# Patient Record
Sex: FEMALE | Race: BLACK OR AFRICAN AMERICAN | NOT HISPANIC OR LATINO | Employment: OTHER | URBAN - METROPOLITAN AREA
[De-identification: names, ages, dates, MRNs, and addresses within clinical notes are randomized per-mention and may not be internally consistent; named-entity substitution may affect disease eponyms.]

---

## 2017-01-03 ENCOUNTER — PATIENT OUTREACH (OUTPATIENT)
Dept: OTHER | Facility: OTHER | Age: 72
End: 2017-01-03
Payer: COMMERCIAL

## 2017-01-03 NOTE — PROGRESS NOTES
Last 5 Patient Entered Readings                                                               Current 30 Day Average: 126/72     Recent Readings 1/2/2017 1/2/2017 12/31/2016 12/30/2016 12/26/2016    Systolic BP (mmHg) 129 138 118 124 119    Diastolic BP (mmHg) 72 77 72 70 69      Ms. Hooper is doing well, continuing her low sodium diet & exercise routine. Patient explained that she will no longer be able to participate in Digital Medicine program because after switching insurance providers she can no longer see Dr. Andrade or other [facility] doctors and that she must now see a provider at [facility]. She states she will still continue to take her BP readings to monitor at home.  will remove her from our active patient list. No further questions or concerns.

## 2017-05-04 ENCOUNTER — PATIENT MESSAGE (OUTPATIENT)
Dept: INTERNAL MEDICINE | Facility: CLINIC | Age: 72
End: 2017-05-04

## 2018-07-03 ENCOUNTER — TELEPHONE (OUTPATIENT)
Dept: INTERNAL MEDICINE | Facility: CLINIC | Age: 73
End: 2018-07-03

## 2018-07-03 ENCOUNTER — OFFICE VISIT (OUTPATIENT)
Dept: INTERNAL MEDICINE | Facility: CLINIC | Age: 73
End: 2018-07-03

## 2018-07-03 VITALS
HEART RATE: 80 BPM | HEIGHT: 61 IN | DIASTOLIC BLOOD PRESSURE: 76 MMHG | SYSTOLIC BLOOD PRESSURE: 142 MMHG | BODY MASS INDEX: 26.51 KG/M2 | WEIGHT: 140.44 LBS | TEMPERATURE: 98 F

## 2018-07-03 DIAGNOSIS — R05.9 COUGH: Primary | ICD-10-CM

## 2018-07-03 PROCEDURE — 99214 OFFICE O/P EST MOD 30 MIN: CPT | Mod: PBBFAC | Performed by: PHYSICIAN ASSISTANT

## 2018-07-03 PROCEDURE — 99212 OFFICE O/P EST SF 10 MIN: CPT | Mod: S$PBB,,, | Performed by: PHYSICIAN ASSISTANT

## 2018-07-03 PROCEDURE — 99999 PR PBB SHADOW E&M-EST. PATIENT-LVL IV: CPT | Mod: PBBFAC,,, | Performed by: PHYSICIAN ASSISTANT

## 2018-07-03 RX ORDER — AMOXICILLIN 875 MG/1
875 TABLET, FILM COATED ORAL 2 TIMES DAILY
Qty: 20 TABLET | Refills: 0 | Status: ON HOLD | OUTPATIENT
Start: 2018-07-03 | End: 2018-07-12 | Stop reason: ALTCHOICE

## 2018-07-03 RX ORDER — CODEINE PHOSPHATE AND GUAIFENESIN 10; 100 MG/5ML; MG/5ML
5-10 SOLUTION ORAL NIGHTLY
Qty: 120 ML | Refills: 0 | Status: SHIPPED | OUTPATIENT
Start: 2018-07-03 | End: 2018-07-23

## 2018-07-03 RX ORDER — DOXYCYCLINE 100 MG/1
100 CAPSULE ORAL EVERY 12 HOURS
Qty: 20 CAPSULE | Refills: 0 | Status: SHIPPED | OUTPATIENT
Start: 2018-07-03 | End: 2018-07-03

## 2018-07-03 RX ORDER — ALBUTEROL SULFATE 90 UG/1
2 AEROSOL, METERED RESPIRATORY (INHALATION) EVERY 4 HOURS PRN
Qty: 1 INHALER | Refills: 0 | Status: ON HOLD | OUTPATIENT
Start: 2018-07-03 | End: 2018-07-12 | Stop reason: ALTCHOICE

## 2018-07-03 NOTE — PATIENT INSTRUCTIONS
What Is Acute Bronchitis?  Acute bronchitis is when the airways in your lungs (bronchial tubes) become red and swollen (inflamed). It is usually caused by a viral infection. But it can also occur because of a bacteria or allergen. Symptoms include a cough that produces yellow or greenish mucus and can last for days or sometimes weeks.  Inside healthy lungs    Air travels in and out of the lungs through the airways. The linings of these airways produce sticky mucus. This mucus traps particles that enter the lungs. Tiny structures called cilia then sweep the particles out of the airways.     Healthy airway: Airways are normally open. Air moves in and out easily.      Healthy cilia: Tiny, hairlike cilia sweep mucus and particles up and out of the airways.   Lungs with bronchitis  Bronchitis often occurs with a cold or the flu virus. The airways become inflamed (red and swollen). There is a deep hacking cough from the extra mucus. Other symptoms may include:  · Wheezing  · Chest discomfort  · Shortness of breath  · Mild fever  A second infection, this time due to bacteria, may then occur. And airways irritated by allergens or smoke are more likely to get infected.        Inflamed airway: Inflammation and extra mucus narrow the airway, causing shortness of breath.      Impaired cilia: Extra mucus impairs cilia, causing congestion and wheezing. Smoking makes the problem worse.   Making a diagnosis  A physical exam, health history, and certain tests help your healthcare provider make the diagnosis.  Health history  Your healthcare provider will ask you about your symptoms.  The exam  Your provider listens to your chest for signs of congestion. He or she may also check your ears, nose, and throat.  Possible tests  · A sputum test for bacteria. This requires a sample of mucus from your lungs.  · A nasal or throat swab. This tests to see if you have a bacterial infection.  · A chest X-ray. This is done if your healthcare  provider thinks you have pneumonia.  · Tests to check for an underlying condition. Other tests may be done to check for things such as allergies, asthma, or COPD (chronic obstructive pulmonary disease). You may need to see a specialist for more lung function testing.  Treating a cough  The main treatment for bronchitis is easing symptoms. Avoiding smoke, allergens, and other things that trigger coughing can often help. If the infection is bacterial, you may be given antibiotics. During the illness, it's important to get plenty of sleep. To ease symptoms:  · Dont smoke. Also avoid secondhand smoke.  · Use a humidifier. Or try breathing in steam from a hot shower. This may help loosen mucus.  · Drink a lot of water and juice. They can soothe the throat and may help thin mucus.  · Sit up or use extra pillows when in bed. This helps to lessen coughing and congestion.  · Ask your provider about using medicine. Ask about using cough medicine, pain and fever medicine, or a decongestant.  Antibiotics  Most cases of bronchitis are caused by cold or flu viruses. They dont need antibiotics to treat them, even if your mucus is thick and green or yellow. Antibiotics dont treat viral illness and antibiotics have not been shown to have any benefit in cases of acute bronchitis. Taking antibiotics when they are not needed increases your risk of getting an infection later that is antibiotic-resistant. Antibiotics can also cause severe cases of diarrhea that require other antibiotics to treat.  It is important that you accept your healthcare provider's opinion to not use antibiotics. Your provider will prescribe antibiotics if the infection is caused by bacteria. If they are prescribed:  · Take all of the medicine. Take the medicine until it is used up, even if symptoms have improved. If you dont, the bronchitis may come back.  · Take the medicines as directed. For instance, some medicines should be taken with food.  · Ask about  side effects. Ask your provider or pharmacist what side effects are common, and what to do about them.  Follow-up care  You should see your provider again in 2 to 3 weeks. By this time, symptoms should have improved. An infection that lasts longer may mean you have a more serious problem.  Prevention  · Avoid tobacco smoke. If you smoke, quit. Stay away from smoky places. Ask friends and family not to smoke around you, or in your home or car.  · Get checked for allergies.  · Ask your provider about getting a yearly flu shot. Also ask about pneumococcal or pneumonia shots.  · Wash your hands often. This helps reduce the chance of picking up viruses that cause colds and flu.  Call your healthcare provider if:  · Symptoms worsen, or you have new symptoms  · Breathing problems worsen or  become severe  · Symptoms dont get better within a week, or within 3 days of taking antibiotics   Date Last Reviewed: 2/1/2017  © 4053-2118 [employer]. [address] [place] [address], [place] [address]. All rights reserved. This information is not intended as a substitute for professional medical care. Always follow your healthcare professional's instructions.

## 2018-07-03 NOTE — TELEPHONE ENCOUNTER
----- Message from Liset Parmar sent at 7/3/2018  1:10 PM CDT -----  Contact: self/ [phone]  Patient was seen today and would like something else called in for doxycycline (VIBRAMYCIN) 100 MG Cap, that is lest expensive.     [facility] [address] [place] [address] [address] [phone] (Phone)  [phone] (Fax)

## 2018-07-03 NOTE — PROGRESS NOTES
Subjective:       Patient ID: Selma Hooper is a 72 y.o. female.        Chief Complaint: Cough    Selma Hooper is an established patient of Phil Quintana MD here today for urgent care visit.    Cough started 2 weeks ago, productive of clear mucus.  Also with post nasal drip and nasal congestion.  No sinus pressure.  No ear pain or sore throat.  No chest pain or shortness of breath.  She does feel like she is wheezing.  No N/V/D/C.  No headache.  No abdominal pain.      She is still smoking.    She no longer has insurance and is going to [facility], seeing Dr. Jameson.    She was on HCTZ, then Hyzaar, then Losartan HCT, off all meds x 6 weeks, has f/u scheduled with Dr. Jameson to discuss.             Review of Systems   Constitutional: Negative for chills, diaphoresis, fatigue and fever.   HENT: Positive for congestion and postnasal drip. Negative for sore throat.    Eyes: Negative for visual disturbance.   Respiratory: Positive for cough. Negative for chest tightness and shortness of breath.    Cardiovascular: Negative for chest pain, palpitations and leg swelling.   Gastrointestinal: Negative for abdominal pain, blood in stool, constipation, diarrhea, nausea and vomiting.   Genitourinary: Negative for dysuria, frequency, hematuria and urgency.   Musculoskeletal: Negative for arthralgias and back pain.   Skin: Negative for rash.   Neurological: Negative for dizziness, syncope, weakness and headaches.   Psychiatric/Behavioral: Negative for dysphoric mood and sleep disturbance. The patient is not nervous/anxious.        Objective:      Physical Exam   Constitutional: She appears well-developed and well-nourished. No distress.   HENT:   Head: Normocephalic and atraumatic.   Right Ear: Tympanic membrane and external ear normal.   Left Ear: Tympanic membrane and external ear normal.   Nose: Mucosal edema and rhinorrhea present.   Mouth/Throat: Oropharynx is clear and moist.   Thick white post nasal drainage in  "oropharynx   Eyes: Conjunctivae are normal. Pupils are equal, round, and reactive to light.   Cardiovascular: Normal rate, regular rhythm and normal heart sounds.  Exam reveals no gallop.    No murmur heard.  Pulmonary/Chest: Effort normal and breath sounds normal. No respiratory distress.   Abdominal: Soft. Normal appearance. There is no tenderness. There is no rebound, no guarding and no CVA tenderness.   Musculoskeletal: She exhibits no edema.   Neurological: She is alert.   Skin: Skin is warm and dry. She is not diaphoretic.   Psychiatric: She has a normal mood and affect.   Nursing note and vitals reviewed.      Assessment:       1. Cough/Acute bronchitis        Plan:       Selma was seen today for cough.    Diagnoses and all orders for this visit:    Cough/acute bronchitis  -     doxycycline (VIBRAMYCIN) 100 MG Cap; Take 1 capsule (100 mg total) by mouth every 12 (twelve) hours.  -     albuterol 90 mcg/actuation inhaler; Inhale 2 puffs into the lungs every 4 (four) hours as needed for Wheezing.  -     guaifenesin-codeine 100-10 mg/5 ml (TUSSI-ORGANIDIN NR)  mg/5 mL syrup; Take 5-10 mLs by mouth every evening.    Drink plenty of fluids, get lots of rest, and follow-up poor results.      Pt has been given instructions populated from [employer] database and has verbalized understanding of the after visit summary and information contained wherein.    Follow up with a primary care provider. May go to ER for acute shortness of breath, lightheadedness, fever, or any other emergent complaints or changes in condition.    "This note will be shared with the patient"    No future appointments.            "

## 2018-07-05 ENCOUNTER — TELEPHONE (OUTPATIENT)
Dept: INTERNAL MEDICINE | Facility: CLINIC | Age: 73
End: 2018-07-05

## 2018-07-05 NOTE — TELEPHONE ENCOUNTER
----- Message from Yash Alvarado sent at 7/5/2018 10:55 AM CDT -----  Contact: Patient  Patient called while was on hold hung up call trying to retrieve call, called patient back no response left vm informing Rx for amoxil sent.      Thank you

## 2018-07-06 ENCOUNTER — PATIENT MESSAGE (OUTPATIENT)
Dept: INTERNAL MEDICINE | Facility: CLINIC | Age: 73
End: 2018-07-06

## 2018-07-11 ENCOUNTER — HOSPITAL ENCOUNTER (INPATIENT)
Facility: HOSPITAL | Age: 73
LOS: 3 days | Discharge: HOME | DRG: 291 | End: 2018-07-14
Attending: EMERGENCY MEDICINE | Admitting: HOSPITALIST
Payer: MEDICAID

## 2018-07-11 DIAGNOSIS — I50.9 NEW ONSET OF CONGESTIVE HEART FAILURE: Primary | ICD-10-CM

## 2018-07-11 DIAGNOSIS — R07.9 CHEST PAIN: ICD-10-CM

## 2018-07-11 DIAGNOSIS — R06.02 SOB (SHORTNESS OF BREATH): ICD-10-CM

## 2018-07-11 PROBLEM — R79.89 ELEVATED LFTS: Status: ACTIVE | Noted: 2018-07-11

## 2018-07-11 PROBLEM — D64.9 ANEMIA: Status: ACTIVE | Noted: 2018-07-11

## 2018-07-11 LAB
ALBUMIN SERPL BCP-MCNC: 3.2 G/DL
ALLENS TEST: ABNORMAL
ALP SERPL-CCNC: 145 U/L
ALT SERPL W/O P-5'-P-CCNC: 118 U/L
ANION GAP SERPL CALC-SCNC: 10 MMOL/L
ANION GAP SERPL CALC-SCNC: 17 MMOL/L
AST SERPL-CCNC: 165 U/L
BACTERIA #/AREA URNS AUTO: ABNORMAL /HPF
BASOPHILS # BLD AUTO: 0.15 K/UL
BASOPHILS NFR BLD: 1.2 %
BILIRUB SERPL-MCNC: 0.3 MG/DL
BILIRUB UR QL STRIP: NEGATIVE
BNP SERPL-MCNC: 1151 PG/ML
BUN SERPL-MCNC: 17 MG/DL
BUN SERPL-MCNC: 20 MG/DL
CALCIUM SERPL-MCNC: 7.7 MG/DL
CALCIUM SERPL-MCNC: 8.5 MG/DL
CHLORIDE SERPL-SCNC: 106 MMOL/L
CHLORIDE SERPL-SCNC: 107 MMOL/L
CLARITY UR REFRACT.AUTO: ABNORMAL
CO2 SERPL-SCNC: 17 MMOL/L
CO2 SERPL-SCNC: 26 MMOL/L
COLOR UR AUTO: YELLOW
CREAT SERPL-MCNC: 0.8 MG/DL
CREAT SERPL-MCNC: 1.3 MG/DL
DELSYS: ABNORMAL
DIFFERENTIAL METHOD: ABNORMAL
EOSINOPHIL # BLD AUTO: 0.2 K/UL
EOSINOPHIL NFR BLD: 1.7 %
EP: 5
EP: 7
ERYTHROCYTE [DISTWIDTH] IN BLOOD BY AUTOMATED COUNT: 19.1 %
ERYTHROCYTE [SEDIMENTATION RATE] IN BLOOD BY WESTERGREN METHOD: 10 MM/H
EST. GFR  (AFRICAN AMERICAN): 48 ML/MIN/1.73 M^2
EST. GFR  (AFRICAN AMERICAN): >60 ML/MIN/1.73 M^2
EST. GFR  (NON AFRICAN AMERICAN): 41.7 ML/MIN/1.73 M^2
EST. GFR  (NON AFRICAN AMERICAN): >60 ML/MIN/1.73 M^2
ESTIMATED AVG GLUCOSE: 105 MG/DL
FIO2: 50
FIO2: 60
GLUCOSE SERPL-MCNC: 174 MG/DL (ref 70–110)
GLUCOSE SERPL-MCNC: 402 MG/DL
GLUCOSE SERPL-MCNC: 88 MG/DL
GLUCOSE UR QL STRIP: ABNORMAL
HBA1C MFR BLD HPLC: 5.3 %
HCO3 UR-SCNC: 24.8 MMOL/L (ref 24–28)
HCO3 UR-SCNC: 28.9 MMOL/L (ref 24–28)
HCT VFR BLD AUTO: 36.6 %
HGB BLD-MCNC: 9.3 G/DL
HGB UR QL STRIP: NEGATIVE
HYALINE CASTS UR QL AUTO: 28 /LPF
IMM GRANULOCYTES # BLD AUTO: 0.13 K/UL
IMM GRANULOCYTES NFR BLD AUTO: 1 %
IP: 13
IP: 15
KETONES UR QL STRIP: NEGATIVE
LACTATE SERPL-SCNC: 2.2 MMOL/L
LACTATE SERPL-SCNC: 8.5 MMOL/L
LDH SERPL L TO P-CCNC: 2.31 MMOL/L (ref 0.5–2.2)
LDH SERPL L TO P-CCNC: 5.76 MMOL/L (ref 0.5–2.2)
LEUKOCYTE ESTERASE UR QL STRIP: NEGATIVE
LYMPHOCYTES # BLD AUTO: 6.7 K/UL
LYMPHOCYTES NFR BLD: 52.5 %
MAGNESIUM SERPL-MCNC: 2.7 MG/DL
MCH RBC QN AUTO: 18.6 PG
MCHC RBC AUTO-ENTMCNC: 25.4 G/DL
MCV RBC AUTO: 73 FL
MICROSCOPIC COMMENT: ABNORMAL
MODE: ABNORMAL
MONOCYTES # BLD AUTO: 0.9 K/UL
MONOCYTES NFR BLD: 7 %
NEUTROPHILS # BLD AUTO: 4.7 K/UL
NEUTROPHILS NFR BLD: 36.6 %
NITRITE UR QL STRIP: NEGATIVE
NRBC BLD-RTO: 0 /100 WBC
PCO2 BLDA: 77.2 MMHG (ref 35–45)
PCO2 BLDA: 87 MMHG (ref 35–45)
PH SMN: 7.06 [PH] (ref 7.35–7.45)
PH SMN: 7.18 [PH] (ref 7.35–7.45)
PH UR STRIP: 5 [PH] (ref 5–8)
PLATELET # BLD AUTO: 363 K/UL
PMV BLD AUTO: 9.8 FL
PO2 BLDA: 35 MMHG (ref 40–60)
PO2 BLDA: 79 MMHG (ref 40–60)
POC BE: -5 MMOL/L
POC BE: 1 MMOL/L
POC SATURATED O2: 50 % (ref 95–100)
POC SATURATED O2: 88 % (ref 95–100)
POC TCO2: 27 MMOL/L (ref 24–29)
POC TCO2: 31 MMOL/L (ref 24–29)
POCT GLUCOSE: 174 MG/DL (ref 70–110)
POTASSIUM SERPL-SCNC: 3.9 MMOL/L
POTASSIUM SERPL-SCNC: 4 MMOL/L
PROT SERPL-MCNC: 6.5 G/DL
PROT UR QL STRIP: ABNORMAL
RBC # BLD AUTO: 5.01 M/UL
RBC #/AREA URNS AUTO: 1 /HPF (ref 0–4)
SAMPLE: ABNORMAL
SITE: ABNORMAL
SODIUM SERPL-SCNC: 141 MMOL/L
SODIUM SERPL-SCNC: 142 MMOL/L
SP GR UR STRIP: 1.01 (ref 1–1.03)
SP02: 100
SP02: 100
SP02: 98
SP02: 98
SPONT RATE: 3
SPONT RATE: 32
SPONT RATE: 33
SQUAMOUS #/AREA URNS AUTO: 1 /HPF
TROPONIN I SERPL DL<=0.01 NG/ML-MCNC: 0.05 NG/ML
TROPONIN I SERPL DL<=0.01 NG/ML-MCNC: 0.12 NG/ML
TROPONIN I SERPL DL<=0.01 NG/ML-MCNC: 0.17 NG/ML
URN SPEC COLLECT METH UR: ABNORMAL
UROBILINOGEN UR STRIP-ACNC: NEGATIVE EU/DL
WBC # BLD AUTO: 12.83 K/UL
WBC #/AREA URNS AUTO: 5 /HPF (ref 0–5)

## 2018-07-11 PROCEDURE — 93010 ELECTROCARDIOGRAM REPORT: CPT | Mod: ,,, | Performed by: INTERNAL MEDICINE

## 2018-07-11 PROCEDURE — 83605 ASSAY OF LACTIC ACID: CPT

## 2018-07-11 PROCEDURE — 27000190 HC CPAP FULL FACE MASK W/VALVE

## 2018-07-11 PROCEDURE — 80053 COMPREHEN METABOLIC PANEL: CPT

## 2018-07-11 PROCEDURE — 63600175 PHARM REV CODE 636 W HCPCS: Performed by: EMERGENCY MEDICINE

## 2018-07-11 PROCEDURE — 36415 COLL VENOUS BLD VENIPUNCTURE: CPT

## 2018-07-11 PROCEDURE — 25000003 PHARM REV CODE 250: Performed by: EMERGENCY MEDICINE

## 2018-07-11 PROCEDURE — 82962 GLUCOSE BLOOD TEST: CPT

## 2018-07-11 PROCEDURE — 99900035 HC TECH TIME PER 15 MIN (STAT)

## 2018-07-11 PROCEDURE — 83735 ASSAY OF MAGNESIUM: CPT

## 2018-07-11 PROCEDURE — 87040 BLOOD CULTURE FOR BACTERIA: CPT

## 2018-07-11 PROCEDURE — 84484 ASSAY OF TROPONIN QUANT: CPT | Mod: 91

## 2018-07-11 PROCEDURE — 11000001 HC ACUTE MED/SURG PRIVATE ROOM

## 2018-07-11 PROCEDURE — 83036 HEMOGLOBIN GLYCOSYLATED A1C: CPT

## 2018-07-11 PROCEDURE — 25000003 PHARM REV CODE 250: Performed by: HOSPITALIST

## 2018-07-11 PROCEDURE — 80048 BASIC METABOLIC PNL TOTAL CA: CPT

## 2018-07-11 PROCEDURE — 82803 BLOOD GASES ANY COMBINATION: CPT

## 2018-07-11 PROCEDURE — 94761 N-INVAS EAR/PLS OXIMETRY MLT: CPT

## 2018-07-11 PROCEDURE — 25000003 PHARM REV CODE 250: Performed by: STUDENT IN AN ORGANIZED HEALTH CARE EDUCATION/TRAINING PROGRAM

## 2018-07-11 PROCEDURE — 63600175 PHARM REV CODE 636 W HCPCS: Performed by: STUDENT IN AN ORGANIZED HEALTH CARE EDUCATION/TRAINING PROGRAM

## 2018-07-11 PROCEDURE — 96365 THER/PROPH/DIAG IV INF INIT: CPT

## 2018-07-11 PROCEDURE — 99291 CRITICAL CARE FIRST HOUR: CPT | Mod: 25

## 2018-07-11 PROCEDURE — 93005 ELECTROCARDIOGRAM TRACING: CPT

## 2018-07-11 PROCEDURE — 85025 COMPLETE CBC W/AUTO DIFF WBC: CPT

## 2018-07-11 PROCEDURE — 99291 CRITICAL CARE FIRST HOUR: CPT | Mod: ,,, | Performed by: EMERGENCY MEDICINE

## 2018-07-11 PROCEDURE — 27000221 HC OXYGEN, UP TO 24 HOURS

## 2018-07-11 PROCEDURE — 96375 TX/PRO/DX INJ NEW DRUG ADDON: CPT

## 2018-07-11 PROCEDURE — 84484 ASSAY OF TROPONIN QUANT: CPT

## 2018-07-11 PROCEDURE — 81001 URINALYSIS AUTO W/SCOPE: CPT

## 2018-07-11 PROCEDURE — 99223 1ST HOSP IP/OBS HIGH 75: CPT | Mod: GC,,, | Performed by: HOSPITALIST

## 2018-07-11 PROCEDURE — 83605 ASSAY OF LACTIC ACID: CPT | Mod: 91

## 2018-07-11 PROCEDURE — 83880 ASSAY OF NATRIURETIC PEPTIDE: CPT

## 2018-07-11 PROCEDURE — 94660 CPAP INITIATION&MGMT: CPT

## 2018-07-11 PROCEDURE — 25000003 PHARM REV CODE 250

## 2018-07-11 RX ORDER — IBUPROFEN 200 MG
24 TABLET ORAL
Status: DISCONTINUED | OUTPATIENT
Start: 2018-07-11 | End: 2018-07-14 | Stop reason: HOSPADM

## 2018-07-11 RX ORDER — IBUPROFEN 200 MG
16 TABLET ORAL
Status: DISCONTINUED | OUTPATIENT
Start: 2018-07-11 | End: 2018-07-14 | Stop reason: HOSPADM

## 2018-07-11 RX ORDER — ONDANSETRON 4 MG/1
4 TABLET, FILM COATED ORAL ONCE
Status: COMPLETED | OUTPATIENT
Start: 2018-07-11 | End: 2018-07-11

## 2018-07-11 RX ORDER — NITROGLYCERIN 20 MG/100ML
INJECTION INTRAVENOUS
Status: COMPLETED
Start: 2018-07-11 | End: 2018-07-11

## 2018-07-11 RX ORDER — ENOXAPARIN SODIUM 100 MG/ML
40 INJECTION SUBCUTANEOUS EVERY 24 HOURS
Status: DISCONTINUED | OUTPATIENT
Start: 2018-07-11 | End: 2018-07-12

## 2018-07-11 RX ORDER — BENZONATATE 100 MG/1
100 CAPSULE ORAL 3 TIMES DAILY PRN
Status: DISCONTINUED | OUTPATIENT
Start: 2018-07-11 | End: 2018-07-14 | Stop reason: HOSPADM

## 2018-07-11 RX ORDER — GLUCAGON 1 MG
1 KIT INJECTION
Status: DISCONTINUED | OUTPATIENT
Start: 2018-07-11 | End: 2018-07-14 | Stop reason: HOSPADM

## 2018-07-11 RX ORDER — FUROSEMIDE 10 MG/ML
40 INJECTION INTRAMUSCULAR; INTRAVENOUS 2 TIMES DAILY
Status: DISCONTINUED | OUTPATIENT
Start: 2018-07-11 | End: 2018-07-12

## 2018-07-11 RX ORDER — FUROSEMIDE 10 MG/ML
40 INJECTION INTRAMUSCULAR; INTRAVENOUS
Status: COMPLETED | OUTPATIENT
Start: 2018-07-11 | End: 2018-07-11

## 2018-07-11 RX ORDER — ACETAMINOPHEN 325 MG/1
650 TABLET ORAL EVERY 4 HOURS PRN
Status: DISCONTINUED | OUTPATIENT
Start: 2018-07-11 | End: 2018-07-14 | Stop reason: HOSPADM

## 2018-07-11 RX ORDER — NAPROXEN SODIUM 220 MG/1
324 TABLET, FILM COATED ORAL
Status: COMPLETED | OUTPATIENT
Start: 2018-07-11 | End: 2018-07-11

## 2018-07-11 RX ORDER — NITROGLYCERIN 20 MG/100ML
5 INJECTION INTRAVENOUS CONTINUOUS
Status: DISCONTINUED | OUTPATIENT
Start: 2018-07-11 | End: 2018-07-11

## 2018-07-11 RX ORDER — FLUTICASONE PROPIONATE 50 MCG
1 SPRAY, SUSPENSION (ML) NASAL DAILY PRN
Status: ON HOLD | COMMUNITY
End: 2022-05-03 | Stop reason: CLARIF

## 2018-07-11 RX ORDER — MOXIFLOXACIN HYDROCHLORIDE 400 MG/1
400 TABLET ORAL DAILY
Status: DISCONTINUED | OUTPATIENT
Start: 2018-07-11 | End: 2018-07-14 | Stop reason: HOSPADM

## 2018-07-11 RX ORDER — IPRATROPIUM BROMIDE AND ALBUTEROL SULFATE 2.5; .5 MG/3ML; MG/3ML
3 SOLUTION RESPIRATORY (INHALATION) EVERY 4 HOURS PRN
Status: DISCONTINUED | OUTPATIENT
Start: 2018-07-11 | End: 2018-07-14 | Stop reason: HOSPADM

## 2018-07-11 RX ORDER — ONDANSETRON 8 MG/1
8 TABLET, ORALLY DISINTEGRATING ORAL EVERY 8 HOURS PRN
Status: DISCONTINUED | OUTPATIENT
Start: 2018-07-11 | End: 2018-07-12

## 2018-07-11 RX ORDER — SODIUM CHLORIDE 0.9 % (FLUSH) 0.9 %
5 SYRINGE (ML) INJECTION
Status: DISCONTINUED | OUTPATIENT
Start: 2018-07-11 | End: 2018-07-14 | Stop reason: HOSPADM

## 2018-07-11 RX ADMIN — ASPIRIN 81 MG CHEWABLE TABLET 324 MG: 81 TABLET CHEWABLE at 05:07

## 2018-07-11 RX ADMIN — ONDANSETRON 8 MG: 8 TABLET, ORALLY DISINTEGRATING ORAL at 11:07

## 2018-07-11 RX ADMIN — MOXIFLOXACIN HYDROCHLORIDE 400 MG: 400 TABLET, FILM COATED ORAL at 09:07

## 2018-07-11 RX ADMIN — LOSARTAN POTASSIUM 12.5 MG: 25 TABLET, FILM COATED ORAL at 11:07

## 2018-07-11 RX ADMIN — NITROGLYCERIN 200 MCG/MIN: 20 INJECTION INTRAVENOUS at 05:07

## 2018-07-11 RX ADMIN — ONDANSETRON 4 MG: 4 TABLET, FILM COATED ORAL at 08:07

## 2018-07-11 RX ADMIN — FUROSEMIDE 40 MG: 10 INJECTION, SOLUTION INTRAMUSCULAR; INTRAVENOUS at 05:07

## 2018-07-11 RX ADMIN — FUROSEMIDE 40 MG: 10 INJECTION, SOLUTION INTRAMUSCULAR; INTRAVENOUS at 06:07

## 2018-07-11 RX ADMIN — ENOXAPARIN SODIUM 40 MG: 100 INJECTION SUBCUTANEOUS at 05:07

## 2018-07-11 NOTE — ED NOTES
Pt. Resting in bed in NAD. Continuous cardiac, BP, and O2 monitoring in progress. VS being monitoring continuously per MD orders. Pt. Offered bathroom assistance and denies need at this time. Explanation of care/wait provided. Bed in low, locked position with rails up and call bell in reach. Will continue to monitor.

## 2018-07-11 NOTE — ASSESSMENT & PLAN NOTE
Was on HCTZ-losartan until 1 month ago - had dry cough, meds were stopped by PCP. Checks her BP daily - 118-140/60s. 180s/100s on admit, required nitro gtt.     - Start on losartan 12.5  - Cont to monitor BP

## 2018-07-11 NOTE — LETTER
July 16, 2018         [address]  [place] [address]  Phone: [phone]  Fax: [phone]       Patient: Selma Hooper   YOB: 1945  Date of Visit: 07/16/2018    To Whom It May Concern:    Yvon Hooper  was admitted at [facility] on 7/11/18; her son Nelson Ryees was present to visit/assist her while she was in the hospital. He anticipates returning to work on 7/18/18 and is helping take care of her now. If you have any questions or concerns, or if I can be of further assistance, please do not hesitate to contact me.    Sincerely,    Yinka Tompkins MD

## 2018-07-11 NOTE — HPI
Ms Hooper is a 72F with HTN who presented with SOB. Both she and her daughter have noticed progressive SOB on exertion in the past 4 weeks, and last night she had felt like she couldn't breathe. She had stopped her HTN medication (losartan-HCTZ) also about 4 weeks ago since she was complaining of a dry cough and her PCP decided that she was stable to stop. She checks her BP every day ranging 118-140/60s. She is worried due to family history of stroke and HTN. Of note, she is currently on antibiotic treatment for sinusitis from an urgent care clinic (day 8/10) and has been taking flonase and guaifenisin-codeine for it.    Per EMS, she was satting at 70%, RR 44, tachycardic 140s, with a BP of 188/110. On arrival to ED, she was put on a nitro drip. VBG showed acidosis and hypercarbia. She had elevated lactate, BNP, troponin, glucose, mild leukocytosis. Beside US showed B-lines, bedside echo showed LVH with no obvious R strain. EKG confirmed sinus tachycardia with no ST changes. She was admitted to hospital medicine for management and workup of her SOB.

## 2018-07-11 NOTE — ASSESSMENT & PLAN NOTE
Progressive for past month, no history of COPD. Recent treatment of sinusitis. CXR showed increasted interstitial markings in lower lung zones. Initial VBG with acidosis and hypercarbia - improvement after bipap. BNP 1151. Consider new onset CHF vs PNA vs flash pulmonary edema vs COPD. Troponin trending up .015 to .118.    - 2D echo pending  - Control BP - started losartan  - Lasix 40 IV BID to remove fluid  - Monitor renal function  - Trend troponin

## 2018-07-11 NOTE — ASSESSMENT & PLAN NOTE
Hb 9.3 today, no other results listed in system. Can be due to anemia of chronic disease vs iron deficiency    - Monitor CBC  - Consider iron studies

## 2018-07-11 NOTE — ED NOTES
Care assumed. Pt. Resting in bed in NAD. Continuous cardiac, BP, and O2 monitoring in progress. VS being monitoring continuously per MD orders. Pt. Offered bathroom assistance and denies need at this time. Explanation of care/wait provided. Bed in low, locked position with rails up and call bell in reach. Will continue to monitor.

## 2018-07-11 NOTE — ED NOTES
Verbal orders received from dr. Granados to start nitro dip at 200 mcg/min. - nitro started at 0501.

## 2018-07-11 NOTE — ED NOTES
HOURLY ROUNDING COMPLETE. Patient resting in stretcher and is in NAD at this time. Pt is awake alert and oriented x4, VSS, respirations even and unlabored at this time, SpO2 maintained >95% on NC. Pt updated on POC. Bed low and locked with side rails up x2, call bell in pt reach.

## 2018-07-11 NOTE — HOSPITAL COURSE
In ED, was put on nitro gtt for BP 180s/100s; nitro was tapered down as her BP trended down and eventually the drip was stopped. VBG showed acidosis and hypercarbia, she was put on bipap with improvement of her VBG after one hour. Lactate was 8.5, decreased to 2. CXR showed increased interstitial markings in lower lung zones, negative for PTX. EKG with no ST changes, sinus tachycardia. Bedside US showed B-lines in all fields. Bedside Echo showed LVH with no obvious R heart strain - 2D echo showed ef 15-20%.  7/14 Cr increased this morning improved with 250cc bolus, will discharge home today holding spironolactone, continue small dose losartan and lasix, continue metoprolol, can restart spironolactone after following up with PCP/ cardiology with repeat BMP if appropriate.

## 2018-07-11 NOTE — ED PROVIDER NOTES
Encounter Date: 7/11/2018    SCRIBE #1 NOTE: I, Davian Palm, am scribing for, and in the presence of,  Dr. Granados. I have scribed the entire note.       History     Chief Complaint   Patient presents with    Shortness of Breath     Patient woke up, was short of breath. Patient became anxious and combative during transport. Upon arrival patient was sating in the low 90s and combative.      Time patient was seen by the provider: 4:51 AM      The patient is a 72 y.o. female who is a current every day smoker (.5 ppd) with co-morbidities including: HTN who presents to the ED via EMS with a complaint of sudden SOB that awoke her tonight. Pt requested that the daughter call EMS. Per daughter, pt has had exertional SOB recently. Per EMS, pt was tachycardic (140's), BP of 188/110, had right expiratory wheezes, and oxygen saturation 70% and pt was placed on BiPAP. Per daughter, pt has no hx of MI or dementia and has not complained of any CP. Pt denies any current nausea. Pt was recently treated for bronchitis by her PCP with doxycycline, albuterol, and guaifenesin-codeine.  .      The history is provided by the patient, medical records and a relative. The history is limited by the condition of the patient.     Review of patient's allergies indicates:  No Known Allergies  Past Medical History:   Diagnosis Date    Hypertension      Past Surgical History:   Procedure Laterality Date    CHOLECYSTECTOMY      COLONOSCOPY      HYSTERECTOMY       Family History   Problem Relation Age of Onset    Heart disease Mother     Colon cancer Father         colon    Dementia Father     Colon cancer Brother         colon    Diabetes Daughter      Social History   Substance Use Topics    Smoking status: Current Every Day Smoker     Packs/day: 0.50    Smokeless tobacco: Not on file    Alcohol use Yes      Comment: rarely     Review of Systems   Unable to perform ROS: Severe respiratory distress       Physical Exam     Initial Vitals    BP Pulse Resp Temp SpO2   07/11/18 0455 07/11/18 0455 07/11/18 0455 07/11/18 0522 07/11/18 0455   (!) 182/103 (!) 134 (!) 27 96.7 °F (35.9 °C) (!) 93 %      MAP       --                Physical Exam   Appearance: No acute distress. In extremis. Diaphoretic.    Skin: No rashes seen.  Good turgor.  No abrasions.  No ecchymoses.  Eyes: No conjunctival injection.  ENT: Oropharynx clear.    Chest: Tachypnea. Using all accessory muscles to breath. Rales diffusely. No wheezes.  No rhonchi.  Cardiovascular: Tachycardic. Regular rhythm.  No murmurs. No gallops. No rubs.  Abdomen: Soft.  Not distended.  Nontender.  No guarding.  No rebound. No Masses  Musculoskeletal: Good range of motion all joints.  No deformities.  Neck supple.  No meningismus.  Neurologic: Motor intact.  Sensation intact.  Cerebellar intact.  Cranial nerves intact.  Mental Status:  Alert and oriented x 3.  Appropriate, conversant.          ED Course   Critical Care  Date/Time: 7/11/2018 5:00 AM  Performed by: SVEN MACKENZIE.  Authorized by: SVEN MACKENZIE   Direct patient critical care time: 45 minutes  Ordering / reviewing critical care time: 5 minutes  Documentation critical care time: 5 minutes  Consulting other physicians critical care time: 5 minutes  Consult with family critical care time: 5 minutes  Total critical care time (exclusive of procedural time) : 65 minutes  Critical care time was exclusive of separately billable procedures and treating other patients and teaching time.  Critical care was necessary to treat or prevent imminent or life-threatening deterioration of the following conditions: cardiac failure and respiratory failure.  Critical care was time spent personally by me on the following activities: development of treatment plan with patient or surrogate, interpretation of cardiac output measurements, evaluation of patient's response to treatment, examination of patient, obtaining history from patient or surrogate, ordering and  performing treatments and interventions, ordering and review of laboratory studies, ordering and review of radiographic studies, pulse oximetry and re-evaluation of patient's condition.        Labs Reviewed   ISTAT PROCEDURE - Abnormal; Notable for the following:        Result Value    POC PH 7.063 (*)     POC PCO2 87.0 (*)     POC PO2 79 (*)     POC SATURATED O2 88 (*)     All other components within normal limits   ISTAT LACTATE - Abnormal; Notable for the following:     POC Lactate 5.76 (*)     All other components within normal limits   CULTURE, BLOOD   CULTURE, BLOOD   MAGNESIUM   CBC W/ AUTO DIFFERENTIAL   COMPREHENSIVE METABOLIC PANEL   LACTIC ACID, PLASMA   URINALYSIS, REFLEX TO URINE CULTURE   B-TYPE NATRIURETIC PEPTIDE   TROPONIN I     EKG Readings: (Independently Interpreted)   4:53  Sinus tachycardia at rate of 134 bpm. No ST changes. T-wave inversions in V4-V6, 2, 3, AVF. No comparison for previous.     5:31  Sinus rhythm at rate of 100 bpm. Slightly wide QRS diffusely. No change in T-wave inversion. No new ST changes.        Imaging Results          X-Ray Chest AP Portable (Final result)  Result time 07/11/18 05:38:59    Final result by Ki Garcia MD (07/11/18 05:38:59)                 Impression:      1. Increased interstitial markings within the lower lung zones which may relate to edema, aspiration or pneumonia.      Electronically signed by: Ki Garcia MD  Date:    07/11/2018  Time:    05:38             Narrative:    EXAMINATION:  XR CHEST AP PORTABLE    CLINICAL HISTORY:  Sepsis;    TECHNIQUE:  Single frontal view of the chest was performed.    COMPARISON:  None    FINDINGS:  Mediastinal structures are midline.  Hilar contours are unremarkable.  Cardiac silhouette is magnified by AP technique.  Lung volumes are symmetric.  Increased interstitial markings noted within the lower lung zones, possibly related to edema, aspiration, pneumonia.  There is mild blunting of the right  costophrenic angle which may relate to layering pleural fluid.  No pneumothorax.  No free air beneath the diaphragm.  No acute osseous abnormalities.                              X-Rays:   Independently Interpreted Readings:   Chest X-Ray: Shows bilateral fluid overload. No obvious pneumonia.      Medical Decision Making:   History:   Old Medical Records: I decided to obtain old medical records.  Initial Assessment:   Pt initially here in severe respiratory distress. Immediately upon arrival, pt pulled off all monitoring leads and IV. IV access was quickly re-obtained by nursing staff. Pt placed on monitor here. Pt hypertensive, tachycardic, and diaphoretic, consistent with CHF exacerbation/flash pulmonary edema. Bedside US shows B-lines in all lung fields. Bedside echo shows apparent LVH. No obvious RV strain. Good squeeze. No pericardial effusion. NTG started at 200 mcg/min. Pt transfer to our BiPAP. Over the next 10-15 minutes, pt quickly improved her pressure and heart rate. NTG weaned down to 20 mcg/min. VBG shows marked acidosis and hypercarbia as well as elevated lactate. CXR shows bilateral fluid overload. No obvious pneumonia.     Repeat Lactate down to 2.  Off nitro, stable vital signs speaking in full sentences.  Trop elevated, BNP very elevated, given lasix 40 mg as she is naive to it.  Repeat pH notably improved, will continue to monitor. Unclear cause, but possibly due to her hypertension on arrival.  Will admit to hospital medicine for further care.  Independently Interpreted Test(s):   I have ordered and independently interpreted X-rays - see prior notes.  I have ordered and independently interpreted EKG Reading(s) - see prior notes  Clinical Tests:   Lab Tests: Ordered and Reviewed  Radiological Study: Ordered and Reviewed  Medical Tests: Ordered and Reviewed            Scribe Attestation:   Scribe #1: I performed the above scribed service and the documentation accurately describes the services I  performed. I attest to the accuracy of the note.            ED Course as of Jul 11 0707   Wed Jul 11, 2018   0700 POC PH: (!) 7.182 [DT]      ED Course User Index  [DT] Bill Granados MD     Clinical Impression:   The encounter diagnosis was SOB (shortness of breath).                             Bill Granados MD  07/11/18 0710       Bill Granados MD  07/11/18 2252       Bill Granados MD  07/11/18 2255

## 2018-07-11 NOTE — ED NOTES
HOURLY ROUNDING COMPLETE. Patient resting in stretcher and is in NAD at this time. Pt is awake alert and oriented x4, VSS, respirations even and unlabored at this time, pt remains on BIPAP. Pt updated on POC. Bed low and locked with side rails up x2, call bell in pt reach.

## 2018-07-11 NOTE — H&P
[facility] Hospital Medicine  History & Physical    Patient Name: Selma Hooper  MRN: [id number]  Admission Date: 7/11/2018  Attending Physician: Yinka Tompkins, *   Primary Care Provider: Phil Quintana MD    [facility] Medicine Team: [facility] Juliana Conn MD     Patient information was obtained from patient, relative(s) and ER records.     Subjective:     Principal Problem:SOB (shortness of breath)    Chief Complaint:   Chief Complaint   Patient presents with    Shortness of Breath     Patient woke up, was short of breath. Patient became anxious and combative during transport. Upon arrival patient was sating in the low 90s and combative.         HPI: Ms Hooper is a 72F with HTN who presented with SOB. Both she and her daughter have noticed progressive SOB on exertion in the past 4 weeks, and last night she had felt like she couldn't breathe. She had stopped her HTN medication (losartan-HCTZ) also about 4 weeks ago since she was complaining of a dry cough and her PCP decided that she was stable to stop. She checks her BP every day ranging 118-140/60s. She is worried due to family history of stroke and HTN. Of note, she is currently on antibiotic treatment for sinusitis from an urgent care clinic (day 8/10) and has been taking flonase and guaifenisin-codeine for it.    Per EMS, she was satting at 70%, RR 44, tachycardic 140s, with a BP of 188/110. On arrival to ED, she was put on a nitro drip. VBG showed acidosis and hypercarbia. She had elevated lactate, BNP, troponin, glucose, mild leukocytosis. Beside US showed B-lines, bedside echo showed LVH with no obvious R strain. EKG confirmed sinus tachycardia with no ST changes. She was admitted to hospital medicine for management and workup of her SOB.    Past Medical History:   Diagnosis Date    Hypertension        Past Surgical History:   Procedure Laterality Date    CHOLECYSTECTOMY      COLONOSCOPY      HYSTERECTOMY          Review of patient's allergies indicates:  No Known Allergies    No current facility-administered medications on file prior to encounter.      Current Outpatient Prescriptions on File Prior to Encounter   Medication Sig    amoxicillin (AMOXIL) 875 MG tablet Take 1 tablet (875 mg total) by mouth 2 (two) times daily. for 10 days    aspirin 81 MG Chew Take 81 mg by mouth once daily.    albuterol 90 mcg/actuation inhaler Inhale 2 puffs into the lungs every 4 (four) hours as needed for Wheezing.    guaifenesin-codeine 100-10 mg/5 ml (TUSSI-ORGANIDIN NR)  mg/5 mL syrup Take 5-10 mLs by mouth every evening.    [DISCONTINUED] hydrochlorothiazide (HYDRODIURIL) 25 MG tablet Take 1 tablet (25 mg total) by mouth once daily.     Family History     Problem Relation (Age of Onset)    Colon cancer Father, Brother    Dementia Father    Diabetes Daughter    Heart disease Mother        Social History Main Topics    Smoking status: Current Every Day Smoker     Packs/day: 0.50    Smokeless tobacco: Not on file    Alcohol use Yes      Comment: rarely    Drug use: No    Sexual activity: Not on file     Review of Systems   Constitutional: Positive for activity change (decreased), chills (x1 day) and fever (x1 day). Negative for appetite change.   HENT: Positive for congestion (on amoxicillin for sinusitis), postnasal drip, rhinorrhea and sinus pressure. Negative for sinus pain, sore throat and trouble swallowing.    Respiratory: Positive for cough (dry) and shortness of breath (progressive in past month). Negative for choking, wheezing and stridor.    Cardiovascular: Negative for chest pain, palpitations and leg swelling.   Gastrointestinal: Negative for abdominal distention, abdominal pain, blood in stool, constipation, diarrhea, nausea and vomiting.   Genitourinary: Negative for difficulty urinating, dysuria, flank pain, frequency, hematuria and pelvic pain.   Musculoskeletal: Negative for arthralgias, joint  swelling, myalgias and neck pain.   Skin: Negative for color change, pallor, rash and wound.   Neurological: Negative for dizziness, tremors, syncope and headaches.   Psychiatric/Behavioral: Negative for agitation, behavioral problems and confusion. The patient is not nervous/anxious.      Objective:     Vital Signs (Most Recent):  Temp: 98.2 °F (36.8 °C) (07/11/18 0734)  Pulse: 64 (07/11/18 1009)  Resp: 18 (07/11/18 0822)  BP: (!) 108/59 (07/11/18 1009)  SpO2: 100 % (07/11/18 1009) Vital Signs (24h Range):  Temp:  [96.7 °F (35.9 °C)-98.2 °F (36.8 °C)] 98.2 °F (36.8 °C)  Pulse:  [] 64  Resp:  [12-50] 18  SpO2:  [92 %-100 %] 100 %  BP: ()/() 108/59     Weight: 63.7 kg (140 lb 6.9 oz)  Body mass index is 26.53 kg/m².    Physical Exam   Constitutional: She is oriented to person, place, and time. She appears well-developed and well-nourished. No distress.   Eyes: Conjunctivae are normal. No scleral icterus.   Neck: Normal range of motion. Neck supple. No JVD present.   Cardiovascular: Normal rate, regular rhythm, normal heart sounds and intact distal pulses.  Exam reveals no gallop.    No murmur heard.  Pulmonary/Chest: Effort normal. No tachypnea. No respiratory distress. She has no wheezes. She has rales in the right lower field and the left lower field. She exhibits no tenderness.   Abdominal: Soft. Bowel sounds are normal. She exhibits no distension and no mass. There is no tenderness. There is no guarding.   Musculoskeletal: She exhibits no edema or tenderness.   Lymphadenopathy:     She has no cervical adenopathy.   Neurological: She is alert and oriented to person, place, and time.   Skin: Skin is warm and dry. She is not diaphoretic.   Psychiatric: She has a normal mood and affect. Her behavior is normal. Judgment and thought content normal.           Significant Labs:   ABGs:   Recent Labs  Lab 07/11/18  0554   PH 7.182*   PCO2 77.2*   HCO3 28.9*   POCSATURATED 50*   BE 1     CBC:   Recent  Labs  Lab 07/11/18  0507   WBC 12.83*   HGB 9.3*   HCT 36.6*   *     CMP:   Recent Labs  Lab 07/11/18  0507      K 4.0      CO2 17*   *   BUN 17   CREATININE 1.3   CALCIUM 8.5*   PROT 6.5   ALBUMIN 3.2*   BILITOT 0.3   ALKPHOS 145*   *   *   ANIONGAP 17*   EGFRNONAA 41.7*     Cardiac Markers:   Recent Labs  Lab 07/11/18  0507   BNP 1,151*     Lactic Acid:   Recent Labs  Lab 07/11/18  0507 07/11/18  0837   LACTATE 8.5* 2.2     POCT Glucose:   Recent Labs  Lab 07/11/18  0627   POCTGLUCOSE 174*     Troponin:   Recent Labs  Lab 07/11/18  0507   TROPONINI 0.051*     7/11 UA:   Hazy, 2+ protein, 1+ glucose, 28 hyaline casts    7/11 BCx: pending    7/11 EKG:   - Normal sinus rhythm  - nonspecific intraventricular block  - T wave abnormality, consider inferolateral ischemia  - QTc 557    Significant Imaging: I have reviewed all pertinent imaging results/findings within the past 24 hours.     7/11 CXR:   1. Increased interstitial markings within the lower lung zones which may relate to edema, aspiration or pneumonia.    7/11 2D Echo: pending        Assessment/Plan:     * SOB (shortness of breath)    Progressive for past month, no history of COPD. Recent treatment of sinusitis. CXR showed increasted interstitial markings in lower lung zones. Initial VBG with acidosis and hypercarbia - improvement after bipap. BNP 1151. Consider new onset CHF vs PNA vs flash pulmonary edema vs COPD. Troponin trending up .015 to .118.    - 2D echo pending  - Control BP - started losartan  - Lasix 40 IV BID to remove fluid  - Monitor renal function  - Trend troponin          Elevated LFTs    ,           Anemia    Hb 9.3 today, no other results listed in system. Can be due to anemia of chronic disease vs iron deficiency    - Monitor CBC  - Consider iron studies        Smoker    1/2 ppd    - Consider nicotine patch if withdrawal symptoms  - Smoking cessation discussion ordered           Hypertension, essential    Was on HCTZ-losartan until 1 month ago - had dry cough, meds were stopped by PCP. Checks her BP daily - 118-140/60s. 180s/100s on admit, required nitro gtt.     - Start on losartan 12.5  - Cont to monitor BP            VTE Risk Mitigation         Ordered     enoxaparin injection 40 mg  Daily      07/11/18 0927     IP VTE HIGH RISK PATIENT  Once      07/11/18 0733     Place YAYO hose  Until discontinued      07/11/18 0733             Juliana Conn MD  Department of Hospital Medicine   [facility]

## 2018-07-11 NOTE — SUBJECTIVE & OBJECTIVE
Past Medical History:   Diagnosis Date    Hypertension        Past Surgical History:   Procedure Laterality Date    CHOLECYSTECTOMY      COLONOSCOPY      HYSTERECTOMY         Review of patient's allergies indicates:  No Known Allergies    No current facility-administered medications on file prior to encounter.      Current Outpatient Prescriptions on File Prior to Encounter   Medication Sig    amoxicillin (AMOXIL) 875 MG tablet Take 1 tablet (875 mg total) by mouth 2 (two) times daily. for 10 days    aspirin 81 MG Chew Take 81 mg by mouth once daily.    albuterol 90 mcg/actuation inhaler Inhale 2 puffs into the lungs every 4 (four) hours as needed for Wheezing.    guaifenesin-codeine 100-10 mg/5 ml (TUSSI-ORGANIDIN NR)  mg/5 mL syrup Take 5-10 mLs by mouth every evening.    [DISCONTINUED] hydrochlorothiazide (HYDRODIURIL) 25 MG tablet Take 1 tablet (25 mg total) by mouth once daily.     Family History     Problem Relation (Age of Onset)    Colon cancer Father, Brother    Dementia Father    Diabetes Daughter    Heart disease Mother        Social History Main Topics    Smoking status: Current Every Day Smoker     Packs/day: 0.50    Smokeless tobacco: Not on file    Alcohol use Yes      Comment: rarely    Drug use: No    Sexual activity: Not on file     Review of Systems   Constitutional: Positive for activity change (decreased), chills (x1 day) and fever (x1 day). Negative for appetite change.   HENT: Positive for congestion (on amoxicillin for sinusitis), postnasal drip, rhinorrhea and sinus pressure. Negative for sinus pain, sore throat and trouble swallowing.    Respiratory: Positive for cough (dry) and shortness of breath (progressive in past month). Negative for choking, wheezing and stridor.    Cardiovascular: Negative for chest pain, palpitations and leg swelling.   Gastrointestinal: Negative for abdominal distention, abdominal pain, blood in stool, constipation, diarrhea, nausea and  vomiting.   Genitourinary: Negative for difficulty urinating, dysuria, flank pain, frequency, hematuria and pelvic pain.   Musculoskeletal: Negative for arthralgias, joint swelling, myalgias and neck pain.   Skin: Negative for color change, pallor, rash and wound.   Neurological: Negative for dizziness, tremors, syncope and headaches.   Psychiatric/Behavioral: Negative for agitation, behavioral problems and confusion. The patient is not nervous/anxious.      Objective:     Vital Signs (Most Recent):  Temp: 98.2 °F (36.8 °C) (07/11/18 0734)  Pulse: 64 (07/11/18 1009)  Resp: 18 (07/11/18 0822)  BP: (!) 108/59 (07/11/18 1009)  SpO2: 100 % (07/11/18 1009) Vital Signs (24h Range):  Temp:  [96.7 °F (35.9 °C)-98.2 °F (36.8 °C)] 98.2 °F (36.8 °C)  Pulse:  [] 64  Resp:  [12-50] 18  SpO2:  [92 %-100 %] 100 %  BP: ()/() 108/59     Weight: 63.7 kg (140 lb 6.9 oz)  Body mass index is 26.53 kg/m².    Physical Exam   Constitutional: She is oriented to person, place, and time. She appears well-developed and well-nourished. No distress.   Eyes: Conjunctivae are normal. No scleral icterus.   Neck: Normal range of motion. Neck supple. No JVD present.   Cardiovascular: Normal rate, regular rhythm, normal heart sounds and intact distal pulses.  Exam reveals no gallop.    No murmur heard.  Pulmonary/Chest: Effort normal. No tachypnea. No respiratory distress. She has no wheezes. She has rales in the right lower field and the left lower field. She exhibits no tenderness.   Abdominal: Soft. Bowel sounds are normal. She exhibits no distension and no mass. There is no tenderness. There is no guarding.   Musculoskeletal: She exhibits no edema or tenderness.   Lymphadenopathy:     She has no cervical adenopathy.   Neurological: She is alert and oriented to person, place, and time.   Skin: Skin is warm and dry. She is not diaphoretic.   Psychiatric: She has a normal mood and affect. Her behavior is normal. Judgment and thought  content normal.           Significant Labs:   ABGs:   Recent Labs  Lab 07/11/18  0554   PH 7.182*   PCO2 77.2*   HCO3 28.9*   POCSATURATED 50*   BE 1     CBC:   Recent Labs  Lab 07/11/18  0507   WBC 12.83*   HGB 9.3*   HCT 36.6*   *     CMP:   Recent Labs  Lab 07/11/18  0507      K 4.0      CO2 17*   *   BUN 17   CREATININE 1.3   CALCIUM 8.5*   PROT 6.5   ALBUMIN 3.2*   BILITOT 0.3   ALKPHOS 145*   *   *   ANIONGAP 17*   EGFRNONAA 41.7*     Cardiac Markers:   Recent Labs  Lab 07/11/18  0507   BNP 1,151*     Lactic Acid:   Recent Labs  Lab 07/11/18  0507 07/11/18  0837   LACTATE 8.5* 2.2     POCT Glucose:   Recent Labs  Lab 07/11/18  0627   POCTGLUCOSE 174*     Troponin:   Recent Labs  Lab 07/11/18  0507   TROPONINI 0.051*     7/11 UA:   Hazy, 2+ protein, 1+ glucose, 28 hyaline casts    7/11 BCx: pending    7/11 EKG:   - Normal sinus rhythm  - nonspecific intraventricular block  - T wave abnormality, consider inferolateral ischemia  - QTc 557    Significant Imaging: I have reviewed all pertinent imaging results/findings within the past 24 hours.     7/11 CXR:   1. Increased interstitial markings within the lower lung zones which may relate to edema, aspiration or pneumonia.    7/11 2D Echo: pending

## 2018-07-11 NOTE — ED NOTES
Pt sitting in bed eating breakfast. Pt. Resting in bed in NAD. RR e/u. Continuous cardiac, BP, and O2 monitoring in progress. VS being monitoring continuously per MD orders. Pt. Offered bathroom assistance and denies need at this time. Explanation of care/wait provided. Bed in low, locked position with rails up and call bell in reach. Will continue to monitor.

## 2018-07-11 NOTE — ED NOTES
Upon departure patient alert and oriented, respirations even and unlabored, skin dry and warm, and no signs or symptoms of acute distress.

## 2018-07-11 NOTE — ED NOTES
Selma Hooper, an 72 y.o. female presents to the ED via ems with daughter- daughter reports that pt awoke c/o sob . Ems reports pt o2 sats 70% and pt was placed on bipap. She presents in respiratory distress with rr of 44 and tachycardia in 140s.       No chief complaint on file.    Review of patient's allergies indicates:  No Known Allergies  Past Medical History:   Diagnosis Date    Hypertension

## 2018-07-11 NOTE — ED NOTES
Pt. Sitting up in bed on bipap with fio2 at 100%. Team at bs. Plan to take off bipap and place on 2L NC O2. Pt. Denies sob, cp, other complaints at this time. Family at bs. No acute distress noted. On cont. Cardiac and o2 monitor, will continue to monitor frequently.

## 2018-07-11 NOTE — ASSESSMENT & PLAN NOTE
1/2 ppd    - Consider nicotine patch if withdrawal symptoms  - Smoking cessation discussion ordered

## 2018-07-12 PROBLEM — E05.90 SUBCLINICAL HYPERTHYROIDISM: Status: ACTIVE | Noted: 2018-07-12

## 2018-07-12 PROBLEM — D50.9 IRON DEFICIENCY ANEMIA: Status: ACTIVE | Noted: 2018-07-11

## 2018-07-12 PROBLEM — I50.9 NEW ONSET OF CONGESTIVE HEART FAILURE: Status: ACTIVE | Noted: 2018-07-12

## 2018-07-12 PROBLEM — R11.2 NAUSEA AND VOMITING: Status: ACTIVE | Noted: 2018-07-12

## 2018-07-12 LAB
ALBUMIN SERPL BCP-MCNC: 3.6 G/DL
ALP SERPL-CCNC: 121 U/L
ALT SERPL W/O P-5'-P-CCNC: 103 U/L
ANION GAP SERPL CALC-SCNC: 13 MMOL/L
AORTIC VALVE REGURGITATION: ABNORMAL
AST SERPL-CCNC: 70 U/L
BASOPHILS # BLD AUTO: 0.02 K/UL
BASOPHILS NFR BLD: 0.4 %
BILIRUB SERPL-MCNC: 0.3 MG/DL
BUN SERPL-MCNC: 22 MG/DL
CALCIUM SERPL-MCNC: 9.5 MG/DL
CHLORIDE SERPL-SCNC: 104 MMOL/L
CO2 SERPL-SCNC: 25 MMOL/L
CREAT SERPL-MCNC: 0.9 MG/DL
DIFFERENTIAL METHOD: ABNORMAL
EOSINOPHIL # BLD AUTO: 0 K/UL
EOSINOPHIL NFR BLD: 0.2 %
ERYTHROCYTE [DISTWIDTH] IN BLOOD BY AUTOMATED COUNT: 18.9 %
EST. GFR  (AFRICAN AMERICAN): >60 ML/MIN/1.73 M^2
EST. GFR  (NON AFRICAN AMERICAN): >60 ML/MIN/1.73 M^2
ESTIMATED AVG GLUCOSE: 108 MG/DL
ESTIMATED PA SYSTOLIC PRESSURE: 28
FERRITIN SERPL-MCNC: 11 NG/ML
GLUCOSE SERPL-MCNC: 133 MG/DL
HBA1C MFR BLD HPLC: 5.4 %
HCT VFR BLD AUTO: 34.2 %
HGB BLD-MCNC: 9.5 G/DL
IMM GRANULOCYTES # BLD AUTO: 0.01 K/UL
IMM GRANULOCYTES NFR BLD AUTO: 0.2 %
IRON SERPL-MCNC: 14 UG/DL
LYMPHOCYTES # BLD AUTO: 0.8 K/UL
LYMPHOCYTES NFR BLD: 14.5 %
MAGNESIUM SERPL-MCNC: 1.9 MG/DL
MCH RBC QN AUTO: 18.5 PG
MCHC RBC AUTO-ENTMCNC: 27.8 G/DL
MCV RBC AUTO: 67 FL
MITRAL VALVE REGURGITATION: ABNORMAL
MONOCYTES # BLD AUTO: 0.2 K/UL
MONOCYTES NFR BLD: 4.7 %
NEUTROPHILS # BLD AUTO: 4.1 K/UL
NEUTROPHILS NFR BLD: 80 %
NRBC BLD-RTO: 0 /100 WBC
PHOSPHATE SERPL-MCNC: 2.9 MG/DL
PLATELET # BLD AUTO: 324 K/UL
PMV BLD AUTO: 9.5 FL
POTASSIUM SERPL-SCNC: 3.9 MMOL/L
PROCALCITONIN SERPL IA-MCNC: 0.86 NG/ML
PROT SERPL-MCNC: 7.1 G/DL
RBC # BLD AUTO: 5.13 M/UL
RETIRED EF AND QEF - SEE NOTES: 18 (ref 55–65)
SATURATED IRON: 3 %
SODIUM SERPL-SCNC: 142 MMOL/L
T4 FREE SERPL-MCNC: 0.93 NG/DL
TOTAL IRON BINDING CAPACITY: 496 UG/DL
TRANSFERRIN SERPL-MCNC: 335 MG/DL
TRICUSPID VALVE REGURGITATION: ABNORMAL
TROPONIN I SERPL DL<=0.01 NG/ML-MCNC: 0.17 NG/ML
TSH SERPL DL<=0.005 MIU/L-ACNC: 0.17 UIU/ML
WBC # BLD AUTO: 5.16 K/UL

## 2018-07-12 PROCEDURE — 25000003 PHARM REV CODE 250: Performed by: STUDENT IN AN ORGANIZED HEALTH CARE EDUCATION/TRAINING PROGRAM

## 2018-07-12 PROCEDURE — 99233 SBSQ HOSP IP/OBS HIGH 50: CPT | Mod: GC,,, | Performed by: HOSPITALIST

## 2018-07-12 PROCEDURE — 99900035 HC TECH TIME PER 15 MIN (STAT)

## 2018-07-12 PROCEDURE — 84100 ASSAY OF PHOSPHORUS: CPT

## 2018-07-12 PROCEDURE — 83735 ASSAY OF MAGNESIUM: CPT

## 2018-07-12 PROCEDURE — 84439 ASSAY OF FREE THYROXINE: CPT

## 2018-07-12 PROCEDURE — 11000001 HC ACUTE MED/SURG PRIVATE ROOM

## 2018-07-12 PROCEDURE — 84443 ASSAY THYROID STIM HORMONE: CPT

## 2018-07-12 PROCEDURE — 63600175 PHARM REV CODE 636 W HCPCS: Performed by: STUDENT IN AN ORGANIZED HEALTH CARE EDUCATION/TRAINING PROGRAM

## 2018-07-12 PROCEDURE — 93306 TTE W/DOPPLER COMPLETE: CPT | Mod: 26,,, | Performed by: INTERNAL MEDICINE

## 2018-07-12 PROCEDURE — 25000003 PHARM REV CODE 250: Performed by: HOSPITALIST

## 2018-07-12 PROCEDURE — 80053 COMPREHEN METABOLIC PANEL: CPT

## 2018-07-12 PROCEDURE — 84145 PROCALCITONIN (PCT): CPT

## 2018-07-12 PROCEDURE — 84484 ASSAY OF TROPONIN QUANT: CPT

## 2018-07-12 PROCEDURE — 93306 TTE W/DOPPLER COMPLETE: CPT

## 2018-07-12 PROCEDURE — 83036 HEMOGLOBIN GLYCOSYLATED A1C: CPT

## 2018-07-12 PROCEDURE — 82728 ASSAY OF FERRITIN: CPT

## 2018-07-12 PROCEDURE — 27000221 HC OXYGEN, UP TO 24 HOURS

## 2018-07-12 PROCEDURE — 85025 COMPLETE CBC W/AUTO DIFF WBC: CPT

## 2018-07-12 PROCEDURE — 83540 ASSAY OF IRON: CPT

## 2018-07-12 PROCEDURE — 94761 N-INVAS EAR/PLS OXIMETRY MLT: CPT

## 2018-07-12 PROCEDURE — 36415 COLL VENOUS BLD VENIPUNCTURE: CPT

## 2018-07-12 RX ORDER — ONDANSETRON 2 MG/ML
8 INJECTION INTRAMUSCULAR; INTRAVENOUS EVERY 6 HOURS PRN
Status: DISCONTINUED | OUTPATIENT
Start: 2018-07-12 | End: 2018-07-12

## 2018-07-12 RX ORDER — ATORVASTATIN CALCIUM 20 MG/1
20 TABLET, FILM COATED ORAL DAILY
Status: DISCONTINUED | OUTPATIENT
Start: 2018-07-12 | End: 2018-07-14 | Stop reason: HOSPADM

## 2018-07-12 RX ORDER — PROMETHAZINE HYDROCHLORIDE 12.5 MG/1
12.5 TABLET ORAL EVERY 6 HOURS PRN
Status: DISCONTINUED | OUTPATIENT
Start: 2018-07-12 | End: 2018-07-12

## 2018-07-12 RX ORDER — METOPROLOL TARTRATE 25 MG/1
25 TABLET, FILM COATED ORAL 2 TIMES DAILY
Status: DISCONTINUED | OUTPATIENT
Start: 2018-07-12 | End: 2018-07-13

## 2018-07-12 RX ORDER — ALBUTEROL SULFATE 90 UG/1
2 AEROSOL, METERED RESPIRATORY (INHALATION) EVERY 4 HOURS PRN
COMMUNITY
End: 2018-07-23

## 2018-07-12 RX ORDER — DIPHENHYDRAMINE HCL 25 MG
25 CAPSULE ORAL EVERY 6 HOURS PRN
Status: DISCONTINUED | OUTPATIENT
Start: 2018-07-12 | End: 2018-07-14 | Stop reason: HOSPADM

## 2018-07-12 RX ORDER — FUROSEMIDE 20 MG/1
20 TABLET ORAL 2 TIMES DAILY
Status: DISCONTINUED | OUTPATIENT
Start: 2018-07-12 | End: 2018-07-14

## 2018-07-12 RX ORDER — PROCHLORPERAZINE EDISYLATE 5 MG/ML
10 INJECTION INTRAMUSCULAR; INTRAVENOUS EVERY 6 HOURS PRN
Status: DISCONTINUED | OUTPATIENT
Start: 2018-07-12 | End: 2018-07-14 | Stop reason: HOSPADM

## 2018-07-12 RX ORDER — PROCHLORPERAZINE EDISYLATE 5 MG/ML
10 INJECTION INTRAMUSCULAR; INTRAVENOUS EVERY 6 HOURS PRN
Status: DISCONTINUED | OUTPATIENT
Start: 2018-07-12 | End: 2018-07-12

## 2018-07-12 RX ORDER — FERROUS SULFATE 325(65) MG
325 TABLET, DELAYED RELEASE (ENTERIC COATED) ORAL DAILY
Status: DISCONTINUED | OUTPATIENT
Start: 2018-07-12 | End: 2018-07-14 | Stop reason: HOSPADM

## 2018-07-12 RX ADMIN — FUROSEMIDE 20 MG: 20 TABLET ORAL at 05:07

## 2018-07-12 RX ADMIN — PROMETHAZINE HYDROCHLORIDE 12.5 MG: 12.5 TABLET ORAL at 04:07

## 2018-07-12 RX ADMIN — LOSARTAN POTASSIUM 12.5 MG: 25 TABLET, FILM COATED ORAL at 10:07

## 2018-07-12 RX ADMIN — MOXIFLOXACIN HYDROCHLORIDE 400 MG: 400 TABLET, FILM COATED ORAL at 10:07

## 2018-07-12 RX ADMIN — METOPROLOL TARTRATE 25 MG: 25 TABLET ORAL at 08:07

## 2018-07-12 RX ADMIN — ONDANSETRON 8 MG: 2 INJECTION, SOLUTION INTRAMUSCULAR; INTRAVENOUS at 09:07

## 2018-07-12 RX ADMIN — ALUMINUM HYDROXIDE, MAGNESIUM HYDROXIDE, AND SIMETHICONE 50 ML: 200; 200; 20 SUSPENSION ORAL at 10:07

## 2018-07-12 RX ADMIN — FERROUS SULFATE TAB EC 325 MG (65 MG FE EQUIVALENT) 325 MG: 325 (65 FE) TABLET DELAYED RESPONSE at 01:07

## 2018-07-12 RX ADMIN — FUROSEMIDE 40 MG: 10 INJECTION, SOLUTION INTRAMUSCULAR; INTRAVENOUS at 09:07

## 2018-07-12 RX ADMIN — METOPROLOL TARTRATE 25 MG: 25 TABLET ORAL at 01:07

## 2018-07-12 RX ADMIN — ATORVASTATIN CALCIUM 20 MG: 20 TABLET, FILM COATED ORAL at 11:07

## 2018-07-12 RX ADMIN — PROCHLORPERAZINE EDISYLATE 10 MG: 5 INJECTION INTRAMUSCULAR; INTRAVENOUS at 03:07

## 2018-07-12 NOTE — PLAN OF CARE
PCP JESSE ZIMMER  PHARMACY [facility] [address]      07/12/18 1045   Discharge Assessment   Assessment Type Discharge Planning Assessment   Confirmed/corrected address and phone number on facesheet? Yes   Assessment information obtained from? Patient;Caregiver   Expected Length of Stay (days) 3   Communicated expected length of stay with patient/caregiver no   Prior to hospitilization cognitive status: No Deficits   Prior to hospitalization functional status: Independent   Current cognitive status: No Deficits   Current Functional Status: Independent   Lives With child(elsie), adult   Able to Return to Prior Arrangements yes   Is patient able to care for self after discharge? Yes   Patient's perception of discharge disposition home or selfcare   Readmission Within The Last 30 Days no previous admission in last 30 days   Patient currently being followed by outpatient case management? No   Patient currently receives any other outside agency services? No   Equipment Currently Used at Home none   Do you have any problems affording any of your prescribed medications? No   Is the patient taking medications as prescribed? yes   Does the patient have transportation home? Yes   Does the patient receive services at the [facility]madin Clinic? No   Discharge Plan A Home;Home with family   Discharge Plan B Home;Home with family;Home Health   Patient/Family In Agreement With Plan unable to assess

## 2018-07-12 NOTE — SUBJECTIVE & OBJECTIVE
Interval History: Pt feels that her breathing has improved. Complaining of pain today, but will avoid QTc prolonging drugs. She is also complaining of tenderness of her epigastric area. GI cocktail was given.    Review of Systems   Constitutional: Negative for activity change, appetite change, chills, diaphoresis, fatigue and fever.   HENT: Negative for congestion and rhinorrhea.    Respiratory: Negative for chest tightness and wheezing.    Cardiovascular: Negative for chest pain, palpitations and leg swelling.   Gastrointestinal: Positive for abdominal pain (dull ache) and nausea. Negative for blood in stool, constipation, diarrhea and vomiting.   Genitourinary: Negative for decreased urine volume, dysuria, flank pain, frequency, pelvic pain and urgency.   Musculoskeletal: Negative for back pain, myalgias and neck pain.   Neurological: Negative for tremors, syncope and weakness.   Psychiatric/Behavioral: Negative for agitation, behavioral problems and confusion.     Objective:     Vital Signs (Most Recent):  Temp: 98.2 °F (36.8 °C) (07/12/18 1420)  Pulse: 88 (07/12/18 1639)  Resp: 20 (07/12/18 1404)  BP: 134/87 (07/12/18 1404)  SpO2: 97 % (07/12/18 1404) Vital Signs (24h Range):  Temp:  [97.9 °F (36.6 °C)-98.2 °F (36.8 °C)] 98.2 °F (36.8 °C)  Pulse:  [] 88  Resp:  [14-20] 20  SpO2:  [93 %-99 %] 97 %  BP: (102-138)/(62-89) 134/87     Weight: 63.4 kg (139 lb 12.8 oz)  Body mass index is 26.41 kg/m².    Intake/Output Summary (Last 24 hours) at 07/12/18 1650  Last data filed at 07/12/18 1100   Gross per 24 hour   Intake                0 ml   Output             2600 ml   Net            -2600 ml      Physical Exam   Constitutional: She is oriented to person, place, and time. She appears well-developed and well-nourished. No distress.   Cardiovascular: Normal rate, regular rhythm, normal heart sounds and intact distal pulses.    No murmur heard.  Pulmonary/Chest: Effort normal and breath sounds normal. No  respiratory distress. She has no wheezes. She has no rales. She exhibits no tenderness.   Abdominal: Soft. She exhibits no mass. There is tenderness (epigastric areas). There is no rebound and no guarding.   Neurological: She is alert and oriented to person, place, and time.   Skin: She is not diaphoretic.   Psychiatric: She has a normal mood and affect. Her behavior is normal. Thought content normal.       Significant Labs:   CBC:   Recent Labs  Lab 07/11/18  0507 07/12/18  0732   WBC 12.83* 5.16   HGB 9.3* 9.5*   HCT 36.6* 34.2*   * 324     CMP:   Recent Labs  Lab 07/11/18  0507 07/11/18  1637 07/12/18  0732    142 142   K 4.0 3.9 3.9    106 104   CO2 17* 26 25   * 88 133*   BUN 17 20 22   CREATININE 1.3 0.8 0.9   CALCIUM 8.5* 7.7* 9.5   PROT 6.5  --  7.1   ALBUMIN 3.2*  --  3.6   BILITOT 0.3  --  0.3   ALKPHOS 145*  --  121   *  --  70*   *  --  103*   ANIONGAP 17* 10 13   EGFRNONAA 41.7* >60.0 >60.0     Troponin:   Recent Labs  Lab 07/11/18  1047 07/11/18  1952 07/12/18  0020   TROPONINI 0.118* 0.175* 0.171*     TSH:   Recent Labs  Lab 07/12/18  0732   TSH 0.165*     Iron 30 - 160 ug/dL 14     Transferrin 200 - 375 mg/dL 335    TIBC 250 - 450 ug/dL 496     Saturated Iron 20 - 50 % 3       .  Significant Imaging: I have reviewed all pertinent imaging results/findings within the past 24 hours.     7/12 2D Echo:  EF 55 - 65 18     Mitral Valve Regurgitation  TRIVIAL TO MILD    Aortic Valve Regurgitation  TRIVIAL TO MILD    Est. PA Systolic Pressure  28    Tricuspid Valve Regurgitation  TRIVIAL TO MILD

## 2018-07-12 NOTE — ASSESSMENT & PLAN NOTE
Hb 9.3 today, no other results listed in system. Can be due to anemia of chronic disease vs iron deficiency. Iron studies showed iron deficiency.     - Monitor CBC  - Started iron supplementation

## 2018-07-12 NOTE — ASSESSMENT & PLAN NOTE
Progressive for past month, no history of COPD. Recent treatment of sinusitis. CXR showed increasted interstitial markings in lower lung zones. Initial VBG with acidosis and hypercarbia - improvement after bipap. BNP 1151. Consider new onset CHF vs PNA vs flash pulmonary edema vs COPD. Troponin trended up, now decreasing.     - 2D echo pending  - Control BP - started losartan  - De-escalated to lasix 20 po bid - monitor UOP  - Monitor renal function  - Trend troponin - 0.175 to 0.171

## 2018-07-12 NOTE — PHARMACY MED REC
Admission Medication Reconciliation - Pharmacy Consult Note    The home medication history was taken by Cinthya Junior Pharmacy Tech.     No issues noted with the medication reconciliation.    Potential issues to be addressed PRIOR TO DISCHARGE  o Started Losartan + metoprolol + furosemide for HFrEF (EF 15-20%); will need to convert to metoprolol succinate prior to discharge; consider spironolactone 12.5 mg PO daily    Please address this information as you see fit.  Feel free to contact us if you have any questions or require assistance.    Stevenson Marinelli, PharmD  [id number]                .    .

## 2018-07-12 NOTE — ASSESSMENT & PLAN NOTE
QTc 557 during episode of tachycardia, last     - Avoid QTc prolonging meds when possible  - Tried GI cocktail  - Will try compazine or prednisone if nauseated

## 2018-07-12 NOTE — PROGRESS NOTES
[facility] Hospital Medicine  Progress Note    Patient Name: Selma Hooper  MRN: [id number]  Patient Class: IP- Inpatient   Admission Date: 7/11/2018  Length of Stay: 1 days  Attending Physician: Yinka Tompkins, *  Primary Care Provider: Phil Quintana MD    [facility] Medicine Team: [facility] HOSP MED 2 Juliana Conn MD    Subjective:     Principal Problem:SOB (shortness of breath)    HPI:  Ms Hooper is a 72F with HTN who presented with SOB. Both she and her daughter have noticed progressive SOB on exertion in the past 4 weeks, and last night she had felt like she couldn't breathe. She had stopped her HTN medication (losartan-HCTZ) also about 4 weeks ago since she was complaining of a dry cough and her PCP decided that she was stable to stop. She checks her BP every day ranging 118-140/60s. She is worried due to family history of stroke and HTN. Of note, she is currently on antibiotic treatment for sinusitis from an urgent care clinic (day 8/10) and has been taking flonase and guaifenisin-codeine for it.    Per EMS, she was satting at 70%, RR 44, tachycardic 140s, with a BP of 188/110. On arrival to ED, she was put on a nitro drip. VBG showed acidosis and hypercarbia. She had elevated lactate, BNP, troponin, glucose, mild leukocytosis. Beside US showed B-lines, bedside echo showed LVH with no obvious R strain. EKG confirmed sinus tachycardia with no ST changes. She was admitted to hospital medicine for management and workup of her SOB.    Hospital Course:  In ED, was put on nitro gtt for BP 180s/100s; nitro was tapered down as her BP trended down and eventually the drip was stopped. VBG showed acidosis and hypercarbia, she was put on bipap with improvement of her VBG after one hour. Lactate was 8.5, decreased to 2. CXR showed increased interstitial markings in lower lung zones, negative for PTX. EKG with no ST changes, sinus tachycardia. Bedside US showed B-lines in all fields. Bedside  Echo showed LVH with no obvious R heart strain - will follow with 2D echo. Elevated troponin, BNP concerning for HF. Conducting HF workup.     Interval History: Pt feels that her breathing has improved. Complaining of pain today, but will avoid QTc prolonging drugs. She is also complaining of tenderness of her epigastric area. GI cocktail was given.    Review of Systems   Constitutional: Negative for activity change, appetite change, chills, diaphoresis, fatigue and fever.   HENT: Negative for congestion and rhinorrhea.    Respiratory: Negative for chest tightness and wheezing.    Cardiovascular: Negative for chest pain, palpitations and leg swelling.   Gastrointestinal: Positive for abdominal pain (dull ache) and nausea. Negative for blood in stool, constipation, diarrhea and vomiting.   Genitourinary: Negative for decreased urine volume, dysuria, flank pain, frequency, pelvic pain and urgency.   Musculoskeletal: Negative for back pain, myalgias and neck pain.   Neurological: Negative for tremors, syncope and weakness.   Psychiatric/Behavioral: Negative for agitation, behavioral problems and confusion.     Objective:     Vital Signs (Most Recent):  Temp: 98.2 °F (36.8 °C) (07/12/18 1420)  Pulse: 88 (07/12/18 1639)  Resp: 20 (07/12/18 1404)  BP: 134/87 (07/12/18 1404)  SpO2: 97 % (07/12/18 1404) Vital Signs (24h Range):  Temp:  [97.9 °F (36.6 °C)-98.2 °F (36.8 °C)] 98.2 °F (36.8 °C)  Pulse:  [] 88  Resp:  [14-20] 20  SpO2:  [93 %-99 %] 97 %  BP: (102-138)/(62-89) 134/87     Weight: 63.4 kg (139 lb 12.8 oz)  Body mass index is 26.41 kg/m².    Intake/Output Summary (Last 24 hours) at 07/12/18 1650  Last data filed at 07/12/18 1100   Gross per 24 hour   Intake                0 ml   Output             2600 ml   Net            -2600 ml      Physical Exam   Constitutional: She is oriented to person, place, and time. She appears well-developed and well-nourished. No distress.   Cardiovascular: Normal rate, regular  rhythm, normal heart sounds and intact distal pulses.    No murmur heard.  Pulmonary/Chest: Effort normal and breath sounds normal. No respiratory distress. She has no wheezes. She has no rales. She exhibits no tenderness.   Abdominal: Soft. She exhibits no mass. There is tenderness (epigastric areas). There is no rebound and no guarding.   Neurological: She is alert and oriented to person, place, and time.   Skin: She is not diaphoretic.   Psychiatric: She has a normal mood and affect. Her behavior is normal. Thought content normal.       Significant Labs:   CBC:   Recent Labs  Lab 07/11/18  0507 07/12/18  0732   WBC 12.83* 5.16   HGB 9.3* 9.5*   HCT 36.6* 34.2*   * 324     CMP:   Recent Labs  Lab 07/11/18  0507 07/11/18  1637 07/12/18  0732    142 142   K 4.0 3.9 3.9    106 104   CO2 17* 26 25   * 88 133*   BUN 17 20 22   CREATININE 1.3 0.8 0.9   CALCIUM 8.5* 7.7* 9.5   PROT 6.5  --  7.1   ALBUMIN 3.2*  --  3.6   BILITOT 0.3  --  0.3   ALKPHOS 145*  --  121   *  --  70*   *  --  103*   ANIONGAP 17* 10 13   EGFRNONAA 41.7* >60.0 >60.0     Troponin:   Recent Labs  Lab 07/11/18  1047 07/11/18  1952 07/12/18  0020   TROPONINI 0.118* 0.175* 0.171*     TSH:   Recent Labs  Lab 07/12/18  0732   TSH 0.165*     Iron 30 - 160 ug/dL 14     Transferrin 200 - 375 mg/dL 335    TIBC 250 - 450 ug/dL 496     Saturated Iron 20 - 50 % 3       .  Significant Imaging: I have reviewed all pertinent imaging results/findings within the past 24 hours.     7/12 2D Echo:  EF 55 - 65 18     Mitral Valve Regurgitation  TRIVIAL TO MILD    Aortic Valve Regurgitation  TRIVIAL TO MILD    Est. PA Systolic Pressure  28    Tricuspid Valve Regurgitation  TRIVIAL TO MILD          Assessment/Plan:      * SOB (shortness of breath)    Progressive for past month, no history of COPD. Recent treatment of sinusitis. CXR showed increasted interstitial markings in lower lung zones. Initial VBG with acidosis and  hypercarbia - improvement after bipap. BNP 1151. Consider new onset CHF vs PNA vs flash pulmonary edema vs COPD. Troponin trended up, now decreasing.     - 2D echo pending  - Control BP - started losartan  - De-escalated to lasix 20 po bid - monitor UOP  - Monitor renal function  - Trend troponin - 0.175 to 0.171          Subclinical hyperthyroidism    TSH 0.165    - Follow with PCP outpatient to monitor          Nausea and vomiting    QTc 557 during episode of tachycardia, last     - Avoid QTc prolonging meds when possible  - Tried GI cocktail  - Will try compazine or prednisone if nauseated          New onset of congestive heart failure    2D echo showed EF 18 with PA systolic pressure wnl.     - Cont diuresing with lasix  - Started on losartan for BP control  - Follow-up with cardiology outpatient          Elevated LFTs    ,           Iron deficiency anemia    Hb 9.3 today, no other results listed in system. Can be due to anemia of chronic disease vs iron deficiency. Iron studies showed iron deficiency.     - Monitor CBC  - Started iron supplementation        Smoker    1/2 ppd    - Consider nicotine patch if withdrawal symptoms  - Smoking cessation discussion ordered          Hypertension, essential    Was on HCTZ-losartan until 1 month ago - had dry cough, meds were stopped by PCP. Checks her BP daily - 118-140/60s. 180s/100s on admit, required nitro gtt.     - Start on losartan 12.5  - Cont to monitor BP            VTE Risk Mitigation         Ordered     IP VTE HIGH RISK PATIENT  Once      07/11/18 0733     Place YAYO hose  Until discontinued      07/11/18 0733              Juliana Conn MD  Department of Hospital Medicine   [facility]

## 2018-07-12 NOTE — PROGRESS NOTES
Patient placed on Bipap per orders. Patient seems to tolerate well.  Medium full face mask used. 25% Fi02 used, patient was on 3LNC. Report given

## 2018-07-12 NOTE — NURSING
Pt c/o nausea throughout shift. No c/o pain to chest but discomfort to epigastric area. Pt describes similar to when she had gall stones. Provider will come to see pt. Monitoring.

## 2018-07-12 NOTE — ASSESSMENT & PLAN NOTE
2D echo showed EF 18 with PA systolic pressure wnl.     - Cont diuresing with lasix  - Started on losartan for BP control  - Follow-up with cardiology outpatient

## 2018-07-13 ENCOUNTER — TELEPHONE (OUTPATIENT)
Dept: INTERNAL MEDICINE | Facility: CLINIC | Age: 73
End: 2018-07-13

## 2018-07-13 DIAGNOSIS — Z12.39 BREAST CANCER SCREENING: ICD-10-CM

## 2018-07-13 LAB
ALBUMIN SERPL BCP-MCNC: 3.7 G/DL
ALP SERPL-CCNC: 116 U/L
ALT SERPL W/O P-5'-P-CCNC: 86 U/L
ANION GAP SERPL CALC-SCNC: 12 MMOL/L
AST SERPL-CCNC: 45 U/L
BASOPHILS # BLD AUTO: 0.01 K/UL
BASOPHILS # BLD AUTO: 0.01 K/UL
BASOPHILS NFR BLD: 0.2 %
BASOPHILS NFR BLD: 0.2 %
BILIRUB SERPL-MCNC: 0.5 MG/DL
BUN SERPL-MCNC: 30 MG/DL
CALCIUM SERPL-MCNC: 9.8 MG/DL
CHLORIDE SERPL-SCNC: 97 MMOL/L
CO2 SERPL-SCNC: 30 MMOL/L
CREAT SERPL-MCNC: 1 MG/DL
DIFFERENTIAL METHOD: ABNORMAL
DIFFERENTIAL METHOD: ABNORMAL
EOSINOPHIL # BLD AUTO: 0 K/UL
EOSINOPHIL # BLD AUTO: 0 K/UL
EOSINOPHIL NFR BLD: 0.2 %
EOSINOPHIL NFR BLD: 0.2 %
ERYTHROCYTE [DISTWIDTH] IN BLOOD BY AUTOMATED COUNT: 18.9 %
ERYTHROCYTE [DISTWIDTH] IN BLOOD BY AUTOMATED COUNT: 18.9 %
EST. GFR  (AFRICAN AMERICAN): >60 ML/MIN/1.73 M^2
EST. GFR  (NON AFRICAN AMERICAN): 57.2 ML/MIN/1.73 M^2
GLUCOSE SERPL-MCNC: 107 MG/DL
HCT VFR BLD AUTO: 36.4 %
HCT VFR BLD AUTO: 36.4 %
HGB BLD-MCNC: 10.2 G/DL
HGB BLD-MCNC: 10.2 G/DL
IMM GRANULOCYTES # BLD AUTO: 0.02 K/UL
IMM GRANULOCYTES # BLD AUTO: 0.02 K/UL
IMM GRANULOCYTES NFR BLD AUTO: 0.4 %
IMM GRANULOCYTES NFR BLD AUTO: 0.4 %
LYMPHOCYTES # BLD AUTO: 1.2 K/UL
LYMPHOCYTES # BLD AUTO: 1.2 K/UL
LYMPHOCYTES NFR BLD: 22.7 %
LYMPHOCYTES NFR BLD: 22.7 %
MCH RBC QN AUTO: 18.4 PG
MCH RBC QN AUTO: 18.4 PG
MCHC RBC AUTO-ENTMCNC: 28 G/DL
MCHC RBC AUTO-ENTMCNC: 28 G/DL
MCV RBC AUTO: 66 FL
MCV RBC AUTO: 66 FL
MONOCYTES # BLD AUTO: 0.6 K/UL
MONOCYTES # BLD AUTO: 0.6 K/UL
MONOCYTES NFR BLD: 11.3 %
MONOCYTES NFR BLD: 11.3 %
NEUTROPHILS # BLD AUTO: 3.5 K/UL
NEUTROPHILS # BLD AUTO: 3.5 K/UL
NEUTROPHILS NFR BLD: 65.2 %
NEUTROPHILS NFR BLD: 65.2 %
NRBC BLD-RTO: 0 /100 WBC
NRBC BLD-RTO: 0 /100 WBC
PHOSPHATE SERPL-MCNC: 3.5 MG/DL
PLATELET # BLD AUTO: 354 K/UL
PLATELET # BLD AUTO: 354 K/UL
PMV BLD AUTO: 9.6 FL
PMV BLD AUTO: 9.6 FL
POTASSIUM SERPL-SCNC: 3.8 MMOL/L
PROT SERPL-MCNC: 7.3 G/DL
RBC # BLD AUTO: 5.53 M/UL
RBC # BLD AUTO: 5.53 M/UL
SODIUM SERPL-SCNC: 139 MMOL/L
WBC # BLD AUTO: 5.38 K/UL
WBC # BLD AUTO: 5.38 K/UL

## 2018-07-13 PROCEDURE — 63600175 PHARM REV CODE 636 W HCPCS: Performed by: STUDENT IN AN ORGANIZED HEALTH CARE EDUCATION/TRAINING PROGRAM

## 2018-07-13 PROCEDURE — 11000001 HC ACUTE MED/SURG PRIVATE ROOM

## 2018-07-13 PROCEDURE — 85025 COMPLETE CBC W/AUTO DIFF WBC: CPT

## 2018-07-13 PROCEDURE — 36415 COLL VENOUS BLD VENIPUNCTURE: CPT

## 2018-07-13 PROCEDURE — 80053 COMPREHEN METABOLIC PANEL: CPT

## 2018-07-13 PROCEDURE — 84100 ASSAY OF PHOSPHORUS: CPT

## 2018-07-13 PROCEDURE — 25000003 PHARM REV CODE 250: Performed by: STUDENT IN AN ORGANIZED HEALTH CARE EDUCATION/TRAINING PROGRAM

## 2018-07-13 PROCEDURE — 25000003 PHARM REV CODE 250: Performed by: HOSPITALIST

## 2018-07-13 PROCEDURE — 99233 SBSQ HOSP IP/OBS HIGH 50: CPT | Mod: ,,, | Performed by: HOSPITALIST

## 2018-07-13 RX ORDER — FERROUS SULFATE 325(65) MG
325 TABLET, DELAYED RELEASE (ENTERIC COATED) ORAL DAILY
Refills: 0 | COMMUNITY
Start: 2018-07-14 | End: 2019-10-07

## 2018-07-13 RX ORDER — FUROSEMIDE 20 MG/1
20 TABLET ORAL 2 TIMES DAILY
Qty: 60 TABLET | Refills: 11 | Status: SHIPPED | OUTPATIENT
Start: 2018-07-13 | End: 2019-05-15

## 2018-07-13 RX ORDER — SPIRONOLACTONE 25 MG/1
25 TABLET ORAL DAILY
Status: DISCONTINUED | OUTPATIENT
Start: 2018-07-13 | End: 2018-07-14

## 2018-07-13 RX ORDER — ATORVASTATIN CALCIUM 20 MG/1
20 TABLET, FILM COATED ORAL DAILY
Qty: 90 TABLET | Refills: 3 | Status: SHIPPED | OUTPATIENT
Start: 2018-07-14 | End: 2019-06-20

## 2018-07-13 RX ORDER — SPIRONOLACTONE 25 MG/1
25 TABLET ORAL DAILY
Qty: 30 TABLET | Refills: 11 | Status: SHIPPED | OUTPATIENT
Start: 2018-07-13 | End: 2018-07-14 | Stop reason: HOSPADM

## 2018-07-13 RX ORDER — METOPROLOL TARTRATE 25 MG/1
25 TABLET, FILM COATED ORAL 2 TIMES DAILY
Qty: 60 TABLET | Refills: 11 | Status: SHIPPED | OUTPATIENT
Start: 2018-07-13 | End: 2018-07-14 | Stop reason: HOSPADM

## 2018-07-13 RX ORDER — MOXIFLOXACIN HYDROCHLORIDE 400 MG/1
400 TABLET ORAL DAILY
Qty: 2 TABLET | Refills: 0 | Status: SHIPPED | OUTPATIENT
Start: 2018-07-14 | End: 2018-07-14

## 2018-07-13 RX ORDER — ENOXAPARIN SODIUM 100 MG/ML
40 INJECTION SUBCUTANEOUS EVERY 24 HOURS
Status: DISCONTINUED | OUTPATIENT
Start: 2018-07-13 | End: 2018-07-14 | Stop reason: HOSPADM

## 2018-07-13 RX ORDER — LOSARTAN POTASSIUM 25 MG/1
12.5 TABLET ORAL DAILY
Qty: 45 TABLET | Refills: 3 | Status: SHIPPED | OUTPATIENT
Start: 2018-07-14 | End: 2018-07-14

## 2018-07-13 RX ADMIN — MOXIFLOXACIN HYDROCHLORIDE 400 MG: 400 TABLET, FILM COATED ORAL at 10:07

## 2018-07-13 RX ADMIN — FUROSEMIDE 20 MG: 20 TABLET ORAL at 10:07

## 2018-07-13 RX ADMIN — ENOXAPARIN SODIUM 40 MG: 100 INJECTION SUBCUTANEOUS at 05:07

## 2018-07-13 RX ADMIN — FERROUS SULFATE TAB EC 325 MG (65 MG FE EQUIVALENT) 325 MG: 325 (65 FE) TABLET DELAYED RESPONSE at 10:07

## 2018-07-13 RX ADMIN — ATORVASTATIN CALCIUM 20 MG: 20 TABLET, FILM COATED ORAL at 10:07

## 2018-07-13 NOTE — TELEPHONE ENCOUNTER
----- Message from Vanessa Siddiqi sent at 7/5/2018  8:38 AM CDT -----  Contact: self/[phone]      ----- Message -----  From: Isabell De La Rosa  Sent: 7/5/2018   8:28 AM  To: Amarilys Asher Staff    Pt called in regards to the her Rx for guaifenesin-codeine 100-10 mg/5 ml (TUSSI-ORGANIDIN NR)  mg/5 mL syrup 120 m  Take 5-10 mLs by mouth every evening. That was given by the pa and  Is not helping her cough that much and she wanted to know what should she do.       Please advise

## 2018-07-13 NOTE — ASSESSMENT & PLAN NOTE
Hb 9.3 today, no other results listed in system. Can be due to anemia of chronic disease vs iron deficiency. Iron studies showed iron deficiency.     - Monitor CBC  - Cont iron supplementation

## 2018-07-13 NOTE — PLAN OF CARE
Future Appointments  Date Time Provider Department Center   7/23/2018 1:00 PM Chanel Reyna MD [facility] CARDIO [facility] [place]   7/27/2018 9:40 AM Phil Andrade MD [facility] IM [facility] [place] [facility]

## 2018-07-13 NOTE — PROGRESS NOTES
[facility] Hospital Medicine  Progress Note    Patient Name: Selma Hooper  MRN: [id number]  Patient Class: IP- Inpatient   Admission Date: 7/11/2018  Length of Stay: 2 days  Attending Physician: Yinka Tompkins, *  Primary Care Provider: Phil Quintana MD    [facility] Medicine Team: [facility] HOSP MED 2 Juliana Conn MD    Subjective:     Principal Problem:SOB (shortness of breath)    HPI:  Ms Hooper is a 72F with HTN who presented with SOB. Both she and her daughter have noticed progressive SOB on exertion in the past 4 weeks, and last night she had felt like she couldn't breathe. She had stopped her HTN medication (losartan-HCTZ) also about 4 weeks ago since she was complaining of a dry cough and her PCP decided that she was stable to stop. She checks her BP every day ranging 118-140/60s. She is worried due to family history of stroke and HTN. Of note, she is currently on antibiotic treatment for sinusitis from an urgent care clinic (day 8/10) and has been taking flonase and guaifenisin-codeine for it.    Per EMS, she was satting at 70%, RR 44, tachycardic 140s, with a BP of 188/110. On arrival to ED, she was put on a nitro drip. VBG showed acidosis and hypercarbia. She had elevated lactate, BNP, troponin, glucose, mild leukocytosis. Beside US showed B-lines, bedside echo showed LVH with no obvious R strain. EKG confirmed sinus tachycardia with no ST changes. She was admitted to hospital medicine for management and workup of her SOB.    Hospital Course:  In ED, was put on nitro gtt for BP 180s/100s; nitro was tapered down as her BP trended down and eventually the drip was stopped. VBG showed acidosis and hypercarbia, she was put on bipap with improvement of her VBG after one hour. Lactate was 8.5, decreased to 2. CXR showed increased interstitial markings in lower lung zones, negative for PTX. EKG with no ST changes, sinus tachycardia. Bedside US showed B-lines in all fields. Bedside  Echo showed LVH with no obvious R heart strain - will follow with 2D echo. Elevated troponin, BNP concerning for HF. Conducting HF workup.     Interval History: Pt feels that her breathing is overall better. She had a large BM last night and her abdominal pain has gone away. Appetite has improved. She is having some hypotensive episodes throughout the day with associated nausea.     Review of Systems   Constitutional: Negative for activity change, appetite change, chills, diaphoresis, fatigue and fever.   HENT: Negative for congestion and rhinorrhea.    Respiratory: Negative for chest tightness and wheezing.    Cardiovascular: Negative for chest pain, palpitations and leg swelling.   Gastrointestinal: Positive for nausea. Negative for abdominal pain, blood in stool, constipation, diarrhea and vomiting.   Genitourinary: Negative for decreased urine volume, dysuria, flank pain, frequency, pelvic pain and urgency.   Musculoskeletal: Negative for back pain, myalgias and neck pain.   Neurological: Negative for tremors, syncope and weakness.   Psychiatric/Behavioral: Negative for agitation, behavioral problems and confusion.     Objective:     Vital Signs (Most Recent):  Temp: 98.2 °F (36.8 °C) (07/13/18 1006)  Pulse: 73 (07/13/18 1600)  Resp: 20 (07/13/18 1140)  BP: (!) 92/56 (07/13/18 1140)  SpO2: (!) 90 % (07/13/18 1140) Vital Signs (24h Range):  Temp:  [98.2 °F (36.8 °C)-99.1 °F (37.3 °C)] 98.2 °F (36.8 °C)  Pulse:  [63-94] 73  Resp:  [13-22] 20  SpO2:  [90 %-99 %] 90 %  BP: ()/(53-88) 92/56     Weight: 63.4 kg (139 lb 12.8 oz)  Body mass index is 26.41 kg/m².    Intake/Output Summary (Last 24 hours) at 07/13/18 1634  Last data filed at 07/13/18 1333   Gross per 24 hour   Intake              500 ml   Output             2080 ml   Net            -1580 ml      Physical Exam   Constitutional: She is oriented to person, place, and time. She appears well-developed and well-nourished. No distress.   Cardiovascular:  Normal rate, regular rhythm, normal heart sounds and intact distal pulses.    No murmur heard.  Pulmonary/Chest: Effort normal and breath sounds normal. No respiratory distress. She has no wheezes. She has no rales. She exhibits no tenderness.   Abdominal: Soft. She exhibits no mass. There is tenderness (epigastric areas). There is no rebound and no guarding.   Neurological: She is alert and oriented to person, place, and time.   Skin: She is not diaphoretic.   Psychiatric: She has a normal mood and affect. Her behavior is normal. Thought content normal.       Significant Labs:   CBC:     Recent Labs  Lab 07/12/18  0732 07/13/18  0639   WBC 5.16 5.38  5.38   HGB 9.5* 10.2*  10.2*   HCT 34.2* 36.4*  36.4*    354*  354*     CMP:     Recent Labs  Lab 07/11/18  1637 07/12/18  0732 07/13/18  0639    142 139   K 3.9 3.9 3.8    104 97   CO2 26 25 30*   GLU 88 133* 107   BUN 20 22 30*   CREATININE 0.8 0.9 1.0   CALCIUM 7.7* 9.5 9.8   PROT  --  7.1 7.3   ALBUMIN  --  3.6 3.7   BILITOT  --  0.3 0.5   ALKPHOS  --  121 116   AST  --  70* 45*   ALT  --  103* 86*   ANIONGAP 10 13 12   EGFRNONAA >60.0 >60.0 57.2*     Troponin:     Recent Labs  Lab 07/11/18  1952 07/12/18  0020   TROPONINI 0.175* 0.171*     TSH:     Recent Labs  Lab 07/12/18  0732   TSH 0.165*     Iron 30 - 160 ug/dL 14     Transferrin 200 - 375 mg/dL 335    TIBC 250 - 450 ug/dL 496     Saturated Iron 20 - 50 % 3       .  Significant Imaging: I have reviewed all pertinent imaging results/findings within the past 24 hours.     7/12 2D Echo:  EF 55 - 65 18     Mitral Valve Regurgitation  TRIVIAL TO MILD    Aortic Valve Regurgitation  TRIVIAL TO MILD    Est. PA Systolic Pressure  28    Tricuspid Valve Regurgitation  TRIVIAL TO MILD          Assessment/Plan:      * SOB (shortness of breath)    Progressive for past month, no history of COPD. Recent treatment of sinusitis. CXR showed increasted interstitial markings in lower lung zones. Initial VBG  with acidosis and hypercarbia - improvement after bipap. BNP 1151. Consider new onset CHF vs PNA vs flash pulmonary edema vs COPD. Troponin trended up, now decreasing. 2D Echo with EF 18%.    - Control BP - started losartan  - De-escalated to lasix 20 po bid - monitor UOP  - Monitor renal function          Subclinical hyperthyroidism    TSH 0.165    - Follow with PCP outpatient to monitor          Nausea and vomiting    QTc 557 during episode of tachycardia, last     - Avoid QTc prolonging meds when possible  - Tried GI cocktail  - Will try compazine or prednisone if nauseated          New onset of congestive heart failure    2D echo showed EF 18 with PA systolic pressure wnl.     - Cont diuresing with lasix  - Started losartan for BP control - monitor for side effects  - Follow-up with cardiology outpatient          Elevated LFTs    Likely due to hepatic congestion 2/2 fluid overload. AST ALT both trending down with improvement of her fluid status.    - Cont to monitor           Iron deficiency anemia    Hb 9.3 today, no other results listed in system. Can be due to anemia of chronic disease vs iron deficiency. Iron studies showed iron deficiency.     - Monitor CBC  - Cont iron supplementation        Smoker    1/2 ppd    - Consider nicotine patch if withdrawal symptoms  - Smoking cessation discussion ordered          Hypertension, essential    Was on HCTZ-losartan until 1 month ago - had dry cough, meds were stopped by PCP. Checks her BP daily - 118-140/60s. 180s/100s on admit, required nitro gtt.      - Losartan 12.5  - Cont to monitor BP  - Episodes of hypotension and light-headedness - cont to titrate BP meds            VTE Risk Mitigation         Ordered     enoxaparin injection 40 mg  Daily      07/13/18 1029     IP VTE HIGH RISK PATIENT  Once      07/11/18 0733     Place YAYO hose  Until discontinued      07/11/18 0733              Juliana Conn MD  Department of Hospital Medicine   [facility]

## 2018-07-13 NOTE — PROGRESS NOTES
BP 92/56 manual. Pt c/o lightheadedness and dizziness when ambulating to bathroom. O2 sat 90% on room air. Dr Tompkins at bedside at this time. Will continue to monitor.     1152- Dr Tompkins instructs pt to increase oral intake. Instructs RN to continue monitoring BP, call if symptomatic at rest. Hold aldactone at this time. Will continue to monitor.     1735- BP 99/67. Hold scheduled lasix tab per Jabier with IM 2.

## 2018-07-13 NOTE — ASSESSMENT & PLAN NOTE
Likely due to hepatic congestion 2/2 fluid overload. AST ALT both trending down with improvement of her fluid status.    - Cont to monitor

## 2018-07-13 NOTE — PLAN OF CARE
Problem: Patient Care Overview  Goal: Plan of Care Review  Outcome: Ongoing (interventions implemented as appropriate)  Pt in room with no c/o pain. Pt feeling better overall than yesterday.  Pt would like to know plan of care today and if she is able to be discharged soon. Pt had no c/o N/V overnight. Pt slept well. Call bell in reach wheels locked and bed in low position.

## 2018-07-13 NOTE — SUBJECTIVE & OBJECTIVE
Interval History: Pt feels that her breathing is overall better. She had a large BM last night and her abdominal pain has gone away. Appetite has improved. She is having some hypotensive episodes throughout the day with associated nausea.     Review of Systems   Constitutional: Negative for activity change, appetite change, chills, diaphoresis, fatigue and fever.   HENT: Negative for congestion and rhinorrhea.    Respiratory: Negative for chest tightness and wheezing.    Cardiovascular: Negative for chest pain, palpitations and leg swelling.   Gastrointestinal: Positive for nausea. Negative for abdominal pain, blood in stool, constipation, diarrhea and vomiting.   Genitourinary: Negative for decreased urine volume, dysuria, flank pain, frequency, pelvic pain and urgency.   Musculoskeletal: Negative for back pain, myalgias and neck pain.   Neurological: Negative for tremors, syncope and weakness.   Psychiatric/Behavioral: Negative for agitation, behavioral problems and confusion.     Objective:     Vital Signs (Most Recent):  Temp: 98.2 °F (36.8 °C) (07/13/18 1006)  Pulse: 73 (07/13/18 1600)  Resp: 20 (07/13/18 1140)  BP: (!) 92/56 (07/13/18 1140)  SpO2: (!) 90 % (07/13/18 1140) Vital Signs (24h Range):  Temp:  [98.2 °F (36.8 °C)-99.1 °F (37.3 °C)] 98.2 °F (36.8 °C)  Pulse:  [63-94] 73  Resp:  [13-22] 20  SpO2:  [90 %-99 %] 90 %  BP: ()/(53-88) 92/56     Weight: 63.4 kg (139 lb 12.8 oz)  Body mass index is 26.41 kg/m².    Intake/Output Summary (Last 24 hours) at 07/13/18 1634  Last data filed at 07/13/18 1333   Gross per 24 hour   Intake              500 ml   Output             2080 ml   Net            -1580 ml      Physical Exam   Constitutional: She is oriented to person, place, and time. She appears well-developed and well-nourished. No distress.   Cardiovascular: Normal rate, regular rhythm, normal heart sounds and intact distal pulses.    No murmur heard.  Pulmonary/Chest: Effort normal and breath sounds  normal. No respiratory distress. She has no wheezes. She has no rales. She exhibits no tenderness.   Abdominal: Soft. She exhibits no mass. There is tenderness (epigastric areas). There is no rebound and no guarding.   Neurological: She is alert and oriented to person, place, and time.   Skin: She is not diaphoretic.   Psychiatric: She has a normal mood and affect. Her behavior is normal. Thought content normal.       Significant Labs:   CBC:     Recent Labs  Lab 07/12/18  0732 07/13/18  0639   WBC 5.16 5.38  5.38   HGB 9.5* 10.2*  10.2*   HCT 34.2* 36.4*  36.4*    354*  354*     CMP:     Recent Labs  Lab 07/11/18  1637 07/12/18  0732 07/13/18  0639    142 139   K 3.9 3.9 3.8    104 97   CO2 26 25 30*   GLU 88 133* 107   BUN 20 22 30*   CREATININE 0.8 0.9 1.0   CALCIUM 7.7* 9.5 9.8   PROT  --  7.1 7.3   ALBUMIN  --  3.6 3.7   BILITOT  --  0.3 0.5   ALKPHOS  --  121 116   AST  --  70* 45*   ALT  --  103* 86*   ANIONGAP 10 13 12   EGFRNONAA >60.0 >60.0 57.2*     Troponin:     Recent Labs  Lab 07/11/18  1952 07/12/18  0020   TROPONINI 0.175* 0.171*     TSH:     Recent Labs  Lab 07/12/18  0732   TSH 0.165*     Iron 30 - 160 ug/dL 14     Transferrin 200 - 375 mg/dL 335    TIBC 250 - 450 ug/dL 496     Saturated Iron 20 - 50 % 3       .  Significant Imaging: I have reviewed all pertinent imaging results/findings within the past 24 hours.     7/12 2D Echo:  EF 55 - 65 18     Mitral Valve Regurgitation  TRIVIAL TO MILD    Aortic Valve Regurgitation  TRIVIAL TO MILD    Est. PA Systolic Pressure  28    Tricuspid Valve Regurgitation  TRIVIAL TO MILD

## 2018-07-13 NOTE — ASSESSMENT & PLAN NOTE
Progressive for past month, no history of COPD. Recent treatment of sinusitis. CXR showed increasted interstitial markings in lower lung zones. Initial VBG with acidosis and hypercarbia - improvement after bipap. BNP 1151. Consider new onset CHF vs PNA vs flash pulmonary edema vs COPD. Troponin trended up, now decreasing. 2D Echo with EF 18%.    - Control BP - started losartan  - De-escalated to lasix 20 po bid - monitor UOP  - Monitor renal function

## 2018-07-13 NOTE — ASSESSMENT & PLAN NOTE
2D echo showed EF 18 with PA systolic pressure wnl.     - Cont diuresing with lasix  - Started losartan for BP control - monitor for side effects  - Follow-up with cardiology outpatient

## 2018-07-13 NOTE — ASSESSMENT & PLAN NOTE
Was on HCTZ-losartan until 1 month ago - had dry cough, meds were stopped by PCP. Checks her BP daily - 118-140/60s. 180s/100s on admit, required nitro gtt.      - Losartan 12.5  - Cont to monitor BP  - Episodes of hypotension and light-headedness - cont to titrate BP meds

## 2018-07-14 VITALS
HEART RATE: 82 BPM | HEIGHT: 61 IN | BODY MASS INDEX: 25.49 KG/M2 | DIASTOLIC BLOOD PRESSURE: 65 MMHG | SYSTOLIC BLOOD PRESSURE: 107 MMHG | TEMPERATURE: 98 F | WEIGHT: 135 LBS | OXYGEN SATURATION: 93 % | RESPIRATION RATE: 18 BRPM

## 2018-07-14 PROBLEM — R11.2 NAUSEA AND VOMITING: Status: RESOLVED | Noted: 2018-07-12 | Resolved: 2018-07-14

## 2018-07-14 PROBLEM — N17.9 AKI (ACUTE KIDNEY INJURY): Status: ACTIVE | Noted: 2018-07-14

## 2018-07-14 LAB
ALBUMIN SERPL BCP-MCNC: 3.4 G/DL
ALP SERPL-CCNC: 112 U/L
ALT SERPL W/O P-5'-P-CCNC: 61 U/L
ANION GAP SERPL CALC-SCNC: 10 MMOL/L
ANION GAP SERPL CALC-SCNC: 10 MMOL/L
AST SERPL-CCNC: 29 U/L
BASOPHILS # BLD AUTO: 0.05 K/UL
BASOPHILS NFR BLD: 1 %
BILIRUB SERPL-MCNC: 0.4 MG/DL
BUN SERPL-MCNC: 37 MG/DL
BUN SERPL-MCNC: 43 MG/DL
CALCIUM SERPL-MCNC: 9.1 MG/DL
CALCIUM SERPL-MCNC: 9.4 MG/DL
CHLORIDE SERPL-SCNC: 94 MMOL/L
CHLORIDE SERPL-SCNC: 97 MMOL/L
CO2 SERPL-SCNC: 32 MMOL/L
CO2 SERPL-SCNC: 32 MMOL/L
CREAT SERPL-MCNC: 1.2 MG/DL
CREAT SERPL-MCNC: 1.6 MG/DL
DIFFERENTIAL METHOD: ABNORMAL
EOSINOPHIL # BLD AUTO: 0.1 K/UL
EOSINOPHIL NFR BLD: 1.4 %
ERYTHROCYTE [DISTWIDTH] IN BLOOD BY AUTOMATED COUNT: 19.3 %
EST. GFR  (AFRICAN AMERICAN): 37.4 ML/MIN/1.73 M^2
EST. GFR  (AFRICAN AMERICAN): 52.9 ML/MIN/1.73 M^2
EST. GFR  (NON AFRICAN AMERICAN): 32.4 ML/MIN/1.73 M^2
EST. GFR  (NON AFRICAN AMERICAN): 45.9 ML/MIN/1.73 M^2
GLUCOSE SERPL-MCNC: 94 MG/DL
GLUCOSE SERPL-MCNC: 99 MG/DL
HCT VFR BLD AUTO: 35.8 %
HGB BLD-MCNC: 9.9 G/DL
IMM GRANULOCYTES # BLD AUTO: 0.01 K/UL
IMM GRANULOCYTES NFR BLD AUTO: 0.2 %
LYMPHOCYTES # BLD AUTO: 1.8 K/UL
LYMPHOCYTES NFR BLD: 36 %
MCH RBC QN AUTO: 18.4 PG
MCHC RBC AUTO-ENTMCNC: 27.7 G/DL
MCV RBC AUTO: 66 FL
MONOCYTES # BLD AUTO: 0.7 K/UL
MONOCYTES NFR BLD: 13.5 %
NEUTROPHILS # BLD AUTO: 2.3 K/UL
NEUTROPHILS NFR BLD: 47.9 %
NRBC BLD-RTO: 0 /100 WBC
PHOSPHATE SERPL-MCNC: 4.6 MG/DL
PLATELET # BLD AUTO: 304 K/UL
PMV BLD AUTO: 9.2 FL
POTASSIUM SERPL-SCNC: 3.2 MMOL/L
POTASSIUM SERPL-SCNC: 3.8 MMOL/L
PROT SERPL-MCNC: 6.6 G/DL
RBC # BLD AUTO: 5.39 M/UL
SODIUM SERPL-SCNC: 136 MMOL/L
SODIUM SERPL-SCNC: 139 MMOL/L
WBC # BLD AUTO: 4.89 K/UL

## 2018-07-14 PROCEDURE — 25000003 PHARM REV CODE 250: Performed by: STUDENT IN AN ORGANIZED HEALTH CARE EDUCATION/TRAINING PROGRAM

## 2018-07-14 PROCEDURE — 99239 HOSP IP/OBS DSCHRG MGMT >30: CPT | Mod: ,,, | Performed by: HOSPITALIST

## 2018-07-14 PROCEDURE — 84100 ASSAY OF PHOSPHORUS: CPT

## 2018-07-14 PROCEDURE — 99900035 HC TECH TIME PER 15 MIN (STAT)

## 2018-07-14 PROCEDURE — 85025 COMPLETE CBC W/AUTO DIFF WBC: CPT

## 2018-07-14 PROCEDURE — 80053 COMPREHEN METABOLIC PANEL: CPT

## 2018-07-14 PROCEDURE — 80048 BASIC METABOLIC PNL TOTAL CA: CPT

## 2018-07-14 PROCEDURE — 25000003 PHARM REV CODE 250: Performed by: HOSPITALIST

## 2018-07-14 PROCEDURE — 36415 COLL VENOUS BLD VENIPUNCTURE: CPT

## 2018-07-14 RX ORDER — LOSARTAN POTASSIUM 25 MG/1
12.5 TABLET ORAL DAILY
Qty: 45 TABLET | Refills: 3 | Status: SHIPPED | OUTPATIENT
Start: 2018-07-14 | End: 2018-09-17

## 2018-07-14 RX ORDER — MOXIFLOXACIN HYDROCHLORIDE 400 MG/1
400 TABLET ORAL DAILY
Qty: 1 TABLET | Refills: 0 | Status: SHIPPED | OUTPATIENT
Start: 2018-07-14 | End: 2018-07-15

## 2018-07-14 RX ORDER — POTASSIUM CHLORIDE 20 MEQ/1
40 TABLET, EXTENDED RELEASE ORAL ONCE
Status: COMPLETED | OUTPATIENT
Start: 2018-07-14 | End: 2018-07-14

## 2018-07-14 RX ORDER — METOPROLOL SUCCINATE 25 MG/1
12.5 TABLET, EXTENDED RELEASE ORAL DAILY
Qty: 15 TABLET | Refills: 11 | Status: SHIPPED | OUTPATIENT
Start: 2018-07-15 | End: 2018-07-23

## 2018-07-14 RX ADMIN — SODIUM CHLORIDE 250 ML: 0.9 INJECTION, SOLUTION INTRAVENOUS at 11:07

## 2018-07-14 RX ADMIN — ATORVASTATIN CALCIUM 20 MG: 20 TABLET, FILM COATED ORAL at 09:07

## 2018-07-14 RX ADMIN — METOPROLOL SUCCINATE 12.5 MG: 25 TABLET, EXTENDED RELEASE ORAL at 09:07

## 2018-07-14 RX ADMIN — POTASSIUM CHLORIDE 40 MEQ: 1500 TABLET, EXTENDED RELEASE ORAL at 09:07

## 2018-07-14 RX ADMIN — MOXIFLOXACIN HYDROCHLORIDE 400 MG: 400 TABLET, FILM COATED ORAL at 09:07

## 2018-07-14 RX ADMIN — FERROUS SULFATE TAB EC 325 MG (65 MG FE EQUIVALENT) 325 MG: 325 (65 FE) TABLET DELAYED RESPONSE at 09:07

## 2018-07-14 NOTE — ASSESSMENT & PLAN NOTE
Was on HCTZ-losartan until 1 month ago - had dry cough, meds were stopped by PCP. Checks her BP daily - 118-140/60s. 180s/100s on admit, required nitro gtt.      - Losartan 12.5  -BB  - Cont to monitor BP  - Episodes of hypotension and light-headedness - cont to titrate BP meds

## 2018-07-14 NOTE — DISCHARGE SUMMARY
[facility] Hospital Medicine  Discharge Summary      Patient Name: Selma Hooper  MRN: [id number]  Admission Date: 7/11/2018  Hospital Length of Stay: 3 days  Discharge Date and Time:  07/14/2018 4:06 PM  Attending Physician: Yinka Tompkins, *   Discharging Provider: TIMOTEO Chávez  Primary Care Provider: Phil Quintana MD  [facility] Medicine Team: [facility] HOSP MED 2 TIMOTEO Chávez    HPI:   Ms Hooper is a 72F with HTN who presented with SOB. Both she and her daughter have noticed progressive SOB on exertion in the past 4 weeks, and last night she had felt like she couldn't breathe. She had stopped her HTN medication (losartan-HCTZ) also about 4 weeks ago since she was complaining of a dry cough and her PCP decided that she was stable to stop. She checks her BP every day ranging 118-140/60s. She is worried due to family history of stroke and HTN. Of note, she is currently on antibiotic treatment for sinusitis from an urgent care clinic (day 8/10) and has been taking flonase and guaifenisin-codeine for it.    Per EMS, she was satting at 70%, RR 44, tachycardic 140s, with a BP of 188/110. On arrival to ED, she was put on a nitro drip. VBG showed acidosis and hypercarbia. She had elevated lactate, BNP, troponin, glucose, mild leukocytosis. Beside US showed B-lines, bedside echo showed LVH with no obvious R strain. EKG confirmed sinus tachycardia with no ST changes. She was admitted to hospital medicine for management and workup of her SOB.    * No surgery found *      Hospital Course:   In ED, was put on nitro gtt for BP 180s/100s; nitro was tapered down as her BP trended down and eventually the drip was stopped. VBG showed acidosis and hypercarbia, she was put on bipap with improvement of her VBG after one hour. Lactate was 8.5, decreased to 2. CXR showed increased interstitial markings in lower lung zones, negative for PTX. EKG with no ST changes, sinus tachycardia. Bedside US showed  B-lines in all fields. Bedside Echo showed LVH with no obvious R heart strain - 2D echo showed ef 15-20%.  7/14 Cr increased this morning improved with 250cc bolus, will discharge home today holding spironolactone, continue small dose losartan and lasix, continue metoprolol, can restart spironolactone after following up with PCP/ cardiology with repeat BMP if appropriate.      Physical Exam   Constitutional: She is oriented to person, place, and time. She appears well-developed and well-nourished. No distress.   Cardiovascular: Normal rate, regular rhythm, normal heart sounds and intact distal pulses.    No murmur heard.  Pulmonary/Chest: Effort normal and breath sounds normal. No respiratory distress. She has no wheezes. She has no rales. She exhibits no tenderness.   Abdominal: Soft. She exhibits no mass. No tenderness.  There is no rebound and no guarding.   Neurological: She is alert and oriented to person, place, and time.   Skin: She is not diaphoretic.   Psychiatric: She has a normal mood and affect. Her behavior is normal. Thought content normal.   Consults:     * SOB (shortness of breath)    Progressive for past month, no history of COPD. Recent treatment of sinusitis. CXR showed increasted interstitial markings in lower lung zones. Initial VBG with acidosis and hypercarbia - improvement after bipap. BNP 1151. Consider new onset CHF vs PNA vs flash pulmonary edema vs COPD. Troponin trended up, now decreasing. 2D Echo with EF 18%.    - Control BP - started losartan  - De-escalated to lasix 20 po bid - monitor UOP  - Monitor renal function          JAMIR (acute kidney injury)    Cr imprvied after NS bolus  zoeyley from over diuresing with lasix and spironolactone  Will hold off spironolactone with discharge and patient will folow up with PCP.  Continue small dose losartan and lasix.           Subclinical hyperthyroidism    TSH 0.165    - Follow with PCP outpatient to monitor          New onset of congestive  heart failure    2D echo showed EF 18 with PA systolic pressure wnl.     - Cont diuresing with lasix  - Started losartan for BP control - monitor for side effects  - Follow-up with cardiology outpatient          Elevated LFTs    Likely due to hepatic congestion 2/2 fluid overload. AST ALT both trending down with improvement of her fluid status.    - Improving           Iron deficiency anemia    Hb 9.3 today, no other results listed in system. Can be due to anemia of chronic disease vs iron deficiency. Iron studies showed iron deficiency.     - Monitor CBC  - Cont iron supplementation        Smoker    1/2 ppd    - Consider nicotine patch if withdrawal symptoms  - Smoking cessation discussion ordered          Hypertension, essential    Was on HCTZ-losartan until 1 month ago - had dry cough, meds were stopped by PCP. Checks her BP daily - 118-140/60s. 180s/100s on admit, required nitro gtt.      - Losartan 12.5  -BB  - Cont to monitor BP  - Episodes of hypotension and light-headedness - cont to titrate BP meds          Nausea and vomiting-resolved as of 7/14/2018    QTc 557 during episode of tachycardia, last     - Avoid QTc prolonging meds when possible  - Tried GI cocktail  - Will try compazine or prednisone if nauseated            Final Active Diagnoses:    Diagnosis Date Noted POA    PRINCIPAL PROBLEM:  SOB (shortness of breath) [R06.02] 07/11/2018 Yes    JAMIR (acute kidney injury) [N17.9] 07/14/2018 No    New onset of congestive heart failure [I50.9] 07/12/2018 Yes    Subclinical hyperthyroidism [E05.90] 07/12/2018 Yes    Iron deficiency anemia [D50.9] 07/11/2018 Yes    Elevated LFTs [R79.89] 07/11/2018 Yes    Smoker [F17.200] 07/27/2016 Yes    Hypertension, essential [I10] 06/25/2015 Yes      Problems Resolved During this Admission:    Diagnosis Date Noted Date Resolved POA    Nausea and vomiting [R11.2] 07/12/2018 07/14/2018 No       Discharged Condition: good    Disposition: Home or Self  Care    Follow Up:  Follow-up Information     Phil Quintana MD In 1 week.    Specialties:  Family Medicine, Sports Medicine  Contact information:  [address]  [facility] [address]  [phone]             [facility] - Cardiology In 2 weeks.    Specialty:  Cardiology  Why:  Post-discharge follow-up  Contact information:  [address]  [facility] [address] [phone]  Additional information:  3rd floor               Patient Instructions:     Basic metabolic panel   Standing Status: Future  Standing Exp. Date: 09/11/19     Ambulatory referral to Cardiology   Referral Priority: Routine Referral Type: Consultation   Referral Reason: Specialty Services Required    Requested Specialty: Cardiology    Number of Visits Requested: 1      Diet Cardiac     Notify your health care provider if you experience any of the following:  temperature >100.4     Notify your health care provider if you experience any of the following:  persistent nausea and vomiting or diarrhea     Notify your health care provider if you experience any of the following:  severe uncontrolled pain     Notify your health care provider if you experience any of the following:  difficulty breathing or increased cough     Notify your health care provider if you experience any of the following:  severe persistent headache     Notify your health care provider if you experience any of the following:  worsening rash     Notify your health care provider if you experience any of the following:  persistent dizziness, light-headedness, or visual disturbances     Notify your health care provider if you experience any of the following:  increased confusion or weakness     Notify your health care provider if you experience any of the following:     Cardiac PET Scan Stress   Standing Status: Future  Standing Exp. Date: 07/13/19   Scheduling Instructions: Please schedule before cardiology appointment - within next week if possible.     Activity  as tolerated         Significant Diagnostic Studies: Labs:   CMP   Recent Labs  Lab 07/13/18  0639 07/14/18  0627 07/14/18  1248    136 139   K 3.8 3.2* 3.8   CL 97 94* 97   CO2 30* 32* 32*    99 94   BUN 30* 43* 37*   CREATININE 1.0 1.6* 1.2   CALCIUM 9.8 9.1 9.4   PROT 7.3 6.6  --    ALBUMIN 3.7 3.4*  --    BILITOT 0.5 0.4  --    ALKPHOS 116 112  --    AST 45* 29  --    ALT 86* 61*  --    ANIONGAP 12 10 10   ESTGFRAFRICA >60.0 37.4* 52.9*   EGFRNONAA 57.2* 32.4* 45.9*    and CBC   Recent Labs  Lab 07/13/18  0639 07/14/18  0627   WBC 5.38  5.38 4.89   HGB 10.2*  10.2* 9.9*   HCT 36.4*  36.4* 35.8*   *  354* 304       Pending Diagnostic Studies:     None         Medications:  Reconciled Home Medications:      Medication List      START taking these medications    atorvastatin 20 MG tablet  Commonly known as:  LIPITOR  Take 1 tablet (20 mg total) by mouth once daily.     ferrous sulfate 325 (65 FE) MG EC tablet  Take 1 tablet (325 mg total) by mouth once daily.     furosemide 20 MG tablet  Commonly known as:  LASIX  Take 1 tablet (20 mg total) by mouth 2 (two) times daily.     losartan 25 MG tablet  Commonly known as:  COZAAR  Take 0.5 tablets (12.5 mg total) by mouth once daily.     metoprolol succinate 25 MG 24 hr tablet  Commonly known as:  TOPROL-XL  Take 0.5 tablets (12.5 mg total) by mouth once daily.  Start taking on:  7/15/2018     moxifloxacin 400 mg tablet  Commonly known as:  AVELOX  Take 1 tablet (400 mg total) by mouth once daily. for 1 day        CONTINUE taking these medications    albuterol 90 mcg/actuation inhaler  Inhale 2 puffs into the lungs every 4 (four) hours as needed for Wheezing. Rescue     fluticasone 50 mcg/actuation nasal spray  Commonly known as:  FLONASE  1 spray by Each Nare route daily as needed for Allergies.     guaifenesin-codeine 100-10 mg/5 ml  mg/5 mL syrup  Commonly known as:  TUSSI-ORGANIDIN NR  Take 5-10 mLs by mouth every evening.             Indwelling Lines/Drains at time of discharge:   Lines/Drains/Airways          No matching active lines, drains, or airways          Time spent on the discharge of patient: 30 minutes  Patient was seen and examined on the date of discharge and determined to be suitable for discharge.         TIMOTEO Chávez  Department of Hospital Medicine  [facility]

## 2018-07-14 NOTE — PROGRESS NOTES
Pt discharged via wheelchair with all gathered personal belongings accompanied by family. Vitals stable. Temp: 98.3 HR: 82 RR: 18 O2 Sat: 93% on room air BP: 107/65. Denies pain. Shows no signs of distress. Peripheral IV removed with pressure held x 3 min. IV catheter intact with no bleeding at site at time of discharge. Discharge instructions/educational information given and taught and pt verbalized understanding.

## 2018-07-14 NOTE — ASSESSMENT & PLAN NOTE
Likely due to hepatic congestion 2/2 fluid overload. AST ALT both trending down with improvement of her fluid status.    - Improving      Full range of motion of upper and lower extremities, no joint tenderness/swelling.

## 2018-07-14 NOTE — PLAN OF CARE
Problem: Patient Care Overview  Goal: Plan of Care Review  Outcome: Ongoing (interventions implemented as appropriate)  Pt is free from injury and falls during shift. Pt shows no signs of acute distress. Pt denies pain. AAO. Vitals stable. Pt educated to call for assistance when getting to [facility]- pt verbalized understanding. O2 weaned per orders- pt tolerating RA well. Bed is in low position with breaks locked. Side rails are up x 2. Environment is clutter free. Call light is within reach of the patient. Will continue to monitor.

## 2018-07-14 NOTE — PLAN OF CARE
Problem: Fall Risk (Adult)  Goal: Absence of Falls  Patient will demonstrate the desired outcomes by discharge/transition of care.   Outcome: Ongoing (interventions implemented as appropriate)  Fall precaution maintained this shift call bell in reach bed in low position no acute distress noted vs stable denies any discomfort. All concerned addressed

## 2018-07-14 NOTE — PROGRESS NOTES
Home Oxygen Evaluation    Date Performed: 2018    1) Patient's O2 Sat on room air, while at rest: 94%        If O2 sats on room air at rest are 88% or below, patient qualifies. No additional testing needed. Document N/A in steps 2 and 3. If 89% or above, complete steps 2.      2) Patient's O2 Sat on room air while exercisin%        If O2 sats on room air while exercising remain 89% or above patient does not qualify, no further testing needed Document N/A in step 3. If O2 sats on room air while exercising are 88% or below, continue to step 3.      3) Patient's O2 Sat while exercising on O2: N/A         (Must show improvement from #2 for patients to qualify)    If O2 sats improve on oxygen, patient qualifies for portable oxygen. If not, the patient does not qualify.

## 2018-07-14 NOTE — ASSESSMENT & PLAN NOTE
Cr imprvied after NS bolus  zoeyley from over diuresing with lasix and spironolactone  Will hold off spironolactone with discharge and patient will folow up with PCP.  Continue small dose losartan and lasix.

## 2018-07-14 NOTE — DISCHARGE INSTRUCTIONS
"    Discharge Instructions for High Blood Pressure (Hypertension)  You have been diagnosed with high blood pressure (also called hypertension). This means the force of blood against your artery walls is too strong. It also means your heart is working hard to move blood. High blood pressure usually has no symptoms, but over time, it can damage your heart, blood vessels, eyes, kidneys, and other organs. With help from your doctor, you can manage your blood pressure and protect your health.  Taking medicine  · Learn to take your own blood pressure. Keep a record of your results. Ask your doctor which readings mean that you need medical attention.  · Take your blood pressure medicine exactly as directed. Dont skip doses. Missing doses can cause your blood pressure to get out of control.  · If you do miss a dose (or doses) check with your healthcare provider about what to do.  · Avoid medicine that contain heart stimulants, including over-the-counter drugs. Check for warnings about high blood pressure on the label. Ask the pharmacist before purchasing something you haven't used before  · Check with your doctor or pharmacist before taking a decongestant. Some decongestants can worsen high blood pressure.  Lifestyle changes  · Maintain a healthy weight. Get help to lose any extra pounds.  · Cut back on salt.  ¨ Limit canned, dried, packaged, and fast foods.  ¨ Dont add salt to your food at the table.  ¨ Season foods with herbs instead of salt when you cook.  ¨ Request no added salt when you go to a restaurant.  ¨ The American Heart Associations (AHA) "ideal" sodium intake recommendation is 1,500 milligrams per day.  However, since American's eat so much salt, the AHA says a positive change can occur by cutting back to even 2,400 milligrams of sodium a day.   · Follow the DASH (Dietary Approaches to Stop Hypertension) eating plan. This plan recommends vegetables, fruits, whole gains, and other heart healthy " foods.  · Begin an exercise program. Ask your doctor how to get started. The American Heart Association recommends aerobic exercise 3 to 4 times a week for an average of 40 minutes at a time, with your doctor's approval. Simple activities like walking or gardening can help.  · Break the smoking habit. Enroll in a stop-smoking program to improve your chances of success. Ask your healthcare provider about programs and medicines to help you stop smoking.  · Limit drinks that contain caffeine (coffee, black or green tea, cola) to 2 per day.  · Never take stimulants such as amphetamines or cocaine; these drugs can be deadly for someone with high blood pressure.  · Control your stress. Learn stress-management techniques.  · Limit alcohol to no more than 1 drink a day for women and 2 drinks a day for men.  Follow-up care  Make a follow-up appointment as directed by our staff.     When to seek medical care  Call your doctor immediately or seek emergency care if you have any of the following:  · Chest pain or shortness of breath (call 911)  · Moderate to severe headache  · Weakness in the muscles of your face, arms, or legs  · Trouble speaking  · Extreme drowsiness  · Confusion  · Fainting or dizziness  · Pulsating or rushing sound in your ears  · Unexplained nosebleed  · Weakness, tingling, or numbness of your face, arms, or legs  · Change in vision  · Blood pressure measured at home that is greater than 180/110   Date Last Reviewed: 4/27/2016  © 6223-1059 [employer]. [address] [place] [address], [place] [address]. All rights reserved. This information is not intended as a substitute for professional medical care. Always follow your healthcare professional's instructions.        Discharge Instructions: Taking Blood Pressure Medications  You have been diagnosed with high blood pressure (hypertension). Diet and exercise help control high blood pressure. Many people also need the help of one or more medicines. Here  "are the main types of blood pressure medicines and how they work.  Diuretics  Diuretics are sometimes called "water pills" because they work in the kidney and flush excess water and sodium (salt) from the body. These are one of the most common and  important medicines for the management of blood pressure.  Beta-blockers  Beta-blockers reduce nerve impulses to the heart and blood vessels. This makes the heart beat slower and with less force. Your blood pressure drops, and your heart does not have to work as hard, which may improve pumping function.  ACE inhibitors  ACE inhibitors cause the vessels to relax. This helps the blood flow better and lowers blood pressure.  Angiotensin antagonists  Angiotensin antagonists shield blood vessels from a hormone that causes the blood vessels to get stiff and narrow. Your vessels become wider, and your blood pressure goes down.  Calcium channel blockers (CCBs)  CCBs keep calcium from entering the muscle cells of the heart and blood vessels. This causes blood vessels to relax, and your blood pressure goes down.  Alpha-blockers  Alpha-blockers reduce nerve impulses to blood vessels. This allows blood to pass easily, causing blood pressure to go down.  Alpha-beta blockers  Alpha-beta blockers work the same way as alpha-blockers but also slow your heartbeat. As a result, less blood is pumped through your blood vessels and your blood pressure goes down.  Vasodilators  Vasodilators directly open blood vessels by relaxing the muscle in the vessel walls, causing blood pressure to go down.  Date Last Reviewed: 4/27/2016  © 3485-3948 [employer]. [address] [place] [address], [place] [id number]. All rights reserved. This information is not intended as a substitute for professional medical care. Always follow your healthcare professional's instructions.        Taking a Diuretic  Your healthcare provider has prescribed a diuretic, or water pill, to help your body get rid of excess " water and salt and maintain appropriate fluid balance. Taking your diuretic can help you feel better, breathe better, move more easily, and have more energy.     Take your medication early in the day at the same time each day.      The name of my diuretic is:     ________________________________________   Medicine tips  · Read the fact sheet that comes with your medicine. It tells you when and how to take it. Ask for a medicine sheet if you dont get one.  · If you take 2 or more doses each day, take the last one before dinner if you can. That way youll get up fewer times during the night to go to the bathroom. However, make sure you have enough time between doses during the day.   · If you miss a dose, take it as soon as you remember. If it is almost time for the next dose, skip the missed dose. Do not take a double dose.  · If you miss 2 or more consecutive doses, call your healthcare provider. You may be at risk for fluid buildup.  For your safety  · Follow your doctors guidelines for potassium intake. You may need to take a potassium supplement. Or, you may need to avoid potassium supplements, salt substitutes with potassium, or large amounts of high-potassium foods (such as bananas, potatoes, broccoli, and milk). Recommendations for potassium will depend on the type of diuretic you are prescribed along with your kidney function and other factors. Your healthcare provider will likely want to check your potassium level regularly while you are taking this medicine.  · Talk to your healthcare provider about whether it is safe for you to drink alcohol while taking this medicine.  · Get up slowly when you are sitting or lying down. This helps prevent dizziness and falls due to dizziness.  · Ask your healthcare provider or pharmacist before you take any other prescription or nonprescription medicine or herbal supplements. Some of them may interact with your diuretic and keep it from working correctly.  · Limit  "exposure to sunlight. A diuretic may increase your sensitivity to the sun. Even brief sun exposure may cause skin rash, itching, redness, or other discoloration. It may also lead to severe sunburn. To protect your skin do the following:  ¨ Avoid direct sunlight, especially between 10 a.m. and 2 p.m., whenever possible.  ¨ Apply a daily sunblock of at least SPF 15 (or higher) to any exposed skin, including your lips.  ¨ Wear protective clothing, such as a hat and sunglasses, when you are outdoors.  ¨ Avoid sunlamps and tanning booths.  ¨ Long-sleeve shirts and pants in the summer can help protect your skin.        When to seek medical advice  Call your healthcare provider right away if any of these occur:  · Have diarrhea, constipation, nausea or vomiting.  · Lose your appetite or notice a rapid or excessive weight gain.  · Feel extremely tired or weak.  · Have shortness of breath or difficulty breathing or swallowing.  · Have numbness or tingling in your hands, feet, or lips or a ringing in your ears.  · Feel lightheaded when getting up after sitting or lying down.  · Have headaches, blurred vision, or feel a sense of confusion.  · Have muscle cramps or joint pain.  · Have chest pains or changes in your heartbeat.  · Have an excessive thirst or a dry mouth.  · Notice a skin rash.  · Gain more than 2 pounds in 1 day or 4 pounds in 1 week (ask your healthcare provider for his or her specific direction).  · Have any other unusual symptoms.   Date Last Reviewed: 6/1/2016 © 2000-2017 [employer]. [address] [place] [address], [place] [address]. All rights reserved. This information is not intended as a substitute for professional medical care. Always follow your healthcare professional's instructions.      - Repeat lab "BMP" before going to the clinic     "

## 2018-07-16 LAB
BACTERIA BLD CULT: NORMAL
BACTERIA BLD CULT: NORMAL

## 2018-07-17 ENCOUNTER — PATIENT OUTREACH (OUTPATIENT)
Dept: ADMINISTRATIVE | Facility: CLINIC | Age: 73
End: 2018-07-17

## 2018-07-17 NOTE — PATIENT INSTRUCTIONS
Left- or Right- Side Congestive Heart Failure (CHF)    The heart is a large muscle. It is a pump that circulates blood throughout the body. Blood carries oxygen to all of the organs, including the brain, muscles, and skin. After your body takes the oxygen out of the blood, the blood returns to the heart. The right side of the heart collects the blood from the body and pumps it to the lungs. In the lungs, it gets fresh oxygen and gives up carbon dioxide. The oxygen-rich blood from the lungs then returns to the left side of the heart, where it is pumped back out to the rest of your body, starting the process all over.  Congestive heart failure (CHF) occurs when the heart muscle is weakened. This affects the pumping action of the heart. Heart failure can affect the right side of the heart or the left side. But heart failure may affect not only the right side of the heart or only the left side. Although it may have started on one side, it can and often eventually does affect both sides.  Right-side heart failure  When the right side of the heart is weakened, it cant handle the blood it is getting from the rest of the body. This blood returns to the heart through veins. When too much pressure builds up in the veins, fluid leaks out into the tissues. Gravity then causes that fluid to move to those parts of the body that are the lowest. So one of the first symptoms of right-side CHF can include swelling in the feet and ankles. If the condition gets worse, the swelling can even go up past the knees. Sometimes it gets so severe, the liver can get congested as well.  Left-side heart failure  When the left side of the heart is weakened, it cant handle the blood it gets from the lungs. Pressure then builds up in the veins of the lungs, causing fluid to leak into the lung tissues. This may cause CHF and pulmonary edema. This causes you to feel short of breath, weak, or dizzy. These symptoms are often worse with exertion,  such as when climbing stairs or walking up hills. Lying with your head flat is uncomfortable and can make your breathing worse. This may make sleeping difficult. You may need to use extra pillows to elevate your upper body to sleep well. The same is true when just resting during the daytime.  There are many causes of heart failure including:  · Coronary artery disease  · Past heart attack (also known as acute myocardial infarction, or AMI)  · High blood pressure  · Damaged heart valve  · Diabetes  · Obesity  · Cigarette smoking  · Alcohol abuse  Heart failure is a chronic condition. There is no cure. The purpose of medical treatment is to improve the pumping action of the heart. The main way to do this is to remove excess water from the body. A number of medicines can help reach this goal, improve symptoms, and prevent the heart from becoming weaker. Sometimes, heart failure can become so severe that a device is placed in the heart to help with pumping. Another major goal is to better treat the causes of heart failure, such as diabetes and high blood pressure, by making changes in your lifestyle and maximizing medical control when needed.  Home care  Follow these guidelines when caring for yourself at home:  · Check your weight every day. This is very important because a sudden increase in weight gain could mean worsening heart failure. Keep these things in mind:  ¨ Use the same scale every day.  ¨ Weigh yourself at the same time every day.  ¨ Make sure the scale is on a hard floor surface, not on a rug or carpet.  ¨ Keep a record of your weight every day so your healthcare provider can see it. If you are not given a log sheet for this, keep a separate journal for this purpose.   · Cut back on the amount of salt (sodium) you eat. Follow your healthcare provider's recommendation on how much salt or sodium you should have each day.  ¨ Avoid high-salt foods. These include olives, pickles, smoked meats, salted potato  chips, and most prepared foods.  ¨ Don't add salt to your food at the table. Use only small amounts of salt when cooking.  ¨ Read the labels carefully on food packages to learn how much salt or sodium is in each serving in the package. Remember, a can or package of food may contain more than 1 serving. So if you eat all the food in the package, you may be getting more salt than you think.  · Follow your healthcare provider's recommendations about how much fluid you should have. Be aware that some foods, such as soup, pudding, and juicy fruits like oranges or melons, contain liquid. You'll need to count the liquid in those foods as part of your daily fluid intake. Your provider can help you with this.  · Stop smoking.  · Cut back on how much alcohol you drink.  · Lose weight if you are overweight. The excess weight adds a lot of stress on the workload of the heart.  · Stay active. Talk with your provider about an exercise program that is safe for your heart.  · Keep your feet elevated to reduce swelling. Ask your provider about support hose as a preventive treatment for daytime leg swelling.  Besides taking your medicine as instructed, an important part of treatment is lifestyle changes. These include diet, physical activity, stopping smoking, and weight control.  Improve your diet by including more fresh foods, cutting back on how much sugar and saturated fat you eat, and eating fewer processed foods and less salt.  Follow-up care  Follow up with your healthcare provider, or as advised.  Make sure to keep any appointments that were made for you. These can help better control your congestive heart failure. You will need to follow up with your provider on a routine basis to make sure your heart failure is well managed.  If an X-ray, ECG, or other tests were done, you will be told of any new findings that may affect your care.  Call 911  Call 911 if you:  · Become severely short of breath  · Feel lightheaded, or feel  like you might pass out or faint  · Have chest pain or discomfort that is different than usual, the medicines your doctor told you to use for this don't help, or the pain lasts longer than 10 to 15 minutes  · You suddenly develop a rapid heart rate  When to seek medical advice  The following may be signs that your heart failure is getting worse. Call your healthcare provider right away if any of these happen:  · Sudden weight gain. This means 3 or more pounds in one day, or 5 or more pounds in 1 week.  · Trouble breathing not related to being active  · New or increased swelling of your legs or ankles  · Swelling or pain in your abdomen  · Breathing trouble at night. This means waking up short of breath or needing more pillows to breathe.  · Frequent coughing that doesnt go away  · Feeling much more tired than usual  Date Last Reviewed: 1/4/2016  © 4799-1653 [employer]. [address] [place] [address], [place] [address]. All rights reserved. This information is not intended as a substitute for professional medical care. Always follow your healthcare professional's instructions.

## 2018-07-23 ENCOUNTER — OFFICE VISIT (OUTPATIENT)
Dept: CARDIOLOGY | Facility: CLINIC | Age: 73
End: 2018-07-23
Payer: MEDICAID

## 2018-07-23 VITALS
OXYGEN SATURATION: 96 % | HEART RATE: 76 BPM | HEIGHT: 61 IN | BODY MASS INDEX: 26.22 KG/M2 | SYSTOLIC BLOOD PRESSURE: 127 MMHG | DIASTOLIC BLOOD PRESSURE: 72 MMHG | WEIGHT: 138.88 LBS

## 2018-07-23 DIAGNOSIS — I42.8 OTHER CARDIOMYOPATHY: Primary | ICD-10-CM

## 2018-07-23 DIAGNOSIS — R06.09 DOE (DYSPNEA ON EXERTION): ICD-10-CM

## 2018-07-23 DIAGNOSIS — R06.02 SOB (SHORTNESS OF BREATH): ICD-10-CM

## 2018-07-23 DIAGNOSIS — Z87.891 EX-SMOKER: ICD-10-CM

## 2018-07-23 DIAGNOSIS — E78.00 HYPERCHOLESTEREMIA: ICD-10-CM

## 2018-07-23 PROCEDURE — 99213 OFFICE O/P EST LOW 20 MIN: CPT | Mod: PBBFAC | Performed by: INTERNAL MEDICINE

## 2018-07-23 PROCEDURE — 99204 OFFICE O/P NEW MOD 45 MIN: CPT | Mod: S$PBB,,, | Performed by: INTERNAL MEDICINE

## 2018-07-23 PROCEDURE — 99999 PR PBB SHADOW E&M-EST. PATIENT-LVL III: CPT | Mod: PBBFAC,,, | Performed by: INTERNAL MEDICINE

## 2018-07-23 RX ORDER — METOPROLOL SUCCINATE 50 MG/1
50 TABLET, EXTENDED RELEASE ORAL DAILY
Qty: 90 TABLET | Refills: 3 | Status: SHIPPED | OUTPATIENT
Start: 2018-07-23 | End: 2018-07-27

## 2018-07-23 RX ORDER — SPIRONOLACTONE 25 MG/1
25 TABLET ORAL DAILY
COMMUNITY
End: 2019-04-11 | Stop reason: SDUPTHER

## 2018-07-23 NOTE — PROGRESS NOTES
Subjective:   Patient ID:  Selma Hooper is a 72 y.o. female is a new patient who presents for evaluation of Shortness of Breath (hospital discharge 07/14/18)  Recent discharge:   Ms. Hooper is a 71yo woman with essential HTN (off all meds) who presents with acute hypoxemic respiratory failure 2/2 HTN emergency with flash pulmonary edema and acute new onset systolic congestive heart failure exacerbation. Also with elevated transaminases likely 2/2 congestive hepatopathy (improving), lactic acidosis (now resolved), and elevated troponin, likely 2/2 HTN/CHF. She was initially placed on BiPAP and NTG gtt upon admission with improvement in her BP and taper off gtt. Now weaned off NC and s/p IV lasix with good response. Hypotensive yesterday am, likely 2/2 overdiuresis; improved with 250cc bolus today. Will discharge on GDMT: initiate losartan tomorrow am (held today 2/2 JAMIR), metoprolol succinate, lasix 20. Would consider addition of spironolactone at f/u visit; held off today 2/2 JAMIR due to overdiuresis. Will need timely ischemic w/u as outpatient.    Echo:    1 - Severely depressed left ventricular systolic function (EF 15-20%).     2 - Normal right ventricular systolic function .     3 - Trivial to mild aortic regurgitation.     4 - Trivial to mild mitral regurgitation.     5 - Trivial to mild tricuspid regurgitation.     6 - The estimated PA systolic pressure is 28 mmHg.     HPI:   Patient has been feeling SOB for atleast 6 month that is primarily THEODORE. Since Diuretic her THEODORE has improved. No orthopnea, PND, chest pain or palpitations.   Heavy smoker, mother has heart attack 60s. Brother had CABG in 60s.       Patient Active Problem List   Diagnosis    Family history of colon cancer    Colon polyps    Hypertension, essential    Smoker    SOB (shortness of breath)    Iron deficiency anemia    Elevated LFTs    New onset of congestive heart failure    Subclinical hyperthyroidism    JAMIR (acute kidney injury)  "    /72 (BP Location: Left arm, Patient Position: Sitting, BP Method: Large (Automatic))   Pulse 76   Ht 5' 1" (1.549 m)   Wt 63 kg (138 lb 14.2 oz)   SpO2 96%   BMI 26.24 kg/m²   Body mass index is 26.24 kg/m².  CrCl cannot be calculated (Patient's most recent lab result is older than the maximum 7 days allowed.).    Lab Results   Component Value Date     07/14/2018    K 3.8 07/14/2018    CL 97 07/14/2018    CO2 32 (H) 07/14/2018    BUN 37 (H) 07/14/2018    CREATININE 1.2 07/14/2018    GLU 94 07/14/2018    HGBA1C 5.4 07/12/2018    MG 1.9 07/12/2018    AST 29 07/14/2018    ALT 61 (H) 07/14/2018    ALBUMIN 3.4 (L) 07/14/2018    PROT 6.6 07/14/2018    BILITOT 0.4 07/14/2018    WBC 4.89 07/14/2018    HGB 9.9 (L) 07/14/2018    HCT 35.8 (L) 07/14/2018    MCV 66 (L) 07/14/2018     07/14/2018    TSH 0.165 (L) 07/12/2018    CHOL 135 05/06/2016    HDL 31 (L) 05/06/2016    LDLCALC 81.6 05/06/2016    TRIG 112 05/06/2016       Current Outpatient Prescriptions   Medication Sig    atorvastatin (LIPITOR) 20 MG tablet Take 1 tablet (20 mg total) by mouth once daily.    ferrous sulfate 325 (65 FE) MG EC tablet Take 1 tablet (325 mg total) by mouth once daily.    fluticasone (FLONASE) 50 mcg/actuation nasal spray 1 spray by Each Nare route daily as needed for Allergies.    furosemide (LASIX) 20 MG tablet Take 1 tablet (20 mg total) by mouth 2 (two) times daily.    losartan (COZAAR) 25 MG tablet Take 0.5 tablets (12.5 mg total) by mouth once daily.    spironolactone (ALDACTONE) 25 MG tablet Take 25 mg by mouth once daily.    metoprolol succinate (TOPROL-XL) 50 MG 24 hr tablet Take 1 tablet (50 mg total) by mouth once daily.     No current facility-administered medications for this visit.        Review of Systems   Cardiovascular: Positive for dyspnea on exertion (improved).       Objective:   Physical Exam   Constitutional: She is oriented to person, place, and time. She appears well-developed and " well-nourished. No distress.   HENT:   Head: Normocephalic and atraumatic.   Nose: Nose normal.   Mouth/Throat: Oropharynx is clear and moist. No oropharyngeal exudate.   Eyes: Conjunctivae and EOM are normal. Pupils are equal, round, and reactive to light. Right eye exhibits no discharge. Left eye exhibits no discharge. No scleral icterus.   Neck: Normal range of motion. Neck supple. No JVD present. No tracheal deviation present. No thyromegaly present.   Cardiovascular: Normal rate, regular rhythm, normal heart sounds and intact distal pulses.  Exam reveals no gallop and no friction rub.    No murmur heard.  Pulmonary/Chest: Effort normal and breath sounds normal. No stridor. No respiratory distress. She has no wheezes. She has no rales. She exhibits no tenderness.   Abdominal: Soft. Bowel sounds are normal. She exhibits no distension and no mass. There is no tenderness. There is no rebound and no guarding.   Musculoskeletal: Normal range of motion. She exhibits no edema or tenderness.   Lymphadenopathy:     She has no cervical adenopathy.   Neurological: She is alert and oriented to person, place, and time. She has normal reflexes. No cranial nerve deficit. She exhibits normal muscle tone. Coordination normal.   Skin: Skin is warm. No rash noted. She is not diaphoretic. No erythema. No pallor.   Psychiatric: She has a normal mood and affect. Her behavior is normal. Judgment and thought content normal.       Assessment:     1. Other cardiomyopathy    2. Ex-smoker    3. SOB (shortness of breath)    4. THEODORE (dyspnea on exertion)    5. Hypercholesteremia        Plan:   Selma was seen today for shortness of breath.    Diagnoses and all orders for this visit:    Other cardiomyopathy  -     Cardiac PET Scan Stress; Future    Ex-smoker    SOB (shortness of breath)    THEODORE (dyspnea on exertion)    Hypercholesteremia  -     Lipid panel; Future  -     Hepatic function panel; Future    Other orders  -     metoprolol succinate  (TOPROL-XL) 50 MG 24 hr tablet; Take 1 tablet (50 mg total) by mouth once daily.      R/o obstructive CAD, preoperative clearance is based on the PET results. Gradual uptitration of meds and f/up with echo  Limit sodium intake to less then 2 gram sodium and 1500cc fluid restriction.  Graded exercise program as tolerated.  Call if  more than 3 lbs in 1 day or 5 lbs in 1 week.

## 2018-07-25 ENCOUNTER — NURSE TRIAGE (OUTPATIENT)
Dept: ADMINISTRATIVE | Facility: CLINIC | Age: 73
End: 2018-07-25

## 2018-07-26 ENCOUNTER — TELEPHONE (OUTPATIENT)
Dept: CARDIOLOGY | Facility: CLINIC | Age: 73
End: 2018-07-26

## 2018-07-26 NOTE — TELEPHONE ENCOUNTER
"Selma has had several medication changes / additions since hospitalization for CHF last week.  BP earlier tonight was 88/52, pulse 71, but asymptomatic. Encouraged her to retake BP in five minutes whenever she gets an abnormal high or abnormal low pressure reading again.  She has no symptoms, and retaken it was 115/67, pulse 81. Encouraged hydration with water (states she is not on fluid restriction, and says she does not drink much; now on lasix twice daily and spironolactone daily. She has appt with Phil Andrade MD on Friday, and message will be sent to Dr Chanel Reyna, her cardiologist, for follow up with her concerns.  Please contact caller directly with any additional care advice.      Reason for Disposition   [1] Fall in systolic BP > 20 mm Hg from normal AND [2] NOT dizzy, lightheaded, or weak  (all triage questions negative)    Answer Assessment - Initial Assessment Questions  1. BLOOD PRESSURE: "What is the blood pressure?" "Did you take at least two measurements 5 minutes apart?"      88/52, pulse 71, taken 22:47 tonight.  Taken now during this call is 115/67, pulse 81.    2. ONSET: "When did you take your blood pressure?"      See above.    3. HOW: "How did you obtain the blood pressure?" (e.g., visiting nurse, automatic home BP monitor)      Home digital cuff.    4. HISTORY: "Do you have a history of low blood pressure?" "What is your blood pressure normally?"      No. Usually 125/70's    5. MEDICATIONS: "Are you taking any medications for blood pressure?" If yes: "Have they been changed recently?"      Yes.  Yes, have been changed by Dr Reyna.    6. PULSE RATE: "Do you know what your pulse rate is?"       See above.    7. OTHER SYMPTOMS: "Have you been sick recently?" "Have you had a recent injury?"      Yes, I was in the hospital for CHF exacerbation one week ago.    8. PREGNANCY: "Is there any chance you are pregnant?" "When was your last menstrual period?"      N/a    Protocols used: " ST LOW BLOOD PRESSURE-A-AH

## 2018-07-26 NOTE — TELEPHONE ENCOUNTER
Spoke with the pt - says that she has had a dull HA all day and her BP is too low. /56 and HR 76.  Says that metoprolol was increased to 50 mg daily from 12.5 mg and losartan to 25 mg from 12.5 mg daily. Spoke with Dr. Reyna - says for the pt to go back to the doses she was taking and will discuss at her next appt. Spoke with the pt and gave her instructions from Dr. Reyna and she verbalized understanding.

## 2018-07-26 NOTE — TELEPHONE ENCOUNTER
----- Message from Abdullahi Lau MA sent at 7/26/2018  4:21 PM CDT -----  Contact: Pt called  Please call patient at [phone].  She would like to speak to you in reference to Dr. Reyna changing her medications on Monday her Blood Pressure being low.  She states her   Blood Pressure was:  105/56 Pulse 76.  She's scheduled to see Dr. Andrade on 07/27/2018 her PCP.  LOV 07/23/2018    Thank You,  Paulina

## 2018-07-27 ENCOUNTER — OFFICE VISIT (OUTPATIENT)
Dept: INTERNAL MEDICINE | Facility: CLINIC | Age: 73
End: 2018-07-27
Attending: FAMILY MEDICINE
Payer: MEDICAID

## 2018-07-27 VITALS
HEIGHT: 61 IN | WEIGHT: 139.13 LBS | OXYGEN SATURATION: 96 % | HEART RATE: 62 BPM | SYSTOLIC BLOOD PRESSURE: 116 MMHG | DIASTOLIC BLOOD PRESSURE: 62 MMHG | BODY MASS INDEX: 26.27 KG/M2

## 2018-07-27 DIAGNOSIS — K63.5 POLYP OF COLON, UNSPECIFIED PART OF COLON, UNSPECIFIED TYPE: ICD-10-CM

## 2018-07-27 DIAGNOSIS — R79.89 ELEVATED TROPONIN: ICD-10-CM

## 2018-07-27 DIAGNOSIS — D50.9 IRON DEFICIENCY ANEMIA, UNSPECIFIED IRON DEFICIENCY ANEMIA TYPE: ICD-10-CM

## 2018-07-27 DIAGNOSIS — I50.9 NEW ONSET OF CONGESTIVE HEART FAILURE: Primary | ICD-10-CM

## 2018-07-27 DIAGNOSIS — I10 HYPERTENSION, ESSENTIAL: ICD-10-CM

## 2018-07-27 DIAGNOSIS — F17.200 SMOKER: ICD-10-CM

## 2018-07-27 DIAGNOSIS — E05.90 SUBCLINICAL HYPERTHYROIDISM: ICD-10-CM

## 2018-07-27 DIAGNOSIS — R79.89 ELEVATED LFTS: ICD-10-CM

## 2018-07-27 PROBLEM — R06.02 SOB (SHORTNESS OF BREATH): Status: RESOLVED | Noted: 2018-07-11 | Resolved: 2018-07-27

## 2018-07-27 PROBLEM — N17.9 AKI (ACUTE KIDNEY INJURY): Status: RESOLVED | Noted: 2018-07-14 | Resolved: 2018-07-27

## 2018-07-27 PROCEDURE — 99496 TRANSJ CARE MGMT HIGH F2F 7D: CPT | Mod: S$PBB,,, | Performed by: FAMILY MEDICINE

## 2018-07-27 PROCEDURE — 99999 PR PBB SHADOW E&M-EST. PATIENT-LVL IV: CPT | Mod: PBBFAC,,, | Performed by: FAMILY MEDICINE

## 2018-07-27 PROCEDURE — 99214 OFFICE O/P EST MOD 30 MIN: CPT | Mod: PBBFAC | Performed by: FAMILY MEDICINE

## 2018-07-27 PROCEDURE — 99496 TRANSJ CARE MGMT HIGH F2F 7D: CPT | Mod: PBBFAC | Performed by: FAMILY MEDICINE

## 2018-07-27 RX ORDER — METOPROLOL SUCCINATE 25 MG/1
12.5 TABLET, EXTENDED RELEASE ORAL 2 TIMES DAILY
COMMUNITY
End: 2018-09-17 | Stop reason: SDUPTHER

## 2018-07-27 RX ORDER — METOPROLOL SUCCINATE 25 MG/1
TABLET, EXTENDED RELEASE ORAL
Start: 2018-07-27 | End: 2018-08-22

## 2018-07-27 NOTE — PROGRESS NOTES
Subjective:       Patient ID: Selma Hooper is a 72 y.o. female.    Chief Complaint: Hospital Follow Up    HPI  Review of Systems   Constitutional: Positive for fatigue. Negative for chills, fever and unexpected weight change.   HENT: Positive for congestion. Negative for trouble swallowing.    Eyes: Negative for redness and visual disturbance.   Respiratory: Negative for cough, chest tightness and shortness of breath.    Cardiovascular: Negative for chest pain, palpitations and leg swelling.   Gastrointestinal: Negative for abdominal pain and blood in stool.   Genitourinary: Negative for difficulty urinating, hematuria and menstrual problem.   Musculoskeletal: Negative for arthralgias, back pain, gait problem, joint swelling, myalgias and neck pain.   Skin: Negative for color change and rash.   Neurological: Positive for light-headedness. Negative for tremors, speech difficulty, weakness, numbness and headaches.   Hematological: Negative for adenopathy. Does not bruise/bleed easily.   Psychiatric/Behavioral: Positive for decreased concentration. Negative for behavioral problems, confusion and sleep disturbance. The patient is not nervous/anxious.        Objective:      Physical Exam   Constitutional: She is oriented to person, place, and time. She appears well-developed and well-nourished.   Eyes: No scleral icterus.   Neck: Normal range of motion. Neck supple. No JVD present. No tracheal deviation present. No thyromegaly present.   Cardiovascular: Normal rate, normal heart sounds and intact distal pulses.  Exam reveals no gallop and no friction rub.    No murmur heard.  Pulmonary/Chest: Effort normal and breath sounds normal. No respiratory distress. She has no wheezes. She has no rales.   Abdominal: Soft. Bowel sounds are normal. She exhibits no distension, no abdominal bruit and no mass. There is no tenderness. There is no rebound and no guarding.   Musculoskeletal: She exhibits no edema.   Lymphadenopathy:      She has no cervical adenopathy.   Neurological: She is alert and oriented to person, place, and time. She displays no tremor. No cranial nerve deficit. Coordination and gait normal.   Skin: Skin is warm and dry. No rash noted. She is not diaphoretic. No erythema.   Psychiatric: She has a normal mood and affect. Her behavior is normal. Judgment and thought content normal.   Nursing note and vitals reviewed.      Assessment:       1. New onset of congestive heart failure    2. Elevated LFTs    3. Hypertension, essential    4. Iron deficiency anemia, unspecified iron deficiency anemia type    5. Smoker    6. Subclinical hyperthyroidism    7. Polyp of colon, unspecified part of colon, unspecified type    8. Elevated troponin        Plan:   Selma was seen today for hospital follow up.    Diagnoses and all orders for this visit:    New onset of congestive heart failure  -     Basic metabolic panel; Future    Elevated LFTs    Hypertension, essential  -     Hypertension Digital Medicine (HDMP) Enrollment Order  -     Hypertension Digital Medicine (HDMP): Assign Onboarding Questionnaires    Iron deficiency anemia, unspecified iron deficiency anemia type  -     Ambulatory referral to Gastroenterology    Smoker    Subclinical hyperthyroidism  -     TSH; Future    Polyp of colon, unspecified part of colon, unspecified type    Elevated troponin      See meds, orders, follow up, routing and instructions sections of encounter.  A 72-year-old patient TCC visit.  Recent hospitalization for new onset   congestive heart failure.  Her Emergency Room presentation and discharge   summaries were noted.  The patient had seen physician Amarilys hall on July   3 and has subsequently seen Dr. Reyna on July 23rd.  Those notes were   evaluated.  She had some confusion concerning medication.  Her blood pressure is   a bit low.  She was asked to take 50 mg metoprolol, but apparently by   telephone, this was reduced.  She is currently  taking a half of 25 mg once a   day.  She should probably be taking this twice daily.  Additionally, she was   placed on Lasix twice daily.    She had an echo on July 12th showing wall motion abnormalities and an ejection   fraction of between 15% and 20%.  She states her hospitalization was between   Wednesday and Saturday.    We did note her laboratory.  She had a colonoscopy in 2015 with a five-year   followup recommendation.  Her discharge summary of 07/15/2018 was reviewed.    I had a lengthy discussion and evaluation with her today.  She actually looks   much better than her tests would determine.  For the time being, I wrote out her   medications.  We can take the Lasix once a day and the 25 mg metoprolol, half   tablet once a day.  Continue her other medications.  Watch for hypotension   and/or lightheadedness.  Courtesy copy to Dr. Reyna concerning her   medication changes today.  She is to schedule followup with them and notify us   for any significant hypertension, fatigue, malaise, dizziness, etc.  She did   recently quit smoking and we encouraged her.      RADU/HN  dd: 07/27/2018 12:24:27 (CDT)  td: 07/27/2018 15:48:31 (CDT)  Doc ID   #[id number]  Job ID #[id number]    CC:       Transitional Care Note    Family and/or Caretaker present at visit?  No.  Diagnostic tests reviewed/disposition: I have reviewed all completed as well as pending diagnostic tests at the time of discharge.  Disease/illness education: CHF  Home health/community services discussion/referrals: Patient does not have home health established from hospital visit.  They do not need home health.  If needed, we will set up home health for the patient.   Establishment or re-establishment of referral orders for community resources: No other necessary community resources.   Discussion with other health care providers: digital HTN program.

## 2018-07-27 NOTE — PATIENT INSTRUCTIONS
Metoprolol - take once a day (1/2 tab)  Furosemide - take once a day   - for now    Keep taking the spironolactone daytime,  losartan, atorvastatin once a day in the evening            Information about cholesterol, high blood pressure and healthy diet and activity recommendations can be found at the following links on the Internet.    http://www.nhlbi.nih.gov/health/health-topics/topics/hbc    http://www.nhlbi.nih.gov/health/educational/lose_wt/index.htm    Http://www.nhlbi.nih.gov/files/docs/public/heart/hbp_low.pdf    http://www.heart.org/HEARTORG/    http://diabetes.org/    https://www.cdc.gov/    https://healthfinder.gov/

## 2018-07-31 ENCOUNTER — TELEPHONE (OUTPATIENT)
Dept: CARDIOLOGY | Facility: CLINIC | Age: 73
End: 2018-07-31

## 2018-07-31 ENCOUNTER — CLINICAL SUPPORT (OUTPATIENT)
Dept: CARDIOLOGY | Facility: CLINIC | Age: 73
End: 2018-07-31
Attending: INTERNAL MEDICINE
Payer: MEDICAID

## 2018-07-31 ENCOUNTER — LAB VISIT (OUTPATIENT)
Dept: LAB | Facility: HOSPITAL | Age: 73
End: 2018-07-31
Attending: INTERNAL MEDICINE
Payer: MEDICAID

## 2018-07-31 DIAGNOSIS — E78.00 HYPERCHOLESTEREMIA: ICD-10-CM

## 2018-07-31 DIAGNOSIS — I42.8 OTHER CARDIOMYOPATHY: ICD-10-CM

## 2018-07-31 LAB
ALBUMIN SERPL BCP-MCNC: 3.4 G/DL
ALP SERPL-CCNC: 139 U/L
ALT SERPL W/O P-5'-P-CCNC: 10 U/L
AST SERPL-CCNC: 21 U/L
BILIRUB DIRECT SERPL-MCNC: 0.2 MG/DL
BILIRUB SERPL-MCNC: 0.3 MG/DL
CHOLEST SERPL-MCNC: 89 MG/DL
CHOLEST/HDLC SERPL: 3.1 {RATIO}
DIASTOLIC DYSFUNCTION: NO
HDLC SERPL-MCNC: 29 MG/DL
HDLC SERPL: 32.6 %
LDLC SERPL CALC-MCNC: 44.8 MG/DL
NONHDLC SERPL-MCNC: 60 MG/DL
PROT SERPL-MCNC: 6.8 G/DL
TRIGL SERPL-MCNC: 76 MG/DL

## 2018-07-31 PROCEDURE — 36415 COLL VENOUS BLD VENIPUNCTURE: CPT

## 2018-07-31 PROCEDURE — 78452 HT MUSCLE IMAGE SPECT MULT: CPT | Mod: PBBFAC | Performed by: INTERNAL MEDICINE

## 2018-07-31 PROCEDURE — 80061 LIPID PANEL: CPT

## 2018-07-31 PROCEDURE — 93018 CV STRESS TEST I&R ONLY: CPT | Mod: S$PBB,,, | Performed by: INTERNAL MEDICINE

## 2018-07-31 PROCEDURE — 80076 HEPATIC FUNCTION PANEL: CPT

## 2018-07-31 PROCEDURE — 93016 CV STRESS TEST SUPVJ ONLY: CPT | Mod: S$PBB,,, | Performed by: INTERNAL MEDICINE

## 2018-07-31 NOTE — TELEPHONE ENCOUNTER
----- Message from Chanel Reyna MD sent at 7/31/2018  2:52 PM CDT -----  Cholesterol level is normal. No changes in medications.

## 2018-08-01 ENCOUNTER — TELEPHONE (OUTPATIENT)
Dept: CARDIOLOGY | Facility: CLINIC | Age: 73
End: 2018-08-01

## 2018-08-01 NOTE — PROGRESS NOTES
Please let pt. Know that the stress test was normal    Patient is low to moderate risk due her underlying CM but can proceed with non cardiac surgery

## 2018-08-01 NOTE — TELEPHONE ENCOUNTER
----- Message from Chanel Reyna MD sent at 8/1/2018  8:34 AM CDT -----  Please let pt. Know that the stress test was normal

## 2018-08-14 ENCOUNTER — PATIENT MESSAGE (OUTPATIENT)
Dept: ADMINISTRATIVE | Facility: OTHER | Age: 73
End: 2018-08-14

## 2018-08-21 ENCOUNTER — PATIENT OUTREACH (OUTPATIENT)
Dept: OTHER | Facility: OTHER | Age: 73
End: 2018-08-21

## 2018-08-21 NOTE — LETTER
Gail Cruz, PharmD  [address] [place] [address]     Dear Selma Hooper,    Welcome to the [facility]!           My name is Gail Cruz PharmD and I am your dedicated Digital Medicine clinician.  As an expert in medication management, I will help ensure that the medications you are taking continue to provide you with the intended benefits.        I am Jose Enrique Knowles and I will be your health  for the duration of the program.  My  job is to help you identify lifestyle changes to improve your blood pressure control.  We will talk about nutrition, exercise, and other ways that you may be able to adjust your current habits to better your health. Together, we will work to improve your overall health and encourage you to meet your goals for a healthier lifestyle.    What we expect from YOU:    You will need to take blood pressure readings multiple times a week and no less than one reading per week.   It is important that you take your measurements at different times during the day, when possible.     What you should expect from your Digital Medicine Care Team:   We will provide you with education about high blood pressure, including lifestyle changes that could help you to control your blood pressure.   We will review your weekly readings and provide you with monthly blood pressure progress reports after you have been in the program for more than 30 days.   We will send monthly progress reports on your blood pressure control to your physician so they can follow along with your progress as well.    You will be able to reach me by phone at [phone] or through your [facility] account by clicking my name under Care Team on the right side of the home screen.    I look forward to working with you to achieve your blood pressure goals!    Sincerely,    Gail Cruz PharmD  Your personal clinician    Please visit  [email or web address] to learn more about high blood pressure and what you can do lower your blood pressure.                                                                                           Selma Hooper  [address] [facility] [address]

## 2018-08-21 NOTE — PROGRESS NOTES
Last 5 Patient Entered Readings                                      Current 30 Day Average: 119/69     Recent Readings 8/21/2018 8/21/2018 8/20/2018 8/20/2018 8/19/2018    SBP (mmHg) 123 115 123 119 118    DBP (mmHg) 74 66 75 72 69    Pulse 74 67 72 79 72          08/21: LVM.  Will call back on 08/24

## 2018-08-22 ENCOUNTER — PATIENT OUTREACH (OUTPATIENT)
Dept: OTHER | Facility: OTHER | Age: 73
End: 2018-08-22

## 2018-08-22 NOTE — PROGRESS NOTES
Last 5 Patient Entered Readings                                      Current 30 Day Average: 120/69     Recent Readings 8/21/2018 8/21/2018 8/20/2018 8/20/2018 8/19/2018    SBP (mmHg) 123 115 123 119 118    DBP (mmHg) 74 66 75 72 69    Pulse 74 67 72 79 72        Hypertension Medications             furosemide (LASIX) 20 MG tablet Take 1 tablet (20 mg total) by mouth 2 (two) times daily.    losartan (COZAAR) 25 MG tablet Take 0.5 tablets (12.5 mg total) by mouth once daily. QHS         metoprolol succinate (TOPROL-XL) 25 MG 24 hr tablet Take 12.5 mg by mouth 2 (two) times daily.    spironolactone (ALDACTONE) 25 MG tablet Take 25 mg by mouth once daily. QAM        Assessment/ Plan:   Called patient to welcome her back into the HDMP.  Patient requests to call back.       Patient called back. Welcomed patient back into the HDMP.   Reviewed recent readings.  Per 2017 ACC/ AHA HTN guidelines (goal of BP < 130/80), current 30-day average is well controlled.   Patient denies having questions or concerns. Instructed patient to call if she has any questions or concerns, patient confirms understanding.   Will continue to monitor. WCB in 3 months, sooner if BP begins to trend up or down.

## 2018-08-23 NOTE — PROGRESS NOTES
Last 5 Patient Entered Readings                                      Current 30 Day Average: 119/69     Recent Readings 8/23/2018 8/21/2018 8/21/2018 8/20/2018 8/20/2018    SBP (mmHg) 107 123 115 123 119    DBP (mmHg) 66 74 66 75 72    Pulse 69 74 67 72 79        Ms. Selma Hooper is a 72 y.o. female who is newly enrolled in the [facility].     The following information was reviewed/updated:  Preferred pharmacy   [facility] [address] [place] [address] [address] [facility]  [address] [facility] [place] [address]  Phone: [phone] Fax: [phone]      Patient prefers a 90 days supply *Unable to ask    HYPERTENSION    Explained that we expect patient to obtain several blood pressures per week at random times of day.   Our goal is to get  BP to consistently below 130/80mmHg and make the process convenient so patient can avoid extra trips to the office. Getting your blood pressure below 130/80mmHg (definition of control) will reduce your risk for heart attack, kidney failure, stroke and death (as well as kidney failure, eye disease, & dementia).     Patient is meeting the goal already. Current BP average 119/69 mmHg.  When asked what the patient thinks is causing BP to be elevated, she states: NA    Discussed appropriate BP measuring technique:  Before taking your blood pressure, find a quiet place. You will need to listen for your heartbeat.  Roll up the sleeve on your left arm or remove any tight-sleeved clothing, if needed. (It's best to take your blood pressure from your left arm if you are right-handed.You can use the other arm if you have been told by your health care provider to do so.)  Rest in a chair next to a table for 5 to 10 minutes. (Your left arm should rest comfortably at heart level.)  Sit up straight with your back against the chair, legs uncrossed and on the ground.  Rest your forearm on the table with the palm of your hand facing up.  You should not talk, read the newspaper,  or watch television during this process.    Last 5 Patient Entered Readings                                      Current 30 Day Average: 119/69     Recent Readings 8/23/2018 8/21/2018 8/21/2018 8/20/2018 8/20/2018    SBP (mmHg) 107 123 115 123 119    DBP (mmHg) 66 74 66 75 72    Pulse 69 74 67 72 79             Instructed patient not to allow anyone else to use phone and BP cuff or glucometer.   I'm not available for emergencies. Patient will call [facility] on-call ([phone] or [phone]) or [phone] if needed.     Patient and I agreed that she will continue to monitor blood pressure and sodium intake and continue to remain adherent to medications.     I will plan to follow-up with the patient in 3 weeks.   Emailed patient link to [facility] webpages and my contact information in case she has any questions.    Lifestyle Assessment:    Diet: Patient states she is cutting back on salt intake by using Ms. Dash and reducing salt use.  Cut back on salty snacks.  Encouraged patient to continue low sodium diet.    Exercise: Patient states she stays active by walking around and performing arm circles.  Keeps busy.    Alcohol/Tobacco: Reports she quit smoking two months ago.    Reviewed technique. States she has been getting some error messages, but once she adjusts the cuff, it gives her readings.  Encouraged patient to call tech team.    Patient reports concern about some lower readings and medication timing.

## 2018-09-05 ENCOUNTER — PATIENT OUTREACH (OUTPATIENT)
Dept: OTHER | Facility: OTHER | Age: 73
End: 2018-09-05

## 2018-09-05 NOTE — PROGRESS NOTES
Last 5 Patient Entered Readings                                      Current 30 Day Average: 121/71     Recent Readings 9/4/2018 9/3/2018 9/2/2018 9/1/2018 8/31/2018    SBP (mmHg) 117 133 118 124 127    DBP (mmHg) 68 82 73 73 68    Pulse 78 73 91 73 85        Opened in error.

## 2018-09-11 ENCOUNTER — LAB VISIT (OUTPATIENT)
Dept: LAB | Facility: HOSPITAL | Age: 73
End: 2018-09-11
Attending: FAMILY MEDICINE
Payer: MEDICAID

## 2018-09-11 DIAGNOSIS — E05.90 SUBCLINICAL HYPERTHYROIDISM: ICD-10-CM

## 2018-09-11 DIAGNOSIS — I50.9 NEW ONSET OF CONGESTIVE HEART FAILURE: ICD-10-CM

## 2018-09-11 LAB
ANION GAP SERPL CALC-SCNC: 13 MMOL/L
BUN SERPL-MCNC: 13 MG/DL
CALCIUM SERPL-MCNC: 10.1 MG/DL
CHLORIDE SERPL-SCNC: 104 MMOL/L
CO2 SERPL-SCNC: 26 MMOL/L
CREAT SERPL-MCNC: 1 MG/DL
EST. GFR  (AFRICAN AMERICAN): >60 ML/MIN/1.73 M^2
EST. GFR  (NON AFRICAN AMERICAN): 57.2 ML/MIN/1.73 M^2
GLUCOSE SERPL-MCNC: 89 MG/DL
POTASSIUM SERPL-SCNC: 3.8 MMOL/L
SODIUM SERPL-SCNC: 143 MMOL/L
TSH SERPL DL<=0.005 MIU/L-ACNC: 0.87 UIU/ML

## 2018-09-11 PROCEDURE — 80048 BASIC METABOLIC PNL TOTAL CA: CPT

## 2018-09-11 PROCEDURE — 84443 ASSAY THYROID STIM HORMONE: CPT

## 2018-09-11 PROCEDURE — 36415 COLL VENOUS BLD VENIPUNCTURE: CPT

## 2018-09-12 ENCOUNTER — PATIENT OUTREACH (OUTPATIENT)
Dept: OTHER | Facility: OTHER | Age: 73
End: 2018-09-12

## 2018-09-12 NOTE — PROGRESS NOTES
Last 5 Patient Entered Readings                                      Current 30 Day Average: 123/70     Recent Readings 9/12/2018 9/11/2018 9/10/2018 9/9/2018 9/8/2018    SBP (mmHg) 139 134 132 118 119    DBP (mmHg) 75 75 68 62 65    Pulse 70 68 73 71 72          09/12: LVM.  Will follow up in two weeks. Pt at goal.

## 2018-09-17 ENCOUNTER — OFFICE VISIT (OUTPATIENT)
Dept: CARDIOLOGY | Facility: CLINIC | Age: 73
End: 2018-09-17
Payer: MEDICAID

## 2018-09-17 ENCOUNTER — LAB VISIT (OUTPATIENT)
Dept: LAB | Facility: HOSPITAL | Age: 73
End: 2018-09-17
Attending: INTERNAL MEDICINE
Payer: MEDICAID

## 2018-09-17 ENCOUNTER — PATIENT MESSAGE (OUTPATIENT)
Dept: CARDIOLOGY | Facility: CLINIC | Age: 73
End: 2018-09-17

## 2018-09-17 ENCOUNTER — TELEPHONE (OUTPATIENT)
Dept: CARDIOLOGY | Facility: CLINIC | Age: 73
End: 2018-09-17

## 2018-09-17 VITALS
WEIGHT: 146.19 LBS | SYSTOLIC BLOOD PRESSURE: 138 MMHG | HEIGHT: 61 IN | OXYGEN SATURATION: 96 % | DIASTOLIC BLOOD PRESSURE: 73 MMHG | HEART RATE: 68 BPM | BODY MASS INDEX: 27.6 KG/M2

## 2018-09-17 DIAGNOSIS — I50.9 CONGESTIVE HEART FAILURE, UNSPECIFIED HF CHRONICITY, UNSPECIFIED HEART FAILURE TYPE: ICD-10-CM

## 2018-09-17 DIAGNOSIS — R06.09 DOE (DYSPNEA ON EXERTION): ICD-10-CM

## 2018-09-17 DIAGNOSIS — E78.00 HYPERCHOLESTEREMIA: ICD-10-CM

## 2018-09-17 DIAGNOSIS — R06.02 SOB (SHORTNESS OF BREATH): ICD-10-CM

## 2018-09-17 DIAGNOSIS — I50.23 NYHA CLASS 3 ACUTE ON CHRONIC SYSTOLIC HEART FAILURE: ICD-10-CM

## 2018-09-17 DIAGNOSIS — Z87.891 EX-SMOKER: ICD-10-CM

## 2018-09-17 DIAGNOSIS — I42.8 NICM (NONISCHEMIC CARDIOMYOPATHY): Primary | ICD-10-CM

## 2018-09-17 LAB
ALBUMIN SERPL BCP-MCNC: 3.8 G/DL
ALP SERPL-CCNC: 156 U/L
ALT SERPL W/O P-5'-P-CCNC: 9 U/L
ANION GAP SERPL CALC-SCNC: 7 MMOL/L
AST SERPL-CCNC: 12 U/L
BILIRUB SERPL-MCNC: 0.4 MG/DL
BNP SERPL-MCNC: 216 PG/ML
BUN SERPL-MCNC: 15 MG/DL
CALCIUM SERPL-MCNC: 9.6 MG/DL
CHLORIDE SERPL-SCNC: 103 MMOL/L
CO2 SERPL-SCNC: 28 MMOL/L
CREAT SERPL-MCNC: 0.9 MG/DL
EST. GFR  (AFRICAN AMERICAN): >60 ML/MIN/1.73 M^2
EST. GFR  (NON AFRICAN AMERICAN): >60 ML/MIN/1.73 M^2
GLUCOSE SERPL-MCNC: 91 MG/DL
POTASSIUM SERPL-SCNC: 3.8 MMOL/L
PROT SERPL-MCNC: 7.6 G/DL
SODIUM SERPL-SCNC: 138 MMOL/L

## 2018-09-17 PROCEDURE — 83880 ASSAY OF NATRIURETIC PEPTIDE: CPT

## 2018-09-17 PROCEDURE — 99213 OFFICE O/P EST LOW 20 MIN: CPT | Mod: PBBFAC | Performed by: INTERNAL MEDICINE

## 2018-09-17 PROCEDURE — 36415 COLL VENOUS BLD VENIPUNCTURE: CPT

## 2018-09-17 PROCEDURE — 99999 PR PBB SHADOW E&M-EST. PATIENT-LVL III: CPT | Mod: PBBFAC,,, | Performed by: INTERNAL MEDICINE

## 2018-09-17 PROCEDURE — 99214 OFFICE O/P EST MOD 30 MIN: CPT | Mod: S$PBB,,, | Performed by: INTERNAL MEDICINE

## 2018-09-17 PROCEDURE — 80053 COMPREHEN METABOLIC PANEL: CPT

## 2018-09-17 RX ORDER — LOSARTAN POTASSIUM 25 MG/1
25 TABLET ORAL DAILY
Qty: 90 TABLET | Refills: 3 | Status: SHIPPED | OUTPATIENT
Start: 2018-09-17 | End: 2019-06-12

## 2018-09-17 RX ORDER — METOPROLOL SUCCINATE 50 MG/1
50 TABLET, EXTENDED RELEASE ORAL DAILY
Qty: 90 TABLET | Refills: 3 | Status: SHIPPED | OUTPATIENT
Start: 2018-09-17 | End: 2019-11-03 | Stop reason: SDUPTHER

## 2018-09-17 RX ORDER — METOPROLOL SUCCINATE 25 MG/1
50 TABLET, EXTENDED RELEASE ORAL 2 TIMES DAILY
Qty: 90 TABLET | Refills: 3 | Status: SHIPPED | OUTPATIENT
Start: 2018-09-17 | End: 2018-09-17 | Stop reason: SDUPTHER

## 2018-09-17 NOTE — PROGRESS NOTES
Subjective:   Patient ID:  Selma Hooper is a 72 y.o. female who presents for follow-up of Other cardiomyopathy    Selma Hooper is a 72 y.o. female is a new patient who presents for evaluation of Shortness of Breath (hospital discharge 07/14/18)  Recent discharge:   Ms. Hooper is a 71yo woman with essential HTN (off all meds) who presents with acute hypoxemic respiratory failure 2/2 HTN emergency with flash pulmonary edema and acute new onset systolic congestive heart failure exacerbation. Also with elevated transaminases likely 2/2 congestive hepatopathy (improving), lactic acidosis (now resolved), and elevated troponin, likely 2/2 HTN/CHF. She was initially placed on BiPAP and NTG gtt upon admission with improvement in her BP and taper off gtt. Now weaned off NC and s/p IV lasix with good response. Hypotensive yesterday am, likely 2/2 overdiuresis; improved with 250cc bolus today. Will discharge on GDMT: initiate losartan tomorrow am (held today 2/2 JAMIR), metoprolol succinate, lasix 20. Would consider addition of spironolactone at f/u visit; held off today 2/2 JAMIR due to overdiuresis. Will need timely ischemic w/u as outpatient.     Echo:    1 - Severely depressed left ventricular systolic function (EF 15-20%).     2 - Normal right ventricular systolic function .     3 - Trivial to mild aortic regurgitation.     4 - Trivial to mild mitral regurgitation.     5 - Trivial to mild tricuspid regurgitation.     6 - The estimated PA systolic pressure is 28 mmHg.     NM stress test:  Impression: NORMAL MYOCARDIAL PERFUSION  1. The perfusion scan is free of evidence for myocardial ischemia or injury.   2. There is a mild fixed septal defect consistent with patient's underlying LBBB - left bundle branch block.   3. There is a mild intensity fixed defect in the inferior wall of the left ventricle, secondary to diaphragm attenuation.   4. There is abnormal wall motion at rest showing severe global hypokinesis of the left  "ventricle.   5. There is resting LV dysfunction with a reduced ejection fraction of 20 %.  (normal is >= 51%)  6. The left ventricular volume is mildly increased at rest.   7. The extracardiac distribution of radioactivity is normal.      HPI:   Patient has been feeling SOB for atleast 6 month that is primarily THEODORE. Since Diuretic her THEODORE has improved. No orthopnea, PND, chest pain or palpitations.   Heavy smoker, mother has heart attack 60s. Brother had CABG in 60s.   NYHA III- feeling SOB on minimal exertion.   No chest pain, Orthopnea, PND.   Denies palpitations or fluttering in the chest      Patient Active Problem List   Diagnosis    Family history of colon cancer    Colon polyps    Hypertension, essential    Smoker    Iron deficiency anemia    Elevated LFTs    New onset of congestive heart failure    Subclinical hyperthyroidism    Elevated troponin     /73 (BP Location: Left arm, Patient Position: Sitting, BP Method: Large (Automatic))   Pulse 68   Ht 5' 1" (1.549 m)   Wt 66.3 kg (146 lb 2.6 oz)   SpO2 96%   BMI 27.62 kg/m²   Body mass index is 27.62 kg/m².  Estimated Creatinine Clearance: 45.6 mL/min (based on SCr of 1 mg/dL).    Lab Results   Component Value Date     09/11/2018    K 3.8 09/11/2018     09/11/2018    CO2 26 09/11/2018    BUN 13 09/11/2018    CREATININE 1.0 09/11/2018    GLU 89 09/11/2018    HGBA1C 5.4 07/12/2018    MG 1.9 07/12/2018    AST 21 07/31/2018    ALT 10 07/31/2018    ALBUMIN 3.4 (L) 07/31/2018    PROT 6.8 07/31/2018    BILITOT 0.3 07/31/2018    WBC 4.89 07/14/2018    HGB 9.9 (L) 07/14/2018    HCT 35.8 (L) 07/14/2018    MCV 66 (L) 07/14/2018     07/14/2018    TSH 0.872 09/11/2018    CHOL 89 (L) 07/31/2018    HDL 29 (L) 07/31/2018    LDLCALC 44.8 (L) 07/31/2018    TRIG 76 07/31/2018       Current Outpatient Medications   Medication Sig    atorvastatin (LIPITOR) 20 MG tablet Take 1 tablet (20 mg total) by mouth once daily.    ferrous sulfate 325 " (65 FE) MG EC tablet Take 1 tablet (325 mg total) by mouth once daily.    fluticasone (FLONASE) 50 mcg/actuation nasal spray 1 spray by Each Nare route daily as needed for Allergies.    furosemide (LASIX) 20 MG tablet Take 1 tablet (20 mg total) by mouth 2 (two) times daily. (Patient taking differently: Take 20 mg by mouth once daily. )    losartan (COZAAR) 25 MG tablet Take 1 tablet (25 mg total) by mouth once daily.    metoprolol succinate (TOPROL-XL) 25 MG 24 hr tablet Take 12.5 mg by mouth 2 (two) times daily.    spironolactone (ALDACTONE) 25 MG tablet Take 25 mg by mouth once daily.     No current facility-administered medications for this visit.        Review of Systems   Constitution: Positive for weight gain. Negative for chills, decreased appetite, weakness, malaise/fatigue, night sweats and weight loss.   Eyes: Negative for blurred vision, double vision, visual disturbance and visual halos.   Cardiovascular: Positive for dyspnea on exertion. Negative for chest pain, claudication, cyanosis, irregular heartbeat, leg swelling, near-syncope, orthopnea, palpitations, paroxysmal nocturnal dyspnea and syncope.   Respiratory: Negative for cough, hemoptysis, snoring, sputum production and wheezing.    Endocrine: Negative for cold intolerance, heat intolerance, polydipsia and polyphagia.   Hematologic/Lymphatic: Negative for adenopathy and bleeding problem. Does not bruise/bleed easily.   Skin: Negative for flushing, itching, poor wound healing and rash.   Musculoskeletal: Negative for arthritis, back pain, falls, gout, joint pain, joint swelling, muscle cramps, muscle weakness, myalgias, neck pain and stiffness.   Gastrointestinal: Negative for bloating, abdominal pain, anorexia, diarrhea, dysphagia, excessive appetite, flatus, hematemesis, jaundice, melena and nausea.   Genitourinary: Negative for hesitancy and incomplete emptying.   Neurological: Negative for aphonia, brief paralysis, difficulty with  concentration, disturbances in coordination, excessive daytime sleepiness, dizziness, focal weakness, light-headedness and loss of balance.   Psychiatric/Behavioral: Negative for altered mental status, depression, hallucinations, hypervigilance, memory loss, substance abuse and suicidal ideas. The patient does not have insomnia and is not nervous/anxious.        Objective:   Physical Exam   Constitutional: She is oriented to person, place, and time. She appears well-developed and well-nourished. No distress.   HENT:   Head: Normocephalic and atraumatic.   Nose: Nose normal.   Mouth/Throat: Oropharynx is clear and moist. No oropharyngeal exudate.   Eyes: Conjunctivae and EOM are normal. Pupils are equal, round, and reactive to light. Right eye exhibits no discharge. Left eye exhibits no discharge. No scleral icterus.   Neck: Normal range of motion. Neck supple. No JVD present. No tracheal deviation present. No thyromegaly present.   Cardiovascular: Normal rate, regular rhythm, normal heart sounds and intact distal pulses. Exam reveals no gallop and no friction rub.   No murmur heard.  Pulmonary/Chest: Effort normal and breath sounds normal. No stridor. No respiratory distress. She has no wheezes. She has no rales. She exhibits no tenderness.   Abdominal: Soft. Bowel sounds are normal. She exhibits no distension and no mass. There is no tenderness. There is no rebound and no guarding.   Musculoskeletal: Normal range of motion. She exhibits no edema or tenderness.   Lymphadenopathy:     She has no cervical adenopathy.   Neurological: She is alert and oriented to person, place, and time. She has normal reflexes. No cranial nerve deficit. She exhibits normal muscle tone. Coordination normal.   Skin: Skin is warm. No rash noted. She is not diaphoretic. No erythema. No pallor.   Psychiatric: She has a normal mood and affect. Her behavior is normal. Judgment and thought content normal.       Assessment:     1. NICM  (nonischemic cardiomyopathy)    2. THEODORE (dyspnea on exertion)    3. SOB (shortness of breath)    4. Hypercholesteremia    5. Ex-smoker    6. Congestive heart failure, unspecified HF chronicity, unspecified heart failure type    7. NYHA class 3 acute on chronic systolic heart failure        Plan:   Does not appear volume overloaded on exam, will check labs,. suspcious of getting hypotensive with entresto, discussed potential CRT-ICD if EF still less then 35% in 2 months.   Limit sodium intake to less then 2 gram sodium and 1500cc fluid restriction.  Graded exercise program as tolerated.  Call if  more than 3 lbs in 1 day or 5 lbs in 1 week.  Counseled on importance of heart healthy diet low in saturated and trans fat and salt as well gradually starting a regular aerobic exercise regimen with goal of 30min 5x/week. Recommend BP diary. Call if systolic BP > 130 mmHg on checking repeatedly    Selma was seen today for other cardiomyopathy.    Diagnoses and all orders for this visit:    NICM (nonischemic cardiomyopathy)  -     2D Echo w/ Color Flow Doppler; Future    THEODORE (dyspnea on exertion)    SOB (shortness of breath)    Hypercholesteremia    Ex-smoker    Congestive heart failure, unspecified HF chronicity, unspecified heart failure type  -     Brain natriuretic peptide; Future  -     Comprehensive metabolic panel; Future    NYHA class 3 acute on chronic systolic heart failure  -     2D Echo w/ Color Flow Doppler; Future    Other orders  -     losartan (COZAAR) 25 MG tablet; Take 1 tablet (25 mg total) by mouth once daily.  -     Discontinue: metoprolol succinate (TOPROL-XL) 25 MG 24 hr tablet; Take 2 tablets (50 mg total) by mouth 2 (two) times daily.  -     metoprolol succinate (TOPROL-XL) 50 MG 24 hr tablet; Take 1 tablet (50 mg total) by mouth once daily.          RTC 2 months

## 2018-09-26 NOTE — PROGRESS NOTES
Last 5 Patient Entered Readings                                      Current 30 Day Average: 124/69     Recent Readings 9/26/2018 9/25/2018 9/24/2018 9/24/2018 9/24/2018    SBP (mmHg) 105 116 123 110 108    DBP (mmHg) 58 64 61 68 62    Pulse 66 61 67 66 64          Digital Medicine: Health  Follow Up    Lifestyle Modifications:    1.Dietary Modifications (Sodium intake <2,000mg/day, food labels, dining out): Reports no change in diet.    2.Physical Activity: Reports no change in PA. Experiences SOB when walking.     3.Medication Therapy: Patient has been compliant with the medication regimen.    4.Patient has the following medication side effects/concerns: none.  (Frequency/Alleviating factors/Precipitating factors, etc.)     Follow up with Mrs. Selma Hooper completed. No further questions or concerns. Will continue to follow up to achieve health goals.    States she forgot to take medicine last night and took pressure before she took morning medication.  Scared to take medication because she does not want pressure to get too low.  Reports no dizziness or lightheadedness.

## 2018-10-17 ENCOUNTER — PATIENT OUTREACH (OUTPATIENT)
Dept: ADMINISTRATIVE | Facility: HOSPITAL | Age: 73
End: 2018-10-17

## 2018-10-17 DIAGNOSIS — Z11.59 ENCOUNTER FOR HEPATITIS C SCREENING TEST FOR LOW RISK PATIENT: Primary | ICD-10-CM

## 2018-10-17 NOTE — PROGRESS NOTES
Pre-visit audit complete, pt due for a mammogram.  Attempted to contact pt to schedule screening, voice message left requesting a return call.

## 2018-10-24 ENCOUNTER — HOSPITAL ENCOUNTER (OUTPATIENT)
Dept: RADIOLOGY | Facility: HOSPITAL | Age: 73
Discharge: HOME | End: 2018-10-24
Attending: FAMILY MEDICINE

## 2018-10-24 ENCOUNTER — OFFICE VISIT (OUTPATIENT)
Dept: INTERNAL MEDICINE | Facility: CLINIC | Age: 73
End: 2018-10-24
Attending: FAMILY MEDICINE
Payer: MEDICAID

## 2018-10-24 ENCOUNTER — HOSPITAL ENCOUNTER (OUTPATIENT)
Dept: PULMONOLOGY | Facility: CLINIC | Age: 73
Discharge: HOME | End: 2018-10-24
Attending: FAMILY MEDICINE

## 2018-10-24 VITALS
WEIGHT: 149.13 LBS | SYSTOLIC BLOOD PRESSURE: 112 MMHG | TEMPERATURE: 98 F | HEART RATE: 64 BPM | DIASTOLIC BLOOD PRESSURE: 68 MMHG | HEIGHT: 61 IN | BODY MASS INDEX: 28.15 KG/M2 | OXYGEN SATURATION: 99 %

## 2018-10-24 DIAGNOSIS — Z80.0 FAMILY HISTORY OF COLON CANCER: ICD-10-CM

## 2018-10-24 DIAGNOSIS — Z87.891 FORMER SMOKER: ICD-10-CM

## 2018-10-24 DIAGNOSIS — R06.09 DOE (DYSPNEA ON EXERTION): ICD-10-CM

## 2018-10-24 DIAGNOSIS — D50.9 IRON DEFICIENCY ANEMIA, UNSPECIFIED IRON DEFICIENCY ANEMIA TYPE: ICD-10-CM

## 2018-10-24 DIAGNOSIS — K21.9 GASTROESOPHAGEAL REFLUX DISEASE, ESOPHAGITIS PRESENCE NOT SPECIFIED: ICD-10-CM

## 2018-10-24 DIAGNOSIS — R06.09 DOE (DYSPNEA ON EXERTION): Primary | ICD-10-CM

## 2018-10-24 DIAGNOSIS — Z12.39 BREAST CANCER SCREENING: ICD-10-CM

## 2018-10-24 DIAGNOSIS — I42.8 NICM (NONISCHEMIC CARDIOMYOPATHY): ICD-10-CM

## 2018-10-24 PROBLEM — I50.9 NEW ONSET OF CONGESTIVE HEART FAILURE: Status: RESOLVED | Noted: 2018-07-12 | Resolved: 2018-10-24

## 2018-10-24 LAB
POST FEV1 FVC: 0.58
POST FEV1: 1.04
POST FVC: 1.78
PRE FEV1 FVC: 56
PRE FEV1: 0.92
PRE FVC: 1.64
PREDICTED FEV1 FVC: 80
PREDICTED FEV1: 1.88
PREDICTED FVC: 2.4

## 2018-10-24 PROCEDURE — 94060 EVALUATION OF WHEEZING: CPT | Mod: 26,S$PBB,, | Performed by: INTERNAL MEDICINE

## 2018-10-24 PROCEDURE — 77067 SCR MAMMO BI INCL CAD: CPT | Mod: 26,,, | Performed by: RADIOLOGY

## 2018-10-24 PROCEDURE — 77063 BREAST TOMOSYNTHESIS BI: CPT | Mod: TC

## 2018-10-24 PROCEDURE — 99214 OFFICE O/P EST MOD 30 MIN: CPT | Mod: S$PBB,,, | Performed by: FAMILY MEDICINE

## 2018-10-24 PROCEDURE — 99213 OFFICE O/P EST LOW 20 MIN: CPT | Mod: PBBFAC,25 | Performed by: FAMILY MEDICINE

## 2018-10-24 PROCEDURE — 94060 EVALUATION OF WHEEZING: CPT | Mod: PBBFAC | Performed by: INTERNAL MEDICINE

## 2018-10-24 PROCEDURE — 99999 PR PBB SHADOW E&M-EST. PATIENT-LVL III: CPT | Mod: PBBFAC,,, | Performed by: FAMILY MEDICINE

## 2018-10-24 PROCEDURE — 77063 BREAST TOMOSYNTHESIS BI: CPT | Mod: 26,,, | Performed by: RADIOLOGY

## 2018-10-24 NOTE — PROGRESS NOTES
Subjective:       Patient ID: Selma Hooper is a 73 y.o. female.    Chief Complaint: Follow-up and Shortness of Breath    HPI  Review of Systems   Constitutional: Positive for fatigue. Negative for chills, fever and unexpected weight change.   HENT: Negative for congestion and trouble swallowing.    Eyes: Negative for redness and visual disturbance.   Respiratory: Positive for shortness of breath. Negative for cough and chest tightness.    Cardiovascular: Negative for chest pain, palpitations and leg swelling.   Gastrointestinal: Negative for abdominal pain and blood in stool.        ? Acid reflux   Genitourinary: Negative for difficulty urinating, hematuria and menstrual problem.   Musculoskeletal: Negative for arthralgias, back pain, gait problem, joint swelling, myalgias and neck pain.   Skin: Negative for color change and rash.   Neurological: Negative for tremors, speech difficulty, weakness, numbness and headaches.   Hematological: Negative for adenopathy. Does not bruise/bleed easily.   Psychiatric/Behavioral: Negative for behavioral problems, confusion and sleep disturbance. The patient is not nervous/anxious.        Objective:      Physical Exam   Constitutional: She is oriented to person, place, and time. She appears well-developed and well-nourished.   HENT:   Head: Normocephalic and atraumatic.   Eyes: Conjunctivae are normal. No scleral icterus.   Neck: Normal range of motion. Neck supple.   Cardiovascular: Normal rate and intact distal pulses. Exam reveals distant heart sounds. Exam reveals no gallop and no friction rub.   No murmur heard.  Pulmonary/Chest: Effort normal. No respiratory distress. She has decreased breath sounds. She has no wheezes. She has no rales.   Abdominal: She exhibits no distension and no abdominal bruit. There is no tenderness.   Musculoskeletal: She exhibits no edema or deformity.   Neurological: She is alert and oriented to person, place, and time. She displays no tremor. No  cranial nerve deficit. Coordination and gait normal.   Skin: Skin is warm and dry. No rash noted. She is not diaphoretic. No erythema.   Psychiatric: She has a normal mood and affect. Her behavior is normal. Judgment and thought content normal.   Nursing note and vitals reviewed.      Assessment:       1. THEODORE (dyspnea on exertion)    2. Former smoker    3. NICM (nonischemic cardiomyopathy)    4. Iron deficiency anemia, unspecified iron deficiency anemia type    5. Family history of colon cancer    6. Gastroesophageal reflux disease, esophagitis presence not specified        Plan:   Selma was seen today for follow-up and shortness of breath.    Diagnoses and all orders for this visit:    THEODORE (dyspnea on exertion)  -     Spirometry with/without bronchodilator; Future    Former smoker  -     Spirometry with/without bronchodilator; Future    NICM (nonischemic cardiomyopathy)    Iron deficiency anemia, unspecified iron deficiency anemia type  Comments:  Gastro referral placed 7/27/2018 and not scheduled - addressed again today    Family history of colon cancer    Gastroesophageal reflux disease, esophagitis presence not specified      See meds, orders, follow up, routing and instructions sections of encounter.  A 73-year-old established female patient.  She is in with shortness of breath   and a three-month followup.  I did review her TCC visit and subsequent   Cardiology visit.  Her cardiologist plans to get a repeat followup echo on more   optimized medical management and determine indication for implantable cardiac   defibrillator.  Her most recent laboratory and medication list were reviewed.    The patient states good compliance.    She is still complaining of significant dyspnea; however, she is able to get   around fairly good including from the parking lot into the examination room   today.  She is having no definite chest pain at this time.  She does report some   possible indigestion.  Her GI referral from July  was not executed and we will   try to expedite that at this time.  She had a probable iron deficient anemia   noted during her summer hospitalization.  Former smoker, check pulmonary   function tests, and follow up with cardiologist next month.      ARDU/YASMANY  dd: 10/24/2018 10:10:05 (CDT)  td: 10/25/2018 06:34:23 (CDT)  Doc ID   #[id number]  Job ID #[id number]    CC:

## 2018-10-24 NOTE — PROGRESS NOTES
Last 5 Patient Entered Readings                                      Current 30 Day Average: 125/67     Recent Readings 10/23/2018 10/22/2018 10/20/2018 10/19/2018 10/18/2018    SBP (mmHg) 129 131 128 139 132    DBP (mmHg) 74 69 71 68 66    Pulse 74 72 66 65 75          10/24: Patient has office visit today.  Will call next week.  At goal.

## 2018-10-29 ENCOUNTER — PATIENT MESSAGE (OUTPATIENT)
Dept: INTERNAL MEDICINE | Facility: CLINIC | Age: 73
End: 2018-10-29

## 2018-10-29 ENCOUNTER — TELEPHONE (OUTPATIENT)
Dept: INTERNAL MEDICINE | Facility: CLINIC | Age: 73
End: 2018-10-29

## 2018-10-29 DIAGNOSIS — Z87.891 FORMER SMOKER: Primary | ICD-10-CM

## 2018-10-29 DIAGNOSIS — Z12.9 SCREENING FOR CANCER: ICD-10-CM

## 2018-10-29 RX ORDER — BUDESONIDE AND FORMOTEROL FUMARATE DIHYDRATE 80; 4.5 UG/1; UG/1
2 AEROSOL RESPIRATORY (INHALATION) 2 TIMES DAILY
Qty: 6.9 G | Refills: 5 | Status: SHIPPED | OUTPATIENT
Start: 2018-10-29 | End: 2019-03-13

## 2018-10-29 NOTE — TELEPHONE ENCOUNTER
Called patient and discussed labs and or test results. Patient expressed understanding and had the opportunity to ask questions. Any questions were answered. See meds, orders, follow up and instructions sections of encounter.    Specific issues include:  Moderate obstruction on PFT  Trial symbicort  FU in 6 weeks    Having night sweats - will get CT chest

## 2018-10-31 NOTE — TELEPHONE ENCOUNTER
Good Morning, I tried to schedule the patient for a 6 week follow up appointment and no appointments were available due to the patients insurance the first available is 02/27/19 please advise /assisatant

## 2018-11-05 ENCOUNTER — PATIENT OUTREACH (OUTPATIENT)
Dept: OTHER | Facility: OTHER | Age: 73
End: 2018-11-05

## 2018-11-05 NOTE — PROGRESS NOTES
Last 5 Patient Entered Readings                                      Current 30 Day Average: 124/66     Recent Readings 11/3/2018 11/2/2018 10/31/2018 10/30/2018 10/29/2018    SBP (mmHg) 131 132 107 120 117    DBP (mmHg) 73 72 62 60 60    Pulse 80 68 66 74 67          Digital Medicine: Health  Follow Up    Lifestyle Modifications:    1.Dietary Modifications (Sodium intake <2,000mg/day, food labels, dining out): No change in diet.    2.Physical Activity: Reports no change in PA    3.Medication Therapy: Patient has been compliant with the medication regimen.    4.Patient has the following medication side effects/concerns: none  (Frequency/Alleviating factors/Precipitating factors, etc.)     Follow up with Mrs. Selma Hooper completed. No further questions or concerns. Will continue to follow up to achieve health goals.    Sometimes misses medication dose and notices readings are higher.  Encouraged patient to adhere to medication regimen.  Has been experiencing acid reflux at night after taking medications.  States she will call and make appt with GI.  Going on vacation to [place] Nov 13-16

## 2018-11-15 RX ORDER — FLUTICASONE PROPIONATE AND SALMETEROL 250; 50 UG/1; UG/1
1 POWDER RESPIRATORY (INHALATION) 2 TIMES DAILY
Qty: 60 EACH | Refills: 5 | Status: SHIPPED | OUTPATIENT
Start: 2018-11-15 | End: 2019-03-13

## 2018-11-19 ENCOUNTER — HOSPITAL ENCOUNTER (OUTPATIENT)
Dept: RADIOLOGY | Facility: HOSPITAL | Age: 73
Discharge: HOME | End: 2018-11-19
Attending: FAMILY MEDICINE

## 2018-11-19 DIAGNOSIS — Z12.9 SCREENING FOR CANCER: ICD-10-CM

## 2018-11-19 PROCEDURE — G0297 LDCT FOR LUNG CA SCREEN: HCPCS | Mod: TC

## 2018-11-23 ENCOUNTER — TELEPHONE (OUTPATIENT)
Dept: INTERNAL MEDICINE | Facility: CLINIC | Age: 73
End: 2018-11-23

## 2018-11-23 RX ORDER — AMOXICILLIN 500 MG/1
500 TABLET, FILM COATED ORAL 3 TIMES DAILY
Qty: 30 TABLET | Refills: 0 | Status: SHIPPED | OUTPATIENT
Start: 2018-11-23 | End: 2018-12-03

## 2018-11-23 NOTE — TELEPHONE ENCOUNTER
----- Message from Vanessa Siddiqi sent at 11/23/2018 12:26 PM CST -----  Contact: Patient Daughter      ----- Message -----  From: Gaby Jackman  Sent: 11/23/2018  12:04 PM  To: Lupe HUERTAS Staff    Pt daughter Alexsandra Smith. Has another question , would like the nurse to give her a call at [phone]    Thanks

## 2018-11-23 NOTE — TELEPHONE ENCOUNTER
Spoke to pt daughter  Pt is having bad cough with yellowish phlegm, her throat and ear is hurting   Pt daughter asked if you can rx pt a medication that can be affordable for pt (pt has NO INSURANCE but able to pay meds under $500)    Please advise

## 2018-11-23 NOTE — TELEPHONE ENCOUNTER
----- Message from Gaby Jackman sent at 11/23/2018 10:28 AM CST -----  Contact: Pt daughter  Patient daughter Alexsandra Hooper calling about her mother is having a bad cough.     Send antibiotic for the cough , would like nurse to call her to discuss today if possible     Pt daughter can be reached at [phone]    Thanks

## 2018-11-28 ENCOUNTER — PATIENT OUTREACH (OUTPATIENT)
Dept: OTHER | Facility: OTHER | Age: 73
End: 2018-11-28

## 2018-11-28 NOTE — PROGRESS NOTES
Last 5 Patient Entered Readings                                      Current 30 Day Average: 124/68     Recent Readings 11/27/2018 11/26/2018 11/26/2018 11/25/2018 11/23/2018    SBP (mmHg) 112 118 107 104 124    DBP (mmHg) 65 70 62 69 64    Pulse 85 90 72 77 69        Hypertension Medications             furosemide (LASIX) 20 MG tablet Take 1 tablet (20 mg total) by mouth 2 (two) times daily.    losartan (COZAAR) 25 MG tablet Take 1 tablet (25 mg total) by mouth once daily.    metoprolol succinate (TOPROL-XL) 50 MG 24 hr tablet Take 1 tablet (50 mg total) by mouth once daily.    spironolactone (ALDACTONE) 25 MG tablet Take 25 mg by mouth once daily.        Plan:   Called patient to follow up, reviewed BP readings. Per 2017 ACC/ AHA HTN guidelines  (goal of BP < 130/80), current 30-day average is well controlled.  LVM, requested patient call back at her convenience if she has any questions or concerns, and to continue to take at least weekly BP readings.   Will continue to monitor. WCB in 6 months, sooner if BP begins to trend up or down.

## 2018-12-04 ENCOUNTER — PATIENT OUTREACH (OUTPATIENT)
Dept: OTHER | Facility: OTHER | Age: 73
End: 2018-12-04

## 2018-12-04 NOTE — PROGRESS NOTES
Last 5 Patient Entered Readings                                      Current 30 Day Average: 126/68     Recent Readings 12/3/2018 12/3/2018 12/2/2018 12/1/2018 12/1/2018    SBP (mmHg) 141 133 133 118 109    DBP (mmHg) 70 68 72 72 61    Pulse 70 69 80 79 71          12/4: LVM.  Will call in 4 weeks.  Patient at goal.

## 2018-12-07 ENCOUNTER — TELEPHONE (OUTPATIENT)
Dept: INTERNAL MEDICINE | Facility: CLINIC | Age: 73
End: 2018-12-07

## 2018-12-07 DIAGNOSIS — R91.1 LUNG NODULE: ICD-10-CM

## 2018-12-07 DIAGNOSIS — J43.9 PULMONARY EMPHYSEMA, UNSPECIFIED EMPHYSEMA TYPE: ICD-10-CM

## 2018-12-07 DIAGNOSIS — R91.1 PULMONARY NODULE: Primary | ICD-10-CM

## 2018-12-07 NOTE — TELEPHONE ENCOUNTER
Called patient and discussed labs and or test results. Patient expressed understanding and had the opportunity to ask questions. Any questions were answered. See meds, orders, follow up and instructions sections of encounter.    Specific issues include:  pulm nod - repeat in 1 year  COPD  Sees me monday

## 2018-12-10 ENCOUNTER — OFFICE VISIT (OUTPATIENT)
Dept: INTERNAL MEDICINE | Facility: CLINIC | Age: 73
End: 2018-12-10
Attending: FAMILY MEDICINE

## 2018-12-10 ENCOUNTER — LAB VISIT (OUTPATIENT)
Dept: LAB | Facility: HOSPITAL | Age: 73
End: 2018-12-10
Attending: FAMILY MEDICINE

## 2018-12-10 VITALS
HEART RATE: 77 BPM | HEIGHT: 61 IN | WEIGHT: 153.13 LBS | SYSTOLIC BLOOD PRESSURE: 128 MMHG | OXYGEN SATURATION: 98 % | BODY MASS INDEX: 28.91 KG/M2 | DIASTOLIC BLOOD PRESSURE: 70 MMHG | TEMPERATURE: 98 F

## 2018-12-10 DIAGNOSIS — D50.9 IRON DEFICIENCY ANEMIA, UNSPECIFIED IRON DEFICIENCY ANEMIA TYPE: ICD-10-CM

## 2018-12-10 DIAGNOSIS — R06.00 DYSPNEA, UNSPECIFIED TYPE: Primary | ICD-10-CM

## 2018-12-10 DIAGNOSIS — J43.9 PULMONARY EMPHYSEMA, UNSPECIFIED EMPHYSEMA TYPE: ICD-10-CM

## 2018-12-10 DIAGNOSIS — R74.8 ALKALINE PHOSPHATASE ELEVATION: ICD-10-CM

## 2018-12-10 DIAGNOSIS — Z87.891 FORMER SMOKER: ICD-10-CM

## 2018-12-10 DIAGNOSIS — R91.1 LUNG NODULE: ICD-10-CM

## 2018-12-10 DIAGNOSIS — I10 HYPERTENSION, ESSENTIAL: ICD-10-CM

## 2018-12-10 PROCEDURE — 99999 PR PBB SHADOW E&M-EST. PATIENT-LVL IV: CPT | Mod: PBBFAC,,, | Performed by: FAMILY MEDICINE

## 2018-12-10 PROCEDURE — 84080 ASSAY ALKALINE PHOSPHATASES: CPT

## 2018-12-10 PROCEDURE — 99214 OFFICE O/P EST MOD 30 MIN: CPT | Mod: PBBFAC | Performed by: FAMILY MEDICINE

## 2018-12-10 PROCEDURE — 99214 OFFICE O/P EST MOD 30 MIN: CPT | Mod: S$PBB,,, | Performed by: FAMILY MEDICINE

## 2018-12-10 PROCEDURE — 84075 ASSAY ALKALINE PHOSPHATASE: CPT

## 2018-12-10 PROCEDURE — 36415 COLL VENOUS BLD VENIPUNCTURE: CPT

## 2018-12-10 RX ORDER — FLUCONAZOLE 150 MG/1
150 TABLET ORAL ONCE
Qty: 1 TABLET | Refills: 0 | Status: SHIPPED | OUTPATIENT
Start: 2018-12-10 | End: 2018-12-10

## 2018-12-10 NOTE — PROGRESS NOTES
Subjective:       Patient ID: Selma Hooper is a 73 y.o. female.    Chief Complaint: Follow-up; Vaginal Itching; and Shortness of Breath    HPI  Review of Systems   Constitutional: Positive for fatigue. Negative for chills, fever and unexpected weight change.   HENT: Negative for congestion and trouble swallowing.    Eyes: Negative for redness and visual disturbance.   Respiratory: Positive for shortness of breath. Negative for cough and chest tightness.    Cardiovascular: Negative for chest pain, palpitations and leg swelling.   Gastrointestinal: Negative for abdominal pain and blood in stool.   Genitourinary: Negative for difficulty urinating, hematuria and menstrual problem.   Musculoskeletal: Negative for arthralgias, back pain, gait problem, joint swelling, myalgias and neck pain.   Skin: Negative for color change and rash.   Neurological: Negative for tremors, speech difficulty, weakness, numbness and headaches.   Hematological: Negative for adenopathy. Does not bruise/bleed easily.   Psychiatric/Behavioral: Negative for behavioral problems, confusion and sleep disturbance. The patient is not nervous/anxious.        Objective:      Physical Exam   Constitutional: She is oriented to person, place, and time. She appears well-developed and well-nourished.   Eyes: No scleral icterus.   Neck: Normal range of motion. Neck supple. No JVD present. No tracheal deviation present. No thyromegaly present.   Cardiovascular: Normal rate and intact distal pulses. Exam reveals no gallop and no friction rub.   Murmur heard.  Pulmonary/Chest: Effort normal and breath sounds normal. No respiratory distress. She has no wheezes. She has no rales.   Abdominal: Soft. She exhibits no distension, no abdominal bruit and no mass. There is no tenderness. There is no rebound and no guarding.   Musculoskeletal: She exhibits no edema.   Lymphadenopathy:     She has no cervical adenopathy.   Neurological: She is alert and oriented to person,  place, and time. She displays no tremor. No cranial nerve deficit. Coordination and gait normal.   Skin: Skin is warm and dry. No rash noted. She is not diaphoretic. No erythema.   Psychiatric: She has a normal mood and affect. Her behavior is normal. Judgment and thought content normal.   Nursing note and vitals reviewed.      Assessment:       1. Dyspnea, unspecified type    2. Former smoker    3. Lung nodule    4. Pulmonary emphysema, unspecified emphysema type    5. Alkaline phosphatase elevation    6. Iron deficiency anemia, unspecified iron deficiency anemia type    7. Hypertension, essential        Plan:   Selma was seen today for follow-up, vaginal itching and shortness of breath.    Diagnoses and all orders for this visit:    Dyspnea, unspecified type    Former smoker    Lung nodule    Pulmonary emphysema, unspecified emphysema type  -     Ambulatory referral to Pulmonology    Alkaline phosphatase elevation  -     Alkaline phosphatase, isoenzymes; Future    Iron deficiency anemia, unspecified iron deficiency anemia type    Hypertension, essential    Other orders  -     fluconazole (DIFLUCAN) 150 MG Tab; Take 1 tablet (150 mg total) by mouth once. for 1 dose      See meds, orders, follow up, routing and instructions sections of encounter.  This patient is reporting that she does not have insurance and could not get the   inhalers we had prescribed her.  We also made GI and Cardiology referrals in   the past.  She has not gone to the GI Clinic.  Her lung function test revealed   COPD.  It may warrant a pulmonary consult.  She states she was unable to get   Medicare type insurance in the past.  It is unclear why.  She states that she   was not a social security recipient or contributor.    She was on antibiotics recently and developed a vaginal itch.  Subsequently, she   has no other genitourinary complaints at this time.    This summer, in July, she had an admission for cardiopulmonary difficulty, was    diagnosed with new-onset congestive heart failure, has seen the cardiologist.    She is overdue for a followup.    RECOMMENDATIONS:  GI and Pulmonary consults pending.  We will attempt to help   her with her insurance issue.  She states she was unable to afford medications.      RADU/HN  dd: 12/10/2018 12:38:34 (CST)  td: 12/11/2018 01:11:29 (CST)  Doc ID   #[id number]  Job ID #[id number]    CC:

## 2018-12-14 LAB
ALP BONE CFR SERPL: 20 % (ref 28–66)
ALP INTEST CFR SERPL: 0 % (ref 1–24)
ALP LIVER CFR SERPL: 80 % (ref 25–69)
ALP PLAC CFR SERPL: 0 %
ALP SERPL-CCNC: 150 U/L (ref 33–130)

## 2019-01-03 ENCOUNTER — TELEPHONE (OUTPATIENT)
Dept: INTERNAL MEDICINE | Facility: CLINIC | Age: 74
End: 2019-01-03

## 2019-01-03 DIAGNOSIS — R74.8 ALKALINE PHOSPHATASE ELEVATION: Primary | ICD-10-CM

## 2019-01-03 NOTE — PROGRESS NOTES
Last 5 Patient Entered Readings                                      Current 30 Day Average: 124/70     Recent Readings 1/3/2019 1/2/2019 1/2/2019 1/2/2019 12/31/2018    SBP (mmHg) 117 132 134 140 129    DBP (mmHg) 63 69 65 77 65    Pulse - 72 76 89 72          1/3/2019: LVM.  Will call in 4 weeks.  Patient at goal.

## 2019-01-03 NOTE — TELEPHONE ENCOUNTER
Called patient and discussed labs and or test results. Patient expressed understanding and had the opportunity to ask questions. Any questions were answered. See meds, orders, follow up and instructions sections of encounter.    Specific issues include:  Elevated liver AO    Recommend liver US and possible hepatology consult, does have standing GI appt next month    Patient declines secondary to no insurance coverage

## 2019-02-04 NOTE — PROGRESS NOTES
Last 5 Patient Entered Readings                                      Current 30 Day Average: 123/69     Recent Readings 2/3/2019 2/1/2019 2/1/2019 1/31/2019 1/31/2019    SBP (mmHg) 121 116 131 119 132    DBP (mmHg) 67 62 67 69 68    Pulse 96 79 68 78 69          2/4: LVM.  Sent MyChart.  Will call in 5 weeks.

## 2019-02-05 ENCOUNTER — OFFICE VISIT (OUTPATIENT)
Dept: GASTROENTEROLOGY | Facility: CLINIC | Age: 74
End: 2019-02-05
Payer: COMMERCIAL

## 2019-02-05 ENCOUNTER — LAB VISIT (OUTPATIENT)
Dept: LAB | Facility: HOSPITAL | Age: 74
End: 2019-02-05
Payer: COMMERCIAL

## 2019-02-05 VITALS
WEIGHT: 152.75 LBS | BODY MASS INDEX: 28.84 KG/M2 | DIASTOLIC BLOOD PRESSURE: 62 MMHG | SYSTOLIC BLOOD PRESSURE: 107 MMHG | HEIGHT: 61 IN | HEART RATE: 67 BPM

## 2019-02-05 DIAGNOSIS — D50.9 IRON DEFICIENCY ANEMIA, UNSPECIFIED IRON DEFICIENCY ANEMIA TYPE: Primary | ICD-10-CM

## 2019-02-05 DIAGNOSIS — R10.816 EPIGASTRIC ABDOMINAL TENDERNESS ON DIRECT PALPATION: ICD-10-CM

## 2019-02-05 DIAGNOSIS — K21.9 GASTROESOPHAGEAL REFLUX DISEASE, ESOPHAGITIS PRESENCE NOT SPECIFIED: ICD-10-CM

## 2019-02-05 DIAGNOSIS — D50.9 IRON DEFICIENCY ANEMIA, UNSPECIFIED IRON DEFICIENCY ANEMIA TYPE: ICD-10-CM

## 2019-02-05 DIAGNOSIS — Z80.0 FAMILY HISTORY OF COLON CANCER: ICD-10-CM

## 2019-02-05 LAB
BASOPHILS # BLD AUTO: 0.05 K/UL
BASOPHILS NFR BLD: 1.5 %
DIFFERENTIAL METHOD: ABNORMAL
EOSINOPHIL # BLD AUTO: 0.1 K/UL
EOSINOPHIL NFR BLD: 3 %
ERYTHROCYTE [DISTWIDTH] IN BLOOD BY AUTOMATED COUNT: 14 %
HCT VFR BLD AUTO: 43.2 %
HGB BLD-MCNC: 12.9 G/DL
IGA SERPL-MCNC: 184 MG/DL
IMM GRANULOCYTES # BLD AUTO: 0.01 K/UL
IMM GRANULOCYTES NFR BLD AUTO: 0.3 %
LYMPHOCYTES # BLD AUTO: 1.2 K/UL
LYMPHOCYTES NFR BLD: 36 %
MCH RBC QN AUTO: 24.1 PG
MCHC RBC AUTO-ENTMCNC: 29.9 G/DL
MCV RBC AUTO: 81 FL
MONOCYTES # BLD AUTO: 0.4 K/UL
MONOCYTES NFR BLD: 11 %
NEUTROPHILS # BLD AUTO: 1.6 K/UL
NEUTROPHILS NFR BLD: 48.2 %
NRBC BLD-RTO: 0 /100 WBC
PLATELET # BLD AUTO: 206 K/UL
PMV BLD AUTO: 9.9 FL
RBC # BLD AUTO: 5.36 M/UL
WBC # BLD AUTO: 3.28 K/UL

## 2019-02-05 PROCEDURE — 1101F PT FALLS ASSESS-DOCD LE1/YR: CPT | Mod: CPTII,S$GLB,, | Performed by: NURSE PRACTITIONER

## 2019-02-05 PROCEDURE — 3074F SYST BP LT 130 MM HG: CPT | Mod: CPTII,S$GLB,, | Performed by: NURSE PRACTITIONER

## 2019-02-05 PROCEDURE — 99203 OFFICE O/P NEW LOW 30 MIN: CPT | Mod: S$GLB,,, | Performed by: NURSE PRACTITIONER

## 2019-02-05 PROCEDURE — 99203 PR OFFICE/OUTPT VISIT, NEW, LEVL III, 30-44 MIN: ICD-10-PCS | Mod: S$GLB,,, | Performed by: NURSE PRACTITIONER

## 2019-02-05 PROCEDURE — 99999 PR PBB SHADOW E&M-EST. PATIENT-LVL III: CPT | Mod: PBBFAC,,, | Performed by: NURSE PRACTITIONER

## 2019-02-05 PROCEDURE — 3074F PR MOST RECENT SYSTOLIC BLOOD PRESSURE < 130 MM HG: ICD-10-PCS | Mod: CPTII,S$GLB,, | Performed by: NURSE PRACTITIONER

## 2019-02-05 PROCEDURE — 83516 IMMUNOASSAY NONANTIBODY: CPT

## 2019-02-05 PROCEDURE — 99999 PR PBB SHADOW E&M-EST. PATIENT-LVL III: ICD-10-PCS | Mod: PBBFAC,,, | Performed by: NURSE PRACTITIONER

## 2019-02-05 PROCEDURE — 1101F PR PT FALLS ASSESS DOC 0-1 FALLS W/OUT INJ PAST YR: ICD-10-PCS | Mod: CPTII,S$GLB,, | Performed by: NURSE PRACTITIONER

## 2019-02-05 PROCEDURE — 85025 COMPLETE CBC W/AUTO DIFF WBC: CPT

## 2019-02-05 PROCEDURE — 3078F PR MOST RECENT DIASTOLIC BLOOD PRESSURE < 80 MM HG: ICD-10-PCS | Mod: CPTII,S$GLB,, | Performed by: NURSE PRACTITIONER

## 2019-02-05 PROCEDURE — 82784 ASSAY IGA/IGD/IGG/IGM EACH: CPT

## 2019-02-05 PROCEDURE — 3078F DIAST BP <80 MM HG: CPT | Mod: CPTII,S$GLB,, | Performed by: NURSE PRACTITIONER

## 2019-02-05 PROCEDURE — 86677 HELICOBACTER PYLORI ANTIBODY: CPT

## 2019-02-05 RX ORDER — CALCIUM CARBONATE 200(500)MG
1 TABLET,CHEWABLE ORAL
Status: ON HOLD | COMMUNITY
End: 2022-05-03 | Stop reason: CLARIF

## 2019-02-05 NOTE — PROGRESS NOTES
Last 5 Patient Entered Readings                                      Current 30 Day Average: 123/69     Recent Readings 2/5/2019 2/3/2019 2/1/2019 2/1/2019 1/31/2019    SBP (mmHg) 114 121 116 131 119    DBP (mmHg) 71 67 62 67 69    Pulse 75 96 79 68 78          2/5: Returning patient's call.  States she was able to connect phone with BP cuff.  Did not request tech support.  States she feels good about readings.  Most of the encounter was spent helping patient look for list of foods that are good and bad [facility]  States she will have to choose between coffee and soda for her 1 caffenated beverage/day.

## 2019-02-05 NOTE — PROGRESS NOTES
Subjective:       Patient ID: Selma Hooper is a 73 y.o. female.    Chief Complaint: Anemia; Gastroesophageal Reflux; and Shortness of Breath      HPI:  Ms. Hooper is a new patient to the GI clinic referred for LANDY. She also reports a history of GERD. Patient denies any BRBPR, hematochezia, or melena. No change in bowel habits or unintentional wt loss. Currently reports normal BMs every day. Has been on ferrous sulfate 325 mg for about a year. Does not take NSAIDs, ASA, or blood thinners.   Reports having reflux for the past couple of years. Occurs about 2-3 a week. Reports she eats acidic foods and her diet contributes to her symptoms as well as laying down too quickly after a meal. Denies epigastric or abdominal pain. Has some burping. Denies N/V or regurgitation. Takes famotidine PRN with relief. Does not sleep with HOB raised 6 inches.  Reports THEODORE. Denies SOB at rest. Is supposed to be taking Symbicort and Advair, but has been unable to get those filled due to the high out of pocket expense. Has a new onset of Chest Pain that started about a month ago. Has only occurred 2x. Described as an aching pain that doesn't radiate and only lasts for a couple of minutes after she has been doing house work. Denies any CP right now. Has been seen by cardiology in the past - stress test WNL 2018. Abnormal Echo - QEF 18%. Has not seen cardiology since this new onset of CP - states she is going to call and make an appointment.    Both Brother and Father  of Colon CA. Believes they were >60 when diagnosed.     Last colonoscopy-- Normal, good prep with 5 year f/u    Review of patient's allergies indicates:  No Known Allergies    Current Outpatient Medications   Medication Sig Dispense Refill    calcium carbonate (TUMS) 200 mg calcium (500 mg) chewable tablet Take 1 tablet by mouth as needed for Heartburn.      ferrous sulfate 325 (65 FE) MG EC tablet Take 1 tablet (325 mg total) by mouth once daily.  0    furosemide  (LASIX) 20 MG tablet Take 1 tablet (20 mg total) by mouth 2 (two) times daily. 60 tablet 11    losartan (COZAAR) 25 MG tablet Take 1 tablet (25 mg total) by mouth once daily. 90 tablet 3    metoprolol succinate (TOPROL-XL) 50 MG 24 hr tablet Take 1 tablet (50 mg total) by mouth once daily. 90 tablet 3    spironolactone (ALDACTONE) 25 MG tablet Take 25 mg by mouth once daily.      atorvastatin (LIPITOR) 20 MG tablet Take 1 tablet (20 mg total) by mouth once daily. 90 tablet 3    budesonide-formoterol 80-4.5 mcg (SYMBICORT) 80-4.5 mcg/actuation HFAA Inhale 2 puffs into the lungs 2 (two) times daily. Controller 6.9 g 5    fluticasone (FLONASE) 50 mcg/actuation nasal spray 1 spray by Each Nare route daily as needed for Allergies.      fluticasone-salmeterol 250-50 mcg/dose (ADVAIR) 250-50 mcg/dose diskus inhaler Inhale 1 puff into the lungs 2 (two) times daily. 60 each 5    ranitidine (ZANTAC) 150 MG tablet Take 1 tablet (150 mg total) by mouth 2 (two) times daily. Take 30 Minutes before breakfast and 30 minutes before your last meal of the day. 60 tablet 6     No current facility-administered medications for this visit.        PMH:   has a past medical history of Hypertension and Smoker (7/27/2016).    Surgical History:   has a past surgical history that includes Cholecystectomy; Hysterectomy; and Colonoscopy.    Family History:  family history includes Colon cancer in her father; Colon cancer (age of onset: 63) in her brother; Dementia in her father; Diabetes in her daughter; Heart attack in her mother; Heart disease in her brother and mother; Hypertension in her brother, father, and mother.    Social History:   reports that she quit smoking about 7 months ago. She smoked 0.50 packs per day. she has never used smokeless tobacco. She reports that she drinks alcohol. She reports that she does not use drugs.      Review of Systems   Constitutional: Negative for chills, fever and unexpected weight change.   HENT:  "Positive for postnasal drip. Negative for trouble swallowing.    Eyes: Negative for visual disturbance.   Respiratory: Negative for cough.         +THEODORE   Cardiovascular:        See HPI   Gastrointestinal:        See HPI   Endocrine: Negative for cold intolerance and heat intolerance.   Genitourinary: Positive for frequency. Negative for dysuria.   Musculoskeletal: Positive for arthralgias. Negative for myalgias.   Skin: Negative for rash.   Neurological: Positive for dizziness. Negative for syncope.   Psychiatric/Behavioral: The patient is not nervous/anxious.      Objective:     Vital Signs:  /62   Pulse 67 Comment: p ox 95 %  Ht 5' 1" (1.549 m)   Wt 69.3 kg (152 lb 12.5 oz)   BMI 28.87 kg/m²     Physical Exam   Constitutional: She is oriented to person, place, and time. She appears well-developed and well-nourished. No distress.   HENT:   Head: Normocephalic and atraumatic.   Nose: Nose normal.   Eyes: Conjunctivae are normal.   Neck: Normal range of motion. Neck supple.   Cardiovascular: Normal rate, regular rhythm and normal heart sounds.   Pulmonary/Chest: Effort normal and breath sounds normal. No respiratory distress. She has no wheezes.   Abdominal: Soft. Bowel sounds are normal. She exhibits no distension. There is tenderness in the epigastric area. There is no rebound.   Musculoskeletal: Normal range of motion. She exhibits no edema.   Neurological: She is alert and oriented to person, place, and time.   Skin: Skin is warm and dry. No rash noted.   Psychiatric: She has a normal mood and affect. Her behavior is normal. Judgment normal.       Lab Results   Component Value Date    WBC 4.89 07/14/2018    HGB 9.9 (L) 07/14/2018    HCT 35.8 (L) 07/14/2018     07/14/2018    CHOL 89 (L) 07/31/2018    TRIG 76 07/31/2018    HDL 29 (L) 07/31/2018    ALT 9 (L) 09/17/2018    AST 12 09/17/2018     09/17/2018    K 3.8 09/17/2018     09/17/2018    CREATININE 0.9 09/17/2018    BUN 15 09/17/2018 "    CO2 28 09/17/2018    TSH 0.872 09/11/2018    HGBA1C 5.4 07/12/2018     Assessment:     73 y.o BF here today for LANDY work up.  Unsure how long she has been anemic - earliest CBC dated back to 7/2018 which showed microcytic hypochromic anemia along with low saturated iron, elevated TIBC, and low ferritin. She denies any overt signs of GI bleeding. Last colon in 7/2015 Normal with good prep quality. Does have a history of GERD that started a couple of years ago - she has only tried taking tums and pepcid AC which seems to help. She did have epigastric tenderness on palpation during physical exam - will r/o H. Pylori.   I discussed that an EGD and Colonoscopy would be appropriate at this time to r/o anything that could be causing her anemia. She reports she just switched to a new health insurance and is concerned about the cost. I told her we would get blood work today to see if her anemia has worsened and will rule out celiac and h. Pylori. Number provided to the [facility] department for cost of EGD/Colon. Pt enouraged to [facility] to update me on whether or not she wants to proceed with the endoscopies.    New onset of chest pain x1 month that has occurred about two times. Today she denies CP. I offered to set up an appt with her cardiologist but she declined at this time.         Plan:   Selma was seen today for anemia, gastroesophageal reflux and shortness of breath.    Diagnoses and all orders for this visit:    Iron deficiency anemia, unspecified iron deficiency anemia type  -     CBC auto differential; Future  -     H. PYLORI ANTIBODY, IGG; Future  -     TISSUE TRANSGLUTAMINASE (TTG), IGA; Future  -     IGA; Future        -     EGD/ COLON - patient is to call [facility] department regarding price and then let me know whether or not she wants to proceed with procedure.    Gastroesophageal reflux disease, esophagitis presence not specified        - We discussed life style/diet modifications -  information provided in AVS.  -     Case request GI: EGD (ESOPHAGOGASTRODUODENOSCOPY), COLONOSCOPY  -     ranitidine (ZANTAC) 150 MG tablet; Take 1 tablet (150 mg total) by mouth 2 (two) times daily. Take 30 Minutes before breakfast and 30 minutes before your last meal of the day.    Epigastric abdominal tenderness on direct palpation  -     Case request GI: EGD (ESOPHAGOGASTRODUODENOSCOPY), COLONOSCOPY  -     H. PYLORI ANTIBODY, IGG; Future  -     TISSUE TRANSGLUTAMINASE (TTG), IGA; Future  -     IGA; Future    Family history of colon cancer  -     Case request GI: EGD (ESOPHAGOGASTRODUODENOSCOPY), COLONOSCOPY        TAVIA Wild-C

## 2019-02-05 NOTE — PATIENT INSTRUCTIONS
[email or web address]  Dropping Acid The Reflux Diet Cookbook and Cure -  Yoan Andrews M.D.    GERD  Worst Foods for Acid Reflux  Chocolate (milk chocolate worse than dark chocolate)  Soda (all carbonated beverages)  Alcohol (beer, liquor, wine)  Fried foods  [place], sausage, ribs  Cream sauce  Fatty meats (beef)  Butter, margarine, lard, shortening  Coffee, tea  Mint   High fat nuts  Hot sauces and pepper  Citrus fruit/juices      Acidic foods (pH - 1 is MORE acidic, 5 is LESS acidic)     Do not eat or drink these (lower numbers are worse)    Induction diet - For 2 weeks eat nothing below pH 5     Lemon juice 2.3  Grape cranberry juice 2.5  Stomach Acid 2.5  Gelatin Dessert 2.6  Lemon/lime 2.9/2.7  Vinegar 2.9  Gatorade 3.0  Fruits - plums, apricots, strawberries, cherries 3.0  Vitamin C (ascorbic acid) 3.0  Iced tea, Snapple 3.1  Mustard 3.2  Soft drinks 3.3  Nectarines 3.3  Pomegranate 3.3  Applesauce 3.4  Grapefruit 3.4  Kiwi 3.4  Barbecue sauce 3.4  Caesar dressing 3.5  Thousand island dressing 3.6  Strawberries 3.5  Pineapple juice 3.5  Beer 3.5  Wine 3.5  Grape 3.6  Apples 3.6  Pineapple 3.7  Pickle 3.7  Blackberries, blueberries 3.7  Burnett 3.7  Orange 3.8  Cherries 3.9  Red Bull 3.9  Tomatoes 4.2  Coffee 5.1      These are Safe foods:  Agave  Aloe Vera  Apple (only red)  Bagels  Banana (worsens reflux in 1%)  Beans - black, red, lima, lentils  Bread - whole grain, rye  Caramel  Celery  Chamomile tea  Chicken - skinless, never fried  Chicken stock or bouillon  Coffee - one cup/day with milk  Fennel  Fish  Linda  Green vegetables (no green peppers)  Herbs  Honey  Melon  Milk - skin, soy, or Lactaid skim milk  Mushrooms  Oatmeal  Olive oil  Parsley  Pasta  Pears  Popcorn  Potatoes  Red bell peppers  Rice  Soups  Tofu  Turkey Breast  Turnip  Vegetables - no onion, tomatoes, peppers  Vinaigrette  Water - non carbonated  Whole grain breads, crackers, breakfast cereals      Best Foods for Acid  Reflux  Whole grain breads  Oatmeal  Aloe Vera  Salad (no tomatoes, onions, cheese, or high fat dressing)  Banana  Melon  Fennel  Chicken and turkey (skinless, never fried)  Fish/seafood (never fried)  Celery  Parsley  Couscous and Rice    Maybe bad foods (Everyone is unique)  Tomatoes  Garlic  Onion  Nuts (macadamia nuts)  Apples (especially green)  Cucumber  Green peppers  Spicy food  Some herbal teas    GERD tips  Change what you eat:  Eat smaller meals  Eat slowly and chew thoroughly until food is almost liquid  Cut down on junk carbohydrates such as sugar and white flour  Use herbs in your cooking  Eat more raw foods (more than 10 ingredients is not a raw food)  Avoid trans fats and partially hydrogenated oils  Eat more fish and switch to grass fed beef  Switch your cooking oil to macadamia nut or olive oil  Watch extremes of salt intake (too high or too low is bad)    If just cutting out acidic foods is not enough, change how you eat:  Large breakfast, medium lunch, light dinner  Dont mix fruit juices, sweet fruits, and refined starches with meats and heavy food  Dont wash your food down with a lot of liquid      Change these habits:  Stop smoking  Eat dinner earlier (3-4 hours before lying down to sleep)  Elevate the head of your bed 6 inches (blocks under the head of the bed are better than pillows) OR [employer] sells a special Syllabuster Reflux relief system  That may be purchased online for a comfortable, individual solution for raising the head of the bed.  Exercise (but wait 2 hours after eating)  Drink more water (between meals)    Take these supplements:  Multi vitamin  Probiotic  Fish oil    Most common food allergens: milk, eggs, peanuts, tree nuts, fish, shellfish, wheat, and soy    All natural immediate relief:  Chew 2-3 soft probiotic capsules - Dr. Robertson's Probiotics 12 Plus  Chew chewable DGL licorice tablet  Chew papaya tablet with high protein meal - [employer]  Drink 2 ounces of aloe  vera juice  Swedish bitters  Prelief- reduce the acid in food to keep it form burning sensitive tissue  Iberogast  Slippery Elm  Drink Chamomile Tea  Teaspoon of baking soda in water  Spoonful of vinegar in water      All natural ulcer healers:  Zinc carnosine - 75.5 mg with food twice a day x 8 weeks   Guerrero by Michelet - $8 for 60 pills  DGL (deglycyrrhizinated licorice) - 2 tablets before meals. Heals stomach lining   Natural Factors brand, Enzymatic therapy brand.  Aloe Vera juice  - 2 to 8 ounces a day   [place] or [place]                                                      [email or web address]  Dropping Acid The Reflux Diet Cookbook and Cure -  Yoan Andrews M.D.    GERD  Worst Foods for Acid Reflux  Chocolate (milk chocolate worse than dark chocolate)  Soda (all carbonated beverages)  Alcohol (beer, liquor, wine)  Fried foods  [place], sausage, ribs  Cream sauce  Fatty meats (beef)  Butter, margarine, lard, shortening  Coffee, tea  Mint   High fat nuts  Hot sauces and pepper  Citrus fruit/juices      Acidic foods (pH - 1 is MORE acidic, 5 is LESS acidic)     Do not eat or drink these (lower numbers are worse)    Induction diet - For 2 weeks eat nothing below pH 5     Lemon juice 2.3  Grape cranberry juice 2.5  Stomach Acid 2.5  Gelatin Dessert 2.6  Lemon/lime 2.9/2.7  Vinegar 2.9  Gatorade 3.0  Fruits - plums, apricots, strawberries, cherries 3.0  Vitamin C (ascorbic acid) 3.0  Iced tea, Snapple 3.1  Mustard 3.2  Soft drinks 3.3  Nectarines 3.3  Pomegranate 3.3  Applesauce 3.4  Grapefruit 3.4  Kiwi 3.4  Barbecue sauce 3.4  Caesar dressing 3.5  Thousand island dressing 3.6  Strawberries 3.5  Pineapple juice 3.5  Beer 3.5  Wine 3.5  Grape 3.6  Apples 3.6  Pineapple 3.7  Pickle 3.7  Blackberries, blueberries 3.7  Russell 3.7  Orange 3.8  Cherries 3.9  Red Bull 3.9  Tomatoes 4.2  Coffee 5.1      These are Safe foods:  Agave  Aloe Vera  Apple (only red)  Bagels  Banana (worsens reflux in  1%)  Beans - black, red, lima, lentils  Bread - whole grain, rye  Caramel  Celery  Chamomile tea  Chicken - skinless, never fried  Chicken stock or bouillon  Coffee - one cup/day with milk  Fennel  Fish  Linda  Green vegetables (no green peppers)  Herbs  Honey  Melon  Milk - skin, soy, or Lactaid skim milk  Mushrooms  Oatmeal  Olive oil  Parsley  Pasta  Pears  Popcorn  Potatoes  Red bell peppers  Rice  Soups  Tofu  Turkey Breast  Turnip  Vegetables - no onion, tomatoes, peppers  Vinaigrette  Water - non carbonated  Whole grain breads, crackers, breakfast cereals      Best Foods for Acid Reflux  Whole grain breads  Oatmeal  Aloe Vera  Salad (no tomatoes, onions, cheese, or high fat dressing)  Banana  Melon  Fennel  Chicken and turkey (skinless, never fried)  Fish/seafood (never fried)  Celery  Parsley  Couscous and Rice    Maybe bad foods (Everyone is unique)  Tomatoes  Garlic  Onion  Nuts (macadamia nuts)  Apples (especially green)  Cucumber  Green peppers  Spicy food  Some herbal teas    GERD tips  Change what you eat:  Eat smaller meals  Eat slowly and chew thoroughly until food is almost liquid  Cut down on junk carbohydrates such as sugar and white flour  Use herbs in your cooking  Eat more raw foods (more than 10 ingredients is not a raw food)  Avoid trans fats and partially hydrogenated oils  Eat more fish and switch to grass fed beef  Switch your cooking oil to macadamia nut or olive oil  Watch extremes of salt intake (too high or too low is bad)    If just cutting out acidic foods is not enough, change how you eat:  Large breakfast, medium lunch, light dinner  Dont mix fruit juices, sweet fruits, and refined starches with meats and heavy food  Dont wash your food down with a lot of liquid      Change these habits:  Stop smoking  Eat dinner earlier (3-4 hours before lying down to sleep)  Elevate the head of your bed 6 inches (blocks under the head of the bed are better than pillows) OR [employer] sells a  special [employer]ek Reflux relief system  That may be purchased online for a comfortable, individual solution for raising the head of the bed.  Exercise (but wait 2 hours after eating)  Drink more water (between meals)    Take these supplements:  Multi vitamin  Probiotic  Fish oil    Most common food allergens: milk, eggs, peanuts, tree nuts, fish, shellfish, wheat, and soy    All natural immediate relief:  Chew 2-3 soft probiotic capsules - Dr. Robertson's Probiotics 12 Plus  Chew chewable DGL licorice tablet  Chew papaya tablet with high protein meal - [employer]  Drink 2 ounces of aloe vera juice  Swedish bitters  Prelief- reduce the acid in food to keep it form burning sensitive tissue  Iberogast  Slippery Elm  Drink Chamomile Tea  Teaspoon of baking soda in water  Spoonful of vinegar in water      All natural ulcer healers:  Zinc carnosine - 75.5 mg with food twice a day x 8 weeks   TrevonI by Michelet - $8 for 60 pills  DGL (deglycyrrhizinated licorice) - 2 tablets before meals. Heals stomach lining   Natural Factors brand, Enzymatic therapy brand.  Aloe Vera juice  - 2 to 8 ounces a day   [place] or [place]

## 2019-02-05 NOTE — LETTER
February 5, 2019      Phil Andrade MD  [address]  [place] [address]           [facility] - Gastroenterology  [address]  [place] [address]  Phone: [phone]  Fax: [phone]          Patient: Selma Hooper   MR Number: [id number]   YOB: 1945   Date of Visit: 2/5/2019       Dear Dr. Phil Andrade:    Thank you for referring Selma Hooper to me for evaluation. Attached you will find relevant portions of my assessment and plan of care.    If you have questions, please do not hesitate to call me. I look forward to following Selma Hooper along with you.    Sincerely,    Lori Duff, GABBY    Enclosure  CC:  No Recipients    If you would like to receive this communication electronically, please contact [email or web address] or [phone] to request more information on [employer] Link access.    For providers and/or their staff who would like to refer a patient to [facility], please contact us through our one-stop-shop provider referral line, [facility], at [phone].    If you feel you have received this communication in error or would no longer like to receive these types of communications, please e-mail [email or web address]

## 2019-02-06 LAB — H PYLORI IGG SERPL QL IA: NEGATIVE

## 2019-02-08 ENCOUNTER — PATIENT MESSAGE (OUTPATIENT)
Dept: GASTROENTEROLOGY | Facility: CLINIC | Age: 74
End: 2019-02-08

## 2019-02-08 LAB — TTG IGA SER-ACNC: 6 UNITS

## 2019-03-11 ENCOUNTER — PATIENT OUTREACH (OUTPATIENT)
Dept: OTHER | Facility: OTHER | Age: 74
End: 2019-03-11

## 2019-03-11 NOTE — PROGRESS NOTES
Last 5 Patient Entered Readings                                      Current 30 Day Average: 128/71     Recent Readings 3/11/2019 3/11/2019 3/10/2019 3/9/2019 3/8/2019    SBP (mmHg) 139 150 126 119 131    DBP (mmHg) 74 79 76 64 72    Pulse 79 85 86 73 77        3/11: Brief encounter.  Patient states she is doing well.  No questions or concerns at this time.  Patient at goal.  Trending up slightly.  Will call in 2 weeks.

## 2019-03-13 ENCOUNTER — TELEPHONE (OUTPATIENT)
Dept: CARDIOLOGY | Facility: CLINIC | Age: 74
End: 2019-03-13

## 2019-03-13 ENCOUNTER — OFFICE VISIT (OUTPATIENT)
Dept: CARDIOLOGY | Facility: CLINIC | Age: 74
End: 2019-03-13
Payer: COMMERCIAL

## 2019-03-13 ENCOUNTER — HOSPITAL ENCOUNTER (OUTPATIENT)
Dept: CARDIOLOGY | Facility: CLINIC | Age: 74
Discharge: HOME | End: 2019-03-13
Attending: INTERNAL MEDICINE
Payer: COMMERCIAL

## 2019-03-13 VITALS
SYSTOLIC BLOOD PRESSURE: 139 MMHG | WEIGHT: 154 LBS | DIASTOLIC BLOOD PRESSURE: 63 MMHG | BODY MASS INDEX: 29.07 KG/M2 | HEIGHT: 61 IN | HEART RATE: 70 BPM

## 2019-03-13 VITALS
HEART RATE: 77 BPM | WEIGHT: 154.13 LBS | BODY MASS INDEX: 29.1 KG/M2 | OXYGEN SATURATION: 95 % | HEIGHT: 61 IN | DIASTOLIC BLOOD PRESSURE: 63 MMHG | SYSTOLIC BLOOD PRESSURE: 139 MMHG

## 2019-03-13 DIAGNOSIS — Z87.891 EX-SMOKER: ICD-10-CM

## 2019-03-13 DIAGNOSIS — I42.8 NICM (NONISCHEMIC CARDIOMYOPATHY): Primary | ICD-10-CM

## 2019-03-13 DIAGNOSIS — I42.8 NICM (NONISCHEMIC CARDIOMYOPATHY): ICD-10-CM

## 2019-03-13 DIAGNOSIS — I10 HYPERTENSION, ESSENTIAL: ICD-10-CM

## 2019-03-13 DIAGNOSIS — E78.00 HYPERCHOLESTEREMIA: ICD-10-CM

## 2019-03-13 DIAGNOSIS — I50.23 NYHA CLASS 3 ACUTE ON CHRONIC SYSTOLIC HEART FAILURE: ICD-10-CM

## 2019-03-13 LAB
ASCENDING AORTA: 2.85 CM
BSA FOR ECHO PROCEDURE: 1.73 M2
CV ECHO LV RWT: 0.28 CM
DOP CALC LVOT AREA: 3.17 CM2
DOP CALC LVOT DIAMETER: 2.01 CM
DOP CALC LVOT PEAK VEL: 0.88 M/S
DOP CALC LVOT STROKE VOLUME: 46.34 CM3
DOP CALCLVOT PEAK VEL VTI: 14.61 CM
E WAVE DECELERATION TIME: 171.91 MSEC
E/A RATIO: 0.42
E/E' RATIO: 7.8
ECHO LV POSTERIOR WALL: 0.75 CM (ref 0.6–1.1)
FRACTIONAL SHORTENING: 11 % (ref 28–44)
INTERVENTRICULAR SEPTUM: 0.91 CM (ref 0.6–1.1)
IVRT: 0.13 MSEC
LA MAJOR: 4.77 CM
LA MINOR: 4.77 CM
LA WIDTH: 3.93 CM
LEFT ATRIUM SIZE: 4.63 CM
LEFT ATRIUM VOLUME INDEX: 43.6 ML/M2
LEFT ATRIUM VOLUME: 73.78 CM3
LEFT INTERNAL DIMENSION IN SYSTOLE: 4.8 CM (ref 2.1–4)
LEFT VENTRICLE DIASTOLIC VOLUME INDEX: 84.46 ML/M2
LEFT VENTRICLE DIASTOLIC VOLUME: 142.78 ML
LEFT VENTRICLE MASS INDEX: 96.7 G/M2
LEFT VENTRICLE SYSTOLIC VOLUME INDEX: 57 ML/M2
LEFT VENTRICLE SYSTOLIC VOLUME: 96.28 ML
LEFT VENTRICULAR INTERNAL DIMENSION IN DIASTOLE: 5.42 CM (ref 3.5–6)
LEFT VENTRICULAR MASS: 163.4 G
LV LATERAL E/E' RATIO: 6.5
LV SEPTAL E/E' RATIO: 9.75
MV PEAK A VEL: 0.93 M/S
MV PEAK E VEL: 0.39 M/S
PISA TR MAX VEL: 1.97 M/S
PULM VEIN S/D RATIO: 2.24
PV PEAK D VEL: 0.21 M/S
PV PEAK S VEL: 0.47 M/S
RA MAJOR: 3.59 CM
RA PRESSURE: 3 MMHG
RA WIDTH: 3.32 CM
RIGHT VENTRICULAR END-DIASTOLIC DIMENSION: 3.35 CM
RV TISSUE DOPPLER FREE WALL SYSTOLIC VELOCITY 1 (APICAL 4 CHAMBER VIEW): 14.94 M/S
SINUS: 3.1 CM
STJ: 2.3 CM
TDI LATERAL: 0.06
TDI SEPTAL: 0.04
TDI: 0.05
TR MAX PG: 15.52 MMHG
TRICUSPID ANNULAR PLANE SYSTOLIC EXCURSION: 2.51 CM
TV REST PULMONARY ARTERY PRESSURE: 19 MMHG

## 2019-03-13 PROCEDURE — 99999 PR PBB SHADOW E&M-EST. PATIENT-LVL IV: ICD-10-PCS | Mod: PBBFAC,,, | Performed by: INTERNAL MEDICINE

## 2019-03-13 PROCEDURE — 1101F PR PT FALLS ASSESS DOC 0-1 FALLS W/OUT INJ PAST YR: ICD-10-PCS | Mod: CPTII,S$GLB,, | Performed by: INTERNAL MEDICINE

## 2019-03-13 PROCEDURE — 99214 PR OFFICE/OUTPT VISIT, EST, LEVL IV, 30-39 MIN: ICD-10-PCS | Mod: S$GLB,,, | Performed by: INTERNAL MEDICINE

## 2019-03-13 PROCEDURE — 93306 TTE W/DOPPLER COMPLETE: CPT | Mod: S$GLB,,, | Performed by: INTERNAL MEDICINE

## 2019-03-13 PROCEDURE — 3078F PR MOST RECENT DIASTOLIC BLOOD PRESSURE < 80 MM HG: ICD-10-PCS | Mod: CPTII,S$GLB,, | Performed by: INTERNAL MEDICINE

## 2019-03-13 PROCEDURE — 99999 PR PBB SHADOW E&M-EST. PATIENT-LVL IV: CPT | Mod: PBBFAC,,, | Performed by: INTERNAL MEDICINE

## 2019-03-13 PROCEDURE — 93306 TRANSTHORACIC ECHO (TTE) COMPLETE (CUPID ONLY): ICD-10-PCS | Mod: S$GLB,,, | Performed by: INTERNAL MEDICINE

## 2019-03-13 PROCEDURE — 3078F DIAST BP <80 MM HG: CPT | Mod: CPTII,S$GLB,, | Performed by: INTERNAL MEDICINE

## 2019-03-13 PROCEDURE — 3075F PR MOST RECENT SYSTOLIC BLOOD PRESS GE 130-139MM HG: ICD-10-PCS | Mod: CPTII,S$GLB,, | Performed by: INTERNAL MEDICINE

## 2019-03-13 PROCEDURE — 3075F SYST BP GE 130 - 139MM HG: CPT | Mod: CPTII,S$GLB,, | Performed by: INTERNAL MEDICINE

## 2019-03-13 PROCEDURE — 99214 OFFICE O/P EST MOD 30 MIN: CPT | Mod: S$GLB,,, | Performed by: INTERNAL MEDICINE

## 2019-03-13 PROCEDURE — 1101F PT FALLS ASSESS-DOCD LE1/YR: CPT | Mod: CPTII,S$GLB,, | Performed by: INTERNAL MEDICINE

## 2019-03-13 NOTE — PROGRESS NOTES
Subjective:   Patient ID:  Selma Hooper is a 73 y.o. female who presents for follow-up of NICM (nonischemic cardiomyopathy and Shortness of Breath  Selma Hooper is a 72 y.o. female who presents for follow-up of Other cardiomyopathy     Selma Hooper is a 72 y.o. female is a new patient who presents for evaluation of Shortness of Breath (hospital discharge 07/14/18)  Recent discharge:   Ms. Hooper is a 71yo woman with essential HTN (off all meds) who presents with acute hypoxemic respiratory failure 2/2 HTN emergency with flash pulmonary edema and acute new onset systolic congestive heart failure exacerbation. Also with elevated transaminases likely 2/2 congestive hepatopathy (improving), lactic acidosis (now resolved), and elevated troponin, likely 2/2 HTN/CHF. She was initially placed on BiPAP and NTG gtt upon admission with improvement in her BP and taper off gtt. Now weaned off NC and s/p IV lasix with good response. Hypotensive yesterday am, likely 2/2 overdiuresis; improved with 250cc bolus today. Will discharge on GDMT: initiate losartan tomorrow am (held today 2/2 JAMIR), metoprolol succinate, lasix 20. Would consider addition of spironolactone at f/u visit; held off today 2/2 JAMIR due to overdiuresis. Will need timely ischemic w/u as outpatient.     Echo:    1 - Severely depressed left ventricular systolic function (EF 15-20%).     2 - Normal right ventricular systolic function .     3 - Trivial to mild aortic regurgitation.     4 - Trivial to mild mitral regurgitation.     5 - Trivial to mild tricuspid regurgitation.     6 - The estimated PA systolic pressure is 28 mmHg.      NM stress test:  Impression: NORMAL MYOCARDIAL PERFUSION  1. The perfusion scan is free of evidence for myocardial ischemia or injury.   2. There is a mild fixed septal defect consistent with patient's underlying LBBB - left bundle branch block.   3. There is a mild intensity fixed defect in the inferior wall of the left ventricle, secondary  "to diaphragm attenuation.   4. There is abnormal wall motion at rest showing severe global hypokinesis of the left ventricle.   5. There is resting LV dysfunction with a reduced ejection fraction of 20 %.  (normal is >= 51%)  6. The left ventricular volume is mildly increased at rest.   7. The extracardiac distribution of radioactivity is normal.      HPI:   Patient has been feeling SOB for atleast 6 month that is primarily THEODORE. Since Diuretic her THEODORE has improved. No orthopnea, PND, chest pain or palpitations.   Heavy smoker, mother has heart attack 60s. Brother had CABG in 60s.   NYHA III- feeling SOB on minimal exertion.   No chest pain, Orthopnea, PND.   Denies palpitations or fluttering in the chest  Patent get sob on walking.   Grandparents had CVA.           Patient Active Problem List   Diagnosis    Family history of colon cancer    Colon polyps    Hypertension, essential    Iron deficiency anemia    Elevated LFTs    Subclinical hyperthyroidism    Elevated troponin    Former smoker    NICM (nonischemic cardiomyopathy)    Lung nodule    Pulmonary emphysema    Alkaline phosphatase elevation     /63   Pulse 77   Ht 5' 1" (1.549 m)   Wt 69.9 kg (154 lb 1.6 oz)   SpO2 95%   BMI 29.12 kg/m²   Body mass index is 29.12 kg/m².  CrCl cannot be calculated (Patient's most recent lab result is older than the maximum 7 days allowed.).    Lab Results   Component Value Date     09/17/2018    K 3.8 09/17/2018     09/17/2018    CO2 28 09/17/2018    BUN 15 09/17/2018    CREATININE 0.9 09/17/2018    GLU 91 09/17/2018    HGBA1C 5.4 07/12/2018    MG 1.9 07/12/2018    AST 12 09/17/2018    ALT 9 (L) 09/17/2018    ALBUMIN 3.8 09/17/2018    PROT 7.6 09/17/2018    BILITOT 0.4 09/17/2018    WBC 3.28 (L) 02/05/2019    HGB 12.9 02/05/2019    HCT 43.2 02/05/2019    MCV 81 (L) 02/05/2019     02/05/2019    TSH 0.872 09/11/2018    CHOL 89 (L) 07/31/2018    HDL 29 (L) 07/31/2018    LDLCALC 44.8 (L) " 07/31/2018    TRIG 76 07/31/2018       Current Outpatient Medications   Medication Sig    calcium carbonate (TUMS) 200 mg calcium (500 mg) chewable tablet Take 1 tablet by mouth as needed for Heartburn.    ferrous sulfate 325 (65 FE) MG EC tablet Take 1 tablet (325 mg total) by mouth once daily.    fluticasone (FLONASE) 50 mcg/actuation nasal spray 1 spray by Each Nare route daily as needed for Allergies.    furosemide (LASIX) 20 MG tablet Take 1 tablet (20 mg total) by mouth 2 (two) times daily.    metoprolol succinate (TOPROL-XL) 50 MG 24 hr tablet Take 1 tablet (50 mg total) by mouth once daily.    ranitidine (ZANTAC) 150 MG tablet Take 1 tablet (150 mg total) by mouth 2 (two) times daily. Take 30 Minutes before breakfast and 30 minutes before your last meal of the day.    spironolactone (ALDACTONE) 25 MG tablet Take 25 mg by mouth once daily.    atorvastatin (LIPITOR) 20 MG tablet Take 1 tablet (20 mg total) by mouth once daily.    losartan (COZAAR) 25 MG tablet Take 1 tablet (25 mg total) by mouth once daily.     No current facility-administered medications for this visit.        Review of Systems   Constitution: Negative for chills, decreased appetite, weakness, malaise/fatigue, night sweats, weight gain and weight loss.   Eyes: Negative for blurred vision, double vision, visual disturbance and visual halos.   Cardiovascular: Positive for dyspnea on exertion. Negative for chest pain, claudication, cyanosis, irregular heartbeat, leg swelling, near-syncope, orthopnea, palpitations, paroxysmal nocturnal dyspnea and syncope.   Respiratory: Negative for cough, hemoptysis, snoring, sputum production and wheezing.    Endocrine: Negative for cold intolerance, heat intolerance, polydipsia and polyphagia.   Hematologic/Lymphatic: Negative for adenopathy and bleeding problem. Does not bruise/bleed easily.   Skin: Negative for flushing, itching, poor wound healing and rash.   Musculoskeletal: Negative for  arthritis, back pain, falls, gout, joint pain, joint swelling, muscle cramps, muscle weakness, myalgias, neck pain and stiffness.   Gastrointestinal: Negative for bloating, abdominal pain, anorexia, diarrhea, dysphagia, excessive appetite, flatus, hematemesis, jaundice, melena and nausea.   Genitourinary: Negative for hesitancy and incomplete emptying.   Neurological: Negative for aphonia, brief paralysis, difficulty with concentration, disturbances in coordination, excessive daytime sleepiness, dizziness, focal weakness, light-headedness and loss of balance.   Psychiatric/Behavioral: Negative for altered mental status, depression, hallucinations, hypervigilance, memory loss, substance abuse and suicidal ideas. The patient does not have insomnia and is not nervous/anxious.        Objective:   Physical Exam   Constitutional: She is oriented to person, place, and time. She appears well-developed and well-nourished. No distress.   HENT:   Head: Normocephalic and atraumatic.   Nose: Nose normal.   Mouth/Throat: Oropharynx is clear and moist. No oropharyngeal exudate.   Eyes: Conjunctivae and EOM are normal. Pupils are equal, round, and reactive to light. Right eye exhibits no discharge. Left eye exhibits no discharge. No scleral icterus.   Neck: Normal range of motion. Neck supple. No JVD present. No tracheal deviation present. No thyromegaly present.   Cardiovascular: Normal rate, regular rhythm, normal heart sounds and intact distal pulses. Exam reveals no gallop and no friction rub.   No murmur heard.  Pulmonary/Chest: Effort normal and breath sounds normal. No stridor. No respiratory distress. She has no wheezes. She has no rales. She exhibits no tenderness.   Abdominal: Soft. Bowel sounds are normal. She exhibits no distension and no mass. There is no tenderness. There is no rebound and no guarding.   Musculoskeletal: Normal range of motion. She exhibits no edema or tenderness.   Lymphadenopathy:     She has no  cervical adenopathy.   Neurological: She is alert and oriented to person, place, and time. She has normal reflexes. No cranial nerve deficit. She exhibits normal muscle tone. Coordination normal.   Skin: Skin is warm. No rash noted. She is not diaphoretic. No erythema. No pallor.   Psychiatric: She has a normal mood and affect. Her behavior is normal. Judgment and thought content normal.       Assessment:     1. NICM (nonischemic cardiomyopathy)    2. Hypertension, essential    3. NYHA class 3 acute on chronic systolic heart failure    4. Hypercholesteremia    5. Ex-smoker        Plan:   Selma was seen today for nicm (nonischemic cardiomyopathy and shortness of breath.    Diagnoses and all orders for this visit:    NICM (nonischemic cardiomyopathy)  -     Cancel: Ambulatory Referral to Electrophysiology  -     Transthoracic echo (TTE) complete (Cupid Only); Future    Hypertension, essential    NYHA class 3 acute on chronic systolic heart failure    Hypercholesteremia    Ex-smoker      Limit sodium intake to less then 2 gram sodium and 1500cc fluid restriction.  Graded exercise program as tolerated.  Call if  more than 3 lbs in 1 day or 5 lbs in 1 week.  Counseled on importance of heart healthy diet low in saturated and trans fat and salt as well gradually starting a regular aerobic exercise regimen with goal of 30min 5x/week. Recommend BP diary. Call if systolic BP > 130 mmHg on checking repeatedly

## 2019-03-13 NOTE — TELEPHONE ENCOUNTER
plz let the patient know that the heart function is still decreased and that I will refer you to the electrical doctor in our group for defibrillator that we discussed

## 2019-03-14 NOTE — TELEPHONE ENCOUNTER
Spoke with patient on test results verbalized understanding. Patient will call back to schedule appointment with electrical Doctor .

## 2019-03-18 ENCOUNTER — PATIENT MESSAGE (OUTPATIENT)
Dept: ADMINISTRATIVE | Facility: OTHER | Age: 74
End: 2019-03-18

## 2019-03-19 DIAGNOSIS — I42.9 CARDIOMYOPATHY, UNSPECIFIED TYPE: Primary | ICD-10-CM

## 2019-03-20 ENCOUNTER — INITIAL CONSULT (OUTPATIENT)
Dept: ELECTROPHYSIOLOGY | Facility: CLINIC | Age: 74
End: 2019-03-20
Payer: COMMERCIAL

## 2019-03-20 VITALS
SYSTOLIC BLOOD PRESSURE: 144 MMHG | HEART RATE: 70 BPM | HEIGHT: 61 IN | OXYGEN SATURATION: 95 % | BODY MASS INDEX: 29.22 KG/M2 | WEIGHT: 154.75 LBS | DIASTOLIC BLOOD PRESSURE: 66 MMHG

## 2019-03-20 DIAGNOSIS — I10 HYPERTENSION, ESSENTIAL: ICD-10-CM

## 2019-03-20 DIAGNOSIS — I42.9 CARDIOMYOPATHY, UNSPECIFIED TYPE: ICD-10-CM

## 2019-03-20 DIAGNOSIS — I42.8 NICM (NONISCHEMIC CARDIOMYOPATHY): Primary | ICD-10-CM

## 2019-03-20 PROCEDURE — 1101F PT FALLS ASSESS-DOCD LE1/YR: CPT | Mod: CPTII,S$GLB,, | Performed by: INTERNAL MEDICINE

## 2019-03-20 PROCEDURE — 99999 PR PBB SHADOW E&M-EST. PATIENT-LVL III: CPT | Mod: PBBFAC,,, | Performed by: INTERNAL MEDICINE

## 2019-03-20 PROCEDURE — 99999 PR PBB SHADOW E&M-EST. PATIENT-LVL III: ICD-10-PCS | Mod: PBBFAC,,, | Performed by: INTERNAL MEDICINE

## 2019-03-20 PROCEDURE — 93000 ELECTROCARDIOGRAM COMPLETE: CPT | Mod: S$GLB,,, | Performed by: INTERNAL MEDICINE

## 2019-03-20 PROCEDURE — 1101F PR PT FALLS ASSESS DOC 0-1 FALLS W/OUT INJ PAST YR: ICD-10-PCS | Mod: CPTII,S$GLB,, | Performed by: INTERNAL MEDICINE

## 2019-03-20 PROCEDURE — 3078F PR MOST RECENT DIASTOLIC BLOOD PRESSURE < 80 MM HG: ICD-10-PCS | Mod: CPTII,S$GLB,, | Performed by: INTERNAL MEDICINE

## 2019-03-20 PROCEDURE — 99205 OFFICE O/P NEW HI 60 MIN: CPT | Mod: S$GLB,,, | Performed by: INTERNAL MEDICINE

## 2019-03-20 PROCEDURE — 3074F PR MOST RECENT SYSTOLIC BLOOD PRESSURE < 130 MM HG: ICD-10-PCS | Mod: CPTII,S$GLB,, | Performed by: INTERNAL MEDICINE

## 2019-03-20 PROCEDURE — 3078F DIAST BP <80 MM HG: CPT | Mod: CPTII,S$GLB,, | Performed by: INTERNAL MEDICINE

## 2019-03-20 PROCEDURE — 3074F SYST BP LT 130 MM HG: CPT | Mod: CPTII,S$GLB,, | Performed by: INTERNAL MEDICINE

## 2019-03-20 PROCEDURE — 99205 PR OFFICE/OUTPT VISIT, NEW, LEVL V, 60-74 MIN: ICD-10-PCS | Mod: S$GLB,,, | Performed by: INTERNAL MEDICINE

## 2019-03-20 PROCEDURE — 93000 RHYTHM STRIP: ICD-10-PCS | Mod: S$GLB,,, | Performed by: INTERNAL MEDICINE

## 2019-03-20 NOTE — LETTER
March 20, 2019      Chanel Reyna MD  [address]  [address]  [place] [address]           [facility] - Arrhythmia  [address]  [place] [address]  Phone: [phone]  Fax: [phone]          Patient: Selma Hooper   MR Number: [id number]   YOB: 1945   Date of Visit: 3/20/2019       Dear Dr. Chanel Reyna:    Thank you for referring Selma Hooper to me for evaluation. Attached you will find relevant portions of my assessment and plan of care.    If you have questions, please do not hesitate to call me. I look forward to following Selma Hooper along with you.    Sincerely,    Nehemias Adams MD    Enclosure  CC:  No Recipients    If you would like to receive this communication electronically, please contact [email or web address] or [phone] to request more information on [employer] Link access.    For providers and/or their staff who would like to refer a patient to [facility], please contact us through our one-stop-shop provider referral line, [facility], at [phone].    If you feel you have received this communication in error or would no longer like to receive these types of communications, please e-mail [email or web address]

## 2019-03-20 NOTE — PATIENT INSTRUCTIONS
Understanding Cardiac Resynchronization Therapy (CRT)    Cardiac resynchronization therapy (CRT) is a treatment that may help you when your heart isnt pumping as well as it should. This problem can be caused by heart failure, a condition in which the heart muscle has become weak. It can also be caused by an electrical problem that keeps the bottom chambers of the heart (ventricles) from beating in sync. This can make heart failure worse.  When you have heart failure, fluid can build up in your lungs and your legs (edema). You may have less energy and be short of breath. These symptoms interfere with daily life.  CRT uses a small device to help improve the timing of the hearts contractions. CRT can ease symptoms, improve your quality of life, and help you live longer.  How CRT Works  With CRT, a small electrical device (pacemaker or defibrillator) is put under the skin in the upper chest. This is a minor surgical procedure done at a hospital. It is not heart surgery. Wires from the device lead to the ventricles. The device sends electrical pulses to each ventricle at the same time. This keeps the ventricles beating in sync . This procedure is also called biventricular pacing or resynchronization pacing.  CRT can be done with 1 of 2 devices. The type of device used depends on the persons needs. The devices are:  · A biventricular pacemaker. This device helps the heart beat at a normal rate.  · A biventricular ICD (implantable cardioverter defibrillator). This device is a pacemaker but also can treat fast, life-threatening heart rhythms.  Reasons for CRT  Your healthcare provider may advise CRT if:  · You have heart failure symptoms and your heart doesnt pump well  · The ventricles are not working together  · Tests, like an echocardiogram, show that your heart is weak and enlarged  · Medicine and lifestyle changes are not working well enough to control your heart failure  Benefits of CRT  CRT wont replace your  other treatments. Its part of a complete heart failure treatment plan. CRT helps a weakened heart do a better job of pumping blood out of the heart with each beat. So, more blood and oxygen go to the rest of your body. This can decrease heart failure symptoms and improve survival. The device is put into your body during a low-risk procedure.  Not everyone with heart failure benefits from CRT. CRT improves symptoms in about 2 out of 3 people who get it. For those who have mild symptoms, it can also prevent worsening heart failure. With CRT, you may be more able to:  · Return to daily activities such as walking, carrying grocery bags, and climbing stairs  · Have more energy to be active and do the things you enjoy  · Breathe more easily when you lie flat, so you sleep better at night  · Have less swelling in your ankles, feet, and abdomen  · Make fewer visits to the hospital because of heart failure symptoms  · Have fewer side effects from your heart failure medicines  Risks and complications  Like all medical procedures, having a CRT device implanted has some risks. These include:  · Anesthesia reactions  · Swelling or bruising in the upper chest area where the CRT device is placed  · Bleeding  · Infection  · Heart rhythm problems  · Collapsed lung  · Nerve or blood vessel damage  · Movement of the device or the device wires, which may require a second procedure  · Mechanical problems with the CRT device  · Kidney damage  · Sudden worsening of heart failure  · Other risks related to your specific medical condition  Life with CRT  If you have a CRT device, you may need to stay away from certain electronic devices and devices that have strong magnetic fields. These devices can interfere with how the CRT device works. You will need to avoid anti-theft systems, security metal detectors, CB radios, and very strong magnets, such as those used in MRI. However, depending on the type of device you have implanted, you may be  able to undergo an MRI with certain precautions in place. Talk to your cardiologist and the radiologist to make sure it is safe.  Your provider may also give you specific instructions for using cell phones and headphones with mp3 players. Your provider may give you a list of other devices and procedures to avoid. Most people with CRT devices can still enjoy physical activity, including sports and exercise.  You should not drive until your doctor says it's OK. The driving restriction is done to be sure that your health condition does not cause a safety issue for yourself or others on the road. Ask your provider when it may be safe for you to continue to drive.  You will have regular appointments to see how the device is working and to check the battery life of your device. Some of the device monitoring can be done using a home device that transmits the information about your device to your provider's office over a telephone or internet connection. The battery, or generator, will have to be changed at some point and your provider will let you know as that time approaches.  Date Last Reviewed: 6/1/2016 © 2000-2017 [employer]. [address] [place] [address], [place] [address]. All rights reserved. This information is not intended as a substitute for professional medical care. Always follow your healthcare professional's instructions.

## 2019-03-20 NOTE — PROGRESS NOTES
Subjective:    Patient ID:  Selma Hooper is a 73 y.o. female who presents for evaluation of Congestive Heart Failure    Referring Cardiologist: Chanel Reyna MD  Primary Care Physician: Phil Quintana MD    HPI   I had the pleasure of seeing Ms. Hooper today in our electrophysiology clinic in consultation for her cardiomyopathy. As you are aware she is a pleasant 73 year-old woman with hypertension who was admitted 7/2018 with acute heart failure in setting of severe hypertension. She was found to have a LVEF of 15-20%. She was discharged on metoprolol and losartan. Stress test did not suggest any ischemic disease. Despite >90 days of goal directed medical therapy her EF on repeat ECHO 3/2019 notes EF of 20% with dilated LV. She continues to have fatigue and dyspnea on exertion. She states to me today she never started taking losartan due to recall concerns despite discussing with her pharmacist and noting that her lot was not affected.     ECGs in Epic reviewed which note sinus rhythm with LBBB with QRS duration of 135msec.    My interpretation of today's in clinic ECG is sinus rhythm with LBBB ().    Review of Systems   Constitution: Positive for malaise/fatigue. Negative for fever and weakness.   HENT: Negative for congestion and sore throat.    Eyes: Negative for blurred vision and visual disturbance.   Cardiovascular: Positive for dyspnea on exertion. Negative for chest pain, irregular heartbeat, leg swelling, near-syncope, orthopnea, palpitations, paroxysmal nocturnal dyspnea and syncope.   Respiratory: Negative for cough and shortness of breath.    Hematologic/Lymphatic: Negative for bleeding problem. Does not bruise/bleed easily.   Skin: Negative.    Musculoskeletal: Negative.    Gastrointestinal: Negative for bloating and abdominal pain.   Neurological: Negative for dizziness, focal weakness and light-headedness.        Objective:    Physical Exam   Constitutional: She is oriented to person,  place, and time. She appears well-developed and well-nourished. No distress.   HENT:   Head: Normocephalic and atraumatic.   Eyes: Conjunctivae are normal. Right eye exhibits no discharge.   Neck: Neck supple. No JVD present.   Cardiovascular: Normal rate, regular rhythm and normal heart sounds. Exam reveals no gallop and no friction rub.   No murmur heard.  Pulmonary/Chest: Effort normal and breath sounds normal. No respiratory distress. She has no wheezes. She has no rales.   Abdominal: Soft. Bowel sounds are normal. She exhibits no distension. There is no tenderness. There is no rebound.   Musculoskeletal: She exhibits no edema.   Neurological: She is alert and oriented to person, place, and time.   Skin: Skin is warm and dry. She is not diaphoretic.   Psychiatric: She has a normal mood and affect. Her behavior is normal. Judgment and thought content normal.   Vitals reviewed.        Assessment:       1. NICM (nonischemic cardiomyopathy)    2. Hypertension, essential         Plan:       In summary, Ms. Hooper is a pleasant 73 year-old woman with hypertension, LBBB (QRS duration of 135msec) and chronic dilated nonischemic cardiomyopathy (LVEF of 20%, NYHA class II symptoms). She has been on only metoprolol therapy. We discussed the etiology and pathophysiology of potential conduction system disease and its effect on cardiomyopathy and how cardiac resynchronization therapy may provide both mortality and morbidity benefit. We also discussed the etiology and pathophysiology of sudden cardiac death in a cardiomyopathy patient population and how an ICD may provide mortality benefit. We then discussed long-term issues of cardiac implantable electronic devices including infection, venous occlusion, inappropriate shocks (for ICDs), and device malfunction requiring further procedures. We discussed the alternatives, benefits and risks of the procedure including pain, infection, bleeding, injury to lung causing pneumothorax  requiring tube placement, injury to heart valves, puncture of the heart leading to pericardial effusion or tamponade requiring tube drainage, heart attack, stroke and death. She however needs to be on ARB therapy. Discussed changing to valsartan. She states she is ok taking the losartan. She understood and desires to proceed if her EF remains low despite 90 days of losartan therapy.    Plan  Start losartan  Limited ECHO after 90 days of therapy, if EF remains <35% then proceed with CRT-D.    Thank you for allowing me to participate in the care of this patient. Please do not hesitate to call me with any questions or concerns.    Nehemias Adams MD, PhD  Cardiac Electrophysiology

## 2019-03-20 NOTE — Clinical Note
Anurag Kellyjavi never took losartan. She will start today and we will recheck echo in 90 days. If EF remains <35% will schedule for CRT-D implant.Thanks

## 2019-03-27 ENCOUNTER — PATIENT OUTREACH (OUTPATIENT)
Dept: OTHER | Facility: OTHER | Age: 74
End: 2019-03-27

## 2019-03-27 NOTE — PROGRESS NOTES
Last 5 Patient Entered Readings                                      Current 30 Day Average: 129/71     Recent Readings 3/26/2019 3/25/2019 3/25/2019 3/24/2019 3/24/2019    SBP (mmHg) 119 104 120 108 121    DBP (mmHg) 64 60 61 61 65    Pulse 60 61 67 69 74          3/27: LVM.  Will call in 4 weeks.  Patient at goal.

## 2019-04-09 ENCOUNTER — PATIENT MESSAGE (OUTPATIENT)
Dept: INTERNAL MEDICINE | Facility: CLINIC | Age: 74
End: 2019-04-09

## 2019-04-11 ENCOUNTER — TELEPHONE (OUTPATIENT)
Dept: INTERNAL MEDICINE | Facility: CLINIC | Age: 74
End: 2019-04-11

## 2019-04-11 RX ORDER — SPIRONOLACTONE 25 MG/1
25 TABLET ORAL DAILY
Qty: 90 TABLET | Refills: 0 | Status: SHIPPED | OUTPATIENT
Start: 2019-04-11 | End: 2019-05-15

## 2019-04-11 RX ORDER — FERROUS SULFATE 325(65) MG
325 TABLET, DELAYED RELEASE (ENTERIC COATED) ORAL DAILY
Refills: 0 | COMMUNITY
Start: 2019-04-11

## 2019-04-11 NOTE — TELEPHONE ENCOUNTER
Message left for pt to call office  MD will refill her spironolactone however she needs to complete the workup with Gastroenterology concerning the iron loss.

## 2019-04-11 NOTE — TELEPHONE ENCOUNTER
I will refill her spironolactone however she needs to complete the workup with Gastroenterology concerning the iron loss.

## 2019-04-11 NOTE — TELEPHONE ENCOUNTER
Spoke with pt and she stated she went to gastro and they told her that her iron levels improved and not to worry

## 2019-04-11 NOTE — TELEPHONE ENCOUNTER
----- Message from Deisi Conn sent at 4/11/2019  8:48 AM CDT -----  Contact: [phone]  Patient is returning a phone call.  Who left a message for the patient: Leola  Does patient know what this is regarding:    Comments: please advise, thanks

## 2019-04-11 NOTE — TELEPHONE ENCOUNTER
Refill request received and reviewed, patient will need to be seen to discuss. Please call patient and schedule patient for an appointment with me. Thank you.

## 2019-04-16 ENCOUNTER — NURSE TRIAGE (OUTPATIENT)
Dept: ADMINISTRATIVE | Facility: CLINIC | Age: 74
End: 2019-04-16

## 2019-04-17 ENCOUNTER — TELEPHONE (OUTPATIENT)
Dept: CARDIOLOGY | Facility: CLINIC | Age: 74
End: 2019-04-17

## 2019-04-17 NOTE — TELEPHONE ENCOUNTER
B/P 114/58 p.86    Reason for Disposition   [1] Systolic BP  AND [2] taking blood pressure medications AND [3] NOT dizzy, lightheaded or weak    Protocols used: LOW BLOOD PRESSURE-A-    B/P 114/58 p.86  Please call patient in the AM for scheduling,  Her B/P has been running. 100-110/50's P.86

## 2019-04-18 ENCOUNTER — TELEPHONE (OUTPATIENT)
Dept: CARDIOLOGY | Facility: CLINIC | Age: 74
End: 2019-04-18

## 2019-04-18 NOTE — TELEPHONE ENCOUNTER
Spoke with patient on medication changes per Dr Reyna she can hold lasix and aldactone if dizziness persist go to er.

## 2019-04-24 NOTE — PROGRESS NOTES
Last 5 Patient Entered Readings                                      Current 30 Day Average: 122/66     Recent Readings 4/24/2019 4/24/2019 4/24/2019 4/23/2019 4/23/2019    SBP (mmHg) 132 130 142 137 140    DBP (mmHg) 70 71 85 65 68    Pulse 65 72 68 69 68        4/24: Patient states she just got back from the dentist.  RCB Friday.

## 2019-04-26 NOTE — PROGRESS NOTES
Last 5 Patient Entered Readings                                      Current 30 Day Average: 123/66     Recent Readings 4/26/2019 4/25/2019 4/25/2019 4/24/2019 4/24/2019    SBP (mmHg) 137 129 124 132 130    DBP (mmHg) 72 65 69 70 71    Pulse 66 68 64 65 72          4/26: Not available.  Will call in 1 week.

## 2019-05-06 ENCOUNTER — OFFICE VISIT (OUTPATIENT)
Dept: CARDIOLOGY | Facility: CLINIC | Age: 74
End: 2019-05-06
Payer: COMMERCIAL

## 2019-05-06 VITALS
SYSTOLIC BLOOD PRESSURE: 129 MMHG | HEART RATE: 62 BPM | WEIGHT: 157.44 LBS | BODY MASS INDEX: 29.72 KG/M2 | HEIGHT: 61 IN | DIASTOLIC BLOOD PRESSURE: 66 MMHG

## 2019-05-06 DIAGNOSIS — I42.8 NICM (NONISCHEMIC CARDIOMYOPATHY): Primary | ICD-10-CM

## 2019-05-06 DIAGNOSIS — I10 HYPERTENSION, ESSENTIAL: ICD-10-CM

## 2019-05-06 DIAGNOSIS — E78.00 HYPERCHOLESTEREMIA: ICD-10-CM

## 2019-05-06 DIAGNOSIS — R06.09 DOE (DYSPNEA ON EXERTION): ICD-10-CM

## 2019-05-06 DIAGNOSIS — Z87.891 EX-SMOKER: ICD-10-CM

## 2019-05-06 DIAGNOSIS — I50.23 NYHA CLASS 3 ACUTE ON CHRONIC SYSTOLIC HEART FAILURE: ICD-10-CM

## 2019-05-06 PROCEDURE — 99999 PR PBB SHADOW E&M-EST. PATIENT-LVL III: ICD-10-PCS | Mod: PBBFAC,,, | Performed by: INTERNAL MEDICINE

## 2019-05-06 PROCEDURE — 1101F PT FALLS ASSESS-DOCD LE1/YR: CPT | Mod: CPTII,S$GLB,, | Performed by: INTERNAL MEDICINE

## 2019-05-06 PROCEDURE — 1101F PR PT FALLS ASSESS DOC 0-1 FALLS W/OUT INJ PAST YR: ICD-10-PCS | Mod: CPTII,S$GLB,, | Performed by: INTERNAL MEDICINE

## 2019-05-06 PROCEDURE — 3078F DIAST BP <80 MM HG: CPT | Mod: CPTII,S$GLB,, | Performed by: INTERNAL MEDICINE

## 2019-05-06 PROCEDURE — 99999 PR PBB SHADOW E&M-EST. PATIENT-LVL III: CPT | Mod: PBBFAC,,, | Performed by: INTERNAL MEDICINE

## 2019-05-06 PROCEDURE — 3074F PR MOST RECENT SYSTOLIC BLOOD PRESSURE < 130 MM HG: ICD-10-PCS | Mod: CPTII,S$GLB,, | Performed by: INTERNAL MEDICINE

## 2019-05-06 PROCEDURE — 99214 OFFICE O/P EST MOD 30 MIN: CPT | Mod: S$GLB,,, | Performed by: INTERNAL MEDICINE

## 2019-05-06 PROCEDURE — 99214 PR OFFICE/OUTPT VISIT, EST, LEVL IV, 30-39 MIN: ICD-10-PCS | Mod: S$GLB,,, | Performed by: INTERNAL MEDICINE

## 2019-05-06 PROCEDURE — 3078F PR MOST RECENT DIASTOLIC BLOOD PRESSURE < 80 MM HG: ICD-10-PCS | Mod: CPTII,S$GLB,, | Performed by: INTERNAL MEDICINE

## 2019-05-06 PROCEDURE — 3074F SYST BP LT 130 MM HG: CPT | Mod: CPTII,S$GLB,, | Performed by: INTERNAL MEDICINE

## 2019-05-06 NOTE — PROGRESS NOTES
Subjective:   Patient ID:  Selma Hooper is a 73 y.o. female who presents for follow-up of NICM (nonischemic cardiomyopathy)  Selma Hooper is a 73 y.o. female who presents for follow-up of NICM (nonischemic cardiomyopathy and Shortness of Breath  Selma Hooper is a 72 y.o. female who presents for follow-up of Other cardiomyopathy     Selma Hooper is a 72 y.o. female is a new patient who presents for evaluation of Shortness of Breath (hospital discharge 07/14/18)  Recent discharge:   Ms. Hooper is a 73yo woman with essential HTN (off all meds) who presents with acute hypoxemic respiratory failure 2/2 HTN emergency with flash pulmonary edema and acute new onset systolic congestive heart failure exacerbation. Also with elevated transaminases likely 2/2 congestive hepatopathy (improving), lactic acidosis (now resolved), and elevated troponin, likely 2/2 HTN/CHF. She was initially placed on BiPAP and NTG gtt upon admission with improvement in her BP and taper off gtt. Now weaned off NC and s/p IV lasix with good response. Hypotensive yesterday am, likely 2/2 overdiuresis; improved with 250cc bolus today. Will discharge on GDMT: initiate losartan tomorrow am (held today 2/2 JAMIR), metoprolol succinate, lasix 20. Would consider addition of spironolactone at f/u visit; held off today 2/2 JAMIR due to overdiuresis. Will need timely ischemic w/u as outpatient.     Echo:    1 - Severely depressed left ventricular systolic function (EF 15-20%).     2 - Normal right ventricular systolic function .     3 - Trivial to mild aortic regurgitation.     4 - Trivial to mild mitral regurgitation.     5 - Trivial to mild tricuspid regurgitation.     6 - The estimated PA systolic pressure is 28 mmHg.      NM stress test:  Impression: NORMAL MYOCARDIAL PERFUSION  1. The perfusion scan is free of evidence for myocardial ischemia or injury.   2. There is a mild fixed septal defect consistent with patient's underlying LBBB - left bundle branch block.  "  3. There is a mild intensity fixed defect in the inferior wall of the left ventricle, secondary to diaphragm attenuation.   4. There is abnormal wall motion at rest showing severe global hypokinesis of the left ventricle.   5. There is resting LV dysfunction with a reduced ejection fraction of 20 %.  (normal is >= 51%)  6. The left ventricular volume is mildly increased at rest.   7. The extracardiac distribution of radioactivity is normal.      HPI:   Patient has been feeling SOB for atleast 6 month that is primarily THEODORE. Since Diuretic her THEODORE has improved. No orthopnea, PND, chest pain or palpitations.   Heavy smoker, mother has heart attack 60s. Brother had CABG in 60s.   NYHA III- feeling SOB on minimal exertion.   No chest pain, Orthopnea, PND.   Denies palpitations or fluttering in the chest  Patent get sob on walking.   Grandparents had CVA.       Echo 3/2019  · Severely decreased left ventricular systolic function. The estimated ejection fraction is 20%  · Eccentric left ventricular hypertrophy.  · Global hypokinetic wall motion.  · Moderate left atrial enlargement.  · Indeterminate left ventricular diastolic function.  · Normal right ventricular systolic function.  · Mild tricuspid regurgitation.  · The estimated PA systolic pressure is 19 mm Hg  · Normal central venous pressure (3 mm Hg).         Patient Active Problem List   Diagnosis    Family history of colon cancer    Colon polyps    Hypertension, essential    Iron deficiency anemia    Elevated LFTs    Subclinical hyperthyroidism    Elevated troponin    Former smoker    NICM (nonischemic cardiomyopathy)    Lung nodule    Pulmonary emphysema    Alkaline phosphatase elevation     /66 (BP Location: Left arm, Patient Position: Sitting, BP Method: Large (Automatic))   Pulse 62   Ht 5' 1" (1.549 m)   Wt 71.4 kg (157 lb 6.5 oz)   BMI 29.74 kg/m²   Body mass index is 29.74 kg/m².  CrCl cannot be calculated (Patient's most recent lab " result is older than the maximum 7 days allowed.).    Lab Results   Component Value Date     09/17/2018    K 3.8 09/17/2018     09/17/2018    CO2 28 09/17/2018    BUN 15 09/17/2018    CREATININE 0.9 09/17/2018    GLU 91 09/17/2018    HGBA1C 5.4 07/12/2018    MG 1.9 07/12/2018    AST 12 09/17/2018    ALT 9 (L) 09/17/2018    ALBUMIN 3.8 09/17/2018    PROT 7.6 09/17/2018    BILITOT 0.4 09/17/2018    WBC 3.28 (L) 02/05/2019    HGB 12.9 02/05/2019    HCT 43.2 02/05/2019    MCV 81 (L) 02/05/2019     02/05/2019    TSH 0.872 09/11/2018    CHOL 89 (L) 07/31/2018    HDL 29 (L) 07/31/2018    LDLCALC 44.8 (L) 07/31/2018    TRIG 76 07/31/2018       Current Outpatient Medications   Medication Sig    calcium carbonate (TUMS) 200 mg calcium (500 mg) chewable tablet Take 1 tablet by mouth as needed for Heartburn.    fluticasone (FLONASE) 50 mcg/actuation nasal spray 1 spray by Each Nare route daily as needed for Allergies.    losartan (COZAAR) 25 MG tablet Take 1 tablet (25 mg total) by mouth once daily.    metoprolol succinate (TOPROL-XL) 50 MG 24 hr tablet Take 1 tablet (50 mg total) by mouth once daily.    ranitidine (ZANTAC) 150 MG tablet Take 1 tablet (150 mg total) by mouth 2 (two) times daily. Take 30 Minutes before breakfast and 30 minutes before your last meal of the day. (Patient taking differently: Take 150 mg by mouth 2 (two) times daily as needed. Take 30 Minutes before breakfast and 30 minutes before your last meal of the day.)    atorvastatin (LIPITOR) 20 MG tablet Take 1 tablet (20 mg total) by mouth once daily.    ferrous sulfate 325 (65 FE) MG EC tablet Take 1 tablet (325 mg total) by mouth once daily.    furosemide (LASIX) 20 MG tablet Take 1 tablet (20 mg total) by mouth 2 (two) times daily.    spironolactone (ALDACTONE) 25 MG tablet Take 1 tablet (25 mg total) by mouth once daily.     No current facility-administered medications for this visit.        Review of Systems   Constitution:  Positive for malaise/fatigue. Negative for chills, decreased appetite, fever, night sweats, weight gain and weight loss.   HENT: Negative for congestion and sore throat.    Eyes: Negative for blurred vision, double vision, visual disturbance and visual halos.   Cardiovascular: Positive for dyspnea on exertion. Negative for chest pain, claudication, cyanosis, irregular heartbeat, leg swelling, near-syncope, orthopnea, palpitations, paroxysmal nocturnal dyspnea and syncope.   Respiratory: Negative for cough, hemoptysis, shortness of breath, snoring, sputum production and wheezing.    Endocrine: Negative for cold intolerance, heat intolerance, polydipsia and polyphagia.   Hematologic/Lymphatic: Negative for adenopathy and bleeding problem. Does not bruise/bleed easily.   Skin: Negative.  Negative for flushing, itching, poor wound healing and rash.   Musculoskeletal: Negative.  Negative for arthritis, back pain, falls, gout, joint pain, joint swelling, muscle cramps, muscle weakness, myalgias, neck pain and stiffness.   Gastrointestinal: Negative for bloating, abdominal pain, anorexia, diarrhea, dysphagia, excessive appetite, flatus, hematemesis, jaundice, melena and nausea.   Genitourinary: Negative for hesitancy and incomplete emptying.   Neurological: Negative for aphonia, brief paralysis, difficulty with concentration, disturbances in coordination, excessive daytime sleepiness, dizziness, focal weakness, light-headedness, loss of balance and weakness.   Psychiatric/Behavioral: Negative for altered mental status, depression, hallucinations, hypervigilance, memory loss, substance abuse and suicidal ideas. The patient does not have insomnia and is not nervous/anxious.        Objective:   Physical Exam   Constitutional: She is oriented to person, place, and time. She appears well-developed and well-nourished. No distress.   HENT:   Head: Normocephalic and atraumatic.   Nose: Nose normal.   Mouth/Throat: Oropharynx is  clear and moist. No oropharyngeal exudate.   Eyes: Pupils are equal, round, and reactive to light. Conjunctivae and EOM are normal. Right eye exhibits no discharge. Left eye exhibits no discharge. No scleral icterus.   Neck: Normal range of motion. Neck supple. No JVD present. No tracheal deviation present. No thyromegaly present.   Cardiovascular: Normal rate, regular rhythm, normal heart sounds and intact distal pulses. Exam reveals no gallop and no friction rub.   No murmur heard.  Pulmonary/Chest: Effort normal and breath sounds normal. No stridor. No respiratory distress. She has no wheezes. She has no rales. She exhibits no tenderness.   Abdominal: Soft. Bowel sounds are normal. She exhibits no distension and no mass. There is no tenderness. There is no rebound and no guarding.   Musculoskeletal: Normal range of motion. She exhibits no edema or tenderness.   Lymphadenopathy:     She has no cervical adenopathy.   Neurological: She is alert and oriented to person, place, and time. She has normal reflexes. No cranial nerve deficit. She exhibits normal muscle tone. Coordination normal.   Skin: Skin is warm. No rash noted. She is not diaphoretic. No erythema. No pallor.   Psychiatric: She has a normal mood and affect. Her behavior is normal. Judgment and thought content normal.       Assessment:     1. NICM (nonischemic cardiomyopathy)    2. Hypertension, essential    3. NYHA class 3 acute on chronic systolic heart failure    4. Hypercholesteremia    5. Ex-smoker    6. THEODORE (dyspnea on exertion)      Plan:   Patient is a candidate for device and should proceed. Ok to hold diuretic as no signs of  Heart failure.  Limit sodium intake to less then 2 gram sodium and 1500cc fluid restriction.  Graded exercise program as tolerated.  Call if  more than 3 lbs in 1 day or 5 lbs in 1 week.

## 2019-05-15 ENCOUNTER — PATIENT OUTREACH (OUTPATIENT)
Dept: OTHER | Facility: OTHER | Age: 74
End: 2019-05-15

## 2019-05-15 NOTE — PROGRESS NOTES
"HPI:  Called patient to follow up. Patient reports adherence to medication regimen daily and denies missed doses. Patient denies hypotensive s/sx (lightheadedness, dizziness, nausea, fatigue); patient denies hypertensive s/sx (SOB, CP, severe headaches, changes in vision, dizziness, fatigue, confusion, anxiety, nosebleeds).     Patient reports Lasix and spironolactone were recently discontinued.     Last 5 Patient Entered Readings                                      Current 30 Day Average: 129/69     Recent Readings 5/15/2019 5/15/2019 5/15/2019 5/14/2019 5/14/2019    SBP (mmHg) 144 158 154 130 127    DBP (mmHg) 76 78 79 63 69    Pulse 70 69 67 62 61        Assessment:  Reviewed recent readings. Per 2017 ACC/ AHA HTN guidelines (goal of BP < 130/80), current 30-day average is controlled. Patient was seen by Cardiology on 5/6, BPs were 129/70 and 129/66. Per note, "Patient is a candidate for device and should proceed. Ok to hold diuretic as no signs of Heart failure."    Plan:  Continue current medication regimen.   Encouraged patient to charge digital cuff at least monthly.  Patients health , Jose nErique Knowles, will be following up as scheduled.   I will continue to monitor regularly and will follow-up in 4 weeks, sooner if blood pressure begins to trend upward or downward.     Current medication regimen:  Hypertension Medications             losartan (COZAAR) 25 MG tablet Take 1 tablet (25 mg total) by mouth once daily.    metoprolol succinate (TOPROL-XL) 50 MG 24 hr tablet Take 1 tablet (50 mg total) by mouth once daily.        Patient denies having questions or concerns. Patient has my contact information and knows to call with any concerns or clinical changes.                                       "

## 2019-05-24 NOTE — PROGRESS NOTES
Last 5 Patient Entered Readings                                      Current 30 Day Average: 133/70     Recent Readings 5/24/2019 5/24/2019 5/23/2019 5/23/2019 5/23/2019    SBP (mmHg) 146 157 128 138 140    DBP (mmHg) 72 88 66 72 77    Pulse - 74 68 71 66          5/24: LVM.  Will call in 2 weeks.

## 2019-05-28 NOTE — PROGRESS NOTES
Last 5 Patient Entered Readings                                      Current 30 Day Average: 133/70     Recent Readings 5/28/2019 5/27/2019 5/26/2019 5/26/2019 5/25/2019    SBP (mmHg) 132 139 126 135 134    DBP (mmHg) 77 71 63 72 71    Pulse 65 72 81 68 71          5/28: Patient called and LVM on 5/24.  Returning call.  LVM.  Will call in 1 week.

## 2019-06-05 ENCOUNTER — OFFICE VISIT (OUTPATIENT)
Dept: INTERNAL MEDICINE | Facility: CLINIC | Age: 74
End: 2019-06-05
Attending: FAMILY MEDICINE
Payer: COMMERCIAL

## 2019-06-05 VITALS
BODY MASS INDEX: 29.57 KG/M2 | WEIGHT: 156.63 LBS | OXYGEN SATURATION: 95 % | DIASTOLIC BLOOD PRESSURE: 62 MMHG | HEIGHT: 61 IN | HEART RATE: 67 BPM | TEMPERATURE: 98 F | SYSTOLIC BLOOD PRESSURE: 132 MMHG

## 2019-06-05 DIAGNOSIS — R06.00 DYSPNEA, UNSPECIFIED TYPE: ICD-10-CM

## 2019-06-05 DIAGNOSIS — I42.8 NICM (NONISCHEMIC CARDIOMYOPATHY): ICD-10-CM

## 2019-06-05 DIAGNOSIS — D64.9 ANEMIA, UNSPECIFIED TYPE: ICD-10-CM

## 2019-06-05 DIAGNOSIS — R74.8 ALKALINE PHOSPHATASE ELEVATION: ICD-10-CM

## 2019-06-05 DIAGNOSIS — Z87.891 PERSONAL HISTORY OF NICOTINE DEPENDENCE: ICD-10-CM

## 2019-06-05 DIAGNOSIS — D50.9 IRON DEFICIENCY ANEMIA, UNSPECIFIED IRON DEFICIENCY ANEMIA TYPE: ICD-10-CM

## 2019-06-05 DIAGNOSIS — R79.89 ELEVATED LFTS: ICD-10-CM

## 2019-06-05 DIAGNOSIS — J43.9 PULMONARY EMPHYSEMA, UNSPECIFIED EMPHYSEMA TYPE: ICD-10-CM

## 2019-06-05 DIAGNOSIS — I10 HYPERTENSION, ESSENTIAL: Primary | ICD-10-CM

## 2019-06-05 DIAGNOSIS — R91.1 LUNG NODULE: ICD-10-CM

## 2019-06-05 PROBLEM — Z91.199 NONCOMPLIANCE: Status: ACTIVE | Noted: 2019-06-05

## 2019-06-05 PROCEDURE — 3078F DIAST BP <80 MM HG: CPT | Mod: CPTII,S$GLB,, | Performed by: FAMILY MEDICINE

## 2019-06-05 PROCEDURE — 3075F SYST BP GE 130 - 139MM HG: CPT | Mod: CPTII,S$GLB,, | Performed by: FAMILY MEDICINE

## 2019-06-05 PROCEDURE — 1101F PT FALLS ASSESS-DOCD LE1/YR: CPT | Mod: CPTII,S$GLB,, | Performed by: FAMILY MEDICINE

## 2019-06-05 PROCEDURE — 1101F PR PT FALLS ASSESS DOC 0-1 FALLS W/OUT INJ PAST YR: ICD-10-PCS | Mod: CPTII,S$GLB,, | Performed by: FAMILY MEDICINE

## 2019-06-05 PROCEDURE — 99214 OFFICE O/P EST MOD 30 MIN: CPT | Mod: S$GLB,,, | Performed by: FAMILY MEDICINE

## 2019-06-05 PROCEDURE — 99999 PR PBB SHADOW E&M-EST. PATIENT-LVL IV: CPT | Mod: PBBFAC,,, | Performed by: FAMILY MEDICINE

## 2019-06-05 PROCEDURE — 3078F PR MOST RECENT DIASTOLIC BLOOD PRESSURE < 80 MM HG: ICD-10-PCS | Mod: CPTII,S$GLB,, | Performed by: FAMILY MEDICINE

## 2019-06-05 PROCEDURE — 3075F PR MOST RECENT SYSTOLIC BLOOD PRESS GE 130-139MM HG: ICD-10-PCS | Mod: CPTII,S$GLB,, | Performed by: FAMILY MEDICINE

## 2019-06-05 PROCEDURE — 99999 PR PBB SHADOW E&M-EST. PATIENT-LVL IV: ICD-10-PCS | Mod: PBBFAC,,, | Performed by: FAMILY MEDICINE

## 2019-06-05 PROCEDURE — 99214 PR OFFICE/OUTPT VISIT, EST, LEVL IV, 30-39 MIN: ICD-10-PCS | Mod: S$GLB,,, | Performed by: FAMILY MEDICINE

## 2019-06-05 NOTE — PROGRESS NOTES
Subjective:       Patient ID: Selma Hooper is a 73 y.o. female.    Chief Complaint: Shortness of Breath    HPI  Review of Systems   Constitutional: Positive for fatigue. Negative for activity change, chills, fever and unexpected weight change.   HENT: Negative for congestion, hearing loss, rhinorrhea and trouble swallowing.    Eyes: Negative for discharge, redness and visual disturbance.   Respiratory: Positive for shortness of breath and wheezing. Negative for cough and chest tightness.    Cardiovascular: Negative for chest pain, palpitations and leg swelling.   Gastrointestinal: Negative for abdominal pain, blood in stool, constipation, diarrhea and vomiting.   Endocrine: Negative for polydipsia and polyuria.   Genitourinary: Negative for difficulty urinating, dysuria, hematuria and menstrual problem.   Musculoskeletal: Negative for arthralgias, back pain, gait problem, joint swelling, myalgias and neck pain.   Skin: Negative for color change and rash.   Neurological: Negative for tremors, speech difficulty, weakness, numbness and headaches.   Hematological: Negative for adenopathy. Does not bruise/bleed easily.   Psychiatric/Behavioral: Negative for behavioral problems, confusion, dysphoric mood and sleep disturbance. The patient is not nervous/anxious.        Objective:      Physical Exam   Constitutional: She is oriented to person, place, and time. She appears well-developed and well-nourished.   HENT:   Head: Normocephalic and atraumatic.   Eyes: Conjunctivae are normal. No scleral icterus.   Neck: Normal range of motion. Neck supple.   Cardiovascular: Normal rate and intact distal pulses. Exam reveals distant heart sounds. Exam reveals no gallop and no friction rub.   No murmur heard.  Pulmonary/Chest: Effort normal. No respiratory distress. She has decreased breath sounds. She has no wheezes. She has no rales.   Abdominal: She exhibits no distension, no abdominal bruit and no mass. There is no tenderness.  There is no rebound and no guarding.   Musculoskeletal: She exhibits no edema or deformity.   Neurological: She is alert and oriented to person, place, and time. She displays no tremor. No cranial nerve deficit. Coordination and gait normal.   Skin: Skin is warm and dry. No rash noted. She is not diaphoretic. No erythema.   Psychiatric: She has a normal mood and affect. Her behavior is normal. Judgment and thought content normal.   Nursing note and vitals reviewed.      Assessment:       1. Hypertension, essential    2. NICM (nonischemic cardiomyopathy)    3. Pulmonary emphysema, unspecified emphysema type    4. Elevated LFTs    5. Alkaline phosphatase elevation    6. Dyspnea, unspecified type    7. Anemia, unspecified type    8. Personal history of nicotine dependence    9. Lung nodule    10. Iron deficiency anemia, unspecified iron deficiency anemia type        Plan:     Medication List with Changes/Refills   Current Medications    ATORVASTATIN (LIPITOR) 20 MG TABLET    Take 1 tablet (20 mg total) by mouth once daily.    CALCIUM CARBONATE (TUMS) 200 MG CALCIUM (500 MG) CHEWABLE TABLET    Take 1 tablet by mouth as needed for Heartburn.    FERROUS SULFATE 325 (65 FE) MG EC TABLET    Take 1 tablet (325 mg total) by mouth once daily.    FLUTICASONE (FLONASE) 50 MCG/ACTUATION NASAL SPRAY    1 spray by Each Nare route daily as needed for Allergies.    LOSARTAN (COZAAR) 25 MG TABLET    Take 1 tablet (25 mg total) by mouth once daily.    METOPROLOL SUCCINATE (TOPROL-XL) 50 MG 24 HR TABLET    Take 1 tablet (50 mg total) by mouth once daily.    RANITIDINE (ZANTAC) 150 MG TABLET    Take 1 tablet (150 mg total) by mouth 2 (two) times daily. Take 30 Minutes before breakfast and 30 minutes before your last meal of the day.     Selma was seen today for shortness of breath.    Diagnoses and all orders for this visit:    Hypertension, essential    NICM (nonischemic cardiomyopathy)    Pulmonary emphysema, unspecified emphysema  type  -     Ambulatory referral to Pulmonology    Elevated LFTs    Alkaline phosphatase elevation    Dyspnea, unspecified type  -     Ambulatory referral to Pulmonology    Anemia, unspecified type    Personal history of nicotine dependence    Lung nodule    Iron deficiency anemia, unspecified iron deficiency anemia type      See meds, orders, follow up, routing and instructions sections of encounter.  A 73-year-old established female patient is in for followup.  I went over her   last six months or so of clinic notes.  We had placed several referrals.  She   did see the gastroenterologist concerning GERD and mild anemia.  She was   scheduled for an EGD and colonoscopy, which she has not completed.  The elevated   alkaline phosphatase was not directly addressed.    She did see her cardiologist.  She has nonischemic cardiomyopathy with a   significantly reduced ejection fraction and an ICD placed.  She is still   complaining of dyspnea.  She has not followed up with her pulmonologist.  She   stated that they canceled her appointment, had not rescheduled it.  Likewise, we   ordered an abdominal ultrasound looking into her alkaline phosphatase in   January, which she has not gotten yet.  I did ask her to do so today.    Her last CBC did not show a reduced H and H.  She has no other acute complaints   at this time.    RECOMMENDATIONS:  I did urge her to follow up with the testing that we   requested.  She is due for health maintenance including a colonoscopy in 2020.    She will be due this fall for mammogram and a CT scan, repeat, for pulmonary   nodule.  Referral is placed for Pulmonary.  Follow up with me in six months.    Asked that her ultrasound be rescheduled and asked that she consider a followup   with her GI concerning the endoscopies.  She stated again today that she had   some problem with her insurance paying for her coverage items and testing.      RADU/HN  dd: 06/05/2019 10:22:30 (CDT)  td: 06/06/2019  02:11:37 (CDT)  Doc ID   #[id number]  Job ID #[id number]    CC:

## 2019-06-06 ENCOUNTER — TELEPHONE (OUTPATIENT)
Dept: ENDOSCOPY | Facility: HOSPITAL | Age: 74
End: 2019-06-06

## 2019-06-06 NOTE — TELEPHONE ENCOUNTER
Contacted Pt to schedule EGD/colonoscopy, no answer, left message for Pt to call back to schedule, number provided [phone].

## 2019-06-07 NOTE — PROGRESS NOTES
Last 5 Patient Entered Readings                                      Current 30 Day Average: 135/72     Recent Readings 6/7/2019 6/7/2019 6/6/2019 6/6/2019 6/6/2019    SBP (mmHg) 138 143 138 150 157    DBP (mmHg) 79 78 72 74 82    Pulse 67 67 77 72 80          6/7: Busy tone.  Will call in 1 week.

## 2019-06-10 ENCOUNTER — HOSPITAL ENCOUNTER (OUTPATIENT)
Dept: CARDIOLOGY | Facility: CLINIC | Age: 74
Discharge: HOME | End: 2019-06-10
Attending: INTERNAL MEDICINE
Payer: COMMERCIAL

## 2019-06-10 ENCOUNTER — TELEPHONE (OUTPATIENT)
Dept: ELECTROPHYSIOLOGY | Facility: CLINIC | Age: 74
End: 2019-06-10

## 2019-06-10 VITALS
DIASTOLIC BLOOD PRESSURE: 80 MMHG | HEIGHT: 61 IN | BODY MASS INDEX: 29.45 KG/M2 | SYSTOLIC BLOOD PRESSURE: 140 MMHG | WEIGHT: 156 LBS | HEART RATE: 64 BPM

## 2019-06-10 DIAGNOSIS — I42.8 NICM (NONISCHEMIC CARDIOMYOPATHY): ICD-10-CM

## 2019-06-10 LAB
ASCENDING AORTA: 3.13 CM
AV INDEX (PROSTH): 0.64
AV MEAN GRADIENT: 2.47 MMHG
AV PEAK GRADIENT: 4.24 MMHG
AV VALVE AREA: 2.45 CM2
AV VELOCITY RATIO: 0.75
BSA FOR ECHO PROCEDURE: 1.75 M2
CV ECHO LV RWT: 0.38 CM
DOP CALC AO PEAK VEL: 1.03 M/S
DOP CALC AO VTI: 20.57 CM
DOP CALC LVOT AREA: 3.83 CM2
DOP CALC LVOT DIAMETER: 2.21 CM
DOP CALC LVOT PEAK VEL: 0.77 M/S
DOP CALC LVOT STROKE VOLUME: 50.42 CM3
DOP CALCLVOT PEAK VEL VTI: 13.15 CM
E WAVE DECELERATION TIME: 277.29 MSEC
E/A RATIO: 0.72
E/E' RATIO: 10
ECHO LV POSTERIOR WALL: 0.99 CM (ref 0.6–1.1)
FRACTIONAL SHORTENING: 10 % (ref 28–44)
INTERVENTRICULAR SEPTUM: 1.04 CM (ref 0.6–1.1)
LA MAJOR: 3.74 CM
LA MINOR: 4.35 CM
LA WIDTH: 4 CM
LEFT ATRIUM SIZE: 4.12 CM
LEFT ATRIUM VOLUME INDEX: 33.1 ML/M2
LEFT ATRIUM VOLUME: 56.34 CM3
LEFT INTERNAL DIMENSION IN SYSTOLE: 4.67 CM (ref 2.1–4)
LEFT VENTRICLE DIASTOLIC VOLUME INDEX: 76.02 ML/M2
LEFT VENTRICLE DIASTOLIC VOLUME: 129.21 ML
LEFT VENTRICLE MASS INDEX: 116.1 G/M2
LEFT VENTRICLE SYSTOLIC VOLUME INDEX: 59.4 ML/M2
LEFT VENTRICLE SYSTOLIC VOLUME: 100.92 ML
LEFT VENTRICULAR INTERNAL DIMENSION IN DIASTOLE: 5.19 CM (ref 3.5–6)
LEFT VENTRICULAR MASS: 197.43 G
LV LATERAL E/E' RATIO: 7.86
LV SEPTAL E/E' RATIO: 13.75
MV PEAK A VEL: 0.76 M/S
MV PEAK E VEL: 0.55 M/S
PULM VEIN S/D RATIO: 1.42
PV PEAK D VEL: 0.24 M/S
PV PEAK S VEL: 0.34 M/S
RA MAJOR: 2.75 CM
RA PRESSURE: 3 MMHG
RA WIDTH: 2.87 CM
RIGHT VENTRICULAR END-DIASTOLIC DIMENSION: 3.19 CM
RV TISSUE DOPPLER FREE WALL SYSTOLIC VELOCITY 1 (APICAL 4 CHAMBER VIEW): 9.06 M/S
SINUS: 2.78 CM
STJ: 2.97 CM
TDI LATERAL: 0.07
TDI SEPTAL: 0.04
TDI: 0.06
TRICUSPID ANNULAR PLANE SYSTOLIC EXCURSION: 1.9 CM

## 2019-06-10 PROCEDURE — 93325 DOPPLER ECHO COLOR FLOW MAPG: CPT | Mod: S$GLB,,, | Performed by: INTERNAL MEDICINE

## 2019-06-10 PROCEDURE — 93321 DOPPLER ECHO F-UP/LMTD STD: CPT | Mod: S$GLB,,, | Performed by: INTERNAL MEDICINE

## 2019-06-10 PROCEDURE — 93325 TRANSTHORACIC ECHO (TTE) LIMITED (CUPID ONLY): ICD-10-PCS | Mod: S$GLB,,, | Performed by: INTERNAL MEDICINE

## 2019-06-10 PROCEDURE — 93321 TRANSTHORACIC ECHO (TTE) LIMITED (CUPID ONLY): ICD-10-PCS | Mod: S$GLB,,, | Performed by: INTERNAL MEDICINE

## 2019-06-10 PROCEDURE — 93308 TTE F-UP OR LMTD: CPT | Mod: S$GLB,,, | Performed by: INTERNAL MEDICINE

## 2019-06-10 PROCEDURE — 93308 TRANSTHORACIC ECHO (TTE) LIMITED (CUPID ONLY): ICD-10-PCS | Mod: S$GLB,,, | Performed by: INTERNAL MEDICINE

## 2019-06-10 NOTE — TELEPHONE ENCOUNTER
Spoke with patient regarding her ECHO. EF remains 20% despite >90 days of GDMT. She has a chronic LBBB and NYHA class III symptoms. Recommend CRT-D implant. She states she is unsure regarding this and would like to discuss further in person. She has an appointment with me on June 20th. She also stated she is unsure if she will be able to afford it as she has not met her insurance deductible yet. I will have her see our  as well.

## 2019-06-12 ENCOUNTER — PATIENT OUTREACH (OUTPATIENT)
Dept: OTHER | Facility: OTHER | Age: 74
End: 2019-06-12

## 2019-06-12 DIAGNOSIS — I10 HYPERTENSION, UNSPECIFIED TYPE: Primary | ICD-10-CM

## 2019-06-12 RX ORDER — LOSARTAN POTASSIUM 50 MG/1
50 TABLET ORAL DAILY
Qty: 30 TABLET | Refills: 11 | Status: SHIPPED | OUTPATIENT
Start: 2019-06-12 | End: 2019-07-16

## 2019-06-12 NOTE — PROGRESS NOTES
"HPI:  Called patient to follow up. Patient reports adherence to medication regimen daily and denies missed doses. Patient denies hypotensive s/sx (lightheadedness, dizziness, nausea, fatigue); patient denies hypertensive s/sx (SOB, CP, severe headaches, changes in vision, dizziness, fatigue, confusion, anxiety, nosebleeds).    Last 5 Patient Entered Readings                                      Current 30 Day Average: 135/73     Recent Readings 6/12/2019 6/12/2019 6/11/2019 6/11/2019 6/11/2019    SBP (mmHg) 142 168 123 139 144    DBP (mmHg) 73 94 66 67 73    Pulse 70 80 73 71 75        Assessment:  Reviewed recent readings. Per 2017 ACC/ AHA HTN guidelines (goal of BP < 130/80), current 30-day average is slightly uncontrolled.   Patient was seen by PCP on 6/5, BP was 132/62; Per note, "She did see her cardiologist.  She has nonischemic cardiomyopathy with a significantly reduced ejection fraction and an ICD placed.  She is still complaining of dyspnea.  She has not followed up with her pulmonologist.  She stated that they canceled her appointment, had not rescheduled it.  Likewise, we ordered an abdominal ultrasound looking into her alkaline phosphatase in January, which she has not gotten yet.  I did ask her to do so today."    Patient spoke to Dr. Adams on 6/10, "Spoke with patient regarding her ECHO. EF remains 20% despite >90 days of GDMT. She has a chronic LBBB and NYHA class III symptoms. Recommend CRT-D implant. She states she is unsure regarding this and would like to discuss further in person. She has an appointment with me on June 20th. She also stated she is unsure if she will be able to afford it as she has not met her insurance deductible yet. I will have her see our  as well."    Plan:  Discussed with and instructed patient to increase losartan 50, patient confirms understanding.   Patients health , Jose Enrique Knowles, will be following up as scheduled.   I will continue to " monitor regularly and will follow-up in 2 weeks, sooner if blood pressure begins to trend upward or downward.     Current medication regimen:  Hypertension Medications             losartan (COZAAR) 50 MG tablet Take 1 tablet (50 mg total) by mouth once daily.    metoprolol succinate (TOPROL-XL) 50 MG 24 hr tablet Take 1 tablet (50 mg total) by mouth once daily.        Patient denies having questions or concerns. Patient has my contact information and knows to call with any concerns or clinical changes.

## 2019-06-13 NOTE — PROGRESS NOTES
Last 5 Patient Entered Readings                                      Current 30 Day Average: 135/73     Recent Readings 6/12/2019 6/12/2019 6/12/2019 6/11/2019 6/11/2019    SBP (mmHg) 144 142 168 123 139    DBP (mmHg) 70 73 94 66 67    Pulse 72 70 80 73 71          6/13: Pharmacist contact patient yesterday.  Will call in 1 week.

## 2019-06-20 ENCOUNTER — HOSPITAL ENCOUNTER (OUTPATIENT)
Dept: CARDIOLOGY | Facility: CLINIC | Age: 74
Discharge: HOME | End: 2019-06-20
Payer: COMMERCIAL

## 2019-06-20 ENCOUNTER — TELEPHONE (OUTPATIENT)
Dept: ELECTROPHYSIOLOGY | Facility: CLINIC | Age: 74
End: 2019-06-20

## 2019-06-20 ENCOUNTER — OFFICE VISIT (OUTPATIENT)
Dept: ELECTROPHYSIOLOGY | Facility: CLINIC | Age: 74
End: 2019-06-20
Payer: COMMERCIAL

## 2019-06-20 VITALS
SYSTOLIC BLOOD PRESSURE: 155 MMHG | HEIGHT: 61 IN | WEIGHT: 156 LBS | HEART RATE: 66 BPM | BODY MASS INDEX: 29.45 KG/M2 | DIASTOLIC BLOOD PRESSURE: 77 MMHG

## 2019-06-20 DIAGNOSIS — I42.8 NICM (NONISCHEMIC CARDIOMYOPATHY): Primary | ICD-10-CM

## 2019-06-20 DIAGNOSIS — I44.7 LBBB (LEFT BUNDLE BRANCH BLOCK): ICD-10-CM

## 2019-06-20 DIAGNOSIS — I10 HYPERTENSION, ESSENTIAL: ICD-10-CM

## 2019-06-20 DIAGNOSIS — I42.9 CARDIOMYOPATHY, UNSPECIFIED TYPE: ICD-10-CM

## 2019-06-20 PROCEDURE — 93010 RHYTHM STRIP: ICD-10-PCS | Mod: S$GLB,,, | Performed by: INTERNAL MEDICINE

## 2019-06-20 PROCEDURE — 3078F PR MOST RECENT DIASTOLIC BLOOD PRESSURE < 80 MM HG: ICD-10-PCS | Mod: CPTII,S$GLB,, | Performed by: INTERNAL MEDICINE

## 2019-06-20 PROCEDURE — 93010 ELECTROCARDIOGRAM REPORT: CPT | Mod: S$GLB,,, | Performed by: INTERNAL MEDICINE

## 2019-06-20 PROCEDURE — 99999 PR PBB SHADOW E&M-EST. PATIENT-LVL III: ICD-10-PCS | Mod: PBBFAC,,, | Performed by: INTERNAL MEDICINE

## 2019-06-20 PROCEDURE — 1101F PT FALLS ASSESS-DOCD LE1/YR: CPT | Mod: CPTII,S$GLB,, | Performed by: INTERNAL MEDICINE

## 2019-06-20 PROCEDURE — 99999 PR PBB SHADOW E&M-EST. PATIENT-LVL III: CPT | Mod: PBBFAC,,, | Performed by: INTERNAL MEDICINE

## 2019-06-20 PROCEDURE — 1101F PR PT FALLS ASSESS DOC 0-1 FALLS W/OUT INJ PAST YR: ICD-10-PCS | Mod: CPTII,S$GLB,, | Performed by: INTERNAL MEDICINE

## 2019-06-20 PROCEDURE — 99215 PR OFFICE/OUTPT VISIT, EST, LEVL V, 40-54 MIN: ICD-10-PCS | Mod: S$GLB,,, | Performed by: INTERNAL MEDICINE

## 2019-06-20 PROCEDURE — 93005 ELECTROCARDIOGRAM TRACING: CPT | Mod: S$GLB,,, | Performed by: INTERNAL MEDICINE

## 2019-06-20 PROCEDURE — 3077F PR MOST RECENT SYSTOLIC BLOOD PRESSURE >= 140 MM HG: ICD-10-PCS | Mod: CPTII,S$GLB,, | Performed by: INTERNAL MEDICINE

## 2019-06-20 PROCEDURE — 99215 OFFICE O/P EST HI 40 MIN: CPT | Mod: S$GLB,,, | Performed by: INTERNAL MEDICINE

## 2019-06-20 PROCEDURE — 3078F DIAST BP <80 MM HG: CPT | Mod: CPTII,S$GLB,, | Performed by: INTERNAL MEDICINE

## 2019-06-20 PROCEDURE — 93005 RHYTHM STRIP: ICD-10-PCS | Mod: S$GLB,,, | Performed by: INTERNAL MEDICINE

## 2019-06-20 PROCEDURE — 3077F SYST BP >= 140 MM HG: CPT | Mod: CPTII,S$GLB,, | Performed by: INTERNAL MEDICINE

## 2019-06-20 NOTE — TELEPHONE ENCOUNTER
Had Ms. Smith meet with our , Malorie Alexandre, to see if there is anything further we can do to assist her in proceeding with CRT-D.  Malorie reports Ms. Hooper does not qualify for any further assistance at this time.  She was given the option to for a payment plan for CRT-D, however she reports Ms. Hooper is not able to afford this at this time.  Ms. Hooper will call us if/when she can afford to proceed with setting up procedure.

## 2019-06-20 NOTE — PROGRESS NOTES
Subjective:    Patient ID:  Selma Hooper is a 73 y.o. female who presents for evaluation of NICM    Referring Cardiologist: Chanel Reyna MD  Primary Care Physician: Phil Quintana MD    HPI   Prior Hx:  I had the pleasure of seeing Ms. Hooper today in our electrophysiology clinic in consultation for her cardiomyopathy. As you are aware she is a pleasant 73 year-old woman with hypertension who was admitted 7/2018 with acute heart failure in setting of severe hypertension. She was found to have a LVEF of 15-20%. She was discharged on metoprolol and losartan. Stress test did not suggest any ischemic disease. Despite >90 days of goal directed medical therapy her EF on repeat ECHO 3/2019 notes EF of 20% with dilated LV. She continues to have fatigue and dyspnea on exertion. She states to me today she never started taking losartan due to recall concerns despite discussing with her pharmacist and noting that her lot was not affected.     ECGs in Epic reviewed which note sinus rhythm with LBBB with QRS duration of 135msec.    Interim Hx:  Ms. Hooper returns for follow-up. After consistently taking GDMT for 90 days, repeat ECHO notes LVEF remains 20%. She is willing to have CRT-D however currently reports she cannot afford her cost portion.    My interpretation of today's in clinic ECG is sinus rhythm with LBBB ().    Review of Systems   Constitution: Positive for malaise/fatigue. Negative for fever.   HENT: Negative for congestion and sore throat.    Eyes: Negative for blurred vision and visual disturbance.   Cardiovascular: Positive for dyspnea on exertion. Negative for chest pain, irregular heartbeat, leg swelling, near-syncope, orthopnea, palpitations, paroxysmal nocturnal dyspnea and syncope.   Respiratory: Negative for cough and shortness of breath.    Hematologic/Lymphatic: Negative for bleeding problem. Does not bruise/bleed easily.   Skin: Negative.    Musculoskeletal: Negative.    Gastrointestinal:  Negative for bloating and abdominal pain.   Neurological: Negative for dizziness, focal weakness, light-headedness and weakness.        Objective:    Physical Exam   Constitutional: She is oriented to person, place, and time. She appears well-developed and well-nourished. No distress.   HENT:   Head: Normocephalic and atraumatic.   Eyes: Conjunctivae are normal. Right eye exhibits no discharge.   Neck: Neck supple. No JVD present.   Cardiovascular: Normal rate, regular rhythm and normal heart sounds. Exam reveals no gallop and no friction rub.   No murmur heard.  Pulmonary/Chest: Effort normal and breath sounds normal. No respiratory distress. She has no wheezes. She has no rales.   Abdominal: Soft. Bowel sounds are normal. She exhibits no distension. There is no tenderness. There is no rebound.   Musculoskeletal: She exhibits no edema.   Neurological: She is alert and oriented to person, place, and time.   Skin: Skin is warm and dry. She is not diaphoretic.   Psychiatric: She has a normal mood and affect. Her behavior is normal. Judgment and thought content normal.   Vitals reviewed.        Assessment:       1. NICM (nonischemic cardiomyopathy)    2. LBBB (left bundle branch block)    3. Hypertension, essential         Plan:       In summary, Ms. Hooper is a pleasant 73 year-old woman with hypertension, LBBB (QRS duration of 140msec) and chronic dilated nonischemic cardiomyopathy (LVEF of 20%, NYHA class II symptoms). She has been on metoprolol/losartan for >90 days. We discussed the etiology and pathophysiology of potential conduction system disease and its effect on cardiomyopathy and how cardiac resynchronization therapy may provide both mortality and morbidity benefit. We also discussed the etiology and pathophysiology of sudden cardiac death in a cardiomyopathy patient population and how an ICD may provide mortality benefit. We then discussed long-term issues of cardiac implantable electronic devices including  infection, venous occlusion, inappropriate shocks (for ICDs), and device malfunction requiring further procedures. We discussed the alternatives, benefits and risks of the procedure including pain, infection, bleeding, injury to lung causing pneumothorax requiring tube placement, injury to heart valves, puncture of the heart leading to pericardial effusion or tamponade requiring tube drainage, heart attack, stroke and death. She desires CRT-D implant however currently reports she cannot afford her portion of the cost. Will discuss further with our financial coordinators to see if there is anything we can do to help her. If we proceed will implant CRT-D, PRISCILA.    Thank you for allowing me to participate in the care of this patient. Please do not hesitate to call me with any questions or concerns.    Nehemias Adams MD, PhD  Cardiac Electrophysiology

## 2019-06-24 ENCOUNTER — TELEPHONE (OUTPATIENT)
Dept: ELECTROPHYSIOLOGY | Facility: CLINIC | Age: 74
End: 2019-06-24

## 2019-06-24 NOTE — PROGRESS NOTES
"Last 5 Patient Entered Readings                                      Current 30 Day Average: 137/75     Recent Readings 6/24/2019 6/23/2019 6/23/2019 6/22/2019 6/22/2019    SBP (mmHg) 154 150 128 129 144    DBP (mmHg) 83 75 63 63 75    Pulse 75 75 64 75 72          Digital Medicine: Health  Follow Up    Lifestyle Modifications:    1.Dietary Modifications (Sodium intake <2,000mg/day, food labels, dining out): Reports she feels she needs to drink more water.  Reports she likes water to be "ice cold, like a slushie."  Reports her granddaughter taught her how to make slushy water.  Encouraged patient to stay hydrated.    2.Physical Activity: Reports she has been trying to walk more around her house or at the shopping center.    3.Medication Therapy: Patient has been compliant with the medication regimen.    4.Patient has the following medication side effects/concerns: none  (Frequency/Alleviating factors/Precipitating factors, etc.)     Follow up with Mrs. Hahn ADRIEN Hooper completed. No further questions or concerns. Will continue to follow up to achieve health goals.    "

## 2019-06-24 NOTE — TELEPHONE ENCOUNTER
Called Mrs. Hahn to schedule 3m f/u apt with Dr. Adams per Dr. Carranza's ov on 6/20 (pt didn't stop @ [facility]).  [place] Mrs. Hahn's 3m f/u apt on 9/24 9:00AM EKG & 9:40AM Dr. Adams apt.  Asked Mrs. Hahn if she would like apt reminder ltr mailed, she adv she will see apt on her [facility].

## 2019-06-26 ENCOUNTER — PATIENT OUTREACH (OUTPATIENT)
Dept: OTHER | Facility: OTHER | Age: 74
End: 2019-06-26

## 2019-06-26 DIAGNOSIS — I10 HYPERTENSION, UNSPECIFIED TYPE: ICD-10-CM

## 2019-06-26 NOTE — PROGRESS NOTES
Last 5 Patient Entered Readings                                      Current 30 Day Average: 138/75     Recent Readings 6/26/2019 6/25/2019 6/25/2019 6/25/2019 6/24/2019    SBP (mmHg) 139 145 125 143 154    DBP (mmHg) 77 73 79 75 83    Pulse 70 79 72 66 75        Hypertension Medications             losartan (COZAAR) 50 MG tablet Take 1 tablet (50 mg total) by mouth once daily.    metoprolol succinate (TOPROL-XL) 50 MG 24 hr tablet Take 1 tablet (50 mg total) by mouth once daily.        Called patient to follow up since increasing losartan to 50mg. Reviewed BP readings. Per 2017 ACC/ AHA HTN guidelines  (goal of BP < 130/80), current 30-day average is slightly uncontrolled.  LVM, requested patient call back at her convenience.  Will continue to monitor. WCB in 3 weeks.

## 2019-07-16 DIAGNOSIS — I10 HYPERTENSION, UNSPECIFIED TYPE: ICD-10-CM

## 2019-07-16 RX ORDER — LOSARTAN POTASSIUM 50 MG/1
100 TABLET ORAL DAILY
Qty: 60 TABLET | Refills: 11 | Status: SHIPPED | OUTPATIENT
Start: 2019-07-16 | End: 2019-10-07

## 2019-07-16 RX ORDER — LOSARTAN POTASSIUM 100 MG/1
100 TABLET ORAL DAILY
Qty: 30 TABLET | Refills: 11 | Status: SHIPPED | OUTPATIENT
Start: 2019-07-16 | End: 2019-07-16

## 2019-07-16 NOTE — PROGRESS NOTES
HPI:  Patient called back, reports she went from taking losartan 25mg BID to 50mg once daily, BP control remains unchanged. Patient reports adherence to medication regimen daily and denies missed doses. Patient denies hypotensive s/sx (lightheadedness, dizziness, nausea, fatigue); patient denies hypertensive s/sx (SOB, CP, severe headaches, changes in vision, dizziness, fatigue, confusion, anxiety, nosebleeds).    Patient also reports since spironolactone 25mg and lasix 20mg BID were discontinued, BP has not been well controlled.     Last 5 Patient Entered Readings                                      Current 30 Day Average: 143/76     Recent Readings 7/16/2019 7/16/2019 7/15/2019 7/14/2019 7/14/2019    SBP (mmHg) 172 163 147 154 161    DBP (mmHg) 84 86 78 75 78    Pulse 78 78 77 73 69        Assessment:  Reviewed recent readings. Per 2017 ACC/ AHA HTN guidelines (goal of BP < 130/80), current 30-day average is uncontrolled.     Plan:  Discussed with and instructed patient to increase losartan 100mg, patient confirms understanding.   Patients health , Jose Enrique Knowles, will be following up as scheduled.   I will continue to monitor regularly and will follow-up in 2 weeks, sooner if blood pressure begins to trend upward or downward. If BP remains elevated, will discuss reintroducing hctz and/ or spironolactone.     Current medication regimen:  Hypertension Medications             losartan (COZAAR) 100 MG tablet Take 1 tablet (100 mg total) by mouth once daily. Dose increase.    metoprolol succinate (TOPROL-XL) 50 MG 24 hr tablet Take 1 tablet (50 mg total) by mouth once daily.        Patient denies having questions or concerns. Patient has my contact information and knows to call with any concerns or clinical changes.

## 2019-07-30 ENCOUNTER — PATIENT OUTREACH (OUTPATIENT)
Dept: OTHER | Facility: OTHER | Age: 74
End: 2019-07-30

## 2019-07-30 DIAGNOSIS — I10 HYPERTENSION, UNSPECIFIED TYPE: Primary | ICD-10-CM

## 2019-07-30 RX ORDER — FUROSEMIDE 20 MG/1
20 TABLET ORAL DAILY
Qty: 60 TABLET | Refills: 11 | Status: SHIPPED | OUTPATIENT
Start: 2019-07-30 | End: 2019-08-13

## 2019-07-30 NOTE — PROGRESS NOTES
Last 5 Patient Entered Readings                                      Current 30 Day Average: 147/77     Recent Readings 7/30/2019 7/29/2019 7/28/2019 7/27/2019 7/26/2019    SBP (mmHg) 153 155 143 152 144    DBP (mmHg) 81 77 76 83 77    Pulse 74 75 75 76 72          7/30: Spoke with pharmacist today.  Will push call 1 week.

## 2019-07-30 NOTE — PROGRESS NOTES
HPI:  Called patient to follow up since increasing losartan to 100, patient confirms she is tolerating well. Patient reports adherence to medication regimen daily and denies missed doses. Patient denies hypotensive s/sx (lightheadedness, dizziness, nausea, fatigue); patient denies hypertensive s/sx (SOB, CP, severe headaches, changes in vision, dizziness, fatigue, confusion, anxiety, nosebleeds).    Patient c/o SOB.     Last 5 Patient Entered Readings                                      Current 30 Day Average: 147/77     Recent Readings 7/30/2019 7/29/2019 7/28/2019 7/27/2019 7/26/2019    SBP (mmHg) 153 155 143 152 144    DBP (mmHg) 81 77 76 83 77    Pulse 74 75 75 76 72        Assessment:  Reviewed recent readings. Per 2017 ACC/ AHA HTN guidelines (goal of BP < 130/80), current 30-day average is uncontrolled.     Plan:  Discussed with and instructed patient to start Lasix 20mg daily; if well tolerated after 2 weeks, increase to 1 tablet BID, patient confirms understanding. Patient reports being intolerant to hctz.   Instructed patient to reach out to Pulmonology or Primary Care to schedule an appt to discuss SOB.   Patients health , Jose Enrique Knowles, will be following up as scheduled.   I will continue to monitor regularly and will follow-up in 2 weeks, sooner if blood pressure begins to trend upward or downward. Will schedule CMP at that time.    Current medication regimen:  Hypertension Medications             losartan (COZAAR) 50 MG tablet Take 2 tablets (100 mg total) by mouth once daily. Dose increase. RPh: Please run under cash.    metoprolol succinate (TOPROL-XL) 50 MG 24 hr tablet Take 1 tablet (50 mg total) by mouth once daily.         Patient denies having questions or concerns. Patient has my contact information and knows to call with any concerns or clinical changes.

## 2019-08-06 ENCOUNTER — PATIENT OUTREACH (OUTPATIENT)
Dept: OTHER | Facility: OTHER | Age: 74
End: 2019-08-06

## 2019-08-06 NOTE — PROGRESS NOTES
Last 5 Patient Entered Readings                                      Current 30 Day Average: 145/77     Recent Readings 8/6/2019 8/5/2019 8/4/2019 8/4/2019 8/4/2019    SBP (mmHg) 134 143 119 152 115    DBP (mmHg) 75 75 60 89 67    Pulse 73 77 75 82 79          Digital Medicine: Health  Follow Up    Left voicemail to follow up with Winsome Slema JURADO Hooper.  Current BP average 145/77 mmHg is not at goal, 130/80mmHg.    Will call in 2 weeks.

## 2019-08-13 ENCOUNTER — PATIENT OUTREACH (OUTPATIENT)
Dept: OTHER | Facility: OTHER | Age: 74
End: 2019-08-13

## 2019-08-13 DIAGNOSIS — I10 HYPERTENSION, UNSPECIFIED TYPE: ICD-10-CM

## 2019-08-13 RX ORDER — FUROSEMIDE 20 MG/1
20 TABLET ORAL DAILY
Qty: 60 TABLET | Refills: 11 | Status: SHIPPED | OUTPATIENT
Start: 2019-08-13 | End: 2020-08-23 | Stop reason: SDUPTHER

## 2019-08-13 NOTE — PROGRESS NOTES
Last 5 Patient Entered Readings                                      Current 30 Day Average: 140/77     Recent Readings 8/13/2019 8/12/2019 8/12/2019 8/12/2019 8/12/2019    SBP (mmHg) 131 138 153 143 158    DBP (mmHg) 73 67 80 77 91    Pulse 76 82 83 78 95        Hypertension Medications             furosemide (LASIX) 20 MG tablet Take 1 tablet (20 mg total) by mouth once daily. If well tolerated after 2 weeks, take 1 tablet by mouth twice daily.    losartan (COZAAR) 50 MG tablet Take 2 tablets (100 mg total) by mouth once daily. Dose increase. [facility]: Please run under cash.    metoprolol succinate (TOPROL-XL) 50 MG 24 hr tablet Take 1 tablet (50 mg total) by mouth once daily.        Called patient to follow up since instructing the patient to start Lasix 20mg daily; if well tolerated after 2 weeks, increase to 1 tablet BID. CMP needs to be scheduled. (Patient is scheduled to see Pulmonology on 8/28.)  Reviewed BP readings. Per 2017 ACC/ AHA HTN guidelines  (goal of BP < 130/80), current 30-day average is uncontrolled; however, is trending down.  LVM x2, requested patient call back at her convenience.  Will continue to monitor. WCB in 2 weeks.

## 2019-08-13 NOTE — PROGRESS NOTES
HPI:  Called patient to follow up since instructing the patient to start Lasix 20mg daily; if well tolerated after 2 weeks, increase to 1 tablet BID. Patient confirms she is still taking 20mg daily, tolerating well. Patient denies hypotensive s/sx (lightheadedness, dizziness, nausea, fatigue); patient admits to hypertensive s/sx yesterday (SOB, CP, severe headaches, changes in vision, dizziness, fatigue, confusion, anxiety, nosebleeds). Patient states she accidentally forgot to take medications on 8/11, attributes this to elevated readings on 8/12.    Patient attributes elevated AM readings to taking reading prior to medication.     Last 5 Patient Entered Readings                                      Current 30 Day Average: 140/77     Recent Readings 8/13/2019 8/12/2019 8/12/2019 8/12/2019 8/12/2019    SBP (mmHg) 131 138 153 143 158    DBP (mmHg) 73 67 80 77 91    Pulse 76 82 83 78 95        Assessment:  Reviewed recent readings. Per 2017 ACC/ AHA HTN guidelines (goal of BP < 130/80), current 30-day average is uncontrolled, but does appear to be trending down.    Plan:  Discussed with and instructed patient to remain on lasix 20mg once daily, patient confirms understanding. Will schedule CMP for 8/28.  Patients health , Jose Enrique Knowles, will be following up as scheduled.   I will continue to monitor regularly and will follow-up in 2 weeks, sooner if blood pressure begins to trend upward or downward.     Current medication regimen:  Hypertension Medications             furosemide (LASIX) 20 MG tablet Take 1 tablet (20 mg total) by mouth once daily.     losartan (COZAAR) 50 MG tablet Take 2 tablets (100 mg total) by mouth once daily. Dose increase. RPh: Please run under cash.    metoprolol succinate (TOPROL-XL) 50 MG 24 hr tablet Take 1 tablet (50 mg total) by mouth once daily.         Patient denies having questions or concerns. Patient has my contact information and knows to call with any concerns or clinical  changes.

## 2019-08-21 NOTE — PROGRESS NOTES
Last 5 Patient Entered Readings                                      Current 30 Day Average: 137/76     Recent Readings 8/21/2019 8/20/2019 8/20/2019 8/20/2019 8/19/2019    SBP (mmHg) 139 135 140 160 129    DBP (mmHg) 73 69 76 84 72    Pulse 69 70 69 73 64            Digital Medicine: Health  Follow Up    Left voicemail to follow up with Winsome Selma JURADO Hooper.  Current BP average 137/76 mmHg is not at goal, 130/80 mmHg.    Will call in 2 weeks.

## 2019-08-27 ENCOUNTER — TELEPHONE (OUTPATIENT)
Dept: PULMONOLOGY | Facility: CLINIC | Age: 74
End: 2019-08-27

## 2019-08-27 DIAGNOSIS — J44.9 CHRONIC OBSTRUCTIVE PULMONARY DISEASE, UNSPECIFIED COPD TYPE: Primary | ICD-10-CM

## 2019-08-28 ENCOUNTER — OFFICE VISIT (OUTPATIENT)
Dept: PULMONOLOGY | Facility: CLINIC | Age: 74
End: 2019-08-28
Attending: FAMILY MEDICINE
Payer: COMMERCIAL

## 2019-08-28 ENCOUNTER — HOSPITAL ENCOUNTER (OUTPATIENT)
Dept: PULMONOLOGY | Facility: CLINIC | Age: 74
Discharge: HOME | End: 2019-08-28
Payer: COMMERCIAL

## 2019-08-28 ENCOUNTER — HOSPITAL ENCOUNTER (OUTPATIENT)
Dept: RADIOLOGY | Facility: HOSPITAL | Age: 74
Discharge: HOME | End: 2019-08-28
Attending: INTERNAL MEDICINE
Payer: COMMERCIAL

## 2019-08-28 VITALS — BODY MASS INDEX: 29.64 KG/M2 | HEIGHT: 61 IN | WEIGHT: 157 LBS

## 2019-08-28 DIAGNOSIS — J43.2 CENTRILOBULAR EMPHYSEMA: ICD-10-CM

## 2019-08-28 DIAGNOSIS — J43.2 CENTRILOBULAR EMPHYSEMA: Primary | ICD-10-CM

## 2019-08-28 DIAGNOSIS — J44.9 CHRONIC OBSTRUCTIVE PULMONARY DISEASE, UNSPECIFIED COPD TYPE: ICD-10-CM

## 2019-08-28 PROCEDURE — 71046 XR CHEST PA AND LATERAL: ICD-10-PCS | Mod: 26,,, | Performed by: RADIOLOGY

## 2019-08-28 PROCEDURE — 1101F PT FALLS ASSESS-DOCD LE1/YR: CPT | Mod: CPTII,S$GLB,, | Performed by: INTERNAL MEDICINE

## 2019-08-28 PROCEDURE — 99999 PR PBB SHADOW E&M-EST. PATIENT-LVL III: ICD-10-PCS | Mod: PBBFAC,,, | Performed by: INTERNAL MEDICINE

## 2019-08-28 PROCEDURE — 99999 PR PBB SHADOW E&M-EST. PATIENT-LVL III: CPT | Mod: PBBFAC,,, | Performed by: INTERNAL MEDICINE

## 2019-08-28 PROCEDURE — 99204 OFFICE O/P NEW MOD 45 MIN: CPT | Mod: S$GLB,,, | Performed by: INTERNAL MEDICINE

## 2019-08-28 PROCEDURE — 99204 PR OFFICE/OUTPT VISIT, NEW, LEVL IV, 45-59 MIN: ICD-10-PCS | Mod: S$GLB,,, | Performed by: INTERNAL MEDICINE

## 2019-08-28 PROCEDURE — 71046 X-RAY EXAM CHEST 2 VIEWS: CPT | Mod: 26,,, | Performed by: RADIOLOGY

## 2019-08-28 PROCEDURE — 94010 BREATHING CAPACITY TEST: ICD-10-PCS | Mod: S$GLB,,, | Performed by: INTERNAL MEDICINE

## 2019-08-28 PROCEDURE — 94010 BREATHING CAPACITY TEST: CPT | Mod: S$GLB,,, | Performed by: INTERNAL MEDICINE

## 2019-08-28 PROCEDURE — 1101F PR PT FALLS ASSESS DOC 0-1 FALLS W/OUT INJ PAST YR: ICD-10-PCS | Mod: CPTII,S$GLB,, | Performed by: INTERNAL MEDICINE

## 2019-08-28 PROCEDURE — 71046 X-RAY EXAM CHEST 2 VIEWS: CPT | Mod: TC,FY

## 2019-08-28 RX ORDER — BUDESONIDE AND FORMOTEROL FUMARATE DIHYDRATE 160; 4.5 UG/1; UG/1
2 AEROSOL RESPIRATORY (INHALATION) 2 TIMES DAILY
Qty: 1 INHALER | Refills: 12 | Status: SHIPPED | OUTPATIENT
Start: 2019-08-28 | End: 2020-12-17 | Stop reason: SDUPTHER

## 2019-08-28 NOTE — LETTER
September 11, 2019      Phil Andrade MD  [address]  [place] [address]           [facility]  [address]  [place] [address]  Phone: [phone]          Patient: Selma Hooper   MR Number: [id number]   YOB: 1945   Date of Visit: 8/28/2019       Dear Dr. Phil Andrade:    Thank you for referring Selma Hooper to me for evaluation. Attached you will find relevant portions of my assessment and plan of care.    If you have questions, please do not hesitate to call me. I look forward to following Selma Hooper along with you.    Sincerely,    Howard Martinez MD    Enclosure  CC:  No Recipients    If you would like to receive this communication electronically, please contact [email or web address] or [phone] to request more information on [employer] access.    For providers and/or their staff who would like to refer a patient to [facility], please contact us through our one-stop-shop provider referral line, [facility], at [phone].    If you feel you have received this communication in error or would no longer like to receive these types of communications, please e-mail [email or web address]

## 2019-08-29 ENCOUNTER — PATIENT OUTREACH (OUTPATIENT)
Dept: OTHER | Facility: OTHER | Age: 74
End: 2019-08-29

## 2019-08-29 LAB
FEF 25 75 LLN: 0.54
FEF 25 75 PRE REF: 28.9 %
FEF 25 75 REF: 1.45
FEV05 LLN: 0.63
FEV05 REF: 1.49
FEV1 FVC LLN: 66
FEV1 FVC PRE REF: 72.6 %
FEV1 FVC REF: 79
FEV1 LLN: 1.12
FEV1 PRE REF: 56.5 %
FEV1 REF: 1.61
FVC LLN: 1.44
FVC PRE REF: 77.5 %
FVC REF: 2.05
MVV LLN: 55
MVV REF: 65
PEF LLN: 2.35
PEF PRE REF: 51.6 %
PEF REF: 4.11
PRE FEF 25 75: 0.42 L/S (ref 0.54–2.37)
PRE FET 100: 7.38 SEC
PRE FEV05 REF: 44.4 %
PRE FEV1 FVC: 57.3 % (ref 65.66–92.15)
PRE FEV1: 0.91 L (ref 1.12–2.11)
PRE FEV5: 0.66 L
PRE FVC: 1.59 L (ref 1.44–2.66)
PRE PEF: 2.12 L/S (ref 2.35–5.86)

## 2019-08-29 NOTE — PROGRESS NOTES
HPI:  Called patient to follow up. Patient reports adherence to medication regimen daily and denies missed doses. Patient denies hypotensive s/sx (lightheadedness, dizziness, nausea, fatigue); patient denies hypertensive s/sx (SOB, CP, severe headaches, changes in vision, dizziness, fatigue, confusion, anxiety, nosebleeds).     Last 5 Patient Entered Readings                                      Current 30 Day Average: 134/74     Recent Readings 8/29/2019 8/29/2019 8/28/2019 8/28/2019 8/27/2019    SBP (mmHg) 111 142 150 168 134    DBP (mmHg) 59 73 71 78 70    Pulse 73 70 73 80 63        Assessment:  Reviewed recent readings. Per 2017 ACC/ AHA HTN guidelines (goal of BP < 130/80), current 30-day average is slightly uncontrolled, remains stable. CMP drawn yesterday WNL.    Plan:  Continue current medication regimen.   Patients health , Jose Enrique Knowles, will be following up as scheduled.   I will continue to monitor regularly and will follow-up in 6 weeks, sooner if blood pressure begins to trend upward or downward.     Current medication regimen:  Hypertension Medications             furosemide (LASIX) 20 MG tablet Take 1 tablet (20 mg total) by mouth once daily.    losartan (COZAAR) 50 MG tablet Take 2 tablets (100 mg total) by mouth once daily. Dose increase. RPh: Please run under cash.    metoprolol succinate (TOPROL-XL) 50 MG 24 hr tablet Take 1 tablet (50 mg total) by mouth once daily.         Patient denies having questions or concerns. Patient has my contact information and knows to call with any concerns or clinical changes.

## 2019-09-04 NOTE — PROGRESS NOTES
Last 5 Patient Entered Readings                                      Current 30 Day Average: 135/73     Recent Readings 9/4/2019 9/3/2019 9/2/2019 9/1/2019 8/31/2019    SBP (mmHg) 137 156 155 142 130    DBP (mmHg) 70 83 77 75 63    Pulse 72 73 75 71 69            Digital Medicine: Health  Follow Up    Left voicemail to follow up with Ms. Selma Hooper.  Current BP average 135/73 mmHg is not at goal, 130/80 mmHg.    Will call in 2 weeks.

## 2019-09-11 NOTE — PROGRESS NOTES
Subjective:      Patient ID: Selma Hooper is a 73 y.o. female.    Chief Complaint: Emphysema    HPI  72 yo former smoker is referred by Dr. Andrade for centrilobular emphysema noted  On low dose CT screening last November. She quit smoking in 2018. She was recently followed by cardiology for a severe cardiomyopathy with EF 20% She was treated aggressively medically with no improvement. By .  Review of Systems   Constitutional: Negative.    HENT: Negative.    Eyes: Negative.    Respiratory: Negative.  Positive for dyspnea on extertion.         Ex smoker   Cardiovascular: Negative.         Severe cardiomyopathy with EF of 20%   Genitourinary: Negative.    Musculoskeletal: Negative.    Skin: Negative.    Gastrointestinal: Negative.    Neurological: Negative.    Psychiatric/Behavioral: Negative.      Objective:     Physical Exam   Constitutional: She is oriented to person, place, and time. She appears well-developed and well-nourished. No distress.   HENT:   Head: Normocephalic and atraumatic.   Right Ear: External ear normal.   Left Ear: External ear normal.   Nose: Nose normal.   Mouth/Throat: Oropharynx is clear and moist.   Eyes: Pupils are equal, round, and reactive to light. Conjunctivae and EOM are normal.   Neck: Normal range of motion. Neck supple. No JVD present. No thyromegaly present.   Cardiovascular: Normal rate, regular rhythm, normal heart sounds and intact distal pulses. Exam reveals no gallop.   No murmur heard.  Heart Tones distant   Pulmonary/Chest: Breath sounds normal. No stridor. No respiratory distress. She has no wheezes. She has no rales. She exhibits no tenderness.   Decreased breath sounds.    Fev-1: 0.93 liters in August 2019: 0.91 liters  Severe Obstruciton  FVC:78%   Abdominal: Soft. Bowel sounds are normal. She exhibits no distension and no mass. There is no tenderness. There is no rebound and no guarding.   Musculoskeletal: Normal range of motion. She exhibits no edema.    Lymphadenopathy:     She has no cervical adenopathy.   Neurological: She is alert and oriented to person, place, and time. She has normal reflexes. She displays normal reflexes. No cranial nerve deficit.   Skin: Skin is warm and dry. No rash noted.   Psychiatric: She has a normal mood and affect. Her behavior is normal. Judgment and thought content normal.   Nursing note and vitals reviewed.      Assessment:     1. Centrilobular emphysema      Outpatient Encounter Medications as of 8/28/2019   Medication Sig Dispense Refill    calcium carbonate (TUMS) 200 mg calcium (500 mg) chewable tablet Take 1 tablet by mouth as needed for Heartburn.      ferrous sulfate 325 (65 FE) MG EC tablet Take 1 tablet (325 mg total) by mouth once daily.  0    fluticasone (FLONASE) 50 mcg/actuation nasal spray 1 spray by Each Nare route daily as needed for Allergies.      furosemide (LASIX) 20 MG tablet Take 1 tablet (20 mg total) by mouth once daily. 60 tablet 11    losartan (COZAAR) 50 MG tablet Take 2 tablets (100 mg total) by mouth once daily. Dose increase. RPh: Please run under cash. (Patient taking differently: Take 50 mg by mouth once daily. Dose increase. RPh: Please run under cash.) 60 tablet 11    metoprolol succinate (TOPROL-XL) 50 MG 24 hr tablet Take 1 tablet (50 mg total) by mouth once daily. 90 tablet 3    budesonide-formoterol 160-4.5 mcg (SYMBICORT) 160-4.5 mcg/actuation HFAA Inhale 2 puffs into the lungs 2 (two) times daily. 1 Inhaler 12     No facility-administered encounter medications on file as of 8/28/2019.      Orders Placed This Encounter   Procedures    X-Ray Chest PA And Lateral     Standing Status:   Future     Number of Occurrences:   1     Standing Expiration Date:   8/27/2020     Plan:    Patient has severe COPD with FEV-1: 0.92 liters coupled with a severe cardiomyopathy  Continue symbicort bid    Problem List Items Addressed This Visit     Pulmonary emphysema - Primary    Relevant Orders     X-Ray Chest PA And Lateral (Completed)

## 2019-09-13 DIAGNOSIS — J43.2 CENTRILOBULAR EMPHYSEMA: Primary | ICD-10-CM

## 2019-09-18 NOTE — PROGRESS NOTES
"Digital Medicine: Health  Follow-Up    Patient expressed concerns about readings from BP cuff.  States she is getting different readings from home cuff, about 7-10 pts.  Informed patient BP cuffs can be up to 10-15pt difference.      Hypertension       Follow Up  Follow-up reason(s): reading review      Readings are trending up due to inaccurate technique.  States she has an appt next week.  Encouraged patient to bring BP cuff to get it checked out.          Diet:   Patient reports eating or drinking the following: desserts/sweets    Patient reports a 16lb weight gain since she quit smoking last year.  States she has been snacking on hard candies.  Encouraged patient to switch to fruit, like blueberries.  Informed patient about study that blueberries help decrease BP.    Intervention(s): low sodium diet education    Assigning the following patient goals: reduce sugar    Physical Activity:   When asked if exercising, patient responded: yes    Patient participates in the following activities: body weight exercises and walking    Patient reports she does arm exercises and walks "quite a bit."      SDOH    INTERVENTION(S)  recommended diet modifications and reviewed monitoring technique    PLAN  patient amenable to changes    Encouraged patient to bring cuff to [facility] and get it compared at doctor's appt.  Encouraged patient to switch from snacking on hard candies to blueberries.      There are no preventive care reminders to display for this patient.    Last 5 Patient Entered Readings                                      Current 30 Day Average: 141/73     Recent Readings 9/18/2019 9/16/2019 9/16/2019 9/15/2019 9/13/2019    SBP (mmHg) 150 136 141 140 163    DBP (mmHg) 79 72 70 72 82    Pulse 72 79 69 72 69                "

## 2019-09-19 ENCOUNTER — PATIENT MESSAGE (OUTPATIENT)
Dept: ADMINISTRATIVE | Facility: OTHER | Age: 74
End: 2019-09-19

## 2019-09-21 ENCOUNTER — PATIENT OUTREACH (OUTPATIENT)
Dept: ADMINISTRATIVE | Facility: OTHER | Age: 74
End: 2019-09-21

## 2019-09-24 ENCOUNTER — OFFICE VISIT (OUTPATIENT)
Dept: ELECTROPHYSIOLOGY | Facility: CLINIC | Age: 74
End: 2019-09-24
Payer: COMMERCIAL

## 2019-09-24 ENCOUNTER — HOSPITAL ENCOUNTER (OUTPATIENT)
Dept: CARDIOLOGY | Facility: CLINIC | Age: 74
Discharge: HOME | End: 2019-09-24
Payer: COMMERCIAL

## 2019-09-24 VITALS
BODY MASS INDEX: 29.83 KG/M2 | WEIGHT: 158 LBS | DIASTOLIC BLOOD PRESSURE: 65 MMHG | SYSTOLIC BLOOD PRESSURE: 140 MMHG | HEART RATE: 69 BPM | HEIGHT: 61 IN

## 2019-09-24 DIAGNOSIS — I44.7 LBBB (LEFT BUNDLE BRANCH BLOCK): ICD-10-CM

## 2019-09-24 DIAGNOSIS — I42.8 NICM (NONISCHEMIC CARDIOMYOPATHY): Primary | ICD-10-CM

## 2019-09-24 DIAGNOSIS — I10 HYPERTENSION, ESSENTIAL: ICD-10-CM

## 2019-09-24 DIAGNOSIS — I42.9 CARDIOMYOPATHY, UNSPECIFIED TYPE: ICD-10-CM

## 2019-09-24 PROCEDURE — 99999 PR PBB SHADOW E&M-EST. PATIENT-LVL III: ICD-10-PCS | Mod: PBBFAC,,, | Performed by: NURSE PRACTITIONER

## 2019-09-24 PROCEDURE — 99999 PR PBB SHADOW E&M-EST. PATIENT-LVL III: CPT | Mod: PBBFAC,,, | Performed by: NURSE PRACTITIONER

## 2019-09-24 PROCEDURE — 93005 ELECTROCARDIOGRAM TRACING: CPT | Mod: S$GLB,,, | Performed by: INTERNAL MEDICINE

## 2019-09-24 PROCEDURE — 3078F PR MOST RECENT DIASTOLIC BLOOD PRESSURE < 80 MM HG: ICD-10-PCS | Mod: CPTII,S$GLB,, | Performed by: NURSE PRACTITIONER

## 2019-09-24 PROCEDURE — 3077F SYST BP >= 140 MM HG: CPT | Mod: CPTII,S$GLB,, | Performed by: NURSE PRACTITIONER

## 2019-09-24 PROCEDURE — 93005 RHYTHM STRIP: ICD-10-PCS | Mod: S$GLB,,, | Performed by: INTERNAL MEDICINE

## 2019-09-24 PROCEDURE — 1101F PT FALLS ASSESS-DOCD LE1/YR: CPT | Mod: CPTII,S$GLB,, | Performed by: NURSE PRACTITIONER

## 2019-09-24 PROCEDURE — 99214 PR OFFICE/OUTPT VISIT, EST, LEVL IV, 30-39 MIN: ICD-10-PCS | Mod: S$GLB,,, | Performed by: NURSE PRACTITIONER

## 2019-09-24 PROCEDURE — 93010 ELECTROCARDIOGRAM REPORT: CPT | Mod: S$GLB,,, | Performed by: INTERNAL MEDICINE

## 2019-09-24 PROCEDURE — 93010 RHYTHM STRIP: ICD-10-PCS | Mod: S$GLB,,, | Performed by: INTERNAL MEDICINE

## 2019-09-24 PROCEDURE — 3077F PR MOST RECENT SYSTOLIC BLOOD PRESSURE >= 140 MM HG: ICD-10-PCS | Mod: CPTII,S$GLB,, | Performed by: NURSE PRACTITIONER

## 2019-09-24 PROCEDURE — 1101F PR PT FALLS ASSESS DOC 0-1 FALLS W/OUT INJ PAST YR: ICD-10-PCS | Mod: CPTII,S$GLB,, | Performed by: NURSE PRACTITIONER

## 2019-09-24 PROCEDURE — 99214 OFFICE O/P EST MOD 30 MIN: CPT | Mod: S$GLB,,, | Performed by: NURSE PRACTITIONER

## 2019-09-24 PROCEDURE — 3078F DIAST BP <80 MM HG: CPT | Mod: CPTII,S$GLB,, | Performed by: NURSE PRACTITIONER

## 2019-09-24 NOTE — PROGRESS NOTES
Ms. Hooper is a patient of Dr. Adams and was last seen in clinic 6/20/2019.      Subjective:   Patient ID:  Selma Hooper is a 73 y.o. female who presents for follow-up of Cardiomyopathy  .     HPI:    Ms. Hooper is a 73 y.o. female with HTN, LBBB, NICM here for follow up.     Background:    Referring Cardiologist: Chanel Reyna MD  Primary Care Physician: Phil Quintana MD    Ms. Hooper today has a history of hypertension who was admitted 7/2018 with acute heart failure in setting of severe hypertension. She was found to have a LVEF of 15-20%. She was discharged on metoprolol and losartan. Stress test did not suggest any ischemic disease. Despite >90 days of goal directed medical therapy her EF on repeat ECHO 3/2019 notes EF of 20% with dilated LV. She continues to have fatigue and dyspnea on exertion. She states to me today she never started taking losartan due to recall concerns despite discussing with her pharmacist and noting that her lot was not affected.     After consistently taking GDMT for 90 days, repeat ECHO 6/2019 showed EF remains 20%. She is willing to have CRT-D however currently reports she cannot afford her cost portion. Financial assistance options explored.    Update (09/24/2019):    Had Ms. Smith meet with our , Malorie Alexandre, to see if there is anything further we can do to assist her in proceeding with CRT-D.  Malorie reports Ms. Hooper does not qualify for any further assistance at this time.  She was given the option to for a payment plan for CRT-D, however she reports Ms. Hooper is not able to afford this at this time.  Ms. Hooper will call us if/when she can afford to proceed with setting up procedure.     Today she says she continues to struggle with SOB. Started Symbicort which has been helping some. Denies CP, palpitations, LH, syncope.    I have personally reviewed the patient's EKG today, which shows sinus rhythm with LBBB at 69bpm. WV interval is 140. QRS is 144. QTc is  501.    Recent Cardiac Tests:    2D Echo (6/10/2019):  · Eccentric left ventricular hypertrophy. Severely decreased left ventricular systolic function. The estimated ejection fraction is 20%  · Grade I (mild) left ventricular diastolic dysfunction consistent with impaired relaxation.  · Normal left atrial pressure.  · Normal right ventricular systolic function.  · Normal central venous pressure (3 mm Hg).    Current Outpatient Medications   Medication Sig    budesonide-formoterol 160-4.5 mcg (SYMBICORT) 160-4.5 mcg/actuation HFAA Inhale 2 puffs into the lungs 2 (two) times daily.    calcium carbonate (TUMS) 200 mg calcium (500 mg) chewable tablet Take 1 tablet by mouth as needed for Heartburn.    ferrous sulfate 325 (65 FE) MG EC tablet Take 1 tablet (325 mg total) by mouth once daily.    fluticasone (FLONASE) 50 mcg/actuation nasal spray 1 spray by Each Nare route daily as needed for Allergies.    furosemide (LASIX) 20 MG tablet Take 1 tablet (20 mg total) by mouth once daily.    losartan (COZAAR) 50 MG tablet Take 2 tablets (100 mg total) by mouth once daily. Dose increase. RPh: Please run under cash. (Patient taking differently: Take 50 mg by mouth once daily. Dose increase. RPh: Please run under cash.)    metoprolol succinate (TOPROL-XL) 50 MG 24 hr tablet Take 1 tablet (50 mg total) by mouth once daily.     No current facility-administered medications for this visit.        Review of Systems   Constitution: Negative for malaise/fatigue.   Cardiovascular: Positive for dyspnea on exertion. Negative for chest pain, irregular heartbeat, leg swelling and palpitations.   Respiratory: Positive for shortness of breath.    Hematologic/Lymphatic: Negative for bleeding problem.   Skin: Negative for rash.   Musculoskeletal: Negative for myalgias.   Gastrointestinal: Negative for hematemesis, hematochezia and nausea.   Genitourinary: Negative for hematuria.   Neurological: Negative for light-headedness.  "  Psychiatric/Behavioral: Negative for altered mental status.   Allergic/Immunologic: Negative for persistent infections.     Objective:          BP (!) 140/65   Pulse 69   Ht 5' 1" (1.549 m)   Wt 71.7 kg (158 lb)   BMI 29.85 kg/m²     Physical Exam   Constitutional: She is oriented to person, place, and time. She appears well-developed and well-nourished.   HENT:   Head: Normocephalic.   Nose: Nose normal.   Eyes: Pupils are equal, round, and reactive to light.   Cardiovascular: Normal rate, regular rhythm, S1 normal and S2 normal.   No murmur heard.  Pulses:       Radial pulses are 2+ on the right side, and 2+ on the left side.   Pulmonary/Chest: Breath sounds normal. No respiratory distress.   Abdominal: Normal appearance.   Musculoskeletal: Normal range of motion. She exhibits no edema.   Neurological: She is alert and oriented to person, place, and time.   Skin: Skin is warm and dry. No erythema.   Psychiatric: She has a normal mood and affect. Her speech is normal and behavior is normal.   Nursing note and vitals reviewed.    Lab Results   Component Value Date     08/28/2019    K 3.5 08/28/2019    MG 1.9 07/12/2018    BUN 10 08/28/2019    CREATININE 0.9 08/28/2019    ALT 10 08/28/2019    AST 16 08/28/2019    HGB 12.9 02/05/2019    HCT 43.2 02/05/2019    TSH 0.872 09/11/2018    LDLCALC 44.8 (L) 07/31/2018           Assessment:     1. NICM (nonischemic cardiomyopathy)    2. Hypertension, essential    3. LBBB (left bundle branch block)      Plan:     In summary, Ms. Hooper is a 73 y.o. female with HTN, LBBB, NICM here for follow up.   She has a LBBB and a persistent non-ischemic cardiomyopathy which qualifies her for CRT-D. Unfortunately, she is unable to afford her copay, nor can she afford a payment plan. Financial assistance unable to cover complete costs. We again reviewed risks, benefits, and alternatives to procedure. I instructed her that if her circumstances change, she can call the clinic and set " up procedure appt. Aleisha Davenport contact information provided. Patient will be following up with Dr. Reyna for routine CHF management. I did remind patient the importance of medication compliance, particularly her GDMT.    Continue current medications.  Contact clinic if/when schedule CRT-D.  RTC 6 months, sooner if needed.    *A copy of this note has been sent to Dr. Adams*    Follow up in about 6 months (around 3/24/2020).    ------------------------------------------------------------------    DWIGHT Garcia, NP-C  Cardiac Electrophysiology

## 2019-09-25 ENCOUNTER — TELEPHONE (OUTPATIENT)
Dept: ELECTROPHYSIOLOGY | Facility: CLINIC | Age: 74
End: 2019-09-25

## 2019-09-25 DIAGNOSIS — I42.8 NICM (NONISCHEMIC CARDIOMYOPATHY): Primary | ICD-10-CM

## 2019-09-25 NOTE — TELEPHONE ENCOUNTER
Spoke with [facility] ([phone]) regarding Ms. Hooper's situation.  The team member I spoke with explained the [facility] is a private clinic that is separate from [facility].  She reports if patient is in need of financial assistance it is best to go through [facility] (same [facility] physicians staff this [facility] clinic).  She reports it is better this way so that patient can be set up with sliding scale payment vs a private pay at the [facility].      Spoke to Deborah with [facility] ([phone]) who confirmed above.  Fax number to send referral is [phone].    Spoke with Ms. Hooper and explained above.  She reports she is unsure if she wants to even have procedure done.  Suggested she may want to establish care with [facility] so that in the case she decides to proceed with CRT-D upgrade she will be able to.  Let Ms. Hooper know I am going to speak with Dr. Adams and ask if he has a preference on which provider referral goes to and update her with name and phone number to call.  Ms. Hooper verbalizes understanding and appreciates call.

## 2019-10-07 ENCOUNTER — OFFICE VISIT (OUTPATIENT)
Dept: INTERNAL MEDICINE | Facility: CLINIC | Age: 74
End: 2019-10-07
Attending: FAMILY MEDICINE
Payer: COMMERCIAL

## 2019-10-07 ENCOUNTER — IMMUNIZATION (OUTPATIENT)
Dept: PHARMACY | Facility: CLINIC | Age: 74
End: 2019-10-07
Payer: COMMERCIAL

## 2019-10-07 ENCOUNTER — TELEPHONE (OUTPATIENT)
Dept: INTERNAL MEDICINE | Facility: CLINIC | Age: 74
End: 2019-10-07

## 2019-10-07 VITALS
HEART RATE: 70 BPM | TEMPERATURE: 98 F | WEIGHT: 161.06 LBS | BODY MASS INDEX: 30.41 KG/M2 | HEIGHT: 61 IN | SYSTOLIC BLOOD PRESSURE: 134 MMHG | OXYGEN SATURATION: 95 % | DIASTOLIC BLOOD PRESSURE: 70 MMHG

## 2019-10-07 DIAGNOSIS — I10 HYPERTENSION, ESSENTIAL: ICD-10-CM

## 2019-10-07 DIAGNOSIS — I42.8 NICM (NONISCHEMIC CARDIOMYOPATHY): ICD-10-CM

## 2019-10-07 DIAGNOSIS — I10 HYPERTENSION, UNSPECIFIED TYPE: ICD-10-CM

## 2019-10-07 DIAGNOSIS — R06.00 DYSPNEA, UNSPECIFIED TYPE: Primary | ICD-10-CM

## 2019-10-07 DIAGNOSIS — R74.8 ALKALINE PHOSPHATASE ELEVATION: ICD-10-CM

## 2019-10-07 DIAGNOSIS — J43.9 PULMONARY EMPHYSEMA, UNSPECIFIED EMPHYSEMA TYPE: ICD-10-CM

## 2019-10-07 PROCEDURE — 1101F PR PT FALLS ASSESS DOC 0-1 FALLS W/OUT INJ PAST YR: ICD-10-PCS | Mod: CPTII,S$GLB,, | Performed by: FAMILY MEDICINE

## 2019-10-07 PROCEDURE — 99999 PR PBB SHADOW E&M-EST. PATIENT-LVL III: ICD-10-PCS | Mod: PBBFAC,,, | Performed by: FAMILY MEDICINE

## 2019-10-07 PROCEDURE — 3078F DIAST BP <80 MM HG: CPT | Mod: CPTII,S$GLB,, | Performed by: FAMILY MEDICINE

## 2019-10-07 PROCEDURE — 1101F PT FALLS ASSESS-DOCD LE1/YR: CPT | Mod: CPTII,S$GLB,, | Performed by: FAMILY MEDICINE

## 2019-10-07 PROCEDURE — 3075F PR MOST RECENT SYSTOLIC BLOOD PRESS GE 130-139MM HG: ICD-10-PCS | Mod: CPTII,S$GLB,, | Performed by: FAMILY MEDICINE

## 2019-10-07 PROCEDURE — 3078F PR MOST RECENT DIASTOLIC BLOOD PRESSURE < 80 MM HG: ICD-10-PCS | Mod: CPTII,S$GLB,, | Performed by: FAMILY MEDICINE

## 2019-10-07 PROCEDURE — 99214 PR OFFICE/OUTPT VISIT, EST, LEVL IV, 30-39 MIN: ICD-10-PCS | Mod: S$GLB,,, | Performed by: FAMILY MEDICINE

## 2019-10-07 PROCEDURE — 3075F SYST BP GE 130 - 139MM HG: CPT | Mod: CPTII,S$GLB,, | Performed by: FAMILY MEDICINE

## 2019-10-07 PROCEDURE — 99999 PR PBB SHADOW E&M-EST. PATIENT-LVL III: CPT | Mod: PBBFAC,,, | Performed by: FAMILY MEDICINE

## 2019-10-07 PROCEDURE — 99214 OFFICE O/P EST MOD 30 MIN: CPT | Mod: S$GLB,,, | Performed by: FAMILY MEDICINE

## 2019-10-07 RX ORDER — LOSARTAN POTASSIUM 100 MG/1
100 TABLET ORAL DAILY
Qty: 90 TABLET | Refills: 3 | Status: SHIPPED | OUTPATIENT
Start: 2019-10-07 | End: 2019-10-07

## 2019-10-07 RX ORDER — LOSARTAN POTASSIUM 50 MG/1
100 TABLET ORAL DAILY
Qty: 180 TABLET | Refills: 3 | Status: SHIPPED | OUTPATIENT
Start: 2019-10-07 | End: 2020-09-24 | Stop reason: ALTCHOICE

## 2019-10-07 NOTE — TELEPHONE ENCOUNTER
Please contact patient and let her know that the pharmacy does not have the 100 mg losartan tablets.  Therefore, please continue the 50 mg tablets but take 2 a day.  I will send this in to the primary care pharmacy again

## 2019-10-07 NOTE — TELEPHONE ENCOUNTER
----- Message from Isabella Page sent at 10/7/2019  9:45 AM CDT -----  Contact: self [phone]  Type: Rx    Name of medication(s): losartan (COZAAR) 100 MG tablet    Is this a refill? New rx?    Who prescribed medication? Mariano    Pharmacy Name, Phone, & Location: Primary care pharmacy    Comments: above pharmacy doesn't have above medication on stock, please advise    Thank you

## 2019-10-07 NOTE — PROGRESS NOTES
Subjective:       Patient ID: Selma Hooper is a 73 y.o. female.    Chief Complaint: Follow-up    HPI  Review of Systems   Constitutional: Positive for fatigue. Negative for activity change, chills, fever and unexpected weight change.   HENT: Negative for congestion, hearing loss, rhinorrhea and trouble swallowing.    Eyes: Negative for discharge, redness and visual disturbance.   Respiratory: Positive for shortness of breath. Negative for cough, chest tightness and wheezing.    Cardiovascular: Negative for chest pain, palpitations and leg swelling.   Gastrointestinal: Positive for vomiting. Negative for abdominal pain, blood in stool, constipation and diarrhea.   Endocrine: Positive for polyuria. Negative for polydipsia.   Genitourinary: Negative for difficulty urinating, dysuria, hematuria and menstrual problem.   Musculoskeletal: Positive for neck pain. Negative for arthralgias, back pain, gait problem, joint swelling and myalgias.   Skin: Negative for color change and rash.   Neurological: Negative for tremors, speech difficulty, weakness, numbness and headaches.   Hematological: Negative for adenopathy. Does not bruise/bleed easily.   Psychiatric/Behavioral: Negative for behavioral problems, confusion, dysphoric mood and sleep disturbance. The patient is not nervous/anxious.        Objective:      Physical Exam   Constitutional: She is oriented to person, place, and time. She appears well-developed and well-nourished.   HENT:   Head: Normocephalic and atraumatic.   Eyes: Conjunctivae are normal. No scleral icterus.   Neck: Normal range of motion. Neck supple.   Cardiovascular: Normal rate and intact distal pulses. Exam reveals distant heart sounds. Exam reveals no gallop and no friction rub.   No murmur heard.  Pulmonary/Chest: Effort normal. No respiratory distress. She has decreased breath sounds. She has no wheezes. She has no rales.   Abdominal: She exhibits no distension and no abdominal bruit. There is no  tenderness.   Musculoskeletal: She exhibits no edema or deformity.   Neurological: She is alert and oriented to person, place, and time. She displays no tremor. No cranial nerve deficit. Coordination and gait normal.   Skin: Skin is warm and dry. No rash noted. She is not diaphoretic. No erythema.   Psychiatric: She has a normal mood and affect. Her behavior is normal. Judgment and thought content normal.   Nursing note and vitals reviewed.      Assessment:       1. Dyspnea, unspecified type    2. Pulmonary emphysema, unspecified emphysema type    3. NICM (nonischemic cardiomyopathy)    4. Alkaline phosphatase elevation    5. Hypertension, essential    6. Hypertension, unspecified type        Plan:     Medication List with Changes/Refills   Current Medications    BUDESONIDE-FORMOTEROL 160-4.5 MCG (SYMBICORT) 160-4.5 MCG/ACTUATION HFAA    Inhale 2 puffs into the lungs 2 (two) times daily.    CALCIUM CARBONATE (TUMS) 200 MG CALCIUM (500 MG) CHEWABLE TABLET    Take 1 tablet by mouth as needed for Heartburn.    FLUTICASONE (FLONASE) 50 MCG/ACTUATION NASAL SPRAY    1 spray by Each Nare route daily as needed for Allergies.    FUROSEMIDE (LASIX) 20 MG TABLET    Take 1 tablet (20 mg total) by mouth once daily.    METOPROLOL SUCCINATE (TOPROL-XL) 50 MG 24 HR TABLET    Take 1 tablet (50 mg total) by mouth once daily.   Changed and/or Refilled Medications    Modified Medication Previous Medication    LOSARTAN (COZAAR) 100 MG TABLET losartan (COZAAR) 50 MG tablet       Take 1 tablet (100 mg total) by mouth once daily.    Take 2 tablets (100 mg total) by mouth once daily. Dose increase. [facility]: Please run under cash.   Discontinued Medications    FERROUS SULFATE 325 (65 FE) MG EC TABLET    Take 1 tablet (325 mg total) by mouth once daily.     Selma was seen today for follow-up.    Diagnoses and all orders for this visit:    Dyspnea, unspecified type    Pulmonary emphysema, unspecified emphysema type    NICM (nonischemic  cardiomyopathy)    Alkaline phosphatase elevation    Hypertension, essential    Hypertension, unspecified type  -     losartan (COZAAR) 100 MG tablet; Take 1 tablet (100 mg total) by mouth once daily.      See meds, orders, follow up, routing and instructions sections of encounter.  A 73-year-old established female patient.  She is in for a followup.  She is   complaining of shortness of breath.  She has been evaluated by Cardiology and   Pulmonology.  She has essentially declined intervention as recommended by the   cardiologist with an implanted defibrillator pacemaker and additionally has seen   Pulmonology with no change of recommendations.  I suspect her symptoms are   coming from a combination of COPD and nonischemic cardiomyopathy.    The patient did not pursue her EGD or colonoscopies or the ultrasound that we   had ordered and attempted to schedule at least twice.  Apparently, she has some   sort of a co-pay problem with her insurance.  I asked if she had tried to sign   up for Medicare.  Apparently, she has had some sort of difficulty.  We have   attempted to work through this with our complex case management in the past.    RECOMMENDATIONS:  Continue current medications.  Notify us for significant   decompensation.  Follow up with me in six months.  Her ultrasound was to   investigate an elevated alkaline phosphatase on laboratory.  Consider rechecking   all laboratory at her next visit six months from now.  She declined a flu shot,   but accepted a pneumonia second series.      RADU/YASMANY  dd: 10/07/2019 09:56:40 (CDT)  td: 10/07/2019 23:01:37 (CDT)  Doc ID   #[id number]  Job ID #[id number]    CC:

## 2019-10-10 ENCOUNTER — PATIENT OUTREACH (OUTPATIENT)
Dept: OTHER | Facility: OTHER | Age: 74
End: 2019-10-10

## 2019-10-10 NOTE — PROGRESS NOTES
Digital Medicine: Clinician Follow-Up    The history is provided by the patient.     Follow Up  Follow-up reason(s): reading review      HPI:  Called patient to follow up. Patient reports adherence to medication regimen daily and denies missed doses. Patient denies hypotensive s/sx (lightheadedness, dizziness, nausea, fatigue); patient denies hypertensive s/sx (SOB, CP, severe headaches, changes in vision, dizziness, fatigue, confusion, anxiety, nosebleeds).     Patient attributes changes in diet to trend in BP.     Patient states she did not wait at least 1 hour after taking medication yesterday morning before taking her BP.     Last 5 Patient Entered Readings                                      Current 30 Day Average: 138/73     Recent Readings 10/9/2019 10/8/2019 10/7/2019 10/6/2019 10/2/2019    SBP (mmHg) 151 116 147 142 126    DBP (mmHg) 78 64 78 76 71    Pulse 76 71 75 76 75        Assessment:  Reviewed recent readings. Per 2017 ACC/ AHA HTN guidelines (goal of BP < 130/80), current 30-day average is slightly uncontrolled, appears to be trending down.   Patient was seen by PCP on 10/7, BP was 134/70.  Patient was seen by Cardiology on 9/24, BP was 140/65.    Plan:  Continue current medication regimen.   Instructed patient to wait at least 1 hour after taking medication before taking BP.   Encouraged patient to charge digital cuff at least monthly.  Patients health , Jose Enrique Knowles, will be following up as scheduled.   I will continue to monitor regularly and will follow-up in 6 weeks, sooner if blood pressure begins to trend upward or downward. If BP remains elevated, will discuss re-introducing spironolactone.     Current medication regimen:  Hypertension Medications             furosemide (LASIX) 20 MG tablet Take 1 tablet (20 mg total) by mouth once daily.    losartan (COZAAR) 50 MG tablet Take 2 tablets (100 mg total) by mouth once daily.    metoprolol succinate (TOPROL-XL) 50 MG 24 hr tablet Take 1  tablet (50 mg total) by mouth once daily.         Patient denies having questions or concerns. Patient has my contact information and knows to call with any concerns or clinical changes.

## 2019-10-18 ENCOUNTER — PATIENT OUTREACH (OUTPATIENT)
Dept: OTHER | Facility: OTHER | Age: 74
End: 2019-10-18

## 2019-10-18 NOTE — PROGRESS NOTES
Digital Medicine: Health  Follow-Up    Patient reports she has an appt at [facility] to discuss pace maker on Oct 23.  Reports she experiences SOB when walking.    The history is provided by the patient.     Follow Up  Follow-up reason(s): routine education      Routine Education Topics: physical activity  Reports her elevated BP reading today of 149/78 mmHg was after doing laundry.          Physical Activity:   When asked if exercising, patient responded: yes    Patient participates in the following activities: yard/housework and walking    Reports she walks around her neighborhood and in the super market with her daughter.    Assigning the following patient goal(s): participate in exercise weekly      SDOH    INTERVENTION(S)  recommend physical activity, reviewed monitoring technique and encouragement/support    PLAN  patient verbalizes understanding      There are no preventive care reminders to display for this patient.    Last 5 Patient Entered Readings                                      Current 30 Day Average: 137/72     Recent Readings 10/18/2019 10/17/2019 10/15/2019 10/13/2019 10/12/2019    SBP (mmHg) 149 149 121 131 159    DBP (mmHg) 78 76 64 68 82    Pulse 73 68 73 75 76

## 2019-11-04 RX ORDER — METOPROLOL SUCCINATE 50 MG/1
TABLET, EXTENDED RELEASE ORAL
Qty: 90 TABLET | Refills: 3 | Status: SHIPPED | OUTPATIENT
Start: 2019-11-04 | End: 2021-01-19 | Stop reason: SDUPTHER

## 2019-11-15 ENCOUNTER — PATIENT OUTREACH (OUTPATIENT)
Dept: OTHER | Facility: OTHER | Age: 74
End: 2019-11-15

## 2019-11-21 ENCOUNTER — PATIENT OUTREACH (OUTPATIENT)
Dept: OTHER | Facility: OTHER | Age: 74
End: 2019-11-21

## 2019-11-21 NOTE — PROGRESS NOTES
Digital Medicine: Clinician Follow-Up    The history is provided by the patient.     Follow Up  Follow-up reason(s): reading review        HPI:  Called patient to follow up. Patient reports adherence to medication regimen daily and denies missed doses. Patient denies hypotensive s/sx (lightheadedness, dizziness, nausea, fatigue); patient denies hypertensive s/sx (SOB, CP, severe headaches, changes in vision, dizziness, fatigue, confusion, anxiety, nosebleeds).     Last 5 Patient Entered Readings                                      Current 30 Day Average: 124/67     Recent Readings 11/20/2019 11/19/2019 11/11/2019 11/9/2019 11/9/2019    SBP (mmHg) 137 112 123 110 100    DBP (mmHg) 74 65 66 61 59    Pulse 79 80 75 82 79        Assessment:  Reviewed recent readings and labs (last CMP drawn on 8/28/19). Per 2017 ACC/ AHA HTN guidelines (goal of BP < 130/80), current 30-day average is well controlled.     Plan:  Continue current medication regimen.   Instructed patient to call if BP remains > 130/80.   Patients health , Jose Enrique Knowles, will be following up as scheduled.   I will continue to monitor regularly and will follow-up in 12 weeks, sooner if blood pressure begins to trend upward or downward.     Current medication regimen:  Hypertension Medications             furosemide (LASIX) 20 MG tablet Take 1 tablet (20 mg total) by mouth once daily.    losartan (COZAAR) 50 MG tablet Take 2 tablets (100 mg total) by mouth once daily.    metoprolol succinate (TOPROL-XL) 50 MG 24 hr tablet TAKE 1 TABLET BY MOUTH ONCE DAILY         Patient denies having questions or concerns. Patient has my contact information and knows to call with any concerns or clinical changes.

## 2019-12-02 ENCOUNTER — TELEPHONE (OUTPATIENT)
Dept: INTERNAL MEDICINE | Facility: CLINIC | Age: 74
End: 2019-12-02

## 2019-12-02 DIAGNOSIS — Z12.31 ENCOUNTER FOR SCREENING MAMMOGRAM FOR MALIGNANT NEOPLASM OF BREAST: Primary | ICD-10-CM

## 2019-12-02 NOTE — TELEPHONE ENCOUNTER
----- Message from Adela Luevano sent at 12/2/2019  3:37 PM CST -----  Contact: [phone]/self  Type:  Mammogram    Caller is requesting to schedule their annual mammogram appointment.  Order is not listed in EPIC.  Please enter order and contact patient to schedule.  Name of Caller: self  Where would they like the mammogram performed? [facility]  Would the patient rather a call back or a response via [facility]?  call  Best Call Back Number:   Additional Information:

## 2019-12-06 ENCOUNTER — TELEPHONE (OUTPATIENT)
Dept: INTERNAL MEDICINE | Facility: CLINIC | Age: 74
End: 2019-12-06

## 2019-12-06 DIAGNOSIS — Z12.31 ENCOUNTER FOR SCREENING MAMMOGRAM FOR MALIGNANT NEOPLASM OF BREAST: Primary | ICD-10-CM

## 2019-12-06 NOTE — TELEPHONE ENCOUNTER
Spoke with pt and informed pt of doctor's responses. Also advised pt to contact her insurance for further assistance. Pt verbalized understanding and will call her insurance. pt already has [facility] pricing office number in case of needing for contacting

## 2019-12-06 NOTE — TELEPHONE ENCOUNTER
Her mammogram was coded correctly.  This is the same code we always use.  Unclear what the problem is.  I will reorder it using the same code we always use.  She may need to contact the pricing office or her insurance company to clarify this further.  We have experience problems with her insurance company coverage in the past.  Thank you    [facility] Pricing Office:  [phone]

## 2019-12-06 NOTE — TELEPHONE ENCOUNTER
----- Message from Isabell De La Rosa sent at 12/6/2019  8:38 AM CST -----  Contact: self/[phone]  Pt called regards to her mammogram order was coded wrong and it is costing her 800 dollars and the dr would need to change the coding so is would be free. The office can leave a message      Please advise

## 2019-12-10 ENCOUNTER — PATIENT MESSAGE (OUTPATIENT)
Dept: ELECTROPHYSIOLOGY | Facility: CLINIC | Age: 74
End: 2019-12-10

## 2019-12-11 ENCOUNTER — TELEPHONE (OUTPATIENT)
Dept: ELECTROPHYSIOLOGY | Facility: CLINIC | Age: 74
End: 2019-12-11

## 2019-12-12 ENCOUNTER — TELEPHONE (OUTPATIENT)
Dept: ELECTROPHYSIOLOGY | Facility: CLINIC | Age: 74
End: 2019-12-12

## 2019-12-16 ENCOUNTER — HOSPITAL ENCOUNTER (OUTPATIENT)
Dept: RADIOLOGY | Facility: HOSPITAL | Age: 74
Discharge: HOME | End: 2019-12-16
Attending: FAMILY MEDICINE
Payer: COMMERCIAL

## 2019-12-16 VITALS — BODY MASS INDEX: 30.43 KG/M2 | HEIGHT: 61 IN | WEIGHT: 161.19 LBS

## 2019-12-16 DIAGNOSIS — Z12.31 ENCOUNTER FOR SCREENING MAMMOGRAM FOR MALIGNANT NEOPLASM OF BREAST: ICD-10-CM

## 2019-12-16 PROCEDURE — 77063 MAMMO DIGITAL SCREENING BILAT WITH TOMOSYNTHESIS_CAD: ICD-10-PCS | Mod: 26,,, | Performed by: RADIOLOGY

## 2019-12-16 PROCEDURE — 77063 BREAST TOMOSYNTHESIS BI: CPT | Mod: 26,,, | Performed by: RADIOLOGY

## 2019-12-16 PROCEDURE — 77067 SCR MAMMO BI INCL CAD: CPT | Mod: 26,,, | Performed by: RADIOLOGY

## 2019-12-16 PROCEDURE — 77067 MAMMO DIGITAL SCREENING BILAT WITH TOMOSYNTHESIS_CAD: ICD-10-PCS | Mod: 26,,, | Performed by: RADIOLOGY

## 2019-12-16 PROCEDURE — 77067 SCR MAMMO BI INCL CAD: CPT | Mod: TC

## 2020-01-15 NOTE — PROGRESS NOTES
Digital Medicine: Health  Follow-Up    Patient reports she has been having issues with her phone.  Reports she will try to do a reading today, and will make sure the cuff is charged.    The history is provided by the patient.       INTERVENTION(S)  reviewed monitoring technique, encouragement/support and denied questions    PLAN  patient verbalizes understanding    Informed patient to call if she is having any technical issues.      There are no preventive care reminders to display for this patient.    Last 5 Patient Entered Readings                                      Current 30 Day Average: 136/70     Recent Readings 1/9/2020 1/8/2020 1/8/2020 1/8/2020 12/29/2019    SBP (mmHg) 149 135 135 116 126    DBP (mmHg) 78 66 80 58 65    Pulse 86 74 76 70 70                  Screenings    SDOH

## 2020-02-13 ENCOUNTER — PATIENT OUTREACH (OUTPATIENT)
Dept: OTHER | Facility: OTHER | Age: 75
End: 2020-02-13

## 2020-02-13 NOTE — PROGRESS NOTES
Digital Medicine: Clinician Follow-Up    The history is provided by the patient.     Follow Up  Follow-up reason(s): reading review        HPI:  Called patient to follow up. Patient reports adherence to medication regimen daily and denies missed doses. Patient denies hypotensive s/sx (lightheadedness, dizziness, nausea, fatigue); patient denies hypertensive s/sx (SOB, CP, severe headaches, changes in vision, dizziness, fatigue, confusion, anxiety, nosebleeds).     Patient states she let her son use her digital cuff, attributes this to elevated readings.     Patient states she does not always wait at least 1 hour after taking medication prior to taking BP.     Last 5 Patient Entered Readings                                      Current 30 Day Average: 130/75     Recent Readings 2/13/2020 2/10/2020 2/8/2020 2/5/2020 2/4/2020    SBP (mmHg) 137 113 133 146 112    DBP (mmHg) 69 70 76 77 61    Pulse 70 84 80 75 77        Assessment:  Reviewed recent readings and labs (last CMP drawn on 8/28/19). Per 2017 ACC/ AHA HTN guidelines (goal of BP < 130/80), current 30-day average is controlled.     Plan:  Continue current medication regimen.   Encouraged patient not to allow others to use her cuff.  Instructed patient to wait at least 1 hour after taking medication prior to taking BP.   Instructed patient to call if BP remains > 130/80.   Patients health  will be following up as scheduled.   I will continue to monitor regularly and will follow-up in 12 weeks, sooner if blood pressure begins to trend upward or downward.     Current medication regimen:  Hypertension Medications             furosemide (LASIX) 20 MG tablet Take 1 tablet (20 mg total) by mouth once daily.    losartan (COZAAR) 50 MG tablet Take 2 tablets (100 mg total) by mouth once daily.    metoprolol succinate (TOPROL-XL) 50 MG 24 hr tablet TAKE 1 TABLET BY MOUTH ONCE DAILY         Patient denies having questions or concerns. Patient has my contact  information and knows to call with any concerns or clinical changes.

## 2020-02-27 ENCOUNTER — PATIENT OUTREACH (OUTPATIENT)
Dept: OTHER | Facility: OTHER | Age: 75
End: 2020-02-27

## 2020-03-05 NOTE — PROGRESS NOTES
Digital Medicine: Health  Follow-Up    Patient reports her  was in the hospital for quite some time for a blockage, but now he is back home.  States she has been taking care of him, so she has been busy.    Patient reports she missed a dose of her medication one morning, and realized it that night.  Reports she is unsure if she should take the medication that late when she misses it.  States this is not often.  Will route to pharmacist to clarify for patient.    The history is provided by the patient.     Follow Up  Follow-up reason(s): reading review      Readings are trending up due to lifestyle change.        INTERVENTION(S)  recommended diet modifications and encouragement/support    PLAN  patient verbalizes understanding and continue monitoring      There are no preventive care reminders to display for this patient.    Last 5 Patient Entered Readings                                      Current 30 Day Average: 133/73     Recent Readings 2/29/2020 2/29/2020 2/28/2020 2/28/2020 2/27/2020    SBP (mmHg) 151 156 123 140 138    DBP (mmHg) 76 78 63 80 73    Pulse 86 - 75 84 71                      Diet Screening   Patient reports eating or drinking the following: fruit and fresh vegetablesShe skips meals regularly.      Eating style: time constraints    Barriers to a Healthy Diet: family constraints    Reports she is busy taking care of others, so she will skip meals.  Reports she will have a salad or yogurt or fruit occasionally.    Intervention(s): regularly scheduling meals      SDOH

## 2020-03-11 ENCOUNTER — PATIENT MESSAGE (OUTPATIENT)
Dept: ADMINISTRATIVE | Facility: OTHER | Age: 75
End: 2020-03-11

## 2020-04-13 ENCOUNTER — PATIENT OUTREACH (OUTPATIENT)
Dept: OTHER | Facility: OTHER | Age: 75
End: 2020-04-13

## 2020-04-13 NOTE — PROGRESS NOTES
Digital Medicine: Health  Follow-Up    Reports they are staying safe since COVID 19.  Reports she has been staying in and her daughter has been doing most of the shopping for her.    The history is provided by the patient.     Follow Up  Follow-up reason(s): reading review and routine education      Readings are trending up due to lifestyle change and inaccurate technique.  Patient reports she is unsure about fluctuation in BP.  States she has been taking care of her partner who had surgery last month, so she feels this could be strenuous at times.      INTERVENTION(S)  reviewed monitoring technique, encouragement/support and denied questions    PLAN  patient verbalizes understanding and continue monitoring      There are no preventive care reminders to display for this patient.    Last 5 Patient Entered Readings                                      Current 30 Day Average: 132/69     Recent Readings 4/12/2020 4/12/2020 4/11/2020 4/10/2020 4/9/2020    SBP (mmHg) 139 108 148 130 134    DBP (mmHg) 76 58 77 68 75    Pulse 88 89 93 77 88                  Screenings    SDOH

## 2020-05-07 ENCOUNTER — PATIENT OUTREACH (OUTPATIENT)
Dept: OTHER | Facility: OTHER | Age: 75
End: 2020-05-07

## 2020-05-07 NOTE — PROGRESS NOTES
Digital Medicine: Clinician Follow-Up    The history is provided by the patient.     Follow Up  Follow-up reason(s): reading review        HPI:  Called patient to follow up. Patient reports adherence to medication regimen daily and denies missed doses. Patient denies hypotensive s/sx (lightheadedness, dizziness, nausea, fatigue); patient denies hypertensive s/sx (SOB, CP, severe headaches, changes in vision, dizziness, fatigue, confusion, anxiety, nosebleeds).     Last 5 Patient Entered Readings                                      Current 30 Day Average: 126/69     Recent Readings 5/7/2020 5/6/2020 5/6/2020 5/5/2020 5/5/2020    SBP (mmHg) 140 121 128 109 131    DBP (mmHg) 76 62 68 64 75    Pulse 78 76 67 75 92        Assessment:  Reviewed recent readings and labs (last CMP drawn on 8/28/19). Per 2017 ACC/ AHA HTN guidelines (goal of BP < 130/80), current 30-day average is well controlled. Within the past month, SBPs have ranged 108-148 and DBPs have ranged 58-80.    Plan:  Continue current medication regimen.   Instructed patient to call if BP remains > 130/80.   Patients health  will be following up as scheduled.   I will continue to monitor regularly and will follow-up in 12-16 weeks, sooner if blood pressure begins to trend upward or downward.     Current medication regimen:  Hypertension Medications             furosemide (LASIX) 20 MG tablet Take 1 tablet (20 mg total) by mouth once daily.    losartan (COZAAR) 50 MG tablet Take 2 tablets (100 mg total) by mouth once daily.    metoprolol succinate (TOPROL-XL) 50 MG 24 hr tablet TAKE 1 TABLET BY MOUTH ONCE DAILY         Patient denies having questions or concerns. Patient has my contact information and knows to call with any concerns or clinical changes.

## 2020-06-03 ENCOUNTER — PATIENT OUTREACH (OUTPATIENT)
Dept: OTHER | Facility: OTHER | Age: 75
End: 2020-06-03

## 2020-06-03 NOTE — PROGRESS NOTES
Digital Medicine: Health  Follow-Up    Patient reports she worries that her BP will drop too low.  Denies hypotensive symptoms, but reviewed if patient experiences hypotensive symptoms to drink water and eat a salty snack.    The history is provided by the patient.     Follow Up  Follow-up reason(s): reading review and routine education      Routine Education Topics: eating patterns        INTERVENTION(S)  recommended diet modifications, encouragement/support and denied questions    PLAN  patient verbalizes understanding and continue monitoring      There are no preventive care reminders to display for this patient.    Last 5 Patient Entered Readings                                      Current 30 Day Average: 128/72     Recent Readings 6/3/2020 6/2/2020 6/2/2020 6/1/2020 6/1/2020    SBP (mmHg) 129 120 143 127 139    DBP (mmHg) 70 65 77 67 80    Pulse 69 69 78 64 68                      Diet Screening   Patient reports eating or drinking the following: fruit and fresh vegetables    Patient reports she has been eating breakfast and eating vegetables with every meal.    Intervention(s): reading food labels and DASH diet      SDOH

## 2020-07-05 ENCOUNTER — HOSPITAL ENCOUNTER (EMERGENCY)
Facility: HOSPITAL | Age: 75
Discharge: HOME | End: 2020-07-05
Attending: EMERGENCY MEDICINE
Payer: COMMERCIAL

## 2020-07-05 ENCOUNTER — NURSE TRIAGE (OUTPATIENT)
Dept: ADMINISTRATIVE | Facility: CLINIC | Age: 75
End: 2020-07-05

## 2020-07-05 VITALS
TEMPERATURE: 99 F | RESPIRATION RATE: 18 BRPM | OXYGEN SATURATION: 96 % | SYSTOLIC BLOOD PRESSURE: 158 MMHG | DIASTOLIC BLOOD PRESSURE: 72 MMHG | HEIGHT: 61 IN | WEIGHT: 148 LBS | BODY MASS INDEX: 27.94 KG/M2 | HEART RATE: 75 BPM

## 2020-07-05 DIAGNOSIS — R10.9 ABDOMINAL PAIN: Primary | ICD-10-CM

## 2020-07-05 DIAGNOSIS — R11.2 NON-INTRACTABLE VOMITING WITH NAUSEA, UNSPECIFIED VOMITING TYPE: ICD-10-CM

## 2020-07-05 LAB
ALBUMIN SERPL BCP-MCNC: 4.2 G/DL (ref 3.5–5.2)
ALP SERPL-CCNC: 145 U/L (ref 55–135)
ALT SERPL W/O P-5'-P-CCNC: 18 U/L (ref 10–44)
ANION GAP SERPL CALC-SCNC: 11 MMOL/L (ref 8–16)
AST SERPL-CCNC: 19 U/L (ref 10–40)
BACTERIA #/AREA URNS AUTO: NORMAL /HPF
BASOPHILS # BLD AUTO: 0.03 K/UL (ref 0–0.2)
BASOPHILS NFR BLD: 0.7 % (ref 0–1.9)
BILIRUB SERPL-MCNC: 0.5 MG/DL (ref 0.1–1)
BILIRUB UR QL STRIP: NEGATIVE
BUN SERPL-MCNC: 12 MG/DL (ref 8–23)
CALCIUM SERPL-MCNC: 9.7 MG/DL (ref 8.7–10.5)
CHLORIDE SERPL-SCNC: 107 MMOL/L (ref 95–110)
CLARITY UR REFRACT.AUTO: CLEAR
CO2 SERPL-SCNC: 25 MMOL/L (ref 23–29)
COLOR UR AUTO: YELLOW
CREAT SERPL-MCNC: 0.9 MG/DL (ref 0.5–1.4)
DIFFERENTIAL METHOD: ABNORMAL
EOSINOPHIL # BLD AUTO: 0 K/UL (ref 0–0.5)
EOSINOPHIL NFR BLD: 0.7 % (ref 0–8)
ERYTHROCYTE [DISTWIDTH] IN BLOOD BY AUTOMATED COUNT: 13.4 % (ref 11.5–14.5)
EST. GFR  (AFRICAN AMERICAN): >60 ML/MIN/1.73 M^2
EST. GFR  (NON AFRICAN AMERICAN): >60 ML/MIN/1.73 M^2
GLUCOSE SERPL-MCNC: 110 MG/DL (ref 70–110)
GLUCOSE UR QL STRIP: NEGATIVE
HCT VFR BLD AUTO: 44.2 % (ref 37–48.5)
HGB BLD-MCNC: 13.4 G/DL (ref 12–16)
HGB UR QL STRIP: NEGATIVE
HYALINE CASTS UR QL AUTO: 0 /LPF
IMM GRANULOCYTES # BLD AUTO: 0.01 K/UL (ref 0–0.04)
IMM GRANULOCYTES NFR BLD AUTO: 0.2 % (ref 0–0.5)
KETONES UR QL STRIP: NEGATIVE
LACTATE SERPL-SCNC: 0.9 MMOL/L (ref 0.5–2.2)
LEUKOCYTE ESTERASE UR QL STRIP: NEGATIVE
LIPASE SERPL-CCNC: 26 U/L (ref 4–60)
LYMPHOCYTES # BLD AUTO: 0.9 K/UL (ref 1–4.8)
LYMPHOCYTES NFR BLD: 20.8 % (ref 18–48)
MCH RBC QN AUTO: 24.6 PG (ref 27–31)
MCHC RBC AUTO-ENTMCNC: 30.3 G/DL (ref 32–36)
MCV RBC AUTO: 81 FL (ref 82–98)
MICROSCOPIC COMMENT: NORMAL
MONOCYTES # BLD AUTO: 0.4 K/UL (ref 0.3–1)
MONOCYTES NFR BLD: 8.1 % (ref 4–15)
NEUTROPHILS # BLD AUTO: 3 K/UL (ref 1.8–7.7)
NEUTROPHILS NFR BLD: 69.5 % (ref 38–73)
NITRITE UR QL STRIP: NEGATIVE
NRBC BLD-RTO: 0 /100 WBC
PH UR STRIP: 6 [PH] (ref 5–8)
PLATELET # BLD AUTO: 214 K/UL (ref 150–350)
PMV BLD AUTO: 9.9 FL (ref 9.2–12.9)
POTASSIUM SERPL-SCNC: 3.9 MMOL/L (ref 3.5–5.1)
PROT SERPL-MCNC: 7.7 G/DL (ref 6–8.4)
PROT UR QL STRIP: ABNORMAL
RBC # BLD AUTO: 5.44 M/UL (ref 4–5.4)
RBC #/AREA URNS AUTO: 2 /HPF (ref 0–4)
SARS-COV-2 RNA RESP QL NAA+PROBE: NOT DETECTED
SODIUM SERPL-SCNC: 143 MMOL/L (ref 136–145)
SP GR UR STRIP: 1.02 (ref 1–1.03)
SQUAMOUS #/AREA URNS AUTO: 3 /HPF
TROPONIN I SERPL DL<=0.01 NG/ML-MCNC: 0.01 NG/ML (ref 0–0.03)
URN SPEC COLLECT METH UR: ABNORMAL
WBC # BLD AUTO: 4.32 K/UL (ref 3.9–12.7)
WBC #/AREA URNS AUTO: 1 /HPF (ref 0–5)

## 2020-07-05 PROCEDURE — 81001 URINALYSIS AUTO W/SCOPE: CPT

## 2020-07-05 PROCEDURE — 80053 COMPREHEN METABOLIC PANEL: CPT

## 2020-07-05 PROCEDURE — 93010 EKG 12-LEAD: ICD-10-PCS | Mod: ,,, | Performed by: INTERNAL MEDICINE

## 2020-07-05 PROCEDURE — 84484 ASSAY OF TROPONIN QUANT: CPT

## 2020-07-05 PROCEDURE — 99284 EMERGENCY DEPT VISIT MOD MDM: CPT | Mod: 25

## 2020-07-05 PROCEDURE — 83690 ASSAY OF LIPASE: CPT

## 2020-07-05 PROCEDURE — 85025 COMPLETE CBC W/AUTO DIFF WBC: CPT

## 2020-07-05 PROCEDURE — 93010 ELECTROCARDIOGRAM REPORT: CPT | Mod: ,,, | Performed by: INTERNAL MEDICINE

## 2020-07-05 PROCEDURE — 25000003 PHARM REV CODE 250: Performed by: PHYSICIAN ASSISTANT

## 2020-07-05 PROCEDURE — U0003 INFECTIOUS AGENT DETECTION BY NUCLEIC ACID (DNA OR RNA); SEVERE ACUTE RESPIRATORY SYNDROME CORONAVIRUS 2 (SARS-COV-2) (CORONAVIRUS DISEASE [COVID-19]), AMPLIFIED PROBE TECHNIQUE, MAKING USE OF HIGH THROUGHPUT TECHNOLOGIES AS DESCRIBED BY CMS-2020-01-R: HCPCS

## 2020-07-05 PROCEDURE — 93005 ELECTROCARDIOGRAM TRACING: CPT

## 2020-07-05 PROCEDURE — 99284 EMERGENCY DEPT VISIT MOD MDM: CPT | Mod: ,,, | Performed by: PHYSICIAN ASSISTANT

## 2020-07-05 PROCEDURE — 99284 PR EMERGENCY DEPT VISIT,LEVEL IV: ICD-10-PCS | Mod: ,,, | Performed by: PHYSICIAN ASSISTANT

## 2020-07-05 PROCEDURE — 83605 ASSAY OF LACTIC ACID: CPT

## 2020-07-05 RX ORDER — MAG HYDROX/ALUMINUM HYD/SIMETH 200-200-20
30 SUSPENSION, ORAL (FINAL DOSE FORM) ORAL
Status: COMPLETED | OUTPATIENT
Start: 2020-07-05 | End: 2020-07-05

## 2020-07-05 RX ORDER — FAMOTIDINE 20 MG/1
20 TABLET, FILM COATED ORAL DAILY
Qty: 30 TABLET | Refills: 0 | Status: SHIPPED | OUTPATIENT
Start: 2020-07-05 | End: 2020-08-04

## 2020-07-05 RX ADMIN — ALUMINUM HYDROXIDE, MAGNESIUM HYDROXIDE, AND SIMETHICONE 30 ML: 200; 200; 20 SUSPENSION ORAL at 06:07

## 2020-07-05 NOTE — ED TRIAGE NOTES
Had diarrhea yesterday and this am had n/v w/ mid abdominal pain . Had  Cold sweats  This am  , states she was afebrile.  Has a strong odor to her urine.

## 2020-07-05 NOTE — TELEPHONE ENCOUNTER
Pt reports abdomen pain and vomiting since this morning. Pt had diarrhea yesterday. Pt took otc meds for diarrhea and was feeling better last night but then again this morning she started to have the pain again with vomiting. Pt reports vomit was yellow in color and appeared to be bile. Pt vomited twice.     Reason for Disposition   [1] SEVERE pain AND [2] age > 60    Additional Information   Negative: Shock suspected (e.g., cold/pale/clammy skin, too weak to stand, low BP, rapid pulse)   Negative: Difficult to awaken or acting confused (e.g., disoriented, slurred speech)   Negative: Passed out (i.e., lost consciousness, collapsed and was not responding)   Negative: Sounds like a life-threatening emergency to the triager   Negative: [1] SEVERE pain (e.g., excruciating) AND [2] present > 1 hour    Protocols used: ABDOMINAL PAIN - FEMALE-A-

## 2020-07-05 NOTE — ED NOTES
LOC: The patient is awake and alert; oriented x 3 and speaking appropriately.  APPEARANCE: Patient resting comfortably, patient is clean and well groomed  SKIN: warm and dry, normal skin turgor & moist mucus membranes, skin intact, no breakdown noted.  MUSCULOSKELETAL: Patient moving all extremities well, no obvious swelling or deformities noted  RESPIRATORY: Airway is open and patent,  respirations are spontaneous, normal effort and rate  CARDIAC: Patient has a tachy rate, no peripheral edema noted, capillary refill < 3 seconds; No complaints of chest pain   ABDOMEN: Soft and non tender to palpation, no distention noted. Bowel sounds present x 4, having diarrhea and mid abd pain

## 2020-07-06 ENCOUNTER — PES CALL (OUTPATIENT)
Dept: ADMINISTRATIVE | Facility: CLINIC | Age: 75
End: 2020-07-06

## 2020-07-06 NOTE — ED PROVIDER NOTES
"Encounter Date: 7/5/2020       History     Chief Complaint   Patient presents with    Abdominal Pain     Ms Hooper is a 74yoF who presents for abdominal pain since today; pertinent PMHx CHF, HTN, h/o cigarette use, SHx cholecystectomy, hysterectomy.  Patient reports loose stool yesterday, though this is not unusual for her.  No blood or mucus.  Onset today of periumbilical abdominal discomfort that has been constant, not worsened with exertion, no changes with food consumption.  Associated with nausea and 2 episodes of vomiting earlier today, no lingering nausea.  No history  of similar symptoms.  She is passing gas. She kept down liquids earlier today. She does report malodorous urine "for a while" without dysuria or hematuria.  No fever/chills, chest pain, shortness of breath, weakness or dizziness.  The patients available PMH, PSH, Social History, medications, allergies, and triage vital signs were reviewed just prior to their medical evaluation.  A ten point review of systems was completed and is negative except as documented above.  Patient denies any other acute medical complaint.    Please be advised this text was dictated with [employer] software and may contain errors due to translation.           Review of patient's allergies indicates:  No Known Allergies  Past Medical History:   Diagnosis Date    CHF (congestive heart failure)     Former smoker 10/24/2018    Hypertension     Smoker 7/27/2016     Past Surgical History:   Procedure Laterality Date    CHOLECYSTECTOMY      COLONOSCOPY      HYSTERECTOMY       Family History   Problem Relation Age of Onset    Heart disease Mother     Heart attack Mother     Hypertension Mother     Colon cancer Father         colon    Dementia Father     Hypertension Father     Colon cancer Brother 63        colon    Heart disease Brother     Hypertension Brother     Diabetes Daughter     Crohn's disease Neg Hx     Ulcerative colitis Neg Hx     Stomach cancer " Neg Hx      Social History     Tobacco Use    Smoking status: Former Smoker     Packs/day: 0.50     Quit date: 2018     Years since quittin.0    Smokeless tobacco: Never Used   Substance Use Topics    Alcohol use: Yes     Frequency: Monthly or less     Drinks per session: 1 or 2     Binge frequency: Never     Comment: rarely    Drug use: No     Review of Systems   Constitutional: Negative for chills and fever.   Respiratory: Negative for shortness of breath.    Cardiovascular: Negative for chest pain.   Gastrointestinal: Positive for abdominal pain, diarrhea, nausea and vomiting. Negative for constipation and rectal pain.   Genitourinary: Negative for dysuria, flank pain, frequency, hematuria and pelvic pain.        Malodorous urine   Musculoskeletal: Negative for back pain.   Skin: Negative for rash and wound.   Neurological: Negative for dizziness, weakness and light-headedness.   Psychiatric/Behavioral: Negative for confusion.       Physical Exam     Initial Vitals [20 1617]   BP Pulse Resp Temp SpO2   (!) 174/98 103 16 98 °F (36.7 °C) 95 %      MAP       --         Physical Exam    Vitals reviewed.  Constitutional: She appears well-developed and well-nourished. She is not diaphoretic. No distress.   HENT:   Head: Normocephalic and atraumatic.   Right Ear: External ear normal.   Left Ear: External ear normal.   Mouth/Throat: Oropharynx is clear and moist.   Eyes: Conjunctivae and EOM are normal. Pupils are equal, round, and reactive to light. No scleral icterus.   Cardiovascular: Normal rate, regular rhythm and intact distal pulses.   Pulmonary/Chest: Breath sounds normal. She has no wheezes. She has no rhonchi. She has no rales.   Abdominal: Soft. Bowel sounds are normal. She exhibits no distension. There is no abdominal tenderness (reports discomfort to periumbilical region, not made worse with deep palpation). There is no rebound and no guarding.   No flank or CVA TTP   Musculoskeletal:  Normal range of motion. No edema.   Neurological: She is alert and oriented to person, place, and time. She has normal strength.   Skin: Skin is warm and dry. Capillary refill takes less than 2 seconds. No rash noted. No erythema. No pallor.   Psychiatric: She has a normal mood and affect. Her behavior is normal. Judgment and thought content normal.         ED Course   Procedures  Labs Reviewed   CBC W/ AUTO DIFFERENTIAL - Abnormal; Notable for the following components:       Result Value    RBC 5.44 (*)     Mean Corpuscular Volume 81 (*)     Mean Corpuscular Hemoglobin 24.6 (*)     Mean Corpuscular Hemoglobin Conc 30.3 (*)     Lymph # 0.9 (*)     All other components within normal limits   COMPREHENSIVE METABOLIC PANEL - Abnormal; Notable for the following components:    Alkaline Phosphatase 145 (*)     All other components within normal limits   URINALYSIS, REFLEX TO URINE CULTURE - Abnormal; Notable for the following components:    Protein, UA 2+ (*)     All other components within normal limits    Narrative:     Specimen Source->Urine   SARS-COV-2 (COVID-19) QUALITATIVE PCR    Narrative:     Is the patient symptomatic?->Yes  Is this needed for pre-procedure or pre-op testing?->No   LACTIC ACID, PLASMA   LIPASE   URINALYSIS MICROSCOPIC    Narrative:     Specimen Source->Urine   TROPONIN I    Narrative:     ADD ON TROP [id number] PER ANGEL WADE  07/05/2020  17:28         ECG Results          EKG 12-lead (In process)  Result time 07/05/20 21:20:01    In process by Interface, Lab In [facility] (07/05/20 21:20:01)                 Narrative:    Test Reason : R10.9,    Vent. Rate : 098 BPM     Atrial Rate : 098 BPM     P-R Int : 152 ms          QRS Dur : 150 ms      QT Int : 408 ms       P-R-T Axes : 063 000 234 degrees     QTc Int : 520 ms    Normal sinus rhythm  Left bundle branch block  Abnormal ECG  When compared with ECG of 24-SEP-2019 13:08,  No significant change was found    Referred By: JONATAN   SELF            Confirmed By:                             Imaging Results    None          Medical Decision Making:   History:   Old Medical Records: I decided to obtain old medical records.  Old Records Summarized: records from clinic visits and records from previous admission(s).  Initial Assessment:   Patient presents for periumbilical abdominal discomfort not made worse w/ palpation, two episodes of vomiting earlier today since resolved. Abdomen soft, passed gas, normal stool output. VSS, afebrile  Differential Diagnosis:   DDx includes cystitis, gastroenteritis, functional abdominal pain, gas. Physical exam and history taking lower clinical suspicion for mesenteric ischemia, SBO, ileus, colitis, pyelonephritis, hepatitis, ACS.  Independently Interpreted Test(s):   I have ordered and independently interpreted EKG Reading(s) - see prior notes  Clinical Tests:   Lab Tests: Ordered and Reviewed  Medical Tests: Ordered and Reviewed  ED Management:  EKG shows continued LBBB, no acute ischemic changes, nml intervals. Trop WNL, given time frame of symptoms, single trop appropriate. Labs are largely unremarkable, lipase and lactate WNL. COVID neg. Given Maalox with mild improvement, nml BM during ED stay. UA with 2+ protein no evidence of infection.   Patient re-examined without change in abdominal exam. Overall, I do not suspect emergent etiology of her symptoms, though she should have close f/u with PCP if symptoms unchanged and unimproved with meds in the next 2-3 days. Given maalox and Pepcid for home use. Discharged in stable condition with strict ED return precautions. Patient agreed to plan of care and voiced understanding.    Tatiana Ramey PA-C  07/07/2020    I discussed the following case, diagnosis and plan of care with attending physician.                                   Clinical Impression:       ICD-10-CM ICD-9-CM   1. Abdominal pain  R10.9 789.00   2. Non-intractable vomiting with nausea, unspecified vomiting  type  R11.2 787.01         Disposition:   Disposition: Discharged  Condition: Stable     ED Disposition Condition    Discharge Stable        ED Prescriptions     Medication Sig Dispense Start Date End Date Auth. Provider    famotidine (PEPCID) 20 MG tablet Take 1 tablet (20 mg total) by mouth once daily. 30 tablet 7/5/2020 8/4/2020 Tatiana Ramey PA-C        Follow-up Information     Follow up With Specialties Details Why Contact Info    [facility] Emergency Medicine Go to  Return to the ER immediately, If symptoms worsen or new symptoms occur [address] [facility] [id number] [phone]    Phil Andrade MD Family Medicine, Sports Medicine Go in 2 days if symptoms continue [address]  [place] [address]  [phone]                                       Tatiana Ramey PA-C  07/07/20 1545

## 2020-07-06 NOTE — DISCHARGE INSTRUCTIONS
Take Pepcid daily. Follow up with Dr. Andrade's office if symptoms do not improve in 2-3 days. Return to ED if you develop worsening pain or uncontrollable vomiting.    Our goal in the emergency department is to always give you outstanding care and exceptional service. You may receive a survey by mail or e-mail in the next week regarding your experience in our ED. We would greatly appreciate your completing and returning the survey. Your feedback provides us with a way to recognize our staff who give very good care and it helps us learn how to improve when your experience was below our aspiration of excellence.

## 2020-07-23 ENCOUNTER — PATIENT OUTREACH (OUTPATIENT)
Dept: OTHER | Facility: OTHER | Age: 75
End: 2020-07-23

## 2020-07-23 NOTE — PROGRESS NOTES
"Digital Medicine: Health  Follow-Up    The history is provided by the patient.             Reason for review: Blood pressure not at goal        Topics Covered on Call: technique, medication adherence, and recent ED visit    Additional Follow-up details: States she is feeling better after ED visit on 7/5.  States she may have eaten something that hurt her stomach.        Diet-Not assessed      Additional diet details:    Physical Activity-Not assessed    Medication Adherence-Medication adherence was asssessed.    Patient reported missing medication: "occasionally".    Adherence tools used: Pill box    Reports she takes her blood pressure readings before medications and if it "120s-130s" then she skips her medication and will try to remember to take it later, but seems to forget. Reports she does this since she does not want BP to drop too low. Discussed purpose of clinicians role in HDMP is to make medication adjustments. Encouraged patient to take BP medications at least 1hr before BP readings to assess how medications are working.  Patient verbalized understanding.  Substance, Sleep, Stress-Not assessed    PLAN  Provided patient education:  Reviewed Device Techniques  Patient verbalizes understanding. Patient did not express questions or concerns and patient has contact information if needed.    Explained the importance of self-monitoring and medication adherence. Encouraged the patient to communicate with their health  for lifestyle modifications to help improve or maintain a healthy lifestyle.        There are no preventive care reminders to display for this patient.    Last 5 Patient Entered Readings                                      Current 30 Day Average: 136/77     Recent Readings 7/23/2020 7/22/2020 7/21/2020 7/21/2020 7/20/2020    SBP (mmHg) 135 133 144 176 135    DBP (mmHg) 76 73 82 96 83    Pulse 73 64 81 89 71               "

## 2020-08-23 DIAGNOSIS — I10 HYPERTENSION, UNSPECIFIED TYPE: ICD-10-CM

## 2020-08-23 RX ORDER — FUROSEMIDE 20 MG/1
20 TABLET ORAL DAILY
Qty: 60 TABLET | Refills: 0 | Status: SHIPPED | OUTPATIENT
Start: 2020-08-23 | End: 2020-11-04 | Stop reason: SDUPTHER

## 2020-08-23 NOTE — TELEPHONE ENCOUNTER
Care Due:                  Date            Visit Type   Department     Provider  --------------------------------------------------------------------------------                                ESTABLISHED   [facility] INTERNAL  Last Visit: 10-      PATIENT      MEDICINE       ELIER FARLEY  Next Visit: None Scheduled  None         None Found                                                            Last  Test          Frequency    Reason                     Performed    Due Date  --------------------------------------------------------------------------------    Office Visit  12 months..  losartan.................  10-   10-    Powered by [employer]. Reference number: [id number]. 8/23/2020 2:37:45 PM CDT

## 2020-08-27 ENCOUNTER — PATIENT OUTREACH (OUTPATIENT)
Dept: OTHER | Facility: OTHER | Age: 75
End: 2020-08-27

## 2020-08-27 NOTE — PROGRESS NOTES
Digital Medicine: Clinician Follow-Up      Follow-up reason(s): lab follow up.     Hypertension    Readings are trending up   Patient is not experiencing signs/symptoms of hypotension.  Patient is not experiencing signs/symptoms of hypertension.      Additional Follow-up details: Patient attributes stress, needing to charge cuff to trend in BP. Patient confirms she recently charged her cuff.    Last 5 Patient Entered Readings                                      Current 30 Day Average: 139/79     Recent Readings 8/26/2020 8/23/2020 8/22/2020 8/20/2020 8/19/2020    SBP (mmHg) 133 132 140 149 157    DBP (mmHg) 73 75 82 78 86    Pulse 69 85 68 73 78            Depression Screening  Did not address depression screening.    Sleep Apnea Screening    Did not address sleep apnea screening.     Medication Affordability Screening  Did not address medication affordability screening.     Medication Adherence-Medication adherence was asssessed.    Patient reported missing medication: once a week.    Patient identified the following reasons for non-adherence:  Patient forgets.          ASSESSMENT(S)  Patients BP average is 139/79 mmHg, which is above goal. Patient's BP goal is less than or equal to 130/80 per 2017 ACC/AHA Hypertension Guidelines.       Hypertension Plan  Continue current therapy. Encouraged medication compliance. Will continue to monitor, WCB in 1 month. If BP remains elevated, will discuss changing losartan 100 to telmisartan 80.  Instructed to charge device. Monthly.  Recommended adherence tool. Alarm.       Addressed any questions or concerns and patient has my contact information if needed prior to next outreach. Patient verbalizes understanding.          Hypertension Medications             furosemide (LASIX) 20 MG tablet Take 1 tablet (20 mg total) by mouth once daily.    losartan (COZAAR) 50 MG tablet Take 2 tablets (100 mg total) by mouth once daily.    metoprolol succinate (TOPROL-XL) 50 MG 24 hr tablet  TAKE 1 TABLET BY MOUTH ONCE DAILY

## 2020-09-18 ENCOUNTER — PATIENT OUTREACH (OUTPATIENT)
Dept: OTHER | Facility: OTHER | Age: 75
End: 2020-09-18

## 2020-09-24 ENCOUNTER — PATIENT OUTREACH (OUTPATIENT)
Dept: OTHER | Facility: OTHER | Age: 75
End: 2020-09-24

## 2020-09-24 DIAGNOSIS — I10 HYPERTENSION, UNSPECIFIED TYPE: Primary | ICD-10-CM

## 2020-09-24 RX ORDER — OLMESARTAN MEDOXOMIL 40 MG/1
40 TABLET ORAL DAILY
Qty: 30 TABLET | Refills: 5 | Status: SHIPPED | OUTPATIENT
Start: 2020-09-24 | End: 2020-10-20

## 2020-09-24 NOTE — PROGRESS NOTES
Digital Medicine: Clinician Follow-Up    The history is provided by the patient.   Follow-up reason(s): routine follow up.     Hypertension    Patient readings are stable   Patient is not experiencing signs/symptoms of hypotension.  Patient is not experiencing signs/symptoms of hypertension.          Last 5 Patient Entered Readings                                      Current 30 Day Average: 140/77     Recent Readings 9/22/2020 9/21/2020 9/20/2020 9/20/2020 9/17/2020    SBP (mmHg) 145 142 122 116 139    DBP (mmHg) 77 84 70 72 86    Pulse 73 84 73 75 73            Depression Screening  Did not address depression screening.    Sleep Apnea Screening    Did not address sleep apnea screening.     Medication Affordability Screening  Did not address medication affordability screening.     Medication Adherence-Medication adherence was asssessed.    Patient reported missing medication: once a week.          ASSESSMENT(S)  Patients BP average is 140/77 mmHg, which is above goal. Patient's BP goal is less than or equal to 130/80 per 2017 ACC/AHA Hypertension Guidelines.     Hypertension Plan  Medication change. Change losartan 100 to olmesartan 40.   Will continue to monitor, WCB in 2 weeks.        Addressed patient questions and patient has my contact information if needed prior to next outreach. Patient verbalizes understanding.

## 2020-10-02 NOTE — PROGRESS NOTES
Digital Medicine: Health  Follow-Up    The history is provided by the patient.             Reason for review: Blood pressure not at goal        Topics Covered on Call: Diet and medication change    Additional Follow-up details: Patient reports she has been feeling tired lately and had a headache this morning.  States she has not been feeling well since adjusting BP medication.  Reports she had a salty meal last night (meatballs and spaghetti).  Epic chatted clinician since patient said she was planning to call clinician.  Informed patient to contact clinician if she does not hear back.          Diet-Change      Dietary Indiscretions:Meatballs and spaghetti sauce. Informed patient about salt content. States she does not drink water.    Intervention(s): DASH diet      Physical Activity-Not assessed    Medication Adherence-Medication adherence was assessed.      Substance, Sleep, Stress-Not assessed      Provided patient education.       Addressed patient questions and patient has my contact information if needed prior to next outreach. Patient verbalizes understanding.      Explained the importance of self-monitoring and medication adherence. Encouraged the patient to communicate with their health  for lifestyle modifications to help improve or maintain a healthy lifestyle.            There are no preventive care reminders to display for this patient.    Last 5 Patient Entered Readings                                      Current 30 Day Average: 139/78     Recent Readings 10/2/2020 10/2/2020 10/1/2020 10/1/2020 10/1/2020    SBP (mmHg) 150 164 144 151 154    DBP (mmHg) 85 100 77 73 87    Pulse 71 82 75 73 86

## 2020-10-05 ENCOUNTER — PATIENT OUTREACH (OUTPATIENT)
Dept: OTHER | Facility: OTHER | Age: 75
End: 2020-10-05

## 2020-10-05 NOTE — PROGRESS NOTES
Digital Medicine: Clinician Follow-Up    Returned patient's call, patient reports olmesartan is causing more fatigue, but admits she has only been taking olmesartan 40 for about 1 week.    The history is provided by the patient.   Follow-up reason(s): medication change follow-up and addressing patient questions/concerns.     Patient is not experiencing signs/symptoms of hypotension.  Patient is not experiencing signs/symptoms of hypertension.          Last 5 Patient Entered Readings                                      Current 30 Day Average: 139/78     Recent Readings 10/5/2020 10/4/2020 10/4/2020 10/4/2020 10/3/2020    SBP (mmHg) 149 149 136 130 124    DBP (mmHg) 77 77 78 65 67    Pulse 64 69 70 71 69                             Medication Adherence-Medication adherence was asssessed.  Patient continue taking medication as prescribed.            ASSESSMENT(S)  Patients BP average is 139/78 mmHg, which is above goal. Patient's BP goal is less than or equal to 130/80.     Hypertension Plan  Continue current therapy.  Provided patient education. Explained it can take 2-3 weeks to see full effect due to change in medication. Explained her body might be acclimating to better controlled BP.  Will continue to monitor, WCB in 2 weeks.        Addressed patient questions and patient has my contact information if needed prior to next outreach. Patient verbalizes understanding.            Hypertension Medications             furosemide (LASIX) 20 MG tablet Take 1 tablet (20 mg total) by mouth once daily.    metoprolol succinate (TOPROL-XL) 50 MG 24 hr tablet TAKE 1 TABLET BY MOUTH ONCE DAILY    olmesartan (BENICAR) 40 MG tablet Take 1 tablet (40 mg total) by mouth once daily.

## 2020-10-20 ENCOUNTER — PATIENT OUTREACH (OUTPATIENT)
Dept: OTHER | Facility: OTHER | Age: 75
End: 2020-10-20

## 2020-10-20 DIAGNOSIS — I10 HYPERTENSION, UNSPECIFIED TYPE: ICD-10-CM

## 2020-10-20 RX ORDER — OLMESARTAN MEDOXOMIL 40 MG/1
20 TABLET ORAL 2 TIMES DAILY
Qty: 30 TABLET | Refills: 5
Start: 2020-10-20 | End: 2020-10-26

## 2020-10-20 NOTE — PROGRESS NOTES
Digital Medicine: Clinician Follow-Up    Called patient since changing losartan 100 to olmesartan to 40, patient c/o dizziness.    Patient questions if digital cuff is faulty.    The history is provided by the patient.   Follow-up reason(s): medication change follow-up.     Patient is experiencing signs/symptoms of hypotension. Dizziness  Patient is not experiencing signs/symptoms of hypertension.            Last 5 Patient Entered Readings                                      Current 30 Day Average: 139/76     Recent Readings 10/16/2020 10/16/2020 10/14/2020 10/13/2020 10/12/2020    SBP (mmHg) 143 150 150 137 131    DBP (mmHg) 73 81 82 73 72    Pulse 73 76 61 68 77                 Depression Screening  Did not address depression screening.    Sleep Apnea Screening    Did not address sleep apnea screening.     Medication Affordability Screening  Did not address medication affordability screening.     Medication Adherence-Medication adherence was asssessed.  Patient continue taking medication as prescribed.            ASSESSMENT(S)  Patients BP average is 139/76 mmHg, which is above goal. Patient's BP goal is less than or equal to 130/80.     Hypertension Plan  Medication change. Change olmesartan 40 daily to 20mg BID.  Encouraged patient to bring digital cuff to [facility] for evaluation.    Will continue to monitor, WCB in 2 weeks.      Addressed patient questions and patient has my contact information if needed prior to next outreach. Patient verbalizes understanding.               Hypertension Medications             furosemide (LASIX) 20 MG tablet Take 1 tablet (20 mg total) by mouth once daily.    metoprolol succinate (TOPROL-XL) 50 MG 24 hr tablet TAKE 1 TABLET BY MOUTH ONCE DAILY    olmesartan (BENICAR) 40 MG tablet Take 0.5 tablets (20 mg total) by mouth 2 (two) times a day.

## 2020-10-26 ENCOUNTER — PATIENT OUTREACH (OUTPATIENT)
Dept: OTHER | Facility: OTHER | Age: 75
End: 2020-10-26

## 2020-10-26 DIAGNOSIS — I10 HYPERTENSION, UNSPECIFIED TYPE: Primary | ICD-10-CM

## 2020-10-26 RX ORDER — LOSARTAN POTASSIUM 100 MG/1
100 TABLET ORAL DAILY
Qty: 30 TABLET | Refills: 11
Start: 2020-10-26 | End: 2021-01-03 | Stop reason: SDUPTHER

## 2020-10-26 NOTE — PROGRESS NOTES
Digital Medicine: Clinician Follow-Up    Patient called, requesting to be changed back to losartan 100. Patient c/o more fatigue with olmesartan.     Patient states she plans to go to the [facility] tomorrow for assistance with digital cuff, received a new phone recently, no readings have transmitted since 10/16/20.    The history is provided by the patient.      Review of patient's allergies indicates:  No Known Allergies  Follow-up reason(s): addressing patient questions/concerns.     Hypertension    Patient's blood pressure is stable.   Patient is not experiencing signs/symptoms of hypotension.  Patient is not experiencing signs/symptoms of hypertension.            Last 5 Patient Entered Readings                                      Current 30 Day Average: 139/76     Recent Readings 10/16/2020 10/16/2020 10/14/2020 10/13/2020 10/12/2020    SBP (mmHg) 143 150 150 137 131    DBP (mmHg) 73 81 82 73 72    Pulse 73 76 61 68 77                 Depression Screening  Did not address depression screening.    Sleep Apnea Screening    Did not address sleep apnea screening.     Medication Affordability Screening  Did not address medication affordability screening.     Medication Adherence-Medication adherence was asssessed.  Patient continue taking medication as prescribed.            ASSESSMENT(S)  Patients BP average is 139/76 mmHg, which is above goal. Patient's BP goal is less than or equal to 130/80.     Hypertension Plan  Hypertension Medication Change. Stop olmesartan 20 and start losartan 100.  Will continue to monitor, WCB in 2-4 weeks. If BP remains elevated, will discuss resuming spironolactone.       Addressed patient questions and patient has my contact information if needed prior to next outreach. Patient verbalizes understanding.               Hypertension Medications             furosemide (LASIX) 20 MG tablet Take 1 tablet (20 mg total) by mouth once daily.    losartan (COZAAR) 100 MG tablet Take 1 tablet (100 mg  total) by mouth once daily.    metoprolol succinate (TOPROL-XL) 50 MG 24 hr tablet TAKE 1 TABLET BY MOUTH ONCE DAILY

## 2020-11-04 ENCOUNTER — TELEPHONE (OUTPATIENT)
Dept: ADMINISTRATIVE | Facility: HOSPITAL | Age: 75
End: 2020-11-04

## 2020-11-04 VITALS — DIASTOLIC BLOOD PRESSURE: 74 MMHG | SYSTOLIC BLOOD PRESSURE: 137 MMHG

## 2020-11-04 DIAGNOSIS — I10 HYPERTENSION, UNSPECIFIED TYPE: ICD-10-CM

## 2020-11-04 RX ORDER — FUROSEMIDE 20 MG/1
20 TABLET ORAL DAILY
Qty: 90 TABLET | Refills: 0 | Status: SHIPPED | OUTPATIENT
Start: 2020-11-04 | End: 2021-01-11

## 2020-11-04 NOTE — TELEPHONE ENCOUNTER
Care Due:                  Date            Visit Type   Department     Provider  --------------------------------------------------------------------------------                                ESTABLISHED   [facility] INTERNAL  Last Visit: 10-      PATIENT      MEDICINE       ELIER FARLEY  Next Visit: None Scheduled  None         None Found                                                            Last  Test          Frequency    Reason                     Performed    Due Date  --------------------------------------------------------------------------------    Office Visit  12 months..  furosemide, losartan.....  10-   10-    Powered by [employer]. Reference number: [id number]. 11/04/2020 10:27:37 AM   CST

## 2020-11-04 NOTE — PROGRESS NOTES
Refill Routing Note   Medication(s) are not appropriate for processing by [facility] for the following reason(s):     - Required vitals are abnormal  - Patient has been hospitalized since the last PCP visit    [facility] actions taken in this encounter: Defer    Will follow up with your staff to schedule appointment after your decision.    Medication-related problems identified: Requires appointment  Medication Therapy Plan: CDMR. NEEDS APPT(ANNUAL/HOSPITAL FOLLOW UP); BP-ELEVATED(158/72) NOT TO [facility] STANDARDS ABNORMAL VITALS, PLEASE ADVISE; HOSPITAL VISIT(7/5/20-Abdominal pain)  Medication reconciliation completed: No   Automatic Epic Generated Protocol Data:    Orders Placed This Encounter    furosemide (LASIX) 20 MG tablet      Requested Prescriptions   Signed Prescriptions Disp Refills    furosemide (LASIX) 20 MG tablet 90 tablet 0     Sig: Take 1 tablet (20 mg total) by mouth once daily.       Cardiovascular:  Diuretics - Loop Failed - 11/4/2020  2:13 PM        Failed - Last BP in normal range within 360 days.     BP Readings from Last 3 Encounters:   11/04/20 137/74   07/05/20 (!) 158/72   10/07/19 134/70              Failed - Office visit in past 12 months or future 90 days     Recent Outpatient Visits            1 year ago Dyspnea, unspecified type    [facility] Phil Andrade MD    1 year ago NICM (nonischemic cardiomyopathy)    [id number] Juliana Ramey NP    1 year ago Centrilobular emphysema    [facility] Howard Martinez MD    1 year ago NICM (nonischemic cardiomyopathy)    [facility] Nehemias Adams MD    1 year ago Hypertension, essential    [facility] Phil Andrade MD                    Passed - Patient is at least 18 years old        Passed - K in normal range and within 360 days     Potassium   Date Value Ref Range Status   07/05/2020 3.9 3.5 - 5.1 mmol/L  Final   08/28/2019 3.5 3.5 - 5.1 mmol/L Final   09/17/2018 3.8 3.5 - 5.1 mmol/L Final              Passed - Na is between 130 and 148 and within 360 days     Sodium   Date Value Ref Range Status   07/05/2020 143 136 - 145 mmol/L Final   08/28/2019 141 136 - 145 mmol/L Final   09/17/2018 138 136 - 145 mmol/L Final              Passed - Cr is 1.4 or below and within 360 days     Creatinine   Date Value Ref Range Status   07/05/2020 0.9 0.5 - 1.4 mg/dL Final   08/28/2019 0.9 0.5 - 1.4 mg/dL Final   09/17/2018 0.9 0.5 - 1.4 mg/dL Final              Passed - eGFR within 360 days     eGFR if non    Date Value Ref Range Status   07/05/2020 >60.0 >60 mL/min/1.73 m^2 Final     Comment:     Calculation used to obtain the estimated glomerular filtration  rate (eGFR) is the CKD-EPI equation.      08/28/2019 >60.0 >60 mL/min/1.73 m^2 Final     Comment:     Calculation used to obtain the estimated glomerular filtration  rate (eGFR) is the CKD-EPI equation.      09/17/2018 >60.0 >60 mL/min/1.73 m^2 Final     Comment:     Calculation used to obtain the estimated glomerular filtration  rate (eGFR) is the CKD-EPI equation.        eGFR if    Date Value Ref Range Status   07/05/2020 >60.0 >60 mL/min/1.73 m^2 Final   08/28/2019 >60.0 >60 mL/min/1.73 m^2 Final   09/17/2018 >60.0 >60 mL/min/1.73 m^2 Final                    Appointments  past 12m or future 3m with PCP    Date Provider   Last Visit   10/7/2019 Phil Andrade MD   Next Visit   11/4/2020 Phil Andrade MD   ED visits in past 90 days: 0        Note composed:2:07 PM 11/05/2020

## 2020-11-05 NOTE — TELEPHONE ENCOUNTER
Provider Staff:     Action is required for this patient.     Please schedule patient for Annual/Hospital follow up.    Thanks!  [facility]     Appointments  past 12m or future 3m with PCP    Date Provider   Last Visit   10/7/2019 Phil Andrade MD   Next Visit   11/4/2020 Phil Andrade MD     Note composed:8:01 AM 11/05/2020

## 2020-11-11 ENCOUNTER — PATIENT OUTREACH (OUTPATIENT)
Dept: OTHER | Facility: OTHER | Age: 75
End: 2020-11-11

## 2020-11-11 NOTE — PROGRESS NOTES
Digital Medicine: Health  Follow-Up    The history is provided by the patient.             Reason for review: Blood pressure not at goal      Additional Follow-up details: BP avg close to goal.  States she is being more consistent with taking BP readings at the same time.            Diet-no change to diet    No change to diet.        Physical Activity-no change to routine  No change to exercise routine.     Medication Adherence-Medication Adherence not addressed.      Substance, Sleep, Stress-Not assessed      Continue current diet/physical activity routine.  Reviewed Device Techniques.     Addressed patient questions and patient has my contact information if needed prior to next outreach. Patient verbalizes understanding.      Explained the importance of self-monitoring and medication adherence. Encouraged the patient to communicate with their health  for lifestyle modifications to help improve or maintain a healthy lifestyle.               There are no preventive care reminders to display for this patient.      Last 5 Patient Entered Readings                                      Current 30 Day Average: 135/76     Recent Readings 11/10/2020 11/10/2020 11/9/2020 11/9/2020 11/8/2020    SBP (mmHg) 120 139 125 152 143    DBP (mmHg) 66 77 65 81 75    Pulse 70 71 70 73 69

## 2020-11-23 ENCOUNTER — PATIENT OUTREACH (OUTPATIENT)
Dept: OTHER | Facility: OTHER | Age: 75
End: 2020-11-23

## 2020-11-23 NOTE — PROGRESS NOTES
Digital Medicine: Clinician Follow-Up    Called patient to follow up regarding HTN digital readings. Patient attributes watching her salt intake, taking her medications daily around the same time, and keeping a charge on her cuff to improved readings. Patient states she has scheduled an annual appointment with PCP, 12/17/20.    The history is provided by the patient.   Follow-up reason(s): routine follow up.     Hypertension    Readings are trending down   Patient is not experiencing signs/symptoms of hypotension.  Patient is not experiencing signs/symptoms of hypertension.        Last 5 Patient Entered Readings                                      Current 30 Day Average: 133/75     Recent Readings 11/22/2020 11/21/2020 11/21/2020 11/20/2020 11/19/2020    SBP (mmHg) 135 133 143 126 139    DBP (mmHg) 70 72 72 73 75    Pulse 70 67 68 65 67            Depression Screening  Did not address depression screening.    Sleep Apnea Screening    Did not address sleep apnea screening.     Medication Affordability Screening  Did not address medication affordability screening.     Medication Adherence-Medication adherence was asssessed.  Patient continue taking medication as prescribed.            ASSESSMENT(S)  Patients BP average is 133/75 mmHg, which is above goal. Patient's BP goal is less than or equal to 130/80.     Hypertension Plan  Continue current therapy.  Instructed patient to call if BP remains > 140/90 or if she begins to experience s/s of hypotension.     Will continue to monitor, WCB in 4-6 weeks.          Addressed patient questions and patient has my contact information if needed prior to next outreach. Patient verbalizes understanding.                 Hypertension Medications             furosemide (LASIX) 20 MG tablet Take 1 tablet (20 mg total) by mouth once daily.    losartan (COZAAR) 100 MG tablet Take 1 tablet (100 mg total) by mouth once daily.    metoprolol succinate (TOPROL-XL) 50 MG 24 hr tablet TAKE  1 TABLET BY MOUTH ONCE DAILY

## 2020-12-17 ENCOUNTER — PATIENT MESSAGE (OUTPATIENT)
Dept: INTERNAL MEDICINE | Facility: CLINIC | Age: 75
End: 2020-12-17

## 2020-12-17 ENCOUNTER — OFFICE VISIT (OUTPATIENT)
Dept: INTERNAL MEDICINE | Facility: CLINIC | Age: 75
End: 2020-12-17
Attending: FAMILY MEDICINE
Payer: COMMERCIAL

## 2020-12-17 ENCOUNTER — HOSPITAL ENCOUNTER (OUTPATIENT)
Dept: RADIOLOGY | Facility: HOSPITAL | Age: 75
Discharge: HOME | End: 2020-12-17
Attending: FAMILY MEDICINE
Payer: COMMERCIAL

## 2020-12-17 VITALS
WEIGHT: 151 LBS | BODY MASS INDEX: 28.51 KG/M2 | OXYGEN SATURATION: 96 % | HEIGHT: 61 IN | HEART RATE: 85 BPM | SYSTOLIC BLOOD PRESSURE: 130 MMHG | DIASTOLIC BLOOD PRESSURE: 70 MMHG

## 2020-12-17 DIAGNOSIS — R06.00 DYSPNEA, UNSPECIFIED TYPE: ICD-10-CM

## 2020-12-17 DIAGNOSIS — Z80.0 FAMILY HISTORY OF COLON CANCER: ICD-10-CM

## 2020-12-17 DIAGNOSIS — J43.9 PULMONARY EMPHYSEMA, UNSPECIFIED EMPHYSEMA TYPE: ICD-10-CM

## 2020-12-17 DIAGNOSIS — Z23 FLU VACCINE NEED: Primary | ICD-10-CM

## 2020-12-17 DIAGNOSIS — E78.5 HYPERLIPIDEMIA, UNSPECIFIED HYPERLIPIDEMIA TYPE: ICD-10-CM

## 2020-12-17 DIAGNOSIS — Z12.31 ENCOUNTER FOR SCREENING MAMMOGRAM FOR MALIGNANT NEOPLASM OF BREAST: ICD-10-CM

## 2020-12-17 DIAGNOSIS — I10 HYPERTENSION, ESSENTIAL: ICD-10-CM

## 2020-12-17 DIAGNOSIS — Z12.11 COLON CANCER SCREENING: ICD-10-CM

## 2020-12-17 DIAGNOSIS — I42.8 NICM (NONISCHEMIC CARDIOMYOPATHY): ICD-10-CM

## 2020-12-17 DIAGNOSIS — K63.5 POLYP OF COLON, UNSPECIFIED PART OF COLON, UNSPECIFIED TYPE: ICD-10-CM

## 2020-12-17 PROCEDURE — 3078F DIAST BP <80 MM HG: CPT | Mod: CPTII,S$GLB,, | Performed by: FAMILY MEDICINE

## 2020-12-17 PROCEDURE — 71046 X-RAY EXAM CHEST 2 VIEWS: CPT | Mod: TC

## 2020-12-17 PROCEDURE — 3075F PR MOST RECENT SYSTOLIC BLOOD PRESS GE 130-139MM HG: ICD-10-PCS | Mod: CPTII,S$GLB,, | Performed by: FAMILY MEDICINE

## 2020-12-17 PROCEDURE — 90694 VACC AIIV4 NO PRSRV 0.5ML IM: CPT | Mod: S$GLB,,, | Performed by: FAMILY MEDICINE

## 2020-12-17 PROCEDURE — 99214 PR OFFICE/OUTPT VISIT, EST, LEVL IV, 30-39 MIN: ICD-10-PCS | Mod: 25,S$GLB,, | Performed by: FAMILY MEDICINE

## 2020-12-17 PROCEDURE — 90471 FLU VACCINE - QUADRIVALENT - ADJUVANTED: ICD-10-PCS | Mod: S$GLB,,, | Performed by: FAMILY MEDICINE

## 2020-12-17 PROCEDURE — 99999 PR PBB SHADOW E&M-EST. PATIENT-LVL V: ICD-10-PCS | Mod: PBBFAC,,, | Performed by: FAMILY MEDICINE

## 2020-12-17 PROCEDURE — 71046 XR CHEST PA AND LATERAL: ICD-10-PCS | Mod: 26,,, | Performed by: RADIOLOGY

## 2020-12-17 PROCEDURE — 99214 OFFICE O/P EST MOD 30 MIN: CPT | Mod: 25,S$GLB,, | Performed by: FAMILY MEDICINE

## 2020-12-17 PROCEDURE — 71046 X-RAY EXAM CHEST 2 VIEWS: CPT | Mod: 26,,, | Performed by: RADIOLOGY

## 2020-12-17 PROCEDURE — 99999 PR PBB SHADOW E&M-EST. PATIENT-LVL V: CPT | Mod: PBBFAC,,, | Performed by: FAMILY MEDICINE

## 2020-12-17 PROCEDURE — 1159F MED LIST DOCD IN RCRD: CPT | Mod: S$GLB,,, | Performed by: FAMILY MEDICINE

## 2020-12-17 PROCEDURE — 3075F SYST BP GE 130 - 139MM HG: CPT | Mod: CPTII,S$GLB,, | Performed by: FAMILY MEDICINE

## 2020-12-17 PROCEDURE — 90471 IMMUNIZATION ADMIN: CPT | Mod: S$GLB,,, | Performed by: FAMILY MEDICINE

## 2020-12-17 PROCEDURE — 90694 FLU VACCINE - QUADRIVALENT - ADJUVANTED: ICD-10-PCS | Mod: S$GLB,,, | Performed by: FAMILY MEDICINE

## 2020-12-17 PROCEDURE — 1159F PR MEDICATION LIST DOCUMENTED IN MEDICAL RECORD: ICD-10-PCS | Mod: S$GLB,,, | Performed by: FAMILY MEDICINE

## 2020-12-17 PROCEDURE — 3078F PR MOST RECENT DIASTOLIC BLOOD PRESSURE < 80 MM HG: ICD-10-PCS | Mod: CPTII,S$GLB,, | Performed by: FAMILY MEDICINE

## 2020-12-17 RX ORDER — BUDESONIDE AND FORMOTEROL FUMARATE DIHYDRATE 160; 4.5 UG/1; UG/1
2 AEROSOL RESPIRATORY (INHALATION) 2 TIMES DAILY
Qty: 10.2 G | Refills: 12 | Status: SHIPPED | OUTPATIENT
Start: 2020-12-17 | End: 2021-01-11

## 2020-12-17 NOTE — PROGRESS NOTES
Subjective:       Patient ID: Selma Hooper is a 75 y.o. female.    Chief Complaint: Shortness of Breath (due to COPD, only while walking )    Chronic shortness of breath with some progression recently.  History of heart failure, not seen recently.  History of COPD currently not taking her inhaler, states did not seem to help her much.  I reviewed her echocardiography, pulmonary function tests and last notes with her cardiologist and pulmonologist.  Approximately 1 block with fatigue now.  States unable to do house work.  Denies significant edema.  No syncope or near-syncope.  States she was seen at [facility] when I reviewed the care everywhere.  Something about an implanted cardio defibrillator.  No followups are noted.    Shortness of Breath  Associated symptoms include wheezing. Pertinent negatives include no abdominal pain, chest pain, fever, headaches, leg swelling, neck pain or rash.     Review of Systems   Constitutional: Negative for chills, fatigue, fever and unexpected weight change.   HENT: Negative for congestion and trouble swallowing.    Eyes: Negative for redness and visual disturbance.   Respiratory: Positive for shortness of breath and wheezing. Negative for cough and chest tightness.    Cardiovascular: Negative for chest pain, palpitations and leg swelling.   Gastrointestinal: Negative for abdominal pain and blood in stool.   Genitourinary: Negative for difficulty urinating, hematuria and menstrual problem.   Musculoskeletal: Negative for arthralgias, back pain, gait problem, joint swelling, myalgias and neck pain.   Skin: Negative for color change and rash.   Neurological: Negative for tremors, speech difficulty, weakness, numbness and headaches.   Hematological: Negative for adenopathy. Does not bruise/bleed easily.   Psychiatric/Behavioral: Negative for behavioral problems, confusion and sleep disturbance. The patient is not nervous/anxious.        Objective:      Physical Exam  Vitals signs and  nursing note reviewed.   Constitutional:       Appearance: She is well-developed. She is not diaphoretic.   HENT:      Head: Normocephalic and atraumatic.   Eyes:      General: No scleral icterus.     Conjunctiva/sclera: Conjunctivae normal.   Neck:      Musculoskeletal: Normal range of motion and neck supple.   Cardiovascular:      Rate and Rhythm: Normal rate.      Heart sounds: Heart sounds are distant. No murmur. No friction rub. No gallop.    Pulmonary:      Effort: Pulmonary effort is normal. No respiratory distress.      Breath sounds: Normal breath sounds. No wheezing or rales.   Abdominal:      General: There is no distension or abdominal bruit.      Tenderness: There is no abdominal tenderness.   Musculoskeletal:         General: No deformity.      Right lower leg: No edema.      Left lower leg: No edema.   Skin:     General: Skin is warm and dry.      Findings: No erythema or rash.   Neurological:      Mental Status: She is alert and oriented to person, place, and time.      Cranial Nerves: No cranial nerve deficit.      Motor: No tremor.      Coordination: Coordination normal.      Gait: Gait normal.   Psychiatric:         Behavior: Behavior normal.         Thought Content: Thought content normal.         Judgment: Judgment normal.         Assessment:       1. Dyspnea, unspecified type    2. NICM (nonischemic cardiomyopathy)    3. Pulmonary emphysema, unspecified emphysema type    4. Family history of colon cancer    5. Polyp of colon, unspecified part of colon, unspecified type    6. Hypertension, essential    7. Hyperlipidemia, unspecified hyperlipidemia type    8. Encounter for screening mammogram for malignant neoplasm of breast    9. Colon cancer screening        Plan:     Medication List with Changes/Refills   Current Medications    CALCIUM CARBONATE (TUMS) 200 MG CALCIUM (500 MG) CHEWABLE TABLET    Take 1 tablet by mouth as needed for Heartburn.    FLUTICASONE (FLONASE) 50 MCG/ACTUATION NASAL  SPRAY    1 spray by Each Nare route daily as needed for Allergies.    FUROSEMIDE (LASIX) 20 MG TABLET    Take 1 tablet (20 mg total) by mouth once daily.    LOSARTAN (COZAAR) 100 MG TABLET    Take 1 tablet (100 mg total) by mouth once daily.    METOPROLOL SUCCINATE (TOPROL-XL) 50 MG 24 HR TABLET    TAKE 1 TABLET BY MOUTH ONCE DAILY   Changed and/or Refilled Medications    Modified Medication Previous Medication    BUDESONIDE-FORMOTEROL 160-4.5 MCG (SYMBICORT) 160-4.5 MCG/ACTUATION HFAA budesonide-formoterol 160-4.5 mcg (SYMBICORT) 160-4.5 mcg/actuation HFAA       Inhale 2 puffs into the lungs 2 (two) times daily.    Inhale 2 puffs into the lungs 2 (two) times daily.   Discontinued Medications    FAMOTIDINE (PEPCID) 20 MG TABLET    Take one tablet by mouth once daily.     Selma was seen today for shortness of breath.    Diagnoses and all orders for this visit:    Dyspnea, unspecified type  -     Ambulatory referral/consult to Cardiology; Future  -     Ambulatory referral/consult to Pulmonology; Future  -     CBC Without Differential; Future  -     X-Ray Chest PA And Lateral; Future    NICM (nonischemic cardiomyopathy)  -     Ambulatory referral/consult to Cardiology; Future    Pulmonary emphysema, unspecified emphysema type  -     Ambulatory referral/consult to Pulmonology; Future  -     budesonide-formoterol 160-4.5 mcg (SYMBICORT) 160-4.5 mcg/actuation HFAA; Inhale 2 puffs into the lungs 2 (two) times daily.  -     X-Ray Chest PA And Lateral; Future    Family history of colon cancer  -     Case Request Endoscopy: COLONOSCOPY    Polyp of colon, unspecified part of colon, unspecified type  -     Case Request Endoscopy: COLONOSCOPY    Hypertension, essential  -     Comprehensive Metabolic Panel; Future    Hyperlipidemia, unspecified hyperlipidemia type  -     Lipid Panel; Future    Encounter for screening mammogram for malignant neoplasm of breast  -     Mammo Digital Screening Bilat w/ Gilberto; Future    Colon cancer  screening  -     Case Request Endoscopy: COLONOSCOPY      See meds, orders, follow up, routing and instructions sections of encounter and AVS. Discussed with patient and provided on AVS.      Patient does not appear to be in acute distress at this time.  Will order follow ups with her consultants and try to restart the Symbicort.

## 2020-12-17 NOTE — PROGRESS NOTES
Used 2 pt identifiers and reviewed allergies prior to giving Fluaad injection in her R/deltoid, VIS given then asked pt to wait 15 mins post injection for s/sx of adverse reactions.

## 2020-12-17 NOTE — PATIENT INSTRUCTIONS
Flu shot today    Schedule lab orders for today.     If not contacted in a couple weeks by colonoscopy scheduling department - call Colonoscopy Scheduling Number - [phone].    Information about cholesterol, high blood pressure and healthy diet and activity recommendations can be found at the following links on the Internet:    http://www.nhlbi.nih.gov/health/health-topics/topics/hbc  http://www.nhlbi.nih.gov/health/educational/lose_wt/index.htm  Http://www.nhlbi.nih.gov/files/docs/public/heart/hbp_low.pdf  http://www.heart.org/HEARTORG/  http://diabetes.org/  https://www.cdc.gov/  Https://healthfinder.gov/  https://health.gov/dietaryguidelines/2015/guidelines/  https://health.gov/paguidelines/second-edition/pdf/Physical_Activity_Guidelines_2nd_edition.pdf

## 2020-12-23 ENCOUNTER — HOSPITAL ENCOUNTER (OUTPATIENT)
Dept: RADIOLOGY | Facility: HOSPITAL | Age: 75
Discharge: HOME | End: 2020-12-23
Attending: FAMILY MEDICINE
Payer: COMMERCIAL

## 2020-12-23 DIAGNOSIS — Z12.31 ENCOUNTER FOR SCREENING MAMMOGRAM FOR MALIGNANT NEOPLASM OF BREAST: ICD-10-CM

## 2020-12-23 PROCEDURE — 77067 MAMMO DIGITAL SCREENING BILAT: ICD-10-PCS | Mod: 26,,, | Performed by: RADIOLOGY

## 2020-12-23 PROCEDURE — 77067 SCR MAMMO BI INCL CAD: CPT | Mod: TC

## 2020-12-23 PROCEDURE — 77067 SCR MAMMO BI INCL CAD: CPT | Mod: 26,,, | Performed by: RADIOLOGY

## 2021-01-03 ENCOUNTER — PATIENT MESSAGE (OUTPATIENT)
Dept: ADMINISTRATIVE | Facility: OTHER | Age: 76
End: 2021-01-03

## 2021-01-07 ENCOUNTER — PATIENT OUTREACH (OUTPATIENT)
Dept: ADMINISTRATIVE | Facility: OTHER | Age: 76
End: 2021-01-07

## 2021-01-11 ENCOUNTER — OFFICE VISIT (OUTPATIENT)
Dept: CARDIOLOGY | Facility: CLINIC | Age: 76
End: 2021-01-11
Attending: FAMILY MEDICINE
Payer: COMMERCIAL

## 2021-01-11 VITALS
DIASTOLIC BLOOD PRESSURE: 71 MMHG | BODY MASS INDEX: 28.39 KG/M2 | OXYGEN SATURATION: 97 % | SYSTOLIC BLOOD PRESSURE: 145 MMHG | HEART RATE: 85 BPM | WEIGHT: 150.38 LBS | HEIGHT: 61 IN

## 2021-01-11 DIAGNOSIS — I10 HYPERTENSION, ESSENTIAL: ICD-10-CM

## 2021-01-11 DIAGNOSIS — I44.7 LBBB (LEFT BUNDLE BRANCH BLOCK): ICD-10-CM

## 2021-01-11 DIAGNOSIS — R06.00 DYSPNEA, UNSPECIFIED TYPE: ICD-10-CM

## 2021-01-11 DIAGNOSIS — I50.22 CHRONIC SYSTOLIC CONGESTIVE HEART FAILURE, NYHA CLASS 3: Primary | ICD-10-CM

## 2021-01-11 DIAGNOSIS — I42.8 NICM (NONISCHEMIC CARDIOMYOPATHY): ICD-10-CM

## 2021-01-11 DIAGNOSIS — Z87.891 EX-SMOKER: ICD-10-CM

## 2021-01-11 PROCEDURE — 3077F SYST BP >= 140 MM HG: CPT | Mod: CPTII,S$GLB,, | Performed by: INTERNAL MEDICINE

## 2021-01-11 PROCEDURE — 99214 OFFICE O/P EST MOD 30 MIN: CPT | Mod: S$GLB,,, | Performed by: INTERNAL MEDICINE

## 2021-01-11 PROCEDURE — 3078F DIAST BP <80 MM HG: CPT | Mod: CPTII,S$GLB,, | Performed by: INTERNAL MEDICINE

## 2021-01-11 PROCEDURE — 1126F AMNT PAIN NOTED NONE PRSNT: CPT | Mod: S$GLB,,, | Performed by: INTERNAL MEDICINE

## 2021-01-11 PROCEDURE — 99214 PR OFFICE/OUTPT VISIT, EST, LEVL IV, 30-39 MIN: ICD-10-PCS | Mod: S$GLB,,, | Performed by: INTERNAL MEDICINE

## 2021-01-11 PROCEDURE — 3078F PR MOST RECENT DIASTOLIC BLOOD PRESSURE < 80 MM HG: ICD-10-PCS | Mod: CPTII,S$GLB,, | Performed by: INTERNAL MEDICINE

## 2021-01-11 PROCEDURE — 99999 PR PBB SHADOW E&M-EST. PATIENT-LVL IV: ICD-10-PCS | Mod: PBBFAC,,, | Performed by: INTERNAL MEDICINE

## 2021-01-11 PROCEDURE — 1126F PR PAIN SEVERITY QUANTIFIED, NO PAIN PRESENT: ICD-10-PCS | Mod: S$GLB,,, | Performed by: INTERNAL MEDICINE

## 2021-01-11 PROCEDURE — 3008F BODY MASS INDEX DOCD: CPT | Mod: CPTII,S$GLB,, | Performed by: INTERNAL MEDICINE

## 2021-01-11 PROCEDURE — 99999 PR PBB SHADOW E&M-EST. PATIENT-LVL IV: CPT | Mod: PBBFAC,,, | Performed by: INTERNAL MEDICINE

## 2021-01-11 PROCEDURE — 1159F MED LIST DOCD IN RCRD: CPT | Mod: S$GLB,,, | Performed by: INTERNAL MEDICINE

## 2021-01-11 PROCEDURE — 1159F PR MEDICATION LIST DOCUMENTED IN MEDICAL RECORD: ICD-10-PCS | Mod: S$GLB,,, | Performed by: INTERNAL MEDICINE

## 2021-01-11 PROCEDURE — 3008F PR BODY MASS INDEX (BMI) DOCUMENTED: ICD-10-PCS | Mod: CPTII,S$GLB,, | Performed by: INTERNAL MEDICINE

## 2021-01-11 PROCEDURE — 3077F PR MOST RECENT SYSTOLIC BLOOD PRESSURE >= 140 MM HG: ICD-10-PCS | Mod: CPTII,S$GLB,, | Performed by: INTERNAL MEDICINE

## 2021-01-18 ENCOUNTER — PATIENT MESSAGE (OUTPATIENT)
Dept: CARDIOLOGY | Facility: CLINIC | Age: 76
End: 2021-01-18

## 2021-01-18 ENCOUNTER — TELEPHONE (OUTPATIENT)
Dept: INTERNAL MEDICINE | Facility: CLINIC | Age: 76
End: 2021-01-18

## 2021-01-18 DIAGNOSIS — R74.8 ALKALINE PHOSPHATASE ELEVATION: Primary | ICD-10-CM

## 2021-01-19 ENCOUNTER — LAB VISIT (OUTPATIENT)
Dept: LAB | Facility: HOSPITAL | Age: 76
End: 2021-01-19
Attending: INTERNAL MEDICINE
Payer: COMMERCIAL

## 2021-01-19 DIAGNOSIS — I50.22 CHRONIC SYSTOLIC CONGESTIVE HEART FAILURE, NYHA CLASS 3: ICD-10-CM

## 2021-01-19 LAB
ALBUMIN SERPL BCP-MCNC: 3.6 G/DL (ref 3.5–5.2)
ALP SERPL-CCNC: 120 U/L (ref 55–135)
ALT SERPL W/O P-5'-P-CCNC: 8 U/L (ref 10–44)
ANION GAP SERPL CALC-SCNC: 9 MMOL/L (ref 8–16)
AST SERPL-CCNC: 13 U/L (ref 10–40)
BILIRUB SERPL-MCNC: 0.3 MG/DL (ref 0.1–1)
BUN SERPL-MCNC: 12 MG/DL (ref 8–23)
CALCIUM SERPL-MCNC: 8.8 MG/DL (ref 8.7–10.5)
CHLORIDE SERPL-SCNC: 108 MMOL/L (ref 95–110)
CO2 SERPL-SCNC: 26 MMOL/L (ref 23–29)
CREAT SERPL-MCNC: 0.8 MG/DL (ref 0.5–1.4)
EST. GFR  (AFRICAN AMERICAN): >60 ML/MIN/1.73 M^2
EST. GFR  (NON AFRICAN AMERICAN): >60 ML/MIN/1.73 M^2
GLUCOSE SERPL-MCNC: 101 MG/DL (ref 70–110)
POTASSIUM SERPL-SCNC: 4.1 MMOL/L (ref 3.5–5.1)
PROT SERPL-MCNC: 6.6 G/DL (ref 6–8.4)
SODIUM SERPL-SCNC: 143 MMOL/L (ref 136–145)

## 2021-01-19 PROCEDURE — 80053 COMPREHEN METABOLIC PANEL: CPT

## 2021-01-19 PROCEDURE — 36415 COLL VENOUS BLD VENIPUNCTURE: CPT

## 2021-01-19 RX ORDER — METOPROLOL SUCCINATE 50 MG/1
50 TABLET, EXTENDED RELEASE ORAL DAILY
Qty: 90 TABLET | Refills: 3 | Status: ON HOLD | OUTPATIENT
Start: 2021-01-19 | End: 2021-04-05 | Stop reason: HOSPADM

## 2021-01-25 ENCOUNTER — TELEPHONE (OUTPATIENT)
Dept: CARDIOLOGY | Facility: CLINIC | Age: 76
End: 2021-01-25

## 2021-02-10 ENCOUNTER — HOSPITAL ENCOUNTER (OUTPATIENT)
Dept: RADIOLOGY | Facility: HOSPITAL | Age: 76
Discharge: HOME | End: 2021-02-10
Attending: FAMILY MEDICINE
Payer: COMMERCIAL

## 2021-02-10 DIAGNOSIS — R74.8 ALKALINE PHOSPHATASE ELEVATION: ICD-10-CM

## 2021-02-10 PROCEDURE — 76700 US EXAM ABDOM COMPLETE: CPT | Mod: 26,,, | Performed by: INTERNAL MEDICINE

## 2021-02-10 PROCEDURE — 76700 US ABDOMEN COMPLETE: ICD-10-PCS | Mod: 26,,, | Performed by: INTERNAL MEDICINE

## 2021-02-10 PROCEDURE — 76700 US EXAM ABDOM COMPLETE: CPT | Mod: TC

## 2021-02-12 ENCOUNTER — PATIENT OUTREACH (OUTPATIENT)
Dept: ADMINISTRATIVE | Facility: OTHER | Age: 76
End: 2021-02-12

## 2021-02-15 ENCOUNTER — OFFICE VISIT (OUTPATIENT)
Dept: HEPATOLOGY | Facility: CLINIC | Age: 76
End: 2021-02-15
Attending: FAMILY MEDICINE
Payer: COMMERCIAL

## 2021-02-15 ENCOUNTER — PATIENT MESSAGE (OUTPATIENT)
Dept: HEPATOLOGY | Facility: CLINIC | Age: 76
End: 2021-02-15

## 2021-02-15 ENCOUNTER — TELEPHONE (OUTPATIENT)
Dept: HEPATOLOGY | Facility: CLINIC | Age: 76
End: 2021-02-15

## 2021-02-15 DIAGNOSIS — R74.8 ALKALINE PHOSPHATASE ELEVATION: ICD-10-CM

## 2021-02-15 PROBLEM — R79.89 ELEVATED LFTS: Status: RESOLVED | Noted: 2018-07-11 | Resolved: 2021-02-15

## 2021-02-15 PROCEDURE — 99204 OFFICE O/P NEW MOD 45 MIN: CPT | Mod: 95,,, | Performed by: NURSE PRACTITIONER

## 2021-02-15 PROCEDURE — 1159F PR MEDICATION LIST DOCUMENTED IN MEDICAL RECORD: ICD-10-PCS | Mod: ,,, | Performed by: NURSE PRACTITIONER

## 2021-02-15 PROCEDURE — 1159F MED LIST DOCD IN RCRD: CPT | Mod: ,,, | Performed by: NURSE PRACTITIONER

## 2021-02-15 PROCEDURE — 99204 PR OFFICE/OUTPT VISIT, NEW, LEVL IV, 45-59 MIN: ICD-10-PCS | Mod: 95,,, | Performed by: NURSE PRACTITIONER

## 2021-02-16 ENCOUNTER — TELEPHONE (OUTPATIENT)
Dept: INTERNAL MEDICINE | Facility: CLINIC | Age: 76
End: 2021-02-16

## 2021-02-24 ENCOUNTER — IMMUNIZATION (OUTPATIENT)
Dept: INTERNAL MEDICINE | Facility: CLINIC | Age: 76
End: 2021-02-24

## 2021-02-24 ENCOUNTER — LAB VISIT (OUTPATIENT)
Dept: LAB | Facility: HOSPITAL | Age: 76
End: 2021-02-24
Attending: NURSE PRACTITIONER
Payer: COMMERCIAL

## 2021-02-24 DIAGNOSIS — Z23 NEED FOR VACCINATION: Primary | ICD-10-CM

## 2021-02-24 DIAGNOSIS — R74.8 ALKALINE PHOSPHATASE ELEVATION: ICD-10-CM

## 2021-02-24 PROCEDURE — 86803 HEPATITIS C AB TEST: CPT

## 2021-02-24 PROCEDURE — 91300 COVID-19, MRNA, LNP-S, PF, 30 MCG/0.3 ML DOSE VACCINE: CPT | Mod: S$GLB,,, | Performed by: INTERNAL MEDICINE

## 2021-02-24 PROCEDURE — 0001A COVID-19, MRNA, LNP-S, PF, 30 MCG/0.3 ML DOSE VACCINE: ICD-10-PCS | Mod: CV19,S$GLB,, | Performed by: INTERNAL MEDICINE

## 2021-02-24 PROCEDURE — 36415 COLL VENOUS BLD VENIPUNCTURE: CPT

## 2021-02-24 PROCEDURE — 91300 COVID-19, MRNA, LNP-S, PF, 30 MCG/0.3 ML DOSE VACCINE: ICD-10-PCS | Mod: S$GLB,,, | Performed by: INTERNAL MEDICINE

## 2021-02-24 PROCEDURE — 0001A COVID-19, MRNA, LNP-S, PF, 30 MCG/0.3 ML DOSE VACCINE: CPT | Mod: CV19,S$GLB,, | Performed by: INTERNAL MEDICINE

## 2021-02-24 PROCEDURE — 86235 NUCLEAR ANTIGEN ANTIBODY: CPT

## 2021-02-24 PROCEDURE — 87340 HEPATITIS B SURFACE AG IA: CPT

## 2021-02-25 LAB
HBV SURFACE AG SERPL QL IA: NEGATIVE
HCV AB SERPL QL IA: NEGATIVE

## 2021-02-26 LAB — MITOCHONDRIA AB TITR SER IF: NORMAL {TITER}

## 2021-02-28 ENCOUNTER — PATIENT MESSAGE (OUTPATIENT)
Dept: ADMINISTRATIVE | Facility: OTHER | Age: 76
End: 2021-02-28

## 2021-03-17 ENCOUNTER — IMMUNIZATION (OUTPATIENT)
Dept: INTERNAL MEDICINE | Facility: CLINIC | Age: 76
End: 2021-03-17
Payer: COMMERCIAL

## 2021-03-17 DIAGNOSIS — Z23 NEED FOR VACCINATION: Primary | ICD-10-CM

## 2021-03-17 PROCEDURE — 91300 COVID-19, MRNA, LNP-S, PF, 30 MCG/0.3 ML DOSE VACCINE: CPT | Mod: PBBFAC | Performed by: INTERNAL MEDICINE

## 2021-03-17 PROCEDURE — 0002A COVID-19, MRNA, LNP-S, PF, 30 MCG/0.3 ML DOSE VACCINE: CPT | Mod: PBBFAC | Performed by: INTERNAL MEDICINE

## 2021-04-02 ENCOUNTER — HOSPITAL ENCOUNTER (INPATIENT)
Facility: HOSPITAL | Age: 76
LOS: 3 days | Discharge: HOME | DRG: 291 | End: 2021-04-05
Attending: EMERGENCY MEDICINE | Admitting: HOSPITALIST
Payer: COMMERCIAL

## 2021-04-02 DIAGNOSIS — E87.20 LACTIC ACIDOSIS: ICD-10-CM

## 2021-04-02 DIAGNOSIS — J43.0 UNILATERAL EMPHYSEMA: ICD-10-CM

## 2021-04-02 DIAGNOSIS — I50.20 HFREF (HEART FAILURE WITH REDUCED EJECTION FRACTION): ICD-10-CM

## 2021-04-02 DIAGNOSIS — R00.0 TACHYCARDIA: ICD-10-CM

## 2021-04-02 DIAGNOSIS — I50.43 ACUTE ON CHRONIC COMBINED SYSTOLIC AND DIASTOLIC CONGESTIVE HEART FAILURE: Primary | ICD-10-CM

## 2021-04-02 DIAGNOSIS — R06.02 SHORTNESS OF BREATH: ICD-10-CM

## 2021-04-02 DIAGNOSIS — J96.01 ACUTE HYPOXEMIC RESPIRATORY FAILURE: ICD-10-CM

## 2021-04-02 PROBLEM — E87.6 HYPOKALEMIA: Status: ACTIVE | Noted: 2021-04-02

## 2021-04-02 PROBLEM — J96.02 ACUTE RESPIRATORY FAILURE WITH HYPERCAPNIA: Status: ACTIVE | Noted: 2021-04-02

## 2021-04-02 PROBLEM — N18.30 CHRONIC KIDNEY DISEASE, STAGE III (MODERATE): Status: ACTIVE | Noted: 2021-04-02

## 2021-04-02 LAB
ALBUMIN SERPL BCP-MCNC: 3.4 G/DL (ref 3.5–5.2)
ALBUMIN SERPL BCP-MCNC: 3.5 G/DL (ref 3.5–5.2)
ALLENS TEST: ABNORMAL
ALP SERPL-CCNC: 139 U/L (ref 55–135)
ALT SERPL W/O P-5'-P-CCNC: 122 U/L (ref 10–44)
ANION GAP SERPL CALC-SCNC: 15 MMOL/L (ref 8–16)
ANION GAP SERPL CALC-SCNC: 9 MMOL/L (ref 8–16)
AST SERPL-CCNC: 154 U/L (ref 10–40)
BACTERIA #/AREA URNS AUTO: NORMAL /HPF
BASOPHILS # BLD AUTO: 0.04 K/UL (ref 0–0.2)
BASOPHILS NFR BLD: 0.7 % (ref 0–1.9)
BILIRUB SERPL-MCNC: 0.4 MG/DL (ref 0.1–1)
BILIRUB UR QL STRIP: NEGATIVE
BNP SERPL-MCNC: 999 PG/ML (ref 0–99)
BUN SERPL-MCNC: 17 MG/DL (ref 8–23)
BUN SERPL-MCNC: 17 MG/DL (ref 8–23)
BUN SERPL-MCNC: 20 MG/DL (ref 6–30)
CALCIUM SERPL-MCNC: 8.3 MG/DL (ref 8.7–10.5)
CALCIUM SERPL-MCNC: 8.6 MG/DL (ref 8.7–10.5)
CHLORIDE SERPL-SCNC: 104 MMOL/L (ref 95–110)
CHLORIDE SERPL-SCNC: 105 MMOL/L (ref 95–110)
CHLORIDE SERPL-SCNC: 108 MMOL/L (ref 95–110)
CLARITY UR REFRACT.AUTO: CLEAR
CO2 SERPL-SCNC: 19 MMOL/L (ref 23–29)
CO2 SERPL-SCNC: 32 MMOL/L (ref 23–29)
COLOR UR AUTO: ABNORMAL
CREAT SERPL-MCNC: 0.9 MG/DL (ref 0.5–1.4)
CREAT SERPL-MCNC: 1 MG/DL (ref 0.5–1.4)
CREAT SERPL-MCNC: 1.1 MG/DL (ref 0.5–1.4)
CTP QC/QA: YES
DELSYS: ABNORMAL
DELSYS: ABNORMAL
DIFFERENTIAL METHOD: ABNORMAL
EOSINOPHIL # BLD AUTO: 0.1 K/UL (ref 0–0.5)
EOSINOPHIL NFR BLD: 1.7 % (ref 0–8)
EP: 6
EP: 6
ERYTHROCYTE [DISTWIDTH] IN BLOOD BY AUTOMATED COUNT: 13.6 % (ref 11.5–14.5)
ERYTHROCYTE [SEDIMENTATION RATE] IN BLOOD BY WESTERGREN METHOD: 16 MM/H
ERYTHROCYTE [SEDIMENTATION RATE] IN BLOOD BY WESTERGREN METHOD: 16 MM/H
EST. GFR  (AFRICAN AMERICAN): 57.6 ML/MIN/1.73 M^2
EST. GFR  (AFRICAN AMERICAN): >60 ML/MIN/1.73 M^2
EST. GFR  (NON AFRICAN AMERICAN): 49.9 ML/MIN/1.73 M^2
EST. GFR  (NON AFRICAN AMERICAN): >60 ML/MIN/1.73 M^2
ESTIMATED AVG GLUCOSE: 103 MG/DL (ref 68–131)
FIO2: 40
FIO2: 40
GLUCOSE SERPL-MCNC: 105 MG/DL (ref 70–110)
GLUCOSE SERPL-MCNC: 258 MG/DL (ref 70–110)
GLUCOSE SERPL-MCNC: 270 MG/DL (ref 70–110)
GLUCOSE UR QL STRIP: NEGATIVE
HBA1C MFR BLD: 5.2 % (ref 4–5.6)
HCO3 UR-SCNC: 26.4 MMOL/L (ref 24–28)
HCO3 UR-SCNC: 28.7 MMOL/L (ref 24–28)
HCT VFR BLD AUTO: 40.5 % (ref 37–48.5)
HCT VFR BLD CALC: 39 %PCV (ref 36–54)
HGB BLD-MCNC: 12.4 G/DL (ref 12–16)
HGB UR QL STRIP: NEGATIVE
HYALINE CASTS UR QL AUTO: 0 /LPF
IMM GRANULOCYTES # BLD AUTO: 0.01 K/UL (ref 0–0.04)
IMM GRANULOCYTES NFR BLD AUTO: 0.2 % (ref 0–0.5)
INR PPP: 1 (ref 0.8–1.2)
IP: 12
IP: 12
KETONES UR QL STRIP: NEGATIVE
LACTATE SERPL-SCNC: 1 MMOL/L (ref 0.5–2.2)
LDH SERPL L TO P-CCNC: 3.1 MMOL/L (ref 0.5–2.2)
LEUKOCYTE ESTERASE UR QL STRIP: NEGATIVE
LYMPHOCYTES # BLD AUTO: 2.1 K/UL (ref 1–4.8)
LYMPHOCYTES NFR BLD: 38.8 % (ref 18–48)
MAGNESIUM SERPL-MCNC: 1.9 MG/DL (ref 1.6–2.6)
MCH RBC QN AUTO: 24.9 PG (ref 27–31)
MCHC RBC AUTO-ENTMCNC: 30.6 G/DL (ref 32–36)
MCV RBC AUTO: 81 FL (ref 82–98)
MICROSCOPIC COMMENT: NORMAL
MODE: ABNORMAL
MODE: ABNORMAL
MONOCYTES # BLD AUTO: 0.4 K/UL (ref 0.3–1)
MONOCYTES NFR BLD: 7.6 % (ref 4–15)
NEUTROPHILS # BLD AUTO: 2.8 K/UL (ref 1.8–7.7)
NEUTROPHILS NFR BLD: 51 % (ref 38–73)
NITRITE UR QL STRIP: NEGATIVE
NRBC BLD-RTO: 0 /100 WBC
PCO2 BLDA: 56.6 MMHG (ref 35–45)
PCO2 BLDA: 60.4 MMHG (ref 35–45)
PH SMN: 7.25 [PH] (ref 7.35–7.45)
PH SMN: 7.31 [PH] (ref 7.35–7.45)
PH UR STRIP: 7 [PH] (ref 5–8)
PHOSPHATE SERPL-MCNC: 2.6 MG/DL (ref 2.7–4.5)
PLATELET # BLD AUTO: 215 K/UL (ref 150–450)
PMV BLD AUTO: 10.9 FL (ref 9.2–12.9)
PO2 BLDA: 40 MMHG (ref 40–60)
PO2 BLDA: 67 MMHG (ref 40–60)
POC BE: -1 MMOL/L
POC BE: 2 MMOL/L
POC IONIZED CALCIUM: 1.14 MMOL/L (ref 1.06–1.42)
POC SATURATED O2: 69 % (ref 95–100)
POC SATURATED O2: 89 % (ref 95–100)
POC TCO2 (MEASURED): 25 MMOL/L (ref 23–29)
POC TCO2: 28 MMOL/L (ref 24–29)
POC TCO2: 30 MMOL/L (ref 24–29)
POTASSIUM BLD-SCNC: 3.4 MMOL/L (ref 3.5–5.1)
POTASSIUM SERPL-SCNC: 3.3 MMOL/L (ref 3.5–5.1)
POTASSIUM SERPL-SCNC: 4 MMOL/L (ref 3.5–5.1)
PROT SERPL-MCNC: 6.5 G/DL (ref 6–8.4)
PROT UR QL STRIP: ABNORMAL
PROTHROMBIN TIME: 10.6 SEC (ref 9–12.5)
RBC # BLD AUTO: 4.98 M/UL (ref 4–5.4)
RBC #/AREA URNS AUTO: 1 /HPF (ref 0–4)
SAMPLE: ABNORMAL
SARS-COV-2 RDRP RESP QL NAA+PROBE: NEGATIVE
SITE: ABNORMAL
SODIUM BLD-SCNC: 143 MMOL/L (ref 136–145)
SODIUM SERPL-SCNC: 142 MMOL/L (ref 136–145)
SODIUM SERPL-SCNC: 146 MMOL/L (ref 136–145)
SP GR UR STRIP: 1 (ref 1–1.03)
SP02: 98
SPONT RATE: 35
SPONT RATE: 35
SQUAMOUS #/AREA URNS AUTO: 0 /HPF
TROPONIN I SERPL DL<=0.01 NG/ML-MCNC: 0.07 NG/ML (ref 0–0.03)
TROPONIN I SERPL DL<=0.01 NG/ML-MCNC: 0.15 NG/ML (ref 0–0.03)
TSH SERPL DL<=0.005 MIU/L-ACNC: 0.99 UIU/ML (ref 0.4–4)
URN SPEC COLLECT METH UR: ABNORMAL
WBC # BLD AUTO: 5.41 K/UL (ref 3.9–12.7)
WBC #/AREA URNS AUTO: 0 /HPF (ref 0–5)

## 2021-04-02 PROCEDURE — 27000190 HC CPAP FULL FACE MASK W/VALVE

## 2021-04-02 PROCEDURE — 99223 1ST HOSP IP/OBS HIGH 75: CPT | Mod: ,,, | Performed by: INTERNAL MEDICINE

## 2021-04-02 PROCEDURE — 99223 1ST HOSP IP/OBS HIGH 75: CPT | Mod: ,,, | Performed by: HOSPITALIST

## 2021-04-02 PROCEDURE — 83036 HEMOGLOBIN GLYCOSYLATED A1C: CPT | Performed by: EMERGENCY MEDICINE

## 2021-04-02 PROCEDURE — 25000003 PHARM REV CODE 250: Performed by: HOSPITALIST

## 2021-04-02 PROCEDURE — 27000221 HC OXYGEN, UP TO 24 HOURS

## 2021-04-02 PROCEDURE — 85025 COMPLETE CBC W/AUTO DIFF WBC: CPT | Performed by: EMERGENCY MEDICINE

## 2021-04-02 PROCEDURE — 84484 ASSAY OF TROPONIN QUANT: CPT | Performed by: HOSPITALIST

## 2021-04-02 PROCEDURE — 36415 COLL VENOUS BLD VENIPUNCTURE: CPT | Performed by: HOSPITALIST

## 2021-04-02 PROCEDURE — 83735 ASSAY OF MAGNESIUM: CPT | Performed by: EMERGENCY MEDICINE

## 2021-04-02 PROCEDURE — 82803 BLOOD GASES ANY COMBINATION: CPT

## 2021-04-02 PROCEDURE — 20600001 HC STEP DOWN PRIVATE ROOM

## 2021-04-02 PROCEDURE — 84443 ASSAY THYROID STIM HORMONE: CPT | Performed by: EMERGENCY MEDICINE

## 2021-04-02 PROCEDURE — 83605 ASSAY OF LACTIC ACID: CPT

## 2021-04-02 PROCEDURE — 99292 PR CRITICAL CARE, ADDL 30 MIN: ICD-10-PCS | Mod: ,,, | Performed by: EMERGENCY MEDICINE

## 2021-04-02 PROCEDURE — 93010 ELECTROCARDIOGRAM REPORT: CPT | Mod: ,,, | Performed by: INTERNAL MEDICINE

## 2021-04-02 PROCEDURE — 87040 BLOOD CULTURE FOR BACTERIA: CPT | Mod: 59 | Performed by: EMERGENCY MEDICINE

## 2021-04-02 PROCEDURE — 99900035 HC TECH TIME PER 15 MIN (STAT)

## 2021-04-02 PROCEDURE — 63600175 PHARM REV CODE 636 W HCPCS: Performed by: NURSE PRACTITIONER

## 2021-04-02 PROCEDURE — 63600175 PHARM REV CODE 636 W HCPCS: Performed by: EMERGENCY MEDICINE

## 2021-04-02 PROCEDURE — 99223 PR INITIAL HOSPITAL CARE,LEVL III: ICD-10-PCS | Mod: ,,, | Performed by: INTERNAL MEDICINE

## 2021-04-02 PROCEDURE — 84484 ASSAY OF TROPONIN QUANT: CPT | Mod: 91 | Performed by: EMERGENCY MEDICINE

## 2021-04-02 PROCEDURE — 93010 EKG 12-LEAD: ICD-10-PCS | Mod: ,,, | Performed by: INTERNAL MEDICINE

## 2021-04-02 PROCEDURE — 99291 CRITICAL CARE FIRST HOUR: CPT

## 2021-04-02 PROCEDURE — 99291 PR CRITICAL CARE, E/M 30-74 MINUTES: ICD-10-PCS | Mod: 25,CR,CS, | Performed by: EMERGENCY MEDICINE

## 2021-04-02 PROCEDURE — U0002 COVID-19 LAB TEST NON-CDC: HCPCS | Performed by: EMERGENCY MEDICINE

## 2021-04-02 PROCEDURE — 93005 ELECTROCARDIOGRAM TRACING: CPT

## 2021-04-02 PROCEDURE — 85610 PROTHROMBIN TIME: CPT | Performed by: EMERGENCY MEDICINE

## 2021-04-02 PROCEDURE — 63600175 PHARM REV CODE 636 W HCPCS: Performed by: HOSPITALIST

## 2021-04-02 PROCEDURE — 80053 COMPREHEN METABOLIC PANEL: CPT | Performed by: EMERGENCY MEDICINE

## 2021-04-02 PROCEDURE — 99292 CRITICAL CARE ADDL 30 MIN: CPT | Mod: ,,, | Performed by: EMERGENCY MEDICINE

## 2021-04-02 PROCEDURE — 99291 CRITICAL CARE FIRST HOUR: CPT | Mod: 25,CR,CS, | Performed by: EMERGENCY MEDICINE

## 2021-04-02 PROCEDURE — 94761 N-INVAS EAR/PLS OXIMETRY MLT: CPT

## 2021-04-02 PROCEDURE — 80069 RENAL FUNCTION PANEL: CPT | Performed by: HOSPITALIST

## 2021-04-02 PROCEDURE — 99292 CRITICAL CARE ADDL 30 MIN: CPT

## 2021-04-02 PROCEDURE — 81001 URINALYSIS AUTO W/SCOPE: CPT | Performed by: HOSPITALIST

## 2021-04-02 PROCEDURE — 99223 PR INITIAL HOSPITAL CARE,LEVL III: ICD-10-PCS | Mod: ,,, | Performed by: HOSPITALIST

## 2021-04-02 PROCEDURE — 83880 ASSAY OF NATRIURETIC PEPTIDE: CPT | Performed by: EMERGENCY MEDICINE

## 2021-04-02 PROCEDURE — 83605 ASSAY OF LACTIC ACID: CPT | Performed by: HOSPITALIST

## 2021-04-02 RX ORDER — FUROSEMIDE 10 MG/ML
40 INJECTION INTRAMUSCULAR; INTRAVENOUS
Status: COMPLETED | OUTPATIENT
Start: 2021-04-02 | End: 2021-04-02

## 2021-04-02 RX ORDER — SODIUM CHLORIDE 0.9 % (FLUSH) 0.9 %
10 SYRINGE (ML) INJECTION
Status: DISCONTINUED | OUTPATIENT
Start: 2021-04-02 | End: 2021-04-05 | Stop reason: HOSPADM

## 2021-04-02 RX ORDER — IPRATROPIUM BROMIDE AND ALBUTEROL SULFATE 2.5; .5 MG/3ML; MG/3ML
3 SOLUTION RESPIRATORY (INHALATION) EVERY 6 HOURS PRN
Status: DISCONTINUED | OUTPATIENT
Start: 2021-04-02 | End: 2021-04-05 | Stop reason: HOSPADM

## 2021-04-02 RX ORDER — NITROGLYCERIN 0.4 MG/1
0.4 TABLET SUBLINGUAL
Status: DISPENSED | OUTPATIENT
Start: 2021-04-02 | End: 2021-04-02

## 2021-04-02 RX ORDER — TALC
6 POWDER (GRAM) TOPICAL NIGHTLY PRN
Status: DISCONTINUED | OUTPATIENT
Start: 2021-04-02 | End: 2021-04-05 | Stop reason: HOSPADM

## 2021-04-02 RX ORDER — IBUPROFEN 200 MG
16 TABLET ORAL
Status: DISCONTINUED | OUTPATIENT
Start: 2021-04-02 | End: 2021-04-05 | Stop reason: HOSPADM

## 2021-04-02 RX ORDER — GLUCAGON 1 MG
1 KIT INJECTION
Status: DISCONTINUED | OUTPATIENT
Start: 2021-04-02 | End: 2021-04-05 | Stop reason: HOSPADM

## 2021-04-02 RX ORDER — ONDANSETRON 2 MG/ML
4 INJECTION INTRAMUSCULAR; INTRAVENOUS EVERY 8 HOURS PRN
Status: DISCONTINUED | OUTPATIENT
Start: 2021-04-02 | End: 2021-04-05 | Stop reason: HOSPADM

## 2021-04-02 RX ORDER — ACETAMINOPHEN 325 MG/1
650 TABLET ORAL EVERY 6 HOURS PRN
Status: DISCONTINUED | OUTPATIENT
Start: 2021-04-02 | End: 2021-04-05 | Stop reason: HOSPADM

## 2021-04-02 RX ORDER — IBUPROFEN 200 MG
24 TABLET ORAL
Status: DISCONTINUED | OUTPATIENT
Start: 2021-04-02 | End: 2021-04-05 | Stop reason: HOSPADM

## 2021-04-02 RX ORDER — FUROSEMIDE 10 MG/ML
40 INJECTION INTRAMUSCULAR; INTRAVENOUS
Status: DISCONTINUED | OUTPATIENT
Start: 2021-04-02 | End: 2021-04-04

## 2021-04-02 RX ADMIN — ACETAMINOPHEN 650 MG: 325 TABLET ORAL at 10:04

## 2021-04-02 RX ADMIN — POTASSIUM BICARBONATE 20 MEQ: 391 TABLET, EFFERVESCENT ORAL at 03:04

## 2021-04-02 RX ADMIN — FUROSEMIDE 40 MG: 10 INJECTION, SOLUTION INTRAMUSCULAR; INTRAVENOUS at 06:04

## 2021-04-02 RX ADMIN — POTASSIUM BICARBONATE 40 MEQ: 391 TABLET, EFFERVESCENT ORAL at 10:04

## 2021-04-02 RX ADMIN — FUROSEMIDE 40 MG: 10 INJECTION, SOLUTION INTRAMUSCULAR; INTRAVENOUS at 10:04

## 2021-04-02 RX ADMIN — ONDANSETRON 4 MG: 2 INJECTION INTRAMUSCULAR; INTRAVENOUS at 10:04

## 2021-04-03 LAB
ALBUMIN SERPL BCP-MCNC: 4.1 G/DL (ref 3.5–5.2)
ALBUMIN SERPL BCP-MCNC: 4.1 G/DL (ref 3.5–5.2)
ALP SERPL-CCNC: 125 U/L (ref 55–135)
ALT SERPL W/O P-5'-P-CCNC: 108 U/L (ref 10–44)
ANION GAP SERPL CALC-SCNC: 14 MMOL/L (ref 8–16)
ANION GAP SERPL CALC-SCNC: 15 MMOL/L (ref 8–16)
AST SERPL-CCNC: 71 U/L (ref 10–40)
BASOPHILS # BLD AUTO: 0.03 K/UL (ref 0–0.2)
BASOPHILS NFR BLD: 0.6 % (ref 0–1.9)
BILIRUB SERPL-MCNC: 0.4 MG/DL (ref 0.1–1)
BUN SERPL-MCNC: 20 MG/DL (ref 8–23)
BUN SERPL-MCNC: 23 MG/DL (ref 8–23)
CALCIUM SERPL-MCNC: 9.5 MG/DL (ref 8.7–10.5)
CALCIUM SERPL-MCNC: 9.7 MG/DL (ref 8.7–10.5)
CHLORIDE SERPL-SCNC: 97 MMOL/L (ref 95–110)
CHLORIDE SERPL-SCNC: 99 MMOL/L (ref 95–110)
CO2 SERPL-SCNC: 30 MMOL/L (ref 23–29)
CO2 SERPL-SCNC: 31 MMOL/L (ref 23–29)
CREAT SERPL-MCNC: 1 MG/DL (ref 0.5–1.4)
CREAT SERPL-MCNC: 1.1 MG/DL (ref 0.5–1.4)
DIFFERENTIAL METHOD: ABNORMAL
EOSINOPHIL # BLD AUTO: 0 K/UL (ref 0–0.5)
EOSINOPHIL NFR BLD: 0.2 % (ref 0–8)
ERYTHROCYTE [DISTWIDTH] IN BLOOD BY AUTOMATED COUNT: 13.5 % (ref 11.5–14.5)
EST. GFR  (AFRICAN AMERICAN): 57.6 ML/MIN/1.73 M^2
EST. GFR  (AFRICAN AMERICAN): >60 ML/MIN/1.73 M^2
EST. GFR  (NON AFRICAN AMERICAN): 49.9 ML/MIN/1.73 M^2
EST. GFR  (NON AFRICAN AMERICAN): 56 ML/MIN/1.73 M^2
GLUCOSE SERPL-MCNC: 142 MG/DL (ref 70–110)
GLUCOSE SERPL-MCNC: 159 MG/DL (ref 70–110)
HCT VFR BLD AUTO: 44.7 % (ref 37–48.5)
HGB BLD-MCNC: 13.7 G/DL (ref 12–16)
IMM GRANULOCYTES # BLD AUTO: 0.01 K/UL (ref 0–0.04)
IMM GRANULOCYTES NFR BLD AUTO: 0.2 % (ref 0–0.5)
LIPASE SERPL-CCNC: 23 U/L (ref 4–60)
LYMPHOCYTES # BLD AUTO: 1 K/UL (ref 1–4.8)
LYMPHOCYTES NFR BLD: 21 % (ref 18–48)
MAGNESIUM SERPL-MCNC: 1.8 MG/DL (ref 1.6–2.6)
MCH RBC QN AUTO: 24.2 PG (ref 27–31)
MCHC RBC AUTO-ENTMCNC: 30.6 G/DL (ref 32–36)
MCV RBC AUTO: 79 FL (ref 82–98)
MONOCYTES # BLD AUTO: 0.3 K/UL (ref 0.3–1)
MONOCYTES NFR BLD: 7.2 % (ref 4–15)
NEUTROPHILS # BLD AUTO: 3.3 K/UL (ref 1.8–7.7)
NEUTROPHILS NFR BLD: 70.8 % (ref 38–73)
NRBC BLD-RTO: 0 /100 WBC
PHOSPHATE SERPL-MCNC: 3.4 MG/DL (ref 2.7–4.5)
PHOSPHATE SERPL-MCNC: 3.8 MG/DL (ref 2.7–4.5)
PLATELET # BLD AUTO: 228 K/UL (ref 150–450)
PMV BLD AUTO: 10.2 FL (ref 9.2–12.9)
POTASSIUM SERPL-SCNC: 3.7 MMOL/L (ref 3.5–5.1)
POTASSIUM SERPL-SCNC: 3.9 MMOL/L (ref 3.5–5.1)
PROT SERPL-MCNC: 8 G/DL (ref 6–8.4)
RBC # BLD AUTO: 5.67 M/UL (ref 4–5.4)
SODIUM SERPL-SCNC: 143 MMOL/L (ref 136–145)
SODIUM SERPL-SCNC: 143 MMOL/L (ref 136–145)
TROPONIN I SERPL DL<=0.01 NG/ML-MCNC: 0.1 NG/ML (ref 0–0.03)
TROPONIN I SERPL DL<=0.01 NG/ML-MCNC: 0.11 NG/ML (ref 0–0.03)
WBC # BLD AUTO: 4.71 K/UL (ref 3.9–12.7)

## 2021-04-03 PROCEDURE — 63600175 PHARM REV CODE 636 W HCPCS: Performed by: HOSPITALIST

## 2021-04-03 PROCEDURE — 83735 ASSAY OF MAGNESIUM: CPT | Performed by: HOSPITALIST

## 2021-04-03 PROCEDURE — 25000003 PHARM REV CODE 250: Performed by: HOSPITALIST

## 2021-04-03 PROCEDURE — 36415 COLL VENOUS BLD VENIPUNCTURE: CPT | Performed by: HOSPITALIST

## 2021-04-03 PROCEDURE — 84484 ASSAY OF TROPONIN QUANT: CPT | Performed by: HOSPITALIST

## 2021-04-03 PROCEDURE — 25000003 PHARM REV CODE 250: Performed by: EMERGENCY MEDICINE

## 2021-04-03 PROCEDURE — 93005 ELECTROCARDIOGRAM TRACING: CPT

## 2021-04-03 PROCEDURE — 20600001 HC STEP DOWN PRIVATE ROOM

## 2021-04-03 PROCEDURE — 93010 EKG 12-LEAD: ICD-10-PCS | Mod: ,,, | Performed by: INTERNAL MEDICINE

## 2021-04-03 PROCEDURE — 84100 ASSAY OF PHOSPHORUS: CPT | Performed by: HOSPITALIST

## 2021-04-03 PROCEDURE — 85025 COMPLETE CBC W/AUTO DIFF WBC: CPT | Performed by: HOSPITALIST

## 2021-04-03 PROCEDURE — 93010 ELECTROCARDIOGRAM REPORT: CPT | Mod: ,,, | Performed by: INTERNAL MEDICINE

## 2021-04-03 PROCEDURE — 83690 ASSAY OF LIPASE: CPT | Performed by: HOSPITALIST

## 2021-04-03 PROCEDURE — 80053 COMPREHEN METABOLIC PANEL: CPT | Performed by: HOSPITALIST

## 2021-04-03 PROCEDURE — 99233 SBSQ HOSP IP/OBS HIGH 50: CPT | Mod: ,,, | Performed by: HOSPITALIST

## 2021-04-03 PROCEDURE — 99233 PR SUBSEQUENT HOSPITAL CARE,LEVL III: ICD-10-PCS | Mod: ,,, | Performed by: HOSPITALIST

## 2021-04-03 PROCEDURE — 63600175 PHARM REV CODE 636 W HCPCS: Performed by: NURSE PRACTITIONER

## 2021-04-03 PROCEDURE — 80069 RENAL FUNCTION PANEL: CPT | Performed by: HOSPITALIST

## 2021-04-03 RX ORDER — MAGNESIUM SULFATE 1 G/100ML
1 INJECTION INTRAVENOUS ONCE
Status: COMPLETED | OUTPATIENT
Start: 2021-04-03 | End: 2021-04-03

## 2021-04-03 RX ORDER — MORPHINE SULFATE 2 MG/ML
1 INJECTION, SOLUTION INTRAMUSCULAR; INTRAVENOUS EVERY 6 HOURS PRN
Status: DISCONTINUED | OUTPATIENT
Start: 2021-04-03 | End: 2021-04-04

## 2021-04-03 RX ORDER — SUCRALFATE 1 G/10ML
1 SUSPENSION ORAL DAILY PRN
Status: DISCONTINUED | OUTPATIENT
Start: 2021-04-03 | End: 2021-04-05 | Stop reason: HOSPADM

## 2021-04-03 RX ADMIN — SUCRALFATE 1 G: 1 SUSPENSION ORAL at 10:04

## 2021-04-03 RX ADMIN — ONDANSETRON 4 MG: 2 INJECTION INTRAMUSCULAR; INTRAVENOUS at 10:04

## 2021-04-03 RX ADMIN — FUROSEMIDE 40 MG: 10 INJECTION, SOLUTION INTRAMUSCULAR; INTRAVENOUS at 05:04

## 2021-04-03 RX ADMIN — MAGNESIUM SULFATE HEPTAHYDRATE 1 G: 500 INJECTION, SOLUTION INTRAMUSCULAR; INTRAVENOUS at 02:04

## 2021-04-03 RX ADMIN — POTASSIUM BICARBONATE 25 MEQ: 977.5 TABLET, EFFERVESCENT ORAL at 10:04

## 2021-04-03 RX ADMIN — FUROSEMIDE 40 MG: 10 INJECTION, SOLUTION INTRAMUSCULAR; INTRAVENOUS at 06:04

## 2021-04-03 RX ADMIN — MORPHINE SULFATE 1 MG: 2 INJECTION, SOLUTION INTRAMUSCULAR; INTRAVENOUS at 10:04

## 2021-04-03 RX ADMIN — MORPHINE SULFATE 1 MG: 2 INJECTION, SOLUTION INTRAMUSCULAR; INTRAVENOUS at 02:04

## 2021-04-03 RX ADMIN — MELATONIN TAB 3 MG 6 MG: 3 TAB at 10:04

## 2021-04-04 LAB
ALBUMIN SERPL BCP-MCNC: 3.8 G/DL (ref 3.5–5.2)
ALBUMIN SERPL BCP-MCNC: 4 G/DL (ref 3.5–5.2)
ALP SERPL-CCNC: 109 U/L (ref 55–135)
ALT SERPL W/O P-5'-P-CCNC: 82 U/L (ref 10–44)
ANION GAP SERPL CALC-SCNC: 12 MMOL/L (ref 8–16)
ANION GAP SERPL CALC-SCNC: 12 MMOL/L (ref 8–16)
AST SERPL-CCNC: 41 U/L (ref 10–40)
BASOPHILS # BLD AUTO: 0.01 K/UL (ref 0–0.2)
BASOPHILS NFR BLD: 0.2 % (ref 0–1.9)
BILIRUB SERPL-MCNC: 0.5 MG/DL (ref 0.1–1)
BUN SERPL-MCNC: 30 MG/DL (ref 8–23)
BUN SERPL-MCNC: 40 MG/DL (ref 8–23)
CALCIUM SERPL-MCNC: 9 MG/DL (ref 8.7–10.5)
CALCIUM SERPL-MCNC: 9.6 MG/DL (ref 8.7–10.5)
CHLORIDE SERPL-SCNC: 91 MMOL/L (ref 95–110)
CHLORIDE SERPL-SCNC: 95 MMOL/L (ref 95–110)
CO2 SERPL-SCNC: 34 MMOL/L (ref 23–29)
CO2 SERPL-SCNC: 35 MMOL/L (ref 23–29)
CREAT SERPL-MCNC: 1.3 MG/DL (ref 0.5–1.4)
CREAT SERPL-MCNC: 1.6 MG/DL (ref 0.5–1.4)
DIFFERENTIAL METHOD: ABNORMAL
EOSINOPHIL # BLD AUTO: 0 K/UL (ref 0–0.5)
EOSINOPHIL NFR BLD: 0.2 % (ref 0–8)
ERYTHROCYTE [DISTWIDTH] IN BLOOD BY AUTOMATED COUNT: 13.6 % (ref 11.5–14.5)
EST. GFR  (AFRICAN AMERICAN): 36.6 ML/MIN/1.73 M^2
EST. GFR  (AFRICAN AMERICAN): 47 ML/MIN/1.73 M^2
EST. GFR  (NON AFRICAN AMERICAN): 31.7 ML/MIN/1.73 M^2
EST. GFR  (NON AFRICAN AMERICAN): 40.8 ML/MIN/1.73 M^2
GLUCOSE SERPL-MCNC: 85 MG/DL (ref 70–110)
GLUCOSE SERPL-MCNC: 87 MG/DL (ref 70–110)
HCT VFR BLD AUTO: 41.1 % (ref 37–48.5)
HGB BLD-MCNC: 12.7 G/DL (ref 12–16)
IMM GRANULOCYTES # BLD AUTO: 0.01 K/UL (ref 0–0.04)
IMM GRANULOCYTES NFR BLD AUTO: 0.2 % (ref 0–0.5)
LYMPHOCYTES # BLD AUTO: 1.1 K/UL (ref 1–4.8)
LYMPHOCYTES NFR BLD: 22.2 % (ref 18–48)
MAGNESIUM SERPL-MCNC: 2.2 MG/DL (ref 1.6–2.6)
MCH RBC QN AUTO: 24 PG (ref 27–31)
MCHC RBC AUTO-ENTMCNC: 30.9 G/DL (ref 32–36)
MCV RBC AUTO: 78 FL (ref 82–98)
MONOCYTES # BLD AUTO: 0.7 K/UL (ref 0.3–1)
MONOCYTES NFR BLD: 14.7 % (ref 4–15)
NEUTROPHILS # BLD AUTO: 3.2 K/UL (ref 1.8–7.7)
NEUTROPHILS NFR BLD: 62.5 % (ref 38–73)
NRBC BLD-RTO: 0 /100 WBC
PHOSPHATE SERPL-MCNC: 3.9 MG/DL (ref 2.7–4.5)
PHOSPHATE SERPL-MCNC: 4.1 MG/DL (ref 2.7–4.5)
PLATELET # BLD AUTO: 245 K/UL (ref 150–450)
PMV BLD AUTO: 10.3 FL (ref 9.2–12.9)
POTASSIUM SERPL-SCNC: 3.7 MMOL/L (ref 3.5–5.1)
POTASSIUM SERPL-SCNC: 4.4 MMOL/L (ref 3.5–5.1)
PROT SERPL-MCNC: 7.4 G/DL (ref 6–8.4)
RBC # BLD AUTO: 5.29 M/UL (ref 4–5.4)
SODIUM SERPL-SCNC: 137 MMOL/L (ref 136–145)
SODIUM SERPL-SCNC: 142 MMOL/L (ref 136–145)
WBC # BLD AUTO: 5.05 K/UL (ref 3.9–12.7)

## 2021-04-04 PROCEDURE — 63600175 PHARM REV CODE 636 W HCPCS: Performed by: HOSPITALIST

## 2021-04-04 PROCEDURE — 99232 SBSQ HOSP IP/OBS MODERATE 35: CPT | Mod: ,,, | Performed by: HOSPITALIST

## 2021-04-04 PROCEDURE — 80053 COMPREHEN METABOLIC PANEL: CPT | Performed by: HOSPITALIST

## 2021-04-04 PROCEDURE — 85025 COMPLETE CBC W/AUTO DIFF WBC: CPT | Performed by: HOSPITALIST

## 2021-04-04 PROCEDURE — 83735 ASSAY OF MAGNESIUM: CPT | Performed by: HOSPITALIST

## 2021-04-04 PROCEDURE — 84100 ASSAY OF PHOSPHORUS: CPT | Performed by: HOSPITALIST

## 2021-04-04 PROCEDURE — 36415 COLL VENOUS BLD VENIPUNCTURE: CPT | Performed by: HOSPITALIST

## 2021-04-04 PROCEDURE — 99232 PR SUBSEQUENT HOSPITAL CARE,LEVL II: ICD-10-PCS | Mod: ,,, | Performed by: HOSPITALIST

## 2021-04-04 PROCEDURE — 20600001 HC STEP DOWN PRIVATE ROOM

## 2021-04-04 PROCEDURE — 25000003 PHARM REV CODE 250: Performed by: HOSPITALIST

## 2021-04-04 PROCEDURE — 80069 RENAL FUNCTION PANEL: CPT | Performed by: HOSPITALIST

## 2021-04-04 RX ORDER — METOPROLOL SUCCINATE 50 MG/1
50 TABLET, EXTENDED RELEASE ORAL DAILY
Status: DISCONTINUED | OUTPATIENT
Start: 2021-04-04 | End: 2021-04-05

## 2021-04-04 RX ORDER — OXYCODONE HYDROCHLORIDE 5 MG/1
5 TABLET ORAL EVERY 6 HOURS PRN
Status: DISCONTINUED | OUTPATIENT
Start: 2021-04-04 | End: 2021-04-04

## 2021-04-04 RX ADMIN — FUROSEMIDE 40 MG: 10 INJECTION, SOLUTION INTRAMUSCULAR; INTRAVENOUS at 05:04

## 2021-04-04 RX ADMIN — METOPROLOL SUCCINATE 50 MG: 50 TABLET, EXTENDED RELEASE ORAL at 02:04

## 2021-04-05 VITALS
HEIGHT: 61 IN | RESPIRATION RATE: 18 BRPM | BODY MASS INDEX: 28.7 KG/M2 | OXYGEN SATURATION: 100 % | DIASTOLIC BLOOD PRESSURE: 70 MMHG | SYSTOLIC BLOOD PRESSURE: 105 MMHG | TEMPERATURE: 97 F | HEART RATE: 78 BPM | WEIGHT: 152 LBS

## 2021-04-05 PROBLEM — E87.6 HYPOKALEMIA: Status: RESOLVED | Noted: 2021-04-02 | Resolved: 2021-04-05

## 2021-04-05 PROBLEM — J96.02 ACUTE RESPIRATORY FAILURE WITH HYPERCAPNIA: Status: RESOLVED | Noted: 2021-04-02 | Resolved: 2021-04-05

## 2021-04-05 PROBLEM — N18.30 CHRONIC KIDNEY DISEASE, STAGE III (MODERATE): Status: RESOLVED | Noted: 2021-04-02 | Resolved: 2021-04-05

## 2021-04-05 LAB
ALBUMIN SERPL BCP-MCNC: 3.7 G/DL (ref 3.5–5.2)
ALBUMIN SERPL BCP-MCNC: 3.8 G/DL (ref 3.5–5.2)
ALP SERPL-CCNC: 104 U/L (ref 55–135)
ALT SERPL W/O P-5'-P-CCNC: 62 U/L (ref 10–44)
ANION GAP SERPL CALC-SCNC: 12 MMOL/L (ref 8–16)
ANION GAP SERPL CALC-SCNC: 12 MMOL/L (ref 8–16)
ANISOCYTOSIS BLD QL SMEAR: SLIGHT
ASCENDING AORTA: 3.37 CM
AST SERPL-CCNC: 34 U/L (ref 10–40)
AV MEAN GRADIENT: 3 MMHG
BASOPHILS # BLD AUTO: 0.04 K/UL (ref 0–0.2)
BASOPHILS NFR BLD: 1 % (ref 0–1.9)
BILIRUB SERPL-MCNC: 0.8 MG/DL (ref 0.1–1)
BSA FOR ECHO PROCEDURE: 1.72 M2
BUN SERPL-MCNC: 43 MG/DL (ref 8–23)
BUN SERPL-MCNC: 51 MG/DL (ref 8–23)
CALCIUM SERPL-MCNC: 8.9 MG/DL (ref 8.7–10.5)
CALCIUM SERPL-MCNC: 9.1 MG/DL (ref 8.7–10.5)
CHLORIDE SERPL-SCNC: 92 MMOL/L (ref 95–110)
CHLORIDE SERPL-SCNC: 92 MMOL/L (ref 95–110)
CO2 SERPL-SCNC: 32 MMOL/L (ref 23–29)
CO2 SERPL-SCNC: 34 MMOL/L (ref 23–29)
CREAT SERPL-MCNC: 1.3 MG/DL (ref 0.5–1.4)
CREAT SERPL-MCNC: 1.5 MG/DL (ref 0.5–1.4)
CV ECHO LV RWT: 0.3 CM
DIFFERENTIAL METHOD: ABNORMAL
DOP CALC LVOT AREA: 3.2 CM2
DOP CALC LVOT DIAMETER: 2.01 CM
DOP CALC LVOT PEAK VEL: 0.56 M/S
DOP CALC LVOT STROKE VOLUME: 27.56 CM3
DOP CALCLVOT PEAK VEL VTI: 8.69 CM
E WAVE DECELERATION TIME: 276.28 MSEC
E/A RATIO: 0.37
E/E' RATIO: 13.6 M/S
ECHO LV POSTERIOR WALL: 0.82 CM (ref 0.6–1.1)
EJECTION FRACTION: 18 %
EOSINOPHIL # BLD AUTO: 0.1 K/UL (ref 0–0.5)
EOSINOPHIL NFR BLD: 2.4 % (ref 0–8)
ERYTHROCYTE [DISTWIDTH] IN BLOOD BY AUTOMATED COUNT: 13.7 % (ref 11.5–14.5)
EST. GFR  (AFRICAN AMERICAN): 39.6 ML/MIN/1.73 M^2
EST. GFR  (AFRICAN AMERICAN): 47 ML/MIN/1.73 M^2
EST. GFR  (NON AFRICAN AMERICAN): 34.3 ML/MIN/1.73 M^2
EST. GFR  (NON AFRICAN AMERICAN): 40.8 ML/MIN/1.73 M^2
FRACTIONAL SHORTENING: 4 % (ref 28–44)
GLUCOSE SERPL-MCNC: 103 MG/DL (ref 70–110)
GLUCOSE SERPL-MCNC: 82 MG/DL (ref 70–110)
HCT VFR BLD AUTO: 43.2 % (ref 37–48.5)
HGB BLD-MCNC: 13.3 G/DL (ref 12–16)
IMM GRANULOCYTES # BLD AUTO: 0.01 K/UL (ref 0–0.04)
IMM GRANULOCYTES NFR BLD AUTO: 0.2 % (ref 0–0.5)
INTERVENTRICULAR SEPTUM: 1.04 CM (ref 0.6–1.1)
LA MAJOR: 4.62 CM
LA MINOR: 4.95 CM
LA WIDTH: 2.85 CM
LEFT ATRIUM SIZE: 3.57 CM
LEFT ATRIUM VOLUME INDEX: 24.6 ML/M2
LEFT ATRIUM VOLUME: 41.33 CM3
LEFT INTERNAL DIMENSION IN SYSTOLE: 5.34 CM (ref 2.1–4)
LEFT VENTRICLE DIASTOLIC VOLUME INDEX: 89.3 ML/M2
LEFT VENTRICLE DIASTOLIC VOLUME: 150.03 ML
LEFT VENTRICLE MASS INDEX: 117 G/M2
LEFT VENTRICLE SYSTOLIC VOLUME INDEX: 82.1 ML/M2
LEFT VENTRICLE SYSTOLIC VOLUME: 137.94 ML
LEFT VENTRICULAR INTERNAL DIMENSION IN DIASTOLE: 5.54 CM (ref 3.5–6)
LEFT VENTRICULAR MASS: 196.28 G
LV LATERAL E/E' RATIO: 17 M/S
LV SEPTAL E/E' RATIO: 11.33 M/S
LYMPHOCYTES # BLD AUTO: 1.7 K/UL (ref 1–4.8)
LYMPHOCYTES NFR BLD: 39.4 % (ref 18–48)
MAGNESIUM SERPL-MCNC: 2.2 MG/DL (ref 1.6–2.6)
MCH RBC QN AUTO: 24.4 PG (ref 27–31)
MCHC RBC AUTO-ENTMCNC: 30.8 G/DL (ref 32–36)
MCV RBC AUTO: 79 FL (ref 82–98)
MONOCYTES # BLD AUTO: 0.5 K/UL (ref 0.3–1)
MONOCYTES NFR BLD: 12.6 % (ref 4–15)
MV PEAK A VEL: 0.92 M/S
MV PEAK E VEL: 0.34 M/S
MV STENOSIS PRESSURE HALF TIME: 80.12 MS
MV VALVE AREA P 1/2 METHOD: 2.75 CM2
NEUTROPHILS # BLD AUTO: 1.9 K/UL (ref 1.8–7.7)
NEUTROPHILS NFR BLD: 44.4 % (ref 38–73)
NRBC BLD-RTO: 0 /100 WBC
PHOSPHATE SERPL-MCNC: 3.8 MG/DL (ref 2.7–4.5)
PHOSPHATE SERPL-MCNC: 4.2 MG/DL (ref 2.7–4.5)
PISA TR MAX VEL: 2.39 M/S
PLATELET # BLD AUTO: 228 K/UL (ref 150–450)
PLATELET BLD QL SMEAR: ABNORMAL
PMV BLD AUTO: 10.2 FL (ref 9.2–12.9)
POTASSIUM SERPL-SCNC: 3.7 MMOL/L (ref 3.5–5.1)
POTASSIUM SERPL-SCNC: 4 MMOL/L (ref 3.5–5.1)
PROT SERPL-MCNC: 6.8 G/DL (ref 6–8.4)
RA MAJOR: 4.15 CM
RA PRESSURE: 3 MMHG
RA WIDTH: 3.22 CM
RBC # BLD AUTO: 5.46 M/UL (ref 4–5.4)
RIGHT VENTRICULAR END-DIASTOLIC DIMENSION: 2.88 CM
SINUS: 2.59 CM
SODIUM SERPL-SCNC: 136 MMOL/L (ref 136–145)
SODIUM SERPL-SCNC: 138 MMOL/L (ref 136–145)
STJ: 2.77 CM
TDI LATERAL: 0.02 M/S
TDI SEPTAL: 0.03 M/S
TDI: 0.03 M/S
TR MAX PG: 23 MMHG
TRICUSPID ANNULAR PLANE SYSTOLIC EXCURSION: 1.85 CM
TV REST PULMONARY ARTERY PRESSURE: 26 MMHG
WBC # BLD AUTO: 4.19 K/UL (ref 3.9–12.7)

## 2021-04-05 PROCEDURE — 80053 COMPREHEN METABOLIC PANEL: CPT | Performed by: HOSPITALIST

## 2021-04-05 PROCEDURE — 85025 COMPLETE CBC W/AUTO DIFF WBC: CPT | Performed by: HOSPITALIST

## 2021-04-05 PROCEDURE — 99239 HOSP IP/OBS DSCHRG MGMT >30: CPT | Mod: ,,, | Performed by: HOSPITALIST

## 2021-04-05 PROCEDURE — 99239 PR HOSPITAL DISCHARGE DAY,>30 MIN: ICD-10-PCS | Mod: ,,, | Performed by: HOSPITALIST

## 2021-04-05 PROCEDURE — 99900035 HC TECH TIME PER 15 MIN (STAT)

## 2021-04-05 PROCEDURE — 25000003 PHARM REV CODE 250: Performed by: HOSPITALIST

## 2021-04-05 PROCEDURE — 83735 ASSAY OF MAGNESIUM: CPT | Performed by: HOSPITALIST

## 2021-04-05 PROCEDURE — 97530 THERAPEUTIC ACTIVITIES: CPT

## 2021-04-05 PROCEDURE — 94761 N-INVAS EAR/PLS OXIMETRY MLT: CPT

## 2021-04-05 PROCEDURE — 84100 ASSAY OF PHOSPHORUS: CPT | Performed by: HOSPITALIST

## 2021-04-05 PROCEDURE — 80069 RENAL FUNCTION PANEL: CPT | Performed by: HOSPITALIST

## 2021-04-05 PROCEDURE — 36415 COLL VENOUS BLD VENIPUNCTURE: CPT | Performed by: HOSPITALIST

## 2021-04-05 PROCEDURE — 97161 PT EVAL LOW COMPLEX 20 MIN: CPT

## 2021-04-05 PROCEDURE — 27000221 HC OXYGEN, UP TO 24 HOURS

## 2021-04-05 RX ORDER — IPRATROPIUM BROMIDE AND ALBUTEROL SULFATE 2.5; .5 MG/3ML; MG/3ML
3 SOLUTION RESPIRATORY (INHALATION) EVERY 6 HOURS PRN
Qty: 180 ML | Refills: 0 | Status: ON HOLD | OUTPATIENT
Start: 2021-04-05 | End: 2022-05-03 | Stop reason: CLARIF

## 2021-04-05 RX ORDER — FLUTICASONE FUROATE AND VILANTEROL TRIFENATATE 100; 25 UG/1; UG/1
1 POWDER RESPIRATORY (INHALATION) DAILY
Qty: 60 EACH | Refills: 3 | Status: SHIPPED | OUTPATIENT
Start: 2021-04-05 | End: 2021-12-16 | Stop reason: SDUPTHER

## 2021-04-05 RX ORDER — VALSARTAN 40 MG/1
20 TABLET ORAL DAILY
Qty: 45 TABLET | Refills: 3 | Status: ON HOLD | OUTPATIENT
Start: 2021-04-06 | End: 2021-12-11 | Stop reason: HOSPADM

## 2021-04-05 RX ORDER — METOPROLOL SUCCINATE 25 MG/1
25 TABLET, EXTENDED RELEASE ORAL DAILY
Status: DISCONTINUED | OUTPATIENT
Start: 2021-04-06 | End: 2021-04-05 | Stop reason: HOSPADM

## 2021-04-05 RX ORDER — FUROSEMIDE 20 MG/1
20 TABLET ORAL DAILY
Status: DISCONTINUED | OUTPATIENT
Start: 2021-04-05 | End: 2021-04-05 | Stop reason: HOSPADM

## 2021-04-05 RX ORDER — FUROSEMIDE 20 MG/1
20 TABLET ORAL DAILY
Qty: 30 TABLET | Refills: 11 | Status: SHIPPED | OUTPATIENT
Start: 2021-04-05 | End: 2021-06-05 | Stop reason: SDUPTHER

## 2021-04-05 RX ORDER — METOPROLOL SUCCINATE 25 MG/1
25 TABLET, EXTENDED RELEASE ORAL DAILY
Qty: 30 TABLET | Refills: 11 | Status: SHIPPED | OUTPATIENT
Start: 2021-04-06 | End: 2022-04-18

## 2021-04-05 RX ADMIN — SACUBITRIL AND VALSARTAN 1 TABLET: 24; 26 TABLET, FILM COATED ORAL at 09:04

## 2021-04-05 RX ADMIN — METOPROLOL SUCCINATE 50 MG: 50 TABLET, EXTENDED RELEASE ORAL at 08:04

## 2021-04-05 RX ADMIN — FUROSEMIDE 20 MG: 20 TABLET ORAL at 09:04

## 2021-04-06 ENCOUNTER — TELEPHONE (OUTPATIENT)
Dept: PULMONOLOGY | Facility: CLINIC | Age: 76
End: 2021-04-06

## 2021-04-06 DIAGNOSIS — J43.2 CENTRILOBULAR EMPHYSEMA: Primary | ICD-10-CM

## 2021-04-07 LAB
BACTERIA BLD CULT: NORMAL
BACTERIA BLD CULT: NORMAL

## 2021-04-09 ENCOUNTER — PATIENT OUTREACH (OUTPATIENT)
Dept: ADMINISTRATIVE | Facility: OTHER | Age: 76
End: 2021-04-09

## 2021-04-12 ENCOUNTER — TELEPHONE (OUTPATIENT)
Dept: CARDIOLOGY | Facility: CLINIC | Age: 76
End: 2021-04-12

## 2021-04-12 ENCOUNTER — LAB VISIT (OUTPATIENT)
Dept: LAB | Facility: HOSPITAL | Age: 76
End: 2021-04-12
Attending: INTERNAL MEDICINE
Payer: COMMERCIAL

## 2021-04-12 ENCOUNTER — OFFICE VISIT (OUTPATIENT)
Dept: CARDIOLOGY | Facility: CLINIC | Age: 76
End: 2021-04-12
Payer: COMMERCIAL

## 2021-04-12 VITALS
WEIGHT: 147.5 LBS | SYSTOLIC BLOOD PRESSURE: 120 MMHG | OXYGEN SATURATION: 98 % | DIASTOLIC BLOOD PRESSURE: 76 MMHG | HEIGHT: 61 IN | BODY MASS INDEX: 27.85 KG/M2 | HEART RATE: 98 BPM

## 2021-04-12 DIAGNOSIS — I50.22 CHRONIC SYSTOLIC CONGESTIVE HEART FAILURE, NYHA CLASS 3: ICD-10-CM

## 2021-04-12 DIAGNOSIS — I50.23 NYHA CLASS 3 ACUTE ON CHRONIC SYSTOLIC HEART FAILURE: ICD-10-CM

## 2021-04-12 DIAGNOSIS — I50.22 CHRONIC SYSTOLIC CONGESTIVE HEART FAILURE, NYHA CLASS 3: Primary | ICD-10-CM

## 2021-04-12 DIAGNOSIS — I42.8 NICM (NONISCHEMIC CARDIOMYOPATHY): ICD-10-CM

## 2021-04-12 DIAGNOSIS — I10 HYPERTENSION, ESSENTIAL: ICD-10-CM

## 2021-04-12 DIAGNOSIS — Z87.891 EX-SMOKER: ICD-10-CM

## 2021-04-12 DIAGNOSIS — I44.7 LBBB (LEFT BUNDLE BRANCH BLOCK): ICD-10-CM

## 2021-04-12 LAB
ALBUMIN SERPL BCP-MCNC: 3.7 G/DL (ref 3.5–5.2)
ALP SERPL-CCNC: 118 U/L (ref 55–135)
ALT SERPL W/O P-5'-P-CCNC: 17 U/L (ref 10–44)
ANION GAP SERPL CALC-SCNC: 6 MMOL/L (ref 8–16)
AST SERPL-CCNC: 15 U/L (ref 10–40)
BILIRUB SERPL-MCNC: 0.3 MG/DL (ref 0.1–1)
BUN SERPL-MCNC: 15 MG/DL (ref 8–23)
CALCIUM SERPL-MCNC: 9.1 MG/DL (ref 8.7–10.5)
CHLORIDE SERPL-SCNC: 103 MMOL/L (ref 95–110)
CO2 SERPL-SCNC: 30 MMOL/L (ref 23–29)
CREAT SERPL-MCNC: 0.9 MG/DL (ref 0.5–1.4)
EST. GFR  (AFRICAN AMERICAN): >60 ML/MIN/1.73 M^2
EST. GFR  (NON AFRICAN AMERICAN): >60 ML/MIN/1.73 M^2
GLUCOSE SERPL-MCNC: 98 MG/DL (ref 70–110)
POTASSIUM SERPL-SCNC: 4.2 MMOL/L (ref 3.5–5.1)
PROT SERPL-MCNC: 6.9 G/DL (ref 6–8.4)
SODIUM SERPL-SCNC: 139 MMOL/L (ref 136–145)

## 2021-04-12 PROCEDURE — 99999 PR PBB SHADOW E&M-EST. PATIENT-LVL IV: ICD-10-PCS | Mod: PBBFAC,,, | Performed by: INTERNAL MEDICINE

## 2021-04-12 PROCEDURE — 3288F PR FALLS RISK ASSESSMENT DOCUMENTED: ICD-10-PCS | Mod: CPTII,S$GLB,, | Performed by: INTERNAL MEDICINE

## 2021-04-12 PROCEDURE — 1159F PR MEDICATION LIST DOCUMENTED IN MEDICAL RECORD: ICD-10-PCS | Mod: S$GLB,,, | Performed by: INTERNAL MEDICINE

## 2021-04-12 PROCEDURE — 1126F PR PAIN SEVERITY QUANTIFIED, NO PAIN PRESENT: ICD-10-PCS | Mod: S$GLB,,, | Performed by: INTERNAL MEDICINE

## 2021-04-12 PROCEDURE — 99999 PR PBB SHADOW E&M-EST. PATIENT-LVL IV: CPT | Mod: PBBFAC,,, | Performed by: INTERNAL MEDICINE

## 2021-04-12 PROCEDURE — 3008F PR BODY MASS INDEX (BMI) DOCUMENTED: ICD-10-PCS | Mod: CPTII,S$GLB,, | Performed by: INTERNAL MEDICINE

## 2021-04-12 PROCEDURE — 80053 COMPREHEN METABOLIC PANEL: CPT | Performed by: INTERNAL MEDICINE

## 2021-04-12 PROCEDURE — 1101F PT FALLS ASSESS-DOCD LE1/YR: CPT | Mod: CPTII,S$GLB,, | Performed by: INTERNAL MEDICINE

## 2021-04-12 PROCEDURE — 1126F AMNT PAIN NOTED NONE PRSNT: CPT | Mod: S$GLB,,, | Performed by: INTERNAL MEDICINE

## 2021-04-12 PROCEDURE — 99215 OFFICE O/P EST HI 40 MIN: CPT | Mod: S$GLB,,, | Performed by: INTERNAL MEDICINE

## 2021-04-12 PROCEDURE — 1159F MED LIST DOCD IN RCRD: CPT | Mod: S$GLB,,, | Performed by: INTERNAL MEDICINE

## 2021-04-12 PROCEDURE — 3288F FALL RISK ASSESSMENT DOCD: CPT | Mod: CPTII,S$GLB,, | Performed by: INTERNAL MEDICINE

## 2021-04-12 PROCEDURE — 3008F BODY MASS INDEX DOCD: CPT | Mod: CPTII,S$GLB,, | Performed by: INTERNAL MEDICINE

## 2021-04-12 PROCEDURE — 36415 COLL VENOUS BLD VENIPUNCTURE: CPT | Performed by: INTERNAL MEDICINE

## 2021-04-12 PROCEDURE — 99215 PR OFFICE/OUTPT VISIT, EST, LEVL V, 40-54 MIN: ICD-10-PCS | Mod: S$GLB,,, | Performed by: INTERNAL MEDICINE

## 2021-04-12 PROCEDURE — 1101F PR PT FALLS ASSESS DOC 0-1 FALLS W/OUT INJ PAST YR: ICD-10-PCS | Mod: CPTII,S$GLB,, | Performed by: INTERNAL MEDICINE

## 2021-04-13 ENCOUNTER — TELEPHONE (OUTPATIENT)
Dept: PULMONOLOGY | Facility: CLINIC | Age: 76
End: 2021-04-13

## 2021-04-13 ENCOUNTER — OFFICE VISIT (OUTPATIENT)
Dept: PULMONOLOGY | Facility: CLINIC | Age: 76
End: 2021-04-13
Payer: COMMERCIAL

## 2021-04-13 VITALS
BODY MASS INDEX: 27.75 KG/M2 | HEIGHT: 61 IN | OXYGEN SATURATION: 95 % | SYSTOLIC BLOOD PRESSURE: 112 MMHG | DIASTOLIC BLOOD PRESSURE: 72 MMHG | WEIGHT: 147 LBS | HEART RATE: 93 BPM

## 2021-04-13 DIAGNOSIS — J96.11 CHRONIC RESPIRATORY FAILURE WITH HYPOXIA: Primary | ICD-10-CM

## 2021-04-13 DIAGNOSIS — I50.42 CHF (CONGESTIVE HEART FAILURE), NYHA CLASS I, CHRONIC, COMBINED: ICD-10-CM

## 2021-04-13 PROCEDURE — 3288F FALL RISK ASSESSMENT DOCD: CPT | Mod: CPTII,S$GLB,, | Performed by: INTERNAL MEDICINE

## 2021-04-13 PROCEDURE — 1126F PR PAIN SEVERITY QUANTIFIED, NO PAIN PRESENT: ICD-10-PCS | Mod: S$GLB,,, | Performed by: INTERNAL MEDICINE

## 2021-04-13 PROCEDURE — 1126F AMNT PAIN NOTED NONE PRSNT: CPT | Mod: S$GLB,,, | Performed by: INTERNAL MEDICINE

## 2021-04-13 PROCEDURE — 3008F BODY MASS INDEX DOCD: CPT | Mod: CPTII,S$GLB,, | Performed by: INTERNAL MEDICINE

## 2021-04-13 PROCEDURE — 3288F PR FALLS RISK ASSESSMENT DOCUMENTED: ICD-10-PCS | Mod: CPTII,S$GLB,, | Performed by: INTERNAL MEDICINE

## 2021-04-13 PROCEDURE — 1159F MED LIST DOCD IN RCRD: CPT | Mod: S$GLB,,, | Performed by: INTERNAL MEDICINE

## 2021-04-13 PROCEDURE — 99999 PR PBB SHADOW E&M-EST. PATIENT-LVL III: ICD-10-PCS | Mod: PBBFAC,,, | Performed by: INTERNAL MEDICINE

## 2021-04-13 PROCEDURE — 99214 PR OFFICE/OUTPT VISIT, EST, LEVL IV, 30-39 MIN: ICD-10-PCS | Mod: S$GLB,,, | Performed by: INTERNAL MEDICINE

## 2021-04-13 PROCEDURE — 1159F PR MEDICATION LIST DOCUMENTED IN MEDICAL RECORD: ICD-10-PCS | Mod: S$GLB,,, | Performed by: INTERNAL MEDICINE

## 2021-04-13 PROCEDURE — 99214 OFFICE O/P EST MOD 30 MIN: CPT | Mod: S$GLB,,, | Performed by: INTERNAL MEDICINE

## 2021-04-13 PROCEDURE — 1101F PT FALLS ASSESS-DOCD LE1/YR: CPT | Mod: CPTII,S$GLB,, | Performed by: INTERNAL MEDICINE

## 2021-04-13 PROCEDURE — 99999 PR PBB SHADOW E&M-EST. PATIENT-LVL III: CPT | Mod: PBBFAC,,, | Performed by: INTERNAL MEDICINE

## 2021-04-13 PROCEDURE — 3008F PR BODY MASS INDEX (BMI) DOCUMENTED: ICD-10-PCS | Mod: CPTII,S$GLB,, | Performed by: INTERNAL MEDICINE

## 2021-04-13 PROCEDURE — 1101F PR PT FALLS ASSESS DOC 0-1 FALLS W/OUT INJ PAST YR: ICD-10-PCS | Mod: CPTII,S$GLB,, | Performed by: INTERNAL MEDICINE

## 2021-04-20 ENCOUNTER — OFFICE VISIT (OUTPATIENT)
Dept: INTERNAL MEDICINE | Facility: CLINIC | Age: 76
End: 2021-04-20
Attending: FAMILY MEDICINE
Payer: COMMERCIAL

## 2021-04-20 VITALS
HEIGHT: 61 IN | DIASTOLIC BLOOD PRESSURE: 66 MMHG | WEIGHT: 146.19 LBS | RESPIRATION RATE: 16 BRPM | SYSTOLIC BLOOD PRESSURE: 110 MMHG | TEMPERATURE: 97 F | OXYGEN SATURATION: 100 % | BODY MASS INDEX: 27.6 KG/M2 | HEART RATE: 75 BPM

## 2021-04-20 DIAGNOSIS — J43.9 PULMONARY EMPHYSEMA, UNSPECIFIED EMPHYSEMA TYPE: ICD-10-CM

## 2021-04-20 DIAGNOSIS — K63.5 POLYP OF COLON, UNSPECIFIED PART OF COLON, UNSPECIFIED TYPE: ICD-10-CM

## 2021-04-20 DIAGNOSIS — Z80.0 FAMILY HISTORY OF COLON CANCER: ICD-10-CM

## 2021-04-20 DIAGNOSIS — I42.8 NICM (NONISCHEMIC CARDIOMYOPATHY): ICD-10-CM

## 2021-04-20 DIAGNOSIS — N95.9 MENOPAUSAL AND PERIMENOPAUSAL DISORDER: ICD-10-CM

## 2021-04-20 DIAGNOSIS — I10 HYPERTENSION, ESSENTIAL: ICD-10-CM

## 2021-04-20 DIAGNOSIS — R06.00 DYSPNEA, UNSPECIFIED TYPE: ICD-10-CM

## 2021-04-20 DIAGNOSIS — I50.43 ACUTE ON CHRONIC COMBINED SYSTOLIC AND DIASTOLIC CONGESTIVE HEART FAILURE: Primary | ICD-10-CM

## 2021-04-20 DIAGNOSIS — Z12.11 COLON CANCER SCREENING: ICD-10-CM

## 2021-04-20 DIAGNOSIS — Z78.9 STATIN INTOLERANCE: ICD-10-CM

## 2021-04-20 DIAGNOSIS — I44.7 LBBB (LEFT BUNDLE BRANCH BLOCK): ICD-10-CM

## 2021-04-20 PROCEDURE — 3288F PR FALLS RISK ASSESSMENT DOCUMENTED: ICD-10-PCS | Mod: CPTII,S$GLB,, | Performed by: FAMILY MEDICINE

## 2021-04-20 PROCEDURE — 3008F BODY MASS INDEX DOCD: CPT | Mod: CPTII,S$GLB,, | Performed by: FAMILY MEDICINE

## 2021-04-20 PROCEDURE — 99214 OFFICE O/P EST MOD 30 MIN: CPT | Mod: S$GLB,,, | Performed by: FAMILY MEDICINE

## 2021-04-20 PROCEDURE — 1159F MED LIST DOCD IN RCRD: CPT | Mod: S$GLB,,, | Performed by: FAMILY MEDICINE

## 2021-04-20 PROCEDURE — 99214 PR OFFICE/OUTPT VISIT, EST, LEVL IV, 30-39 MIN: ICD-10-PCS | Mod: S$GLB,,, | Performed by: FAMILY MEDICINE

## 2021-04-20 PROCEDURE — 1126F AMNT PAIN NOTED NONE PRSNT: CPT | Mod: S$GLB,,, | Performed by: FAMILY MEDICINE

## 2021-04-20 PROCEDURE — 3008F PR BODY MASS INDEX (BMI) DOCUMENTED: ICD-10-PCS | Mod: CPTII,S$GLB,, | Performed by: FAMILY MEDICINE

## 2021-04-20 PROCEDURE — 3288F FALL RISK ASSESSMENT DOCD: CPT | Mod: CPTII,S$GLB,, | Performed by: FAMILY MEDICINE

## 2021-04-20 PROCEDURE — 1101F PR PT FALLS ASSESS DOC 0-1 FALLS W/OUT INJ PAST YR: ICD-10-PCS | Mod: CPTII,S$GLB,, | Performed by: FAMILY MEDICINE

## 2021-04-20 PROCEDURE — 99999 PR PBB SHADOW E&M-EST. PATIENT-LVL V: CPT | Mod: PBBFAC,,, | Performed by: FAMILY MEDICINE

## 2021-04-20 PROCEDURE — 1126F PR PAIN SEVERITY QUANTIFIED, NO PAIN PRESENT: ICD-10-PCS | Mod: S$GLB,,, | Performed by: FAMILY MEDICINE

## 2021-04-20 PROCEDURE — 1159F PR MEDICATION LIST DOCUMENTED IN MEDICAL RECORD: ICD-10-PCS | Mod: S$GLB,,, | Performed by: FAMILY MEDICINE

## 2021-04-20 PROCEDURE — 99999 PR PBB SHADOW E&M-EST. PATIENT-LVL V: ICD-10-PCS | Mod: PBBFAC,,, | Performed by: FAMILY MEDICINE

## 2021-04-20 PROCEDURE — 1101F PT FALLS ASSESS-DOCD LE1/YR: CPT | Mod: CPTII,S$GLB,, | Performed by: FAMILY MEDICINE

## 2021-05-17 ENCOUNTER — PATIENT OUTREACH (OUTPATIENT)
Dept: ADMINISTRATIVE | Facility: OTHER | Age: 76
End: 2021-05-17

## 2021-05-18 ENCOUNTER — HOSPITAL ENCOUNTER (OUTPATIENT)
Dept: PULMONOLOGY | Facility: CLINIC | Age: 76
Discharge: HOME | End: 2021-05-18
Payer: COMMERCIAL

## 2021-05-18 ENCOUNTER — HOSPITAL ENCOUNTER (OUTPATIENT)
Dept: RADIOLOGY | Facility: CLINIC | Age: 76
Discharge: HOME | End: 2021-05-18
Attending: FAMILY MEDICINE
Payer: COMMERCIAL

## 2021-05-18 ENCOUNTER — OFFICE VISIT (OUTPATIENT)
Dept: PULMONOLOGY | Facility: CLINIC | Age: 76
End: 2021-05-18
Payer: COMMERCIAL

## 2021-05-18 VITALS
HEIGHT: 60 IN | BODY MASS INDEX: 28.47 KG/M2 | WEIGHT: 145 LBS | HEIGHT: 60 IN | DIASTOLIC BLOOD PRESSURE: 70 MMHG | WEIGHT: 145 LBS | SYSTOLIC BLOOD PRESSURE: 119 MMHG | OXYGEN SATURATION: 98 % | HEART RATE: 73 BPM | BODY MASS INDEX: 28.47 KG/M2

## 2021-05-18 DIAGNOSIS — J96.11 CHRONIC RESPIRATORY FAILURE WITH HYPOXIA: ICD-10-CM

## 2021-05-18 DIAGNOSIS — N95.9 MENOPAUSAL AND PERIMENOPAUSAL DISORDER: ICD-10-CM

## 2021-05-18 DIAGNOSIS — R06.02 SHORTNESS OF BREATH: Primary | ICD-10-CM

## 2021-05-18 PROCEDURE — 99214 OFFICE O/P EST MOD 30 MIN: CPT | Mod: S$GLB,,, | Performed by: INTERNAL MEDICINE

## 2021-05-18 PROCEDURE — 1159F PR MEDICATION LIST DOCUMENTED IN MEDICAL RECORD: ICD-10-PCS | Mod: S$GLB,,, | Performed by: INTERNAL MEDICINE

## 2021-05-18 PROCEDURE — 77080 DEXA BONE DENSITY SPINE HIP: ICD-10-PCS | Mod: 26,,, | Performed by: INTERNAL MEDICINE

## 2021-05-18 PROCEDURE — 3008F PR BODY MASS INDEX (BMI) DOCUMENTED: ICD-10-PCS | Mod: CPTII,S$GLB,, | Performed by: INTERNAL MEDICINE

## 2021-05-18 PROCEDURE — 77080 DXA BONE DENSITY AXIAL: CPT | Mod: 26,,, | Performed by: INTERNAL MEDICINE

## 2021-05-18 PROCEDURE — 94618 PULMONARY STRESS TESTING: ICD-10-PCS | Mod: S$GLB,,, | Performed by: INTERNAL MEDICINE

## 2021-05-18 PROCEDURE — 1126F AMNT PAIN NOTED NONE PRSNT: CPT | Mod: S$GLB,,, | Performed by: INTERNAL MEDICINE

## 2021-05-18 PROCEDURE — 77080 DXA BONE DENSITY AXIAL: CPT | Mod: TC

## 2021-05-18 PROCEDURE — 1159F MED LIST DOCD IN RCRD: CPT | Mod: S$GLB,,, | Performed by: INTERNAL MEDICINE

## 2021-05-18 PROCEDURE — 3288F PR FALLS RISK ASSESSMENT DOCUMENTED: ICD-10-PCS | Mod: CPTII,S$GLB,, | Performed by: INTERNAL MEDICINE

## 2021-05-18 PROCEDURE — 1101F PT FALLS ASSESS-DOCD LE1/YR: CPT | Mod: CPTII,S$GLB,, | Performed by: INTERNAL MEDICINE

## 2021-05-18 PROCEDURE — 3288F FALL RISK ASSESSMENT DOCD: CPT | Mod: CPTII,S$GLB,, | Performed by: INTERNAL MEDICINE

## 2021-05-18 PROCEDURE — 99214 PR OFFICE/OUTPT VISIT, EST, LEVL IV, 30-39 MIN: ICD-10-PCS | Mod: S$GLB,,, | Performed by: INTERNAL MEDICINE

## 2021-05-18 PROCEDURE — 99999 PR PBB SHADOW E&M-EST. PATIENT-LVL III: CPT | Mod: PBBFAC,,, | Performed by: INTERNAL MEDICINE

## 2021-05-18 PROCEDURE — 3008F BODY MASS INDEX DOCD: CPT | Mod: CPTII,S$GLB,, | Performed by: INTERNAL MEDICINE

## 2021-05-18 PROCEDURE — 1126F PR PAIN SEVERITY QUANTIFIED, NO PAIN PRESENT: ICD-10-PCS | Mod: S$GLB,,, | Performed by: INTERNAL MEDICINE

## 2021-05-18 PROCEDURE — 94618 PULMONARY STRESS TESTING: CPT | Mod: S$GLB,,, | Performed by: INTERNAL MEDICINE

## 2021-05-18 PROCEDURE — 1101F PR PT FALLS ASSESS DOC 0-1 FALLS W/OUT INJ PAST YR: ICD-10-PCS | Mod: CPTII,S$GLB,, | Performed by: INTERNAL MEDICINE

## 2021-05-18 PROCEDURE — 99999 PR PBB SHADOW E&M-EST. PATIENT-LVL III: ICD-10-PCS | Mod: PBBFAC,,, | Performed by: INTERNAL MEDICINE

## 2021-05-24 ENCOUNTER — PATIENT MESSAGE (OUTPATIENT)
Dept: CARDIOLOGY | Facility: CLINIC | Age: 76
End: 2021-05-24

## 2021-05-24 ENCOUNTER — PATIENT MESSAGE (OUTPATIENT)
Dept: PULMONOLOGY | Facility: CLINIC | Age: 76
End: 2021-05-24

## 2021-06-01 ENCOUNTER — TELEPHONE (OUTPATIENT)
Dept: PULMONOLOGY | Facility: CLINIC | Age: 76
End: 2021-06-01

## 2021-06-04 DIAGNOSIS — I10 HYPERTENSION, UNSPECIFIED TYPE: ICD-10-CM

## 2021-06-05 RX ORDER — FUROSEMIDE 20 MG/1
20 TABLET ORAL DAILY
Qty: 90 TABLET | Refills: 0 | Status: SHIPPED | OUTPATIENT
Start: 2021-06-05 | End: 2021-09-02 | Stop reason: SDUPTHER

## 2021-06-16 DIAGNOSIS — Z12.11 SPECIAL SCREENING FOR MALIGNANT NEOPLASMS, COLON: Primary | ICD-10-CM

## 2021-06-16 RX ORDER — SODIUM, POTASSIUM,MAG SULFATES 17.5-3.13G
1 SOLUTION, RECONSTITUTED, ORAL ORAL DAILY
Qty: 1 KIT | Refills: 0 | Status: SHIPPED | OUTPATIENT
Start: 2021-06-16 | End: 2021-07-04

## 2021-08-16 ENCOUNTER — OFFICE VISIT (OUTPATIENT)
Dept: INTERNAL MEDICINE | Facility: CLINIC | Age: 76
End: 2021-08-16
Attending: FAMILY MEDICINE
Payer: COMMERCIAL

## 2021-08-16 VITALS
WEIGHT: 149.56 LBS | BODY MASS INDEX: 29.36 KG/M2 | DIASTOLIC BLOOD PRESSURE: 72 MMHG | SYSTOLIC BLOOD PRESSURE: 128 MMHG | HEIGHT: 60 IN | HEART RATE: 77 BPM | OXYGEN SATURATION: 95 %

## 2021-08-16 DIAGNOSIS — Z88.8 ALLERGY TO STATIN MEDICATION: ICD-10-CM

## 2021-08-16 DIAGNOSIS — I42.8 NICM (NONISCHEMIC CARDIOMYOPATHY): ICD-10-CM

## 2021-08-16 DIAGNOSIS — R06.02 SHORTNESS OF BREATH: ICD-10-CM

## 2021-08-16 DIAGNOSIS — I50.43 ACUTE ON CHRONIC COMBINED SYSTOLIC AND DIASTOLIC CONGESTIVE HEART FAILURE: Primary | ICD-10-CM

## 2021-08-16 DIAGNOSIS — Z78.9 STATIN INTOLERANCE: ICD-10-CM

## 2021-08-16 PROCEDURE — 1159F PR MEDICATION LIST DOCUMENTED IN MEDICAL RECORD: ICD-10-PCS | Mod: CPTII,S$GLB,, | Performed by: FAMILY MEDICINE

## 2021-08-16 PROCEDURE — 1160F RVW MEDS BY RX/DR IN RCRD: CPT | Mod: CPTII,S$GLB,, | Performed by: FAMILY MEDICINE

## 2021-08-16 PROCEDURE — 3074F SYST BP LT 130 MM HG: CPT | Mod: CPTII,S$GLB,, | Performed by: FAMILY MEDICINE

## 2021-08-16 PROCEDURE — 1159F MED LIST DOCD IN RCRD: CPT | Mod: CPTII,S$GLB,, | Performed by: FAMILY MEDICINE

## 2021-08-16 PROCEDURE — 99214 OFFICE O/P EST MOD 30 MIN: CPT | Mod: S$GLB,,, | Performed by: FAMILY MEDICINE

## 2021-08-16 PROCEDURE — 1126F AMNT PAIN NOTED NONE PRSNT: CPT | Mod: CPTII,S$GLB,, | Performed by: FAMILY MEDICINE

## 2021-08-16 PROCEDURE — 3008F BODY MASS INDEX DOCD: CPT | Mod: CPTII,S$GLB,, | Performed by: FAMILY MEDICINE

## 2021-08-16 PROCEDURE — 3074F PR MOST RECENT SYSTOLIC BLOOD PRESSURE < 130 MM HG: ICD-10-PCS | Mod: CPTII,S$GLB,, | Performed by: FAMILY MEDICINE

## 2021-08-16 PROCEDURE — 3008F PR BODY MASS INDEX (BMI) DOCUMENTED: ICD-10-PCS | Mod: CPTII,S$GLB,, | Performed by: FAMILY MEDICINE

## 2021-08-16 PROCEDURE — 99999 PR PBB SHADOW E&M-EST. PATIENT-LVL IV: ICD-10-PCS | Mod: PBBFAC,,, | Performed by: FAMILY MEDICINE

## 2021-08-16 PROCEDURE — 99214 PR OFFICE/OUTPT VISIT, EST, LEVL IV, 30-39 MIN: ICD-10-PCS | Mod: S$GLB,,, | Performed by: FAMILY MEDICINE

## 2021-08-16 PROCEDURE — 99999 PR PBB SHADOW E&M-EST. PATIENT-LVL IV: CPT | Mod: PBBFAC,,, | Performed by: FAMILY MEDICINE

## 2021-08-16 PROCEDURE — 3078F DIAST BP <80 MM HG: CPT | Mod: CPTII,S$GLB,, | Performed by: FAMILY MEDICINE

## 2021-08-16 PROCEDURE — 1126F PR PAIN SEVERITY QUANTIFIED, NO PAIN PRESENT: ICD-10-PCS | Mod: CPTII,S$GLB,, | Performed by: FAMILY MEDICINE

## 2021-08-16 PROCEDURE — 3288F PR FALLS RISK ASSESSMENT DOCUMENTED: ICD-10-PCS | Mod: CPTII,S$GLB,, | Performed by: FAMILY MEDICINE

## 2021-08-16 PROCEDURE — 3044F HG A1C LEVEL LT 7.0%: CPT | Mod: CPTII,S$GLB,, | Performed by: FAMILY MEDICINE

## 2021-08-16 PROCEDURE — 3044F PR MOST RECENT HEMOGLOBIN A1C LEVEL <7.0%: ICD-10-PCS | Mod: CPTII,S$GLB,, | Performed by: FAMILY MEDICINE

## 2021-08-16 PROCEDURE — 1101F PT FALLS ASSESS-DOCD LE1/YR: CPT | Mod: CPTII,S$GLB,, | Performed by: FAMILY MEDICINE

## 2021-08-16 PROCEDURE — 3288F FALL RISK ASSESSMENT DOCD: CPT | Mod: CPTII,S$GLB,, | Performed by: FAMILY MEDICINE

## 2021-08-16 PROCEDURE — 3078F PR MOST RECENT DIASTOLIC BLOOD PRESSURE < 80 MM HG: ICD-10-PCS | Mod: CPTII,S$GLB,, | Performed by: FAMILY MEDICINE

## 2021-08-16 PROCEDURE — 1160F PR REVIEW ALL MEDS BY PRESCRIBER/CLIN PHARMACIST DOCUMENTED: ICD-10-PCS | Mod: CPTII,S$GLB,, | Performed by: FAMILY MEDICINE

## 2021-08-16 PROCEDURE — 1101F PR PT FALLS ASSESS DOC 0-1 FALLS W/OUT INJ PAST YR: ICD-10-PCS | Mod: CPTII,S$GLB,, | Performed by: FAMILY MEDICINE

## 2021-08-19 ENCOUNTER — TELEPHONE (OUTPATIENT)
Dept: GASTROENTEROLOGY | Facility: CLINIC | Age: 76
End: 2021-08-19

## 2021-08-19 ENCOUNTER — PATIENT MESSAGE (OUTPATIENT)
Dept: ENDOSCOPY | Facility: HOSPITAL | Age: 76
End: 2021-08-19

## 2021-08-19 DIAGNOSIS — Z01.818 PRE-OP TESTING: ICD-10-CM

## 2021-08-20 ENCOUNTER — LAB VISIT (OUTPATIENT)
Dept: SPORTS MEDICINE | Facility: CLINIC | Age: 76
End: 2021-08-20
Payer: COMMERCIAL

## 2021-08-20 DIAGNOSIS — Z01.818 PRE-OP TESTING: ICD-10-CM

## 2021-08-20 PROCEDURE — U0003 INFECTIOUS AGENT DETECTION BY NUCLEIC ACID (DNA OR RNA); SEVERE ACUTE RESPIRATORY SYNDROME CORONAVIRUS 2 (SARS-COV-2) (CORONAVIRUS DISEASE [COVID-19]), AMPLIFIED PROBE TECHNIQUE, MAKING USE OF HIGH THROUGHPUT TECHNOLOGIES AS DESCRIBED BY CMS-2020-01-R: HCPCS | Performed by: FAMILY MEDICINE

## 2021-08-20 PROCEDURE — U0005 INFEC AGEN DETEC AMPLI PROBE: HCPCS | Performed by: FAMILY MEDICINE

## 2021-08-21 LAB
SARS-COV-2 RNA RESP QL NAA+PROBE: NOT DETECTED
SARS-COV-2- CYCLE NUMBER: -1

## 2021-08-23 ENCOUNTER — HOSPITAL ENCOUNTER (OUTPATIENT)
Facility: HOSPITAL | Age: 76
Discharge: HOME | End: 2021-08-23
Attending: INTERNAL MEDICINE | Admitting: INTERNAL MEDICINE
Payer: COMMERCIAL

## 2021-08-23 ENCOUNTER — ANESTHESIA EVENT (OUTPATIENT)
Dept: ENDOSCOPY | Facility: HOSPITAL | Age: 76
End: 2021-08-23
Payer: COMMERCIAL

## 2021-08-23 ENCOUNTER — ANESTHESIA (OUTPATIENT)
Dept: ENDOSCOPY | Facility: HOSPITAL | Age: 76
End: 2021-08-23
Payer: COMMERCIAL

## 2021-08-23 VITALS
OXYGEN SATURATION: 95 % | DIASTOLIC BLOOD PRESSURE: 67 MMHG | HEIGHT: 61 IN | BODY MASS INDEX: 27.75 KG/M2 | WEIGHT: 147 LBS | HEART RATE: 74 BPM | RESPIRATION RATE: 19 BRPM | TEMPERATURE: 98 F | SYSTOLIC BLOOD PRESSURE: 116 MMHG

## 2021-08-23 DIAGNOSIS — Z80.0 FAMILY HISTORY OF COLON CANCER: Primary | ICD-10-CM

## 2021-08-23 PROBLEM — Z12.12 ENCOUNTER FOR COLORECTAL CANCER SCREENING: Status: ACTIVE | Noted: 2021-08-23

## 2021-08-23 PROBLEM — Z12.11 ENCOUNTER FOR COLORECTAL CANCER SCREENING: Status: ACTIVE | Noted: 2021-08-23

## 2021-08-23 PROCEDURE — 27201089 HC SNARE, DISP (ANY): Performed by: INTERNAL MEDICINE

## 2021-08-23 PROCEDURE — 63600175 PHARM REV CODE 636 W HCPCS: Performed by: NURSE ANESTHETIST, CERTIFIED REGISTERED

## 2021-08-23 PROCEDURE — D9220A PRA ANESTHESIA: ICD-10-PCS | Mod: 33,CRNA,, | Performed by: NURSE ANESTHETIST, CERTIFIED REGISTERED

## 2021-08-23 PROCEDURE — D9220A PRA ANESTHESIA: ICD-10-PCS | Mod: 33,ANES,, | Performed by: ANESTHESIOLOGY

## 2021-08-23 PROCEDURE — 45385 COLONOSCOPY W/LESION REMOVAL: CPT | Mod: 33,,, | Performed by: INTERNAL MEDICINE

## 2021-08-23 PROCEDURE — D9220A PRA ANESTHESIA: Mod: 33,ANES,, | Performed by: ANESTHESIOLOGY

## 2021-08-23 PROCEDURE — 88305 TISSUE EXAM BY PATHOLOGIST: CPT | Mod: 26,,, | Performed by: PATHOLOGY

## 2021-08-23 PROCEDURE — 37000009 HC ANESTHESIA EA ADD 15 MINS: Performed by: INTERNAL MEDICINE

## 2021-08-23 PROCEDURE — 45385 PR COLONOSCOPY,REMV LESN,SNARE: ICD-10-PCS | Mod: 33,,, | Performed by: INTERNAL MEDICINE

## 2021-08-23 PROCEDURE — D9220A PRA ANESTHESIA: Mod: 33,CRNA,, | Performed by: NURSE ANESTHETIST, CERTIFIED REGISTERED

## 2021-08-23 PROCEDURE — 25000003 PHARM REV CODE 250: Performed by: NURSE ANESTHETIST, CERTIFIED REGISTERED

## 2021-08-23 PROCEDURE — 88305 TISSUE EXAM BY PATHOLOGIST: CPT | Performed by: PATHOLOGY

## 2021-08-23 PROCEDURE — 45385 COLONOSCOPY W/LESION REMOVAL: CPT | Performed by: INTERNAL MEDICINE

## 2021-08-23 PROCEDURE — 88305 TISSUE EXAM BY PATHOLOGIST: ICD-10-PCS | Mod: 26,,, | Performed by: PATHOLOGY

## 2021-08-23 PROCEDURE — 37000008 HC ANESTHESIA 1ST 15 MINUTES: Performed by: INTERNAL MEDICINE

## 2021-08-23 RX ORDER — PROPOFOL 10 MG/ML
VIAL (ML) INTRAVENOUS CONTINUOUS PRN
Status: DISCONTINUED | OUTPATIENT
Start: 2021-08-23 | End: 2021-08-23

## 2021-08-23 RX ORDER — PROPOFOL 10 MG/ML
VIAL (ML) INTRAVENOUS
Status: DISCONTINUED | OUTPATIENT
Start: 2021-08-23 | End: 2021-08-23

## 2021-08-23 RX ORDER — LIDOCAINE HCL/PF 100 MG/5ML
SYRINGE (ML) INTRAVENOUS
Status: DISCONTINUED | OUTPATIENT
Start: 2021-08-23 | End: 2021-08-23

## 2021-08-23 RX ADMIN — PROPOFOL 50 MCG/KG/MIN: 10 INJECTION, EMULSION INTRAVENOUS at 08:08

## 2021-08-23 RX ADMIN — SODIUM CHLORIDE: 0.9 INJECTION, SOLUTION INTRAVENOUS at 08:08

## 2021-08-23 RX ADMIN — Medication 100 MG: at 08:08

## 2021-08-23 RX ADMIN — PROPOFOL 50 MG: 10 INJECTION, EMULSION INTRAVENOUS at 08:08

## 2021-08-25 ENCOUNTER — PATIENT MESSAGE (OUTPATIENT)
Dept: GASTROENTEROLOGY | Facility: CLINIC | Age: 76
End: 2021-08-25

## 2021-08-25 LAB
FINAL PATHOLOGIC DIAGNOSIS: NORMAL
GROSS: NORMAL
Lab: NORMAL

## 2021-09-02 ENCOUNTER — PATIENT OUTREACH (OUTPATIENT)
Dept: ADMINISTRATIVE | Facility: OTHER | Age: 76
End: 2021-09-02

## 2021-09-02 DIAGNOSIS — I10 HYPERTENSION, UNSPECIFIED TYPE: ICD-10-CM

## 2021-09-02 RX ORDER — FUROSEMIDE 20 MG/1
20 TABLET ORAL DAILY
Qty: 90 TABLET | Refills: 0 | Status: ON HOLD | OUTPATIENT
Start: 2021-09-02 | End: 2021-12-10 | Stop reason: HOSPADM

## 2021-12-08 ENCOUNTER — HOSPITAL ENCOUNTER (INPATIENT)
Facility: HOSPITAL | Age: 76
LOS: 6 days | Discharge: HOME | DRG: 280 | End: 2021-12-14
Attending: EMERGENCY MEDICINE | Admitting: HOSPITALIST
Payer: COMMERCIAL

## 2021-12-08 DIAGNOSIS — I21.4 NSTEMI (NON-ST ELEVATED MYOCARDIAL INFARCTION): ICD-10-CM

## 2021-12-08 DIAGNOSIS — J96.02 ACUTE RESPIRATORY FAILURE WITH HYPERCAPNIA: ICD-10-CM

## 2021-12-08 DIAGNOSIS — I50.9 CHF (CONGESTIVE HEART FAILURE): ICD-10-CM

## 2021-12-08 DIAGNOSIS — R94.31 ABNORMAL EKG: ICD-10-CM

## 2021-12-08 DIAGNOSIS — R06.03 RESPIRATORY DISTRESS: ICD-10-CM

## 2021-12-08 DIAGNOSIS — J96.01 ACUTE HYPOXEMIC RESPIRATORY FAILURE: Primary | ICD-10-CM

## 2021-12-08 DIAGNOSIS — R06.02 SHORTNESS OF BREATH: ICD-10-CM

## 2021-12-08 DIAGNOSIS — I50.9 ACUTE ON CHRONIC CONGESTIVE HEART FAILURE, UNSPECIFIED HEART FAILURE TYPE: ICD-10-CM

## 2021-12-08 PROBLEM — I50.43 ACUTE ON CHRONIC COMBINED SYSTOLIC AND DIASTOLIC CONGESTIVE HEART FAILURE: Chronic | Status: ACTIVE | Noted: 2021-04-02

## 2021-12-08 PROBLEM — I42.8 NICM (NONISCHEMIC CARDIOMYOPATHY): Chronic | Status: ACTIVE | Noted: 2018-10-24

## 2021-12-08 PROBLEM — J43.9 PULMONARY EMPHYSEMA: Chronic | Status: ACTIVE | Noted: 2018-12-07

## 2021-12-08 PROBLEM — E05.90 SUBCLINICAL HYPERTHYROIDISM: Chronic | Status: ACTIVE | Noted: 2018-07-12

## 2021-12-08 PROBLEM — Z91.199 NONCOMPLIANCE: Chronic | Status: ACTIVE | Noted: 2019-06-05

## 2021-12-08 PROBLEM — I44.7 LBBB (LEFT BUNDLE BRANCH BLOCK): Chronic | Status: ACTIVE | Noted: 2019-06-20

## 2021-12-08 LAB
ALBUMIN SERPL BCP-MCNC: 3.3 G/DL (ref 3.5–5.2)
ALBUMIN SERPL BCP-MCNC: 3.6 G/DL (ref 3.5–5.2)
ALLENS TEST: ABNORMAL
ALP SERPL-CCNC: 144 U/L (ref 55–135)
ALT SERPL W/O P-5'-P-CCNC: 75 U/L (ref 10–44)
ANION GAP SERPL CALC-SCNC: 15 MMOL/L (ref 8–16)
ANION GAP SERPL CALC-SCNC: 17 MMOL/L (ref 8–16)
APTT BLDCRRT: 23.6 SEC (ref 21–32)
AST SERPL-CCNC: 102 U/L (ref 10–40)
AV INDEX (PROSTH): 0.5
AV MEAN GRADIENT: 5 MMHG
AV PEAK GRADIENT: 9 MMHG
AV VALVE AREA: 1.55 CM2
AV VELOCITY RATIO: 0.5
BASOPHILS # BLD AUTO: 0.1 K/UL (ref 0–0.2)
BASOPHILS NFR BLD: 1.1 % (ref 0–1.9)
BILIRUB SERPL-MCNC: 0.4 MG/DL (ref 0.1–1)
BNP SERPL-MCNC: 903 PG/ML (ref 0–99)
BSA FOR ECHO PROCEDURE: 1.71 M2
BUN SERPL-MCNC: 14 MG/DL (ref 8–23)
BUN SERPL-MCNC: 14 MG/DL (ref 8–23)
CALCIUM SERPL-MCNC: 8.3 MG/DL (ref 8.7–10.5)
CALCIUM SERPL-MCNC: 8.4 MG/DL (ref 8.7–10.5)
CHLORIDE SERPL-SCNC: 107 MMOL/L (ref 95–110)
CHLORIDE SERPL-SCNC: 108 MMOL/L (ref 95–110)
CO2 SERPL-SCNC: 17 MMOL/L (ref 23–29)
CO2 SERPL-SCNC: 19 MMOL/L (ref 23–29)
CREAT SERPL-MCNC: 1 MG/DL (ref 0.5–1.4)
CREAT SERPL-MCNC: 1 MG/DL (ref 0.5–1.4)
CTP QC/QA: YES
CTP QC/QA: YES
CV ECHO LV RWT: 0.32 CM
DELSYS: ABNORMAL
DIFFERENTIAL METHOD: ABNORMAL
DOP CALC AO PEAK VEL: 1.53 M/S
DOP CALC AO VTI: 28.25 CM
DOP CALC LVOT AREA: 3.1 CM2
DOP CALC LVOT DIAMETER: 2 CM
DOP CALC LVOT PEAK VEL: 0.76 M/S
DOP CALC LVOT STROKE VOLUME: 43.93 CM3
DOP CALCLVOT PEAK VEL VTI: 13.99 CM
ECHO LV POSTERIOR WALL: 0.88 CM (ref 0.6–1.1)
EJECTION FRACTION: 15 %
EOSINOPHIL # BLD AUTO: 0.2 K/UL (ref 0–0.5)
EOSINOPHIL NFR BLD: 2.1 % (ref 0–8)
EP: 7
ERYTHROCYTE [DISTWIDTH] IN BLOOD BY AUTOMATED COUNT: 13.9 % (ref 11.5–14.5)
ERYTHROCYTE [SEDIMENTATION RATE] IN BLOOD BY WESTERGREN METHOD: 20 MM/H
EST. GFR  (AFRICAN AMERICAN): >60 ML/MIN/1.73 M^2
EST. GFR  (AFRICAN AMERICAN): >60 ML/MIN/1.73 M^2
EST. GFR  (NON AFRICAN AMERICAN): 55.6 ML/MIN/1.73 M^2
EST. GFR  (NON AFRICAN AMERICAN): 55.6 ML/MIN/1.73 M^2
FIO2: 35
FRACTIONAL SHORTENING: 7 % (ref 28–44)
GLUCOSE SERPL-MCNC: 154 MG/DL (ref 70–110)
GLUCOSE SERPL-MCNC: 321 MG/DL (ref 70–110)
HCO3 UR-SCNC: 27.7 MMOL/L (ref 24–28)
HCO3 UR-SCNC: 31.5 MMOL/L (ref 24–28)
HCO3 UR-SCNC: 31.9 MMOL/L (ref 24–28)
HCT VFR BLD AUTO: 41.8 % (ref 37–48.5)
HGB BLD-MCNC: 12.5 G/DL (ref 12–16)
IMM GRANULOCYTES # BLD AUTO: 0.02 K/UL (ref 0–0.04)
IMM GRANULOCYTES NFR BLD AUTO: 0.2 % (ref 0–0.5)
INR PPP: 1 (ref 0.8–1.2)
INTERVENTRICULAR SEPTUM: 0.85 CM (ref 0.6–1.1)
IP: 12
LA MAJOR: 4.45 CM
LA MINOR: 4.43 CM
LA WIDTH: 3.9 CM
LEFT ATRIUM SIZE: 3.55 CM
LEFT ATRIUM VOLUME INDEX MOD: 25.1 ML/M2
LEFT ATRIUM VOLUME INDEX: 31.3 ML/M2
LEFT ATRIUM VOLUME MOD: 41.95 CM3
LEFT ATRIUM VOLUME: 52.25 CM3
LEFT INTERNAL DIMENSION IN SYSTOLE: 5.13 CM (ref 2.1–4)
LEFT VENTRICLE DIASTOLIC VOLUME INDEX: 87.93 ML/M2
LEFT VENTRICLE DIASTOLIC VOLUME: 146.84 ML
LEFT VENTRICLE MASS INDEX: 105 G/M2
LEFT VENTRICLE SYSTOLIC VOLUME INDEX: 75.1 ML/M2
LEFT VENTRICLE SYSTOLIC VOLUME: 125.47 ML
LEFT VENTRICULAR INTERNAL DIMENSION IN DIASTOLE: 5.49 CM (ref 3.5–6)
LEFT VENTRICULAR MASS: 176.07 G
LV LATERAL E/E' RATIO: 42.67 M/S
LYMPHOCYTES # BLD AUTO: 4.7 K/UL (ref 1–4.8)
LYMPHOCYTES NFR BLD: 51.4 % (ref 18–48)
MAGNESIUM SERPL-MCNC: 2.1 MG/DL (ref 1.6–2.6)
MCH RBC QN AUTO: 24.6 PG (ref 27–31)
MCHC RBC AUTO-ENTMCNC: 29.9 G/DL (ref 32–36)
MCV RBC AUTO: 82 FL (ref 82–98)
MODE: ABNORMAL
MONOCYTES # BLD AUTO: 0.7 K/UL (ref 0.3–1)
MONOCYTES NFR BLD: 7.4 % (ref 4–15)
MV PEAK E VEL: 1.28 M/S
NEUTROPHILS # BLD AUTO: 3.4 K/UL (ref 1.8–7.7)
NEUTROPHILS NFR BLD: 37.8 % (ref 38–73)
NRBC BLD-RTO: 0 /100 WBC
PCO2 BLDA: 65.1 MMHG (ref 35–45)
PCO2 BLDA: 76.1 MMHG (ref 35–45)
PCO2 BLDA: 87.2 MMHG (ref 35–45)
PH SMN: 7.11 [PH] (ref 7.35–7.45)
PH SMN: 7.22 [PH] (ref 7.35–7.45)
PH SMN: 7.3 [PH] (ref 7.35–7.45)
PHOSPHATE SERPL-MCNC: 1.4 MG/DL (ref 2.7–4.5)
PHOSPHATE SERPL-MCNC: 6.5 MG/DL (ref 2.7–4.5)
PLATELET # BLD AUTO: 257 K/UL (ref 150–450)
PMV BLD AUTO: 10.2 FL (ref 9.2–12.9)
PO2 BLDA: 112 MMHG (ref 40–60)
PO2 BLDA: 39 MMHG (ref 40–60)
PO2 BLDA: 45 MMHG (ref 40–60)
POC BE: -2 MMOL/L
POC BE: 4 MMOL/L
POC BE: 5 MMOL/L
POC MOLECULAR INFLUENZA A AGN: NEGATIVE
POC MOLECULAR INFLUENZA B AGN: NEGATIVE
POC SATURATED O2: 60 % (ref 95–100)
POC SATURATED O2: 74 % (ref 95–100)
POC SATURATED O2: 96 % (ref 95–100)
POC TCO2: 30 MMOL/L (ref 24–29)
POC TCO2: 34 MMOL/L (ref 24–29)
POC TCO2: 34 MMOL/L (ref 24–29)
POTASSIUM SERPL-SCNC: 2.7 MMOL/L (ref 3.5–5.1)
POTASSIUM SERPL-SCNC: 3.9 MMOL/L (ref 3.5–5.1)
PROT SERPL-MCNC: 6.8 G/DL (ref 6–8.4)
PROTHROMBIN TIME: 10.8 SEC (ref 9–12.5)
RA MAJOR: 4.05 CM
RA PRESSURE: 3 MMHG
RA WIDTH: 3.34 CM
RBC # BLD AUTO: 5.08 M/UL (ref 4–5.4)
RIGHT VENTRICULAR END-DIASTOLIC DIMENSION: 3.25 CM
RV TISSUE DOPPLER FREE WALL SYSTOLIC VELOCITY 1 (APICAL 4 CHAMBER VIEW): 17.83 CM/S
SAMPLE: ABNORMAL
SARS-COV-2 RDRP RESP QL NAA+PROBE: NEGATIVE
SINUS: 3.06 CM
SITE: ABNORMAL
SODIUM SERPL-SCNC: 141 MMOL/L (ref 136–145)
SODIUM SERPL-SCNC: 142 MMOL/L (ref 136–145)
SP02: 97
TDI LATERAL: 0.03 M/S
TRICUSPID ANNULAR PLANE SYSTOLIC EXCURSION: 2.79 CM
TROPONIN I SERPL DL<=0.01 NG/ML-MCNC: 0.04 NG/ML (ref 0–0.03)
TROPONIN I SERPL DL<=0.01 NG/ML-MCNC: 0.23 NG/ML (ref 0–0.03)
TROPONIN I SERPL DL<=0.01 NG/ML-MCNC: 0.96 NG/ML (ref 0–0.03)
TROPONIN I SERPL DL<=0.01 NG/ML-MCNC: 1.14 NG/ML (ref 0–0.03)
TROPONIN I SERPL DL<=0.01 NG/ML-MCNC: 1.52 NG/ML (ref 0–0.03)
TSH SERPL DL<=0.005 MIU/L-ACNC: 0.5 UIU/ML (ref 0.4–4)
WBC # BLD AUTO: 9.07 K/UL (ref 3.9–12.7)

## 2021-12-08 PROCEDURE — 63600175 PHARM REV CODE 636 W HCPCS: Performed by: HOSPITALIST

## 2021-12-08 PROCEDURE — 84484 ASSAY OF TROPONIN QUANT: CPT | Mod: 91 | Performed by: HOSPITALIST

## 2021-12-08 PROCEDURE — 63600175 PHARM REV CODE 636 W HCPCS: Performed by: STUDENT IN AN ORGANIZED HEALTH CARE EDUCATION/TRAINING PROGRAM

## 2021-12-08 PROCEDURE — 36600 WITHDRAWAL OF ARTERIAL BLOOD: CPT

## 2021-12-08 PROCEDURE — 25000242 PHARM REV CODE 250 ALT 637 W/ HCPCS: Performed by: STUDENT IN AN ORGANIZED HEALTH CARE EDUCATION/TRAINING PROGRAM

## 2021-12-08 PROCEDURE — 84484 ASSAY OF TROPONIN QUANT: CPT | Mod: 91 | Performed by: INTERNAL MEDICINE

## 2021-12-08 PROCEDURE — 84484 ASSAY OF TROPONIN QUANT: CPT | Performed by: STUDENT IN AN ORGANIZED HEALTH CARE EDUCATION/TRAINING PROGRAM

## 2021-12-08 PROCEDURE — 94640 AIRWAY INHALATION TREATMENT: CPT

## 2021-12-08 PROCEDURE — 85730 THROMBOPLASTIN TIME PARTIAL: CPT | Performed by: INTERNAL MEDICINE

## 2021-12-08 PROCEDURE — 84484 ASSAY OF TROPONIN QUANT: CPT | Mod: 91 | Performed by: EMERGENCY MEDICINE

## 2021-12-08 PROCEDURE — 96365 THER/PROPH/DIAG IV INF INIT: CPT

## 2021-12-08 PROCEDURE — 83880 ASSAY OF NATRIURETIC PEPTIDE: CPT | Performed by: STUDENT IN AN ORGANIZED HEALTH CARE EDUCATION/TRAINING PROGRAM

## 2021-12-08 PROCEDURE — 36415 COLL VENOUS BLD VENIPUNCTURE: CPT | Performed by: INTERNAL MEDICINE

## 2021-12-08 PROCEDURE — 93005 ELECTROCARDIOGRAM TRACING: CPT

## 2021-12-08 PROCEDURE — 94644 CONT INHLJ TX 1ST HOUR: CPT

## 2021-12-08 PROCEDURE — 96375 TX/PRO/DX INJ NEW DRUG ADDON: CPT

## 2021-12-08 PROCEDURE — 99223 1ST HOSP IP/OBS HIGH 75: CPT | Mod: ,,, | Performed by: HOSPITALIST

## 2021-12-08 PROCEDURE — 27000190 HC CPAP FULL FACE MASK W/VALVE

## 2021-12-08 PROCEDURE — 99285 EMERGENCY DEPT VISIT HI MDM: CPT | Mod: 25

## 2021-12-08 PROCEDURE — 25000003 PHARM REV CODE 250: Performed by: HOSPITALIST

## 2021-12-08 PROCEDURE — 93010 EKG 12-LEAD: ICD-10-PCS | Mod: ,,, | Performed by: INTERNAL MEDICINE

## 2021-12-08 PROCEDURE — 99285 EMERGENCY DEPT VISIT HI MDM: CPT | Mod: CS,,, | Performed by: EMERGENCY MEDICINE

## 2021-12-08 PROCEDURE — 99900035 HC TECH TIME PER 15 MIN (STAT)

## 2021-12-08 PROCEDURE — 99285 PR EMERGENCY DEPT VISIT,LEVEL V: ICD-10-PCS | Mod: CS,,, | Performed by: EMERGENCY MEDICINE

## 2021-12-08 PROCEDURE — 85025 COMPLETE CBC W/AUTO DIFF WBC: CPT | Performed by: STUDENT IN AN ORGANIZED HEALTH CARE EDUCATION/TRAINING PROGRAM

## 2021-12-08 PROCEDURE — 84100 ASSAY OF PHOSPHORUS: CPT | Performed by: STUDENT IN AN ORGANIZED HEALTH CARE EDUCATION/TRAINING PROGRAM

## 2021-12-08 PROCEDURE — 93010 ELECTROCARDIOGRAM REPORT: CPT | Mod: ,,, | Performed by: INTERNAL MEDICINE

## 2021-12-08 PROCEDURE — 99223 PR INITIAL HOSPITAL CARE,LEVL III: ICD-10-PCS | Mod: ,,, | Performed by: INTERNAL MEDICINE

## 2021-12-08 PROCEDURE — 36415 COLL VENOUS BLD VENIPUNCTURE: CPT | Performed by: HOSPITALIST

## 2021-12-08 PROCEDURE — 27000221 HC OXYGEN, UP TO 24 HOURS

## 2021-12-08 PROCEDURE — 85610 PROTHROMBIN TIME: CPT | Performed by: INTERNAL MEDICINE

## 2021-12-08 PROCEDURE — 94761 N-INVAS EAR/PLS OXIMETRY MLT: CPT

## 2021-12-08 PROCEDURE — 94660 CPAP INITIATION&MGMT: CPT

## 2021-12-08 PROCEDURE — 20600001 HC STEP DOWN PRIVATE ROOM

## 2021-12-08 PROCEDURE — 99223 PR INITIAL HOSPITAL CARE,LEVL III: ICD-10-PCS | Mod: ,,, | Performed by: HOSPITALIST

## 2021-12-08 PROCEDURE — 83735 ASSAY OF MAGNESIUM: CPT | Performed by: STUDENT IN AN ORGANIZED HEALTH CARE EDUCATION/TRAINING PROGRAM

## 2021-12-08 PROCEDURE — 84443 ASSAY THYROID STIM HORMONE: CPT | Performed by: EMERGENCY MEDICINE

## 2021-12-08 PROCEDURE — 25000003 PHARM REV CODE 250: Performed by: INTERNAL MEDICINE

## 2021-12-08 PROCEDURE — 80053 COMPREHEN METABOLIC PANEL: CPT | Performed by: STUDENT IN AN ORGANIZED HEALTH CARE EDUCATION/TRAINING PROGRAM

## 2021-12-08 PROCEDURE — 63600175 PHARM REV CODE 636 W HCPCS: Performed by: INTERNAL MEDICINE

## 2021-12-08 PROCEDURE — 82803 BLOOD GASES ANY COMBINATION: CPT

## 2021-12-08 PROCEDURE — 25000242 PHARM REV CODE 250 ALT 637 W/ HCPCS

## 2021-12-08 PROCEDURE — 80069 RENAL FUNCTION PANEL: CPT | Performed by: HOSPITALIST

## 2021-12-08 PROCEDURE — 25000003 PHARM REV CODE 250: Performed by: EMERGENCY MEDICINE

## 2021-12-08 PROCEDURE — U0002 COVID-19 LAB TEST NON-CDC: HCPCS | Performed by: EMERGENCY MEDICINE

## 2021-12-08 PROCEDURE — 25000242 PHARM REV CODE 250 ALT 637 W/ HCPCS: Performed by: HOSPITALIST

## 2021-12-08 PROCEDURE — 99223 1ST HOSP IP/OBS HIGH 75: CPT | Mod: ,,, | Performed by: INTERNAL MEDICINE

## 2021-12-08 RX ORDER — SODIUM CHLORIDE 0.9 % (FLUSH) 0.9 %
10 SYRINGE (ML) INJECTION EVERY 12 HOURS PRN
Status: DISCONTINUED | OUTPATIENT
Start: 2021-12-08 | End: 2021-12-14 | Stop reason: HOSPADM

## 2021-12-08 RX ORDER — SODIUM CHLORIDE 0.9 % (FLUSH) 0.9 %
3 SYRINGE (ML) INJECTION
Status: DISCONTINUED | OUTPATIENT
Start: 2021-12-08 | End: 2021-12-14 | Stop reason: HOSPADM

## 2021-12-08 RX ORDER — IPRATROPIUM BROMIDE AND ALBUTEROL SULFATE 2.5; .5 MG/3ML; MG/3ML
3 SOLUTION RESPIRATORY (INHALATION)
Status: COMPLETED | OUTPATIENT
Start: 2021-12-08 | End: 2021-12-08

## 2021-12-08 RX ORDER — IPRATROPIUM BROMIDE 0.5 MG/2.5ML
SOLUTION RESPIRATORY (INHALATION)
Status: COMPLETED
Start: 2021-12-08 | End: 2021-12-08

## 2021-12-08 RX ORDER — SODIUM,POTASSIUM PHOSPHATES 280-250MG
2 POWDER IN PACKET (EA) ORAL
Status: DISPENSED | OUTPATIENT
Start: 2021-12-08 | End: 2021-12-09

## 2021-12-08 RX ORDER — ALBUTEROL SULFATE 2.5 MG/.5ML
10 SOLUTION RESPIRATORY (INHALATION)
Status: COMPLETED | OUTPATIENT
Start: 2021-12-08 | End: 2021-12-08

## 2021-12-08 RX ORDER — ALBUTEROL SULFATE 2.5 MG/.5ML
2.5 SOLUTION RESPIRATORY (INHALATION)
Status: DISCONTINUED | OUTPATIENT
Start: 2021-12-08 | End: 2021-12-08

## 2021-12-08 RX ORDER — IPRATROPIUM BROMIDE 0.5 MG/2.5ML
1 SOLUTION RESPIRATORY (INHALATION)
Status: COMPLETED | OUTPATIENT
Start: 2021-12-08 | End: 2021-12-08

## 2021-12-08 RX ORDER — METHYLPREDNISOLONE SOD SUCC 125 MG
125 VIAL (EA) INJECTION
Status: COMPLETED | OUTPATIENT
Start: 2021-12-08 | End: 2021-12-08

## 2021-12-08 RX ORDER — IBUPROFEN 200 MG
24 TABLET ORAL
Status: DISCONTINUED | OUTPATIENT
Start: 2021-12-08 | End: 2021-12-14 | Stop reason: HOSPADM

## 2021-12-08 RX ORDER — MAGNESIUM SULFATE HEPTAHYDRATE 40 MG/ML
2 INJECTION, SOLUTION INTRAVENOUS
Status: COMPLETED | OUTPATIENT
Start: 2021-12-08 | End: 2021-12-08

## 2021-12-08 RX ORDER — GLUCAGON 1 MG
1 KIT INJECTION
Status: DISCONTINUED | OUTPATIENT
Start: 2021-12-08 | End: 2021-12-14 | Stop reason: HOSPADM

## 2021-12-08 RX ORDER — FUROSEMIDE 10 MG/ML
60 INJECTION INTRAMUSCULAR; INTRAVENOUS
Status: COMPLETED | OUTPATIENT
Start: 2021-12-08 | End: 2021-12-08

## 2021-12-08 RX ORDER — IPRATROPIUM BROMIDE AND ALBUTEROL SULFATE 2.5; .5 MG/3ML; MG/3ML
3 SOLUTION RESPIRATORY (INHALATION) EVERY 4 HOURS
Status: DISCONTINUED | OUTPATIENT
Start: 2021-12-08 | End: 2021-12-08

## 2021-12-08 RX ORDER — NALOXONE HCL 0.4 MG/ML
0.02 VIAL (ML) INJECTION
Status: DISCONTINUED | OUTPATIENT
Start: 2021-12-08 | End: 2021-12-14 | Stop reason: HOSPADM

## 2021-12-08 RX ORDER — HEPARIN SODIUM,PORCINE/D5W 25000/250
0-40 INTRAVENOUS SOLUTION INTRAVENOUS CONTINUOUS
Status: DISCONTINUED | OUTPATIENT
Start: 2021-12-08 | End: 2021-12-10

## 2021-12-08 RX ORDER — IPRATROPIUM BROMIDE AND ALBUTEROL SULFATE 2.5; .5 MG/3ML; MG/3ML
3 SOLUTION RESPIRATORY (INHALATION) EVERY 4 HOURS
Status: DISCONTINUED | OUTPATIENT
Start: 2021-12-08 | End: 2021-12-14 | Stop reason: HOSPADM

## 2021-12-08 RX ORDER — ATORVASTATIN CALCIUM 20 MG/1
40 TABLET, FILM COATED ORAL NIGHTLY
Status: DISCONTINUED | OUTPATIENT
Start: 2021-12-08 | End: 2021-12-14 | Stop reason: HOSPADM

## 2021-12-08 RX ORDER — CLOPIDOGREL BISULFATE 75 MG/1
300 TABLET ORAL ONCE
Status: COMPLETED | OUTPATIENT
Start: 2021-12-08 | End: 2021-12-08

## 2021-12-08 RX ORDER — FUROSEMIDE 10 MG/ML
40 INJECTION INTRAMUSCULAR; INTRAVENOUS
Status: DISCONTINUED | OUTPATIENT
Start: 2021-12-08 | End: 2021-12-08

## 2021-12-08 RX ORDER — IBUPROFEN 200 MG
16 TABLET ORAL
Status: DISCONTINUED | OUTPATIENT
Start: 2021-12-08 | End: 2021-12-14 | Stop reason: HOSPADM

## 2021-12-08 RX ORDER — DOXYCYCLINE HYCLATE 100 MG
100 TABLET ORAL EVERY 12 HOURS
Status: DISCONTINUED | OUTPATIENT
Start: 2021-12-08 | End: 2021-12-13

## 2021-12-08 RX ORDER — PREDNISONE 20 MG/1
40 TABLET ORAL DAILY
Status: COMPLETED | OUTPATIENT
Start: 2021-12-08 | End: 2021-12-12

## 2021-12-08 RX ORDER — ASPIRIN 325 MG
325 TABLET ORAL ONCE
Status: DISCONTINUED | OUTPATIENT
Start: 2021-12-08 | End: 2021-12-08

## 2021-12-08 RX ORDER — ASPIRIN 325 MG
325 TABLET ORAL ONCE
Status: COMPLETED | OUTPATIENT
Start: 2021-12-08 | End: 2021-12-08

## 2021-12-08 RX ORDER — ALBUTEROL SULFATE 2.5 MG/.5ML
SOLUTION RESPIRATORY (INHALATION)
Status: COMPLETED
Start: 2021-12-08 | End: 2021-12-08

## 2021-12-08 RX ORDER — FLUTICASONE FUROATE AND VILANTEROL 100; 25 UG/1; UG/1
1 POWDER RESPIRATORY (INHALATION) DAILY
Status: DISCONTINUED | OUTPATIENT
Start: 2021-12-08 | End: 2021-12-14 | Stop reason: HOSPADM

## 2021-12-08 RX ORDER — NITROGLYCERIN 20 MG/100ML
5 INJECTION INTRAVENOUS CONTINUOUS
Status: DISCONTINUED | OUTPATIENT
Start: 2021-12-08 | End: 2021-12-08

## 2021-12-08 RX ORDER — ASPIRIN 325 MG
325 TABLET ORAL DAILY
Status: DISCONTINUED | OUTPATIENT
Start: 2021-12-09 | End: 2021-12-08

## 2021-12-08 RX ORDER — FUROSEMIDE 10 MG/ML
40 INJECTION INTRAMUSCULAR; INTRAVENOUS DAILY
Status: DISCONTINUED | OUTPATIENT
Start: 2021-12-09 | End: 2021-12-09

## 2021-12-08 RX ADMIN — IPRATROPIUM BROMIDE 1 MG: 0.5 SOLUTION RESPIRATORY (INHALATION) at 11:12

## 2021-12-08 RX ADMIN — POTASSIUM & SODIUM PHOSPHATES POWDER PACK 280-160-250 MG 2 PACKET: 280-160-250 PACK at 08:12

## 2021-12-08 RX ADMIN — IPRATROPIUM BROMIDE AND ALBUTEROL SULFATE 3 ML: .5; 3 SOLUTION RESPIRATORY (INHALATION) at 05:12

## 2021-12-08 RX ADMIN — POTASSIUM BICARBONATE 40 MEQ: 391 TABLET, EFFERVESCENT ORAL at 08:12

## 2021-12-08 RX ADMIN — HEPARIN SODIUM AND DEXTROSE 12 UNITS/KG/HR: 10000; 5 INJECTION INTRAVENOUS at 10:12

## 2021-12-08 RX ADMIN — METHYLPREDNISOLONE SODIUM SUCCINATE 125 MG: 125 INJECTION, POWDER, FOR SOLUTION INTRAMUSCULAR; INTRAVENOUS at 05:12

## 2021-12-08 RX ADMIN — ALBUTEROL SULFATE 10 MG: 2.5 SOLUTION RESPIRATORY (INHALATION) at 11:12

## 2021-12-08 RX ADMIN — IPRATROPIUM BROMIDE AND ALBUTEROL SULFATE 3 ML: 2.5; .5 SOLUTION RESPIRATORY (INHALATION) at 07:12

## 2021-12-08 RX ADMIN — MAGNESIUM SULFATE 2 G: 2 INJECTION INTRAVENOUS at 05:12

## 2021-12-08 RX ADMIN — ATORVASTATIN CALCIUM 40 MG: 20 TABLET, FILM COATED ORAL at 10:12

## 2021-12-08 RX ADMIN — POTASSIUM BICARBONATE 40 MEQ: 391 TABLET, EFFERVESCENT ORAL at 06:12

## 2021-12-08 RX ADMIN — PREDNISONE 40 MG: 20 TABLET ORAL at 11:12

## 2021-12-08 RX ADMIN — CLOPIDOGREL 300 MG: 75 TABLET, FILM COATED ORAL at 09:12

## 2021-12-08 RX ADMIN — ASPIRIN 325 MG ORAL TABLET 325 MG: 325 PILL ORAL at 08:12

## 2021-12-08 RX ADMIN — NITROGLYCERIN 1 INCH: 20 OINTMENT TOPICAL at 04:12

## 2021-12-08 RX ADMIN — FUROSEMIDE 60 MG: 10 INJECTION, SOLUTION INTRAVENOUS at 06:12

## 2021-12-09 LAB
ALBUMIN SERPL BCP-MCNC: 3.4 G/DL (ref 3.5–5.2)
ALBUMIN SERPL BCP-MCNC: 3.7 G/DL (ref 3.5–5.2)
ALP SERPL-CCNC: 101 U/L (ref 55–135)
ALT SERPL W/O P-5'-P-CCNC: 50 U/L (ref 10–44)
ANION GAP SERPL CALC-SCNC: 12 MMOL/L (ref 8–16)
ANION GAP SERPL CALC-SCNC: 15 MMOL/L (ref 8–16)
APTT BLDCRRT: 33.1 SEC (ref 21–32)
APTT BLDCRRT: 39.7 SEC (ref 21–32)
APTT BLDCRRT: 39.7 SEC (ref 21–32)
APTT BLDCRRT: 39.9 SEC (ref 21–32)
APTT BLDCRRT: 40.6 SEC (ref 21–32)
AST SERPL-CCNC: 36 U/L (ref 10–40)
BASOPHILS # BLD AUTO: 0 K/UL (ref 0–0.2)
BASOPHILS NFR BLD: 0 % (ref 0–1.9)
BILIRUB SERPL-MCNC: 0.3 MG/DL (ref 0.1–1)
BUN SERPL-MCNC: 21 MG/DL (ref 8–23)
BUN SERPL-MCNC: 25 MG/DL (ref 8–23)
CALCIUM SERPL-MCNC: 9.3 MG/DL (ref 8.7–10.5)
CALCIUM SERPL-MCNC: 9.5 MG/DL (ref 8.7–10.5)
CHLORIDE SERPL-SCNC: 101 MMOL/L (ref 95–110)
CHLORIDE SERPL-SCNC: 99 MMOL/L (ref 95–110)
CHOLEST SERPL-MCNC: 136 MG/DL (ref 120–199)
CHOLEST/HDLC SERPL: 3.8 {RATIO} (ref 2–5)
CO2 SERPL-SCNC: 29 MMOL/L (ref 23–29)
CO2 SERPL-SCNC: 29 MMOL/L (ref 23–29)
CREAT SERPL-MCNC: 0.8 MG/DL (ref 0.5–1.4)
CREAT SERPL-MCNC: 1 MG/DL (ref 0.5–1.4)
DIFFERENTIAL METHOD: ABNORMAL
EOSINOPHIL # BLD AUTO: 0 K/UL (ref 0–0.5)
EOSINOPHIL NFR BLD: 0 % (ref 0–8)
ERYTHROCYTE [DISTWIDTH] IN BLOOD BY AUTOMATED COUNT: 14 % (ref 11.5–14.5)
EST. GFR  (AFRICAN AMERICAN): >60 ML/MIN/1.73 M^2
EST. GFR  (AFRICAN AMERICAN): >60 ML/MIN/1.73 M^2
EST. GFR  (NON AFRICAN AMERICAN): 55.6 ML/MIN/1.73 M^2
EST. GFR  (NON AFRICAN AMERICAN): >60 ML/MIN/1.73 M^2
GLUCOSE SERPL-MCNC: 116 MG/DL (ref 70–110)
GLUCOSE SERPL-MCNC: 131 MG/DL (ref 70–110)
HCT VFR BLD AUTO: 35 % (ref 37–48.5)
HDLC SERPL-MCNC: 36 MG/DL (ref 40–75)
HDLC SERPL: 26.5 % (ref 20–50)
HGB BLD-MCNC: 10.7 G/DL (ref 12–16)
IMM GRANULOCYTES # BLD AUTO: 0.02 K/UL (ref 0–0.04)
IMM GRANULOCYTES NFR BLD AUTO: 0.3 % (ref 0–0.5)
LDLC SERPL CALC-MCNC: 84.6 MG/DL (ref 63–159)
LYMPHOCYTES # BLD AUTO: 0.7 K/UL (ref 1–4.8)
LYMPHOCYTES NFR BLD: 10.3 % (ref 18–48)
MAGNESIUM SERPL-MCNC: 2 MG/DL (ref 1.6–2.6)
MCH RBC QN AUTO: 24.3 PG (ref 27–31)
MCHC RBC AUTO-ENTMCNC: 30.6 G/DL (ref 32–36)
MCV RBC AUTO: 80 FL (ref 82–98)
MONOCYTES # BLD AUTO: 0.5 K/UL (ref 0.3–1)
MONOCYTES NFR BLD: 7.8 % (ref 4–15)
NEUTROPHILS # BLD AUTO: 5.1 K/UL (ref 1.8–7.7)
NEUTROPHILS NFR BLD: 81.6 % (ref 38–73)
NONHDLC SERPL-MCNC: 100 MG/DL
NRBC BLD-RTO: 0 /100 WBC
PHOSPHATE SERPL-MCNC: 3.2 MG/DL (ref 2.7–4.5)
PHOSPHATE SERPL-MCNC: 4 MG/DL (ref 2.7–4.5)
PLATELET # BLD AUTO: 185 K/UL (ref 150–450)
PMV BLD AUTO: 10.6 FL (ref 9.2–12.9)
POTASSIUM SERPL-SCNC: 4.6 MMOL/L (ref 3.5–5.1)
POTASSIUM SERPL-SCNC: 4.7 MMOL/L (ref 3.5–5.1)
PROCALCITONIN SERPL IA-MCNC: 0.46 NG/ML
PROT SERPL-MCNC: 6.1 G/DL (ref 6–8.4)
RBC # BLD AUTO: 4.4 M/UL (ref 4–5.4)
SODIUM SERPL-SCNC: 142 MMOL/L (ref 136–145)
SODIUM SERPL-SCNC: 143 MMOL/L (ref 136–145)
TRIGL SERPL-MCNC: 77 MG/DL (ref 30–150)
TROPONIN I SERPL DL<=0.01 NG/ML-MCNC: 1.05 NG/ML (ref 0–0.03)
TROPONIN I SERPL DL<=0.01 NG/ML-MCNC: 1.29 NG/ML (ref 0–0.03)
WBC # BLD AUTO: 6.29 K/UL (ref 3.9–12.7)

## 2021-12-09 PROCEDURE — 80061 LIPID PANEL: CPT | Performed by: HOSPITALIST

## 2021-12-09 PROCEDURE — 20600001 HC STEP DOWN PRIVATE ROOM

## 2021-12-09 PROCEDURE — 85730 THROMBOPLASTIN TIME PARTIAL: CPT | Performed by: INTERNAL MEDICINE

## 2021-12-09 PROCEDURE — 84484 ASSAY OF TROPONIN QUANT: CPT | Mod: 91 | Performed by: HOSPITALIST

## 2021-12-09 PROCEDURE — 36415 COLL VENOUS BLD VENIPUNCTURE: CPT | Performed by: HOSPITALIST

## 2021-12-09 PROCEDURE — 94761 N-INVAS EAR/PLS OXIMETRY MLT: CPT

## 2021-12-09 PROCEDURE — 94640 AIRWAY INHALATION TREATMENT: CPT

## 2021-12-09 PROCEDURE — 85730 THROMBOPLASTIN TIME PARTIAL: CPT | Mod: 91 | Performed by: HOSPITALIST

## 2021-12-09 PROCEDURE — 99232 PR SUBSEQUENT HOSPITAL CARE,LEVL II: ICD-10-PCS | Mod: ,,, | Performed by: HOSPITALIST

## 2021-12-09 PROCEDURE — 80069 RENAL FUNCTION PANEL: CPT | Performed by: HOSPITALIST

## 2021-12-09 PROCEDURE — 85025 COMPLETE CBC W/AUTO DIFF WBC: CPT | Performed by: HOSPITALIST

## 2021-12-09 PROCEDURE — 27000221 HC OXYGEN, UP TO 24 HOURS

## 2021-12-09 PROCEDURE — 63600175 PHARM REV CODE 636 W HCPCS: Performed by: HOSPITALIST

## 2021-12-09 PROCEDURE — 84145 PROCALCITONIN (PCT): CPT | Performed by: HOSPITALIST

## 2021-12-09 PROCEDURE — 99900035 HC TECH TIME PER 15 MIN (STAT)

## 2021-12-09 PROCEDURE — 25000003 PHARM REV CODE 250: Performed by: HOSPITALIST

## 2021-12-09 PROCEDURE — 84100 ASSAY OF PHOSPHORUS: CPT | Performed by: HOSPITALIST

## 2021-12-09 PROCEDURE — 63600175 PHARM REV CODE 636 W HCPCS

## 2021-12-09 PROCEDURE — 25000242 PHARM REV CODE 250 ALT 637 W/ HCPCS: Performed by: HOSPITALIST

## 2021-12-09 PROCEDURE — 99232 SBSQ HOSP IP/OBS MODERATE 35: CPT | Mod: ,,, | Performed by: HOSPITALIST

## 2021-12-09 PROCEDURE — 25000003 PHARM REV CODE 250: Performed by: INTERNAL MEDICINE

## 2021-12-09 PROCEDURE — 84484 ASSAY OF TROPONIN QUANT: CPT | Performed by: HOSPITALIST

## 2021-12-09 PROCEDURE — 80053 COMPREHEN METABOLIC PANEL: CPT | Performed by: HOSPITALIST

## 2021-12-09 PROCEDURE — 83735 ASSAY OF MAGNESIUM: CPT | Performed by: HOSPITALIST

## 2021-12-09 RX ORDER — CLOPIDOGREL BISULFATE 75 MG/1
75 TABLET ORAL DAILY
Status: DISCONTINUED | OUTPATIENT
Start: 2021-12-09 | End: 2021-12-09

## 2021-12-09 RX ORDER — ONDANSETRON 2 MG/ML
4 INJECTION INTRAMUSCULAR; INTRAVENOUS EVERY 4 HOURS PRN
Status: DISCONTINUED | OUTPATIENT
Start: 2021-12-09 | End: 2021-12-10

## 2021-12-09 RX ORDER — NAPROXEN SODIUM 220 MG/1
81 TABLET, FILM COATED ORAL DAILY
Status: DISCONTINUED | OUTPATIENT
Start: 2021-12-09 | End: 2021-12-14 | Stop reason: HOSPADM

## 2021-12-09 RX ORDER — FUROSEMIDE 10 MG/ML
40 INJECTION INTRAMUSCULAR; INTRAVENOUS 2 TIMES DAILY
Status: DISCONTINUED | OUTPATIENT
Start: 2021-12-09 | End: 2021-12-10

## 2021-12-09 RX ORDER — CLOPIDOGREL BISULFATE 75 MG/1
75 TABLET ORAL DAILY
Status: DISCONTINUED | OUTPATIENT
Start: 2021-12-09 | End: 2021-12-14 | Stop reason: HOSPADM

## 2021-12-09 RX ORDER — NAPROXEN SODIUM 220 MG/1
81 TABLET, FILM COATED ORAL DAILY
Status: DISCONTINUED | OUTPATIENT
Start: 2021-12-09 | End: 2021-12-09

## 2021-12-09 RX ADMIN — DOXYCYCLINE HYCLATE 100 MG: 100 TABLET, COATED ORAL at 08:12

## 2021-12-09 RX ADMIN — POTASSIUM & SODIUM PHOSPHATES POWDER PACK 280-160-250 MG 2 PACKET: 280-160-250 PACK at 05:12

## 2021-12-09 RX ADMIN — IPRATROPIUM BROMIDE AND ALBUTEROL SULFATE 3 ML: 2.5; .5 SOLUTION RESPIRATORY (INHALATION) at 08:12

## 2021-12-09 RX ADMIN — ATORVASTATIN CALCIUM 40 MG: 20 TABLET, FILM COATED ORAL at 08:12

## 2021-12-09 RX ADMIN — IPRATROPIUM BROMIDE AND ALBUTEROL SULFATE 3 ML: 2.5; .5 SOLUTION RESPIRATORY (INHALATION) at 12:12

## 2021-12-09 RX ADMIN — ONDANSETRON 4 MG: 2 INJECTION INTRAMUSCULAR; INTRAVENOUS at 04:12

## 2021-12-09 RX ADMIN — IPRATROPIUM BROMIDE AND ALBUTEROL SULFATE 3 ML: 2.5; .5 SOLUTION RESPIRATORY (INHALATION) at 07:12

## 2021-12-09 RX ADMIN — IPRATROPIUM BROMIDE AND ALBUTEROL SULFATE 3 ML: 2.5; .5 SOLUTION RESPIRATORY (INHALATION) at 04:12

## 2021-12-09 RX ADMIN — FUROSEMIDE 40 MG: 10 INJECTION, SOLUTION INTRAMUSCULAR; INTRAVENOUS at 08:12

## 2021-12-09 RX ADMIN — POTASSIUM & SODIUM PHOSPHATES POWDER PACK 280-160-250 MG 2 PACKET: 280-160-250 PACK at 04:12

## 2021-12-09 RX ADMIN — FLUTICASONE FUROATE AND VILANTEROL TRIFENATATE 1 PUFF: 100; 25 POWDER RESPIRATORY (INHALATION) at 08:12

## 2021-12-09 RX ADMIN — CLOPIDOGREL 75 MG: 75 TABLET, FILM COATED ORAL at 09:12

## 2021-12-09 RX ADMIN — PREDNISONE 40 MG: 20 TABLET ORAL at 08:12

## 2021-12-09 RX ADMIN — ONDANSETRON 4 MG: 2 INJECTION INTRAMUSCULAR; INTRAVENOUS at 09:12

## 2021-12-09 RX ADMIN — FUROSEMIDE 40 MG: 40 INJECTION, SOLUTION INTRAMUSCULAR; INTRAVENOUS at 08:12

## 2021-12-09 RX ADMIN — ASPIRIN 81 MG CHEWABLE TABLET 81 MG: 81 TABLET CHEWABLE at 09:12

## 2021-12-10 PROBLEM — J96.22 ACUTE ON CHRONIC RESPIRATORY FAILURE WITH HYPOXIA AND HYPERCAPNIA: Status: ACTIVE | Noted: 2021-12-10

## 2021-12-10 PROBLEM — J44.1 CHRONIC OBSTRUCTIVE PULMONARY DISEASE WITH ACUTE EXACERBATION: Status: ACTIVE | Noted: 2021-12-10

## 2021-12-10 PROBLEM — J96.21 ACUTE ON CHRONIC RESPIRATORY FAILURE WITH HYPOXIA AND HYPERCAPNIA: Status: ACTIVE | Noted: 2021-12-10

## 2021-12-10 LAB
ALBUMIN SERPL BCP-MCNC: 3.9 G/DL (ref 3.5–5.2)
ALBUMIN SERPL BCP-MCNC: 3.9 G/DL (ref 3.5–5.2)
ALP SERPL-CCNC: 111 U/L (ref 55–135)
ALT SERPL W/O P-5'-P-CCNC: 49 U/L (ref 10–44)
ANION GAP SERPL CALC-SCNC: 10 MMOL/L (ref 8–16)
ANION GAP SERPL CALC-SCNC: 16 MMOL/L (ref 8–16)
APTT BLDCRRT: 24.1 SEC (ref 21–32)
APTT BLDCRRT: 36.4 SEC (ref 21–32)
APTT BLDCRRT: 82 SEC (ref 21–32)
AST SERPL-CCNC: 28 U/L (ref 10–40)
BASOPHILS # BLD AUTO: 0.01 K/UL (ref 0–0.2)
BASOPHILS # BLD AUTO: 0.02 K/UL (ref 0–0.2)
BASOPHILS NFR BLD: 0.1 % (ref 0–1.9)
BASOPHILS NFR BLD: 0.2 % (ref 0–1.9)
BILIRUB SERPL-MCNC: 0.4 MG/DL (ref 0.1–1)
BUN SERPL-MCNC: 31 MG/DL (ref 8–23)
BUN SERPL-MCNC: 31 MG/DL (ref 8–23)
CALCIUM SERPL-MCNC: 9.6 MG/DL (ref 8.7–10.5)
CALCIUM SERPL-MCNC: 9.6 MG/DL (ref 8.7–10.5)
CHLORIDE SERPL-SCNC: 95 MMOL/L (ref 95–110)
CHLORIDE SERPL-SCNC: 98 MMOL/L (ref 95–110)
CO2 SERPL-SCNC: 28 MMOL/L (ref 23–29)
CO2 SERPL-SCNC: 32 MMOL/L (ref 23–29)
CREAT SERPL-MCNC: 1 MG/DL (ref 0.5–1.4)
CREAT SERPL-MCNC: 1 MG/DL (ref 0.5–1.4)
DIFFERENTIAL METHOD: ABNORMAL
DIFFERENTIAL METHOD: ABNORMAL
EOSINOPHIL # BLD AUTO: 0 K/UL (ref 0–0.5)
EOSINOPHIL # BLD AUTO: 0 K/UL (ref 0–0.5)
EOSINOPHIL NFR BLD: 0.1 % (ref 0–8)
EOSINOPHIL NFR BLD: 0.1 % (ref 0–8)
ERYTHROCYTE [DISTWIDTH] IN BLOOD BY AUTOMATED COUNT: 14.5 % (ref 11.5–14.5)
ERYTHROCYTE [DISTWIDTH] IN BLOOD BY AUTOMATED COUNT: 14.6 % (ref 11.5–14.5)
EST. GFR  (AFRICAN AMERICAN): >60 ML/MIN/1.73 M^2
EST. GFR  (AFRICAN AMERICAN): >60 ML/MIN/1.73 M^2
EST. GFR  (NON AFRICAN AMERICAN): 55.6 ML/MIN/1.73 M^2
EST. GFR  (NON AFRICAN AMERICAN): 55.6 ML/MIN/1.73 M^2
GLUCOSE SERPL-MCNC: 125 MG/DL (ref 70–110)
GLUCOSE SERPL-MCNC: 97 MG/DL (ref 70–110)
HCT VFR BLD AUTO: 40.7 % (ref 37–48.5)
HCT VFR BLD AUTO: 43 % (ref 37–48.5)
HGB BLD-MCNC: 12.3 G/DL (ref 12–16)
HGB BLD-MCNC: 13.3 G/DL (ref 12–16)
IMM GRANULOCYTES # BLD AUTO: 0.03 K/UL (ref 0–0.04)
IMM GRANULOCYTES # BLD AUTO: 0.04 K/UL (ref 0–0.04)
IMM GRANULOCYTES NFR BLD AUTO: 0.3 % (ref 0–0.5)
IMM GRANULOCYTES NFR BLD AUTO: 0.4 % (ref 0–0.5)
INR PPP: 0.9 (ref 0.8–1.2)
LYMPHOCYTES # BLD AUTO: 1.2 K/UL (ref 1–4.8)
LYMPHOCYTES # BLD AUTO: 1.4 K/UL (ref 1–4.8)
LYMPHOCYTES NFR BLD: 11.7 % (ref 18–48)
LYMPHOCYTES NFR BLD: 15.8 % (ref 18–48)
MAGNESIUM SERPL-MCNC: 1.9 MG/DL (ref 1.6–2.6)
MCH RBC QN AUTO: 24.5 PG (ref 27–31)
MCH RBC QN AUTO: 24.7 PG (ref 27–31)
MCHC RBC AUTO-ENTMCNC: 30.2 G/DL (ref 32–36)
MCHC RBC AUTO-ENTMCNC: 30.9 G/DL (ref 32–36)
MCV RBC AUTO: 79 FL (ref 82–98)
MCV RBC AUTO: 82 FL (ref 82–98)
MONOCYTES # BLD AUTO: 0.7 K/UL (ref 0.3–1)
MONOCYTES # BLD AUTO: 0.8 K/UL (ref 0.3–1)
MONOCYTES NFR BLD: 7.4 % (ref 4–15)
MONOCYTES NFR BLD: 8.4 % (ref 4–15)
NEUTROPHILS # BLD AUTO: 6.9 K/UL (ref 1.8–7.7)
NEUTROPHILS # BLD AUTO: 8 K/UL (ref 1.8–7.7)
NEUTROPHILS NFR BLD: 75.3 % (ref 38–73)
NEUTROPHILS NFR BLD: 80.2 % (ref 38–73)
NRBC BLD-RTO: 0 /100 WBC
NRBC BLD-RTO: 0 /100 WBC
PHOSPHATE SERPL-MCNC: 3.7 MG/DL (ref 2.7–4.5)
PHOSPHATE SERPL-MCNC: 4.2 MG/DL (ref 2.7–4.5)
PLATELET # BLD AUTO: 231 K/UL (ref 150–450)
PLATELET # BLD AUTO: 272 K/UL (ref 150–450)
PMV BLD AUTO: 10.2 FL (ref 9.2–12.9)
PMV BLD AUTO: 10.5 FL (ref 9.2–12.9)
POTASSIUM SERPL-SCNC: 4.4 MMOL/L (ref 3.5–5.1)
POTASSIUM SERPL-SCNC: 4.5 MMOL/L (ref 3.5–5.1)
PROT SERPL-MCNC: 7 G/DL (ref 6–8.4)
PROTHROMBIN TIME: 10.4 SEC (ref 9–12.5)
RBC # BLD AUTO: 4.98 M/UL (ref 4–5.4)
RBC # BLD AUTO: 5.42 M/UL (ref 4–5.4)
SODIUM SERPL-SCNC: 137 MMOL/L (ref 136–145)
SODIUM SERPL-SCNC: 142 MMOL/L (ref 136–145)
WBC # BLD AUTO: 10.02 K/UL (ref 3.9–12.7)
WBC # BLD AUTO: 9.13 K/UL (ref 3.9–12.7)

## 2021-12-10 PROCEDURE — 80069 RENAL FUNCTION PANEL: CPT | Performed by: HOSPITALIST

## 2021-12-10 PROCEDURE — 83735 ASSAY OF MAGNESIUM: CPT | Performed by: HOSPITALIST

## 2021-12-10 PROCEDURE — 85730 THROMBOPLASTIN TIME PARTIAL: CPT | Performed by: INTERNAL MEDICINE

## 2021-12-10 PROCEDURE — 25000003 PHARM REV CODE 250: Performed by: HOSPITALIST

## 2021-12-10 PROCEDURE — 27000221 HC OXYGEN, UP TO 24 HOURS

## 2021-12-10 PROCEDURE — 99900035 HC TECH TIME PER 15 MIN (STAT)

## 2021-12-10 PROCEDURE — 99239 PR HOSPITAL DISCHARGE DAY,>30 MIN: ICD-10-PCS | Mod: ,,, | Performed by: HOSPITALIST

## 2021-12-10 PROCEDURE — 84100 ASSAY OF PHOSPHORUS: CPT | Performed by: HOSPITALIST

## 2021-12-10 PROCEDURE — 85730 THROMBOPLASTIN TIME PARTIAL: CPT | Mod: 91 | Performed by: HOSPITALIST

## 2021-12-10 PROCEDURE — 25000242 PHARM REV CODE 250 ALT 637 W/ HCPCS: Performed by: HOSPITALIST

## 2021-12-10 PROCEDURE — 94761 N-INVAS EAR/PLS OXIMETRY MLT: CPT

## 2021-12-10 PROCEDURE — 20600001 HC STEP DOWN PRIVATE ROOM

## 2021-12-10 PROCEDURE — 63600175 PHARM REV CODE 636 W HCPCS

## 2021-12-10 PROCEDURE — 36415 COLL VENOUS BLD VENIPUNCTURE: CPT | Performed by: INTERNAL MEDICINE

## 2021-12-10 PROCEDURE — 85025 COMPLETE CBC W/AUTO DIFF WBC: CPT | Mod: 91 | Performed by: HOSPITALIST

## 2021-12-10 PROCEDURE — 25000003 PHARM REV CODE 250: Performed by: INTERNAL MEDICINE

## 2021-12-10 PROCEDURE — 80053 COMPREHEN METABOLIC PANEL: CPT | Performed by: HOSPITALIST

## 2021-12-10 PROCEDURE — 99239 HOSP IP/OBS DSCHRG MGMT >30: CPT | Mod: ,,, | Performed by: HOSPITALIST

## 2021-12-10 PROCEDURE — 94640 AIRWAY INHALATION TREATMENT: CPT

## 2021-12-10 PROCEDURE — 63600175 PHARM REV CODE 636 W HCPCS: Performed by: HOSPITALIST

## 2021-12-10 PROCEDURE — 85025 COMPLETE CBC W/AUTO DIFF WBC: CPT | Performed by: HOSPITALIST

## 2021-12-10 PROCEDURE — 85610 PROTHROMBIN TIME: CPT | Performed by: HOSPITALIST

## 2021-12-10 PROCEDURE — 36415 COLL VENOUS BLD VENIPUNCTURE: CPT | Performed by: HOSPITALIST

## 2021-12-10 RX ORDER — FUROSEMIDE 10 MG/ML
40 INJECTION INTRAMUSCULAR; INTRAVENOUS EVERY MORNING
Status: DISCONTINUED | OUTPATIENT
Start: 2021-12-11 | End: 2021-12-11

## 2021-12-10 RX ORDER — NAPROXEN SODIUM 220 MG/1
81 TABLET, FILM COATED ORAL DAILY
Qty: 30 TABLET | Refills: 0 | Status: SHIPPED | OUTPATIENT
Start: 2021-12-10 | End: 2022-01-10 | Stop reason: SDUPTHER

## 2021-12-10 RX ORDER — SPIRONOLACTONE 25 MG/1
25 TABLET ORAL DAILY
Qty: 30 TABLET | Refills: 0 | Status: SHIPPED | OUTPATIENT
Start: 2021-12-10 | End: 2021-12-11 | Stop reason: HOSPADM

## 2021-12-10 RX ORDER — CLOPIDOGREL BISULFATE 75 MG/1
75 TABLET ORAL DAILY
Qty: 30 TABLET | Refills: 0 | Status: SHIPPED | OUTPATIENT
Start: 2021-12-10 | End: 2022-01-10 | Stop reason: SDUPTHER

## 2021-12-10 RX ORDER — PROCHLORPERAZINE EDISYLATE 5 MG/ML
2.5 INJECTION INTRAMUSCULAR; INTRAVENOUS EVERY 4 HOURS PRN
Status: DISCONTINUED | OUTPATIENT
Start: 2021-12-10 | End: 2021-12-14 | Stop reason: HOSPADM

## 2021-12-10 RX ORDER — ATORVASTATIN CALCIUM 40 MG/1
40 TABLET, FILM COATED ORAL NIGHTLY
Qty: 30 TABLET | Refills: 0 | Status: ON HOLD | OUTPATIENT
Start: 2021-12-10 | End: 2022-05-03 | Stop reason: CLARIF

## 2021-12-10 RX ORDER — FAMOTIDINE 20 MG/1
20 TABLET, FILM COATED ORAL DAILY
Status: DISCONTINUED | OUTPATIENT
Start: 2021-12-10 | End: 2021-12-14 | Stop reason: HOSPADM

## 2021-12-10 RX ORDER — METOPROLOL SUCCINATE 25 MG/1
25 TABLET, EXTENDED RELEASE ORAL DAILY
Status: DISCONTINUED | OUTPATIENT
Start: 2021-12-10 | End: 2021-12-14 | Stop reason: HOSPADM

## 2021-12-10 RX ORDER — HEPARIN SODIUM,PORCINE/D5W 25000/250
0-40 INTRAVENOUS SOLUTION INTRAVENOUS CONTINUOUS
Status: DISCONTINUED | OUTPATIENT
Start: 2021-12-10 | End: 2021-12-11

## 2021-12-10 RX ORDER — ONDANSETRON 2 MG/ML
4 INJECTION INTRAMUSCULAR; INTRAVENOUS EVERY 4 HOURS PRN
Status: DISCONTINUED | OUTPATIENT
Start: 2021-12-10 | End: 2021-12-14 | Stop reason: HOSPADM

## 2021-12-10 RX ADMIN — PREDNISONE 40 MG: 20 TABLET ORAL at 09:12

## 2021-12-10 RX ADMIN — FAMOTIDINE 20 MG: 20 TABLET ORAL at 09:12

## 2021-12-10 RX ADMIN — HEPARIN SODIUM AND DEXTROSE 12 UNITS/KG/HR: 10000; 5 INJECTION INTRAVENOUS at 11:12

## 2021-12-10 RX ADMIN — IPRATROPIUM BROMIDE AND ALBUTEROL SULFATE 3 ML: 2.5; .5 SOLUTION RESPIRATORY (INHALATION) at 04:12

## 2021-12-10 RX ADMIN — IPRATROPIUM BROMIDE AND ALBUTEROL SULFATE 3 ML: 2.5; .5 SOLUTION RESPIRATORY (INHALATION) at 11:12

## 2021-12-10 RX ADMIN — HEPARIN SODIUM AND DEXTROSE 15 UNITS/KG/HR: 10000; 5 INJECTION INTRAVENOUS at 01:12

## 2021-12-10 RX ADMIN — CLOPIDOGREL 75 MG: 75 TABLET, FILM COATED ORAL at 09:12

## 2021-12-10 RX ADMIN — IPRATROPIUM BROMIDE AND ALBUTEROL SULFATE 3 ML: 2.5; .5 SOLUTION RESPIRATORY (INHALATION) at 08:12

## 2021-12-10 RX ADMIN — IPRATROPIUM BROMIDE AND ALBUTEROL SULFATE 3 ML: 2.5; .5 SOLUTION RESPIRATORY (INHALATION) at 03:12

## 2021-12-10 RX ADMIN — IPRATROPIUM BROMIDE AND ALBUTEROL SULFATE 3 ML: 2.5; .5 SOLUTION RESPIRATORY (INHALATION) at 12:12

## 2021-12-10 RX ADMIN — ATORVASTATIN CALCIUM 40 MG: 20 TABLET, FILM COATED ORAL at 09:12

## 2021-12-10 RX ADMIN — ASPIRIN 81 MG CHEWABLE TABLET 81 MG: 81 TABLET CHEWABLE at 09:12

## 2021-12-10 RX ADMIN — DOXYCYCLINE HYCLATE 100 MG: 100 TABLET, COATED ORAL at 09:12

## 2021-12-10 RX ADMIN — FUROSEMIDE 40 MG: 40 INJECTION, SOLUTION INTRAMUSCULAR; INTRAVENOUS at 09:12

## 2021-12-10 RX ADMIN — METOPROLOL SUCCINATE 25 MG: 25 TABLET, EXTENDED RELEASE ORAL at 09:12

## 2021-12-10 RX ADMIN — PROCHLORPERAZINE EDISYLATE 2.5 MG: 5 INJECTION INTRAMUSCULAR; INTRAVENOUS at 02:12

## 2021-12-10 RX ADMIN — FLUTICASONE FUROATE AND VILANTEROL TRIFENATATE 1 PUFF: 100; 25 POWDER RESPIRATORY (INHALATION) at 08:12

## 2021-12-10 RX ADMIN — HEPARIN SODIUM AND DEXTROSE 12 UNITS/KG/HR: 10000; 5 INJECTION INTRAVENOUS at 07:12

## 2021-12-11 LAB
ALBUMIN SERPL BCP-MCNC: 3.7 G/DL (ref 3.5–5.2)
ALBUMIN SERPL BCP-MCNC: 4 G/DL (ref 3.5–5.2)
ALP SERPL-CCNC: 99 U/L (ref 55–135)
ALT SERPL W/O P-5'-P-CCNC: 36 U/L (ref 10–44)
ANION GAP SERPL CALC-SCNC: 11 MMOL/L (ref 8–16)
ANION GAP SERPL CALC-SCNC: 14 MMOL/L (ref 8–16)
APTT BLDCRRT: 32.6 SEC (ref 21–32)
AST SERPL-CCNC: 17 U/L (ref 10–40)
BASOPHILS # BLD AUTO: 0.01 K/UL (ref 0–0.2)
BASOPHILS # BLD AUTO: 0.01 K/UL (ref 0–0.2)
BASOPHILS NFR BLD: 0.1 % (ref 0–1.9)
BASOPHILS NFR BLD: 0.1 % (ref 0–1.9)
BILIRUB SERPL-MCNC: 0.6 MG/DL (ref 0.1–1)
BUN SERPL-MCNC: 32 MG/DL (ref 8–23)
BUN SERPL-MCNC: 39 MG/DL (ref 8–23)
CALCIUM SERPL-MCNC: 10.1 MG/DL (ref 8.7–10.5)
CALCIUM SERPL-MCNC: 9.9 MG/DL (ref 8.7–10.5)
CHLORIDE SERPL-SCNC: 95 MMOL/L (ref 95–110)
CHLORIDE SERPL-SCNC: 95 MMOL/L (ref 95–110)
CO2 SERPL-SCNC: 28 MMOL/L (ref 23–29)
CO2 SERPL-SCNC: 31 MMOL/L (ref 23–29)
CREAT SERPL-MCNC: 1 MG/DL (ref 0.5–1.4)
CREAT SERPL-MCNC: 1 MG/DL (ref 0.5–1.4)
DIFFERENTIAL METHOD: ABNORMAL
DIFFERENTIAL METHOD: ABNORMAL
EOSINOPHIL # BLD AUTO: 0 K/UL (ref 0–0.5)
EOSINOPHIL # BLD AUTO: 0 K/UL (ref 0–0.5)
EOSINOPHIL NFR BLD: 0 % (ref 0–8)
EOSINOPHIL NFR BLD: 0 % (ref 0–8)
ERYTHROCYTE [DISTWIDTH] IN BLOOD BY AUTOMATED COUNT: 14.3 % (ref 11.5–14.5)
ERYTHROCYTE [DISTWIDTH] IN BLOOD BY AUTOMATED COUNT: 14.3 % (ref 11.5–14.5)
EST. GFR  (AFRICAN AMERICAN): >60 ML/MIN/1.73 M^2
EST. GFR  (AFRICAN AMERICAN): >60 ML/MIN/1.73 M^2
EST. GFR  (NON AFRICAN AMERICAN): 55.6 ML/MIN/1.73 M^2
EST. GFR  (NON AFRICAN AMERICAN): 55.6 ML/MIN/1.73 M^2
GLUCOSE SERPL-MCNC: 139 MG/DL (ref 70–110)
GLUCOSE SERPL-MCNC: 99 MG/DL (ref 70–110)
HCT VFR BLD AUTO: 39.2 % (ref 37–48.5)
HCT VFR BLD AUTO: 39.2 % (ref 37–48.5)
HGB BLD-MCNC: 12.2 G/DL (ref 12–16)
HGB BLD-MCNC: 12.2 G/DL (ref 12–16)
IMM GRANULOCYTES # BLD AUTO: 0.02 K/UL (ref 0–0.04)
IMM GRANULOCYTES # BLD AUTO: 0.02 K/UL (ref 0–0.04)
IMM GRANULOCYTES NFR BLD AUTO: 0.3 % (ref 0–0.5)
IMM GRANULOCYTES NFR BLD AUTO: 0.3 % (ref 0–0.5)
LYMPHOCYTES # BLD AUTO: 1.2 K/UL (ref 1–4.8)
LYMPHOCYTES # BLD AUTO: 1.2 K/UL (ref 1–4.8)
LYMPHOCYTES NFR BLD: 17 % (ref 18–48)
LYMPHOCYTES NFR BLD: 17 % (ref 18–48)
MAGNESIUM SERPL-MCNC: 2 MG/DL (ref 1.6–2.6)
MCH RBC QN AUTO: 24.9 PG (ref 27–31)
MCH RBC QN AUTO: 24.9 PG (ref 27–31)
MCHC RBC AUTO-ENTMCNC: 31.1 G/DL (ref 32–36)
MCHC RBC AUTO-ENTMCNC: 31.1 G/DL (ref 32–36)
MCV RBC AUTO: 80 FL (ref 82–98)
MCV RBC AUTO: 80 FL (ref 82–98)
MONOCYTES # BLD AUTO: 0.6 K/UL (ref 0.3–1)
MONOCYTES # BLD AUTO: 0.6 K/UL (ref 0.3–1)
MONOCYTES NFR BLD: 7.9 % (ref 4–15)
MONOCYTES NFR BLD: 7.9 % (ref 4–15)
NEUTROPHILS # BLD AUTO: 5.4 K/UL (ref 1.8–7.7)
NEUTROPHILS # BLD AUTO: 5.4 K/UL (ref 1.8–7.7)
NEUTROPHILS NFR BLD: 74.7 % (ref 38–73)
NEUTROPHILS NFR BLD: 74.7 % (ref 38–73)
NRBC BLD-RTO: 0 /100 WBC
NRBC BLD-RTO: 0 /100 WBC
PHOSPHATE SERPL-MCNC: 3.1 MG/DL (ref 2.7–4.5)
PHOSPHATE SERPL-MCNC: 3.7 MG/DL (ref 2.7–4.5)
PLATELET # BLD AUTO: 212 K/UL (ref 150–450)
PLATELET # BLD AUTO: 212 K/UL (ref 150–450)
PMV BLD AUTO: 9.8 FL (ref 9.2–12.9)
PMV BLD AUTO: 9.8 FL (ref 9.2–12.9)
POTASSIUM SERPL-SCNC: 4.5 MMOL/L (ref 3.5–5.1)
POTASSIUM SERPL-SCNC: 4.7 MMOL/L (ref 3.5–5.1)
PROT SERPL-MCNC: 6.7 G/DL (ref 6–8.4)
RBC # BLD AUTO: 4.9 M/UL (ref 4–5.4)
RBC # BLD AUTO: 4.9 M/UL (ref 4–5.4)
SODIUM SERPL-SCNC: 137 MMOL/L (ref 136–145)
SODIUM SERPL-SCNC: 137 MMOL/L (ref 136–145)
WBC # BLD AUTO: 7.23 K/UL (ref 3.9–12.7)
WBC # BLD AUTO: 7.23 K/UL (ref 3.9–12.7)

## 2021-12-11 PROCEDURE — 63600175 PHARM REV CODE 636 W HCPCS: Performed by: HOSPITALIST

## 2021-12-11 PROCEDURE — 94640 AIRWAY INHALATION TREATMENT: CPT

## 2021-12-11 PROCEDURE — 25000003 PHARM REV CODE 250: Performed by: INTERNAL MEDICINE

## 2021-12-11 PROCEDURE — 99232 SBSQ HOSP IP/OBS MODERATE 35: CPT | Mod: ,,, | Performed by: HOSPITALIST

## 2021-12-11 PROCEDURE — 20600001 HC STEP DOWN PRIVATE ROOM

## 2021-12-11 PROCEDURE — 85730 THROMBOPLASTIN TIME PARTIAL: CPT | Performed by: HOSPITALIST

## 2021-12-11 PROCEDURE — 99232 PR SUBSEQUENT HOSPITAL CARE,LEVL II: ICD-10-PCS | Mod: ,,, | Performed by: HOSPITALIST

## 2021-12-11 PROCEDURE — 99900035 HC TECH TIME PER 15 MIN (STAT)

## 2021-12-11 PROCEDURE — 84100 ASSAY OF PHOSPHORUS: CPT | Performed by: HOSPITALIST

## 2021-12-11 PROCEDURE — 83735 ASSAY OF MAGNESIUM: CPT | Performed by: HOSPITALIST

## 2021-12-11 PROCEDURE — 36415 COLL VENOUS BLD VENIPUNCTURE: CPT | Performed by: HOSPITALIST

## 2021-12-11 PROCEDURE — 25000242 PHARM REV CODE 250 ALT 637 W/ HCPCS: Performed by: HOSPITALIST

## 2021-12-11 PROCEDURE — 85025 COMPLETE CBC W/AUTO DIFF WBC: CPT | Performed by: HOSPITALIST

## 2021-12-11 PROCEDURE — 63600175 PHARM REV CODE 636 W HCPCS

## 2021-12-11 PROCEDURE — 25000003 PHARM REV CODE 250: Performed by: HOSPITALIST

## 2021-12-11 PROCEDURE — 80053 COMPREHEN METABOLIC PANEL: CPT | Performed by: HOSPITALIST

## 2021-12-11 PROCEDURE — 80069 RENAL FUNCTION PANEL: CPT | Performed by: HOSPITALIST

## 2021-12-11 PROCEDURE — 94761 N-INVAS EAR/PLS OXIMETRY MLT: CPT

## 2021-12-11 PROCEDURE — 27000221 HC OXYGEN, UP TO 24 HOURS

## 2021-12-11 RX ORDER — FUROSEMIDE 40 MG/1
40 TABLET ORAL DAILY
Qty: 90 TABLET | Refills: 0 | Status: SHIPPED | OUTPATIENT
Start: 2021-12-12 | End: 2021-12-14

## 2021-12-11 RX ORDER — FUROSEMIDE 40 MG/1
40 TABLET ORAL DAILY
Status: DISCONTINUED | OUTPATIENT
Start: 2021-12-12 | End: 2021-12-12

## 2021-12-11 RX ORDER — FUROSEMIDE 40 MG/1
40 TABLET ORAL DAILY
Status: DISCONTINUED | OUTPATIENT
Start: 2021-12-11 | End: 2021-12-11

## 2021-12-11 RX ORDER — ENOXAPARIN SODIUM 100 MG/ML
40 INJECTION SUBCUTANEOUS EVERY 24 HOURS
Status: DISCONTINUED | OUTPATIENT
Start: 2021-12-11 | End: 2021-12-14 | Stop reason: HOSPADM

## 2021-12-11 RX ADMIN — DOXYCYCLINE HYCLATE 100 MG: 100 TABLET, COATED ORAL at 09:12

## 2021-12-11 RX ADMIN — ATORVASTATIN CALCIUM 40 MG: 20 TABLET, FILM COATED ORAL at 09:12

## 2021-12-11 RX ADMIN — IPRATROPIUM BROMIDE AND ALBUTEROL SULFATE 3 ML: 2.5; .5 SOLUTION RESPIRATORY (INHALATION) at 08:12

## 2021-12-11 RX ADMIN — FLUTICASONE FUROATE AND VILANTEROL TRIFENATATE 1 PUFF: 100; 25 POWDER RESPIRATORY (INHALATION) at 08:12

## 2021-12-11 RX ADMIN — IPRATROPIUM BROMIDE AND ALBUTEROL SULFATE 3 ML: 2.5; .5 SOLUTION RESPIRATORY (INHALATION) at 03:12

## 2021-12-11 RX ADMIN — METOPROLOL SUCCINATE 25 MG: 25 TABLET, EXTENDED RELEASE ORAL at 08:12

## 2021-12-11 RX ADMIN — ENOXAPARIN SODIUM 40 MG: 100 INJECTION SUBCUTANEOUS at 05:12

## 2021-12-11 RX ADMIN — PREDNISONE 40 MG: 20 TABLET ORAL at 08:12

## 2021-12-11 RX ADMIN — IPRATROPIUM BROMIDE AND ALBUTEROL SULFATE 3 ML: 2.5; .5 SOLUTION RESPIRATORY (INHALATION) at 12:12

## 2021-12-11 RX ADMIN — FAMOTIDINE 20 MG: 20 TABLET ORAL at 08:12

## 2021-12-11 RX ADMIN — PROCHLORPERAZINE EDISYLATE 2.5 MG: 5 INJECTION INTRAMUSCULAR; INTRAVENOUS at 04:12

## 2021-12-11 RX ADMIN — ASPIRIN 81 MG CHEWABLE TABLET 81 MG: 81 TABLET CHEWABLE at 08:12

## 2021-12-11 RX ADMIN — IPRATROPIUM BROMIDE AND ALBUTEROL SULFATE 3 ML: 2.5; .5 SOLUTION RESPIRATORY (INHALATION) at 11:12

## 2021-12-11 RX ADMIN — FUROSEMIDE 40 MG: 40 INJECTION, SOLUTION INTRAMUSCULAR; INTRAVENOUS at 08:12

## 2021-12-11 RX ADMIN — DOXYCYCLINE HYCLATE 100 MG: 100 TABLET, COATED ORAL at 08:12

## 2021-12-11 RX ADMIN — CLOPIDOGREL 75 MG: 75 TABLET, FILM COATED ORAL at 08:12

## 2021-12-12 LAB
ALBUMIN SERPL BCP-MCNC: 3.5 G/DL (ref 3.5–5.2)
ALBUMIN SERPL BCP-MCNC: 3.7 G/DL (ref 3.5–5.2)
ALP SERPL-CCNC: 94 U/L (ref 55–135)
ALT SERPL W/O P-5'-P-CCNC: 31 U/L (ref 10–44)
ANION GAP SERPL CALC-SCNC: 12 MMOL/L (ref 8–16)
ANION GAP SERPL CALC-SCNC: 7 MMOL/L (ref 8–16)
AST SERPL-CCNC: 18 U/L (ref 10–40)
BASOPHILS # BLD AUTO: 0.01 K/UL (ref 0–0.2)
BASOPHILS NFR BLD: 0.2 % (ref 0–1.9)
BILIRUB SERPL-MCNC: 0.6 MG/DL (ref 0.1–1)
BUN SERPL-MCNC: 39 MG/DL (ref 8–23)
BUN SERPL-MCNC: 45 MG/DL (ref 8–23)
CALCIUM SERPL-MCNC: 9.5 MG/DL (ref 8.7–10.5)
CALCIUM SERPL-MCNC: 9.9 MG/DL (ref 8.7–10.5)
CHLORIDE SERPL-SCNC: 102 MMOL/L (ref 95–110)
CHLORIDE SERPL-SCNC: 96 MMOL/L (ref 95–110)
CO2 SERPL-SCNC: 29 MMOL/L (ref 23–29)
CO2 SERPL-SCNC: 31 MMOL/L (ref 23–29)
CREAT SERPL-MCNC: 0.9 MG/DL (ref 0.5–1.4)
CREAT SERPL-MCNC: 1.1 MG/DL (ref 0.5–1.4)
DIFFERENTIAL METHOD: ABNORMAL
EOSINOPHIL # BLD AUTO: 0 K/UL (ref 0–0.5)
EOSINOPHIL NFR BLD: 0.2 % (ref 0–8)
ERYTHROCYTE [DISTWIDTH] IN BLOOD BY AUTOMATED COUNT: 14 % (ref 11.5–14.5)
EST. GFR  (AFRICAN AMERICAN): 57.2 ML/MIN/1.73 M^2
EST. GFR  (AFRICAN AMERICAN): >60 ML/MIN/1.73 M^2
EST. GFR  (NON AFRICAN AMERICAN): 49.6 ML/MIN/1.73 M^2
EST. GFR  (NON AFRICAN AMERICAN): >60 ML/MIN/1.73 M^2
GLUCOSE SERPL-MCNC: 109 MG/DL (ref 70–110)
GLUCOSE SERPL-MCNC: 92 MG/DL (ref 70–110)
HCT VFR BLD AUTO: 39.7 % (ref 37–48.5)
HGB BLD-MCNC: 12.2 G/DL (ref 12–16)
IMM GRANULOCYTES # BLD AUTO: 0.02 K/UL (ref 0–0.04)
IMM GRANULOCYTES NFR BLD AUTO: 0.3 % (ref 0–0.5)
LYMPHOCYTES # BLD AUTO: 1.2 K/UL (ref 1–4.8)
LYMPHOCYTES NFR BLD: 18.6 % (ref 18–48)
MAGNESIUM SERPL-MCNC: 2.1 MG/DL (ref 1.6–2.6)
MCH RBC QN AUTO: 24.7 PG (ref 27–31)
MCHC RBC AUTO-ENTMCNC: 30.7 G/DL (ref 32–36)
MCV RBC AUTO: 80 FL (ref 82–98)
MONOCYTES # BLD AUTO: 0.6 K/UL (ref 0.3–1)
MONOCYTES NFR BLD: 8.6 % (ref 4–15)
NEUTROPHILS # BLD AUTO: 4.6 K/UL (ref 1.8–7.7)
NEUTROPHILS NFR BLD: 72.1 % (ref 38–73)
NRBC BLD-RTO: 0 /100 WBC
PHOSPHATE SERPL-MCNC: 2.5 MG/DL (ref 2.7–4.5)
PHOSPHATE SERPL-MCNC: 3.7 MG/DL (ref 2.7–4.5)
PLATELET # BLD AUTO: 213 K/UL (ref 150–450)
PMV BLD AUTO: 10.1 FL (ref 9.2–12.9)
POTASSIUM SERPL-SCNC: 4.4 MMOL/L (ref 3.5–5.1)
POTASSIUM SERPL-SCNC: 4.5 MMOL/L (ref 3.5–5.1)
PROT SERPL-MCNC: 6.3 G/DL (ref 6–8.4)
RBC # BLD AUTO: 4.94 M/UL (ref 4–5.4)
SODIUM SERPL-SCNC: 138 MMOL/L (ref 136–145)
SODIUM SERPL-SCNC: 139 MMOL/L (ref 136–145)
WBC # BLD AUTO: 6.36 K/UL (ref 3.9–12.7)

## 2021-12-12 PROCEDURE — 36415 COLL VENOUS BLD VENIPUNCTURE: CPT | Performed by: HOSPITALIST

## 2021-12-12 PROCEDURE — 99231 SBSQ HOSP IP/OBS SF/LOW 25: CPT | Mod: ,,, | Performed by: HOSPITALIST

## 2021-12-12 PROCEDURE — 25000003 PHARM REV CODE 250: Performed by: INTERNAL MEDICINE

## 2021-12-12 PROCEDURE — 25000242 PHARM REV CODE 250 ALT 637 W/ HCPCS: Performed by: HOSPITALIST

## 2021-12-12 PROCEDURE — 25000003 PHARM REV CODE 250: Performed by: HOSPITALIST

## 2021-12-12 PROCEDURE — 94761 N-INVAS EAR/PLS OXIMETRY MLT: CPT

## 2021-12-12 PROCEDURE — 83735 ASSAY OF MAGNESIUM: CPT | Performed by: HOSPITALIST

## 2021-12-12 PROCEDURE — 63600175 PHARM REV CODE 636 W HCPCS: Performed by: HOSPITALIST

## 2021-12-12 PROCEDURE — 80069 RENAL FUNCTION PANEL: CPT | Performed by: HOSPITALIST

## 2021-12-12 PROCEDURE — 27000221 HC OXYGEN, UP TO 24 HOURS

## 2021-12-12 PROCEDURE — 94640 AIRWAY INHALATION TREATMENT: CPT

## 2021-12-12 PROCEDURE — 84100 ASSAY OF PHOSPHORUS: CPT | Performed by: HOSPITALIST

## 2021-12-12 PROCEDURE — 99900035 HC TECH TIME PER 15 MIN (STAT)

## 2021-12-12 PROCEDURE — 85025 COMPLETE CBC W/AUTO DIFF WBC: CPT | Performed by: HOSPITALIST

## 2021-12-12 PROCEDURE — 80053 COMPREHEN METABOLIC PANEL: CPT | Performed by: HOSPITALIST

## 2021-12-12 PROCEDURE — 20600001 HC STEP DOWN PRIVATE ROOM

## 2021-12-12 PROCEDURE — 99231 PR SUBSEQUENT HOSPITAL CARE,LEVL I: ICD-10-PCS | Mod: ,,, | Performed by: HOSPITALIST

## 2021-12-12 RX ADMIN — PREDNISONE 40 MG: 20 TABLET ORAL at 10:12

## 2021-12-12 RX ADMIN — METOPROLOL SUCCINATE 25 MG: 25 TABLET, EXTENDED RELEASE ORAL at 10:12

## 2021-12-12 RX ADMIN — ATORVASTATIN CALCIUM 40 MG: 20 TABLET, FILM COATED ORAL at 09:12

## 2021-12-12 RX ADMIN — IPRATROPIUM BROMIDE AND ALBUTEROL SULFATE 3 ML: 2.5; .5 SOLUTION RESPIRATORY (INHALATION) at 07:12

## 2021-12-12 RX ADMIN — FAMOTIDINE 20 MG: 20 TABLET ORAL at 10:12

## 2021-12-12 RX ADMIN — DOXYCYCLINE HYCLATE 100 MG: 100 TABLET, COATED ORAL at 10:12

## 2021-12-12 RX ADMIN — CLOPIDOGREL 75 MG: 75 TABLET, FILM COATED ORAL at 10:12

## 2021-12-12 RX ADMIN — FLUTICASONE FUROATE AND VILANTEROL TRIFENATATE 1 PUFF: 100; 25 POWDER RESPIRATORY (INHALATION) at 08:12

## 2021-12-12 RX ADMIN — ASPIRIN 81 MG CHEWABLE TABLET 81 MG: 81 TABLET CHEWABLE at 10:12

## 2021-12-12 RX ADMIN — IPRATROPIUM BROMIDE AND ALBUTEROL SULFATE 3 ML: 2.5; .5 SOLUTION RESPIRATORY (INHALATION) at 04:12

## 2021-12-12 RX ADMIN — SACUBITRIL AND VALSARTAN 1 TABLET: 24; 26 TABLET, FILM COATED ORAL at 10:12

## 2021-12-12 RX ADMIN — IPRATROPIUM BROMIDE AND ALBUTEROL SULFATE 3 ML: 2.5; .5 SOLUTION RESPIRATORY (INHALATION) at 11:12

## 2021-12-12 RX ADMIN — ENOXAPARIN SODIUM 40 MG: 100 INJECTION SUBCUTANEOUS at 04:12

## 2021-12-12 RX ADMIN — SACUBITRIL AND VALSARTAN 1 TABLET: 24; 26 TABLET, FILM COATED ORAL at 09:12

## 2021-12-12 RX ADMIN — DOXYCYCLINE HYCLATE 100 MG: 100 TABLET, COATED ORAL at 09:12

## 2021-12-13 LAB
ALBUMIN SERPL BCP-MCNC: 3.3 G/DL (ref 3.5–5.2)
ALBUMIN SERPL BCP-MCNC: 3.5 G/DL (ref 3.5–5.2)
ALP SERPL-CCNC: 103 U/L (ref 55–135)
ALT SERPL W/O P-5'-P-CCNC: 27 U/L (ref 10–44)
ANION GAP SERPL CALC-SCNC: 11 MMOL/L (ref 8–16)
ANION GAP SERPL CALC-SCNC: 12 MMOL/L (ref 8–16)
AST SERPL-CCNC: 16 U/L (ref 10–40)
BILIRUB SERPL-MCNC: 0.5 MG/DL (ref 0.1–1)
BUN SERPL-MCNC: 40 MG/DL (ref 8–23)
BUN SERPL-MCNC: 44 MG/DL (ref 8–23)
CALCIUM SERPL-MCNC: 9.1 MG/DL (ref 8.7–10.5)
CALCIUM SERPL-MCNC: 9.4 MG/DL (ref 8.7–10.5)
CHLORIDE SERPL-SCNC: 98 MMOL/L (ref 95–110)
CHLORIDE SERPL-SCNC: 99 MMOL/L (ref 95–110)
CO2 SERPL-SCNC: 27 MMOL/L (ref 23–29)
CO2 SERPL-SCNC: 28 MMOL/L (ref 23–29)
CREAT SERPL-MCNC: 0.9 MG/DL (ref 0.5–1.4)
CREAT SERPL-MCNC: 1 MG/DL (ref 0.5–1.4)
EST. GFR  (AFRICAN AMERICAN): >60 ML/MIN/1.73 M^2
EST. GFR  (AFRICAN AMERICAN): >60 ML/MIN/1.73 M^2
EST. GFR  (NON AFRICAN AMERICAN): 55.6 ML/MIN/1.73 M^2
EST. GFR  (NON AFRICAN AMERICAN): >60 ML/MIN/1.73 M^2
GLUCOSE SERPL-MCNC: 104 MG/DL (ref 70–110)
GLUCOSE SERPL-MCNC: 109 MG/DL (ref 70–110)
PHOSPHATE SERPL-MCNC: 3.1 MG/DL (ref 2.7–4.5)
POTASSIUM SERPL-SCNC: 3.4 MMOL/L (ref 3.5–5.1)
POTASSIUM SERPL-SCNC: 3.6 MMOL/L (ref 3.5–5.1)
PROT SERPL-MCNC: 6.6 G/DL (ref 6–8.4)
SODIUM SERPL-SCNC: 137 MMOL/L (ref 136–145)
SODIUM SERPL-SCNC: 138 MMOL/L (ref 136–145)

## 2021-12-13 PROCEDURE — 99231 SBSQ HOSP IP/OBS SF/LOW 25: CPT | Mod: ,,, | Performed by: HOSPITALIST

## 2021-12-13 PROCEDURE — 20600001 HC STEP DOWN PRIVATE ROOM

## 2021-12-13 PROCEDURE — 94640 AIRWAY INHALATION TREATMENT: CPT

## 2021-12-13 PROCEDURE — 25000003 PHARM REV CODE 250: Performed by: HOSPITALIST

## 2021-12-13 PROCEDURE — 27000221 HC OXYGEN, UP TO 24 HOURS

## 2021-12-13 PROCEDURE — 99900035 HC TECH TIME PER 15 MIN (STAT)

## 2021-12-13 PROCEDURE — 80053 COMPREHEN METABOLIC PANEL: CPT | Performed by: HOSPITALIST

## 2021-12-13 PROCEDURE — 25000242 PHARM REV CODE 250 ALT 637 W/ HCPCS: Performed by: HOSPITALIST

## 2021-12-13 PROCEDURE — 36415 COLL VENOUS BLD VENIPUNCTURE: CPT | Performed by: HOSPITALIST

## 2021-12-13 PROCEDURE — 25000003 PHARM REV CODE 250: Performed by: INTERNAL MEDICINE

## 2021-12-13 PROCEDURE — 99231 PR SUBSEQUENT HOSPITAL CARE,LEVL I: ICD-10-PCS | Mod: ,,, | Performed by: HOSPITALIST

## 2021-12-13 PROCEDURE — 80069 RENAL FUNCTION PANEL: CPT | Performed by: HOSPITALIST

## 2021-12-13 PROCEDURE — 63600175 PHARM REV CODE 636 W HCPCS: Performed by: HOSPITALIST

## 2021-12-13 PROCEDURE — 94761 N-INVAS EAR/PLS OXIMETRY MLT: CPT

## 2021-12-13 RX ORDER — FUROSEMIDE 40 MG/1
40 TABLET ORAL DAILY
Status: DISCONTINUED | OUTPATIENT
Start: 2021-12-13 | End: 2021-12-13

## 2021-12-13 RX ADMIN — CLOPIDOGREL 75 MG: 75 TABLET, FILM COATED ORAL at 10:12

## 2021-12-13 RX ADMIN — FLUTICASONE FUROATE AND VILANTEROL TRIFENATATE 1 PUFF: 100; 25 POWDER RESPIRATORY (INHALATION) at 09:12

## 2021-12-13 RX ADMIN — FAMOTIDINE 20 MG: 20 TABLET ORAL at 10:12

## 2021-12-13 RX ADMIN — SACUBITRIL AND VALSARTAN 1 TABLET: 24; 26 TABLET, FILM COATED ORAL at 08:12

## 2021-12-13 RX ADMIN — IPRATROPIUM BROMIDE AND ALBUTEROL SULFATE 3 ML: 2.5; .5 SOLUTION RESPIRATORY (INHALATION) at 04:12

## 2021-12-13 RX ADMIN — ASPIRIN 81 MG CHEWABLE TABLET 81 MG: 81 TABLET CHEWABLE at 10:12

## 2021-12-13 RX ADMIN — IPRATROPIUM BROMIDE AND ALBUTEROL SULFATE 3 ML: 2.5; .5 SOLUTION RESPIRATORY (INHALATION) at 03:12

## 2021-12-13 RX ADMIN — ENOXAPARIN SODIUM 40 MG: 100 INJECTION SUBCUTANEOUS at 05:12

## 2021-12-13 RX ADMIN — IPRATROPIUM BROMIDE AND ALBUTEROL SULFATE 3 ML: 2.5; .5 SOLUTION RESPIRATORY (INHALATION) at 08:12

## 2021-12-13 RX ADMIN — DOXYCYCLINE HYCLATE 100 MG: 100 TABLET, COATED ORAL at 10:12

## 2021-12-13 RX ADMIN — IPRATROPIUM BROMIDE AND ALBUTEROL SULFATE 3 ML: 2.5; .5 SOLUTION RESPIRATORY (INHALATION) at 12:12

## 2021-12-13 RX ADMIN — SACUBITRIL AND VALSARTAN 1 TABLET: 24; 26 TABLET, FILM COATED ORAL at 10:12

## 2021-12-13 RX ADMIN — ATORVASTATIN CALCIUM 40 MG: 20 TABLET, FILM COATED ORAL at 08:12

## 2021-12-14 VITALS
SYSTOLIC BLOOD PRESSURE: 110 MMHG | DIASTOLIC BLOOD PRESSURE: 57 MMHG | HEART RATE: 94 BPM | WEIGHT: 148.38 LBS | OXYGEN SATURATION: 93 % | RESPIRATION RATE: 20 BRPM | HEIGHT: 61 IN | TEMPERATURE: 99 F | BODY MASS INDEX: 28.01 KG/M2

## 2021-12-14 PROCEDURE — 25000242 PHARM REV CODE 250 ALT 637 W/ HCPCS: Performed by: HOSPITALIST

## 2021-12-14 PROCEDURE — 27000221 HC OXYGEN, UP TO 24 HOURS

## 2021-12-14 PROCEDURE — 25000003 PHARM REV CODE 250: Performed by: INTERNAL MEDICINE

## 2021-12-14 PROCEDURE — 99239 PR HOSPITAL DISCHARGE DAY,>30 MIN: ICD-10-PCS | Mod: ,,, | Performed by: HOSPITALIST

## 2021-12-14 PROCEDURE — 99239 HOSP IP/OBS DSCHRG MGMT >30: CPT | Mod: ,,, | Performed by: HOSPITALIST

## 2021-12-14 PROCEDURE — 99900035 HC TECH TIME PER 15 MIN (STAT)

## 2021-12-14 PROCEDURE — 94761 N-INVAS EAR/PLS OXIMETRY MLT: CPT

## 2021-12-14 PROCEDURE — 25000003 PHARM REV CODE 250: Performed by: HOSPITALIST

## 2021-12-14 PROCEDURE — 94640 AIRWAY INHALATION TREATMENT: CPT

## 2021-12-14 RX ORDER — FUROSEMIDE 40 MG/1
40 TABLET ORAL DAILY
Qty: 90 TABLET | Refills: 0 | Status: SHIPPED | OUTPATIENT
Start: 2021-12-14 | End: 2022-01-11 | Stop reason: SDUPTHER

## 2021-12-14 RX ADMIN — SACUBITRIL AND VALSARTAN 1 TABLET: 24; 26 TABLET, FILM COATED ORAL at 08:12

## 2021-12-14 RX ADMIN — FLUTICASONE FUROATE AND VILANTEROL TRIFENATATE 1 PUFF: 100; 25 POWDER RESPIRATORY (INHALATION) at 07:12

## 2021-12-14 RX ADMIN — CLOPIDOGREL 75 MG: 75 TABLET, FILM COATED ORAL at 08:12

## 2021-12-14 RX ADMIN — METOPROLOL SUCCINATE 25 MG: 25 TABLET, EXTENDED RELEASE ORAL at 08:12

## 2021-12-14 RX ADMIN — ASPIRIN 81 MG CHEWABLE TABLET 81 MG: 81 TABLET CHEWABLE at 08:12

## 2021-12-14 RX ADMIN — IPRATROPIUM BROMIDE AND ALBUTEROL SULFATE 3 ML: 2.5; .5 SOLUTION RESPIRATORY (INHALATION) at 07:12

## 2021-12-14 RX ADMIN — IPRATROPIUM BROMIDE AND ALBUTEROL SULFATE 3 ML: 2.5; .5 SOLUTION RESPIRATORY (INHALATION) at 04:12

## 2021-12-14 RX ADMIN — IPRATROPIUM BROMIDE AND ALBUTEROL SULFATE 3 ML: 2.5; .5 SOLUTION RESPIRATORY (INHALATION) at 12:12

## 2021-12-14 RX ADMIN — FAMOTIDINE 20 MG: 20 TABLET ORAL at 08:12

## 2021-12-16 ENCOUNTER — OFFICE VISIT (OUTPATIENT)
Dept: INTERNAL MEDICINE | Facility: CLINIC | Age: 76
End: 2021-12-16
Attending: FAMILY MEDICINE
Payer: COMMERCIAL

## 2021-12-16 VITALS
HEART RATE: 87 BPM | OXYGEN SATURATION: 95 % | WEIGHT: 146.81 LBS | DIASTOLIC BLOOD PRESSURE: 56 MMHG | SYSTOLIC BLOOD PRESSURE: 108 MMHG | HEIGHT: 61 IN | BODY MASS INDEX: 27.72 KG/M2

## 2021-12-16 DIAGNOSIS — I50.43 ACUTE ON CHRONIC COMBINED SYSTOLIC AND DIASTOLIC CONGESTIVE HEART FAILURE: Chronic | ICD-10-CM

## 2021-12-16 DIAGNOSIS — Z12.31 ENCOUNTER FOR SCREENING MAMMOGRAM FOR MALIGNANT NEOPLASM OF BREAST: ICD-10-CM

## 2021-12-16 DIAGNOSIS — R79.89 ELEVATED TROPONIN: ICD-10-CM

## 2021-12-16 DIAGNOSIS — J96.11 CHRONIC RESPIRATORY FAILURE WITH HYPOXIA: Chronic | ICD-10-CM

## 2021-12-16 DIAGNOSIS — I42.8 NICM (NONISCHEMIC CARDIOMYOPATHY): Chronic | ICD-10-CM

## 2021-12-16 DIAGNOSIS — I21.4 NSTEMI (NON-ST ELEVATED MYOCARDIAL INFARCTION): Primary | ICD-10-CM

## 2021-12-16 PROBLEM — J96.21 ACUTE ON CHRONIC RESPIRATORY FAILURE WITH HYPOXIA AND HYPERCAPNIA: Status: RESOLVED | Noted: 2021-12-10 | Resolved: 2021-12-16

## 2021-12-16 PROBLEM — J96.22 ACUTE ON CHRONIC RESPIRATORY FAILURE WITH HYPOXIA AND HYPERCAPNIA: Status: RESOLVED | Noted: 2021-12-10 | Resolved: 2021-12-16

## 2021-12-16 PROCEDURE — 99999 PR PBB SHADOW E&M-EST. PATIENT-LVL III: ICD-10-PCS | Mod: PBBFAC,,, | Performed by: FAMILY MEDICINE

## 2021-12-16 PROCEDURE — 99214 OFFICE O/P EST MOD 30 MIN: CPT | Mod: S$GLB,,, | Performed by: FAMILY MEDICINE

## 2021-12-16 PROCEDURE — 99214 PR OFFICE/OUTPT VISIT, EST, LEVL IV, 30-39 MIN: ICD-10-PCS | Mod: S$GLB,,, | Performed by: FAMILY MEDICINE

## 2021-12-16 PROCEDURE — 99999 PR PBB SHADOW E&M-EST. PATIENT-LVL III: CPT | Mod: PBBFAC,,, | Performed by: FAMILY MEDICINE

## 2021-12-16 PROCEDURE — 4010F PR ACE/ARB THEARPY RXD/TAKEN: ICD-10-PCS | Mod: CPTII,S$GLB,, | Performed by: FAMILY MEDICINE

## 2021-12-16 PROCEDURE — 4010F ACE/ARB THERAPY RXD/TAKEN: CPT | Mod: CPTII,S$GLB,, | Performed by: FAMILY MEDICINE

## 2021-12-16 RX ORDER — FLUTICASONE FUROATE AND VILANTEROL TRIFENATATE 100; 25 UG/1; UG/1
1 POWDER RESPIRATORY (INHALATION) DAILY
Qty: 60 EACH | Refills: 3 | Status: ON HOLD | OUTPATIENT
Start: 2021-12-16 | End: 2022-05-03 | Stop reason: CLARIF

## 2021-12-17 ENCOUNTER — TELEPHONE (OUTPATIENT)
Dept: CARDIOLOGY | Facility: HOSPITAL | Age: 76
End: 2021-12-17
Payer: COMMERCIAL

## 2021-12-21 ENCOUNTER — HOSPITAL ENCOUNTER (OUTPATIENT)
Dept: CARDIOLOGY | Facility: HOSPITAL | Age: 76
Discharge: HOME | End: 2021-12-21
Attending: INTERNAL MEDICINE
Payer: COMMERCIAL

## 2021-12-21 VITALS
SYSTOLIC BLOOD PRESSURE: 141 MMHG | WEIGHT: 146 LBS | BODY MASS INDEX: 27.56 KG/M2 | DIASTOLIC BLOOD PRESSURE: 77 MMHG | HEIGHT: 61 IN | HEART RATE: 82 BPM

## 2021-12-21 DIAGNOSIS — I21.4 NSTEMI (NON-ST ELEVATED MYOCARDIAL INFARCTION): ICD-10-CM

## 2021-12-21 LAB
CFR FLOW - ANTERIOR: 2
CFR FLOW - INFERIOR: 1.86
CFR FLOW - LATERAL: 1.97
CFR FLOW - MAX: 2.64
CFR FLOW - MIN: 1
CFR FLOW - SEPTAL: 2.12
CFR FLOW - WHOLE HEART: 1.99
CFR FLOW- DEFECT 1: 1.53
CV PHARM DOSE: 37.2 MG
CV STRESS BASE HR: 82 BPM
DIASTOLIC BLOOD PRESSURE: 77 MMHG
EJECTION FRACTION- HIGH: 65 %
END DIASTOLIC INDEX-HIGH: 153 ML/M2
END DIASTOLIC INDEX-LOW: 93 ML/M2
END SYSTOLIC INDEX-HIGH: 71 ML/M2
END SYSTOLIC INDEX-LOW: 31 ML/M2
NUC REST DIASTOLIC VOLUME INDEX: 310
NUC REST EJECTION FRACTION: 14
NUC REST SYSTOLIC VOLUME INDEX: 266
NUC STRESS DIASTOLIC VOLUME INDEX: 337
NUC STRESS EJECTION FRACTION: 15 %
NUC STRESS SYSTOLIC VOLUME INDEX: 287
OHS CV CPX 85 PERCENT MAX PREDICTED HEART RATE MALE: 120
OHS CV CPX MAX PREDICTED HEART RATE: 141
OHS CV CPX PATIENT IS FEMALE: 1
OHS CV CPX PATIENT IS MALE: 0
OHS CV CPX PEAK DIASTOLIC BLOOD PRESSURE: 64 MMHG
OHS CV CPX PEAK HEAR RATE: 91 BPM
OHS CV CPX PEAK RATE PRESSURE PRODUCT: NORMAL
OHS CV CPX PEAK SYSTOLIC BLOOD PRESSURE: 111 MMHG
OHS CV CPX PERCENT MAX PREDICTED HEART RATE ACHIEVED: 65
OHS CV CPX RATE PRESSURE PRODUCT PRESENTING: NORMAL
PERFUSION DEFECT 1 SIZE IN %: 11 %
PERFUSION DEFECT SIZE WORSENS % 1: 13 %
REST FLOW - ANTERIOR: 0.99 CC/MIN/G
REST FLOW - INFERIOR: 0.84 CC/MIN/G
REST FLOW - LATERAL: 1 CC/MIN/G
REST FLOW - MAX: 1.43 CC/MIN/G
REST FLOW - MIN: 0.28 CC/MIN/G
REST FLOW - SEPTAL: 0.89 CC/MIN/G
REST FLOW - WHOLE HEART: 0.93 CC/MIN/G
REST FLOW- DEFECT 1: 0.46 CC/MIN/G
RETIRED EF AND QEF - SEE NOTES: 53 %
STRESS FLOW - ANTERIOR: 1.98 CC/MIN/G
STRESS FLOW - INFERIOR: 1.61 CC/MIN/G
STRESS FLOW - LATERAL: 1.98 CC/MIN/G
STRESS FLOW - MAX: 2.8 CC/MIN/G
STRESS FLOW - MIN: 0.33 CC/MIN/G
STRESS FLOW - SEPTAL: 1.88 CC/MIN/G
STRESS FLOW - WHOLE HEART: 1.86 CC/MIN/G
STRESS FLOW- DEFECT 1: 0.71 CC/MIN/G
SYSTOLIC BLOOD PRESSURE: 141 MMHG

## 2021-12-21 PROCEDURE — 78434 AQMBF PET REST & RX STRESS: CPT

## 2021-12-21 PROCEDURE — 93016 CARDIAC PET SCAN STRESS (CUPID ONLY): ICD-10-PCS | Mod: ,,, | Performed by: INTERNAL MEDICINE

## 2021-12-21 PROCEDURE — 93018 CARDIAC PET SCAN STRESS (CUPID ONLY): ICD-10-PCS | Mod: ,,, | Performed by: INTERNAL MEDICINE

## 2021-12-21 PROCEDURE — 78431 CARDIAC PET SCAN STRESS (CUPID ONLY): ICD-10-PCS | Mod: 26,,, | Performed by: INTERNAL MEDICINE

## 2021-12-21 PROCEDURE — 78434 CARDIAC PET SCAN STRESS (CUPID ONLY): ICD-10-PCS | Mod: 26,,, | Performed by: INTERNAL MEDICINE

## 2021-12-21 PROCEDURE — 63600175 PHARM REV CODE 636 W HCPCS: Performed by: INTERNAL MEDICINE

## 2021-12-21 PROCEDURE — 78431 MYOCRD IMG PET RST&STRS CT: CPT | Mod: 26,,, | Performed by: INTERNAL MEDICINE

## 2021-12-21 PROCEDURE — 93016 CV STRESS TEST SUPVJ ONLY: CPT | Mod: ,,, | Performed by: INTERNAL MEDICINE

## 2021-12-21 PROCEDURE — 93018 CV STRESS TEST I&R ONLY: CPT | Mod: ,,, | Performed by: INTERNAL MEDICINE

## 2021-12-21 PROCEDURE — 78434 AQMBF PET REST & RX STRESS: CPT | Mod: 26,,, | Performed by: INTERNAL MEDICINE

## 2021-12-21 RX ORDER — DIPYRIDAMOLE 5 MG/ML
37.16 INJECTION INTRAVENOUS ONCE
Status: COMPLETED | OUTPATIENT
Start: 2021-12-21 | End: 2021-12-21

## 2021-12-21 RX ORDER — AMINOPHYLLINE 25 MG/ML
75 INJECTION, SOLUTION INTRAVENOUS ONCE
Status: COMPLETED | OUTPATIENT
Start: 2021-12-21 | End: 2021-12-21

## 2021-12-21 RX ADMIN — DIPYRIDAMOLE 37.15 MG: 5 INJECTION INTRAVENOUS at 02:12

## 2021-12-21 RX ADMIN — AMINOPHYLLINE 75 MG: 25 INJECTION, SOLUTION INTRAVENOUS at 02:12

## 2022-01-05 ENCOUNTER — TELEPHONE (OUTPATIENT)
Dept: INTERNAL MEDICINE | Facility: CLINIC | Age: 77
End: 2022-01-05
Payer: COMMERCIAL

## 2022-01-05 NOTE — TELEPHONE ENCOUNTER
----- Message from Blessing Mon sent at 1/5/2022  3:55 PM CST -----  Contact: Self/[phone]  Pt said that she is calling in regards to needing to speak with the nurse pt stated that she is having pains in her legs that she thinks is from the atorvastatin (LIPITOR) 40 MG tablet. Please advise

## 2022-01-05 NOTE — TELEPHONE ENCOUNTER
Pt stated she started feeling pain in her legs last week pt stated the pains started when she begin taking the atorvastatin again pt also stated the pain feels like a soreness type of pain please advice

## 2022-01-10 ENCOUNTER — TELEPHONE (OUTPATIENT)
Dept: INTERNAL MEDICINE | Facility: CLINIC | Age: 77
End: 2022-01-10
Payer: COMMERCIAL

## 2022-01-10 DIAGNOSIS — I10 HYPERTENSION, UNSPECIFIED TYPE: ICD-10-CM

## 2022-01-10 RX ORDER — FUROSEMIDE 20 MG/1
20 TABLET ORAL DAILY
Qty: 90 TABLET | Refills: 0 | OUTPATIENT
Start: 2022-01-10

## 2022-01-10 RX ORDER — ATORVASTATIN CALCIUM 40 MG/1
40 TABLET, FILM COATED ORAL NIGHTLY
Qty: 30 TABLET | Refills: 0 | Status: CANCELLED | OUTPATIENT
Start: 2022-01-10 | End: 2022-02-09

## 2022-01-10 NOTE — TELEPHONE ENCOUNTER
Spoke to pt she stated she did not get the message to stop the meds until today but she will stop the meds today 1/10/2022 she also stated she do not have any pain today I informed pt to stop the meds per Dr. Andrade and keep us inform of symptoms pt verbalized understanding

## 2022-01-10 NOTE — TELEPHONE ENCOUNTER
No new care gaps identified.  Powered by [employer]. Reference number: [id number].   1/10/2022 10:55:41 AM CST

## 2022-01-11 ENCOUNTER — OFFICE VISIT (OUTPATIENT)
Dept: CARDIOLOGY | Facility: CLINIC | Age: 77
End: 2022-01-11
Payer: COMMERCIAL

## 2022-01-11 VITALS
HEIGHT: 61 IN | SYSTOLIC BLOOD PRESSURE: 121 MMHG | HEART RATE: 75 BPM | BODY MASS INDEX: 27.52 KG/M2 | WEIGHT: 145.75 LBS | DIASTOLIC BLOOD PRESSURE: 63 MMHG

## 2022-01-11 DIAGNOSIS — R79.89 ELEVATED TROPONIN: ICD-10-CM

## 2022-01-11 DIAGNOSIS — I50.22 CHRONIC SYSTOLIC CONGESTIVE HEART FAILURE: Primary | ICD-10-CM

## 2022-01-11 DIAGNOSIS — Z78.9 STATIN INTOLERANCE: ICD-10-CM

## 2022-01-11 DIAGNOSIS — I10 HYPERTENSION, ESSENTIAL: Chronic | ICD-10-CM

## 2022-01-11 DIAGNOSIS — I44.7 LBBB (LEFT BUNDLE BRANCH BLOCK): Chronic | ICD-10-CM

## 2022-01-11 DIAGNOSIS — I25.5 ISCHEMIC CARDIOMYOPATHY: ICD-10-CM

## 2022-01-11 PROCEDURE — 1159F MED LIST DOCD IN RCRD: CPT | Mod: CPTII,S$GLB,, | Performed by: INTERNAL MEDICINE

## 2022-01-11 PROCEDURE — 1160F PR REVIEW ALL MEDS BY PRESCRIBER/CLIN PHARMACIST DOCUMENTED: ICD-10-PCS | Mod: CPTII,S$GLB,, | Performed by: INTERNAL MEDICINE

## 2022-01-11 PROCEDURE — 1111F PR DISCHARGE MEDS RECONCILED W/ CURRENT OUTPATIENT MED LIST: ICD-10-PCS | Mod: CPTII,S$GLB,, | Performed by: INTERNAL MEDICINE

## 2022-01-11 PROCEDURE — 99999 PR PBB SHADOW E&M-EST. PATIENT-LVL IV: CPT | Mod: PBBFAC,,, | Performed by: INTERNAL MEDICINE

## 2022-01-11 PROCEDURE — 99214 OFFICE O/P EST MOD 30 MIN: CPT | Mod: S$GLB,,, | Performed by: INTERNAL MEDICINE

## 2022-01-11 PROCEDURE — 99214 PR OFFICE/OUTPT VISIT, EST, LEVL IV, 30-39 MIN: ICD-10-PCS | Mod: S$GLB,,, | Performed by: INTERNAL MEDICINE

## 2022-01-11 PROCEDURE — 1111F DSCHRG MED/CURRENT MED MERGE: CPT | Mod: CPTII,S$GLB,, | Performed by: INTERNAL MEDICINE

## 2022-01-11 PROCEDURE — 3008F PR BODY MASS INDEX (BMI) DOCUMENTED: ICD-10-PCS | Mod: CPTII,S$GLB,, | Performed by: INTERNAL MEDICINE

## 2022-01-11 PROCEDURE — 1159F PR MEDICATION LIST DOCUMENTED IN MEDICAL RECORD: ICD-10-PCS | Mod: CPTII,S$GLB,, | Performed by: INTERNAL MEDICINE

## 2022-01-11 PROCEDURE — 1101F PT FALLS ASSESS-DOCD LE1/YR: CPT | Mod: CPTII,S$GLB,, | Performed by: INTERNAL MEDICINE

## 2022-01-11 PROCEDURE — 3074F PR MOST RECENT SYSTOLIC BLOOD PRESSURE < 130 MM HG: ICD-10-PCS | Mod: CPTII,S$GLB,, | Performed by: INTERNAL MEDICINE

## 2022-01-11 PROCEDURE — 1126F PR PAIN SEVERITY QUANTIFIED, NO PAIN PRESENT: ICD-10-PCS | Mod: CPTII,S$GLB,, | Performed by: INTERNAL MEDICINE

## 2022-01-11 PROCEDURE — 3288F PR FALLS RISK ASSESSMENT DOCUMENTED: ICD-10-PCS | Mod: CPTII,S$GLB,, | Performed by: INTERNAL MEDICINE

## 2022-01-11 PROCEDURE — 1126F AMNT PAIN NOTED NONE PRSNT: CPT | Mod: CPTII,S$GLB,, | Performed by: INTERNAL MEDICINE

## 2022-01-11 PROCEDURE — 3078F DIAST BP <80 MM HG: CPT | Mod: CPTII,S$GLB,, | Performed by: INTERNAL MEDICINE

## 2022-01-11 PROCEDURE — 3288F FALL RISK ASSESSMENT DOCD: CPT | Mod: CPTII,S$GLB,, | Performed by: INTERNAL MEDICINE

## 2022-01-11 PROCEDURE — 99999 PR PBB SHADOW E&M-EST. PATIENT-LVL IV: ICD-10-PCS | Mod: PBBFAC,,, | Performed by: INTERNAL MEDICINE

## 2022-01-11 PROCEDURE — 3008F BODY MASS INDEX DOCD: CPT | Mod: CPTII,S$GLB,, | Performed by: INTERNAL MEDICINE

## 2022-01-11 PROCEDURE — 1160F RVW MEDS BY RX/DR IN RCRD: CPT | Mod: CPTII,S$GLB,, | Performed by: INTERNAL MEDICINE

## 2022-01-11 PROCEDURE — 1101F PR PT FALLS ASSESS DOC 0-1 FALLS W/OUT INJ PAST YR: ICD-10-PCS | Mod: CPTII,S$GLB,, | Performed by: INTERNAL MEDICINE

## 2022-01-11 PROCEDURE — 3074F SYST BP LT 130 MM HG: CPT | Mod: CPTII,S$GLB,, | Performed by: INTERNAL MEDICINE

## 2022-01-11 PROCEDURE — 3078F PR MOST RECENT DIASTOLIC BLOOD PRESSURE < 80 MM HG: ICD-10-PCS | Mod: CPTII,S$GLB,, | Performed by: INTERNAL MEDICINE

## 2022-01-11 RX ORDER — SPIRONOLACTONE 25 MG/1
25 TABLET ORAL DAILY
Qty: 90 TABLET | Refills: 3 | Status: SHIPPED | OUTPATIENT
Start: 2022-01-11 | End: 2022-05-02

## 2022-01-11 RX ORDER — NAPROXEN SODIUM 220 MG/1
81 TABLET, FILM COATED ORAL DAILY
Qty: 30 TABLET | Refills: 0 | Status: ON HOLD | OUTPATIENT
Start: 2022-01-11 | End: 2022-05-03 | Stop reason: CLARIF

## 2022-01-11 RX ORDER — FUROSEMIDE 20 MG/1
20 TABLET ORAL DAILY
Qty: 90 TABLET | Refills: 3 | Status: SHIPPED | OUTPATIENT
Start: 2022-01-11 | End: 2022-05-02

## 2022-01-11 RX ORDER — CLOPIDOGREL BISULFATE 75 MG/1
75 TABLET ORAL DAILY
Qty: 30 TABLET | Refills: 0 | Status: SHIPPED | OUTPATIENT
Start: 2022-01-11 | End: 2022-02-17 | Stop reason: ALTCHOICE

## 2022-01-11 NOTE — PROGRESS NOTES
Subjective:   Patient ID:  Selma Hooper is a 76 y.o. female who presents for follow-up of NSTEMI (non-ST elevated myocardial infarction) (Hospital f/u 12/8/21), Hypotension, and Medication Management    ICM, DM  Recent discharge:   Ms. Hooper is a 71yo woman with essential HTN (off all meds) who presents with acute hypoxemic respiratory failure 2/2 HTN emergency with flash pulmonary edema and acute new onset systolic congestive heart failure exacerbation. Also with elevated transaminases likely 2/2 congestive hepatopathy (improving), lactic acidosis (now resolved), and elevated troponin, likely 2/2 HTN/CHF. She was initially placed on BiPAP and NTG gtt upon admission with improvement in her BP and taper off gtt. Now weaned off NC and s/p IV lasix with good response. Hypotensive yesterday am, likely 2/2 overdiuresis; improved with 250cc bolus today. Will discharge on GDMT: initiate losartan tomorrow am (held today 2/2 JAMIR), metoprolol succinate, lasix 20. Would consider addition of spironolactone at f/u visit; held off today 2/2 JAMIR due to overdiuresis. Will need timely ischemic w/u as outpatient.     Echo:    1 - Severely depressed left ventricular systolic function (EF 15-20%).     2 - Normal right ventricular systolic function .     3 - Trivial to mild aortic regurgitation.     4 - Trivial to mild mitral regurgitation.     5 - Trivial to mild tricuspid regurgitation.     6 - The estimated PA systolic pressure is 28 mmHg.      NM stress test:  Impression: NORMAL MYOCARDIAL PERFUSION  1. The perfusion scan is free of evidence for myocardial ischemia or injury.   2. There is a mild fixed septal defect consistent with patient's underlying LBBB - left bundle branch block.   3. There is a mild intensity fixed defect in the inferior wall of the left ventricle, secondary to diaphragm attenuation.   4. There is abnormal wall motion at rest showing severe global hypokinesis of the left ventricle.   5. There is resting LV  dysfunction with a reduced ejection fraction of 20 %.  (normal is >= 51%)  6. The left ventricular volume is mildly increased at rest.   7. The extracardiac distribution of radioactivity is normal.      HPI:   Patient has been feeling SOB for atleast 6 month that is primarily THEODORE. Since Diuretic her THEODORE has improved. No orthopnea, PND, chest pain or palpitations.   Heavy smoker, mother has heart attack 60s. Brother had CABG in 60s.   NYHA III- feeling SOB on minimal exertion.   No chest pain, Orthopnea, PND.   Denies palpitations or fluttering in the chest  Patent get sob on walking.   Grandparents had CVA.         Echo 3/2019  · Severely decreased left ventricular systolic function. The estimated ejection fraction is 20%  · Eccentric left ventricular hypertrophy.  · Global hypokinetic wall motion.  · Moderate left atrial enlargement.  · Indeterminate left ventricular diastolic function.  · Normal right ventricular systolic function.  · Mild tricuspid regurgitation.  · The estimated PA systolic pressure is 19 mm Hg  · Normal central venous pressure (3 mm Hg).      HPI:   Cannot afford CRT it will cost 7000 and she cannot afford it.  Worsening SOB on minimal exertion, NYHA III.   No chest pain, Orthopnea, PND of heart failure symptoms.   Denies palpitations or fluttering in the chest     HPI:  Recent CHF exacerbation with hospitalization.   Now euvolemic. Reiterated the indication of CRT patient says she cannot afford it.   No chest pain, Orthopnea, PND of heart failure symptoms.   Denies palpitations or fluttering in the chest  We discussed changes in medicine she also had COPD.     Echo 12/2021  · The left ventricle is normal in size with moderate eccentric hypertrophy and severely decreased systolic function.  · There is severe left ventricular global hypokinesis. The estimated ejection fraction is 15%.  · Left ventricular diastolic dysfunction.  · Normal right ventricular size with normal right ventricular  systolic function.  · Normal central venous pressure (3 mmHg).  · There is no significant tricuspid regurgitation and therefore the pulmonary artery systolic pressure cannot be reported.      PET 12/2022      There is a small to moderate sized, moderate intensity distal to apical inferior and inferolateral resting perfusion abnormality involving 11% of the LV myocardium in the distribution of the RCA. After pharmacologic stress, this defect is larger and more severe involving 13% of the LV myocardium, indicative of infarct with layla-infarct ischemia.    Within the perfusion abnormality, absolute myocardial perfusion (cc/min/gm) averaged 0.46 cc/min/g at rest, 0.71 cc/min/g at stress and CFR was 1.53 , which equates to moderately to severely reduced coronary flow capacity  within the RCA territory.    The whole heart absolute myocardial perfusion values averaged 0.93 cc/min/g at rest, which is normal; 1.86 cc/min/g at stress, which is low normal; and CFR is 1.99 , which is mildly reduced.    CT attenuation images demonstrate mild diffuse coronary calcifications in the LAD, LCX and RCA territory.    The gated perfusion images showed an ejection fraction of 14% at rest and 15% during stress. A normal ejection fraction is greater than 53%.    There is severe global hypokinesis at rest and during stress.    The LV cavity size is severely enlarged at rest and stress.    The EKG portion of this study is uninterpretable.    During stress, rare PVCs are noted.    The patient reported no chest pain during the stress test.    There are no prior studies for comparison.     The degree of scar on perfusion imaging is out of proportion to the degree of myocardial dysfunction.      HPI:   Recent admission for CHF exacerbation and type II respiratory failure in 12/2021.   Patient had a bout of dizziness and felt that her BP was low but she did not have breakfast.   No chest pain, PND.  NYHA II  Occasional  "exercise.  Denies palpitations or fluttering in the chest      Patient Active Problem List   Diagnosis    Family history of colon cancer    Colon polyps    Hypertension, essential    Dyspnea    Iron deficiency anemia    Subclinical hyperthyroidism    Elevated troponin    NICM (nonischemic cardiomyopathy)    Lung nodule    Pulmonary emphysema    Abnormal liver enzymes    Anemia    Personal history of nicotine dependence    Noncompliance    LBBB (left bundle branch block)    Acute on chronic combined systolic and diastolic congestive heart failure    Statin intolerance    Chronic respiratory failure with hypoxia    Allergy to statin medication    Encounter for colorectal cancer screening    NSTEMI (non-ST elevated myocardial infarction)    Chronic obstructive pulmonary disease with acute exacerbation     /63 (BP Location: Left arm, Patient Position: Sitting, BP Method: Medium (Automatic))   Pulse 75   Ht 5' 1" (1.549 m)   Wt 66.1 kg (145 lb 11.6 oz)   LMP  (LMP Unknown)   BMI 27.53 kg/m²   Body mass index is 27.53 kg/m².  CrCl cannot be calculated (Patient's most recent lab result is older than the maximum 7 days allowed.).    Lab Results   Component Value Date     12/13/2021    K 3.4 (L) 12/13/2021    CL 98 12/13/2021    CO2 28 12/13/2021    BUN 40 (H) 12/13/2021    CREATININE 0.9 12/13/2021     12/13/2021    HGBA1C 5.2 04/02/2021    MG 2.1 12/12/2021    AST 16 12/13/2021    ALT 27 12/13/2021    ALBUMIN 3.3 (L) 12/13/2021    PROT 6.6 12/13/2021    BILITOT 0.5 12/13/2021    WBC 6.36 12/12/2021    HGB 12.2 12/12/2021    HCT 39.7 12/12/2021    HCT 39 04/02/2021    MCV 80 (L) 12/12/2021     12/12/2021    INR 0.9 12/10/2021    TSH 0.503 12/08/2021    CHOL 136 12/09/2021    HDL 36 (L) 12/09/2021    LDLCALC 84.6 12/09/2021    TRIG 77 12/09/2021       Current Outpatient Medications   Medication Sig    albuterol-ipratropium (DUO-NEB) 2.5 mg-0.5 mg/3 mL nebulizer solution " Take 3 mLs by nebulization every 6 (six) hours as needed for Wheezing. Rescue    calcium carbonate (TUMS) 200 mg calcium (500 mg) chewable tablet Take 1 tablet by mouth as needed for Heartburn.    fluticasone (FLONASE) 50 mcg/actuation nasal spray 1 spray by Each Nare route daily as needed for Allergies.    fluticasone furoate-vilanteroL (BREO ELLIPTA) 100-25 mcg/dose diskus inhaler Inhale 1 puff into the lungs once daily. Controller    metoprolol succinate (TOPROL-XL) 25 MG 24 hr tablet Take 1 tablet (25 mg total) by mouth once daily.    sacubitriL-valsartan (ENTRESTO) 24-26 mg per tablet Take 1 tablet by mouth 2 (two) times daily.    aspirin 81 MG Chew Chew and swallow 1 tablet (81 mg total) by mouth once daily.    atorvastatin (LIPITOR) 40 MG tablet Take 1 tablet (40 mg total) by mouth every evening. (Patient not taking: Reported on 1/11/2022)    clopidogreL (PLAVIX) 75 mg tablet Take 1 tablet (75 mg total) by mouth once daily.    furosemide (LASIX) 20 MG tablet Take 1 tablet (20 mg total) by mouth once daily. Hold until PCP follow up    spironolactone (ALDACTONE) 25 MG tablet Take 1 tablet (25 mg total) by mouth once daily.     No current facility-administered medications for this visit.       Review of Systems   Constitutional: Negative for chills, decreased appetite, malaise/fatigue, night sweats, weight gain and weight loss.   Eyes: Negative for blurred vision, double vision, visual disturbance and visual halos.   Cardiovascular: Positive for dyspnea on exertion. Negative for chest pain, claudication, cyanosis, irregular heartbeat, leg swelling, near-syncope, orthopnea, palpitations, paroxysmal nocturnal dyspnea and syncope.   Respiratory: Negative for cough, hemoptysis, snoring, sputum production and wheezing.    Endocrine: Negative for cold intolerance, heat intolerance, polydipsia and polyphagia.   Hematologic/Lymphatic: Negative for adenopathy and bleeding problem. Does not bruise/bleed easily.    Skin: Negative for flushing, itching, poor wound healing and rash.   Musculoskeletal: Negative for arthritis, back pain, falls, gout, joint pain, joint swelling, muscle cramps, muscle weakness, myalgias, neck pain and stiffness.   Gastrointestinal: Negative for bloating, abdominal pain, anorexia, diarrhea, dysphagia, excessive appetite, flatus, hematemesis, jaundice, melena and nausea.   Genitourinary: Negative for hesitancy and incomplete emptying.   Neurological: Negative for aphonia, brief paralysis, difficulty with concentration, disturbances in coordination, excessive daytime sleepiness, dizziness, focal weakness, light-headedness, loss of balance and weakness.   Psychiatric/Behavioral: Negative for altered mental status, depression, hallucinations, hypervigilance, memory loss, substance abuse and suicidal ideas. The patient does not have insomnia and is not nervous/anxious.        Objective:   Physical Exam  Constitutional:       General: She is not in acute distress.     Appearance: She is well-developed and well-nourished. She is not diaphoretic.   HENT:      Head: Normocephalic and atraumatic.      Nose: Nose normal.      Mouth/Throat:      Mouth: Oropharynx is clear and moist.      Pharynx: No oropharyngeal exudate.   Eyes:      General: No scleral icterus.        Right eye: No discharge.         Left eye: No discharge.      Extraocular Movements: EOM normal.      Conjunctiva/sclera: Conjunctivae normal.      Pupils: Pupils are equal, round, and reactive to light.   Neck:      Thyroid: No thyromegaly.      Vascular: No JVD.      Trachea: No tracheal deviation.   Cardiovascular:      Rate and Rhythm: Normal rate and regular rhythm.      Pulses: Intact distal pulses.      Heart sounds: Normal heart sounds. No murmur heard.  No friction rub. No gallop.    Pulmonary:      Effort: Pulmonary effort is normal. No respiratory distress.      Breath sounds: Normal breath sounds. No stridor. No wheezing or rales.    Chest:      Chest wall: No tenderness.   Abdominal:      General: Bowel sounds are normal. There is no distension.      Palpations: Abdomen is soft. There is no mass.      Tenderness: There is no abdominal tenderness. There is no guarding or rebound.   Musculoskeletal:         General: No tenderness or edema. Normal range of motion.      Cervical back: Normal range of motion and neck supple.   Lymphadenopathy:      Cervical: No cervical adenopathy.   Skin:     General: Skin is warm.      Coloration: Skin is not pale.      Findings: No erythema or rash.   Neurological:      Mental Status: She is alert and oriented to person, place, and time.      Cranial Nerves: No cranial nerve deficit.      Motor: No abnormal muscle tone.      Coordination: Coordination normal.      Deep Tendon Reflexes: Reflexes are normal and symmetric.   Psychiatric:         Mood and Affect: Mood and affect normal.         Behavior: Behavior normal.         Thought Content: Thought content normal.         Judgment: Judgment normal.         Assessment:     1. Chronic systolic congestive heart failure    2. LBBB (left bundle branch block)    3. Ischemic cardiomyopathy    4. Statin intolerance    5. Hypertension, essential    6. Elevated troponin        Plan:     Patient had troponin elevation in the setting of congestive heart failure (demand mediated ischemia) and and not true NSTEMI, PET shows prior scar with layla-infact ischemia there is no role of Plavix that I stopped. I have talked to her about ICD several times that she does not want to proceed and she has seen EP. Also I discussed with her adding Cardiomems.     Limit sodium intake to less then 2 gram sodium and 1500cc fluid restriction.  Graded exercise program as tolerated.  Call if  more than 3 lbs in 1 day or 5 lbs in 1 week.      Selma was seen today for nstemi (non-st elevated myocardial infarction), hypotension and medication management.    Diagnoses and all orders for this  visit:    Chronic systolic congestive heart failure  -     Ambulatory referral/consult to Cardiology  -     furosemide (LASIX) 20 MG tablet; Take 1 tablet (20 mg total) by mouth once daily. Hold until PCP follow up  -     spironolactone (ALDACTONE) 25 MG tablet; Take 1 tablet (25 mg total) by mouth once daily.    LBBB (left bundle branch block)    Ischemic cardiomyopathy  -     Ambulatory referral/consult to Cardiology    Statin intolerance    Hypertension, essential    Elevated troponin    RTC 5 months

## 2022-01-18 ENCOUNTER — TELEPHONE (OUTPATIENT)
Dept: INTERNAL MEDICINE | Facility: CLINIC | Age: 77
End: 2022-01-18
Payer: COMMERCIAL

## 2022-01-18 ENCOUNTER — HOSPITAL ENCOUNTER (OUTPATIENT)
Dept: RADIOLOGY | Facility: HOSPITAL | Age: 77
Discharge: HOME | End: 2022-01-18
Attending: FAMILY MEDICINE
Payer: COMMERCIAL

## 2022-01-18 VITALS — WEIGHT: 144 LBS | BODY MASS INDEX: 27.19 KG/M2 | HEIGHT: 61 IN

## 2022-01-18 DIAGNOSIS — Z12.31 ENCOUNTER FOR SCREENING MAMMOGRAM FOR MALIGNANT NEOPLASM OF BREAST: ICD-10-CM

## 2022-01-18 PROCEDURE — 77067 SCR MAMMO BI INCL CAD: CPT | Mod: TC

## 2022-01-18 PROCEDURE — 77067 MAMMO DIGITAL SCREENING BILAT WITH TOMO: ICD-10-PCS | Mod: 26,,, | Performed by: RADIOLOGY

## 2022-01-18 PROCEDURE — 77063 MAMMO DIGITAL SCREENING BILAT WITH TOMO: ICD-10-PCS | Mod: 26,,, | Performed by: RADIOLOGY

## 2022-01-18 PROCEDURE — 77063 BREAST TOMOSYNTHESIS BI: CPT | Mod: TC

## 2022-01-18 PROCEDURE — 77063 BREAST TOMOSYNTHESIS BI: CPT | Mod: 26,,, | Performed by: RADIOLOGY

## 2022-01-18 PROCEDURE — 77067 SCR MAMMO BI INCL CAD: CPT | Mod: 26,,, | Performed by: RADIOLOGY

## 2022-01-18 NOTE — TELEPHONE ENCOUNTER
Spoke to pt F2F she stated she stopped meds on 1/10/2022 and since then no leg pain please advise

## 2022-01-20 ENCOUNTER — TELEPHONE (OUTPATIENT)
Dept: RADIOLOGY | Facility: HOSPITAL | Age: 77
End: 2022-01-20
Payer: COMMERCIAL

## 2022-01-20 NOTE — TELEPHONE ENCOUNTER
Spoke with patient and explained mammogram findings.Patient expressed understanding of results. Patient scheduled abnormal mammogram follow up appointment at [facility] on 1/27/2022.

## 2022-01-27 ENCOUNTER — HOSPITAL ENCOUNTER (OUTPATIENT)
Dept: RADIOLOGY | Facility: HOSPITAL | Age: 77
Discharge: HOME | End: 2022-01-27
Attending: FAMILY MEDICINE
Payer: COMMERCIAL

## 2022-01-27 DIAGNOSIS — R92.8 ABNORMAL FINDING ON BREAST IMAGING: ICD-10-CM

## 2022-01-27 PROCEDURE — 77065 MAMMO DIGITAL DIAGNOSTIC RIGHT WITH TOMO: ICD-10-PCS | Mod: 26,RT,, | Performed by: RADIOLOGY

## 2022-01-27 PROCEDURE — 77061 BREAST TOMOSYNTHESIS UNI: CPT | Mod: 26,RT,, | Performed by: RADIOLOGY

## 2022-01-27 PROCEDURE — 77061 MAMMO DIGITAL DIAGNOSTIC RIGHT WITH TOMO: ICD-10-PCS | Mod: 26,RT,, | Performed by: RADIOLOGY

## 2022-01-27 PROCEDURE — 77065 DX MAMMO INCL CAD UNI: CPT | Mod: TC,RT

## 2022-01-27 PROCEDURE — 77065 DX MAMMO INCL CAD UNI: CPT | Mod: 26,RT,, | Performed by: RADIOLOGY

## 2022-01-27 NOTE — PROGRESS NOTES
Last 5 Patient Entered Readings                                      Current 30 Day Average: 125/67     Recent Readings 5/3/2019 5/2/2019 5/2/2019 5/1/2019 5/1/2019    SBP (mmHg) 136 142 144 110 136    DBP (mmHg) 73 63 72 60 72    Pulse 69 65 67 69 63          5/3: LVM.  Will call in 3 weeks.   Quality 226: Preventive Care And Screening: Tobacco Use: Screening And Cessation Intervention: Patient screened for tobacco use and is an ex/non-smoker Detail Level: Detailed Quality 154 Part A: Falls: Risk Assessment (Should Be Reported With Measure 155.): Falls risk assessment completed and documented in the past 12 months. Quality 110: Preventive Care And Screening: Influenza Immunization: Influenza Immunization Administered during Influenza season Quality 431: Preventive Care And Screening: Unhealthy Alcohol Use - Screening: Patient screened for unhealthy alcohol use using a single question and scores less than 2 times per year Quality 155 (Denominator): Falls Plan Of Care: Plan of Care not Documented, Reason not Otherwise Specified Quality 154 Part B: Falls: Risk Screening (Should Be Reported With Measure 155.): Patient screened for future fall risk; documentation of no falls in the past year or only one fall without injury in the past year Quality 47: Advance Care Plan: Advance Care Planning discussed and documented; advance care plan or surrogate decision maker documented in the medical record. Quality 111:Pneumonia Vaccination Status For Older Adults: Pneumococcal Vaccination Previously Received

## 2022-02-17 ENCOUNTER — OFFICE VISIT (OUTPATIENT)
Dept: INTERNAL MEDICINE | Facility: CLINIC | Age: 77
End: 2022-02-17
Attending: FAMILY MEDICINE
Payer: COMMERCIAL

## 2022-02-17 VITALS
HEART RATE: 86 BPM | SYSTOLIC BLOOD PRESSURE: 110 MMHG | BODY MASS INDEX: 27.68 KG/M2 | OXYGEN SATURATION: 97 % | HEIGHT: 61 IN | DIASTOLIC BLOOD PRESSURE: 68 MMHG | WEIGHT: 146.63 LBS

## 2022-02-17 DIAGNOSIS — J44.1 CHRONIC OBSTRUCTIVE PULMONARY DISEASE WITH ACUTE EXACERBATION: ICD-10-CM

## 2022-02-17 DIAGNOSIS — I10 HYPERTENSION, ESSENTIAL: Chronic | ICD-10-CM

## 2022-02-17 DIAGNOSIS — Z78.9 STATIN INTOLERANCE: ICD-10-CM

## 2022-02-17 DIAGNOSIS — Z87.891 PERSONAL HISTORY OF NICOTINE DEPENDENCE: ICD-10-CM

## 2022-02-17 DIAGNOSIS — I50.43 ACUTE ON CHRONIC COMBINED SYSTOLIC AND DIASTOLIC CONGESTIVE HEART FAILURE: Primary | Chronic | ICD-10-CM

## 2022-02-17 DIAGNOSIS — I42.8 NICM (NONISCHEMIC CARDIOMYOPATHY): Chronic | ICD-10-CM

## 2022-02-17 DIAGNOSIS — I21.4 NSTEMI (NON-ST ELEVATED MYOCARDIAL INFARCTION): ICD-10-CM

## 2022-02-17 PROCEDURE — 3074F SYST BP LT 130 MM HG: CPT | Mod: CPTII,S$GLB,, | Performed by: FAMILY MEDICINE

## 2022-02-17 PROCEDURE — 1159F MED LIST DOCD IN RCRD: CPT | Mod: CPTII,S$GLB,, | Performed by: FAMILY MEDICINE

## 2022-02-17 PROCEDURE — 90471 IMMUNIZATION ADMIN: CPT | Mod: S$GLB,,, | Performed by: FAMILY MEDICINE

## 2022-02-17 PROCEDURE — 90694 PR FLU VACCINE, QAIIV, INACTIV, NO PRSV, 0.5 ML, IM: ICD-10-PCS | Mod: S$GLB,,, | Performed by: FAMILY MEDICINE

## 2022-02-17 PROCEDURE — 99214 PR OFFICE/OUTPT VISIT, EST, LEVL IV, 30-39 MIN: ICD-10-PCS | Mod: 25,S$GLB,, | Performed by: FAMILY MEDICINE

## 2022-02-17 PROCEDURE — 3008F BODY MASS INDEX DOCD: CPT | Mod: CPTII,S$GLB,, | Performed by: FAMILY MEDICINE

## 2022-02-17 PROCEDURE — 3074F PR MOST RECENT SYSTOLIC BLOOD PRESSURE < 130 MM HG: ICD-10-PCS | Mod: CPTII,S$GLB,, | Performed by: FAMILY MEDICINE

## 2022-02-17 PROCEDURE — 3078F PR MOST RECENT DIASTOLIC BLOOD PRESSURE < 80 MM HG: ICD-10-PCS | Mod: CPTII,S$GLB,, | Performed by: FAMILY MEDICINE

## 2022-02-17 PROCEDURE — 99499 UNLISTED E&M SERVICE: CPT | Mod: S$GLB,,, | Performed by: FAMILY MEDICINE

## 2022-02-17 PROCEDURE — 1159F PR MEDICATION LIST DOCUMENTED IN MEDICAL RECORD: ICD-10-PCS | Mod: CPTII,S$GLB,, | Performed by: FAMILY MEDICINE

## 2022-02-17 PROCEDURE — 1160F PR REVIEW ALL MEDS BY PRESCRIBER/CLIN PHARMACIST DOCUMENTED: ICD-10-PCS | Mod: CPTII,S$GLB,, | Performed by: FAMILY MEDICINE

## 2022-02-17 PROCEDURE — 1126F PR PAIN SEVERITY QUANTIFIED, NO PAIN PRESENT: ICD-10-PCS | Mod: CPTII,S$GLB,, | Performed by: FAMILY MEDICINE

## 2022-02-17 PROCEDURE — 1101F PR PT FALLS ASSESS DOC 0-1 FALLS W/OUT INJ PAST YR: ICD-10-PCS | Mod: CPTII,S$GLB,, | Performed by: FAMILY MEDICINE

## 2022-02-17 PROCEDURE — 3288F FALL RISK ASSESSMENT DOCD: CPT | Mod: CPTII,S$GLB,, | Performed by: FAMILY MEDICINE

## 2022-02-17 PROCEDURE — 99499 FLU VACCINE - QUADRIVALENT - ADJUVANTED: ICD-10-PCS | Mod: S$GLB,,, | Performed by: FAMILY MEDICINE

## 2022-02-17 PROCEDURE — 3008F PR BODY MASS INDEX (BMI) DOCUMENTED: ICD-10-PCS | Mod: CPTII,S$GLB,, | Performed by: FAMILY MEDICINE

## 2022-02-17 PROCEDURE — 3078F DIAST BP <80 MM HG: CPT | Mod: CPTII,S$GLB,, | Performed by: FAMILY MEDICINE

## 2022-02-17 PROCEDURE — 90694 VACC AIIV4 NO PRSRV 0.5ML IM: CPT | Mod: S$GLB,,, | Performed by: FAMILY MEDICINE

## 2022-02-17 PROCEDURE — 99999 PR PBB SHADOW E&M-EST. PATIENT-LVL IV: CPT | Mod: PBBFAC,,, | Performed by: FAMILY MEDICINE

## 2022-02-17 PROCEDURE — 1160F RVW MEDS BY RX/DR IN RCRD: CPT | Mod: CPTII,S$GLB,, | Performed by: FAMILY MEDICINE

## 2022-02-17 PROCEDURE — 90471 PR IMMUNIZ ADMIN,1 SINGLE/COMB VAC/TOXOID: ICD-10-PCS | Mod: S$GLB,,, | Performed by: FAMILY MEDICINE

## 2022-02-17 PROCEDURE — 3288F PR FALLS RISK ASSESSMENT DOCUMENTED: ICD-10-PCS | Mod: CPTII,S$GLB,, | Performed by: FAMILY MEDICINE

## 2022-02-17 PROCEDURE — 99999 PR PBB SHADOW E&M-EST. PATIENT-LVL IV: ICD-10-PCS | Mod: PBBFAC,,, | Performed by: FAMILY MEDICINE

## 2022-02-17 PROCEDURE — 1101F PT FALLS ASSESS-DOCD LE1/YR: CPT | Mod: CPTII,S$GLB,, | Performed by: FAMILY MEDICINE

## 2022-02-17 PROCEDURE — 1126F AMNT PAIN NOTED NONE PRSNT: CPT | Mod: CPTII,S$GLB,, | Performed by: FAMILY MEDICINE

## 2022-02-17 PROCEDURE — 99214 OFFICE O/P EST MOD 30 MIN: CPT | Mod: 25,S$GLB,, | Performed by: FAMILY MEDICINE

## 2022-02-17 NOTE — PATIENT INSTRUCTIONS
Flu shot today    Information about cholesterol, high blood pressure and healthy diet and activity recommendations can be found at the following links on the Internet:    http://www.nhlbi.nih.gov/health/health-topics/topics/hbc  http://www.nhlbi.nih.gov/health/educational/lose_wt/index.htm  Http://www.nhlbi.nih.gov/files/docs/public/heart/hbp_low.pdf  http://www.heart.org/HEARTORG/  http://diabetes.org/  https://www.cdc.gov/  Https://healthfinder.gov/  https://health.gov/dietaryguidelines/2015/guidelines/  https://health.gov/paguidelines/second-edition/pdf/Physical_Activity_Guidelines_2nd_edition.pdf

## 2022-02-17 NOTE — PROGRESS NOTES
Subjective:       Patient ID: Selma Hooper is a 76 y.o. female.    Chief Complaint: Follow-up    Established patient follows up for management of chronic medical illnesses with complaints today. Please see dictation and ROS for interval problems, specific complaints and disease management discussion.    P, S, Fm, Soc Hx's; Meds, allergies reviewed and reconciled.  Health maintenance file reviewed and addressed items due. Recent applicable lab, imaging and cardiovascular results reviewed.  Problem list items reviewed and modified or added entries (in the overview section) may not be transcribed into this encounter note due to note writer format.    Has seen her cardiologist - noted reviewed - stopped plavix. Patient not amenable to ICD. No sx of decomp today, weight stable at 146.    Cardiac PET scan stress - 12/21/2021 -   Conclusion    There is a small to moderate sized, moderate intensity distal to apical inferior and inferolateral resting perfusion abnormality involving 11% of the LV myocardium in the distribution of the RCA. After pharmacologic stress, this defect is larger and more severe involving 13% of the LV myocardium, indicative of infarct with layla-infarct ischemia.    Within the perfusion abnormality, absolute myocardial perfusion (cc/min/gm) averaged 0.46 cc/min/g at rest, 0.71 cc/min/g at stress and CFR was 1.53 , which equates to moderately to severely reduced coronary flow capacity  within the RCA territory.    The whole heart absolute myocardial perfusion values averaged 0.93 cc/min/g at rest, which is normal; 1.86 cc/min/g at stress, which is low normal; and CFR is 1.99 , which is mildly reduced.    CT attenuation images demonstrate mild diffuse coronary calcifications in the LAD, LCX and RCA territory.    The gated perfusion images showed an ejection fraction of 14% at rest and 15% during stress. A normal ejection fraction is greater than 53%.    There is severe global hypokinesis at rest  and during stress.    The LV cavity size is severely enlarged at rest and stress.    The EKG portion of this study is uninterpretable.    During stress, rare PVCs are noted.    The patient reported no chest pain during the stress test.    There are no prior studies for comparison.  The degree of scar on perfusion imaging is out of proportion to the degree of myocardial dysfunction.      Echo - 12/85/2021 -   Summary  · The left ventricle is normal in size with moderate eccentric hypertrophy and severely decreased systolic function.  · There is severe left ventricular global hypokinesis. The estimated ejection fraction is 15%.  · Left ventricular diastolic dysfunction.  · Normal right ventricular size with normal right ventricular systolic function.  · Normal central venous pressure (3 mmHg).  · There is no significant tricuspid regurgitation and therefore the pulmonary artery systolic pressure cannot be reported.      Review of Systems   Constitutional: Negative for appetite change, chills, diaphoresis, fatigue and fever.   HENT: Negative for congestion, postnasal drip, rhinorrhea, sore throat and trouble swallowing.    Eyes: Negative for visual disturbance.   Respiratory: Positive for shortness of breath. Negative for cough, choking, chest tightness and wheezing.    Cardiovascular: Negative for chest pain and leg swelling.   Gastrointestinal: Negative for abdominal distention, abdominal pain, diarrhea, nausea and vomiting.   Genitourinary: Negative for difficulty urinating and hematuria.   Musculoskeletal: Negative for arthralgias and myalgias.   Skin: Negative for rash.   Neurological: Positive for light-headedness. Negative for weakness and headaches.        Episode last week. Was walking and her  had inadvertently hit her in head. Got up right away.     Sometimes lightheaded when bends and gets up.   Hematological: Does not bruise/bleed easily.   Psychiatric/Behavioral: Negative for decreased  concentration and dysphoric mood.       Objective:      Physical Exam  Vitals and nursing note reviewed.   Constitutional:       Appearance: She is well-developed and well-nourished. She is not diaphoretic.   HENT:      Head: Normocephalic and atraumatic.   Eyes:      General: No scleral icterus.     Conjunctiva/sclera: Conjunctivae normal.   Cardiovascular:      Rate and Rhythm: Normal rate.      Pulses: Intact distal pulses.      Heart sounds: Heart sounds are distant. No murmur heard.  No friction rub. No gallop.    Pulmonary:      Effort: Pulmonary effort is normal. No respiratory distress.      Breath sounds: Normal breath sounds. No wheezing or rales.   Abdominal:      General: There is no distension or abdominal bruit.      Tenderness: There is no abdominal tenderness.   Musculoskeletal:         General: No deformity or edema.      Cervical back: Normal range of motion and neck supple.   Skin:     General: Skin is warm and dry.      Findings: No erythema or rash.   Neurological:      Mental Status: She is alert and oriented to person, place, and time.      Cranial Nerves: No cranial nerve deficit.      Motor: No tremor.      Coordination: Coordination normal.      Gait: Gait normal.   Psychiatric:         Mood and Affect: Mood and affect normal.         Behavior: Behavior normal.         Thought Content: Thought content normal.         Judgment: Judgment normal.         Assessment:       1. Acute on chronic combined systolic and diastolic congestive heart failure    2. NSTEMI (non-ST elevated myocardial infarction)    3. NICM (nonischemic cardiomyopathy)    4. Hypertension, essential    5. Chronic obstructive pulmonary disease with acute exacerbation    6. Personal history of nicotine dependence    7. Statin intolerance        Plan:     Medication List with Changes/Refills   Current Medications    ALBUTEROL-IPRATROPIUM (DUO-NEB) 2.5 MG-0.5 MG/3 ML NEBULIZER SOLUTION    Take 3 mLs by nebulization every 6 (six)  hours as needed for Wheezing. Rescue    ASPIRIN 81 MG CHEW    Chew and swallow 1 tablet (81 mg total) by mouth once daily.    ATORVASTATIN (LIPITOR) 40 MG TABLET    Take 1 tablet (40 mg total) by mouth every evening.    CALCIUM CARBONATE (TUMS) 200 MG CALCIUM (500 MG) CHEWABLE TABLET    Take 1 tablet by mouth as needed for Heartburn.    FLUTICASONE (FLONASE) 50 MCG/ACTUATION NASAL SPRAY    1 spray by Each Nare route daily as needed for Allergies.    FLUTICASONE FUROATE-VILANTEROL (BREO ELLIPTA) 100-25 MCG/DOSE DISKUS INHALER    Inhale 1 puff into the lungs once daily. Controller    FUROSEMIDE (LASIX) 20 MG TABLET    Take 1 tablet (20 mg total) by mouth once daily. Hold until PCP follow up    METOPROLOL SUCCINATE (TOPROL-XL) 25 MG 24 HR TABLET    Take 1 tablet (25 mg total) by mouth once daily.    SACUBITRIL-VALSARTAN (ENTRESTO) 24-26 MG PER TABLET    Take 1 tablet by mouth 2 (two) times daily.    SPIRONOLACTONE (ALDACTONE) 25 MG TABLET    Take 1 tablet (25 mg total) by mouth once daily.   Discontinued Medications    CLOPIDOGREL (PLAVIX) 75 MG TABLET    Take 1 tablet (75 mg total) by mouth once daily.     Selma was seen today for follow-up.    Diagnoses and all orders for this visit:    Acute on chronic combined systolic and diastolic congestive heart failure    NSTEMI (non-ST elevated myocardial infarction)    NICM (nonischemic cardiomyopathy)    Hypertension, essential    Chronic obstructive pulmonary disease with acute exacerbation    Personal history of nicotine dependence  -     CT Chest Lung Screening Low Dose; Future    Statin intolerance      See meds, orders, follow up, routing and instructions sections of encounter and AVS. Discussed with patient and provided on AVS.    Lab Results   Component Value Date     01/18/2022    K 4.0 01/18/2022     01/18/2022    BUN 17 01/18/2022    CREATININE 0.9 01/18/2022     (H) 01/18/2022    HGBA1C 5.2 04/02/2021    MG 2.1 12/12/2021    AST 16 12/13/2021     ALT 27 12/13/2021    ALBUMIN 3.3 (L) 12/13/2021    PROT 6.6 12/13/2021    BILITOT 0.5 12/13/2021    CHOL 136 12/09/2021    HDL 36 (L) 12/09/2021    LDLCALC 84.6 12/09/2021    TRIG 77 12/09/2021    WBC 6.36 12/12/2021    HGB 12.2 12/12/2021    HCT 39.7 12/12/2021    HCT 39 04/02/2021     12/12/2021    TSH 0.503 12/08/2021     (H) 12/08/2021

## 2022-02-20 RX ORDER — NAPROXEN SODIUM 220 MG/1
81 TABLET, FILM COATED ORAL DAILY
Qty: 30 TABLET | Refills: 0 | Status: CANCELLED | OUTPATIENT
Start: 2022-02-20 | End: 2022-03-22

## 2022-02-21 ENCOUNTER — IMMUNIZATION (OUTPATIENT)
Dept: INTERNAL MEDICINE | Facility: CLINIC | Age: 77
End: 2022-02-21
Payer: COMMERCIAL

## 2022-02-23 ENCOUNTER — PATIENT MESSAGE (OUTPATIENT)
Dept: ADMINISTRATIVE | Facility: OTHER | Age: 77
End: 2022-02-23
Payer: COMMERCIAL

## 2022-04-03 RX ORDER — SACUBITRIL AND VALSARTAN 24; 26 MG/1; MG/1
1 TABLET, FILM COATED ORAL 2 TIMES DAILY
Qty: 180 TABLET | Refills: 0 | Status: CANCELLED | OUTPATIENT
Start: 2022-04-03 | End: 2022-07-02

## 2022-04-04 RX ORDER — SACUBITRIL AND VALSARTAN 24; 26 MG/1; MG/1
TABLET, FILM COATED ORAL
Qty: 180 TABLET | Refills: 3 | Status: SHIPPED | OUTPATIENT
Start: 2022-04-04 | End: 2023-05-13 | Stop reason: SDUPTHER

## 2022-04-25 ENCOUNTER — NURSE TRIAGE (OUTPATIENT)
Dept: ADMINISTRATIVE | Facility: CLINIC | Age: 77
End: 2022-04-25
Payer: COMMERCIAL

## 2022-04-25 NOTE — TELEPHONE ENCOUNTER
Patient exposed to covid. She is vaccinated. No symptoms. Advised per protocol to f/u with her PCP today. Discussed testing and isolation. Advised the patient to call back with any further questions or if symptoms worsen.      Reason for Disposition   [1] CLOSE CONTACT COVID-19 EXPOSURE within last 14 days AND [2] NO symptoms    Additional Information   Negative: [1] CLOSE CONTACT COVID-19 EXPOSURE within last 14 days AND [2] needs COVID-19 lab test to return to work AND [3] NO symptoms   Negative: [1] CLOSE CONTACT COVID-19 EXPOSURE within last 14 days AND [2] exposed person is a  (e.g., police or paramedic) AND [3] NO symptoms   Negative: [1] CLOSE CONTACT COVID-19 EXPOSURE within last 14 days AND [2] exposed person is a healthcare worker who was NOT using all recommended personal protective equipment (e.g., a respirator-N95 mask, eye protection, gloves, and gown) AND [3] NO symptoms   Negative: [1] Living or working in a correctional facility, long-term care facility, or shelter (i.e., congregate setting; densely populated) AND [2] where an outbreak has occurred AND [3] NO symptoms    Protocols used: CORONAVIRUS (COVID-19) EXPOSURE-A-[place]

## 2022-04-28 ENCOUNTER — TELEPHONE (OUTPATIENT)
Dept: INTERNAL MEDICINE | Facility: CLINIC | Age: 77
End: 2022-04-28
Payer: COMMERCIAL

## 2022-04-28 NOTE — TELEPHONE ENCOUNTER
Spoke to pt she stated she and her  has been exposed to COVID she would like to be tested I informed pt to go to the nearest Urgent Care pt stataed she will do a home test and inform us of results

## 2022-04-28 NOTE — TELEPHONE ENCOUNTER
----- Message from Kasie Banuelos sent at 4/28/2022  3:50 PM CDT -----  Type:  Patient Call Back    Who Called:Pt Daughter     What is the reqeust in detail: Pt daughter is requesting a call back for a covid test order , pt has been exposed she also would like to know if  can write on for her   Oscar Hoffman MRN [id number]    Can the clinic reply by [facility]?    Best Call Back Number: [phone]

## 2022-05-01 ENCOUNTER — HOSPITAL ENCOUNTER (INPATIENT)
Facility: HOSPITAL | Age: 77
LOS: 2 days | Discharge: HOME | DRG: 178 | End: 2022-05-03
Attending: EMERGENCY MEDICINE | Admitting: EMERGENCY MEDICINE
Payer: COMMERCIAL

## 2022-05-01 DIAGNOSIS — U07.1 COVID-19: Primary | ICD-10-CM

## 2022-05-01 DIAGNOSIS — I50.40 COMBINED CONGESTIVE SYSTOLIC AND DIASTOLIC HEART FAILURE: ICD-10-CM

## 2022-05-01 DIAGNOSIS — R07.9 CHEST PAIN: ICD-10-CM

## 2022-05-01 PROBLEM — I50.42 CHRONIC COMBINED SYSTOLIC AND DIASTOLIC CONGESTIVE HEART FAILURE: Status: ACTIVE | Noted: 2022-05-01

## 2022-05-01 LAB
ALBUMIN SERPL BCP-MCNC: 3.5 G/DL (ref 3.5–5.2)
ALP SERPL-CCNC: 100 U/L (ref 55–135)
ALT SERPL W/O P-5'-P-CCNC: 9 U/L (ref 10–44)
ANION GAP SERPL CALC-SCNC: 14 MMOL/L (ref 8–16)
APTT BLDCRRT: 26.2 SEC (ref 21–32)
AST SERPL-CCNC: 15 U/L (ref 10–40)
BACTERIA #/AREA URNS AUTO: NORMAL /HPF
BASOPHILS # BLD AUTO: 0.03 K/UL (ref 0–0.2)
BASOPHILS NFR BLD: 0.8 % (ref 0–1.9)
BILIRUB SERPL-MCNC: 0.3 MG/DL (ref 0.1–1)
BILIRUB UR QL STRIP: NEGATIVE
BNP SERPL-MCNC: 2135 PG/ML (ref 0–99)
BUN SERPL-MCNC: 19 MG/DL (ref 8–23)
CALCIUM SERPL-MCNC: 10 MG/DL (ref 8.7–10.5)
CHLORIDE SERPL-SCNC: 104 MMOL/L (ref 95–110)
CK SERPL-CCNC: 285 U/L (ref 20–180)
CLARITY UR REFRACT.AUTO: ABNORMAL
CO2 SERPL-SCNC: 25 MMOL/L (ref 23–29)
COLOR UR AUTO: YELLOW
CREAT SERPL-MCNC: 1.1 MG/DL (ref 0.5–1.4)
CRP SERPL-MCNC: 44 MG/L (ref 0–8.2)
CTP QC/QA: YES
DIFFERENTIAL METHOD: ABNORMAL
EOSINOPHIL # BLD AUTO: 0 K/UL (ref 0–0.5)
EOSINOPHIL NFR BLD: 0.3 % (ref 0–8)
ERYTHROCYTE [DISTWIDTH] IN BLOOD BY AUTOMATED COUNT: 13.4 % (ref 11.5–14.5)
ERYTHROCYTE [SEDIMENTATION RATE] IN BLOOD BY WESTERGREN METHOD: 102 MM/HR (ref 0–36)
EST. GFR  (AFRICAN AMERICAN): 57.2 ML/MIN/1.73 M^2
EST. GFR  (NON AFRICAN AMERICAN): 49.6 ML/MIN/1.73 M^2
FERRITIN SERPL-MCNC: 165 NG/ML (ref 20–300)
GLUCOSE SERPL-MCNC: 103 MG/DL (ref 70–110)
GLUCOSE UR QL STRIP: NEGATIVE
HCT VFR BLD AUTO: 38.6 % (ref 37–48.5)
HGB BLD-MCNC: 12.1 G/DL (ref 12–16)
HGB UR QL STRIP: ABNORMAL
HYALINE CASTS UR QL AUTO: 0 /LPF
IMM GRANULOCYTES # BLD AUTO: 0.01 K/UL (ref 0–0.04)
IMM GRANULOCYTES NFR BLD AUTO: 0.3 % (ref 0–0.5)
INR PPP: 1.1 (ref 0.8–1.2)
KETONES UR QL STRIP: ABNORMAL
LACTATE SERPL-SCNC: 1.1 MMOL/L (ref 0.5–2.2)
LDH SERPL L TO P-CCNC: 370 U/L (ref 110–260)
LEUKOCYTE ESTERASE UR QL STRIP: NEGATIVE
LIPASE SERPL-CCNC: 28 U/L (ref 4–60)
LYMPHOCYTES # BLD AUTO: 0.7 K/UL (ref 1–4.8)
LYMPHOCYTES NFR BLD: 18 % (ref 18–48)
MCH RBC QN AUTO: 23.9 PG (ref 27–31)
MCHC RBC AUTO-ENTMCNC: 31.3 G/DL (ref 32–36)
MCV RBC AUTO: 76 FL (ref 82–98)
MICROSCOPIC COMMENT: NORMAL
MONOCYTES # BLD AUTO: 0.3 K/UL (ref 0.3–1)
MONOCYTES NFR BLD: 7.5 % (ref 4–15)
NEUTROPHILS # BLD AUTO: 2.9 K/UL (ref 1.8–7.7)
NEUTROPHILS NFR BLD: 73.1 % (ref 38–73)
NITRITE UR QL STRIP: NEGATIVE
NRBC BLD-RTO: 0 /100 WBC
PH UR STRIP: 5 [PH] (ref 5–8)
PLATELET # BLD AUTO: 250 K/UL (ref 150–450)
PMV BLD AUTO: 9.5 FL (ref 9.2–12.9)
POTASSIUM SERPL-SCNC: 4 MMOL/L (ref 3.5–5.1)
PROT SERPL-MCNC: 7.5 G/DL (ref 6–8.4)
PROT UR QL STRIP: ABNORMAL
PROTHROMBIN TIME: 11 SEC (ref 9–12.5)
RBC # BLD AUTO: 5.07 M/UL (ref 4–5.4)
RBC #/AREA URNS AUTO: 2 /HPF (ref 0–4)
SARS-COV-2 RDRP RESP QL NAA+PROBE: POSITIVE
SODIUM SERPL-SCNC: 143 MMOL/L (ref 136–145)
SP GR UR STRIP: 1.02 (ref 1–1.03)
SQUAMOUS #/AREA URNS AUTO: 1 /HPF
TROPONIN I SERPL DL<=0.01 NG/ML-MCNC: 0.03 NG/ML (ref 0–0.03)
URN SPEC COLLECT METH UR: ABNORMAL
WBC # BLD AUTO: 3.89 K/UL (ref 3.9–12.7)
WBC #/AREA URNS AUTO: 2 /HPF (ref 0–5)

## 2022-05-01 PROCEDURE — 63600175 PHARM REV CODE 636 W HCPCS

## 2022-05-01 PROCEDURE — 99284 PR EMERGENCY DEPT VISIT,LEVEL IV: ICD-10-PCS | Mod: ,,, | Performed by: PHYSICIAN ASSISTANT

## 2022-05-01 PROCEDURE — 12000002 HC ACUTE/MED SURGE SEMI-PRIVATE ROOM

## 2022-05-01 PROCEDURE — 83605 ASSAY OF LACTIC ACID: CPT | Performed by: PHYSICIAN ASSISTANT

## 2022-05-01 PROCEDURE — 86140 C-REACTIVE PROTEIN: CPT | Performed by: PHYSICIAN ASSISTANT

## 2022-05-01 PROCEDURE — 85652 RBC SED RATE AUTOMATED: CPT

## 2022-05-01 PROCEDURE — 83880 ASSAY OF NATRIURETIC PEPTIDE: CPT | Performed by: PHYSICIAN ASSISTANT

## 2022-05-01 PROCEDURE — 81001 URINALYSIS AUTO W/SCOPE: CPT | Performed by: PHYSICIAN ASSISTANT

## 2022-05-01 PROCEDURE — 93005 ELECTROCARDIOGRAM TRACING: CPT

## 2022-05-01 PROCEDURE — 99284 EMERGENCY DEPT VISIT MOD MDM: CPT | Mod: ,,, | Performed by: PHYSICIAN ASSISTANT

## 2022-05-01 PROCEDURE — 85730 THROMBOPLASTIN TIME PARTIAL: CPT

## 2022-05-01 PROCEDURE — 82550 ASSAY OF CK (CPK): CPT | Performed by: PHYSICIAN ASSISTANT

## 2022-05-01 PROCEDURE — 82728 ASSAY OF FERRITIN: CPT | Performed by: PHYSICIAN ASSISTANT

## 2022-05-01 PROCEDURE — 83036 HEMOGLOBIN GLYCOSYLATED A1C: CPT

## 2022-05-01 PROCEDURE — 63600175 PHARM REV CODE 636 W HCPCS: Performed by: PHYSICIAN ASSISTANT

## 2022-05-01 PROCEDURE — 96375 TX/PRO/DX INJ NEW DRUG ADDON: CPT

## 2022-05-01 PROCEDURE — 83690 ASSAY OF LIPASE: CPT | Performed by: PHYSICIAN ASSISTANT

## 2022-05-01 PROCEDURE — U0002 COVID-19 LAB TEST NON-CDC: HCPCS | Performed by: PHYSICIAN ASSISTANT

## 2022-05-01 PROCEDURE — 84484 ASSAY OF TROPONIN QUANT: CPT | Performed by: PHYSICIAN ASSISTANT

## 2022-05-01 PROCEDURE — 27000207 HC ISOLATION

## 2022-05-01 PROCEDURE — 93010 EKG 12-LEAD: ICD-10-PCS | Mod: ,,, | Performed by: INTERNAL MEDICINE

## 2022-05-01 PROCEDURE — 85025 COMPLETE CBC W/AUTO DIFF WBC: CPT | Performed by: PHYSICIAN ASSISTANT

## 2022-05-01 PROCEDURE — 85610 PROTHROMBIN TIME: CPT

## 2022-05-01 PROCEDURE — 96374 THER/PROPH/DIAG INJ IV PUSH: CPT

## 2022-05-01 PROCEDURE — 80053 COMPREHEN METABOLIC PANEL: CPT | Performed by: PHYSICIAN ASSISTANT

## 2022-05-01 PROCEDURE — 83615 LACTATE (LD) (LDH) ENZYME: CPT | Performed by: PHYSICIAN ASSISTANT

## 2022-05-01 PROCEDURE — 99285 EMERGENCY DEPT VISIT HI MDM: CPT | Mod: 25

## 2022-05-01 PROCEDURE — 93010 ELECTROCARDIOGRAM REPORT: CPT | Mod: ,,, | Performed by: INTERNAL MEDICINE

## 2022-05-01 PROCEDURE — 25000003 PHARM REV CODE 250

## 2022-05-01 RX ORDER — IBUPROFEN 200 MG
16 TABLET ORAL
Status: DISCONTINUED | OUTPATIENT
Start: 2022-05-01 | End: 2022-05-03 | Stop reason: HOSPADM

## 2022-05-01 RX ORDER — ONDANSETRON 2 MG/ML
4 INJECTION INTRAMUSCULAR; INTRAVENOUS
Status: COMPLETED | OUTPATIENT
Start: 2022-05-01 | End: 2022-05-01

## 2022-05-01 RX ORDER — IBUPROFEN 200 MG
16 TABLET ORAL
Status: DISCONTINUED | OUTPATIENT
Start: 2022-05-01 | End: 2022-05-02

## 2022-05-01 RX ORDER — SPIRONOLACTONE 25 MG/1
25 TABLET ORAL DAILY
Status: DISCONTINUED | OUTPATIENT
Start: 2022-05-02 | End: 2022-05-02

## 2022-05-01 RX ORDER — SODIUM CHLORIDE 0.9 % (FLUSH) 0.9 %
10 SYRINGE (ML) INJECTION
Status: DISCONTINUED | OUTPATIENT
Start: 2022-05-01 | End: 2022-05-02

## 2022-05-01 RX ORDER — IBUPROFEN 200 MG
24 TABLET ORAL
Status: DISCONTINUED | OUTPATIENT
Start: 2022-05-01 | End: 2022-05-02

## 2022-05-01 RX ORDER — NAPROXEN SODIUM 220 MG/1
81 TABLET, FILM COATED ORAL DAILY
Status: DISCONTINUED | OUTPATIENT
Start: 2022-05-02 | End: 2022-05-03 | Stop reason: HOSPADM

## 2022-05-01 RX ORDER — TALC
6 POWDER (GRAM) TOPICAL NIGHTLY PRN
Status: DISCONTINUED | OUTPATIENT
Start: 2022-05-01 | End: 2022-05-03 | Stop reason: HOSPADM

## 2022-05-01 RX ORDER — GLUCAGON 1 MG
1 KIT INJECTION
Status: DISCONTINUED | OUTPATIENT
Start: 2022-05-01 | End: 2022-05-03 | Stop reason: HOSPADM

## 2022-05-01 RX ORDER — FUROSEMIDE 20 MG/1
20 TABLET ORAL ONCE
Status: COMPLETED | OUTPATIENT
Start: 2022-05-01 | End: 2022-05-02

## 2022-05-01 RX ORDER — SODIUM CHLORIDE 0.9 % (FLUSH) 0.9 %
10 SYRINGE (ML) INJECTION
Status: DISCONTINUED | OUTPATIENT
Start: 2022-05-01 | End: 2022-05-03 | Stop reason: HOSPADM

## 2022-05-01 RX ORDER — DEXAMETHASONE SODIUM PHOSPHATE 4 MG/ML
6 INJECTION, SOLUTION INTRA-ARTICULAR; INTRALESIONAL; INTRAMUSCULAR; INTRAVENOUS; SOFT TISSUE
Status: COMPLETED | OUTPATIENT
Start: 2022-05-01 | End: 2022-05-01

## 2022-05-01 RX ORDER — ENOXAPARIN SODIUM 100 MG/ML
40 INJECTION SUBCUTANEOUS EVERY 24 HOURS
Status: DISCONTINUED | OUTPATIENT
Start: 2022-05-01 | End: 2022-05-01

## 2022-05-01 RX ORDER — GLUCAGON 1 MG
1 KIT INJECTION
Status: DISCONTINUED | OUTPATIENT
Start: 2022-05-01 | End: 2022-05-01 | Stop reason: SDUPTHER

## 2022-05-01 RX ORDER — IBUPROFEN 200 MG
24 TABLET ORAL
Status: DISCONTINUED | OUTPATIENT
Start: 2022-05-01 | End: 2022-05-03 | Stop reason: HOSPADM

## 2022-05-01 RX ORDER — ASCORBIC ACID 500 MG
500 TABLET ORAL 2 TIMES DAILY
Status: DISCONTINUED | OUTPATIENT
Start: 2022-05-01 | End: 2022-05-03 | Stop reason: HOSPADM

## 2022-05-01 RX ORDER — NALOXONE HCL 0.4 MG/ML
0.02 VIAL (ML) INJECTION
Status: DISCONTINUED | OUTPATIENT
Start: 2022-05-01 | End: 2022-05-03 | Stop reason: HOSPADM

## 2022-05-01 RX ORDER — METOPROLOL SUCCINATE 25 MG/1
25 TABLET, EXTENDED RELEASE ORAL DAILY
Status: DISCONTINUED | OUTPATIENT
Start: 2022-05-02 | End: 2022-05-02

## 2022-05-01 RX ORDER — ENOXAPARIN SODIUM 100 MG/ML
1 INJECTION SUBCUTANEOUS 2 TIMES DAILY
Status: DISCONTINUED | OUTPATIENT
Start: 2022-05-01 | End: 2022-05-03 | Stop reason: HOSPADM

## 2022-05-01 RX ORDER — ALBUTEROL SULFATE 90 UG/1
2 AEROSOL, METERED RESPIRATORY (INHALATION) EVERY 6 HOURS
Status: DISCONTINUED | OUTPATIENT
Start: 2022-05-02 | End: 2022-05-03 | Stop reason: HOSPADM

## 2022-05-01 RX ORDER — SODIUM CHLORIDE 0.9 % (FLUSH) 0.9 %
10 SYRINGE (ML) INJECTION EVERY 12 HOURS PRN
Status: DISCONTINUED | OUTPATIENT
Start: 2022-05-01 | End: 2022-05-02

## 2022-05-01 RX ADMIN — DEXAMETHASONE SODIUM PHOSPHATE 6 MG: 4 INJECTION INTRA-ARTICULAR; INTRALESIONAL; INTRAMUSCULAR; INTRAVENOUS; SOFT TISSUE at 08:05

## 2022-05-01 RX ADMIN — Medication 500 MG: at 11:05

## 2022-05-01 RX ADMIN — REMDESIVIR 200 MG: 100 INJECTION, POWDER, LYOPHILIZED, FOR SOLUTION INTRAVENOUS at 11:05

## 2022-05-01 RX ADMIN — ENOXAPARIN SODIUM 70 MG: 80 INJECTION SUBCUTANEOUS at 11:05

## 2022-05-01 RX ADMIN — ONDANSETRON 4 MG: 2 INJECTION INTRAMUSCULAR; INTRAVENOUS at 08:05

## 2022-05-01 NOTE — ED TRIAGE NOTES
The patient is a 76-year-old female who presents to the ED via personal transportation from home. The patient's chief complaint is diffuse abdominal pain that is cramping in nature since this morning and vomiting bile. She vomited 3x while sitting in the waiting room and 3-4x at home today. The vomit was a yellow color. She reports 8/10 abdominal pain.

## 2022-05-02 LAB
ALBUMIN SERPL BCP-MCNC: 3.3 G/DL (ref 3.5–5.2)
ALP SERPL-CCNC: 94 U/L (ref 55–135)
ALT SERPL W/O P-5'-P-CCNC: 9 U/L (ref 10–44)
ANION GAP SERPL CALC-SCNC: 14 MMOL/L (ref 8–16)
ANISOCYTOSIS BLD QL SMEAR: ABNORMAL
AST SERPL-CCNC: 15 U/L (ref 10–40)
BASOPHILS # BLD AUTO: 0.01 K/UL (ref 0–0.2)
BASOPHILS NFR BLD: 0.5 % (ref 0–1.9)
BILIRUB SERPL-MCNC: 0.2 MG/DL (ref 0.1–1)
BUN SERPL-MCNC: 24 MG/DL (ref 8–23)
CALCIUM SERPL-MCNC: 9.3 MG/DL (ref 8.7–10.5)
CHLORIDE SERPL-SCNC: 106 MMOL/L (ref 95–110)
CO2 SERPL-SCNC: 22 MMOL/L (ref 23–29)
CREAT SERPL-MCNC: 0.9 MG/DL (ref 0.5–1.4)
DIFFERENTIAL METHOD: ABNORMAL
EOSINOPHIL # BLD AUTO: 0 K/UL (ref 0–0.5)
EOSINOPHIL NFR BLD: 0.5 % (ref 0–8)
ERYTHROCYTE [DISTWIDTH] IN BLOOD BY AUTOMATED COUNT: 13.2 % (ref 11.5–14.5)
EST. GFR  (AFRICAN AMERICAN): >60 ML/MIN/1.73 M^2
EST. GFR  (NON AFRICAN AMERICAN): >60 ML/MIN/1.73 M^2
ESTIMATED AVG GLUCOSE: 117 MG/DL (ref 68–131)
GLUCOSE SERPL-MCNC: 124 MG/DL (ref 70–110)
HBA1C MFR BLD: 5.7 % (ref 4–5.6)
HCT VFR BLD AUTO: 45.8 % (ref 37–48.5)
HGB BLD-MCNC: 14.1 G/DL (ref 12–16)
HYPOCHROMIA BLD QL SMEAR: ABNORMAL
IMM GRANULOCYTES # BLD AUTO: 0.03 K/UL (ref 0–0.04)
IMM GRANULOCYTES NFR BLD AUTO: 1.4 % (ref 0–0.5)
LYMPHOCYTES # BLD AUTO: 0.6 K/UL (ref 1–4.8)
LYMPHOCYTES NFR BLD: 27.4 % (ref 18–48)
MAGNESIUM SERPL-MCNC: 2 MG/DL (ref 1.6–2.6)
MCH RBC QN AUTO: 23.7 PG (ref 27–31)
MCHC RBC AUTO-ENTMCNC: 30.8 G/DL (ref 32–36)
MCV RBC AUTO: 77 FL (ref 82–98)
MONOCYTES # BLD AUTO: 0.1 K/UL (ref 0.3–1)
MONOCYTES NFR BLD: 4.8 % (ref 4–15)
NEUTROPHILS # BLD AUTO: 1.4 K/UL (ref 1.8–7.7)
NEUTROPHILS NFR BLD: 65.4 % (ref 38–73)
NRBC BLD-RTO: 0 /100 WBC
OVALOCYTES BLD QL SMEAR: ABNORMAL
PHOSPHATE SERPL-MCNC: 3.2 MG/DL (ref 2.7–4.5)
PLATELET # BLD AUTO: 157 K/UL (ref 150–450)
PLATELET BLD QL SMEAR: ABNORMAL
PMV BLD AUTO: 10.6 FL (ref 9.2–12.9)
POCT GLUCOSE: 242 MG/DL (ref 70–110)
POIKILOCYTOSIS BLD QL SMEAR: SLIGHT
POLYCHROMASIA BLD QL SMEAR: ABNORMAL
POTASSIUM SERPL-SCNC: 4.3 MMOL/L (ref 3.5–5.1)
PROT SERPL-MCNC: 7.1 G/DL (ref 6–8.4)
RBC # BLD AUTO: 5.95 M/UL (ref 4–5.4)
SODIUM SERPL-SCNC: 142 MMOL/L (ref 136–145)
TROPONIN I SERPL DL<=0.01 NG/ML-MCNC: 0.02 NG/ML (ref 0–0.03)
WBC # BLD AUTO: 2.08 K/UL (ref 3.9–12.7)

## 2022-05-02 PROCEDURE — 85025 COMPLETE CBC W/AUTO DIFF WBC: CPT

## 2022-05-02 PROCEDURE — 25000242 PHARM REV CODE 250 ALT 637 W/ HCPCS

## 2022-05-02 PROCEDURE — 25000003 PHARM REV CODE 250

## 2022-05-02 PROCEDURE — 27000221 HC OXYGEN, UP TO 24 HOURS

## 2022-05-02 PROCEDURE — 63600175 PHARM REV CODE 636 W HCPCS: Performed by: HOSPITALIST

## 2022-05-02 PROCEDURE — 63600175 PHARM REV CODE 636 W HCPCS: Mod: TB

## 2022-05-02 PROCEDURE — 94761 N-INVAS EAR/PLS OXIMETRY MLT: CPT

## 2022-05-02 PROCEDURE — 99223 PR INITIAL HOSPITAL CARE,LEVL III: ICD-10-PCS | Mod: ,,, | Performed by: HOSPITALIST

## 2022-05-02 PROCEDURE — 84100 ASSAY OF PHOSPHORUS: CPT

## 2022-05-02 PROCEDURE — 36415 COLL VENOUS BLD VENIPUNCTURE: CPT

## 2022-05-02 PROCEDURE — 99223 1ST HOSP IP/OBS HIGH 75: CPT | Mod: ,,, | Performed by: HOSPITALIST

## 2022-05-02 PROCEDURE — 63600175 PHARM REV CODE 636 W HCPCS

## 2022-05-02 PROCEDURE — 83735 ASSAY OF MAGNESIUM: CPT

## 2022-05-02 PROCEDURE — 27000207 HC ISOLATION

## 2022-05-02 PROCEDURE — 94640 AIRWAY INHALATION TREATMENT: CPT

## 2022-05-02 PROCEDURE — 20600001 HC STEP DOWN PRIVATE ROOM

## 2022-05-02 PROCEDURE — 84484 ASSAY OF TROPONIN QUANT: CPT | Performed by: STUDENT IN AN ORGANIZED HEALTH CARE EDUCATION/TRAINING PROGRAM

## 2022-05-02 PROCEDURE — 25000003 PHARM REV CODE 250: Performed by: STUDENT IN AN ORGANIZED HEALTH CARE EDUCATION/TRAINING PROGRAM

## 2022-05-02 PROCEDURE — 93010 ELECTROCARDIOGRAM REPORT: CPT | Mod: ,,, | Performed by: INTERNAL MEDICINE

## 2022-05-02 PROCEDURE — 80053 COMPREHEN METABOLIC PANEL: CPT

## 2022-05-02 PROCEDURE — 93010 EKG 12-LEAD: ICD-10-PCS | Mod: ,,, | Performed by: INTERNAL MEDICINE

## 2022-05-02 PROCEDURE — 99900035 HC TECH TIME PER 15 MIN (STAT)

## 2022-05-02 PROCEDURE — 93005 ELECTROCARDIOGRAM TRACING: CPT

## 2022-05-02 RX ORDER — LORAZEPAM 2 MG/ML
1 INJECTION INTRAMUSCULAR ONCE
Status: COMPLETED | OUTPATIENT
Start: 2022-05-02 | End: 2022-05-02

## 2022-05-02 RX ORDER — ASCORBIC ACID 500 MG
500 TABLET ORAL 2 TIMES DAILY
Qty: 20 TABLET | Refills: 0 | Status: SHIPPED | OUTPATIENT
Start: 2022-05-02 | End: 2022-05-13

## 2022-05-02 RX ADMIN — ALBUTEROL SULFATE 2 PUFF: 108 INHALANT RESPIRATORY (INHALATION) at 01:05

## 2022-05-02 RX ADMIN — FUROSEMIDE 20 MG: 20 TABLET ORAL at 01:05

## 2022-05-02 RX ADMIN — ASPIRIN 81 MG CHEWABLE TABLET 81 MG: 81 TABLET CHEWABLE at 08:05

## 2022-05-02 RX ADMIN — ALBUTEROL SULFATE 2 PUFF: 108 INHALANT RESPIRATORY (INHALATION) at 08:05

## 2022-05-02 RX ADMIN — LORAZEPAM 1 MG: 2 INJECTION INTRAMUSCULAR; INTRAVENOUS at 06:05

## 2022-05-02 RX ADMIN — REMDESIVIR 100 MG: 100 INJECTION, POWDER, LYOPHILIZED, FOR SOLUTION INTRAVENOUS at 04:05

## 2022-05-02 RX ADMIN — ENOXAPARIN SODIUM 70 MG: 80 INJECTION SUBCUTANEOUS at 09:05

## 2022-05-02 RX ADMIN — Medication 500 MG: at 08:05

## 2022-05-02 RX ADMIN — Medication 500 MG: at 09:05

## 2022-05-02 RX ADMIN — ALBUTEROL SULFATE 2 PUFF: 108 INHALANT RESPIRATORY (INHALATION) at 07:05

## 2022-05-02 RX ADMIN — THERA TABS 1 TABLET: TAB at 08:05

## 2022-05-02 RX ADMIN — SODIUM CHLORIDE 250 ML: 0.9 INJECTION, SOLUTION INTRAVENOUS at 08:05

## 2022-05-02 RX ADMIN — ENOXAPARIN SODIUM 70 MG: 80 INJECTION SUBCUTANEOUS at 08:05

## 2022-05-02 RX ADMIN — DEXAMETHASONE 6 MG: 4 TABLET ORAL at 09:05

## 2022-05-02 NOTE — ASSESSMENT & PLAN NOTE
76-year-old female with past medical history of combined systolic and diastolic heart failure (left ventricular EF 15% on 12/2022), hypertension, presumptive COPD, iron deficiency anemia who admits for symptomatic covid. Covid positive on admission. Patient stable on admission, HDS, afebrile. MAGALIS, though does become hypoxic with ambulation. Inflammatory markers elevated on admit. Cr at baseline, no leukocytosis. Will admit overnight for monitoring of O2 status and continued management.     Plan:  -- remdesivir 100 qd x5 days  -- dexamethasone 6mg qd  -- vitamin C supplementation  -- holding off on abx at this time as low suspicion for superimposed bacterial PNA  -- q48 hour inflammatory markers  -- supplemental O2 as needed

## 2022-05-02 NOTE — SUBJECTIVE & OBJECTIVE
Past Medical History:   Diagnosis Date    Acute on chronic respiratory failure with hypoxia and hypercapnia 12/10/2021    CHF (congestive heart failure)     Former smoker 10/24/2018    Hypertension     Smoker 7/27/2016       Past Surgical History:   Procedure Laterality Date    BREAST BIOPSY      left  side years ago in high school   1962    CHOLECYSTECTOMY      COLONOSCOPY      COLONOSCOPY N/A 8/23/2021    Procedure: COLONOSCOPY;  Surgeon: Shree Amaya MD;  Location: [address]);  Service: Endoscopy;  Laterality: N/A;  Do not cancel this order  fully vaccinated-see immunization record  EF 18%  8/20-covid [facility]    HYSTERECTOMY         Review of patient's allergies indicates:   Allergen Reactions    Atorvastatin      Leg cramps       No current facility-administered medications on file prior to encounter.     Current Outpatient Medications on File Prior to Encounter   Medication Sig    furosemide (LASIX) 20 MG tablet Take 1 tablet (20 mg total) by mouth once daily. Hold until PCP follow up    metoprolol succinate (TOPROL-XL) 25 MG 24 hr tablet Take 1 tablet (25 mg total) by mouth once daily.    sacubitriL-valsartan (ENTRESTO) 24-26 mg per tablet Take 1 tablet by mouth 2 (two) times daily.    spironolactone (ALDACTONE) 25 MG tablet Take 1 tablet (25 mg total) by mouth once daily.    albuterol-ipratropium (DUO-NEB) 2.5 mg-0.5 mg/3 mL nebulizer solution Take 3 mLs by nebulization every 6 (six) hours as needed for Wheezing. Rescue    aspirin 81 MG Chew Chew and swallow 1 tablet (81 mg total) by mouth once daily.    atorvastatin (LIPITOR) 40 MG tablet Take 1 tablet (40 mg total) by mouth every evening. (Patient not taking: Reported on 1/11/2022)    calcium carbonate (TUMS) 200 mg calcium (500 mg) chewable tablet Take 1 tablet by mouth as needed for Heartburn.    fluticasone (FLONASE) 50 mcg/actuation nasal spray 1 spray by Each Nare route daily as needed for Allergies.    fluticasone  furoate-vilanteroL (BREO ELLIPTA) 100-25 mcg/dose diskus inhaler Inhale 1 puff into the lungs once daily. Controller     Family History       Problem Relation (Age of Onset)    Colon cancer Father, Brother (63)    Dementia Father    Diabetes Daughter    Heart attack Mother    Heart disease Mother, Brother    Hypertension Mother, Father, Brother          Tobacco Use    Smoking status: Former Smoker     Packs/day: 0.50     Quit date: 6/14/2018     Years since quitting: 3.8    Smokeless tobacco: Never Used   Substance and Sexual Activity    Alcohol use: Yes    Drug use: No    Sexual activity: Not on file     Review of Systems   Constitutional:  Negative for chills and fever.   HENT:  Negative for congestion and sore throat.    Respiratory:  Positive for cough. Negative for shortness of breath.    Cardiovascular:  Negative for chest pain, palpitations and leg swelling.   Gastrointestinal:  Positive for abdominal pain, diarrhea, nausea and vomiting (bilious\). Negative for blood in stool.   Genitourinary:  Negative for dysuria and frequency.   Musculoskeletal:  Negative for arthralgias and back pain.   Skin:  Negative for rash and wound.   Neurological:  Negative for dizziness and headaches.   Psychiatric/Behavioral:  Negative for agitation and confusion.    Objective:     Vital Signs (Most Recent):  Temp: 98.1 °F (36.7 °C) (05/01/22 1824)  Pulse: 78 (05/01/22 2250)  Resp: 16 (05/01/22 2250)  BP: 116/78 (05/01/22 2250)  SpO2: (!) 94 % (05/01/22 2250)   Vital Signs (24h Range):  Temp:  [98.1 °F (36.7 °C)] 98.1 °F (36.7 °C)  Pulse:  [78-99] 78  Resp:  [16-24] 16  SpO2:  [92 %-96 %] 94 %  BP: (116-149)/(70-86) 116/78     Weight: 66.2 kg (146 lb)  Body mass index is 27.59 kg/m².    Physical Exam  Vitals reviewed.   Constitutional:       General: She is not in acute distress.     Appearance: Normal appearance.   HENT:      Head: Normocephalic and atraumatic.      Mouth/Throat:      Mouth: Mucous membranes are dry.       Pharynx: Oropharynx is clear. No oropharyngeal exudate.   Eyes:      Extraocular Movements: Extraocular movements intact.      Pupils: Pupils are equal, round, and reactive to light.   Cardiovascular:      Rate and Rhythm: Normal rate and regular rhythm.      Pulses: Normal pulses.      Heart sounds: Normal heart sounds. No murmur heard.    No friction rub. No gallop.      Comments: Pt appears volume down on exam.  Pulmonary:      Effort: Pulmonary effort is normal. No respiratory distress.      Breath sounds: Normal breath sounds. No wheezing, rhonchi or rales.   Abdominal:      General: Abdomen is flat.      Tenderness: There is no abdominal tenderness.   Musculoskeletal:         General: No swelling. Normal range of motion.      Cervical back: Normal range of motion and neck supple.      Right lower leg: No edema.      Left lower leg: No edema.   Skin:     General: Skin is warm and dry.   Neurological:      General: No focal deficit present.      Mental Status: She is alert and oriented to person, place, and time.   Psychiatric:         Mood and Affect: Mood normal.         Behavior: Behavior normal.         CRANIAL NERVES     CN III, IV, VI   Pupils are equal, round, and reactive to light.     Significant Labs: All pertinent labs within the past 24 hours have been reviewed.  CBC:   Recent Labs   Lab 05/01/22 1902   WBC 3.89*   HGB 12.1   HCT 38.6        CMP:   Recent Labs   Lab 05/01/22 1902      K 4.0      CO2 25      BUN 19   CREATININE 1.1   CALCIUM 10.0   PROT 7.5   ALBUMIN 3.5   BILITOT 0.3   ALKPHOS 100   AST 15   ALT 9*   ANIONGAP 14   EGFRNONAA 49.6*     Cardiac Markers:   Recent Labs   Lab 05/01/22 1902   BNP 2,135*     Coagulation:   Recent Labs   Lab 05/01/22  2243   INR 1.1   APTT 26.2     Lactic Acid:   Recent Labs   Lab 05/01/22  1902   LACTATE 1.1     Lipase:   Recent Labs   Lab 05/01/22 1902   LIPASE 28     Urine Studies:   Recent Labs   Lab 05/01/22 2033   COLORU  Yellow   APPEARANCEUA Hazy*   PHUR 5.0   SPECGRAV 1.025   PROTEINUA 1+*   GLUCUA Negative   KETONESU 1+*   BILIRUBINUA Negative   OCCULTUA 1+*   NITRITE Negative   LEUKOCYTESUR Negative   RBCUA 2   WBCUA 2   BACTERIA None   SQUAMEPITHEL 1   HYALINECASTS 0       Significant Imaging: I have reviewed all pertinent imaging results/findings within the past 24 hours.    X-Ray Chest AP Portable   Final Result      1. Similar appearing chronic interstitial findings, correlation is advised to exclude early change of viral process.         Electronically signed by: Roberto Krueger MD   Date:    05/01/2022   Time:    19:38

## 2022-05-02 NOTE — PLAN OF CARE
[facility] ([place] [address])  Initial Discharge Assessment       Primary Care Provider: Phil Quintana MD    Admission Diagnosis: Chest pain [R07.9]  COVID-19 [U07.1]    Admission Date: 5/1/2022  Expected Discharge Date: 5/4/2022    Discharge Barriers Identified: None    Payor: BLUE CROSS BLUE SHIELD / Plan: [place] HMO / Product Type: HMO /     Extended Emergency Contact Information  Primary Emergency Contact: Lc Hoffman  Address: [address] [place] [address] [facility]  Home Phone: [phone]  Mobile Phone: [phone]  Relation: Significant other  Secondary Emergency Contact: David Hooper  Address: [address] [place] [address] [place]  Mobile Phone: [phone]  Relation: Daughter    Discharge Plan A: Home with family  Discharge Plan B: Home with family, Home Health      [facility] [address] [place] [address]  Phone: [phone] Fax: [phone]      Initial Assessment (most recent)     Adult Discharge Assessment - 05/02/22 1108        Discharge Assessment    Assessment Type Discharge Planning Assessment     Confirmed/corrected address, phone number and insurance Yes     Confirmed Demographics Correct on Facesheet     Source of Information family     If unable to respond/provide information was family/caregiver contacted? Yes     Contact Name/Number Alexsandra Hooper (daughter) [phone]     Does patient/caregiver understand observation status Yes     Communicated GERA with patient/caregiver Date not available/Unable to determine     Reason For Admission COVID-19     Lives With child(elsie), adult;spouse;grandchild(elsie)     Do you expect to return to your current living situation? Yes     Do you have help at home or someone to help you manage your care at home? Yes     Who are your caregiver(s) and their phone number(s)? Alexsandra Hooper (daughter) [phone]     Prior to hospitilization  cognitive status: Unable to Assess     Current cognitive status: Alert/Oriented     Walking or Climbing Stairs Difficulty none     Dressing/Bathing Difficulty none     Home Accessibility stairs to enter home     Number of Stairs, Main Entrance three     Equipment Currently Used at Home oxygen   Oxygen PRN    Readmission within 30 days? No     Patient currently being followed by outpatient case management? No     Do you currently have service(s) that help you manage your care at home? No     Do you take prescription medications? Yes     Do you have prescription coverage? Yes     Coverage BCBS     Do you have any problems affording any of your prescribed medications? No     Is the patient taking medications as prescribed? yes     Who is going to help you get home at discharge? Alexsandra Hooper (daughter) [phone]     How do you get to doctors appointments? car, drives self;family or friend will provide     Are you on dialysis? No     Do you take coumadin? No     Discharge Plan A Home with family     Discharge Plan B Home with family;Home Health     DME Needed Upon Discharge  other (see comments)   TBD    Discharge Plan discussed with: Adult children     Discharge Barriers Identified None        Relationship/Environment    Name(s) of Who Lives With Patient Alexsandra Hooper (daughter) [phone], Lc Hoffman (spouse) [phone], and dependent grandchild                  SW contacted Alexsandra Hooper (daughter) [phone] in room for Discharge Planning Assessment.  Patient was unable to answer questions.  Per Alexsandra Hooper (daughter) [phone], patient lives with her, patient's spouse (Lc Hoffman) and juvenile grandchild in a single story with 3 step(s) to enter.   Per Alexsandra Hooper (daughter) [phone], patient was independent with ADLS and used no DME for ambulation.  Patient will have assistance from Alexsandra Hooper (daughter) [phone] upon discharge. Patient on PRN Home O2 (as needed) All questions  addressed.  Assigned SW/CM will follow for needs.    Noni Griffith, [facility]  [phone]

## 2022-05-02 NOTE — ASSESSMENT & PLAN NOTE
Patient is identified as having Combined Systolic and Diastolic heart failure that is Chronic. CHF is currently controlled. Latest ECHO performed and demonstrates- Results for orders placed during the hospital encounter of 12/08/21    Echo    Interpretation Summary  · The left ventricle is normal in size with moderate eccentric hypertrophy and severely decreased systolic function.  · There is severe left ventricular global hypokinesis. The estimated ejection fraction is 15%.  · Left ventricular diastolic dysfunction.  · Normal right ventricular size with normal right ventricular systolic function.  · Normal central venous pressure (3 mmHg).  · There is no significant tricuspid regurgitation and therefore the pulmonary artery systolic pressure cannot be reported.  . Continue Beta Blocker, Furosemide, Aldactone and ARNI and monitor clinical status closely. Monitor on telemetry. Patient is on CHF pathway.  Monitor strict Is&Os and daily weights.  Place on fluid restriction of 1.5 L. Continue to stress to patient importance of self efficacy and  on diet for CHF. Last BNP reviewed- and noted below   Recent Labs   Lab 05/01/22  1902   BNP 2,135*     BNP elevated, though pt appears overall volume down on exam. Respiratory failure likely 2/2 COVID pneumonia, and less likely HFE.   -- strict I/Os  -- daily weights  -- continue GDMT

## 2022-05-02 NOTE — H&P
[facility] Emergency Dept  [facility] Medicine  History & Physical    Patient Name: Selma Hooper  MRN: [id number]  Patient Class: IP- Inpatient  Admission Date: 5/1/2022  Attending Physician: Mary Del Real MD   Primary Care Provider: Phil Quintana MD         Patient information was obtained from patient, past medical records and ER records.     Subjective:     Principal Problem:COVID-19    Chief Complaint:   Chief Complaint   Patient presents with    Abdominal Pain     Pt reports abdominal pain starting today, pt covid + on home test 4/30/22        HPI: 76-year-old female with past medical history of combined systolic and diastolic heart failure (left ventricular EF 15% on 12/2022), hypertension, presumptive COPD, iron deficiency anemia who presents with complaint of cough, fever, chills in the setting of known COVID exposure and positive home COVID test.  Patient reports her granddaughter return from a trip abroad for spring break a couple of weeks ago.  She ended up having symptomatic COVID, and spread to the rest of the family.  Both the patient, her , and her daughter all currently have positive COVID.  [facility]  Patient reports initial symptoms of cough with thick mucus production as well as fevers and chills.  She initially started to feel better, however a couple of days ago developed sudden-onset abdominal cramping, watery diarrhea, nausea, and bilious vomiting.  Patient has not been able to keep hardly anything down for the past day or 2. Her GI symptoms are what caused her to finally presents to the ED for evaluation. Patient denies any LE swelling, worsening dyspnea, orthopnea, PND. Pt is compliant with all her medications.     In the ED, pt HDS, afebrile, HR 90s, /78. 94% on RA, 92% with ambulation. COVID positive in ED. CBC without leukocytosis or anemia. Cr at baseline, electrolytes wnl. Inflammatory markers elevated, (CRP 44,  , ). BNP 2135. Pt hypoxic with ambulation so admitted to medicine for management of COVID.               Past Medical History:   Diagnosis Date    Acute on chronic respiratory failure with hypoxia and hypercapnia 12/10/2021    CHF (congestive heart failure)     Former smoker 10/24/2018    Hypertension     Smoker 7/27/2016       Past Surgical History:   Procedure Laterality Date    BREAST BIOPSY      left  side years ago in high school   1962    CHOLECYSTECTOMY      COLONOSCOPY      COLONOSCOPY N/A 8/23/2021    Procedure: COLONOSCOPY;  Surgeon: Shree Amaya MD;  Location: [facility] ([address] [place] [address]);  Service: Endoscopy;  Laterality: N/A;  Do not cancel this order  fully vaccinated-see immunization record  EF 18%  8/20-covid [facility]    HYSTERECTOMY         Review of patient's allergies indicates:   Allergen Reactions    Atorvastatin      Leg cramps       No current facility-administered medications on file prior to encounter.     Current Outpatient Medications on File Prior to Encounter   Medication Sig    furosemide (LASIX) 20 MG tablet Take 1 tablet (20 mg total) by mouth once daily. Hold until PCP follow up    metoprolol succinate (TOPROL-XL) 25 MG 24 hr tablet Take 1 tablet (25 mg total) by mouth once daily.    sacubitriL-valsartan (ENTRESTO) 24-26 mg per tablet Take 1 tablet by mouth 2 (two) times daily.    spironolactone (ALDACTONE) 25 MG tablet Take 1 tablet (25 mg total) by mouth once daily.    albuterol-ipratropium (DUO-NEB) 2.5 mg-0.5 mg/3 mL nebulizer solution Take 3 mLs by nebulization every 6 (six) hours as needed for Wheezing. Rescue    aspirin 81 MG Chew Chew and swallow 1 tablet (81 mg total) by mouth once daily.    atorvastatin (LIPITOR) 40 MG tablet Take 1 tablet (40 mg total) by mouth every evening. (Patient not taking: Reported on 1/11/2022)    calcium carbonate (TUMS) 200 mg calcium (500 mg) chewable tablet Take 1 tablet by mouth as needed for Heartburn.     fluticasone (FLONASE) 50 mcg/actuation nasal spray 1 spray by Each Nare route daily as needed for Allergies.    fluticasone furoate-vilanteroL (BREO ELLIPTA) 100-25 mcg/dose diskus inhaler Inhale 1 puff into the lungs once daily. Controller     Family History       Problem Relation (Age of Onset)    Colon cancer Father, Brother (63)    Dementia Father    Diabetes Daughter    Heart attack Mother    Heart disease Mother, Brother    Hypertension Mother, Father, Brother          Tobacco Use    Smoking status: Former Smoker     Packs/day: 0.50     Quit date: 6/14/2018     Years since quitting: 3.8    Smokeless tobacco: Never Used   Substance and Sexual Activity    Alcohol use: Yes    Drug use: No    Sexual activity: Not on file     Review of Systems   Constitutional:  Negative for chills and fever.   HENT:  Negative for congestion and sore throat.    Respiratory:  Positive for cough. Negative for shortness of breath.    Cardiovascular:  Negative for chest pain, palpitations and leg swelling.   Gastrointestinal:  Positive for abdominal pain, diarrhea, nausea and vomiting (bilious\). Negative for blood in stool.   Genitourinary:  Negative for dysuria and frequency.   Musculoskeletal:  Negative for arthralgias and back pain.   Skin:  Negative for rash and wound.   Neurological:  Negative for dizziness and headaches.   Psychiatric/Behavioral:  Negative for agitation and confusion.    Objective:     Vital Signs (Most Recent):  Temp: 98.1 °F (36.7 °C) (05/01/22 1824)  Pulse: 78 (05/01/22 2250)  Resp: 16 (05/01/22 2250)  BP: 116/78 (05/01/22 2250)  SpO2: (!) 94 % (05/01/22 2250)   Vital Signs (24h Range):  Temp:  [98.1 °F (36.7 °C)] 98.1 °F (36.7 °C)  Pulse:  [78-99] 78  Resp:  [16-24] 16  SpO2:  [92 %-96 %] 94 %  BP: (116-149)/(70-86) 116/78     Weight: 66.2 kg (146 lb)  Body mass index is 27.59 kg/m².    Physical Exam  Vitals reviewed.   Constitutional:       General: She is not in acute distress.     Appearance:  Normal appearance.   HENT:      Head: Normocephalic and atraumatic.      Mouth/Throat:      Mouth: Mucous membranes are dry.      Pharynx: Oropharynx is clear. No oropharyngeal exudate.   Eyes:      Extraocular Movements: Extraocular movements intact.      Pupils: Pupils are equal, round, and reactive to light.   Cardiovascular:      Rate and Rhythm: Normal rate and regular rhythm.      Pulses: Normal pulses.      Heart sounds: Normal heart sounds. No murmur heard.    No friction rub. No gallop.      Comments: Pt appears volume down on exam.  Pulmonary:      Effort: Pulmonary effort is normal. No respiratory distress.      Breath sounds: Normal breath sounds. No wheezing, rhonchi or rales.   Abdominal:      General: Abdomen is flat.      Tenderness: There is no abdominal tenderness.   Musculoskeletal:         General: No swelling. Normal range of motion.      Cervical back: Normal range of motion and neck supple.      Right lower leg: No edema.      Left lower leg: No edema.   Skin:     General: Skin is warm and dry.   Neurological:      General: No focal deficit present.      Mental Status: She is alert and oriented to person, place, and time.   Psychiatric:         Mood and Affect: Mood normal.         Behavior: Behavior normal.         CRANIAL NERVES     CN III, IV, VI   Pupils are equal, round, and reactive to light.     Significant Labs: All pertinent labs within the past 24 hours have been reviewed.  CBC:   Recent Labs   Lab 05/01/22 1902   WBC 3.89*   HGB 12.1   HCT 38.6        CMP:   Recent Labs   Lab 05/01/22 1902      K 4.0      CO2 25      BUN 19   CREATININE 1.1   CALCIUM 10.0   PROT 7.5   ALBUMIN 3.5   BILITOT 0.3   ALKPHOS 100   AST 15   ALT 9*   ANIONGAP 14   EGFRNONAA 49.6*     Cardiac Markers:   Recent Labs   Lab 05/01/22 1902   BNP 2,135*     Coagulation:   Recent Labs   Lab 05/01/22  2243   INR 1.1   APTT 26.2     Lactic Acid:   Recent Labs   Lab 05/01/22 1902    LACTATE 1.1     Lipase:   Recent Labs   Lab 05/01/22  1902   LIPASE 28     Urine Studies:   Recent Labs   Lab 05/01/22 2033   COLORU Yellow   APPEARANCEUA Hazy*   PHUR 5.0   SPECGRAV 1.025   PROTEINUA 1+*   GLUCUA Negative   KETONESU 1+*   BILIRUBINUA Negative   OCCULTUA 1+*   NITRITE Negative   LEUKOCYTESUR Negative   RBCUA 2   WBCUA 2   BACTERIA None   SQUAMEPITHEL 1   HYALINECASTS 0       Significant Imaging: I have reviewed all pertinent imaging results/findings within the past 24 hours.    X-Ray Chest AP Portable   Final Result      1. Similar appearing chronic interstitial findings, correlation is advised to exclude early change of viral process.         Electronically signed by: Roberto Krueger MD   Date:    05/01/2022   Time:    19:38            Assessment/Plan:     * COVID-19  76-year-old female with past medical history of combined systolic and diastolic heart failure (left ventricular EF 15% on 12/2022), hypertension, presumptive COPD, iron deficiency anemia who admits for symptomatic covid. Covid positive on admission. Patient stable on admission, HDS, afebrile. MAGALIS, though does become hypoxic with ambulation. Inflammatory markers elevated on admit. Cr at baseline, no leukocytosis. Will admit overnight for monitoring of O2 status and continued management.     Plan:  -- remdesivir 100 qd x5 days  -- dexamethasone 6mg qd  -- vitamin C supplementation  -- holding off on abx at this time as low suspicion for superimposed bacterial PNA  -- q48 hour inflammatory markers  -- supplemental O2 as needed      Chronic combined systolic and diastolic congestive heart failure  Patient is identified as having Combined Systolic and Diastolic heart failure that is Chronic. CHF is currently controlled. Latest ECHO performed and demonstrates- Results for orders placed during the hospital encounter of 12/08/21    Echo    Interpretation Summary  · The left ventricle is normal in size with moderate eccentric hypertrophy  and severely decreased systolic function.  · There is severe left ventricular global hypokinesis. The estimated ejection fraction is 15%.  · Left ventricular diastolic dysfunction.  · Normal right ventricular size with normal right ventricular systolic function.  · Normal central venous pressure (3 mmHg).  · There is no significant tricuspid regurgitation and therefore the pulmonary artery systolic pressure cannot be reported.  . Continue Beta Blocker, Furosemide, Aldactone and ARNI and monitor clinical status closely. Monitor on telemetry. Patient is on CHF pathway.  Monitor strict Is&Os and daily weights.  Place on fluid restriction of 1.5 L. Continue to stress to patient importance of self efficacy and  on diet for CHF. Last BNP reviewed- and noted below   Recent Labs   Lab 05/01/22  1902   BNP 2,135*     BNP elevated, though pt appears overall volume down on exam. Respiratory failure likely 2/2 COVID pneumonia, and less likely HFE.   -- strict I/Os  -- daily weights  -- continue GDMT            Chronic respiratory failure with hypoxia  Pt with chronic respiratory failure, presumptive COPD. Not on home oxygen. Uses albuterol, breo inhaler, duo-nebs.   -- prn albuterol given active covid infection    Iron deficiency anemia  hgb stable on admit, 12.1.   -- daily CBC  -- transfuse for Hgb < 7      Hypertension, essential  Pt with hx of HTN controlled with GDMT: entresto, furosemide, spirinolactone, metoprolol. Pt normotensive on arrival to ED.   -- continue with GDMT. Low threshold to hold medications of pt develops soft pressures        VTE Risk Mitigation (From admission, onward)         Ordered     enoxaparin injection 70 mg  2 times daily         05/01/22 2155     IP VTE HIGH RISK PATIENT  Once         05/01/22 2157     Place sequential compression device  Until discontinued         05/01/22 2151                   Sabino Ibanez MD   Internal Medicine PGY-1  Department of Hospital Medicine   [facility] -  Emergency Dept

## 2022-05-02 NOTE — NURSING
Patient notified nurse that she was having moderate crushing chest pain and vomiting. MD notified, order given for nausea, trop and ekg ordered. Patient feeling better now, cp subsided and nausea better. Will continue to monitor. Vss, NSR.

## 2022-05-02 NOTE — HPI
76-year-old female with past medical history of combined systolic and diastolic heart failure (left ventricular EF 15% on 12/2022), hypertension, presumptive COPD, iron deficiency anemia who presents with complaint of cough, fever, chills in the setting of known COVID exposure and positive home COVID test.  Patient reports her granddaughter return from a trip abroad for spring break a couple of weeks ago.  She ended up having symptomatic COVID, and spread to the rest of the family.  Both the patient, her , and her daughter all currently have positive COVID.  [facility]  Patient reports initial symptoms of cough with thick mucus production as well as fevers and chills.  She initially started to feel better, however a couple of days ago developed sudden-onset abdominal cramping, watery diarrhea, nausea, and bilious vomiting.  Patient has not been able to keep hardly anything down for the past day or 2. Her GI symptoms are what caused her to finally presents to the ED for evaluation. Patient denies any LE swelling, worsening dyspnea, orthopnea, PND. Pt is compliant with all her medications.     In the ED, pt HDS, afebrile, HR 90s, /78. 94% on RA, 92% with ambulation. COVID positive in ED. CBC without leukocytosis or anemia. Cr at baseline, electrolytes wnl. Inflammatory markers elevated, (CRP 44, , ). BNP 2135. Pt hypoxic with ambulation so admitted to medicine for management of COVID.            Implemented All Universal Safety Interventions:  [place] to call system. Call bell, personal items and telephone within reach. Instruct patient to call for assistance. Room bathroom lighting operational. Non-slip footwear when patient is off stretcher. Physically safe environment: no spills, clutter or unnecessary equipment. Stretcher in lowest position, wheels locked, appropriate side rails in place.

## 2022-05-02 NOTE — ASSESSMENT & PLAN NOTE
Pt with hx of HTN controlled with GDMT: entresto, furosemide, spirinolactone, metoprolol. Pt normotensive on arrival to ED.   -- continue with GDMT. Low threshold to hold medications of pt develops soft pressures

## 2022-05-02 NOTE — ASSESSMENT & PLAN NOTE
Pt with chronic respiratory failure, presumptive COPD. Not on home oxygen. Uses albuterol, breo inhaler, duo-nebs.   -- prn albuterol given active covid infection

## 2022-05-02 NOTE — PLAN OF CARE
Problem: Adult Inpatient Plan of Care  Goal: Plan of Care Review  5/2/2022 1855 by Ayanna Modi RN  Outcome: Ongoing, Progressing  5/2/2022 1854 by Ayanna Modi RN  Outcome: Ongoing, Progressing  Goal: Absence of Hospital-Acquired Illness or Injury  5/2/2022 1855 by Ayanna Modi RN  Outcome: Ongoing, Progressing  5/2/2022 1854 by Ayanna Modi RN  Outcome: Ongoing, Progressing  Goal: Optimal Comfort and Wellbeing  5/2/2022 1855 by Ayanna Modi RN  Outcome: Ongoing, Progressing  5/2/2022 1854 by Ayanna Modi RN  Outcome: Ongoing, Progressing     Problem: Fluid Imbalance (Pneumonia)  Goal: Fluid Balance  Outcome: Ongoing, Progressing     Problem: Infection (Pneumonia)  Goal: Resolution of Infection Signs and Symptoms  Outcome: Ongoing, Progressing     Problem: Respiratory Compromise (Pneumonia)  Goal: Effective Oxygenation and Ventilation  Outcome: Ongoing, Progressing     Problem: Adult Inpatient Plan of Care  Goal: Readiness for Transition of Care  5/2/2022 1855 by Ayanna Modi RN  Outcome: Ongoing, Not Progressing  5/2/2022 1854 by Ayanna Modi RN  Outcome: Ongoing, Not Progressing

## 2022-05-03 ENCOUNTER — PATIENT MESSAGE (OUTPATIENT)
Dept: ADMINISTRATIVE | Facility: CLINIC | Age: 77
End: 2022-05-03
Payer: COMMERCIAL

## 2022-05-03 VITALS
HEIGHT: 62 IN | TEMPERATURE: 98 F | BODY MASS INDEX: 26.87 KG/M2 | OXYGEN SATURATION: 96 % | DIASTOLIC BLOOD PRESSURE: 56 MMHG | RESPIRATION RATE: 20 BRPM | HEART RATE: 89 BPM | SYSTOLIC BLOOD PRESSURE: 102 MMHG | WEIGHT: 146 LBS

## 2022-05-03 LAB
ALBUMIN SERPL BCP-MCNC: 3 G/DL (ref 3.5–5.2)
ALP SERPL-CCNC: 85 U/L (ref 55–135)
ALT SERPL W/O P-5'-P-CCNC: 9 U/L (ref 10–44)
ANION GAP SERPL CALC-SCNC: 8 MMOL/L (ref 8–16)
AST SERPL-CCNC: 14 U/L (ref 10–40)
BASOPHILS # BLD AUTO: 0 K/UL (ref 0–0.2)
BASOPHILS NFR BLD: 0 % (ref 0–1.9)
BILIRUB SERPL-MCNC: 0.3 MG/DL (ref 0.1–1)
BUN SERPL-MCNC: 36 MG/DL (ref 8–23)
CALCIUM SERPL-MCNC: 9.3 MG/DL (ref 8.7–10.5)
CHLORIDE SERPL-SCNC: 106 MMOL/L (ref 95–110)
CO2 SERPL-SCNC: 28 MMOL/L (ref 23–29)
CREAT SERPL-MCNC: 0.8 MG/DL (ref 0.5–1.4)
DIFFERENTIAL METHOD: ABNORMAL
EOSINOPHIL # BLD AUTO: 0 K/UL (ref 0–0.5)
EOSINOPHIL NFR BLD: 0 % (ref 0–8)
ERYTHROCYTE [DISTWIDTH] IN BLOOD BY AUTOMATED COUNT: 13.2 % (ref 11.5–14.5)
EST. GFR  (AFRICAN AMERICAN): >60 ML/MIN/1.73 M^2
EST. GFR  (NON AFRICAN AMERICAN): >60 ML/MIN/1.73 M^2
GLUCOSE SERPL-MCNC: 117 MG/DL (ref 70–110)
HCT VFR BLD AUTO: 37.5 % (ref 37–48.5)
HGB BLD-MCNC: 11.3 G/DL (ref 12–16)
IMM GRANULOCYTES # BLD AUTO: 0 K/UL (ref 0–0.04)
IMM GRANULOCYTES NFR BLD AUTO: 0 % (ref 0–0.5)
LYMPHOCYTES # BLD AUTO: 0.6 K/UL (ref 1–4.8)
LYMPHOCYTES NFR BLD: 25.8 % (ref 18–48)
MAGNESIUM SERPL-MCNC: 2.1 MG/DL (ref 1.6–2.6)
MCH RBC QN AUTO: 23.8 PG (ref 27–31)
MCHC RBC AUTO-ENTMCNC: 30.1 G/DL (ref 32–36)
MCV RBC AUTO: 79 FL (ref 82–98)
MONOCYTES # BLD AUTO: 0.1 K/UL (ref 0.3–1)
MONOCYTES NFR BLD: 2.8 % (ref 4–15)
NEUTROPHILS # BLD AUTO: 1.8 K/UL (ref 1.8–7.7)
NEUTROPHILS NFR BLD: 71.4 % (ref 38–73)
NRBC BLD-RTO: 0 /100 WBC
PHOSPHATE SERPL-MCNC: 3.7 MG/DL (ref 2.7–4.5)
PLATELET # BLD AUTO: 242 K/UL (ref 150–450)
PMV BLD AUTO: 11.1 FL (ref 9.2–12.9)
POTASSIUM SERPL-SCNC: 4.2 MMOL/L (ref 3.5–5.1)
PROT SERPL-MCNC: 6.5 G/DL (ref 6–8.4)
RBC # BLD AUTO: 4.75 M/UL (ref 4–5.4)
SODIUM SERPL-SCNC: 142 MMOL/L (ref 136–145)
WBC # BLD AUTO: 2.48 K/UL (ref 3.9–12.7)

## 2022-05-03 PROCEDURE — 99239 HOSP IP/OBS DSCHRG MGMT >30: CPT | Mod: ,,, | Performed by: STUDENT IN AN ORGANIZED HEALTH CARE EDUCATION/TRAINING PROGRAM

## 2022-05-03 PROCEDURE — 25000003 PHARM REV CODE 250

## 2022-05-03 PROCEDURE — 99900035 HC TECH TIME PER 15 MIN (STAT)

## 2022-05-03 PROCEDURE — 99239 PR HOSPITAL DISCHARGE DAY,>30 MIN: ICD-10-PCS | Mod: ,,, | Performed by: STUDENT IN AN ORGANIZED HEALTH CARE EDUCATION/TRAINING PROGRAM

## 2022-05-03 PROCEDURE — 83735 ASSAY OF MAGNESIUM: CPT | Performed by: STUDENT IN AN ORGANIZED HEALTH CARE EDUCATION/TRAINING PROGRAM

## 2022-05-03 PROCEDURE — 94761 N-INVAS EAR/PLS OXIMETRY MLT: CPT

## 2022-05-03 PROCEDURE — 36415 COLL VENOUS BLD VENIPUNCTURE: CPT | Performed by: STUDENT IN AN ORGANIZED HEALTH CARE EDUCATION/TRAINING PROGRAM

## 2022-05-03 PROCEDURE — 85025 COMPLETE CBC W/AUTO DIFF WBC: CPT | Performed by: STUDENT IN AN ORGANIZED HEALTH CARE EDUCATION/TRAINING PROGRAM

## 2022-05-03 PROCEDURE — 27100098 HC SPACER

## 2022-05-03 PROCEDURE — 80053 COMPREHEN METABOLIC PANEL: CPT | Performed by: STUDENT IN AN ORGANIZED HEALTH CARE EDUCATION/TRAINING PROGRAM

## 2022-05-03 PROCEDURE — 84100 ASSAY OF PHOSPHORUS: CPT | Performed by: STUDENT IN AN ORGANIZED HEALTH CARE EDUCATION/TRAINING PROGRAM

## 2022-05-03 PROCEDURE — 94640 AIRWAY INHALATION TREATMENT: CPT

## 2022-05-03 PROCEDURE — 63600175 PHARM REV CODE 636 W HCPCS

## 2022-05-03 PROCEDURE — 27000221 HC OXYGEN, UP TO 24 HOURS

## 2022-05-03 RX ADMIN — THERA TABS 1 TABLET: TAB at 08:05

## 2022-05-03 RX ADMIN — ALBUTEROL SULFATE 2 PUFF: 108 INHALANT RESPIRATORY (INHALATION) at 12:05

## 2022-05-03 RX ADMIN — ASPIRIN 81 MG CHEWABLE TABLET 81 MG: 81 TABLET CHEWABLE at 08:05

## 2022-05-03 RX ADMIN — ALBUTEROL SULFATE 2 PUFF: 108 INHALANT RESPIRATORY (INHALATION) at 02:05

## 2022-05-03 RX ADMIN — ALBUTEROL SULFATE 2 PUFF: 108 INHALANT RESPIRATORY (INHALATION) at 08:05

## 2022-05-03 RX ADMIN — ENOXAPARIN SODIUM 70 MG: 80 INJECTION SUBCUTANEOUS at 08:05

## 2022-05-03 RX ADMIN — Medication 500 MG: at 08:05

## 2022-05-03 NOTE — NURSING
Patient DCed via WC; belongings with patient. Patient in stable condition. Telemetry removed and returned to nursing station.

## 2022-05-03 NOTE — PLAN OF CARE
[facility] scheduled Hospital follow up with patient's PCP. In AVS    Noni Griffith, [facility] [place] [facility]  [phone]

## 2022-05-03 NOTE — NURSING
Bedside shift report received and care assumed. POC and verbalized understanding. Pt able to verbalize needs and pain and denies both throughout shift. Pt denies CP and nausea at this time. 1L nc placed on pt for desat to 88% while resting in bed. Pt currently resting quietly in bed with eyes closed. Resp even and non labored. Safety intact. Bed in low position. Call light in reach. Sr up x2. Will cont to monitor and eval throughout shift.

## 2022-05-03 NOTE — NURSING
Pt noted with relatively uneventful shift over night. Despite placing pt on 1L NC at the beginning of the shift and was able to wean to Ra. Pt slept most of the night. Denies cp and nausea and abd pain. Pt left sitting up in room talking on phone this am. Safety intact.

## 2022-05-03 NOTE — NURSING
Home Oxygen Evaluation    Date Performed: 5/3/2022    1) Patient's Home O2 Sat on room air, while at rest: 95%        If O2 sats on room air at rest are 88% or below, patient qualifies. No additional testing needed. Document N/A in steps 2 and 3. If 89% or above, complete steps 2.      2) Patient's O2 Sat on room air while exercisin%        If O2 sats on room air while exercising remain 89% or above patient does not qualify, no further testing needed Document N/A in step 3. If O2 sats on room air while exercising are 88% or below, continue to step 3.      3) Patient's O2 Sat while exercising on O2:          (Must show improvement from #2 for patients to qualify)    If O2 sats improve on oxygen, patient qualifies for portable oxygen. If not, the patient does not qualify.

## 2022-05-03 NOTE — NURSING
DC information reviewed with patient; verbalized understanding. IV DCed; patient tolerated well. Rx medications given to patient. Patient awaiting daughter to arrive for discharge.

## 2022-05-04 ENCOUNTER — NURSE TRIAGE (OUTPATIENT)
Dept: ADMINISTRATIVE | Facility: CLINIC | Age: 77
End: 2022-05-04
Payer: COMMERCIAL

## 2022-05-04 ENCOUNTER — TELEPHONE (OUTPATIENT)
Dept: INTERNAL MEDICINE | Facility: CLINIC | Age: 77
End: 2022-05-04
Payer: COMMERCIAL

## 2022-05-04 NOTE — TELEPHONE ENCOUNTER
Spoke to pt she stated she was discharged from the hospital yesterday and pt notes states to discontinue lasix and spironolactone pt would like to know why should she stop these meds please advise

## 2022-05-04 NOTE — TELEPHONE ENCOUNTER
1st enrollment call attempted with contact made. Pt requesting call back for enrollment later today. Will call pt back at 3pm. No further needs at this time    Reason for Disposition   Information only question and nurse able to answer    Protocols used: INFORMATION ONLY CALL - NO TRIAGE-A-[place]

## 2022-05-04 NOTE — PLAN OF CARE
[facility] - Intensive Care ([facility]  Discharge Final Note    Primary Care Provider: Phil Quintana MD  Expected Discharge Date: 5/3/2022    Patient medically ready for discharge to home with family.  Nurse can call report to n/a.  Transportation yes per family.   Is family/patient aware of discharge yes - patient.  Hospital follow up scheduled, yes, in AVS.  Final Discharge Note (most recent)       Final Note - 05/04/22 0825          Final Note    Assessment Type Final Discharge Note (P)      Anticipated Discharge Disposition Home or Self Care (P)      What phone number can be called within the next 1-3 days to see how you are doing after discharge? [phone] (P)      Hospital Resources/Appts/Education Provided Appointments scheduled and added to AVS (P)         Post-Acute Status    Post-Acute Authorization Other (P)      Coverage [employer]BS (P)      Other Status No Post-Acute Service Needs (P)      Discharge Delays None known at this time (P)                      Important Message from Medicare         Referral Info (most recent)       Referral Info    No documentation.                   Future Appointments   Date Time Provider Department Center   5/17/2022 10:40 AM Phil Quintana MD [facility] IM [facility]   8/18/2022 10:40 AM Phil Quintana MD [facility] IM [facility]  Case Management  Ext: [phone]  05/04/2022

## 2022-05-04 NOTE — TELEPHONE ENCOUNTER
2nd enrollment call attempted with. Contact made. Pt opted for [facility] program. No further needs at this time.    Reason for Disposition   Information only question and nurse able to answer    Protocols used: INFORMATION ONLY CALL - NO TRIAGE-A-OH

## 2022-05-06 ENCOUNTER — TELEPHONE (OUTPATIENT)
Dept: ADMINISTRATIVE | Facility: CLINIC | Age: 77
End: 2022-05-06
Payer: COMMERCIAL

## 2022-05-06 NOTE — TELEPHONE ENCOUNTER
Patient called about Lasix, Spirolactone that was discontinued and she have to take this to prevent fluid buildup. Patient states she contacted PCP and spoke with his nurse and waiting on him to review records and call her back. I will also forward this to PCP. No other questions or concerns.

## 2022-05-06 NOTE — SUBJECTIVE & OBJECTIVE
Interval History:       Review of Systems   Constitutional:  Negative for chills and fever.   HENT:  Negative for congestion and sore throat.    Respiratory:  Positive for cough. Negative for shortness of breath.    Cardiovascular:  Negative for chest pain, palpitations and leg swelling.   Gastrointestinal:  Positive for abdominal pain, diarrhea, nausea and vomiting (bilious\). Negative for blood in stool.   Genitourinary:  Negative for dysuria and frequency.   Musculoskeletal:  Negative for arthralgias and back pain.   Skin:  Negative for rash and wound.   Neurological:  Negative for dizziness and headaches.   Psychiatric/Behavioral:  Negative for agitation and confusion.    Objective:     Vital Signs (Most Recent):  Temp: 97.6 °F (36.4 °C) (05/03/22 1227)  Pulse: 89 (05/03/22 1522)  Resp: 20 (05/03/22 1409)  BP: (!) 102/56 (05/03/22 1227)  SpO2: 96 % (05/03/22 1522)   Vital Signs (24h Range):        Weight: 66.2 kg (146 lb)  Body mass index is 26.7 kg/m².  No intake or output data in the 24 hours ending 05/06/22 1817   Physical Exam  Vitals reviewed.   Constitutional:       General: She is not in acute distress.     Appearance: Normal appearance.   HENT:      Head: Normocephalic and atraumatic.      Mouth/Throat:      Mouth: Mucous membranes are dry.      Pharynx: Oropharynx is clear. No oropharyngeal exudate.   Eyes:      Extraocular Movements: Extraocular movements intact.      Pupils: Pupils are equal, round, and reactive to light.   Cardiovascular:      Rate and Rhythm: Normal rate and regular rhythm.      Pulses: Normal pulses.      Heart sounds: Normal heart sounds. No murmur heard.    No friction rub. No gallop.      Comments: Pt appears volume down on exam.  Pulmonary:      Effort: Pulmonary effort is normal. No respiratory distress.      Breath sounds: Normal breath sounds. No wheezing, rhonchi or rales.   Abdominal:      General: Abdomen is flat.      Tenderness: There is no abdominal tenderness.    Musculoskeletal:         General: No swelling. Normal range of motion.      Cervical back: Normal range of motion and neck supple.      Right lower leg: No edema.      Left lower leg: No edema.   Skin:     General: Skin is warm and dry.   Neurological:      General: No focal deficit present.      Mental Status: She is alert and oriented to person, place, and time.   Psychiatric:         Mood and Affect: Mood normal.         Behavior: Behavior normal.       Significant Labs: All pertinent labs within the past 24 hours have been reviewed.    Significant Imaging: I have reviewed all pertinent imaging results/findings within the past 24 hours.

## 2022-05-06 NOTE — DISCHARGE SUMMARY
[facility] - Intensive Care ([place] [id number])  [facility] Medicine  Discharge Summary      Patient Name: Selma Hooper  MRN: [id number]  Patient Class: IP- Inpatient  Admission Date: 5/1/2022  Hospital Length of Stay: 2 days  Discharge Date and Time: 5/3/2022  6:24 PM  Attending Physician: No att. providers found   Discharging Provider: Kaitlyn Pratt MD  Primary Care Provider: Phil Quintana MD  [facility] Team: [facility] Kaitlyn Pratt MD    HPI:   76-year-old female with past medical history of combined systolic and diastolic heart failure (left ventricular EF 15% on 12/2022), hypertension, presumptive COPD, iron deficiency anemia who presents with complaint of cough, fever, chills in the setting of known COVID exposure and positive home COVID test.  Patient reports her granddaughter return from a trip [place] for spring [place] a couple of weeks ago.  She ended up having symptomatic COVID, and spread to the rest of the family.  Both the patient, her , and her daughter all currently have positive COVID.  [facility]  Patient reports initial symptoms of cough with thick mucus production as well as fevers and chills.  She initially started to feel better, however a couple of days ago developed sudden-onset abdominal cramping, watery diarrhea, nausea, and bilious vomiting.  Patient has not been able to keep hardly anything down for the past day or 2. Her GI symptoms are what caused her to finally presents to the ED for evaluation. Patient denies any LE swelling, worsening dyspnea, orthopnea, PND. Pt is compliant with all her medications.     In the ED, pt HDS, afebrile, HR 90s, /78. 94% on RA, 92% with ambulation. COVID positive in ED. CBC without leukocytosis or anemia. Cr at baseline, electrolytes wnl. Inflammatory markers elevated, (CRP 44, , ). BNP 2135. Pt hypoxic with ambulation so admitted to medicine for management of  COVID.               * No surgery found *      Hospital Course:   Ms. Hooper was admitted to hospital medicine team 2 on 5/2/2022 for COVID-19 virus infection and hypoxemia with ambulation. Patient stable on admission, HDS, afebrile. Inflammatory markers elevated on admit. Cr at baseline, no leukocytosis. Throughout admission, pt remained stable on her home oxygen of 2-3L NC. Pt developed nausea and vomiting with Remdesivir infusion which she received twice. She also received dexamethasone. On 5/3, she was able to ambulate and maintain an SpO2 > 88% which recovered quickly with rest. Throughout her admission, she was borderline hypotensive likely secondary to poor PO intake, so her home lasix and spirinolactone were held. She had an uneventful hospital course absent of complications or worsening condition. She received supportive care throughout her hospital stay.    On the day of discharge, it was felt that the patient had gained maximum benefit from inpatient admission and that she could be safely discharged home. Return precautions are being provided as well as all pertinent follow up instructions and verbalized understanding. Patient is being instructed to take all medications as instructed. My recommendations include take all medications as prescribed as well as follow up with her PCP in 1 week. Return to local ED or see PCP should condition get worse or not resolve with therapy.     Discharge Condition:  Patient was discharged in improved clinical health without any incident for further complaint.       Review of Systems   Constitutional:  Negative for chills and fever.   HENT:  Negative for congestion and sore throat.    Respiratory:  Positive for cough. Negative for shortness of breath.    Cardiovascular:  Negative for chest pain, palpitations and leg swelling.   Gastrointestinal:  Positive for abdominal pain, diarrhea, nausea and vomiting (bilious\). Negative for blood in stool.   Genitourinary:  Negative for  dysuria and frequency.   Musculoskeletal:  Negative for arthralgias and back pain.   Skin:  Negative for rash and wound.   Neurological:  Negative for dizziness and headaches.   Psychiatric/Behavioral:  Negative for agitation and confusion.      Physical Exam  Vitals reviewed.   Constitutional:       General: She is not in acute distress.     Appearance: Normal appearance.   HENT:      Head: Normocephalic and atraumatic.      Mouth/Throat:      Mouth: Mucous membranes are dry.      Pharynx: Oropharynx is clear. No oropharyngeal exudate.   Eyes:      Extraocular Movements: Extraocular movements intact.      Pupils: Pupils are equal, round, and reactive to light.   Cardiovascular:      Rate and Rhythm: Normal rate and regular rhythm.      Pulses: Normal pulses.      Heart sounds: Normal heart sounds. No murmur heard.    No friction rub. No gallop.      Comments: Pt appears volume down on exam.  Pulmonary:      Effort: Pulmonary effort is normal. No respiratory distress.      Breath sounds: Normal breath sounds. No wheezing, rhonchi or rales.   Abdominal:      General: Abdomen is flat.      Tenderness: There is no abdominal tenderness.   Musculoskeletal:         General: No swelling. Normal range of motion.      Cervical back: Normal range of motion and neck supple.      Right lower leg: No edema.      Left lower leg: No edema.   Skin:     General: Skin is warm and dry.   Neurological:      General: No focal deficit present.      Mental Status: She is alert and oriented to person, place, and time.   Psychiatric:         Mood and Affect: Mood normal.         Behavior: Behavior normal.        Goals of Care Treatment Preferences:  Code Status: Full Code      Consults:     * COVID-19  76-year-old female with past medical history of combined systolic and diastolic heart failure (left ventricular EF 15% on 12/2022), hypertension, presumptive COPD, iron deficiency anemia who admits for symptomatic covid. Covid positive on  admission. Patient stable on admission, HDS, afebrile. MAGALIS, though does become hypoxic with ambulation. Inflammatory markers elevated on admit. Cr at baseline, no leukocytosis. Will admit overnight for monitoring of O2 status and continued management.     Plan:  -- remdesivir 100 qd x2 days, stopped 2/2 pt not meeting criteria for need for treatment with remdesivir and side effects.  -- dexamethasone 6mg qd while admitted  -- vitamin C supplementation  -- holding off on abx at this time as low suspicion for superimposed bacterial PNA  -- q48 hour inflammatory markers  -- supplemental O2 as needed        Final Active Diagnoses:    Diagnosis Date Noted POA    PRINCIPAL PROBLEM:  COVID-19 [U07.1] 05/01/2022 Unknown    Chronic combined systolic and diastolic congestive heart failure [I50.42] 05/01/2022 Unknown    Chronic respiratory failure with hypoxia [J96.11]  Yes     Chronic    Iron deficiency anemia [D50.9] 07/11/2018 Yes    Hypertension, essential [I10] 06/25/2015 Yes     Chronic      Problems Resolved During this Admission:       Discharged Condition: good    Disposition: Home or Self Care    Follow Up:   Follow-up Information     Phil Quintana MD Follow up.    Specialties: Family Medicine, Sports Medicine  Contact information:  [facility] [id number] [phone]                       Patient Instructions:      COVID-19 Surveillance Program     Order Specific Question Answer Comments   Does patient have a smartphone? No    Does patient have the [facility] on their smartphone? No    While in surveillance program, will patient be using home oxygen? Yes        Significant Diagnostic Studies: Labs: CMP No results for input(s): NA, K, CL, CO2, GLU, BUN, CREATININE, CALCIUM, PROT, ALBUMIN, BILITOT, ALKPHOS, AST, ALT, ANIONGAP, ESTGFRAFRICA, EGFRNONAA in the last 48 hours., CBC No results for input(s): WBC, HGB, HCT, PLT in the last 48 hours. and All labs within the past 24 hours have  been reviewed    Pending Diagnostic Studies:     None         Medications:  Reconciled Home Medications:      Medication List      START taking these medications    ONCOVITE tablet  Generic drug: multivitamin  Take 1 tablet by mouth once daily.     * pulse oximeter device  Commonly known as: pulse oximeter  by Apply Externally route 2 (two) times a day. Use twice daily at 8 AM and 3 PM and record the value in MyChart as directed.     * pulse oximeter device  Commonly known as: pulse oximeter  by Apply Externally route 2 (two) times a day. Use twice daily at 8 AM and 3 PM and record the value in MyChart as directed.     VITAMIN C 500 MG tablet  Generic drug: ascorbic acid (vitamin C)  Take 1 tablet (500 mg total) by mouth 2 (two) times daily. for 10 days         * This list has 2 medication(s) that are the same as other medications prescribed for you. Read the directions carefully, and ask your doctor or other care provider to review them with you.            CONTINUE taking these medications    ENTRESTO 24-26 mg per tablet  Generic drug: sacubitriL-valsartan  Take 1 tablet by mouth 2 (two) times daily.     metoprolol succinate 25 MG 24 hr tablet  Commonly known as: TOPROL-XL  Take 1 tablet (25 mg total) by mouth once daily.        STOP taking these medications    furosemide 20 MG tablet  Commonly known as: LASIX     spironolactone 25 MG tablet  Commonly known as: ALDACTONE            Indwelling Lines/Drains at time of discharge:   Lines/Drains/Airways     None                 Time spent on the discharge of patient: 35 minutes         Kaitlyn Pratt MD  Department of Hospital Medicine  [facility] - Intensive Care ([place]

## 2022-05-06 NOTE — HOSPITAL COURSE
Ms. Hooper was admitted to hospital medicine team 2 on 5/2/2022 for COVID-19 virus infection and hypoxemia with ambulation. Patient stable on admission, HDS, afebrile. Inflammatory markers elevated on admit. Cr at baseline, no leukocytosis. Throughout admission, pt remained stable on her home oxygen of 2-3L NC. Pt developed nausea and vomiting with Remdesivir infusion which she received twice. She also received dexamethasone. On 5/3, she was able to ambulate and maintain an SpO2 > 88% which recovered quickly with rest. Throughout her admission, she was borderline hypotensive likely secondary to poor PO intake, so her home lasix and spirinolactone were held. She had an uneventful hospital course absent of complications or worsening condition. She received supportive care throughout her hospital stay.    On the day of discharge, it was felt that the patient had gained maximum benefit from inpatient admission and that she could be safely discharged home. Return precautions are being provided as well as all pertinent follow up instructions and verbalized understanding. Patient is being instructed to take all medications as instructed. My recommendations include take all medications as prescribed as well as follow up with her PCP in 1 week. Return to local ED or see PCP should condition get worse or not resolve with therapy.     Discharge Condition:  Patient was discharged in improved clinical health without any incident for further complaint.       Review of Systems   Constitutional:  Negative for chills and fever.   HENT:  Negative for congestion and sore throat.    Respiratory:  Positive for cough. Negative for shortness of breath.    Cardiovascular:  Negative for chest pain, palpitations and leg swelling.   Gastrointestinal:  Positive for abdominal pain, diarrhea, nausea and vomiting (bilious\). Negative for blood in stool.   Genitourinary:  Negative for dysuria and frequency.   Musculoskeletal:  Negative for  arthralgias and back pain.   Skin:  Negative for rash and wound.   Neurological:  Negative for dizziness and headaches.   Psychiatric/Behavioral:  Negative for agitation and confusion.      Physical Exam  Vitals reviewed.   Constitutional:       General: She is not in acute distress.     Appearance: Normal appearance.   HENT:      Head: Normocephalic and atraumatic.      Mouth/Throat:      Mouth: Mucous membranes are dry.      Pharynx: Oropharynx is clear. No oropharyngeal exudate.   Eyes:      Extraocular Movements: Extraocular movements intact.      Pupils: Pupils are equal, round, and reactive to light.   Cardiovascular:      Rate and Rhythm: Normal rate and regular rhythm.      Pulses: Normal pulses.      Heart sounds: Normal heart sounds. No murmur heard.    No friction rub. No gallop.      Comments: Pt appears volume down on exam.  Pulmonary:      Effort: Pulmonary effort is normal. No respiratory distress.      Breath sounds: Normal breath sounds. No wheezing, rhonchi or rales.   Abdominal:      General: Abdomen is flat.      Tenderness: There is no abdominal tenderness.   Musculoskeletal:         General: No swelling. Normal range of motion.      Cervical back: Normal range of motion and neck supple.      Right lower leg: No edema.      Left lower leg: No edema.   Skin:     General: Skin is warm and dry.   Neurological:      General: No focal deficit present.      Mental Status: She is alert and oriented to person, place, and time.   Psychiatric:         Mood and Affect: Mood normal.         Behavior: Behavior normal.

## 2022-05-09 ENCOUNTER — NURSE TRIAGE (OUTPATIENT)
Dept: ADMINISTRATIVE | Facility: CLINIC | Age: 77
End: 2022-05-09
Payer: COMMERCIAL

## 2022-05-09 NOTE — TELEPHONE ENCOUNTER
Spoke with pt who reports that her o2 level is currently 94%, states unsure what her baseline is. Sates that she is having mild SOB but not new onset, states that she last used albuterol inhaler around 9 am.    Spoke with Uma PACHECO who advised pt to be seen in ED.    Called pt back, and informed.Pt declined advise for ED.     Reason for Disposition   Oxygen level (e.g., pulse oximetry) 91 to 94 percent    Additional Information   Negative: SEVERE difficulty breathing (e.g., struggling for each breath, speaks in single words, pulse > 120)   Negative: Bluish (or gray) lips or face now   Negative: Difficult to awaken or acting confused (e.g., disoriented, slurred speech)   Negative: Slow, shallow and weak breathing   Negative: Sounds like a life-threatening emergency to the triager   Negative: [1] MODERATE difficulty breathing (e.g., speaks in phrases, SOB even at rest, pulse 100 - 120) AND [2] new-onset or worse than normal   Negative: [1] MODERATE difficulty breathing AND [2] oxygen level (e.g., pulse oximetry) 91 to 94 percent   Negative: Oxygen level (e.g., pulse oximetry) 90 percent or lower   Negative: Patient sounds very sick or weak to the triager   Negative: [1] MODERATE difficulty breathing (e.g., speaks in phrases, SOB even at rest, pulse 100 - 120) AND [2] NOT new-onset or worse than normal   Negative: [1] Drinking very little AND [2] dehydration suspected (e.g., no urine > 12 hours, very dry mouth, very lightheaded)   Negative: [1] MILD difficulty breathing (e.g., minimal/no SOB at rest, SOB with walking, pulse <100) AND [2] new-onset   Negative: Fever > 100.4 F (38.0 C)   Negative: Nurse judgment   Negative: [1] Fall in oxygen level 4% or more (below known patient baseline, while awake and resting) AND [2] new or worse difficulty breathing    Protocols used: OXYGEN MONITORING AND [id number]

## 2022-05-17 ENCOUNTER — HOSPITAL ENCOUNTER (OUTPATIENT)
Dept: RADIOLOGY | Facility: HOSPITAL | Age: 77
Discharge: HOME | End: 2022-05-17
Attending: FAMILY MEDICINE
Payer: COMMERCIAL

## 2022-05-17 ENCOUNTER — OFFICE VISIT (OUTPATIENT)
Dept: INTERNAL MEDICINE | Facility: CLINIC | Age: 77
End: 2022-05-17
Attending: FAMILY MEDICINE
Payer: COMMERCIAL

## 2022-05-17 VITALS
SYSTOLIC BLOOD PRESSURE: 110 MMHG | WEIGHT: 141.75 LBS | OXYGEN SATURATION: 97 % | BODY MASS INDEX: 26.09 KG/M2 | DIASTOLIC BLOOD PRESSURE: 64 MMHG | HEART RATE: 98 BPM | HEIGHT: 62 IN

## 2022-05-17 DIAGNOSIS — R91.1 SOLITARY PULMONARY NODULE: ICD-10-CM

## 2022-05-17 DIAGNOSIS — R06.02 SHORTNESS OF BREATH: ICD-10-CM

## 2022-05-17 DIAGNOSIS — R79.89 ELEVATED TROPONIN: ICD-10-CM

## 2022-05-17 DIAGNOSIS — D72.819 LEUKOPENIA, UNSPECIFIED TYPE: ICD-10-CM

## 2022-05-17 DIAGNOSIS — U07.1 COVID-19: Primary | ICD-10-CM

## 2022-05-17 DIAGNOSIS — U07.1 COVID-19: ICD-10-CM

## 2022-05-17 DIAGNOSIS — J43.9 PULMONARY EMPHYSEMA, UNSPECIFIED EMPHYSEMA TYPE: Chronic | ICD-10-CM

## 2022-05-17 DIAGNOSIS — I50.42 CHRONIC COMBINED SYSTOLIC AND DIASTOLIC CONGESTIVE HEART FAILURE: ICD-10-CM

## 2022-05-17 DIAGNOSIS — I42.8 NICM (NONISCHEMIC CARDIOMYOPATHY): Chronic | ICD-10-CM

## 2022-05-17 PROCEDURE — 3044F PR MOST RECENT HEMOGLOBIN A1C LEVEL <7.0%: ICD-10-PCS | Mod: CPTII,S$GLB,, | Performed by: FAMILY MEDICINE

## 2022-05-17 PROCEDURE — 3074F SYST BP LT 130 MM HG: CPT | Mod: CPTII,S$GLB,, | Performed by: FAMILY MEDICINE

## 2022-05-17 PROCEDURE — 1126F PR PAIN SEVERITY QUANTIFIED, NO PAIN PRESENT: ICD-10-PCS | Mod: CPTII,S$GLB,, | Performed by: FAMILY MEDICINE

## 2022-05-17 PROCEDURE — 99999 PR PBB SHADOW E&M-EST. PATIENT-LVL V: CPT | Mod: PBBFAC,,, | Performed by: FAMILY MEDICINE

## 2022-05-17 PROCEDURE — 4010F ACE/ARB THERAPY RXD/TAKEN: CPT | Mod: CPTII,S$GLB,, | Performed by: FAMILY MEDICINE

## 2022-05-17 PROCEDURE — 3288F FALL RISK ASSESSMENT DOCD: CPT | Mod: CPTII,S$GLB,, | Performed by: FAMILY MEDICINE

## 2022-05-17 PROCEDURE — 1111F DSCHRG MED/CURRENT MED MERGE: CPT | Mod: CPTII,S$GLB,, | Performed by: FAMILY MEDICINE

## 2022-05-17 PROCEDURE — 3008F BODY MASS INDEX DOCD: CPT | Mod: CPTII,S$GLB,, | Performed by: FAMILY MEDICINE

## 2022-05-17 PROCEDURE — 1160F PR REVIEW ALL MEDS BY PRESCRIBER/CLIN PHARMACIST DOCUMENTED: ICD-10-PCS | Mod: CPTII,S$GLB,, | Performed by: FAMILY MEDICINE

## 2022-05-17 PROCEDURE — 3078F PR MOST RECENT DIASTOLIC BLOOD PRESSURE < 80 MM HG: ICD-10-PCS | Mod: CPTII,S$GLB,, | Performed by: FAMILY MEDICINE

## 2022-05-17 PROCEDURE — 99999 PR PBB SHADOW E&M-EST. PATIENT-LVL V: ICD-10-PCS | Mod: PBBFAC,,, | Performed by: FAMILY MEDICINE

## 2022-05-17 PROCEDURE — 99214 OFFICE O/P EST MOD 30 MIN: CPT | Mod: S$GLB,,, | Performed by: FAMILY MEDICINE

## 2022-05-17 PROCEDURE — 71046 XR CHEST PA AND LATERAL: ICD-10-PCS | Mod: 26,,, | Performed by: RADIOLOGY

## 2022-05-17 PROCEDURE — 4010F PR ACE/ARB THEARPY RXD/TAKEN: ICD-10-PCS | Mod: CPTII,S$GLB,, | Performed by: FAMILY MEDICINE

## 2022-05-17 PROCEDURE — 1159F MED LIST DOCD IN RCRD: CPT | Mod: CPTII,S$GLB,, | Performed by: FAMILY MEDICINE

## 2022-05-17 PROCEDURE — 1101F PT FALLS ASSESS-DOCD LE1/YR: CPT | Mod: CPTII,S$GLB,, | Performed by: FAMILY MEDICINE

## 2022-05-17 PROCEDURE — 71046 X-RAY EXAM CHEST 2 VIEWS: CPT | Mod: TC

## 2022-05-17 PROCEDURE — 3074F PR MOST RECENT SYSTOLIC BLOOD PRESSURE < 130 MM HG: ICD-10-PCS | Mod: CPTII,S$GLB,, | Performed by: FAMILY MEDICINE

## 2022-05-17 PROCEDURE — 1159F PR MEDICATION LIST DOCUMENTED IN MEDICAL RECORD: ICD-10-PCS | Mod: CPTII,S$GLB,, | Performed by: FAMILY MEDICINE

## 2022-05-17 PROCEDURE — 3288F PR FALLS RISK ASSESSMENT DOCUMENTED: ICD-10-PCS | Mod: CPTII,S$GLB,, | Performed by: FAMILY MEDICINE

## 2022-05-17 PROCEDURE — 1160F RVW MEDS BY RX/DR IN RCRD: CPT | Mod: CPTII,S$GLB,, | Performed by: FAMILY MEDICINE

## 2022-05-17 PROCEDURE — 3044F HG A1C LEVEL LT 7.0%: CPT | Mod: CPTII,S$GLB,, | Performed by: FAMILY MEDICINE

## 2022-05-17 PROCEDURE — 1101F PR PT FALLS ASSESS DOC 0-1 FALLS W/OUT INJ PAST YR: ICD-10-PCS | Mod: CPTII,S$GLB,, | Performed by: FAMILY MEDICINE

## 2022-05-17 PROCEDURE — 99214 PR OFFICE/OUTPT VISIT, EST, LEVL IV, 30-39 MIN: ICD-10-PCS | Mod: S$GLB,,, | Performed by: FAMILY MEDICINE

## 2022-05-17 PROCEDURE — 1126F AMNT PAIN NOTED NONE PRSNT: CPT | Mod: CPTII,S$GLB,, | Performed by: FAMILY MEDICINE

## 2022-05-17 PROCEDURE — 3008F PR BODY MASS INDEX (BMI) DOCUMENTED: ICD-10-PCS | Mod: CPTII,S$GLB,, | Performed by: FAMILY MEDICINE

## 2022-05-17 PROCEDURE — 71046 X-RAY EXAM CHEST 2 VIEWS: CPT | Mod: 26,,, | Performed by: RADIOLOGY

## 2022-05-17 PROCEDURE — 1111F PR DISCHARGE MEDS RECONCILED W/ CURRENT OUTPATIENT MED LIST: ICD-10-PCS | Mod: CPTII,S$GLB,, | Performed by: FAMILY MEDICINE

## 2022-05-17 PROCEDURE — 3078F DIAST BP <80 MM HG: CPT | Mod: CPTII,S$GLB,, | Performed by: FAMILY MEDICINE

## 2022-05-17 RX ORDER — GUAIFENESIN 600 MG/1
1200 TABLET, EXTENDED RELEASE ORAL 2 TIMES DAILY PRN
Qty: 60 TABLET | Refills: 0 | Status: SHIPPED | OUTPATIENT
Start: 2022-05-17 | End: 2022-08-03

## 2022-05-17 NOTE — PROGRESS NOTES
"Subjective:       Patient ID: Selma Hooper is a 76 y.o. female.    Chief Complaint: Follow-up    Patient presents for a post hospitalization follow up visit. Current complaints addressed in the ROS section. Reviewed the pertinent chart data including (but not limited to) labs, imaging, cardiovascular, procedures and available ER/DC Summary and inpatient provider notes. Problem list items reviewed and modified or added entries (in the overview section) may not be transcribed into this encounter note due to note writer format.    Copy D/C Summary:    Admission Date: 5/1/2022  Hospital Length of Stay: 2 days  Discharge Date and Time: 5/3/2022  6:24 PM  Attending Physician: Lupe att. providers found   Discharging Provider: Kaitlyn Pratt MD  Primary Care Provider: Phil Quintana MD  [facility] Medicine Team: [facility] HOSP MED 2 Kaitlyn Pratt MD     HPI:   76-year-old female with past medical history of combined systolic and diastolic heart failure (left ventricular EF 15% on 12/2022), hypertension, presumptive COPD, iron deficiency anemia who presents with complaint of cough, fever, chills in the setting of known COVID exposure and positive home COVID test.  Patient reports her granddaughter return from a trip [place] for spring break a couple of weeks ago.  She ended up having symptomatic COVID, and "spread to the rest of the family".  Both the patient, her , and her daughter all currently have positive COVID.  [facility]  Patient reports initial symptoms of cough with thick mucus production as well as fevers and chills.  She initially started to feel better, however a couple of days ago developed sudden-onset abdominal cramping, watery diarrhea, nausea, and bilious vomiting.  Patient has not been able to keep hardly anything down for the past day or 2. Her GI symptoms are what caused her to finally presents to the ED for evaluation. Patient denies any LE " swelling, worsening dyspnea, orthopnea, PND. Pt is compliant with all her medications.      In the ED, pt HDS, afebrile, HR 90s, /78. 94% on RA, 92% with ambulation. COVID positive in ED. CBC without leukocytosis or anemia. Cr at baseline, electrolytes wnl. Inflammatory markers elevated, (CRP 44, , ). BNP 2135. Pt hypoxic with ambulation so admitted to medicine for management of COVID.       * No surgery found *       Hospital Course:   Ms. Hooper was admitted to hospital medicine team 2 on 5/2/2022 for COVID-19 virus infection and hypoxemia with ambulation. Patient stable on admission, HDS, afebrile. Inflammatory markers elevated on admit. Cr at baseline, no leukocytosis. Throughout admission, pt remained stable on her home oxygen of 2-3L NC. Pt developed nausea and vomiting with Remdesivir infusion which she received twice. She also received dexamethasone. On 5/3, she was able to ambulate and maintain an SpO2 > 88% which recovered quickly with rest. Throughout her admission, she was borderline hypotensive likely secondary to poor PO intake, so her home lasix and spirinolactone were held. She had an uneventful hospital course absent of complications or worsening condition. She received supportive care throughout her hospital stay.     On the day of discharge, it was felt that the patient had gained maximum benefit from inpatient admission and that she could be safely discharged home. Return precautions are being provided as well as all pertinent follow up instructions and verbalized understanding. Patient is being instructed to take all medications as instructed. My recommendations include take all medications as prescribed as well as follow up with her PCP in 1 week. Return to local ED or see PCP should condition get worse or not resolve with therapy.     Review of Systems   Constitutional: Positive for fatigue. Negative for appetite change, chills, diaphoresis and fever.   HENT: Negative for  congestion, postnasal drip, rhinorrhea, sore throat and trouble swallowing.    Eyes: Negative for visual disturbance.   Respiratory: Positive for cough and shortness of breath. Negative for choking, chest tightness and wheezing.    Cardiovascular: Negative for chest pain and leg swelling.   Gastrointestinal: Negative for abdominal distention, abdominal pain, diarrhea, nausea and vomiting.   Genitourinary: Negative for difficulty urinating and hematuria.   Musculoskeletal: Negative for arthralgias and myalgias.   Skin: Negative for rash.   Neurological: Negative for weakness, light-headedness and headaches.   Hematological: Does not bruise/bleed easily.   Psychiatric/Behavioral: Negative for decreased concentration and dysphoric mood.       Objective:      Physical Exam  Vitals and nursing note reviewed.   Constitutional:       Appearance: She is well-developed. She is not diaphoretic.   HENT:      Head: Normocephalic and atraumatic.   Eyes:      General: No scleral icterus.     Conjunctiva/sclera: Conjunctivae normal.   Cardiovascular:      Rate and Rhythm: Normal rate.      Heart sounds: Heart sounds are distant. No murmur heard.    No friction rub. No gallop.   Pulmonary:      Effort: Pulmonary effort is normal. No respiratory distress.      Breath sounds: Decreased breath sounds present. No wheezing or rales.   Abdominal:      General: There is no distension or abdominal bruit.      Tenderness: There is no abdominal tenderness.   Musculoskeletal:         General: No deformity.      Cervical back: Normal range of motion and neck supple.   Skin:     General: Skin is warm and dry.      Findings: No erythema or rash.   Neurological:      Mental Status: She is alert and oriented to person, place, and time.      Cranial Nerves: No cranial nerve deficit.      Motor: No tremor.      Coordination: Coordination normal.      Gait: Gait normal.   Psychiatric:         Behavior: Behavior normal.         Thought Content:  Thought content normal.         Judgment: Judgment normal.         Assessment:       1. COVID-19    2. Chronic combined systolic and diastolic congestive heart failure    3. NICM (nonischemic cardiomyopathy)    4. Pulmonary emphysema, unspecified emphysema type    5. Shortness of breath    6. Solitary pulmonary nodule    7. Leukopenia, unspecified type    8. Elevated troponin        Plan:     Medication List with Changes/Refills   New Medications    GUAIFENESIN (MUCINEX) 600 MG 12 HR TABLET    Take 2 tablets (1,200 mg total) by mouth 2 (two) times daily as needed for Congestion (expectorant).   Current Medications    ASCORBIC ACID, VITAMIN C, (VITAMIN C) 500 MG TABLET    Take 1 tablet (500 mg total) by mouth 2 (two) times daily. for 10 days    METOPROLOL SUCCINATE (TOPROL-XL) 25 MG 24 HR TABLET    Take 1 tablet (25 mg total) by mouth once daily.    MULTIVITAMIN (THERAGRAN) TABLET    Take 1 tablet by mouth once daily.    PULSE OXIMETER (PULSE OXIMETER) DEVICE    by Apply Externally route 2 (two) times a day. Use twice daily at 8 AM and 3 PM and record the value in MyChart as directed.    SACUBITRIL-VALSARTAN (ENTRESTO) 24-26 MG PER TABLET    Take 1 tablet by mouth 2 (two) times daily.   Discontinued Medications    PULSE OXIMETER (PULSE OXIMETER) DEVICE    by Apply Externally route 2 (two) times a day. Use twice daily at 8 AM and 3 PM and record the value in MyChart as directed.     Selma was seen today for follow-up.    Diagnoses and all orders for this visit:    COVID-19  -     Ambulatory referral/consult to Cardiology; Future  -     Ambulatory referral/consult to Pulmonology; Future  -     X-Ray Chest PA And Lateral; Future    Chronic combined systolic and diastolic congestive heart failure    NICM (nonischemic cardiomyopathy)  -     Ambulatory referral/consult to Cardiology; Future    Pulmonary emphysema, unspecified emphysema type  -     Ambulatory referral/consult to Pulmonology; Future    Shortness of breath  -      Ambulatory referral/consult to Pulmonology; Future    Solitary pulmonary nodule  -     CT Chest Without Contrast; Future    Leukopenia, unspecified type  -     CBC Auto Differential; Future    Elevated troponin    Other orders  -     guaiFENesin (MUCINEX) 600 mg 12 hr tablet; Take 2 tablets (1,200 mg total) by mouth 2 (two) times daily as needed for Congestion (expectorant).      See meds, orders, follow up, routing and instructions sections of encounter and AVS. Discussed with patient and provided on AVS.    Lab Results   Component Value Date     05/03/2022    K 4.2 05/03/2022     05/03/2022    BUN 36 (H) 05/03/2022    CREATININE 0.8 05/03/2022     (H) 05/03/2022    HGBA1C 5.7 (H) 05/01/2022    MG 2.1 05/03/2022    AST 14 05/03/2022    ALT 9 (L) 05/03/2022    ALBUMIN 3.0 (L) 05/03/2022    PROT 6.5 05/03/2022    BILITOT 0.3 05/03/2022    CHOL 136 12/09/2021    HDL 36 (L) 12/09/2021    LDLCALC 84.6 12/09/2021    TRIG 77 12/09/2021    WBC 2.48 (L) 05/03/2022    HGB 11.3 (L) 05/03/2022    HCT 37.5 05/03/2022    HCT 39 04/02/2021     05/03/2022    TSH 0.503 12/08/2021    BNP 2,135 (H) 05/01/2022       Some continued shortness of breath.  Using oxygen by a tank today.  Was discharged with this.  When she saw her pulmonologist last year he felt her dyspnea was cardiac related and did not recommend oxygen at that time.  Also, she is off of her diuretics from hospitalization discharge, unclear whether intent was to resume or not.  I think we can start back at every other day and I recommended a close follow-up with her cardiologist.  Her BNP and troponins were elevated.  Troponin appeared to be baseline.  No leg swelling reported today.  Asked her to go back to the emergency room for worsening symptoms.  Copy chart to pulmonology and Cardiology.  Recommend close follow ups.

## 2022-05-17 NOTE — Clinical Note
Dr. Martinez and Axel - Recent COVID - told at discharge to stop diuretics - unclear when to restart. Also sent home on O2, current SAO2 98% on 2 L. Requesting close follow ups with you, thank you.

## 2022-05-17 NOTE — PATIENT INSTRUCTIONS
Please see referral orders and please call patient to schedule a consult with Dr. Reyna, and [place]. Thank you.

## 2022-05-24 ENCOUNTER — PATIENT MESSAGE (OUTPATIENT)
Dept: CARDIOLOGY | Facility: CLINIC | Age: 77
End: 2022-05-24
Payer: COMMERCIAL

## 2022-05-31 ENCOUNTER — TELEPHONE (OUTPATIENT)
Dept: CARDIOLOGY | Facility: CLINIC | Age: 77
End: 2022-05-31
Payer: COMMERCIAL

## 2022-05-31 ENCOUNTER — HOSPITAL ENCOUNTER (OUTPATIENT)
Dept: RADIOLOGY | Facility: HOSPITAL | Age: 77
Discharge: HOME | End: 2022-05-31
Attending: FAMILY MEDICINE
Payer: COMMERCIAL

## 2022-05-31 DIAGNOSIS — I50.22 CHRONIC SYSTOLIC CONGESTIVE HEART FAILURE: ICD-10-CM

## 2022-05-31 DIAGNOSIS — R91.1 SOLITARY PULMONARY NODULE: ICD-10-CM

## 2022-05-31 PROCEDURE — 71250 CT CHEST WITHOUT CONTRAST: ICD-10-PCS | Mod: 26,,, | Performed by: RADIOLOGY

## 2022-05-31 PROCEDURE — 71250 CT THORAX DX C-: CPT | Mod: TC

## 2022-05-31 PROCEDURE — 71250 CT THORAX DX C-: CPT | Mod: 26,,, | Performed by: RADIOLOGY

## 2022-05-31 NOTE — TELEPHONE ENCOUNTER
----- Message from Geneva Wilson RN sent at 5/31/2022 11:56 AM CDT -----  Regarding: diuretics  Pt was d/c fr hospital 5/6 , dx covid. Per notes spironolactone and furosemide were discontinued due to low BP, poor PO intake. Pt states that she had N/V and diarrhea which are now resolved.  She does not normally have fluid build up in legs and has chronic resp issues so it is difficult to evaluate her fluid status. Teaching done on daily wt.   She wants to know if she should resume both meds, and at what dose/ frequency. She admits feeling like her breathing was worse today so she took both and states that she feels already better. No SOB heard on the phone.    Please advise on furosemide and spironolactone.    ----- Message -----  From: Geneva Wilson RN  Sent: 5/31/2022  11:45 AM CDT  To: Geneva Wilson RN    I called pt and left a voicemail to call me back.  ----- Message -----  From: Abigail Hannon MA  Sent: 5/31/2022  10:46 AM CDT  To: NATTY Ramirez the patient would like to talk to  you about her message she set about her medication please call [phone] she is schedule is see 8-  Dr. Reyna  . Thank you.

## 2022-05-31 NOTE — TELEPHONE ENCOUNTER
Dr Reyna was updated. Per dr Reyna, pt should restart the furosemide and if feels ok , after a while, restart the spironolactone. I called pt and repeated the above instructions. I also talked to her about checking bp to make sure that she is doing all right. She verbalized understanding.

## 2022-06-01 RX ORDER — FUROSEMIDE 20 MG/1
20 TABLET ORAL DAILY
Qty: 90 TABLET | Refills: 3 | Status: ON HOLD | OUTPATIENT
Start: 2022-06-01 | End: 2022-07-15 | Stop reason: HOSPADM

## 2022-06-30 ENCOUNTER — TELEPHONE (OUTPATIENT)
Dept: INTERNAL MEDICINE | Facility: CLINIC | Age: 77
End: 2022-06-30
Payer: COMMERCIAL

## 2022-06-30 DIAGNOSIS — D72.819 LEUKOPENIA, UNSPECIFIED TYPE: ICD-10-CM

## 2022-06-30 DIAGNOSIS — D64.9 ANEMIA, UNSPECIFIED TYPE: ICD-10-CM

## 2022-06-30 DIAGNOSIS — D50.9 IRON DEFICIENCY ANEMIA, UNSPECIFIED IRON DEFICIENCY ANEMIA TYPE: Primary | ICD-10-CM

## 2022-07-01 NOTE — TELEPHONE ENCOUNTER
Spoke to patient and advised of labs and recommendation. Patient verbalized understanding.      All appts scheduled

## 2022-07-01 NOTE — TELEPHONE ENCOUNTER
Please call patient and explain that the tests show low blood count.    I would like to refer patient to the Gastroenterology and also Hematology department for further evaluation and treatment.    Please see referral orders and please call patient to schedule.     Thank you.    Also please schedule for additional blood work ordered.

## 2022-07-05 ENCOUNTER — LAB VISIT (OUTPATIENT)
Dept: LAB | Facility: HOSPITAL | Age: 77
End: 2022-07-05
Attending: FAMILY MEDICINE
Payer: COMMERCIAL

## 2022-07-05 DIAGNOSIS — D72.819 LEUKOPENIA, UNSPECIFIED TYPE: ICD-10-CM

## 2022-07-05 DIAGNOSIS — D64.9 ANEMIA, UNSPECIFIED TYPE: ICD-10-CM

## 2022-07-05 DIAGNOSIS — D50.9 IRON DEFICIENCY ANEMIA, UNSPECIFIED IRON DEFICIENCY ANEMIA TYPE: ICD-10-CM

## 2022-07-05 LAB
FERRITIN SERPL-MCNC: 35 NG/ML (ref 20–300)
FOLATE SERPL-MCNC: 7.5 NG/ML (ref 4–24)
IRON SERPL-MCNC: 41 UG/DL (ref 30–160)
VIT B12 SERPL-MCNC: 314 PG/ML (ref 210–950)

## 2022-07-05 PROCEDURE — 81269 HBA1/HBA2 GENE DUP/DEL VRNTS: CPT | Performed by: FAMILY MEDICINE

## 2022-07-05 PROCEDURE — 83540 ASSAY OF IRON: CPT | Performed by: FAMILY MEDICINE

## 2022-07-05 PROCEDURE — 82728 ASSAY OF FERRITIN: CPT | Performed by: FAMILY MEDICINE

## 2022-07-05 PROCEDURE — 82607 VITAMIN B-12: CPT | Performed by: FAMILY MEDICINE

## 2022-07-05 PROCEDURE — 83020 HEMOGLOBIN ELECTROPHORESIS: CPT | Performed by: FAMILY MEDICINE

## 2022-07-05 PROCEDURE — 82746 ASSAY OF FOLIC ACID SERUM: CPT | Performed by: FAMILY MEDICINE

## 2022-07-06 LAB
HGB A2 MFR BLD HPLC: 2.7 % (ref 2.2–3.2)
HGB FRACT BLD ELPH-IMP: NORMAL
HGB FRACT BLD ELPH-IMP: NORMAL

## 2022-07-12 ENCOUNTER — HOSPITAL ENCOUNTER (INPATIENT)
Facility: HOSPITAL | Age: 77
LOS: 3 days | Discharge: HOME | DRG: 291 | End: 2022-07-15
Attending: EMERGENCY MEDICINE | Admitting: FAMILY MEDICINE
Payer: COMMERCIAL

## 2022-07-12 DIAGNOSIS — J44.1 CHRONIC OBSTRUCTIVE PULMONARY DISEASE WITH ACUTE EXACERBATION: Primary | ICD-10-CM

## 2022-07-12 DIAGNOSIS — I50.43 ACUTE ON CHRONIC COMBINED SYSTOLIC AND DIASTOLIC CONGESTIVE HEART FAILURE: ICD-10-CM

## 2022-07-12 DIAGNOSIS — J44.1 COPD EXACERBATION: ICD-10-CM

## 2022-07-12 DIAGNOSIS — R05.9 COUGH: ICD-10-CM

## 2022-07-12 DIAGNOSIS — R06.02 SHORTNESS OF BREATH: ICD-10-CM

## 2022-07-12 DIAGNOSIS — I50.42 CHRONIC COMBINED SYSTOLIC AND DIASTOLIC CONGESTIVE HEART FAILURE: ICD-10-CM

## 2022-07-12 DIAGNOSIS — R07.9 CHEST PAIN: ICD-10-CM

## 2022-07-12 PROBLEM — N39.0 UTI (URINARY TRACT INFECTION): Status: ACTIVE | Noted: 2022-07-12

## 2022-07-12 PROBLEM — J96.00 SEPSIS WITH ACUTE RESPIRATORY FAILURE: Status: ACTIVE | Noted: 2022-07-12

## 2022-07-12 PROBLEM — R79.89 ELEVATED TROPONIN LEVEL: Status: ACTIVE | Noted: 2022-07-12

## 2022-07-12 PROBLEM — D50.9 IRON DEFICIENCY ANEMIA: Status: RESOLVED | Noted: 2018-07-11 | Resolved: 2022-07-12

## 2022-07-12 PROBLEM — E87.20 LACTIC ACIDOSIS: Status: ACTIVE | Noted: 2022-07-12

## 2022-07-12 PROBLEM — A41.9 SEPSIS WITH ACUTE RESPIRATORY FAILURE: Status: ACTIVE | Noted: 2022-07-12

## 2022-07-12 PROBLEM — R65.20 SEPSIS WITH ACUTE RESPIRATORY FAILURE: Status: ACTIVE | Noted: 2022-07-12

## 2022-07-12 PROBLEM — J96.01 ACUTE RESPIRATORY FAILURE WITH HYPOXIA AND HYPERCARBIA: Status: ACTIVE | Noted: 2021-04-02

## 2022-07-12 LAB
ALBUMIN SERPL BCP-MCNC: 3.8 G/DL (ref 3.5–5.2)
ALLENS TEST: ABNORMAL
ALP SERPL-CCNC: 117 U/L (ref 55–135)
ALT SERPL W/O P-5'-P-CCNC: 70 U/L (ref 10–44)
ANION GAP SERPL CALC-SCNC: 11 MMOL/L (ref 8–16)
AST SERPL-CCNC: 87 U/L (ref 10–40)
BACTERIA #/AREA URNS AUTO: ABNORMAL /HPF
BASOPHILS # BLD AUTO: 0.15 K/UL (ref 0–0.2)
BASOPHILS NFR BLD: 1.5 % (ref 0–1.9)
BILIRUB SERPL-MCNC: 0.4 MG/DL (ref 0.1–1)
BILIRUB UR QL STRIP: NEGATIVE
BNP SERPL-MCNC: 1917 PG/ML (ref 0–99)
BUN SERPL-MCNC: 15 MG/DL (ref 8–23)
CALCIUM SERPL-MCNC: 9.2 MG/DL (ref 8.7–10.5)
CHLORIDE SERPL-SCNC: 108 MMOL/L (ref 95–110)
CLARITY UR REFRACT.AUTO: ABNORMAL
CO2 SERPL-SCNC: 24 MMOL/L (ref 23–29)
COLOR UR AUTO: YELLOW
CREAT SERPL-MCNC: 1 MG/DL (ref 0.5–1.4)
CTP QC/QA: YES
CTP QC/QA: YES
DIFFERENTIAL METHOD: ABNORMAL
EOSINOPHIL # BLD AUTO: 0.2 K/UL (ref 0–0.5)
EOSINOPHIL NFR BLD: 1.5 % (ref 0–8)
ERYTHROCYTE [DISTWIDTH] IN BLOOD BY AUTOMATED COUNT: 14.8 % (ref 11.5–14.5)
EST. GFR  (AFRICAN AMERICAN): >60 ML/MIN/1.73 M^2
EST. GFR  (NON AFRICAN AMERICAN): 55.6 ML/MIN/1.73 M^2
GLUCOSE SERPL-MCNC: 346 MG/DL (ref 70–110)
GLUCOSE UR QL STRIP: ABNORMAL
HCO3 UR-SCNC: 25.1 MMOL/L (ref 24–28)
HCO3 UR-SCNC: 28.5 MMOL/L (ref 24–28)
HCO3 UR-SCNC: 32.2 MMOL/L (ref 24–28)
HCT VFR BLD AUTO: 41.8 % (ref 37–48.5)
HGB BLD-MCNC: 12.5 G/DL (ref 12–16)
HGB UR QL STRIP: NEGATIVE
HYALINE CASTS UR QL AUTO: 66 /LPF
IMM GRANULOCYTES # BLD AUTO: 0.01 K/UL (ref 0–0.04)
IMM GRANULOCYTES NFR BLD AUTO: 0.1 % (ref 0–0.5)
KETONES UR QL STRIP: NEGATIVE
LACTATE SERPL-SCNC: 2.7 MMOL/L (ref 0.5–2.2)
LACTATE SERPL-SCNC: 3.2 MMOL/L (ref 0.5–2.2)
LACTATE SERPL-SCNC: 4.2 MMOL/L (ref 0.5–2.2)
LEUKOCYTE ESTERASE UR QL STRIP: NEGATIVE
LYMPHOCYTES # BLD AUTO: 5 K/UL (ref 1–4.8)
LYMPHOCYTES NFR BLD: 50.8 % (ref 18–48)
MAGNESIUM SERPL-MCNC: 2.1 MG/DL (ref 1.6–2.6)
MCH RBC QN AUTO: 24 PG (ref 27–31)
MCHC RBC AUTO-ENTMCNC: 29.9 G/DL (ref 32–36)
MCV RBC AUTO: 80 FL (ref 82–98)
MICROSCOPIC COMMENT: ABNORMAL
MONOCYTES # BLD AUTO: 1 K/UL (ref 0.3–1)
MONOCYTES NFR BLD: 9.6 % (ref 4–15)
NEUTROPHILS # BLD AUTO: 3.6 K/UL (ref 1.8–7.7)
NEUTROPHILS NFR BLD: 36.5 % (ref 38–73)
NITRITE UR QL STRIP: NEGATIVE
NRBC BLD-RTO: 0 /100 WBC
PCO2 BLDA: 47.8 MMHG (ref 35–45)
PCO2 BLDA: 61.4 MMHG (ref 35–45)
PCO2 BLDA: 91.7 MMHG (ref 35–45)
PH SMN: 7.15 [PH] (ref 7.35–7.45)
PH SMN: 7.22 [PH] (ref 7.35–7.45)
PH SMN: 7.38 [PH] (ref 7.35–7.45)
PH UR STRIP: 5 [PH] (ref 5–8)
PHOSPHATE SERPL-MCNC: 4.9 MG/DL (ref 2.7–4.5)
PLATELET # BLD AUTO: 281 K/UL (ref 150–450)
PLATELET BLD QL SMEAR: ABNORMAL
PMV BLD AUTO: 11.1 FL (ref 9.2–12.9)
PO2 BLDA: 32 MMHG (ref 40–60)
PO2 BLDA: 60 MMHG (ref 40–60)
PO2 BLDA: 63 MMHG (ref 40–60)
POC BE: -3 MMOL/L
POC BE: 3 MMOL/L
POC BE: 3 MMOL/L
POC MOLECULAR INFLUENZA A AGN: NEGATIVE
POC MOLECULAR INFLUENZA B AGN: NEGATIVE
POC SATURATED O2: 48 % (ref 95–100)
POC SATURATED O2: 80 % (ref 95–100)
POC SATURATED O2: 91 % (ref 95–100)
POC TCO2: 27 MMOL/L (ref 24–29)
POC TCO2: 30 MMOL/L (ref 24–29)
POC TCO2: 35 MMOL/L (ref 24–29)
POTASSIUM SERPL-SCNC: 3.7 MMOL/L (ref 3.5–5.1)
PROT SERPL-MCNC: 7 G/DL (ref 6–8.4)
PROT UR QL STRIP: ABNORMAL
RBC # BLD AUTO: 5.21 M/UL (ref 4–5.4)
RBC #/AREA URNS AUTO: 6 /HPF (ref 0–4)
SAMPLE: ABNORMAL
SARS-COV-2 RDRP RESP QL NAA+PROBE: NEGATIVE
SITE: ABNORMAL
SMUDGE CELLS BLD QL SMEAR: PRESENT
SODIUM SERPL-SCNC: 143 MMOL/L (ref 136–145)
SP GR UR STRIP: 1.02 (ref 1–1.03)
SQUAMOUS #/AREA URNS AUTO: 4 /HPF
TROPONIN I SERPL DL<=0.01 NG/ML-MCNC: 0.04 NG/ML (ref 0–0.03)
TROPONIN I SERPL DL<=0.01 NG/ML-MCNC: 1.4 NG/ML (ref 0–0.03)
URN SPEC COLLECT METH UR: ABNORMAL
WBC # BLD AUTO: 9.93 K/UL (ref 3.9–12.7)
WBC #/AREA URNS AUTO: 13 /HPF (ref 0–5)
YEAST UR QL AUTO: ABNORMAL

## 2022-07-12 PROCEDURE — 94640 AIRWAY INHALATION TREATMENT: CPT

## 2022-07-12 PROCEDURE — 82803 BLOOD GASES ANY COMBINATION: CPT

## 2022-07-12 PROCEDURE — 27000221 HC OXYGEN, UP TO 24 HOURS

## 2022-07-12 PROCEDURE — 84100 ASSAY OF PHOSPHORUS: CPT | Performed by: STUDENT IN AN ORGANIZED HEALTH CARE EDUCATION/TRAINING PROGRAM

## 2022-07-12 PROCEDURE — 25000003 PHARM REV CODE 250: Performed by: PHYSICIAN ASSISTANT

## 2022-07-12 PROCEDURE — 85025 COMPLETE CBC W/AUTO DIFF WBC: CPT | Performed by: STUDENT IN AN ORGANIZED HEALTH CARE EDUCATION/TRAINING PROGRAM

## 2022-07-12 PROCEDURE — 84484 ASSAY OF TROPONIN QUANT: CPT | Mod: 91 | Performed by: PHYSICIAN ASSISTANT

## 2022-07-12 PROCEDURE — U0002 COVID-19 LAB TEST NON-CDC: HCPCS | Performed by: STUDENT IN AN ORGANIZED HEALTH CARE EDUCATION/TRAINING PROGRAM

## 2022-07-12 PROCEDURE — 80053 COMPREHEN METABOLIC PANEL: CPT | Performed by: STUDENT IN AN ORGANIZED HEALTH CARE EDUCATION/TRAINING PROGRAM

## 2022-07-12 PROCEDURE — 96375 TX/PRO/DX INJ NEW DRUG ADDON: CPT

## 2022-07-12 PROCEDURE — 63600175 PHARM REV CODE 636 W HCPCS: Performed by: PHYSICIAN ASSISTANT

## 2022-07-12 PROCEDURE — 83735 ASSAY OF MAGNESIUM: CPT | Performed by: STUDENT IN AN ORGANIZED HEALTH CARE EDUCATION/TRAINING PROGRAM

## 2022-07-12 PROCEDURE — 87086 URINE CULTURE/COLONY COUNT: CPT | Performed by: STUDENT IN AN ORGANIZED HEALTH CARE EDUCATION/TRAINING PROGRAM

## 2022-07-12 PROCEDURE — 36600 WITHDRAWAL OF ARTERIAL BLOOD: CPT

## 2022-07-12 PROCEDURE — 99223 1ST HOSP IP/OBS HIGH 75: CPT | Mod: ,,, | Performed by: PHYSICIAN ASSISTANT

## 2022-07-12 PROCEDURE — 63600175 PHARM REV CODE 636 W HCPCS: Performed by: STUDENT IN AN ORGANIZED HEALTH CARE EDUCATION/TRAINING PROGRAM

## 2022-07-12 PROCEDURE — 99223 1ST HOSP IP/OBS HIGH 75: CPT | Mod: ,,, | Performed by: INTERNAL MEDICINE

## 2022-07-12 PROCEDURE — 83605 ASSAY OF LACTIC ACID: CPT | Mod: 91 | Performed by: STUDENT IN AN ORGANIZED HEALTH CARE EDUCATION/TRAINING PROGRAM

## 2022-07-12 PROCEDURE — 94660 CPAP INITIATION&MGMT: CPT

## 2022-07-12 PROCEDURE — 25000242 PHARM REV CODE 250 ALT 637 W/ HCPCS: Performed by: STUDENT IN AN ORGANIZED HEALTH CARE EDUCATION/TRAINING PROGRAM

## 2022-07-12 PROCEDURE — 96367 TX/PROPH/DG ADDL SEQ IV INF: CPT

## 2022-07-12 PROCEDURE — 81001 URINALYSIS AUTO W/SCOPE: CPT | Performed by: STUDENT IN AN ORGANIZED HEALTH CARE EDUCATION/TRAINING PROGRAM

## 2022-07-12 PROCEDURE — 94761 N-INVAS EAR/PLS OXIMETRY MLT: CPT

## 2022-07-12 PROCEDURE — 99223 PR INITIAL HOSPITAL CARE,LEVL III: ICD-10-PCS | Mod: ,,, | Performed by: INTERNAL MEDICINE

## 2022-07-12 PROCEDURE — 83880 ASSAY OF NATRIURETIC PEPTIDE: CPT | Performed by: STUDENT IN AN ORGANIZED HEALTH CARE EDUCATION/TRAINING PROGRAM

## 2022-07-12 PROCEDURE — 20600001 HC STEP DOWN PRIVATE ROOM

## 2022-07-12 PROCEDURE — 93010 ELECTROCARDIOGRAM REPORT: CPT | Mod: ,,, | Performed by: INTERNAL MEDICINE

## 2022-07-12 PROCEDURE — 87040 BLOOD CULTURE FOR BACTERIA: CPT | Mod: 59 | Performed by: STUDENT IN AN ORGANIZED HEALTH CARE EDUCATION/TRAINING PROGRAM

## 2022-07-12 PROCEDURE — 96365 THER/PROPH/DIAG IV INF INIT: CPT

## 2022-07-12 PROCEDURE — 25000003 PHARM REV CODE 250: Performed by: STUDENT IN AN ORGANIZED HEALTH CARE EDUCATION/TRAINING PROGRAM

## 2022-07-12 PROCEDURE — 99223 PR INITIAL HOSPITAL CARE,LEVL III: ICD-10-PCS | Mod: ,,, | Performed by: PHYSICIAN ASSISTANT

## 2022-07-12 PROCEDURE — 83605 ASSAY OF LACTIC ACID: CPT | Mod: 91 | Performed by: PHYSICIAN ASSISTANT

## 2022-07-12 PROCEDURE — 93010 EKG 12-LEAD: ICD-10-PCS | Mod: ,,, | Performed by: INTERNAL MEDICINE

## 2022-07-12 PROCEDURE — 27000190 HC CPAP FULL FACE MASK W/VALVE

## 2022-07-12 PROCEDURE — 25000242 PHARM REV CODE 250 ALT 637 W/ HCPCS: Performed by: PHYSICIAN ASSISTANT

## 2022-07-12 PROCEDURE — 99291 PR CRITICAL CARE, E/M 30-74 MINUTES: ICD-10-PCS | Mod: CS,,, | Performed by: EMERGENCY MEDICINE

## 2022-07-12 PROCEDURE — 96376 TX/PRO/DX INJ SAME DRUG ADON: CPT

## 2022-07-12 PROCEDURE — 99900035 HC TECH TIME PER 15 MIN (STAT)

## 2022-07-12 PROCEDURE — 84484 ASSAY OF TROPONIN QUANT: CPT | Performed by: STUDENT IN AN ORGANIZED HEALTH CARE EDUCATION/TRAINING PROGRAM

## 2022-07-12 PROCEDURE — 96366 THER/PROPH/DIAG IV INF ADDON: CPT

## 2022-07-12 PROCEDURE — 99291 CRITICAL CARE FIRST HOUR: CPT | Mod: 25

## 2022-07-12 PROCEDURE — 93005 ELECTROCARDIOGRAM TRACING: CPT

## 2022-07-12 PROCEDURE — 25000242 PHARM REV CODE 250 ALT 637 W/ HCPCS

## 2022-07-12 PROCEDURE — 99291 CRITICAL CARE FIRST HOUR: CPT | Mod: CS,,, | Performed by: EMERGENCY MEDICINE

## 2022-07-12 PROCEDURE — 63600175 PHARM REV CODE 636 W HCPCS

## 2022-07-12 RX ORDER — TALC
6 POWDER (GRAM) TOPICAL NIGHTLY PRN
Status: CANCELLED | OUTPATIENT
Start: 2022-07-12

## 2022-07-12 RX ORDER — IPRATROPIUM BROMIDE AND ALBUTEROL SULFATE 2.5; .5 MG/3ML; MG/3ML
3 SOLUTION RESPIRATORY (INHALATION) EVERY 4 HOURS PRN
Status: DISCONTINUED | OUTPATIENT
Start: 2022-07-12 | End: 2022-07-15 | Stop reason: HOSPADM

## 2022-07-12 RX ORDER — BISACODYL 10 MG
10 SUPPOSITORY, RECTAL RECTAL DAILY PRN
Status: DISCONTINUED | OUTPATIENT
Start: 2022-07-12 | End: 2022-07-15 | Stop reason: HOSPADM

## 2022-07-12 RX ORDER — IBUPROFEN 200 MG
16 TABLET ORAL
Status: DISCONTINUED | OUTPATIENT
Start: 2022-07-12 | End: 2022-07-15 | Stop reason: HOSPADM

## 2022-07-12 RX ORDER — METHYLPREDNISOLONE SOD SUCC 125 MG
125 VIAL (EA) INJECTION
Status: COMPLETED | OUTPATIENT
Start: 2022-07-12 | End: 2022-07-12

## 2022-07-12 RX ORDER — IPRATROPIUM BROMIDE AND ALBUTEROL SULFATE 2.5; .5 MG/3ML; MG/3ML
3 SOLUTION RESPIRATORY (INHALATION)
Status: DISCONTINUED | OUTPATIENT
Start: 2022-07-12 | End: 2022-07-15 | Stop reason: HOSPADM

## 2022-07-12 RX ORDER — ACETAMINOPHEN 325 MG/1
650 TABLET ORAL EVERY 4 HOURS PRN
Status: CANCELLED | OUTPATIENT
Start: 2022-07-12

## 2022-07-12 RX ORDER — FUROSEMIDE 10 MG/ML
40 INJECTION INTRAMUSCULAR; INTRAVENOUS
Status: DISCONTINUED | OUTPATIENT
Start: 2022-07-12 | End: 2022-07-14

## 2022-07-12 RX ORDER — METOPROLOL SUCCINATE 25 MG/1
25 TABLET, EXTENDED RELEASE ORAL DAILY
Status: DISCONTINUED | OUTPATIENT
Start: 2022-07-13 | End: 2022-07-15 | Stop reason: HOSPADM

## 2022-07-12 RX ORDER — VANCOMYCIN HCL IN 5 % DEXTROSE 1G/250ML
1000 PLASTIC BAG, INJECTION (ML) INTRAVENOUS
Status: DISCONTINUED | OUTPATIENT
Start: 2022-07-13 | End: 2022-07-13

## 2022-07-12 RX ORDER — PREDNISONE 20 MG/1
40 TABLET ORAL DAILY
Status: DISCONTINUED | OUTPATIENT
Start: 2022-07-13 | End: 2022-07-15 | Stop reason: HOSPADM

## 2022-07-12 RX ORDER — IBUPROFEN 200 MG
24 TABLET ORAL
Status: DISCONTINUED | OUTPATIENT
Start: 2022-07-12 | End: 2022-07-15 | Stop reason: HOSPADM

## 2022-07-12 RX ORDER — GLUCAGON 1 MG
1 KIT INJECTION
Status: DISCONTINUED | OUTPATIENT
Start: 2022-07-12 | End: 2022-07-15 | Stop reason: HOSPADM

## 2022-07-12 RX ORDER — POTASSIUM CHLORIDE 20 MEQ/1
40 TABLET, EXTENDED RELEASE ORAL ONCE
Status: COMPLETED | OUTPATIENT
Start: 2022-07-12 | End: 2022-07-12

## 2022-07-12 RX ORDER — POLYETHYLENE GLYCOL 3350 17 G/17G
17 POWDER, FOR SOLUTION ORAL DAILY PRN
Status: DISCONTINUED | OUTPATIENT
Start: 2022-07-12 | End: 2022-07-15 | Stop reason: HOSPADM

## 2022-07-12 RX ORDER — IPRATROPIUM BROMIDE AND ALBUTEROL SULFATE 2.5; .5 MG/3ML; MG/3ML
SOLUTION RESPIRATORY (INHALATION)
Status: COMPLETED
Start: 2022-07-12 | End: 2022-07-12

## 2022-07-12 RX ORDER — ONDANSETRON 8 MG/1
8 TABLET, ORALLY DISINTEGRATING ORAL EVERY 8 HOURS PRN
Status: DISCONTINUED | OUTPATIENT
Start: 2022-07-12 | End: 2022-07-15 | Stop reason: HOSPADM

## 2022-07-12 RX ORDER — SODIUM CHLORIDE 0.9 % (FLUSH) 0.9 %
10 SYRINGE (ML) INJECTION
Status: CANCELLED | OUTPATIENT
Start: 2022-07-12

## 2022-07-12 RX ORDER — FUROSEMIDE 10 MG/ML
40 INJECTION INTRAMUSCULAR; INTRAVENOUS
Status: COMPLETED | OUTPATIENT
Start: 2022-07-12 | End: 2022-07-12

## 2022-07-12 RX ORDER — TALC
6 POWDER (GRAM) TOPICAL NIGHTLY PRN
Status: DISCONTINUED | OUTPATIENT
Start: 2022-07-12 | End: 2022-07-15 | Stop reason: HOSPADM

## 2022-07-12 RX ORDER — PROMETHAZINE HYDROCHLORIDE 25 MG/1
25 TABLET ORAL EVERY 6 HOURS PRN
Status: DISCONTINUED | OUTPATIENT
Start: 2022-07-12 | End: 2022-07-15 | Stop reason: HOSPADM

## 2022-07-12 RX ORDER — ENOXAPARIN SODIUM 100 MG/ML
40 INJECTION SUBCUTANEOUS EVERY 24 HOURS
Status: DISCONTINUED | OUTPATIENT
Start: 2022-07-12 | End: 2022-07-15 | Stop reason: HOSPADM

## 2022-07-12 RX ORDER — IPRATROPIUM BROMIDE AND ALBUTEROL SULFATE 2.5; .5 MG/3ML; MG/3ML
3 SOLUTION RESPIRATORY (INHALATION)
Status: COMPLETED | OUTPATIENT
Start: 2022-07-12 | End: 2022-07-12

## 2022-07-12 RX ORDER — SODIUM CHLORIDE 0.9 % (FLUSH) 0.9 %
10 SYRINGE (ML) INJECTION
Status: DISCONTINUED | OUTPATIENT
Start: 2022-07-12 | End: 2022-07-15 | Stop reason: HOSPADM

## 2022-07-12 RX ORDER — ACETAMINOPHEN 325 MG/1
650 TABLET ORAL EVERY 4 HOURS PRN
Status: DISCONTINUED | OUTPATIENT
Start: 2022-07-12 | End: 2022-07-15 | Stop reason: HOSPADM

## 2022-07-12 RX ORDER — ENOXAPARIN SODIUM 100 MG/ML
40 INJECTION SUBCUTANEOUS EVERY 24 HOURS
Status: CANCELLED | OUTPATIENT
Start: 2022-07-12

## 2022-07-12 RX ADMIN — PIPERACILLIN SODIUM AND TAZOBACTAM SODIUM 4.5 G: 4; .5 INJECTION, POWDER, LYOPHILIZED, FOR SOLUTION INTRAVENOUS at 04:07

## 2022-07-12 RX ADMIN — FUROSEMIDE 40 MG: 10 INJECTION, SOLUTION INTRAMUSCULAR; INTRAVENOUS at 10:07

## 2022-07-12 RX ADMIN — ENOXAPARIN SODIUM 40 MG: 100 INJECTION SUBCUTANEOUS at 07:07

## 2022-07-12 RX ADMIN — IPRATROPIUM BROMIDE AND ALBUTEROL SULFATE 3 ML: 2.5; .5 SOLUTION RESPIRATORY (INHALATION) at 04:07

## 2022-07-12 RX ADMIN — METHYLPREDNISOLONE SODIUM SUCCINATE 125 MG: 125 INJECTION, POWDER, FOR SOLUTION INTRAMUSCULAR; INTRAVENOUS at 06:07

## 2022-07-12 RX ADMIN — POTASSIUM CHLORIDE 40 MEQ: 1500 TABLET, EXTENDED RELEASE ORAL at 04:07

## 2022-07-12 RX ADMIN — SODIUM CHLORIDE, SODIUM LACTATE, POTASSIUM CHLORIDE, AND CALCIUM CHLORIDE 1000 ML: .6; .31; .03; .02 INJECTION, SOLUTION INTRAVENOUS at 06:07

## 2022-07-12 RX ADMIN — IPRATROPIUM BROMIDE AND ALBUTEROL SULFATE 3 ML: 2.5; .5 SOLUTION RESPIRATORY (INHALATION) at 07:07

## 2022-07-12 RX ADMIN — PIPERACILLIN SODIUM AND TAZOBACTAM SODIUM 4.5 G: 4; .5 INJECTION, POWDER, LYOPHILIZED, FOR SOLUTION INTRAVENOUS at 10:07

## 2022-07-12 RX ADMIN — PIPERACILLIN SODIUM AND TAZOBACTAM SODIUM 4.5 G: 4; .5 INJECTION, POWDER, LYOPHILIZED, FOR SOLUTION INTRAVENOUS at 07:07

## 2022-07-12 RX ADMIN — FUROSEMIDE 40 MG: 10 INJECTION, SOLUTION INTRAMUSCULAR; INTRAVENOUS at 08:07

## 2022-07-12 RX ADMIN — IPRATROPIUM BROMIDE AND ALBUTEROL SULFATE 3 ML: 2.5; .5 SOLUTION RESPIRATORY (INHALATION) at 06:07

## 2022-07-12 RX ADMIN — VANCOMYCIN HYDROCHLORIDE 1500 MG: 1.5 INJECTION, POWDER, LYOPHILIZED, FOR SOLUTION INTRAVENOUS at 09:07

## 2022-07-12 NOTE — CONSULTS
[facility] - Emergency Dept  Critical Care Medicine  Consult Note    Patient Name: Selma Hooper  MRN: [id number]  Admission Date: 7/12/2022  Hospital Length of Stay: 0 days  Code Status: Full Code  Attending Physician: Mi Arias MD   Primary Care Provider: Phil Quintana MD   Principal Problem: Acute respiratory failure with hypoxia and hypercarbia    Inpatient consult to Critical Care Medicine  Consult performed by: Josefa Cuello MD  Consult ordered by: Clem Connors MD  Reason for consult: Acute on chornic hypoxic hypercapnic respiratory failure        Subjective:     HPI:  76 y.o. F with PMHx of combined CHF (EF 15% in 12/2021), COPD, obesity, HTN LANDY presenting to C for shortness of breath which started progressively worsening last night. On EMS arrival, pt with O2 sats in 80% on NC s/p breathing treatment. At home, pt wears oxygen as needed. Pt states she has dyspnea at baseline due to her CHF and COPD. Denies any leg swelling, cough, or orthopnea. Does reports of THEODORE and abdominal distention when she starts to retain fluid, no noticeable distension this time. Pt does note unable to breathe comfortably when laying on her L side, also complaints of upper R flank pain x 2 days that has improved. Pt was previously on Toprol 25 mg qd, Lasix 20 mg daily, Entresto, and Spironolactone 25 mg daily. However, Lasix and Spironolactone was held after recent discharge in March due to hypotension related to COVID 19. Pt adherent to Toprol and Entresto but only recently re-started Lasix 20 mg daily on May 25th after getting into touch with her cardiologist through [email or web address]. Pt has not been back for a clinic visit in about a year, upcoming appointment was set for August 2022.     In ED, /61, , tachypnea to 31, Tmax 99.1, pt started pm BiPAP. Labs with elevated BNP to 1917 (about 2100 at baseline), lactate of 3.2. CXR with bilateral diffuse interstitial opacities. UA with protein, 13 WBC, few bacteria,  hyaline casts. Pt remained on continuous BiPAP, given methylprednisolone x1, and duoneb. Initial VBG with pH 7.154, CO2 91, HCO3 32, placed on BiPAP with only mild improvement in acidemia. Critical care consulted for acute on chronic hypoxic and hypercapnic respiratory failure.        Hospital/ICU Course:  No notes on file    Past Medical History:   Diagnosis Date    Acute on chronic respiratory failure with hypoxia and hypercapnia 12/10/2021    CHF (congestive heart failure)     Former smoker 10/24/2018    Hypertension     Smoker 2016       Past Surgical History:   Procedure Laterality Date    BREAST BIOPSY      left  side years ago in high school       CHOLECYSTECTOMY      COLONOSCOPY      COLONOSCOPY N/A 2021    Procedure: COLONOSCOPY;  Surgeon: Shree Amaya MD;  Location: [facility] ([address] [place] [address]);  Service: Endoscopy;  Laterality: N/A;  Do not cancel this order  fully vaccinated-see immunization record  EF 18%  -covid [facility]    HYSTERECTOMY         Review of patient's allergies indicates:   Allergen Reactions    Atorvastatin      Leg cramps       Family History       Problem Relation (Age of Onset)    Colon cancer Father, Brother (63)    Dementia Father    Diabetes Daughter    Heart attack Mother    Heart disease Mother, Brother    Hypertension Mother, Father, Brother          Tobacco Use    Smoking status: Former Smoker     Packs/day: 0.50     Quit date: 2018     Years since quittin.0    Smokeless tobacco: Never Used   Substance and Sexual Activity    Alcohol use: Yes    Drug use: No    Sexual activity: Not on file      Review of Systems  Objective:     Vital Signs (Most Recent):  Temp: 99.1 °F (37.3 °C) (22 0658)  Pulse: 65 (22 1153)  Resp: 20 (22 1153)  BP: (!) 110/59 (22 0947)  SpO2: (!) 92 % (22 1153)   Vital Signs (24h Range):  Temp:  [99.1 °F (37.3 °C)] 99.1 °F (37.3 °C)  Pulse:  [] 65  Resp:  [19-31] 20  SpO2:   [88 %-98 %] 92 %  BP: (110-125)/(59-61) 110/59   Weight: 70 kg (154 lb 5.2 oz)  Body mass index is 28.23 kg/m².      Intake/Output Summary (Last 24 hours) at 7/12/2022 1354  Last data filed at 7/12/2022 1044  Gross per 24 hour   Intake --   Output 700 ml   Net -700 ml       Physical Exam  Vitals and nursing note reviewed.   Constitutional:       Appearance: She is obese.   HENT:      Head: Normocephalic and atraumatic.   Eyes:      General: No scleral icterus.        Right eye: No discharge.         Left eye: No discharge.      Extraocular Movements: Extraocular movements intact.      Conjunctiva/sclera: Conjunctivae normal.   Cardiovascular:      Rate and Rhythm: Normal rate and regular rhythm.      Heart sounds:     No friction rub. No gallop.   Pulmonary:      Effort: No respiratory distress.      Breath sounds: No stridor.      Comments: On NC. Mild inspiratory wheezes. Coarse crackles/rales to all lung fields (greater in mid and lower lung zones). Does not clear with coughing.   Abdominal:      General: Abdomen is flat. There is no distension.      Palpations: Abdomen is soft.      Tenderness: There is no abdominal tenderness. There is no guarding or rebound.   Musculoskeletal:         General: No signs of injury.      Cervical back: Normal range of motion and neck supple.      Right lower leg: No edema.      Left lower leg: No edema.      Comments: Moving all extremities.    Skin:     General: Skin is warm and dry.      Findings: No bruising or rash.   Neurological:      General: No focal deficit present.      Mental Status: She is alert and oriented to person, place, and time.   Psychiatric:         Mood and Affect: Mood normal.         Speech: Speech normal.         Behavior: Behavior normal.       Vents:  Oxygen Concentration (%): 28 (07/12/22 1109)  Lines/Drains/Airways       Drain  Duration             Female External Urinary Catheter 07/12/22 0908 <1 day              Peripheral Intravenous Line  Duration                   Peripheral IV - Single Lumen 05/01/22 2037 22 G Right Hand 71 days         Peripheral IV - Single Lumen 07/12/22 0647 22 G Right Wrist <1 day                  Significant Labs:    CBC/Anemia Profile:  Recent Labs   Lab 07/12/22  0650   WBC 9.93   HGB 12.5   HCT 41.8      MCV 80*   RDW 14.8*        Chemistries:  Recent Labs   Lab 07/12/22  0650      K 3.7      CO2 24   BUN 15   CREATININE 1.0   CALCIUM 9.2   ALBUMIN 3.8   PROT 7.0   BILITOT 0.4   ALKPHOS 117   ALT 70*   AST 87*   MG 2.1   PHOS 4.9*       ABGs:   Recent Labs   Lab 07/12/22  1143   PH 7.383   PCO2 47.8*   HCO3 28.5*   POCSATURATED 91*   BE 3     CMP:   Recent Labs   Lab 07/12/22  0650      K 3.7      CO2 24   *   BUN 15   CREATININE 1.0   CALCIUM 9.2   PROT 7.0   ALBUMIN 3.8   BILITOT 0.4   ALKPHOS 117   AST 87*   ALT 70*   ANIONGAP 11   EGFRNONAA 55.6*     Lactic Acid:   Recent Labs   Lab 07/12/22  0650 07/12/22  1049   LACTATE 3.2* 2.7*     All pertinent labs within the past 24 hours have been reviewed.    Significant Imaging: I have reviewed all pertinent imaging results/findings within the past 24 hours.      ABG  Recent Labs   Lab 07/12/22  1143   PH 7.383   PO2 63*   PCO2 47.8*   HCO3 28.5*   BE 3     Assessment/Plan:     Pulmonary  * Acute respiratory failure with hypoxia and hypercarbia  Pt with hx of combined CHF and COPD presenting with gradually worsening dyspnea, found to be hypoxic to 80% on NC.  Initial VBG with pH of 7.1, CO2 91. CXR with bilateral pulmonary infiltrates. BNP to 1900. In ED, s/p IV methylprednisolone, duo-neb, and IV Lasix 40 mg for concerns of COPD exacerbation with acute CHF. Pt placed on continuous BiPAP - pressure support adjusted by ICU team. Additional dose of IV Lasix given with good UOP. Repeat ABG with pH of 7.38, CO2 of 47.8, PO2 of 63. Pt able to be transitioned to 1L RA with no complications.    - Would recommend NC with SpO2 goal of 88-90% due to COPD,  BiPAP QHS   - Continue IV Lasix diuresis for fluid overload, can give additional IV 40 mg PRN, goal of 2-3L daily until euvolemic   - F/u TTE  - Continue GDMT as tolerated by BP   - Albuterol PRN for wheezing    Cardiac/Vascular  Acute on chronic combined systolic and diastolic congestive heart failure  Patient is identified as having Combined Systolic and Diastolic heart failure that is Acute on chronic. CHF is currently uncontrolled due to Rales/crackles on pulmonary exam and Pulmonary edema/pleural effusion on CXR. Latest ECHO performed and demonstrates- Results for orders placed during the hospital encounter of 12/08/21    Echo    Interpretation Summary  · The left ventricle is normal in size with moderate eccentric hypertrophy and severely decreased systolic function.  · There is severe left ventricular global hypokinesis. The estimated ejection fraction is 15%.  · Left ventricular diastolic dysfunction.  · Normal right ventricular size with normal right ventricular systolic function.  · Normal central venous pressure (3 mmHg).  · There is no significant tricuspid regurgitation and therefore the pulmonary artery systolic pressure cannot be reported.    . Continue Beta Blocker and Furosemide and monitor clinical status closely. Monitor on telemetry. Patient is off CHF pathway.  Monitor strict Is&Os and daily weights.  Place on fluid restriction of 1.5 L. Continue to stress to patient importance of self efficacy and  on diet for CHF. Last BNP reviewed- and noted below   Recent Labs   Lab 07/12/22  0650   BNP 1,917*   .  AHRF likely due to fluid overload possibly complicated by COPD. However, per recent pulmonology visit, dyspnea primarily cardiogenic in nature as PFTs showed mild obstructive disease in setting of recent EF of 15%.    Hypertension, essential  - Continue coreg and spironolactone as tolerated by BP  - Pt currently normotensive        Critical care was time spent personally by me on the  following activities: development of treatment plan with patient or surrogate and bedside caregivers, discussions with consultants, evaluation of patient's response to treatment, examination of patient, ordering and performing treatments and interventions, ordering and review of laboratory studies, ordering and review of radiographic studies, pulse oximetry, re-evaluation of patient's condition. This critical care time did not overlap with that of any other provider or involve time for any procedures.    Thank you for your consult. I will sign off. Please contact us if you have any additional questions.     Josefa Cuello MD  Critical Care Medicine  [facility] - Emergency Dept

## 2022-07-12 NOTE — ED PROVIDER NOTES
Encounter Date: 7/12/2022       History     Chief Complaint   Patient presents with    Shortness of Breath     Pt arrives via ems from home on cpap for sob. Hx of copd.     Ms. Hooper is a 76-year-old female with past medical history of combined systolic and diastolic heart failure (left ventricular EF 15% on 12/2022), hypertension, presumptive COPD, iron deficiency anemia who presents to the emergency department due to shortness of breath.  According to EMS, they received a call from the patient that she was having shortness of breath. On arrival patient was saturating in the low 80s she was given a breathing treatment but they were unable to give her steroids due to not getting IV access so they brought her to the emergency department. Patient states that she had been feeling short of breath for the past 2 days but today was much worse. She states that she has had COPD exacerbations in the past and has spent time in the ICU due to this.  She denies any fever, chills, nausea, vomiting, or chest pain.         Review of patient's allergies indicates:   Allergen Reactions    Atorvastatin      Leg cramps     Past Medical History:   Diagnosis Date    Acute on chronic respiratory failure with hypoxia and hypercapnia 12/10/2021    CHF (congestive heart failure)     Former smoker 10/24/2018    Hypertension     Smoker 7/27/2016     Past Surgical History:   Procedure Laterality Date    BREAST BIOPSY      left  side years ago in high school   1962    CHOLECYSTECTOMY      COLONOSCOPY      COLONOSCOPY N/A 8/23/2021    Procedure: COLONOSCOPY;  Surgeon: Shree Amaya MD;  Location: [address];  Service: Endoscopy;  Laterality: N/A;  Do not cancel this order  fully vaccinated-see immunization record  EF 18%  [facility]    HYSTERECTOMY       Family History   Problem Relation Age of Onset    Heart disease Mother     Heart attack Mother     Hypertension Mother     Colon cancer Father          colon    Dementia Father     Hypertension Father     Colon cancer Brother 63        colon    Heart disease Brother     Hypertension Brother     Diabetes Daughter     Crohn's disease Neg Hx     Ulcerative colitis Neg Hx     Stomach cancer Neg Hx      Social History     Tobacco Use    Smoking status: Former Smoker     Packs/day: 0.50     Quit date: 2018     Years since quittin.0    Smokeless tobacco: Never Used   Substance Use Topics    Alcohol use: Yes    Drug use: No     Review of Systems   Constitutional: Positive for fatigue. Negative for activity change, appetite change, chills and fever.   HENT: Negative for congestion, ear discharge, ear pain, facial swelling, postnasal drip, rhinorrhea, sinus pressure, sinus pain, sore throat and trouble swallowing.    Eyes: Negative for photophobia, pain and visual disturbance.   Respiratory: Positive for chest tightness and shortness of breath. Negative for apnea, cough, choking, wheezing and stridor.    Cardiovascular: Negative for chest pain, palpitations and leg swelling.   Gastrointestinal: Negative for abdominal pain, constipation, diarrhea, nausea and vomiting.   Genitourinary: Negative for difficulty urinating, dysuria, flank pain and pelvic pain.   Musculoskeletal: Negative for arthralgias, back pain, gait problem, joint swelling and myalgias.   Skin: Negative for color change and wound.   Neurological: Negative for dizziness, seizures, weakness, light-headedness, numbness and headaches.   Psychiatric/Behavioral: Negative for agitation and confusion.       Physical Exam     Initial Vitals   BP Pulse Resp Temp SpO2   22 0643 22 0643 22 0643 22 0658 22 0643   125/61 (!) 132 (!) 24 99.1 °F (37.3 °C) 96 %      MAP       --                Physical Exam    Nursing note and vitals reviewed.  Constitutional: She appears well-developed and well-nourished. She is not diaphoretic. She appears distressed.   Patient appears  soaked with water  Patient feels cold to touch   HENT:   Head: Normocephalic and atraumatic.   Mouth/Throat: Oropharynx is clear and moist. No oropharyngeal exudate.   Eyes: Conjunctivae and EOM are normal. Pupils are equal, round, and reactive to light. Right eye exhibits no discharge. Left eye exhibits no discharge. No scleral icterus.   Neck: No tracheal deviation present. No JVD present.   Normal range of motion.  Cardiovascular: Normal heart sounds and intact distal pulses. Tachycardia present.  Exam reveals no gallop.    No murmur heard.  Pulmonary/Chest: Tachypnea noted. She is in respiratory distress. She has wheezes. She has no rhonchi. She has rales. She exhibits no tenderness.   Abdominal: Abdomen is soft. Bowel sounds are normal. She exhibits no distension. There is no abdominal tenderness. There is no guarding.   Musculoskeletal:         General: No tenderness or edema. Normal range of motion.      Cervical back: Normal range of motion.     Neurological: She is alert and oriented to person, place, and time. She has normal strength. No cranial nerve deficit or sensory deficit.   Skin: Skin is warm. Capillary refill takes less than 2 seconds. No erythema.   Psychiatric: She has a normal mood and affect.         ED Course   Procedures  Labs Reviewed   CBC W/ AUTO DIFFERENTIAL - Abnormal; Notable for the following components:       Result Value    MCV 80 (*)     MCH 24.0 (*)     MCHC 29.9 (*)     RDW 14.8 (*)     Lymph # 5.0 (*)     Gran % 36.5 (*)     Lymph % 50.8 (*)     All other components within normal limits   COMPREHENSIVE METABOLIC PANEL - Abnormal; Notable for the following components:    Glucose 346 (*)     AST 87 (*)     ALT 70 (*)     eGFR if non  55.6 (*)     All other components within normal limits   LACTIC ACID, PLASMA - Abnormal; Notable for the following components:    Lactate (Lactic Acid) 3.2 (*)     All other components within normal limits   URINALYSIS, REFLEX TO URINE  CULTURE - Abnormal; Notable for the following components:    Appearance, UA Cloudy (*)     Protein, UA 2+ (*)     Glucose, UA 3+ (*)     All other components within normal limits    Narrative:     Specimen Source->Urine   TROPONIN I - Abnormal; Notable for the following components:    Troponin I 0.035 (*)     All other components within normal limits   B-TYPE NATRIURETIC PEPTIDE - Abnormal; Notable for the following components:    BNP 1,917 (*)     All other components within normal limits   PHOSPHORUS - Abnormal; Notable for the following components:    Phosphorus 4.9 (*)     All other components within normal limits   URINALYSIS MICROSCOPIC - Abnormal; Notable for the following components:    RBC, UA 6 (*)     WBC, UA 13 (*)     Bacteria Few (*)     Hyaline Casts, UA 66 (*)     All other components within normal limits    Narrative:     Specimen Source->Urine   LACTIC ACID, PLASMA - Abnormal; Notable for the following components:    Lactate (Lactic Acid) 2.7 (*)     All other components within normal limits   TROPONIN I - Abnormal; Notable for the following components:    Troponin I 1.396 (*)     All other components within normal limits   ISTAT PROCEDURE - Abnormal; Notable for the following components:    POC PH 7.154 (*)     POC PCO2 91.7 (*)     POC HCO3 32.2 (*)     POC SATURATED O2 80 (*)     POC TCO2 35 (*)     All other components within normal limits   ISTAT PROCEDURE - Abnormal; Notable for the following components:    POC PH 7.219 (*)     POC PCO2 61.4 (*)     POC PO2 32 (*)     POC SATURATED O2 48 (*)     All other components within normal limits   ISTAT PROCEDURE - Abnormal; Notable for the following components:    POC PCO2 47.8 (*)     POC PO2 63 (*)     POC HCO3 28.5 (*)     POC SATURATED O2 91 (*)     POC TCO2 30 (*)     All other components within normal limits   CULTURE, BLOOD    Narrative:     Aerobic and anaerobic   CULTURE, BLOOD    Narrative:     Aerobic and anaerobic   CULTURE, URINE    CULTURE, RESPIRATORY   MAGNESIUM   LACTIC ACID, PLASMA   POCT INFLUENZA A/B MOLECULAR   SARS-COV-2 RDRP GENE    Narrative:     This test utilizes isothermal nucleic acid amplification   technology to detect the SARS-CoV-2 RdRp nucleic acid segment.   The analytical sensitivity (limit of detection) is 125 genome   equivalents/mL.   A POSITIVE result implies infection with the SARS-CoV-2 virus;   the patient is presumed to be contagious.     A NEGATIVE result means that SARS-CoV-2 nucleic acids are not   present above the limit of detection. A NEGATIVE result should be   treated as presumptive. It does not rule out the possibility of   COVID-19 and should not be the sole basis for treatment decisions.   If COVID-19 is strongly suspected based on clinical and exposure   history, re-testing using an alternate molecular assay should be   considered.   This test is only for use under the Food and Drug   Administration s Emergency Use Authorization (EUA).   Commercial kits are provided by [employer].   Performance characteristics of the EUA have been independently   verified by [facility] Department of   Pathology and Laboratory Medicine.   _________________________________________________________________   The authorized Fact Sheet for Healthcare Providers and the authorized Fact   Sheet for Patients of the ID NOW COVID-19 are available on the FDA   website:     https://www.fda.gov/media/371314/download  https://www.fda.gov/media/908514/download          POCT GLUCOSE, HAND-HELD DEVICE        ECG Results          EKG 12-lead (Final result)  Result time 07/12/22 09:02:32    Final result by Interface, Lab In [facility] (07/12/22 09:02:32)                 Narrative:    Test Reason : R06.02,    Vent. Rate : 132 BPM     Atrial Rate : 132 BPM     P-R Int : 126 ms          QRS Dur : 168 ms      QT Int : 316 ms       P-R-T Axes : 077 071 -88 degrees     QTc Int : 468 ms    Sinus tachycardia  Possible Left atrial  enlargement  Left bundle branch block  Abnormal ECG  When compared with ECG of 02-MAY-2022 20:03,  Vent. rate has increased BY  57 BPM  Confirmed by Neal GRUBBS MD ([id number]) on 7/12/2022 9:02:19 AM    Referred By: System System           Confirmed By:Neal GRUBBS MD                            Imaging Results          X-Ray Chest AP Portable (Final result)  Result time 07/12/22 07:17:22    Final result by Kwaku Willett MD (07/12/22 07:17:22)                 Impression:      Prominence of the interstitial markings consistent with interstitial edema.  Correlation advised.      Electronically signed by: Kwaku Willett MD  Date:    07/12/2022  Time:    07:17             Narrative:    EXAMINATION:  XR CHEST AP PORTABLE    CLINICAL HISTORY:  Sepsis;    TECHNIQUE:  Single frontal view of the chest was performed.    COMPARISON:  05/17/2022    FINDINGS:  Mild diffuse interstitial edema.  Mild diffuse accentuation of interstitial markings and mild hazy parenchymal opacification.  No pleural effusion. No evident pneumothorax.    The cardiac silhouette is prominent.  The hilar and mediastinal contours are unremarkable.    Bones are intact.                                 Medications   sodium chloride 0.9% flush 10 mL (has no administration in time range)   enoxaparin injection 40 mg (has no administration in time range)   albuterol-ipratropium 2.5 mg-0.5 mg/3 mL nebulizer solution 3 mL (has no administration in time range)   melatonin tablet 6 mg (has no administration in time range)   ondansetron disintegrating tablet 8 mg (has no administration in time range)   promethazine tablet 25 mg (has no administration in time range)   polyethylene glycol packet 17 g (has no administration in time range)   bisacodyL suppository 10 mg (has no administration in time range)   acetaminophen tablet 650 mg (has no administration in time range)   glucose chewable tablet 16 g (has no administration in time range)   glucose chewable tablet 24 g  (has no administration in time range)   glucagon (human recombinant) injection 1 mg (has no administration in time range)   dextrose 10% bolus 125 mL (has no administration in time range)   dextrose 10% bolus 250 mL (has no administration in time range)   piperacillin-tazobactam 4.5 g in sodium chloride 0.9% 100 mL IVPB (ready to mix system) (4.5 g Intravenous New Bag 7/12/22 1607)   predniSONE tablet 40 mg (has no administration in time range)   furosemide injection 40 mg (has no administration in time range)   vancomycin in dextrose 5 % 1 gram/250 mL IVPB 1,000 mg (has no administration in time range)   albuterol-ipratropium 2.5 mg-0.5 mg/3 mL nebulizer solution 3 mL (3 mLs Nebulization Given 7/12/22 1606)   metoprolol succinate (TOPROL-XL) 24 hr tablet 25 mg (has no administration in time range)   sacubitriL-valsartan 24-26 mg per tablet 1 tablet (has no administration in time range)   albuterol-ipratropium 2.5 mg-0.5 mg/3 mL nebulizer solution 3 mL (3 mLs Nebulization Given 7/12/22 0705)   methylPREDNISolone sodium succinate injection 125 mg (125 mg Intravenous Given 7/12/22 0655)   vancomycin 1.5 g in dextrose 5 % 250 mL IVPB (ready to mix) (0 mg/kg × 70 kg Intravenous Stopped 7/12/22 1041)   piperacillin-tazobactam 4.5 g in sodium chloride 0.9% 100 mL IVPB (ready to mix system) (0 g Intravenous Stopped 7/12/22 0852)   furosemide injection 40 mg (40 mg Intravenous Given 7/12/22 0859)   furosemide injection 40 mg (40 mg Intravenous Given 7/12/22 1027)   potassium chloride SA CR tablet 40 mEq (40 mEq Oral Given 7/12/22 1608)     Medical Decision Making:   History:   Old Medical Records: I decided to obtain old medical records.  Old Records Summarized: records from previous admission(s).  Initial Assessment:   Ms. Hooper is a 76-year-old female with past medical history of combined systolic and diastolic heart failure (left ventricular EF 15% on 12/2022), hypertension, presumptive COPD, iron deficiency anemia who  presents to the emergency department due to shortness of breath.    Differential Diagnosis:   COPD exacerbation  CHF exacerbation  Viral URI  Sepsis  Clinical Tests:   Lab Tests: Ordered and Reviewed  Radiological Study: Ordered and Reviewed  ED Management:  Patient was thoroughly examined  EKG showed Regular rate and rhythm.    She appeared to be in respiratory distress and had bilateral wheezing  Given concerns for COPD exacerbation and the fact that patient was in distress she was put on BiPAP  Patient was given Steriods and Duoneb treatments  ABG was significant for respiratory acidosis   Labs were obtained and reviewed.   Chest x-ray did not show pneumonia.  Patient was able to be weaned off BiPAP successfully and so I felt that she was stable for the floor              Attending Attestation:   Physician Attestation Statement for Resident:  As the supervising MD   Physician Attestation Statement: I have personally seen and examined this patient.   I agree with the above history. -: 76 y.o. PMHx of CHF and COPD p/w worsening SOB and wheezing. PT initially on bipapa, weaned off while in ED, plan for hosp admission for further management and evaluation.    As the supervising MD I agree with the above PE.    As the supervising MD I agree with the above treatment, course, plan, and disposition.        Attending Critical Care:   Critical Care Times:   Direct Patient Care (initial evaluation, reassessments, and time considering the case)................................................................15 minutes.   Additional History from reviewing old medical records or taking additional history from the family, EMS, PCP, etc.......................6 minutes.   Ordering, Reviewing, and Interpreting Diagnostic Studies...............................................................................................................7 minutes.    Documentation..................................................................................................................................................................................6 minutes.   Consultation with other Physicians. .................................................................................................................................................5 minutes.   ==============================================================  · Total Critical Care Time - exclusive of procedural time: 39 minutes.  ==============================================================  Critical Care Condition: potentially life-threatening           ED Course as of 07/12/22 1702   Tue Jul 12, 2022   0719 Patients fluids were discontinued right after being started due to her low Ejection fraction. Patient did not get the Litre of LR that shows completed.  [OO]   0986 Discussion had with the ICU. They will evaluate the patient [OO]      ED Course User Index  [OO] Clem Connors MD             Clinical Impression:   Final diagnoses:  [R06.02] Shortness of breath  [J44.1] COPD exacerbation (Primary)  [R05.9] Cough          ED Disposition Condition    Admit               Clem Connors MD  Resident  07/12/22 1702       Ivonne Cortez Jr., MD  07/14/22 0040       Ivonne Cortez Jr., MD  07/14/22 0040

## 2022-07-12 NOTE — HPI
76 y.o. F with PMHx of combined CHF (EF 15% in 12/2021), COPD, obesity, HTN LANDY [facility] shortness of breath which started progressively worsening last night. On EMS arrival, pt with O2 sats in 80% on NC s/p breathing treatment. At home, pt wears oxygen as needed. Pt states she has dyspnea at baseline due to her CHF and COPD. Denies any leg swelling, cough, or orthopnea. Does reports of THEODORE and abdominal distention when she starts to retain fluid, no noticeable distension this time. Pt does note unable to breathe comfortably when laying on her L side, also complaints of upper R flank pain x 2 days that has improved. Pt was previously on Toprol 25 mg qd, Lasix 20 mg daily, Entresto, and Spironolactone 25 mg daily. However, Lasix and Spironolactone was held after recent discharge in March due to hypotension related to COVID 19. Pt adherent to Toprol and Entresto but only recently re-started Lasix 20 mg daily on May 25th after getting into touch with her cardiologist through [facility] [employer]. Pt has not been back for a clinic visit in about a year, upcoming appointment was set for August 2022.     In ED, /61, , tachypnea to 31, Tmax 99.1, pt started pm BiPAP. Labs with elevated BNP to 1917 (about 2100 at baseline), lactate of 3.2. CXR with bilateral diffuse interstitial opacities. UA with protein, 13 WBC, few bacteria, hyaline casts. Pt remained on continuous BiPAP, given methylprednisolone x1, and duoneb. Initial VBG with pH 7.154, CO2 91, HCO3 32, placed on BiPAP with only mild improvement in acidemia. Critical care consulted for acute on chronic hypoxic and hypercapnic respiratory failure.

## 2022-07-12 NOTE — SUBJECTIVE & OBJECTIVE
Past Medical History:   Diagnosis Date    Acute on chronic respiratory failure with hypoxia and hypercapnia 12/10/2021    CHF (congestive heart failure)     Former smoker 10/24/2018    Hypertension     Smoker 7/27/2016       Past Surgical History:   Procedure Laterality Date    BREAST BIOPSY      left  side years ago in high school   1962    CHOLECYSTECTOMY      COLONOSCOPY      COLONOSCOPY N/A 8/23/2021    Procedure: COLONOSCOPY;  Surgeon: Shree Amaya MD;  Location: [address]);  Service: Endoscopy;  Laterality: N/A;  Do not cancel this order  fully vaccinated-see immunization record  EF 18%  8/20-covid [facility]    HYSTERECTOMY         Review of patient's allergies indicates:   Allergen Reactions    Atorvastatin      Leg cramps       No current facility-administered medications on file prior to encounter.     Current Outpatient Medications on File Prior to Encounter   Medication Sig    furosemide (LASIX) 20 MG tablet Take 1 tablet (20 mg total) by mouth once daily.    guaiFENesin (MUCINEX) 600 mg 12 hr tablet Take 2 tablets (1,200 mg total) by mouth 2 (two) times daily as needed for Congestion (expectorant).    metoprolol succinate (TOPROL-XL) 25 MG 24 hr tablet Take 1 tablet (25 mg total) by mouth once daily.    multivitamin (THERAGRAN) tablet Take 1 tablet by mouth once daily.    pulse oximeter (PULSE OXIMETER) device by Apply Externally route 2 (two) times a day. Use twice daily at 8 AM and 3 PM and record the value in MyChart as directed.    sacubitriL-valsartan (ENTRESTO) 24-26 mg per tablet Take 1 tablet by mouth 2 (two) times daily.    [DISCONTINUED] spironolactone (ALDACTONE) 25 MG tablet Take 1 tablet (25 mg total) by mouth once daily.     Family History       Problem Relation (Age of Onset)    Colon cancer Father, Brother (63)    Dementia Father    Diabetes Daughter    Heart attack Mother    Heart disease Mother, Brother    Hypertension Mother, Father, Brother          Tobacco Use     Smoking status: Former Smoker     Packs/day: 0.50     Quit date: 2018     Years since quittin.0    Smokeless tobacco: Never Used   Substance and Sexual Activity    Alcohol use: Yes    Drug use: No    Sexual activity: Not on file     Review of Systems   Constitutional:  Positive for chills and diaphoresis. Negative for fever.   HENT:  Negative for trouble swallowing and voice change.    Eyes:  Negative for photophobia and visual disturbance.   Respiratory:  Positive for cough, shortness of breath and wheezing. Negative for chest tightness.    Cardiovascular:  Negative for chest pain, palpitations and leg swelling.   Gastrointestinal:  Negative for abdominal pain, constipation, diarrhea, nausea and vomiting.   Endocrine: Negative for polyphagia and polyuria.   Genitourinary:  Positive for dysuria and flank pain. Negative for decreased urine volume, difficulty urinating, hematuria and urgency.   Musculoskeletal:  Positive for back pain. Negative for arthralgias and gait problem.   Skin:  Negative for color change and wound.   Neurological:  Negative for dizziness, syncope, facial asymmetry, speech difficulty, weakness, light-headedness and headaches.   Psychiatric/Behavioral:  Negative for behavioral problems, confusion and decreased concentration.    Objective:     Vital Signs (Most Recent):  Temp: 97.5 °F (36.4 °C) (22 143)  Pulse: 84 (22 143)  Resp: 16 (22 143)  BP: 110/63 (22 143)  SpO2: 95 % (22) Vital Signs (24h Range):  Temp:  [97.5 °F (36.4 °C)-99.1 °F (37.3 °C)] 97.5 °F (36.4 °C)  Pulse:  [] 84  Resp:  [16-31] 16  SpO2:  [88 %-98 %] 95 %  BP: (110-125)/(59-63) 110/63     Weight: 70 kg (154 lb 5.2 oz)  Body mass index is 28.23 kg/m².    Physical Exam  Vitals and nursing note reviewed.   Constitutional:       General: She is not in acute distress.     Appearance: She is well-developed.   HENT:      Head: Normocephalic and atraumatic.      Mouth/Throat:       Pharynx: No oropharyngeal exudate.   Eyes:      Extraocular Movements: Extraocular movements intact.      Conjunctiva/sclera: Conjunctivae normal.   Neck:      Vascular: Hepatojugular reflux and JVD present.   Cardiovascular:      Rate and Rhythm: Normal rate and regular rhythm.      Heart sounds: Normal heart sounds.   Pulmonary:      Effort: Pulmonary effort is normal. No respiratory distress.      Breath sounds: Rales present. No wheezing.      Comments: Crackles to BLL  Abdominal:      General: Bowel sounds are normal. There is no distension.      Palpations: Abdomen is soft.      Tenderness: There is no abdominal tenderness.   Musculoskeletal:         General: No tenderness. Normal range of motion.      Cervical back: Normal range of motion and neck supple.   Lymphadenopathy:      Cervical: No cervical adenopathy.   Skin:     General: Skin is warm and dry.      Capillary Refill: Capillary refill takes less than 2 seconds.      Findings: No rash.   Neurological:      Mental Status: She is alert and oriented to person, place, and time.      Cranial Nerves: No cranial nerve deficit.      Sensory: No sensory deficit.      Coordination: Coordination normal.   Psychiatric:         Behavior: Behavior normal.         Thought Content: Thought content normal.         Judgment: Judgment normal.           Significant Labs: All pertinent labs within the past 24 hours have been reviewed.  CBC:   Recent Labs   Lab 07/12/22  0650   WBC 9.93   HGB 12.5   HCT 41.8        CMP:   Recent Labs   Lab 07/12/22  0650      K 3.7      CO2 24   *   BUN 15   CREATININE 1.0   CALCIUM 9.2   PROT 7.0   ALBUMIN 3.8   BILITOT 0.4   ALKPHOS 117   AST 87*   ALT 70*   ANIONGAP 11   EGFRNONAA 55.6*       Significant Imaging: I have reviewed all pertinent imaging results/findings within the past 24 hours.  X-Ray Chest AP Portable  Narrative: EXAMINATION:  XR CHEST AP PORTABLE    CLINICAL  HISTORY:  Sepsis;    TECHNIQUE:  Single frontal view of the chest was performed.    COMPARISON:  05/17/2022    FINDINGS:  Mild diffuse interstitial edema.  Mild diffuse accentuation of interstitial markings and mild hazy parenchymal opacification.  No pleural effusion. No evident pneumothorax.    The cardiac silhouette is prominent.  The hilar and mediastinal contours are unremarkable.    Bones are intact.  Impression: Prominence of the interstitial markings consistent with interstitial edema.  Correlation advised.    Electronically signed by: Kwaku Willett MD  Date:    07/12/2022  Time:    07:17

## 2022-07-12 NOTE — ASSESSMENT & PLAN NOTE
COPD exacerbation  Sepsis with acute respiratory failure  - suspect multifactorial from COPD/CHF exacerbations and sepsis  - required temporary Bipap in the ED>> weaned to 2L NC satting 98%  - IV diuresis as below  - continue IV vanc and zosyn  - follow blood and sputum cx  - Solumedrol 125mg IVP x1 then prednisone 40mg PO daily x5 days  - Bipap qhs  - incentive spirometry

## 2022-07-12 NOTE — ASSESSMENT & PLAN NOTE
- appears volume overload on exam, BNP 1917, CXR w/ pulm edema  - good reponse s/p 40mg lasix IVP in the ED  - start IV lasix 40mg BID  - repeat 2D echo  - continue GDMT  - maintain K>4 and Mg>2  - telemetry   - strict I/Os, daily weights, low Na diet w/ fluid restriction

## 2022-07-12 NOTE — HPI
Selma Hooper is a 76 y.o. 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 Patient reports worsening shortness of breath, orthopnea, abdominal swelling/distension, and productive cough of yellow/green sputum since last night. She denies LE swelling but says she normally retains fluid in her abdomen. She developed profuse diaphoresis upon the arrival to the ED which has improved since improvement in her respiratory status. Her SOB/ increased WOB has improved significantly since receiving steroids, duonebs, antibiotics, and lasix in the ED prior to my exam. She also notes mild L flank pain with mild dysuria for the past few days. Denies fever, chest pain, N/V, abdominal pain, diarrhea, HA, vision changes, or syncope. Of note, her lasix was held after admission in march due to hypotension when she was admitted for COVID. It was recently re-started on May 25th after discussion with her cardiologist.     ED: , RR 29, /59, satting 80% on RA. VBG with pH 7.154, CO2 91, HCO3 32. Placed on BiPAP temporarily with significant improvement in respiratory status. Critical care consulted; no ICU needs warranted at this time.

## 2022-07-12 NOTE — ASSESSMENT & PLAN NOTE
Pt with hx of combined CHF and COPD presenting with gradually worsening dyspnea, found to be hypoxic to 80% on NC.  Initial VBG with pH of 7.1, CO2 91. CXR with bilateral pulmonary infiltrates. BNP to 1900. In ED, s/p IV methylprednisolone, duo-neb, and IV Lasix 40 mg for concerns of COPD exacerbation with acute CHF. Pt placed on continuous BiPAP - pressure support adjusted by ICU team. Additional dose of IV Lasix given with good UOP. Repeat ABG with pH of 7.38, CO2 of 47.8, PO2 of 63. Pt able to be transitioned to 1L RA with no complications.    - Would recommend NC with SpO2 goal of 88-90% due to COPD, BiPAP QHS   - Continue IV Lasix diuresis for fluid overload, can give additional IV 40 mg PRN, goal of 2-3L daily until euvolemic   - F/u TTE  - Continue GDMT as tolerated by BP   - Albuterol PRN for wheezing

## 2022-07-12 NOTE — SUBJECTIVE & OBJECTIVE
Past Medical History:   Diagnosis Date    Acute on chronic respiratory failure with hypoxia and hypercapnia 12/10/2021    CHF (congestive heart failure)     Former smoker 10/24/2018    Hypertension     Smoker 2016       Past Surgical History:   Procedure Laterality Date    BREAST BIOPSY      left  side years ago in high school       CHOLECYSTECTOMY      COLONOSCOPY      COLONOSCOPY N/A 2021    Procedure: COLONOSCOPY;  Surgeon: Shree Amaya MD;  Location: [address]);  Service: Endoscopy;  Laterality: N/A;  Do not cancel this order  fully vaccinated-see immunization record  EF 18%  -covid [facility]    HYSTERECTOMY         Review of patient's allergies indicates:   Allergen Reactions    Atorvastatin      Leg cramps       Family History       Problem Relation (Age of Onset)    Colon cancer Father, Brother (63)    Dementia Father    Diabetes Daughter    Heart attack Mother    Heart disease Mother, Brother    Hypertension Mother, Father, Brother          Tobacco Use    Smoking status: Former Smoker     Packs/day: 0.50     Quit date: 2018     Years since quittin.0    Smokeless tobacco: Never Used   Substance and Sexual Activity    Alcohol use: Yes    Drug use: No    Sexual activity: Not on file      Review of Systems  Objective:     Vital Signs (Most Recent):  Temp: 99.1 °F (37.3 °C) (22 0658)  Pulse: 65 (22 1153)  Resp: 20 (22 1153)  BP: (!) 110/59 (22 0947)  SpO2: (!) 92 % (22 1153)   Vital Signs (24h Range):  Temp:  [99.1 °F (37.3 °C)] 99.1 °F (37.3 °C)  Pulse:  [] 65  Resp:  [19-31] 20  SpO2:  [88 %-98 %] 92 %  BP: (110-125)/(59-61) 110/59   Weight: 70 kg (154 lb 5.2 oz)  Body mass index is 28.23 kg/m².      Intake/Output Summary (Last 24 hours) at 2022 1354  Last data filed at 2022 1044  Gross per 24 hour   Intake --   Output 700 ml   Net -700 ml       Physical Exam  Vitals and nursing note reviewed.   Constitutional:        Appearance: She is obese.   HENT:      Head: Normocephalic and atraumatic.   Eyes:      General: No scleral icterus.        Right eye: No discharge.         Left eye: No discharge.      Extraocular Movements: Extraocular movements intact.      Conjunctiva/sclera: Conjunctivae normal.   Cardiovascular:      Rate and Rhythm: Normal rate and regular rhythm.      Heart sounds:     No friction rub. No gallop.   Pulmonary:      Effort: No respiratory distress.      Breath sounds: No stridor.      Comments: On NC. Mild inspiratory wheezes. Coarse crackles/rales to all lung fields (greater in mid and lower lung zones). Does not clear with coughing.   Abdominal:      General: Abdomen is flat. There is no distension.      Palpations: Abdomen is soft.      Tenderness: There is no abdominal tenderness. There is no guarding or rebound.   Musculoskeletal:         General: No signs of injury.      Cervical back: Normal range of motion and neck supple.      Right lower leg: No edema.      Left lower leg: No edema.      Comments: Moving all extremities.    Skin:     General: Skin is warm and dry.      Findings: No bruising or rash.   Neurological:      General: No focal deficit present.      Mental Status: She is alert and oriented to person, place, and time.   Psychiatric:         Mood and Affect: Mood normal.         Speech: Speech normal.         Behavior: Behavior normal.       Vents:  Oxygen Concentration (%): 28 (07/12/22 1109)  Lines/Drains/Airways       Drain  Duration             Female External Urinary Catheter 07/12/22 0908 <1 day              Peripheral Intravenous Line  Duration                  Peripheral IV - Single Lumen 05/01/22 2037 22 G Right Hand 71 days         Peripheral IV - Single Lumen 07/12/22 0647 22 G Right Wrist <1 day                  Significant Labs:    CBC/Anemia Profile:  Recent Labs   Lab 07/12/22  0650   WBC 9.93   HGB 12.5   HCT 41.8      MCV 80*   RDW 14.8*        Chemistries:  Recent  Labs   Lab 07/12/22  0650      K 3.7      CO2 24   BUN 15   CREATININE 1.0   CALCIUM 9.2   ALBUMIN 3.8   PROT 7.0   BILITOT 0.4   ALKPHOS 117   ALT 70*   AST 87*   MG 2.1   PHOS 4.9*       ABGs:   Recent Labs   Lab 07/12/22  1143   PH 7.383   PCO2 47.8*   HCO3 28.5*   POCSATURATED 91*   BE 3     CMP:   Recent Labs   Lab 07/12/22  0650      K 3.7      CO2 24   *   BUN 15   CREATININE 1.0   CALCIUM 9.2   PROT 7.0   ALBUMIN 3.8   BILITOT 0.4   ALKPHOS 117   AST 87*   ALT 70*   ANIONGAP 11   EGFRNONAA 55.6*     Lactic Acid:   Recent Labs   Lab 07/12/22  0650 07/12/22  1049   LACTATE 3.2* 2.7*     All pertinent labs within the past 24 hours have been reviewed.    Significant Imaging: I have reviewed all pertinent imaging results/findings within the past 24 hours.

## 2022-07-12 NOTE — PROGRESS NOTES
Pharmacokinetic Initial Assessment: IV Vancomycin    Assessment/Plan:    · Initiate intravenous vancomycin with loading dose of 1500 mg once followed by a maintenance dose of vancomycin 1000mg IV every 24 hours  · Desired empiric serum trough concentration is 15 to 20 mcg/mL  · Draw vancomycin trough level 60 min prior to third dose on 07/14 at approximately 08:00  · Pharmacy will continue to follow and monitor vancomycin.      Please contact pharmacy at extension [phone] with any questions regarding this assessment.      Thank you for the consult,   Jaye Bucks       Patient brief summary:  Selma Hooper is a 76 y.o. female initiated on antimicrobial therapy with IV Vancomycin for treatment of suspected Sepsis of Unknown Source  Drug Allergies:   Review of patient's allergies indicates:   Allergen Reactions    Atorvastatin      Leg cramps       Actual Body Weight:   70 Kg.    Renal Function:   Estimated Creatinine Clearance: 45.3 mL/min (based on SCr of 1 mg/dL).,     Dialysis Method (if applicable):  N/A    CBC (last 72 hours):  Recent Labs   Lab Result Units 07/12/22  0650   WBC K/uL 9.93   Hemoglobin g/dL 12.5   Hematocrit % 41.8   Platelets K/uL 281   Gran % % 36.5*   Lymph % % 50.8*   Mono % % 9.6   Eosinophil % % 1.5   Basophil % % 1.5   Differential Method  Automated       Metabolic Panel (last 72 hours):  Recent Labs   Lab Result Units 07/12/22  0650 07/12/22  0738   Sodium mmol/L 143  --    Potassium mmol/L 3.7  --    Chloride mmol/L 108  --    CO2 mmol/L 24  --    Glucose mg/dL 346*  --    Glucose, UA   --  3+*   BUN mg/dL 15  --    Creatinine mg/dL 1.0  --    Albumin g/dL 3.8  --    Total Bilirubin mg/dL 0.4  --    Alkaline Phosphatase U/L 117  --    AST U/L 87*  --    ALT U/L 70*  --    Magnesium mg/dL 2.1  --    Phosphorus mg/dL 4.9*  --        Drug levels (last 3 results):  No results for input(s): VANCOMYCINRA, VANCORANDOM, VANCOMYCINPE, VANCOPEAK, VANCOMYCINTR, VANCOTROUGH in the last 72  hours.    Microbiologic Results:  Microbiology Results (last 7 days)     Procedure Component Value Units Date/Time    Culture, Respiratory with Gram Stain [[id number]]     Order Status: No result Specimen: Respiratory from Sputum, Expectorated     Urine culture [[id number]] Collected: 07/12/22 0738    Order Status: No result Specimen: Urine Updated: 07/12/22 0811    Blood culture x two cultures. Draw prior to antibiotics. [[id number]] Collected: 07/12/22 0650    Order Status: Sent Specimen: Blood from Peripheral, Wrist, Right Updated: 07/12/22 0659    Blood culture x two cultures. Draw prior to antibiotics. [[id number]] Collected: 07/12/22 0650    Order Status: Sent Specimen: Blood from Peripheral, Wrist, Right Updated: 07/12/22 0659

## 2022-07-12 NOTE — ASSESSMENT & PLAN NOTE
- Trop 0.035, EKG without acute STEMI  - suspect type 2 demand ischemia from volume overload  - follow up TTE  - trend trop

## 2022-07-12 NOTE — H&P
[facility] Emergency Dept  [facility]  History & Physical    Patient Name: Selma Hooper  MRN: [id number]  Patient Class: IP- Inpatient  Admission Date: 7/12/2022  Attending Physician: Mi Arias MD   Primary Care Provider: Phil Quintana MD         Patient information was obtained from patient, past medical records and ER records.     Subjective:     Principal Problem:Acute respiratory failure with hypoxia and hypercarbia    Chief Complaint:   Chief Complaint   Patient presents with    Shortness of Breath     Pt arrives via ems from home on cpap for sob. Hx of copd.        HPI: Selma Hooper is a 76 y.o. 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 Patient reports worsening shortness of breath, orthopnea, abdominal swelling/distension, and productive cough of yellow/green sputum since last night. She denies LE swelling but says she normally retains fluid in her abdomen. She developed profuse diaphoresis upon the arrival to the ED which has improved since improvement in her respiratory status. Her SOB/ increased WOB has improved significantly since receiving steroids, duonebs, antibiotics, and lasix in the ED prior to my exam. She also notes mild L flank pain with mild dysuria for the past few days. Denies fever, chest pain, N/V, abdominal pain, diarrhea, HA, vision changes, or syncope. Of note, her lasix was held after admission in march due to hypotension when she was admitted for COVID. It was recently re-started on May 25th after discussion with her cardiologist.     ED: , RR 29, /59, satting 80% on RA. VBG with pH 7.154, CO2 91, HCO3 32. Placed on BiPAP temporarily with significant improvement in respiratory status. Critical care consulted; no ICU needs warranted at this time.       Past Medical History:   Diagnosis Date    Acute on chronic respiratory failure with hypoxia and hypercapnia 12/10/2021    CHF  (congestive heart failure)     Former smoker 10/24/2018    Hypertension     Smoker 2016       Past Surgical History:   Procedure Laterality Date    BREAST BIOPSY      left  side years ago in high school       CHOLECYSTECTOMY      COLONOSCOPY      COLONOSCOPY N/A 2021    Procedure: COLONOSCOPY;  Surgeon: Shree Amaya MD;  Location: [address]);  Service: Endoscopy;  Laterality: N/A;  Do not cancel this order  fully vaccinated-see immunization record  EF 18%  -covid [facility]    HYSTERECTOMY         Review of patient's allergies indicates:   Allergen Reactions    Atorvastatin      Leg cramps       No current facility-administered medications on file prior to encounter.     Current Outpatient Medications on File Prior to Encounter   Medication Sig    furosemide (LASIX) 20 MG tablet Take 1 tablet (20 mg total) by mouth once daily.    guaiFENesin (MUCINEX) 600 mg 12 hr tablet Take 2 tablets (1,200 mg total) by mouth 2 (two) times daily as needed for Congestion (expectorant).    metoprolol succinate (TOPROL-XL) 25 MG 24 hr tablet Take 1 tablet (25 mg total) by mouth once daily.    multivitamin (THERAGRAN) tablet Take 1 tablet by mouth once daily.    pulse oximeter (PULSE OXIMETER) device by Apply Externally route 2 (two) times a day. Use twice daily at 8 AM and 3 PM and record the value in MyChart as directed.    sacubitriL-valsartan (ENTRESTO) 24-26 mg per tablet Take 1 tablet by mouth 2 (two) times daily.    [DISCONTINUED] spironolactone (ALDACTONE) 25 MG tablet Take 1 tablet (25 mg total) by mouth once daily.     Family History       Problem Relation (Age of Onset)    Colon cancer Father, Brother (63)    Dementia Father    Diabetes Daughter    Heart attack Mother    Heart disease Mother, Brother    Hypertension Mother, Father, Brother          Tobacco Use    Smoking status: Former Smoker     Packs/day: 0.50     Quit date: 2018     Years since quittin.0     Smokeless tobacco: Never Used   Substance and Sexual Activity    Alcohol use: Yes    Drug use: No    Sexual activity: Not on file     Review of Systems   Constitutional:  Positive for chills and diaphoresis. Negative for fever.   HENT:  Negative for trouble swallowing and voice change.    Eyes:  Negative for photophobia and visual disturbance.   Respiratory:  Positive for cough, shortness of breath and wheezing. Negative for chest tightness.    Cardiovascular:  Negative for chest pain, palpitations and leg swelling.   Gastrointestinal:  Negative for abdominal pain, constipation, diarrhea, nausea and vomiting.   Endocrine: Negative for polyphagia and polyuria.   Genitourinary:  Positive for dysuria and flank pain. Negative for decreased urine volume, difficulty urinating, hematuria and urgency.   Musculoskeletal:  Positive for back pain. Negative for arthralgias and gait problem.   Skin:  Negative for color change and wound.   Neurological:  Negative for dizziness, syncope, facial asymmetry, speech difficulty, weakness, light-headedness and headaches.   Psychiatric/Behavioral:  Negative for behavioral problems, confusion and decreased concentration.    Objective:     Vital Signs (Most Recent):  Temp: 97.5 °F (36.4 °C) (07/12/22 1432)  Pulse: 84 (07/12/22 1432)  Resp: 16 (07/12/22 1432)  BP: 110/63 (07/12/22 1432)  SpO2: 95 % (07/12/22 1432) Vital Signs (24h Range):  Temp:  [97.5 °F (36.4 °C)-99.1 °F (37.3 °C)] 97.5 °F (36.4 °C)  Pulse:  [] 84  Resp:  [16-31] 16  SpO2:  [88 %-98 %] 95 %  BP: (110-125)/(59-63) 110/63     Weight: 70 kg (154 lb 5.2 oz)  Body mass index is 28.23 kg/m².    Physical Exam  Vitals and nursing note reviewed.   Constitutional:       General: She is not in acute distress.     Appearance: She is well-developed.   HENT:      Head: Normocephalic and atraumatic.      Mouth/Throat:      Pharynx: No oropharyngeal exudate.   Eyes:      Extraocular Movements: Extraocular movements intact.       Conjunctiva/sclera: Conjunctivae normal.   Neck:      Vascular: Hepatojugular reflux and JVD present.   Cardiovascular:      Rate and Rhythm: Normal rate and regular rhythm.      Heart sounds: Normal heart sounds.   Pulmonary:      Effort: Pulmonary effort is normal. No respiratory distress.      Breath sounds: Rales present. No wheezing.      Comments: Crackles to BLL  Abdominal:      General: Bowel sounds are normal. There is no distension.      Palpations: Abdomen is soft.      Tenderness: There is no abdominal tenderness.   Musculoskeletal:         General: No tenderness. Normal range of motion.      Cervical back: Normal range of motion and neck supple.   Lymphadenopathy:      Cervical: No cervical adenopathy.   Skin:     General: Skin is warm and dry.      Capillary Refill: Capillary refill takes less than 2 seconds.      Findings: No rash.   Neurological:      Mental Status: She is alert and oriented to person, place, and time.      Cranial Nerves: No cranial nerve deficit.      Sensory: No sensory deficit.      Coordination: Coordination normal.   Psychiatric:         Behavior: Behavior normal.         Thought Content: Thought content normal.         Judgment: Judgment normal.           Significant Labs: All pertinent labs within the past 24 hours have been reviewed.  CBC:   Recent Labs   Lab 07/12/22  0650   WBC 9.93   HGB 12.5   HCT 41.8        CMP:   Recent Labs   Lab 07/12/22  0650      K 3.7      CO2 24   *   BUN 15   CREATININE 1.0   CALCIUM 9.2   PROT 7.0   ALBUMIN 3.8   BILITOT 0.4   ALKPHOS 117   AST 87*   ALT 70*   ANIONGAP 11   EGFRNONAA 55.6*       Significant Imaging: I have reviewed all pertinent imaging results/findings within the past 24 hours.  X-Ray Chest AP Portable  Narrative: EXAMINATION:  XR CHEST AP PORTABLE    CLINICAL HISTORY:  Sepsis;    TECHNIQUE:  Single frontal view of the chest was performed.    COMPARISON:  05/17/2022    FINDINGS:  Mild diffuse  interstitial edema.  Mild diffuse accentuation of interstitial markings and mild hazy parenchymal opacification.  No pleural effusion. No evident pneumothorax.    The cardiac silhouette is prominent.  The hilar and mediastinal contours are unremarkable.    Bones are intact.  Impression: Prominence of the interstitial markings consistent with interstitial edema.  Correlation advised.    Electronically signed by: Kwaku Willett MD  Date:    07/12/2022  Time:    07:17      Assessment/Plan:     * Acute respiratory failure with hypoxia and hypercarbia  COPD exacerbation  Sepsis with acute respiratory failure  - suspect multifactorial from COPD/CHF exacerbations and sepsis  - required temporary Bipap in the ED>> weaned to 2L NC satting 98%  - IV diuresis as below  - continue IV vanc and zosyn  - follow blood and sputum cx  - Solumedrol 125mg IVP x1 then prednisone 40mg PO daily x5 days  - Bipap qhs  - incentive spirometry     Acute on chronic combined systolic and diastolic congestive heart failure  - appears volume overload on exam, BNP 1917, CXR w/ pulm edema  - good reponse s/p 40mg lasix IVP in the ED  - start IV lasix 40mg BID  - repeat 2D echo  - continue GDMT  - maintain K>4 and Mg>2  - telemetry   - strict I/Os, daily weights, low Na diet w/ fluid restriction    Lactic acidosis  - Lactate 3.4>>2.7  - suspect 2/2 respiratory failure/ sepsis and less likely cardiogenic shock given extremities warm w/ good pulses  - antibiotics as above  - trend lactate    Elevated troponin level  - Trop 0.035, EKG without acute STEMI  - suspect type 2 demand ischemia from volume overload  - follow up TTE  - trend trop    UTI (urinary tract infection)  - continue IV vanc and zosyn  - follow urine cx     Hypertension, essential  - continue entresto and MTP      VTE Risk Mitigation (From admission, onward)         Ordered     enoxaparin injection 40 mg  Daily         07/12/22 1419     IP VTE HIGH RISK PATIENT  Once         07/12/22  1419                   Milly Schmitz PA-C  Department of Hospital Medicine   [facility] - Emergency Dept

## 2022-07-12 NOTE — ASSESSMENT & PLAN NOTE
- Lactate 3.4>>2.7  - suspect 2/2 respiratory failure/ sepsis and less likely cardiogenic shock given extremities warm w/ good pulses  - antibiotics as above  - trend lactate

## 2022-07-12 NOTE — ASSESSMENT & PLAN NOTE
Patient is identified as having Combined Systolic and Diastolic heart failure that is Acute on chronic. CHF is currently uncontrolled due to Rales/crackles on pulmonary exam and Pulmonary edema/pleural effusion on CXR. Latest ECHO performed and demonstrates- Results for orders placed during the hospital encounter of 12/08/21    Echo    Interpretation Summary  · The left ventricle is normal in size with moderate eccentric hypertrophy and severely decreased systolic function.  · There is severe left ventricular global hypokinesis. The estimated ejection fraction is 15%.  · Left ventricular diastolic dysfunction.  · Normal right ventricular size with normal right ventricular systolic function.  · Normal central venous pressure (3 mmHg).  · There is no significant tricuspid regurgitation and therefore the pulmonary artery systolic pressure cannot be reported.    . Continue Beta Blocker and Furosemide and monitor clinical status closely. Monitor on telemetry. Patient is off CHF pathway.  Monitor strict Is&Os and daily weights.  Place on fluid restriction of 1.5 L. Continue to stress to patient importance of self efficacy and  on diet for CHF. Last BNP reviewed- and noted below   Recent Labs   Lab 07/12/22  0650   BNP 1,917*   .  AHRF likely due to fluid overload possibly complicated by COPD. However, per recent pulmonology visit, dyspnea primarily cardiogenic in nature as PFTs showed mild obstructive disease in setting of recent EF of 15%.

## 2022-07-13 LAB
ANION GAP SERPL CALC-SCNC: 13 MMOL/L (ref 8–16)
ASCENDING AORTA: 2.96 CM
AV INDEX (PROSTH): 0.81
AV MEAN GRADIENT: 3 MMHG
AV PEAK GRADIENT: 7 MMHG
AV VALVE AREA: 2.57 CM2
AV VELOCITY RATIO: 0.81
BACTERIA UR CULT: NO GROWTH
BASOPHILS # BLD AUTO: 0 K/UL (ref 0–0.2)
BASOPHILS NFR BLD: 0 % (ref 0–1.9)
BSA FOR ECHO PROCEDURE: 1.67 M2
BUN SERPL-MCNC: 22 MG/DL (ref 8–23)
CALCIUM SERPL-MCNC: 9.1 MG/DL (ref 8.7–10.5)
CHLORIDE SERPL-SCNC: 103 MMOL/L (ref 95–110)
CO2 SERPL-SCNC: 28 MMOL/L (ref 23–29)
CREAT SERPL-MCNC: 1 MG/DL (ref 0.5–1.4)
CV ECHO LV RWT: 0.29 CM
DIFFERENTIAL METHOD: ABNORMAL
DOP CALC AO PEAK VEL: 1.34 M/S
DOP CALC AO VTI: 26.6 CM
DOP CALC LVOT AREA: 3.2 CM2
DOP CALC LVOT DIAMETER: 2.01 CM
DOP CALC LVOT PEAK VEL: 1.08 M/S
DOP CALC LVOT STROKE VOLUME: 68.35 CM3
DOP CALCLVOT PEAK VEL VTI: 21.55 CM
E WAVE DECELERATION TIME: 186.42 MSEC
E/A RATIO: 0.66
E/E' RATIO: 11.8 M/S
ECHO LV POSTERIOR WALL: 0.9 CM (ref 0.6–1.1)
EJECTION FRACTION: 15 %
EOSINOPHIL # BLD AUTO: 0 K/UL (ref 0–0.5)
EOSINOPHIL NFR BLD: 0 % (ref 0–8)
ERYTHROCYTE [DISTWIDTH] IN BLOOD BY AUTOMATED COUNT: 14.9 % (ref 11.5–14.5)
EST. GFR  (AFRICAN AMERICAN): >60 ML/MIN/1.73 M^2
EST. GFR  (NON AFRICAN AMERICAN): 55.6 ML/MIN/1.73 M^2
ESTIMATED AVG GLUCOSE: 103 MG/DL (ref 68–131)
FRACTIONAL SHORTENING: 8 % (ref 28–44)
GLUCOSE SERPL-MCNC: 113 MG/DL (ref 70–110)
HBA1C MFR BLD: 5.2 % (ref 4–5.6)
HCT VFR BLD AUTO: 34.9 % (ref 37–48.5)
HGB BLD-MCNC: 10.6 G/DL (ref 12–16)
IMM GRANULOCYTES # BLD AUTO: 0.03 K/UL (ref 0–0.04)
IMM GRANULOCYTES NFR BLD AUTO: 0.4 % (ref 0–0.5)
INTERVENTRICULAR SEPTUM: 0.88 CM (ref 0.6–1.1)
LA MAJOR: 5.07 CM
LA MINOR: 5.07 CM
LA WIDTH: 4 CM
LACTATE SERPL-SCNC: 2.5 MMOL/L (ref 0.5–2.2)
LEFT ATRIUM SIZE: 4.48 CM
LEFT ATRIUM VOLUME INDEX MOD: 41.8 ML/M2
LEFT ATRIUM VOLUME INDEX: 47.1 ML/M2
LEFT ATRIUM VOLUME MOD: 68.52 CM3
LEFT ATRIUM VOLUME: 77.23 CM3
LEFT INTERNAL DIMENSION IN SYSTOLE: 5.82 CM (ref 2.1–4)
LEFT VENTRICLE DIASTOLIC VOLUME INDEX: 122.84 ML/M2
LEFT VENTRICLE DIASTOLIC VOLUME: 201.45 ML
LEFT VENTRICLE MASS INDEX: 141 G/M2
LEFT VENTRICLE SYSTOLIC VOLUME INDEX: 102.2 ML/M2
LEFT VENTRICLE SYSTOLIC VOLUME: 167.68 ML
LEFT VENTRICULAR INTERNAL DIMENSION IN DIASTOLE: 6.3 CM (ref 3.5–6)
LEFT VENTRICULAR MASS: 231.45 G
LV LATERAL E/E' RATIO: 8.43 M/S
LV SEPTAL E/E' RATIO: 19.67 M/S
LYMPHOCYTES # BLD AUTO: 0.7 K/UL (ref 1–4.8)
LYMPHOCYTES NFR BLD: 10.5 % (ref 18–48)
MAGNESIUM SERPL-MCNC: 1.7 MG/DL (ref 1.6–2.6)
MCH RBC QN AUTO: 23.8 PG (ref 27–31)
MCHC RBC AUTO-ENTMCNC: 30.4 G/DL (ref 32–36)
MCV RBC AUTO: 78 FL (ref 82–98)
MONOCYTES # BLD AUTO: 0.4 K/UL (ref 0.3–1)
MONOCYTES NFR BLD: 5.3 % (ref 4–15)
MV A" WAVE DURATION": 10.85 MSEC
MV PEAK A VEL: 0.9 M/S
MV PEAK E VEL: 0.59 M/S
MV STENOSIS PRESSURE HALF TIME: 54.06 MS
MV VALVE AREA P 1/2 METHOD: 4.07 CM2
NEUTROPHILS # BLD AUTO: 5.9 K/UL (ref 1.8–7.7)
NEUTROPHILS NFR BLD: 83.8 % (ref 38–73)
NRBC BLD-RTO: 0 /100 WBC
PISA TR MAX VEL: 2.67 M/S
PLATELET # BLD AUTO: 196 K/UL (ref 150–450)
PMV BLD AUTO: 11 FL (ref 9.2–12.9)
POCT GLUCOSE: 120 MG/DL (ref 70–110)
POCT GLUCOSE: 137 MG/DL (ref 70–110)
POCT GLUCOSE: 405 MG/DL (ref 70–110)
POTASSIUM SERPL-SCNC: 3.5 MMOL/L (ref 3.5–5.1)
PULM VEIN S/D RATIO: 1.12
PV PEAK D VEL: 0.49 M/S
PV PEAK S VEL: 0.55 M/S
RA MAJOR: 3.96 CM
RA PRESSURE: 3 MMHG
RA WIDTH: 3.93 CM
RBC # BLD AUTO: 4.46 M/UL (ref 4–5.4)
RIGHT VENTRICULAR END-DIASTOLIC DIMENSION: 3.65 CM
RV TISSUE DOPPLER FREE WALL SYSTOLIC VELOCITY 1 (APICAL 4 CHAMBER VIEW): 10.52 CM/S
SINUS: 2.84 CM
SODIUM SERPL-SCNC: 144 MMOL/L (ref 136–145)
STJ: 2.41 CM
TDI LATERAL: 0.07 M/S
TDI SEPTAL: 0.03 M/S
TDI: 0.05 M/S
TR MAX PG: 29 MMHG
TRICUSPID ANNULAR PLANE SYSTOLIC EXCURSION: 2.3 CM
TROPONIN I SERPL DL<=0.01 NG/ML-MCNC: 1.17 NG/ML (ref 0–0.03)
TV REST PULMONARY ARTERY PRESSURE: 32 MMHG
WBC # BLD AUTO: 7.04 K/UL (ref 3.9–12.7)

## 2022-07-13 PROCEDURE — 83735 ASSAY OF MAGNESIUM: CPT | Performed by: PHYSICIAN ASSISTANT

## 2022-07-13 PROCEDURE — 36415 COLL VENOUS BLD VENIPUNCTURE: CPT | Performed by: PHYSICIAN ASSISTANT

## 2022-07-13 PROCEDURE — 27000221 HC OXYGEN, UP TO 24 HOURS

## 2022-07-13 PROCEDURE — 63600175 PHARM REV CODE 636 W HCPCS: Performed by: PHYSICIAN ASSISTANT

## 2022-07-13 PROCEDURE — 94660 CPAP INITIATION&MGMT: CPT

## 2022-07-13 PROCEDURE — 83036 HEMOGLOBIN GLYCOSYLATED A1C: CPT | Performed by: PHYSICIAN ASSISTANT

## 2022-07-13 PROCEDURE — 83605 ASSAY OF LACTIC ACID: CPT | Performed by: FAMILY MEDICINE

## 2022-07-13 PROCEDURE — 85025 COMPLETE CBC W/AUTO DIFF WBC: CPT | Performed by: PHYSICIAN ASSISTANT

## 2022-07-13 PROCEDURE — 25000242 PHARM REV CODE 250 ALT 637 W/ HCPCS: Performed by: PHYSICIAN ASSISTANT

## 2022-07-13 PROCEDURE — 20600001 HC STEP DOWN PRIVATE ROOM

## 2022-07-13 PROCEDURE — 94761 N-INVAS EAR/PLS OXIMETRY MLT: CPT

## 2022-07-13 PROCEDURE — 99900035 HC TECH TIME PER 15 MIN (STAT)

## 2022-07-13 PROCEDURE — 25500020 PHARM REV CODE 255: Performed by: FAMILY MEDICINE

## 2022-07-13 PROCEDURE — 25000003 PHARM REV CODE 250: Performed by: FAMILY MEDICINE

## 2022-07-13 PROCEDURE — 99233 PR SUBSEQUENT HOSPITAL CARE,LEVL III: ICD-10-PCS | Mod: ,,, | Performed by: FAMILY MEDICINE

## 2022-07-13 PROCEDURE — 94640 AIRWAY INHALATION TREATMENT: CPT

## 2022-07-13 PROCEDURE — 80048 BASIC METABOLIC PNL TOTAL CA: CPT | Performed by: PHYSICIAN ASSISTANT

## 2022-07-13 PROCEDURE — 36415 COLL VENOUS BLD VENIPUNCTURE: CPT | Performed by: FAMILY MEDICINE

## 2022-07-13 PROCEDURE — 25000003 PHARM REV CODE 250: Performed by: PHYSICIAN ASSISTANT

## 2022-07-13 PROCEDURE — 84484 ASSAY OF TROPONIN QUANT: CPT | Performed by: PHYSICIAN ASSISTANT

## 2022-07-13 PROCEDURE — 63600175 PHARM REV CODE 636 W HCPCS: Performed by: FAMILY MEDICINE

## 2022-07-13 PROCEDURE — 99233 SBSQ HOSP IP/OBS HIGH 50: CPT | Mod: ,,, | Performed by: FAMILY MEDICINE

## 2022-07-13 PROCEDURE — 94799 UNLISTED PULMONARY SVC/PX: CPT

## 2022-07-13 RX ADMIN — PREDNISONE 40 MG: 20 TABLET ORAL at 08:07

## 2022-07-13 RX ADMIN — IPRATROPIUM BROMIDE AND ALBUTEROL SULFATE 3 ML: 2.5; .5 SOLUTION RESPIRATORY (INHALATION) at 08:07

## 2022-07-13 RX ADMIN — ONDANSETRON 8 MG: 8 TABLET, ORALLY DISINTEGRATING ORAL at 02:07

## 2022-07-13 RX ADMIN — PIPERACILLIN SODIUM AND TAZOBACTAM SODIUM 4.5 G: 4; .5 INJECTION, POWDER, LYOPHILIZED, FOR SOLUTION INTRAVENOUS at 06:07

## 2022-07-13 RX ADMIN — METOPROLOL SUCCINATE 25 MG: 25 TABLET, EXTENDED RELEASE ORAL at 08:07

## 2022-07-13 RX ADMIN — VANCOMYCIN HYDROCHLORIDE 1000 MG: 1 INJECTION, POWDER, LYOPHILIZED, FOR SOLUTION INTRAVENOUS at 09:07

## 2022-07-13 RX ADMIN — FUROSEMIDE 40 MG: 10 INJECTION, SOLUTION INTRAMUSCULAR; INTRAVENOUS at 08:07

## 2022-07-13 RX ADMIN — SACUBITRIL AND VALSARTAN 1 TABLET: 24; 26 TABLET, FILM COATED ORAL at 09:07

## 2022-07-13 RX ADMIN — ENOXAPARIN SODIUM 40 MG: 100 INJECTION SUBCUTANEOUS at 04:07

## 2022-07-13 RX ADMIN — HUMAN ALBUMIN MICROSPHERES AND PERFLUTREN 0.66 MG: 10; .22 INJECTION, SOLUTION INTRAVENOUS at 11:07

## 2022-07-13 RX ADMIN — SACUBITRIL AND VALSARTAN 1 TABLET: 24; 26 TABLET, FILM COATED ORAL at 08:07

## 2022-07-13 RX ADMIN — FUROSEMIDE 40 MG: 10 INJECTION, SOLUTION INTRAMUSCULAR; INTRAVENOUS at 09:07

## 2022-07-13 NOTE — SUBJECTIVE & OBJECTIVE
"Interval History: JAVON. Has an "upset" stomach. Thinks its from the abx.     Review of Systems   Constitutional:  Negative for chills and fever.   Respiratory:  Negative for shortness of breath.    Cardiovascular:  Negative for chest pain.   Gastrointestinal:  Negative for abdominal pain.   Genitourinary:  Negative for dysuria.   Neurological:  Negative for headaches.   Psychiatric/Behavioral:  Negative for confusion.      Objective:     Vital Signs (Most Recent):  Temp: 97.5 °F (36.4 °C) (07/13/22 1141)  Pulse: 78 (07/13/22 0810)  Resp: 20 (07/13/22 1141)  BP: (!) 145/87 (07/13/22 0700)  SpO2: 96 % (07/13/22 0810)   Vital Signs (24h Range):  Temp:  [96.7 °F (35.9 °C)-98.4 °F (36.9 °C)] 97.5 °F (36.4 °C)  Pulse:  [67-92] 78  Resp:  [14-21] 20  SpO2:  [94 %-98 %] 96 %  BP: (110-145)/(63-87) 145/87     Weight: 64.9 kg (143 lb)  Body mass index is 27.02 kg/m².    Intake/Output Summary (Last 24 hours) at 7/13/2022 1325  Last data filed at 7/13/2022 1107  Gross per 24 hour   Intake 460 ml   Output 1400 ml   Net -940 ml      Physical Exam  Vitals and nursing note reviewed.   Constitutional:       General: She is not in acute distress.     Appearance: She is well-developed.   HENT:      Head: Normocephalic and atraumatic.   Eyes:      Conjunctiva/sclera: Conjunctivae normal.   Neck:      Vascular: No JVD.   Cardiovascular:      Rate and Rhythm: Normal rate and regular rhythm.      Heart sounds: Normal heart sounds.   Pulmonary:      Effort: Pulmonary effort is normal.      Breath sounds: Normal breath sounds.   Abdominal:      General: Bowel sounds are normal. There is no distension.      Palpations: Abdomen is soft.      Tenderness: There is no abdominal tenderness.   Musculoskeletal:      Cervical back: Neck supple.      Right lower leg: No edema.      Left lower leg: No edema.   Neurological:      Mental Status: She is alert.   Psychiatric:         Behavior: Behavior normal.       Significant Labs: All pertinent labs " within the past 24 hours have been reviewed.  Blood Culture:   Recent Labs   Lab 07/12/22  0650   LABBLOO No Growth to date  No Growth to date  No Growth to date  No Growth to date     CBC:   Recent Labs   Lab 07/12/22  0650 07/13/22  0414   WBC 9.93 7.04   HGB 12.5 10.6*   HCT 41.8 34.9*    196     CMP:   Recent Labs   Lab 07/12/22  0650 07/13/22  0414    144   K 3.7 3.5    103   CO2 24 28   * 113*   BUN 15 22   CREATININE 1.0 1.0   CALCIUM 9.2 9.1   PROT 7.0  --    ALBUMIN 3.8  --    BILITOT 0.4  --    ALKPHOS 117  --    AST 87*  --    ALT 70*  --    ANIONGAP 11 13   EGFRNONAA 55.6* 55.6*     Cardiac Markers:   Recent Labs   Lab 07/12/22  0650   BNP 1,917*     Troponin:   Recent Labs   Lab 07/12/22  0650 07/12/22  1607   TROPONINI 0.035* 1.396*     Urine Culture:   Recent Labs   Lab 07/12/22  0738   LABURIN No growth       Significant Imaging: I have reviewed all pertinent imaging results/findings within the past 24 hours.

## 2022-07-13 NOTE — PLAN OF CARE
Plan of care discussed with patient. Patient Aox4 and VS stable. Patient remains free of falls and trauma. Patient ambulating independently, fall precautions in place. Patient has no complaints of pain. Patient treated with Zofran PRN for nausea. Patient on 2.5 L NC sating >95%. Discussed medications and care. Patient has no questions at this time. Will continue to monitor.

## 2022-07-13 NOTE — PROGRESS NOTES
07/12/22 2117   Vital Signs   Temp 97.8 °F (36.6 °C)   Temp src Temporal   Pulse 84   Heart Rate Source Continuous   Resp 19   SpO2 97 %   Flow (L/min) 3   O2 Device (Oxygen Therapy) nasal cannula   /82   MAP (mmHg) 108   BP Location Left arm   BP Method cNIBP   Patient Position Lying       Patient received from ED in a wheelchair with transport and family member . Patient is AOX4 . Fall precautions in place , call light in reach , bed locked in lowest position . Telemetry monitoring placed . SPO2 maintained at O2 3 L/M via NC . Will cont to monitor .

## 2022-07-13 NOTE — PROGRESS NOTES
Therapy with vancomycin complete and/or consult discontinued by provider.  Pharmacy will sign off, please re-consult as needed.    Lori Deal, PharmD, BCPS  Ext. [phone]

## 2022-07-13 NOTE — PLAN OF CARE
[facility] - Cardiology Stepdown  Initial Discharge Assessment       Primary Care Provider: Phil Quintana MD    Admission Diagnosis: Shortness of breath [R06.02]  Cough [R05.9]  COPD exacerbation [J44.1]  Acute on chronic combined systolic and diastolic congestive heart failure [I50.43]  Chest pain [R07.9]    Admission Date: 7/12/2022  Expected Discharge Date:     Discharge Barriers Identified: None    Payor: BLUE CROSS BLUE SHIELD / Plan: [place] HMO / Product Type: HMO /     Extended Emergency Contact Information  Primary Emergency Contact: Lc Hoffman  Address: [address] [place] [address]  Home Phone: [phone]  Mobile Phone: [phone]  Relation: Significant other  Secondary Emergency Contact: SandieDavid  Address: [address] [place] [address]  Mobile Phone: [phone]  Relation: Daughter    Discharge Plan A: Home with family  Discharge Plan B: Home      [facility] [address] [place] [address]  Phone: [phone] Fax: [phone]      Initial Assessment (most recent)     Adult Discharge Assessment - 07/13/22 1124        Discharge Assessment    Assessment Type Discharge Planning Assessment     Confirmed/corrected address, phone number and insurance Yes     Confirmed Demographics Correct on Facesheet     Source of Information patient;family     Communicated GERA with patient/caregiver Yes     Lives With significant other;child(elsie), adult;grandchild(elsie)     Do you expect to return to your current living situation? Yes     Do you have help at home or someone to help you manage your care at home? Yes     Who are your caregiver(s) and their phone number(s)? significant other, karley Hernandez and adult grandchildren     Prior to hospitilization cognitive status: Alert/Oriented     Current cognitive status: Alert/Oriented     Walking or Climbing Stairs Difficulty none      Dressing/Bathing Difficulty none     Equipment Currently Used at Home walker, standard;oxygen     Readmission within 30 days? No     Patient currently being followed by outpatient case management? No     Do you currently have service(s) that help you manage your care at home? No     Do you take prescription medications? Yes     Do you have prescription coverage? Yes     Coverage BCBS     Do you have any problems affording any of your prescribed medications? No     Is the patient taking medications as prescribed? yes     Who is going to help you get home at discharge? family     How do you get to doctors appointments? car, drives self     Are you on dialysis? No     Do you take coumadin? No     Discharge Plan A Home with family     Discharge Plan B Home     DME Needed Upon Discharge  none     Discharge Plan discussed with: Patient;Adult children     Discharge Barriers Identified None                    Pt admitted 7/12/2022  6:42 AM    For Shortness of breath [R06.02]  Cough [R05.9]  COPD exacerbation [J44.1]  Acute on chronic combined systolic and diastolic congestive heart failure [I50.43]  Chest pain [R07.9]       DPEA completed with pt who lives at home with her significant other Lc, daughter David (present by bedside) and adult granddaughter. Plan is home with no needs at this time.     Discharge packet provided to pt, all questions answered prior to leaving room and contact number provided to reach CM.      Pt is followed cardiologist Dr Chanel Reyna.     PCP is Phil Quintana MD  [phone] (p)  [phone] (f)    Future Appointments   Date Time Provider Department Center   8/8/2022  9:30 AM Landon Aceves MD [facility] BLAIR [place]   8/18/2022 10:40 AM Phil Quintana MD [facility] PCW   8/18/2022  3:00 PM Chanel Reyna MD [facility] CARDIO [facility]   9/1/2022  9:00 AM Dylan Chaidez MD [facility] GASTRO PB Hawk, RN   Case Management  [phone]

## 2022-07-13 NOTE — PLAN OF CARE
Patient is AOX4 .  Patient remained free from fall /injuries/trauma overnight. NSR on telemetry .V/S within normal limits.  SPO2 maintained at O2 3 L/M via NC . No chest pain, SOB  [id number] .Plan of care reviewed with the patient .  All questions addressed .  No further concerns at this time.

## 2022-07-13 NOTE — PROGRESS NOTES
"[facility] - Cardiology [facility] Medicine  Progress Note    Patient Name: Selma Hooper  MRN: [id number]  Patient Class: IP- Inpatient   Admission Date: 7/12/2022  Length of Stay: 1 days  Attending Physician: Mi Arias MD  Primary Care Provider: Phil Quintana MD        Subjective:     Principal Problem:Acute respiratory failure with hypoxia and hypercarbia        HPI:  Selma Hooper is a 76 y.o. female with a PMHx of combined CHF (EF 15% in 12/2021), COPD, obesity, HTN who presents to [facility] [place], [place] with shortness of breath. Patient reports worsening shortness of breath, orthopnea, abdominal swelling/distension, and productive cough of yellow/green sputum since last night. She denies LE swelling but says she normally retains fluid in her abdomen. She developed profuse diaphoresis upon the arrival to the ED which has improved since improvement in her respiratory status. Her SOB/ increased WOB has improved significantly since receiving steroids, duonebs, antibiotics, and lasix in the ED prior to my exam. She also notes mild L flank pain with mild dysuria for the past few days. Denies fever, chest pain, N/V, abdominal pain, diarrhea, HA, vision changes, or syncope. Of note, her lasix was held after admission in march due to hypotension when she was admitted for COVID. It was recently re-started on May 25th after discussion with her cardiologist.     ED: , RR 29, /59, satting 80% on RA. VBG with pH 7.154, CO2 91, HCO3 32. Placed on BiPAP temporarily with significant improvement in respiratory status. Critical care consulted; no ICU needs warranted at this time.       Overview/Hospital Course:  No notes on file    Interval History: NAEON. Has an "upset" stomach. Thinks its from the abx.     Review of Systems   Constitutional:  Negative for chills and fever.   Respiratory:  Negative for shortness of breath.    Cardiovascular:  Negative for chest pain.   Gastrointestinal:  Negative for abdominal pain. "   Genitourinary:  Negative for dysuria.   Neurological:  Negative for headaches.   Psychiatric/Behavioral:  Negative for confusion.      Objective:     Vital Signs (Most Recent):  Temp: 97.5 °F (36.4 °C) (07/13/22 1141)  Pulse: 78 (07/13/22 0810)  Resp: 20 (07/13/22 1141)  BP: (!) 145/87 (07/13/22 0700)  SpO2: 96 % (07/13/22 0810)   Vital Signs (24h Range):  Temp:  [96.7 °F (35.9 °C)-98.4 °F (36.9 °C)] 97.5 °F (36.4 °C)  Pulse:  [67-92] 78  Resp:  [14-21] 20  SpO2:  [94 %-98 %] 96 %  BP: (110-145)/(63-87) 145/87     Weight: 64.9 kg (143 lb)  Body mass index is 27.02 kg/m².    Intake/Output Summary (Last 24 hours) at 7/13/2022 1325  Last data filed at 7/13/2022 1107  Gross per 24 hour   Intake 460 ml   Output 1400 ml   Net -940 ml      Physical Exam  Vitals and nursing note reviewed.   Constitutional:       General: She is not in acute distress.     Appearance: She is well-developed.   HENT:      Head: Normocephalic and atraumatic.   Eyes:      Conjunctiva/sclera: Conjunctivae normal.   Neck:      Vascular: No JVD.   Cardiovascular:      Rate and Rhythm: Normal rate and regular rhythm.      Heart sounds: Normal heart sounds.   Pulmonary:      Effort: Pulmonary effort is normal.      Breath sounds: Normal breath sounds.   Abdominal:      General: Bowel sounds are normal. There is no distension.      Palpations: Abdomen is soft.      Tenderness: There is no abdominal tenderness.   Musculoskeletal:      Cervical back: Neck supple.      Right lower leg: No edema.      Left lower leg: No edema.   Neurological:      Mental Status: She is alert.   Psychiatric:         Behavior: Behavior normal.       Significant Labs: All pertinent labs within the past 24 hours have been reviewed.  Blood Culture:   Recent Labs   Lab 07/12/22  0650   LABBLOO No Growth to date  No Growth to date  No Growth to date  No Growth to date     CBC:   Recent Labs   Lab 07/12/22  0650 07/13/22  0414   WBC 9.93 7.04   HGB 12.5 10.6*   HCT 41.8 34.9*     196     CMP:   Recent Labs   Lab 07/12/22  0650 07/13/22  0414    144   K 3.7 3.5    103   CO2 24 28   * 113*   BUN 15 22   CREATININE 1.0 1.0   CALCIUM 9.2 9.1   PROT 7.0  --    ALBUMIN 3.8  --    BILITOT 0.4  --    ALKPHOS 117  --    AST 87*  --    ALT 70*  --    ANIONGAP 11 13   EGFRNONAA 55.6* 55.6*     Cardiac Markers:   Recent Labs   Lab 07/12/22  0650   BNP 1,917*     Troponin:   Recent Labs   Lab 07/12/22  0650 07/12/22  1607   TROPONINI 0.035* 1.396*     Urine Culture:   Recent Labs   Lab 07/12/22  0738   LABURIN No growth       Significant Imaging: I have reviewed all pertinent imaging results/findings within the past 24 hours.      Assessment/Plan:      * Acute on chronic respiratory failure with hypoxia and hypercarbia  COPD exacerbation  - suspect multifactorial from COPD/CHF exacerbations   - required temporary Bipap in the ED>> weaned to 2L NC satting 98%. Has prn O2 use at home.   - continue IV diuresis w Lasix.   - follow up blood and sputum cx- negative CXR/ UA. Abx dc'd.   - Solumedrol 125mg IVP given in the ED. Continue with  prednisone 40mg PO daily x5 days  - incentive spirometry     Lactic acidosis  - improved, was given gently IVFs (500 cc over 4 hrs yesterday evening). Continue to trend.   - suspect 2/2 respiratory failure/ CHF ex.     Elevated troponin level  - Trop 0.035, EKG without acute STEMI  - suspect type 2 demand ischemia from hypoxia, CHF ex. Doubt ACS in absence of chest pain, but it is possible.   - continue to trend trop    UTI (urinary tract infection)-- ruled out  - UA not suggestive of this. Negative urine cx. Patient asymptomatic.      Acute on chronic combined systolic and diastolic congestive heart failure  pHTN  · The left ventricle is severely enlarged with eccentric hypertrophy and severely decreased systolic function. The estimated ejection fraction is 15%.  · There is severe left ventricular global hypokinesis.  · Normal right ventricular  size with normal right ventricular systolic function.  · Grade I left ventricular diastolic dysfunction.  · Moderate left atrial enlargement.  · Mild mitral regurgitation.  · The estimated PA systolic pressure is 32 mmHg.  · Normal central venous pressure (3 mmHg).    - Diuresis, fluid restriction, continue routine heart meds.  - she does not fluid restrict at home or check daily weights. Would be a good candidate for outpt heart program (HH).      Hypertension, essential  - stable.       VTE Risk Mitigation (From admission, onward)         Ordered     enoxaparin injection 40 mg  Daily         07/12/22 1419     IP VTE HIGH RISK PATIENT  Once         07/12/22 1419                Discharge Planning   GERA:      Code Status: Full Code   Is the patient medically ready for discharge?:     Reason for patient still in hospital (select all that apply): Patient trending condition and Treatment  Discharge Plan A: Home with family       Mi Arias MD  Department of Hospital Medicine   [facility] - Cardiology Stepdown

## 2022-07-14 ENCOUNTER — TELEPHONE (OUTPATIENT)
Dept: INTERNAL MEDICINE | Facility: CLINIC | Age: 77
End: 2022-07-14
Payer: COMMERCIAL

## 2022-07-14 DIAGNOSIS — R91.1 LUNG NODULE: Primary | ICD-10-CM

## 2022-07-14 DIAGNOSIS — I50.43 ACUTE ON CHRONIC COMBINED SYSTOLIC AND DIASTOLIC CONGESTIVE HEART FAILURE: Chronic | ICD-10-CM

## 2022-07-14 DIAGNOSIS — J43.9 PULMONARY EMPHYSEMA, UNSPECIFIED EMPHYSEMA TYPE: Chronic | ICD-10-CM

## 2022-07-14 PROBLEM — J96.21 ACUTE ON CHRONIC RESPIRATORY FAILURE WITH HYPOXIA: Status: ACTIVE | Noted: 2021-04-02

## 2022-07-14 PROBLEM — E87.20 LACTIC ACIDOSIS: Status: RESOLVED | Noted: 2022-07-12 | Resolved: 2022-07-14

## 2022-07-14 PROBLEM — N39.0 UTI (URINARY TRACT INFECTION): Status: RESOLVED | Noted: 2022-07-12 | Resolved: 2022-07-14

## 2022-07-14 LAB
ALPHA GLOBIN RELEASED BY: NORMAL
ALPHA-GLOBIN SPECIMEN: NORMAL
ANION GAP SERPL CALC-SCNC: 11 MMOL/L (ref 8–16)
BASOPHILS # BLD AUTO: 0.01 K/UL (ref 0–0.2)
BASOPHILS NFR BLD: 0.1 % (ref 0–1.9)
BUN SERPL-MCNC: 25 MG/DL (ref 8–23)
CALCIUM SERPL-MCNC: 9.2 MG/DL (ref 8.7–10.5)
CHLORIDE SERPL-SCNC: 103 MMOL/L (ref 95–110)
CO2 SERPL-SCNC: 31 MMOL/L (ref 23–29)
CREAT SERPL-MCNC: 1 MG/DL (ref 0.5–1.4)
DIFFERENTIAL METHOD: ABNORMAL
EOSINOPHIL # BLD AUTO: 0 K/UL (ref 0–0.5)
EOSINOPHIL NFR BLD: 0 % (ref 0–8)
ERYTHROCYTE [DISTWIDTH] IN BLOOD BY AUTOMATED COUNT: 15.5 % (ref 11.5–14.5)
EST. GFR  (AFRICAN AMERICAN): >60 ML/MIN/1.73 M^2
EST. GFR  (NON AFRICAN AMERICAN): 55.6 ML/MIN/1.73 M^2
GENETICIST REVIEW: NORMAL
GLUCOSE SERPL-MCNC: 104 MG/DL (ref 70–110)
HBA1 GENE MUT ANL BLD/T: NORMAL
HBA1 GENE MUT ANL BLD/T: NORMAL
HCT VFR BLD AUTO: 36.6 % (ref 37–48.5)
HGB BLD-MCNC: 11.3 G/DL (ref 12–16)
IMM GRANULOCYTES # BLD AUTO: 0.03 K/UL (ref 0–0.04)
IMM GRANULOCYTES NFR BLD AUTO: 0.3 % (ref 0–0.5)
LYMPHOCYTES # BLD AUTO: 1.3 K/UL (ref 1–4.8)
LYMPHOCYTES NFR BLD: 13.3 % (ref 18–48)
MAGNESIUM SERPL-MCNC: 1.9 MG/DL (ref 1.6–2.6)
MCH RBC QN AUTO: 24.4 PG (ref 27–31)
MCHC RBC AUTO-ENTMCNC: 30.9 G/DL (ref 32–36)
MCV RBC AUTO: 79 FL (ref 82–98)
MONOCYTES # BLD AUTO: 0.8 K/UL (ref 0.3–1)
MONOCYTES NFR BLD: 7.5 % (ref 4–15)
NEUTROPHILS # BLD AUTO: 7.8 K/UL (ref 1.8–7.7)
NEUTROPHILS NFR BLD: 78.8 % (ref 38–73)
NRBC BLD-RTO: 0 /100 WBC
PLATELET # BLD AUTO: 206 K/UL (ref 150–450)
PMV BLD AUTO: 10.9 FL (ref 9.2–12.9)
POTASSIUM SERPL-SCNC: 3.4 MMOL/L (ref 3.5–5.1)
RBC # BLD AUTO: 4.64 M/UL (ref 4–5.4)
REF LAB TEST METHOD: NORMAL
SERVICE CMNT-IMP: NORMAL
SODIUM SERPL-SCNC: 145 MMOL/L (ref 136–145)
SPECIMEN SOURCE: NORMAL
TROPONIN I SERPL DL<=0.01 NG/ML-MCNC: 0.49 NG/ML (ref 0–0.03)
WBC # BLD AUTO: 9.95 K/UL (ref 3.9–12.7)

## 2022-07-14 PROCEDURE — 63600175 PHARM REV CODE 636 W HCPCS: Performed by: PHYSICIAN ASSISTANT

## 2022-07-14 PROCEDURE — 94640 AIRWAY INHALATION TREATMENT: CPT

## 2022-07-14 PROCEDURE — 99900035 HC TECH TIME PER 15 MIN (STAT)

## 2022-07-14 PROCEDURE — 27000221 HC OXYGEN, UP TO 24 HOURS

## 2022-07-14 PROCEDURE — 99233 SBSQ HOSP IP/OBS HIGH 50: CPT | Mod: ,,, | Performed by: STUDENT IN AN ORGANIZED HEALTH CARE EDUCATION/TRAINING PROGRAM

## 2022-07-14 PROCEDURE — 25000242 PHARM REV CODE 250 ALT 637 W/ HCPCS: Performed by: STUDENT IN AN ORGANIZED HEALTH CARE EDUCATION/TRAINING PROGRAM

## 2022-07-14 PROCEDURE — 36415 COLL VENOUS BLD VENIPUNCTURE: CPT | Performed by: STUDENT IN AN ORGANIZED HEALTH CARE EDUCATION/TRAINING PROGRAM

## 2022-07-14 PROCEDURE — 80048 BASIC METABOLIC PNL TOTAL CA: CPT | Performed by: PHYSICIAN ASSISTANT

## 2022-07-14 PROCEDURE — 36415 COLL VENOUS BLD VENIPUNCTURE: CPT | Performed by: PHYSICIAN ASSISTANT

## 2022-07-14 PROCEDURE — 83735 ASSAY OF MAGNESIUM: CPT | Performed by: PHYSICIAN ASSISTANT

## 2022-07-14 PROCEDURE — 84484 ASSAY OF TROPONIN QUANT: CPT | Performed by: STUDENT IN AN ORGANIZED HEALTH CARE EDUCATION/TRAINING PROGRAM

## 2022-07-14 PROCEDURE — 25000003 PHARM REV CODE 250: Performed by: PHYSICIAN ASSISTANT

## 2022-07-14 PROCEDURE — 25000242 PHARM REV CODE 250 ALT 637 W/ HCPCS: Performed by: PHYSICIAN ASSISTANT

## 2022-07-14 PROCEDURE — 20600001 HC STEP DOWN PRIVATE ROOM

## 2022-07-14 PROCEDURE — 99233 PR SUBSEQUENT HOSPITAL CARE,LEVL III: ICD-10-PCS | Mod: ,,, | Performed by: STUDENT IN AN ORGANIZED HEALTH CARE EDUCATION/TRAINING PROGRAM

## 2022-07-14 PROCEDURE — 25000003 PHARM REV CODE 250: Performed by: STUDENT IN AN ORGANIZED HEALTH CARE EDUCATION/TRAINING PROGRAM

## 2022-07-14 PROCEDURE — 85025 COMPLETE CBC W/AUTO DIFF WBC: CPT | Performed by: PHYSICIAN ASSISTANT

## 2022-07-14 PROCEDURE — 94761 N-INVAS EAR/PLS OXIMETRY MLT: CPT

## 2022-07-14 RX ORDER — FLUTICASONE FUROATE AND VILANTEROL 100; 25 UG/1; UG/1
1 POWDER RESPIRATORY (INHALATION) DAILY
Status: DISCONTINUED | OUTPATIENT
Start: 2022-07-14 | End: 2022-07-15 | Stop reason: HOSPADM

## 2022-07-14 RX ORDER — FUROSEMIDE 40 MG/1
40 TABLET ORAL 2 TIMES DAILY
Status: DISCONTINUED | OUTPATIENT
Start: 2022-07-14 | End: 2022-07-15 | Stop reason: HOSPADM

## 2022-07-14 RX ORDER — POTASSIUM CHLORIDE 20 MEQ/1
40 TABLET, EXTENDED RELEASE ORAL ONCE
Status: COMPLETED | OUTPATIENT
Start: 2022-07-14 | End: 2022-07-14

## 2022-07-14 RX ADMIN — PREDNISONE 40 MG: 20 TABLET ORAL at 09:07

## 2022-07-14 RX ADMIN — SACUBITRIL AND VALSARTAN 1 TABLET: 24; 26 TABLET, FILM COATED ORAL at 09:07

## 2022-07-14 RX ADMIN — METOPROLOL SUCCINATE 25 MG: 25 TABLET, EXTENDED RELEASE ORAL at 09:07

## 2022-07-14 RX ADMIN — FLUTICASONE FUROATE AND VILANTEROL TRIFENATATE 1 PUFF: 100; 25 POWDER RESPIRATORY (INHALATION) at 12:07

## 2022-07-14 RX ADMIN — IPRATROPIUM BROMIDE AND ALBUTEROL SULFATE 3 ML: 2.5; .5 SOLUTION RESPIRATORY (INHALATION) at 07:07

## 2022-07-14 RX ADMIN — ENOXAPARIN SODIUM 40 MG: 100 INJECTION SUBCUTANEOUS at 05:07

## 2022-07-14 RX ADMIN — IPRATROPIUM BROMIDE AND ALBUTEROL SULFATE 3 ML: 2.5; .5 SOLUTION RESPIRATORY (INHALATION) at 11:07

## 2022-07-14 RX ADMIN — POTASSIUM CHLORIDE 40 MEQ: 1500 TABLET, EXTENDED RELEASE ORAL at 09:07

## 2022-07-14 RX ADMIN — FUROSEMIDE 40 MG: 10 INJECTION, SOLUTION INTRAMUSCULAR; INTRAVENOUS at 09:07

## 2022-07-14 RX ADMIN — SACUBITRIL AND VALSARTAN 1 TABLET: 24; 26 TABLET, FILM COATED ORAL at 08:07

## 2022-07-14 RX ADMIN — IPRATROPIUM BROMIDE AND ALBUTEROL SULFATE 3 ML: 2.5; .5 SOLUTION RESPIRATORY (INHALATION) at 03:07

## 2022-07-14 RX ADMIN — IPRATROPIUM BROMIDE AND ALBUTEROL SULFATE 3 ML: 2.5; .5 SOLUTION RESPIRATORY (INHALATION) at 08:07

## 2022-07-14 RX ADMIN — FUROSEMIDE 40 MG: 40 TABLET ORAL at 05:07

## 2022-07-14 NOTE — ASSESSMENT & PLAN NOTE
- PRN O2 at home, likely needs continuous  - 6 min walk test ordered for today  - Restart breo, continue prednisone  - Continuing to monitor

## 2022-07-14 NOTE — TELEPHONE ENCOUNTER
Please call patient and explain that the tests show chronic lung and heart disease that is stable..    I would like to refer patient to the pulmonary and cardiolgy department for further evaluation and treatment. Due for follow up appointments.    Please see referral orders and please call patient to schedule.     Thank you.

## 2022-07-14 NOTE — ASSESSMENT & PLAN NOTE
- Elevation noted on admission, attributed to CHF exacerbation  - Grossly asymptomatic  - Recheck for downtrend

## 2022-07-14 NOTE — PROGRESS NOTES
[facility] - Cardiology [facility] Medicine  Progress Note    Patient Name: Selma Hooper  MRN: [id number]  Patient Class: IP- Inpatient   Admission Date: 7/12/2022  Length of Stay: 2 days  Attending Physician: Tyson Sommer MD  Primary Care Provider: Phil Quintana MD        Subjective:     Principal Problem:Acute on chronic respiratory failure with hypoxia        HPI:  Selma Hooper is a 76 y.o. female with a PMHx of combined CHF (EF 15% in 12/2021), COPD, obesity, HTN [place] [facility] [place] [facility] [place]. Patient reports worsening shortness of breath, orthopnea, abdominal swelling/distension, and productive cough of yellow/green sputum since last night. She denies LE swelling but says she normally retains fluid in her abdomen. She developed profuse diaphoresis upon the arrival to the ED which has improved since improvement in her respiratory status. Her SOB/ increased WOB has improved significantly since receiving steroids, duonebs, antibiotics, and lasix in the ED prior to my exam. She also notes mild L flank pain with mild dysuria for the past few days. Denies fever, chest pain, N/V, abdominal pain, diarrhea, HA, vision changes, or syncope. Of note, her lasix was held after admission in march due to hypotension when she was admitted for COVID. It was recently re-started on May 25th after discussion with her cardiologist.     ED: , RR 29, /59, satting 80% on RA. VBG with pH 7.154, CO2 91, HCO3 32. Placed on BiPAP temporarily with significant improvement in respiratory status. Critical care consulted; no ICU needs warranted at this time.       Overview/Hospital Course:  Pt admitted to [facility] [place] and started on IV lasix with good response. Pt under the care of Dr. Mi Arias until 7/13, see her documentation for how it pertains to the hospital course. Pt with overall symptomatic improvement into 7/14, and IV lasix transitioned to PO. Repeat echo on this admission again showed EF 15% with  G1DD. Discussed home COPD regimen with pt, and she was previously on Breo before her insurance company stopped paying for this; will restart and enlist SW for assistance.      Interval History: Pt seen and examined this morning on daxa ALEJANDRO. Overall symptomatically improving, almost back to her baseline. Discussed changing IV lasix to PO today, restarting Breo, and doing a 6-min home O2 walk test. Care plan reviewed with pt and daughter at bedside. Otherwise, doing well and with no further complaints at this time.      Objective:     Vital Signs (Most Recent):  Temp: 97.1 °F (36.2 °C) (07/14/22 1150)  Pulse: 78 (07/14/22 1206)  Resp: 14 (07/14/22 1206)  BP: (!) 102/51 (07/14/22 1150)  SpO2: 96 % (07/14/22 1206)   Vital Signs (24h Range):  Temp:  [96.9 °F (36.1 °C)-98.1 °F (36.7 °C)] 97.1 °F (36.2 °C)  Pulse:  [59-93] 78  Resp:  [14-24] 14  SpO2:  [95 %-98 %] 96 %  BP: ()/(51-88) 102/51     Weight: 64.3 kg (141 lb 12.1 oz)  Body mass index is 26.78 kg/m².    Intake/Output Summary (Last 24 hours) at 7/14/2022 1209  Last data filed at 7/14/2022 0900  Gross per 24 hour   Intake 1047 ml   Output 1300 ml   Net -253 ml        Physical Exam  Gen: in NAD, appears stated age  Neuro: AAOx4, CN2-12 grossly intact BL; motor, sensory, and strength grossly intact BL  HEENT: NTNC, EOMI, PERRLA, MMM; no thyromegaly or lymphadenopathy; no JVD appreciated  CVS: RRR, no m/r/g; S1/S2 auscultated with no S3 or S4; capillary refill < 2 sec  Resp: coarse breath sounds in all lung fields BL; no belabored breathing or accessory muscle use appreciated   Abd: BS+ in all 4 quadrants; NTND, soft to palpation; no organomegaly appreciated   Extrem: pulses full, equal, and regular over all 4 extremities; no UE or LE edema BL      Significant Labs: All pertinent labs within the past 24 hours have been reviewed.    Significant Imaging: I have reviewed all pertinent imaging results/findings within the past 24 hours.      Assessment/Plan:       * Acute on chronic respiratory failure with hypoxia  - Interval history and physical exam findings as described above  - Likely compounded from superimposed CHF and COPD exacerbation  - Specific management as below  - Symptomatically improving  - Home O2 walk test, likely needs continuous O2    Acute on chronic combined systolic and diastolic congestive heart failure  - Interval history and physical exam findings as described above  - Clinically volume overloaded on admission, now improved  - Repeat echo on this admission again showed EF 15% with G1DD  - IV lasix transitioned to PO 40mg BID  - Continue home cardioprudent regimen  - Strict I/Os  - 1.5L fluid restriction   - Daily weights  - Will continue to monitor on tele    COPD exacerbation  - PRN O2 at home, likely needs continuous  - 6 min walk test ordered for today  - Restart breo, continue prednisone  - Continuing to monitor    Elevated troponin level  - Elevation noted on admission, attributed to CHF exacerbation  - Grossly asymptomatic  - Recheck for downtrend    Essential hypertension  - Working to optimize BP control   - Continue home cardioprudent regimen  - Will continue to monitor and further titrate antihypertensives as clinically indicated       VTE Risk Mitigation (From admission, onward)         Ordered     enoxaparin injection 40 mg  Daily         07/12/22 1419     IP VTE HIGH RISK PATIENT  Once         07/12/22 1419                Discharge Planning   GERA: 7/15/2022     Code Status: Full Code   Is the patient medically ready for discharge?: No    Reason for patient still in hospital (select all that apply): Patient trending condition  Discharge Plan A: Home with family            Tyson Sommer MD  Attending Physician  Department of Hospital Medicine  Epic secure chat preferred, or ext. [phone]  7/14/2022

## 2022-07-14 NOTE — NURSING
Home Oxygen Evaluation    Date Performed: 2022    1) Patient's Home O2 Sat on room air, while at rest: 90%      2) Patient's O2 Sat on room air while exercisin%      3) Patient's O2 Sat while exercising on O2: 91% at 3 LPM        Patient qualifies for Home O2

## 2022-07-14 NOTE — PLAN OF CARE
Plan of care discussed with patient. Patient Aox4 and VS stable. Patient remains free of falls and trauma. Patient ambulating independently, fall precautions in place. Patient has no complaints of pain. Patient is on 3L NC and stating >95%. Patient had Home O2 test done today and will require home O2. Patient transitioned to PO lasix 2x daily. Discussed medications and care. Patient has no questions at this time. Will continue to monitor.

## 2022-07-14 NOTE — SUBJECTIVE & OBJECTIVE
Interval History: Pt seen and examined this morning on daxa ALEJANDRO. Overall symptomatically improving, almost back to her baseline. Discussed changing IV lasix to PO today, restarting Breo, and doing a 6-min home O2 walk test. Care plan reviewed with pt and daughter at bedside. Otherwise, doing well and with no further complaints at this time.      Objective:     Vital Signs (Most Recent):  Temp: 97.1 °F (36.2 °C) (07/14/22 1150)  Pulse: 78 (07/14/22 1206)  Resp: 14 (07/14/22 1206)  BP: (!) 102/51 (07/14/22 1150)  SpO2: 96 % (07/14/22 1206)   Vital Signs (24h Range):  Temp:  [96.9 °F (36.1 °C)-98.1 °F (36.7 °C)] 97.1 °F (36.2 °C)  Pulse:  [59-93] 78  Resp:  [14-24] 14  SpO2:  [95 %-98 %] 96 %  BP: ()/(51-88) 102/51     Weight: 64.3 kg (141 lb 12.1 oz)  Body mass index is 26.78 kg/m².    Intake/Output Summary (Last 24 hours) at 7/14/2022 1209  Last data filed at 7/14/2022 0900  Gross per 24 hour   Intake 1047 ml   Output 1300 ml   Net -253 ml        Physical Exam  Gen: in NAD, appears stated age  Neuro: AAOx4, CN2-12 grossly intact BL; motor, sensory, and strength grossly intact BL  HEENT: NTNC, EOMI, PERRLA, MMM; no thyromegaly or lymphadenopathy; no JVD appreciated  CVS: RRR, no m/r/g; S1/S2 auscultated with no S3 or S4; capillary refill < 2 sec  Resp: coarse breath sounds in all lung fields BL; no belabored breathing or accessory muscle use appreciated   Abd: BS+ in all 4 quadrants; NTND, soft to palpation; no organomegaly appreciated   Extrem: pulses full, equal, and regular over all 4 extremities; no UE or LE edema BL      Significant Labs: All pertinent labs within the past 24 hours have been reviewed.    Significant Imaging: I have reviewed all pertinent imaging results/findings within the past 24 hours.

## 2022-07-14 NOTE — ASSESSMENT & PLAN NOTE
- Interval history and physical exam findings as described above  - Likely compounded from superimposed CHF and COPD exacerbation  - Specific management as below  - Symptomatically improving  - Home O2 walk test, likely needs continuous O2

## 2022-07-14 NOTE — HOSPITAL COURSE
Pt admitted to [facility] [place] and started on IV lasix with good response. Pt under the care of Dr. Mi Arias until 7/13, see her documentation for how it pertains to the hospital course. Pt with overall symptomatic improvement into 7/14, and IV lasix transitioned to PO. Repeat echo on this admission again showed EF 15% with G1DD. Discussed home COPD regimen with pt, and she was previously on Breo before her insurance company stopped paying for this; will restart and enlist SW for assistance.    Pt qualified for 3L continuous home O2, which was arranged by SW. She continued to do well into 7/15 and deemed appropriate for discharge. Plan discussed with pt, who was agreeable and amenable; medications were discussed and reviewed, outpatient follow-up scheduled, ER precautions were given, all questions were answered to the pt's satisfaction, and Ms. Hooper was subsequently discharged.

## 2022-07-14 NOTE — ASSESSMENT & PLAN NOTE
- Interval history and physical exam findings as described above  - Clinically volume overloaded on admission, now improved  - Repeat echo on this admission again showed EF 15% with G1DD  - IV lasix transitioned to PO 40mg BID  - Continue home cardioprudent regimen  - Strict I/Os  - 1.5L fluid restriction   - Daily weights  - Will continue to monitor on tele

## 2022-07-14 NOTE — PLAN OF CARE
Patient is AOX4 . Fall precautions in place, call light in reach , bed in lowest position . Patient remained free from fall /injuries/trauma overnight. NSR on telemetry . V/S Within normal limits. No chest pain, SOB  CUAUHTEMOC . SPO2 maintained at O 2.5 L/M via NC . Pyuric in place . Plan of care reviewed with the patient . All questions addressed .

## 2022-07-15 VITALS
RESPIRATION RATE: 19 BRPM | TEMPERATURE: 97 F | HEIGHT: 61 IN | HEART RATE: 90 BPM | DIASTOLIC BLOOD PRESSURE: 61 MMHG | BODY MASS INDEX: 26.73 KG/M2 | SYSTOLIC BLOOD PRESSURE: 120 MMHG | WEIGHT: 141.56 LBS | OXYGEN SATURATION: 98 %

## 2022-07-15 LAB
ANION GAP SERPL CALC-SCNC: 13 MMOL/L (ref 8–16)
BUN SERPL-MCNC: 26 MG/DL (ref 8–23)
CALCIUM SERPL-MCNC: 9.3 MG/DL (ref 8.7–10.5)
CHLORIDE SERPL-SCNC: 101 MMOL/L (ref 95–110)
CO2 SERPL-SCNC: 31 MMOL/L (ref 23–29)
CREAT SERPL-MCNC: 0.8 MG/DL (ref 0.5–1.4)
EST. GFR  (AFRICAN AMERICAN): >60 ML/MIN/1.73 M^2
EST. GFR  (NON AFRICAN AMERICAN): >60 ML/MIN/1.73 M^2
GLUCOSE SERPL-MCNC: 102 MG/DL (ref 70–110)
POTASSIUM SERPL-SCNC: 3.5 MMOL/L (ref 3.5–5.1)
SODIUM SERPL-SCNC: 145 MMOL/L (ref 136–145)

## 2022-07-15 PROCEDURE — 36415 COLL VENOUS BLD VENIPUNCTURE: CPT | Performed by: STUDENT IN AN ORGANIZED HEALTH CARE EDUCATION/TRAINING PROGRAM

## 2022-07-15 PROCEDURE — 80048 BASIC METABOLIC PNL TOTAL CA: CPT | Performed by: STUDENT IN AN ORGANIZED HEALTH CARE EDUCATION/TRAINING PROGRAM

## 2022-07-15 PROCEDURE — 94760 N-INVAS EAR/PLS OXIMETRY 1: CPT

## 2022-07-15 PROCEDURE — 25000003 PHARM REV CODE 250: Performed by: PHYSICIAN ASSISTANT

## 2022-07-15 PROCEDURE — 63600175 PHARM REV CODE 636 W HCPCS: Performed by: PHYSICIAN ASSISTANT

## 2022-07-15 PROCEDURE — 25000242 PHARM REV CODE 250 ALT 637 W/ HCPCS: Performed by: PHYSICIAN ASSISTANT

## 2022-07-15 PROCEDURE — 99900035 HC TECH TIME PER 15 MIN (STAT)

## 2022-07-15 PROCEDURE — 25000003 PHARM REV CODE 250: Performed by: STUDENT IN AN ORGANIZED HEALTH CARE EDUCATION/TRAINING PROGRAM

## 2022-07-15 PROCEDURE — 99239 PR HOSPITAL DISCHARGE DAY,>30 MIN: ICD-10-PCS | Mod: ,,, | Performed by: STUDENT IN AN ORGANIZED HEALTH CARE EDUCATION/TRAINING PROGRAM

## 2022-07-15 PROCEDURE — 99239 HOSP IP/OBS DSCHRG MGMT >30: CPT | Mod: ,,, | Performed by: STUDENT IN AN ORGANIZED HEALTH CARE EDUCATION/TRAINING PROGRAM

## 2022-07-15 RX ORDER — FUROSEMIDE 40 MG/1
40 TABLET ORAL 2 TIMES DAILY
Qty: 60 TABLET | Refills: 1 | Status: SHIPPED | OUTPATIENT
Start: 2022-07-15 | End: 2022-07-19

## 2022-07-15 RX ORDER — FLUTICASONE FUROATE AND VILANTEROL 200; 25 UG/1; UG/1
1 POWDER RESPIRATORY (INHALATION) DAILY
Qty: 60 EACH | Refills: 1 | Status: SHIPPED | OUTPATIENT
Start: 2022-07-15 | End: 2022-11-14 | Stop reason: SDUPTHER

## 2022-07-15 RX ORDER — PREDNISONE 10 MG/1
TABLET ORAL
Qty: 15 TABLET | Refills: 0 | Status: SHIPPED | OUTPATIENT
Start: 2022-07-15 | End: 2022-07-23

## 2022-07-15 RX ADMIN — METOPROLOL SUCCINATE 25 MG: 25 TABLET, EXTENDED RELEASE ORAL at 08:07

## 2022-07-15 RX ADMIN — FLUTICASONE FUROATE AND VILANTEROL TRIFENATATE 1 PUFF: 100; 25 POWDER RESPIRATORY (INHALATION) at 08:07

## 2022-07-15 RX ADMIN — PREDNISONE 40 MG: 20 TABLET ORAL at 08:07

## 2022-07-15 RX ADMIN — FUROSEMIDE 40 MG: 40 TABLET ORAL at 08:07

## 2022-07-15 RX ADMIN — SACUBITRIL AND VALSARTAN 1 TABLET: 24; 26 TABLET, FILM COATED ORAL at 08:07

## 2022-07-15 NOTE — PLAN OF CARE
Patient resting on the side of bed watching telelvision. No complaints at this time. No respiratory distress noted. Purewick in place. Bed locked in the lowest position. Call bell and personal items within reach. Will continue POC

## 2022-07-15 NOTE — DISCHARGE SUMMARY
Brant Langley - Cardiology [facility] Medicine  Discharge Summary      Patient Name: Selma Hooper  MRN: [id number]  Patient Class: IP- Inpatient  Admission Date: 7/12/2022  Hospital Length of Stay: 3 days  Discharge Date and Time:  07/15/2022 10:13 AM  Attending Physician: Tyson Sommer MD   Discharging Provider: Tyson Sommer MD  Primary Care Provider: Phil Quintana MD  Hospital Medicine [place] [facility] [place] Tyson Sommer MD    HPI:   Selma Hooper is a 76 y.o. 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 Patient reports worsening shortness of breath, orthopnea, abdominal swelling/distension, and productive cough of yellow/green sputum since last night. She denies LE swelling but says she normally retains fluid in her abdomen. She developed profuse diaphoresis upon the arrival to the ED which has improved since improvement in her respiratory status. Her SOB/ increased WOB has improved significantly since receiving steroids, duonebs, antibiotics, and lasix in the ED prior to my exam. She also notes mild L flank pain with mild dysuria for the past few days. Denies fever, chest pain, N/V, abdominal pain, diarrhea, HA, vision changes, or syncope. Of note, her lasix was held after admission in march due to hypotension when she was admitted for COVID. It was recently re-started on May 25th after discussion with her cardiologist.     ED: , RR 29, /59, satting 80% on RA. VBG with pH 7.154, CO2 91, HCO3 32. Placed on BiPAP temporarily with significant improvement in respiratory status. Critical care consulted; no ICU needs warranted at this time.       * No surgery found *      Hospital Course:   Pt admitted to [facility] and started on IV lasix with good response. Pt under the care of Dr. Mi Arias until 7/13, see her documentation for how it pertains to the hospital course. Pt with overall symptomatic improvement into  7/14, and IV lasix transitioned to PO. Repeat echo on this admission again showed EF 15% with G1DD. Discussed home COPD regimen with pt, and she was previously on Breo before her insurance company stopped paying for this; will restart and enlist SW for assistance.    Pt qualified for 3L continuous home O2, which was arranged by SW. She continued to do well into 7/15 and deemed appropriate for discharge. Plan discussed with pt, who was agreeable and amenable; medications were discussed and reviewed, outpatient follow-up scheduled, ER precautions were given, all questions were answered to the pt's satisfaction, and Ms. Hooper was subsequently discharged.         Goals of Care Treatment Preferences:  Code Status: Full Code      Consults:   Consults (From admission, onward)        Status Ordering Provider     Inpatient consult to Social Work/Case Management  Once        Provider:  (Not yet assigned)    Acknowledged GINA PUENTE     Inpatient consult to Social Work/Case Management  Once        Provider:  (Not yet assigned)    Acknowledged GANGA GUY     Inpatient consult to Critical Care Medicine  Once        Provider:  (Not yet assigned)    Completed SUZETTE NORTH          No new Assessment & Plan notes have been filed under this hospital service since the last note was generated.  Service: Hospital Medicine    Final Active Diagnoses:    Diagnosis Date Noted POA    PRINCIPAL PROBLEM:  Acute on chronic respiratory failure with hypoxia [J96.21] 04/02/2021 Yes    Acute on chronic combined systolic and diastolic congestive heart failure [I50.43] 04/02/2021 Yes    COPD exacerbation [J44.1] 07/12/2022 Yes    Elevated troponin level [R77.8] 07/12/2022 Yes    Essential hypertension [I10] 06/25/2015 Yes      Problems Resolved During this Admission:    Diagnosis Date Noted Date Resolved POA    UTI (urinary tract infection) [N39.0] 07/12/2022 07/14/2022 Yes    Lactic acidosis [E87.2] 07/12/2022  "07/14/2022 Yes    Iron deficiency anemia [D50.9] 07/11/2018 07/12/2022 Yes       Discharged Condition: stable    Disposition: Home or Self Care    Follow Up:   Follow-up Information     Phil Quintana MD. Schedule an appointment as soon as possible for a visit.    Specialties: Family Medicine, Sports Medicine  Contact information:  [address]  [facility] [address]  [phone]                       Patient Instructions:      OXYGEN FOR HOME USE     Order Specific Question Answer Comments   Liter Flow 3    Duration Continuous    Qualifying Test Performed at: Activity    Oxygen saturation at rest 90    Oxygen saturation with activity 85    Oxygen saturation with activity on oxygen 91    Portable mode: continuous    Route nasal cannula    Device: home concentrator with portable tanks    Length of need (in months): 99 mos    Patient condition with qualifying saturation COPD    Height: 5' 1" (1.549 m)    Weight: 64.3 kg (141 lb 12.1 oz)    Alternative treatment measures have been tried or considered and deemed clinically ineffective. Yes      Ambulatory referral/consult to Cardiology   Standing Status: Future   Referral Priority: Routine Referral Type: Consultation   Referral Reason: Specialty Services Required   Requested Specialty: Cardiology   Number of Visits Requested: 1     Ambulatory referral/consult to Pulmonology   Standing Status: Future   Referral Priority: Routine Referral Type: Consultation   Referral Reason: Specialty Services Required   Requested Specialty: Pulmonary Disease   Number of Visits Requested: 1     Diet Cardiac     Notify your health care provider if you experience any of the following:  increased confusion or weakness     Notify your health care provider if you experience any of the following:  persistent dizziness, light-headedness, or visual disturbances     Notify your health care provider if you experience any of the following:  worsening rash     Notify your health care " provider if you experience any of the following:  severe persistent headache     Notify your health care provider if you experience any of the following:  difficulty breathing or increased cough     Notify your health care provider if you experience any of the following:  severe uncontrolled pain     Notify your health care provider if you experience any of the following:  persistent nausea and vomiting or diarrhea     Notify your health care provider if you experience any of the following:  temperature >100.4     Activity as tolerated       Significant Diagnostic Studies: Labs: All labs within the past 24 hours have been reviewed    Pending Diagnostic Studies:     None         Medications:  Reconciled Home Medications:      Medication List      START taking these medications    BREO ELLIPTA 200-25 mcg/dose Dsdv diskus inhaler  Generic drug: fluticasone furoate-vilanteroL  Inhale 1 puff into the lungs once daily. Controller     predniSONE 10 MG tablet  Commonly known as: DELTASONE  Take 4 tablets (40 mg total) by mouth once daily for 2 days, THEN 2 tablets (20 mg total) once daily for 2 days, THEN 1 tablet (10 mg total) once daily for 2 days, THEN 0.5 tablets (5 mg total) once daily for 2 days.  Start taking on: July 15, 2022        CHANGE how you take these medications    furosemide 40 MG tablet  Commonly known as: LASIX  Take 1 tablet (40 mg total) by mouth 2 (two) times daily.  What changed:   · medication strength  · how much to take  · when to take this        CONTINUE taking these medications    ENTRESTO 24-26 mg per tablet  Generic drug: sacubitriL-valsartan  Take 1 tablet by mouth 2 (two) times daily.     guaiFENesin 600 mg 12 hr tablet  Commonly known as: MUCINEX  Take 2 tablets (1,200 mg total) by mouth 2 (two) times daily as needed for Congestion (expectorant).     metoprolol succinate 25 MG 24 hr tablet  Commonly known as: TOPROL-XL  Take 1 tablet (25 mg total) by mouth once daily.     ONCOVITE  tablet  Generic drug: multivitamin  Take 1 tablet by mouth once daily.     pulse oximeter device  Commonly known as: pulse oximeter  by Apply Externally route 2 (two) times a day. Use twice daily at 8 AM and 3 PM and record the value in MyChart as directed.        STOP taking these medications    spironolactone 25 MG tablet  Commonly known as: ALDACTONE            Indwelling Lines/Drains at time of discharge:   Lines/Drains/Airways     NA                Time spent on the discharge of patient: 35 minutes         Tyson Sommer MD  Attending Physician  Department of Hospital Medicine  Epic secure chat preferred, or ext. [phone]  7/15/2022

## 2022-07-15 NOTE — PLAN OF CARE
"   07/15/22 1244   Post-Acute Status   Post-Acute Authorization [facility]ME   HME Status Set-up Complete/Auth obtained     Per NATTY Virgen "pt has a cart and regulator at home. her daughter will bring her oxygen and pick her up at discharge, but she was asking if she can get an extra tank because hers is half full."  Per Faby with [facility] "We will deliver her tanks to the bedside since we can't deliver to the home and she knows how to change her liter flow from 2 -to 3 liters at home."    Juliana Roque, SOILA  [facility]  [id number]    "

## 2022-07-15 NOTE — PLAN OF CARE
Problem: Infection  Goal: Absence of Infection Signs and Symptoms  Outcome: Ongoing, Progressing     Problem: Adult Inpatient Plan of Care  Goal: Plan of Care Review  Outcome: Ongoing, Progressing  Goal: Patient-Specific Goal (Individualized)  Outcome: Ongoing, Progressing  Goal: Absence of Hospital-Acquired Illness or Injury  Outcome: Ongoing, Progressing  Goal: Optimal Comfort and Wellbeing  Outcome: Ongoing, Progressing  Goal: Readiness for Transition of Care  Outcome: Ongoing, Progressing     Problem: Adjustment to Illness (Sepsis/Septic Shock)  Goal: Optimal Coping  Outcome: Ongoing, Progressing     Problem: Bleeding (Sepsis/Septic Shock)  Goal: Absence of Bleeding  Outcome: Ongoing, Progressing     Problem: Glycemic Control Impaired (Sepsis/Septic Shock)  Goal: Blood Glucose Level Within Desired Range  Outcome: Ongoing, Progressing     Problem: Infection Progression (Sepsis/Septic Shock)  Goal: Absence of Infection Signs and Symptoms  Outcome: Ongoing, Progressing     Problem: Nutrition Impaired (Sepsis/Septic Shock)  Goal: Optimal Nutrition Intake  Outcome: Ongoing, Progressing

## 2022-07-15 NOTE — PLAN OF CARE
Patient Education     Confirmed Strep Throat (Infant/Toddler)  Children often get a sore throat (pharyngitis). A bacterial infection is one common cause. Streptococcus is the most common bacteria to cause a sore throat in children. This condition is called pharyngitis caused by strep. It is more commonly known as strep throat.  Strep throat starts suddenly. Symptoms include a red, swollen throat and swollen lymph nodes. This makes it painful to swallow. Red spots may appear on the roof of the mouth. Some children will be flushed and have a fever. Young children may not show that they feel pain. But they may refuse to eat or drink. They may also drool a lot.  As soon as a strep infection is confirmed, the health care provider will give your child antibiotics. Treatment may be with an injection or antibiotics taken by mouth. The provider may also give medicine to treat a fever. Children with strep throat will be contagious until they have been taking the antibiotic for 24 hours.  Home care  Follow these guidelines when caring for your child at home:  · The health care provider has prescribed an antibiotic to treat the infection and possibly medicine to treat a fever. Follow the provider’s instructions for giving these medicines to your child. Be sure your child finishes all of the antibiotic as directed, even if he or she feels better.  · Give your child plenty of time to rest.  · Encourage your child to drink liquids. Older children may prefer ice chips, cold drinks, frozen desserts, or popsicles. They may also like warm chicken soup or beverages with lemon and honey. Don’t give honey to children under 1 year of age. Don’t force your child to eat.  · To help prevent catching or spreading infection, wash your hands well with soap and warm water often. Encourage family members and others in the household to wash hands often as well.  · Tell any day-care centers, babysitters, or other people who may have had contact  [facility] - Cardiology Stepdown  Discharge Final Note    Primary Care Provider: Phil Quintana MD    Expected Discharge Date: 7/15/2022    Final Discharge Note (most recent)     Final Note - 07/15/22 1644        Final Note    Assessment Type Final Discharge Note     Anticipated Discharge Disposition Home or Self Care     Hospital Resources/Appts/Education Provided Appointments scheduled and added to AVS             Per Dr Sommer pt declined HH.    Juliana Roque, LMSW [facility]  [id number]      Future Appointments   Date Time Provider Department Center   7/20/2022  8:45 AM Lakeshia Panda PA-C [facility] IM [address] PCW   8/8/2022  9:30 AM Landon Aceves MD [facility] [address]   8/10/2022  4:30 PM Katherine Huddleston MD [facility] P[address]VC [address]   8/18/2022 10:40 AM Phil Quintana MD [facility] IM [address] PCW   8/18/2022  3:00 PM Chanel Reyna MD [facility] CARDIO [address]   8/25/2022  1:30 PM Howard Martinez MD [facility] [address]ULMSVC [address]   9/1/2022  9:00 AM Dylan Chaidez MD [facility] GASTRO [address]           Contact Info     Phil Quintana MD   Specialty: Family Medicine, Sports Medicine   Relationship: PCP - General  Hypertension Digital Medicine Responsible Provider    [id number]  [facility] [address]   Phone: [phone]       Next Steps: Schedule an appointment as soon as possible for a visit         with your child about his or her illness.  · Limit your child’s exposure to other people, including family members, until he or she is no longer contagious.  Follow-up care  Follow up with your child’s health care provider, or as advised.  When to seek medical advice  Call your child's health care provider right away if:  · Your child is 3 months old or younger and has a fever of 100.4°F (38°C) or higher. Your child may need to see a health care provider.  · Your child is of any age and has fevers higher than 104°F (40°C) that come back again and again  Also call your child's provider right away if any of these occur:  · Symptoms don’t get better after taking prescribed medication  · Your child can’t drink fluids  · Your child refuses to drink or eat  · Throat swelling, trouble swallowing, or trouble breathing  · Earache or trouble hearing   © 9417-2665 [employer] [address] [place] [address], [place] [address]. All rights reserved. This information is not intended as a substitute for professional medical care. Always follow your healthcare professional's instructions.

## 2022-07-16 ENCOUNTER — NURSE TRIAGE (OUTPATIENT)
Dept: ADMINISTRATIVE | Facility: CLINIC | Age: 77
End: 2022-07-16
Payer: COMMERCIAL

## 2022-07-16 NOTE — TELEPHONE ENCOUNTER
Pt escalated for responding to post discharge text for day 1 of symptoms. Pt states she is feeling lightheaded and wobbly when she stands up. She is wearing her continuous O2. SpO2 is staying around 97-98%. Denies CP or SOB. BP is 111/66 with HR 71. Pt states they increased her lasix and she has not been drinking much. Has barely had 1/2 cup of water today. Pt is on fluid restriction diet. Advised she needs to make sure she is drinking enough fluids to keep herself hydrated but staying within fluid restriction diet. Dispo is see provider within 24 hours. Offered ready responders, OAC and UC. Pt does not feel she needs to be seen so refuses dispo at this time. Advised to call back with further concerns or worsening symptoms. Advised to continue essential medications as directed by physician.    Reason for Disposition   Taking a medicine that could cause dizziness (e.g., blood pressure medications, diuretics)    Additional Information   Negative: SEVERE difficulty breathing (e.g., struggling for each breath, speaks in single words)   Negative: [1] Difficulty breathing or swallowing AND [2] started suddenly after medicine, an allergic food or bee sting   Negative: Shock suspected (e.g., cold/pale/clammy skin, too weak to stand, low BP, rapid pulse)   Negative: Difficult to awaken or acting confused (e.g., disoriented, slurred speech)   Negative: [1] Weakness (i.e., paralysis, loss of muscle strength) of the face, arm or leg on one side of the body AND [2] sudden onset AND [3] present now   Negative: [1] Numbness (i.e., loss of sensation) of the face, arm or leg on one side of the body AND [2] sudden onset AND [3] present now   Negative: [1] Loss of speech or garbled speech AND [2] sudden onset AND [3] present now   Negative: Overdose (accidental or intentional) of medications   Negative: [1] Fainted > 15 minutes ago AND [2] still feels too weak or dizzy to stand   Negative: Heart beating < 50 beats per  "minute OR > 140 beats per minute   Negative: Sounds like a life-threatening emergency to the triager   Negative: Chest pain   Negative: Rectal bleeding, bloody stool, or tarry-black stool   Negative: [1] Vomiting AND [2] contains red blood or black ("coffee ground") material   Negative: Vomiting is main symptom   Negative: Diarrhea is main symptom   Negative: Difficulty breathing   Negative: SEVERE dizziness (e.g., unable to stand, requires support to walk, feels like passing out now)   Negative: Extra heart beats OR irregular heart beating  (i.e., "palpitations")   Negative: [1] Drinking very little AND [2] dehydration suspected (e.g., no urine > 12 hours, very dry mouth, very lightheaded)   Negative: [1] Weakness (i.e., paralysis, loss of muscle strength) of the face, arm / hand, or leg / foot on one side of the body AND [2] sudden onset AND [3] brief (now gone)   Negative: [1] Numbness (i.e., loss of sensation) of the face, arm / hand, or leg / foot on one side of the body AND [2] sudden onset AND [3] brief (now gone)   Negative: [1] Loss of speech or garbled speech AND [2] sudden onset AND [3] brief (now gone)   Negative: Loss of vision or double vision (Exception: similar to previous migraines)   Negative: Patient sounds very sick or weak to the triager   Negative: [1] Dizziness caused by heat exposure, sudden standing, or poor fluid intake AND [2] no improvement after 2 hours of rest and fluids   Negative: [1] Fever > 103 F (39.4 C) AND [2] not able to get the fever down using Fever Care Advice   Negative: [1] Fever > 101 F (38.3 C) AND [2] age > 60 years   Negative: [1] Fever > 100.0 F (37.8 C) AND [2] bedridden (e.g., nursing home patient, CVA, chronic illness, recovering from surgery)   Negative: [1] Fever > 100.0 F (37.8 C) AND [2] diabetes mellitus or weak immune system (e.g., HIV positive, cancer chemo, splenectomy, organ transplant, chronic steroids)   Negative: [1] MODERATE " dizziness (e.g., interferes with normal activities) AND [2] has NOT been evaluated by physician for this  (Exception: dizziness caused by heat exposure, sudden standing, or poor fluid intake)   Negative: Fever present > 3 days (72 hours)    Protocols used: DIZZINESS - [id number]

## 2022-07-17 ENCOUNTER — NURSE TRIAGE (OUTPATIENT)
Dept: ADMINISTRATIVE | Facility: CLINIC | Age: 77
End: 2022-07-17
Payer: COMMERCIAL

## 2022-07-17 LAB
BACTERIA BLD CULT: NORMAL
BACTERIA BLD CULT: NORMAL

## 2022-07-17 NOTE — TELEPHONE ENCOUNTER
S/w NOC yesterday for dizziness, advised needs to be seen within 24 hours.     Now calling because urinating more frequently. Pt states wasn't urinating more yesterday but was concerned because she increased increased lasix to 40mg BID, gained 8 oz in past 2 days. Was lightheaded yesterday but that improved. Now urinating more but not drinking much. Advised per protocol. Could be r/t lasix dose. Imp to keeping drinking fluids, don't want her to get dehydrated. F/u within PCP within 24 hours. Declines appt at this time. Will call back with worsening s/s    Reason for Disposition   Urinating more frequently than usual (i.e., frequency)    Additional Information   Negative: Shock suspected (e.g., cold/pale/clammy skin, too weak to stand, low BP, rapid pulse)   Negative: Sounds like a life-threatening emergency to the triager   Negative: [1] Unable to urinate (or only a few drops) > 4 hours AND [2] bladder feels very full (e.g., palpable bladder or strong urge to urinate)   Negative: [1] Decreased urination and [2] drinking very little AND [2] dehydration suspected (e.g., dark urine, no urine > 12 hours, very dry mouth, very lightheaded)   Negative: Patient sounds very sick or weak to the triager   Negative: Fever > 100.4 F (38.0 C)   Negative: Side (flank) or lower back pain present   Negative: [1] Can't control passage of urine (i.e., urinary incontinence) AND [2] new-onset (< 2 weeks) or worsening    Protocols used: URINARY SYMPTOMS-A-AH

## 2022-07-18 ENCOUNTER — TELEPHONE (OUTPATIENT)
Dept: CARDIOLOGY | Facility: CLINIC | Age: 77
End: 2022-07-18
Payer: COMMERCIAL

## 2022-07-18 ENCOUNTER — TELEPHONE (OUTPATIENT)
Dept: INTERNAL MEDICINE | Facility: CLINIC | Age: 77
End: 2022-07-18
Payer: COMMERCIAL

## 2022-07-18 DIAGNOSIS — I50.43 ACUTE ON CHRONIC COMBINED SYSTOLIC AND DIASTOLIC CONGESTIVE HEART FAILURE: Primary | ICD-10-CM

## 2022-07-18 NOTE — TELEPHONE ENCOUNTER
Please schedule patient - same day - with any available provider (MD, ANGEL, APRN).     Thank you.

## 2022-07-18 NOTE — TELEPHONE ENCOUNTER
Pt's daughter returned my phone call. She informed me about the pt's low BP. I informed her that the pt was scheduled to go and see the nurse in Cardiology on tomorrow.

## 2022-07-18 NOTE — TELEPHONE ENCOUNTER
Please schedule patient - same day - with any available provider (MD, PALindaC, APRN).     Orly Dent - tomorrow??    Thank you.

## 2022-07-18 NOTE — TELEPHONE ENCOUNTER
----- Message from Vanessa Siddiqi sent at 7/18/2022 12:34 PM CDT -----  Contact: sachin [phone] Daughter/Alexsandra [phone]  Stated that BP is running low at 111/55. Requesting a call. Stated that they have spoken  to nurse on call    Please call and advise.    Thank You

## 2022-07-18 NOTE — TELEPHONE ENCOUNTER
"[facility] Phone Enrollment Complete.    Phone enrollment completed due to :discharge prior to meeting    Called and spoke to ot via phone. Introduced self to pt as [facility] nurse navigator.      Patient education will be given:in person     Reviewed the following key points of [facility] program with pt and family:              1.) Take your medications as directed.               2.) Weight yourself daily              3.) Follow low salt and limited fluid diet.               4.) Stop smoking and start exercising              5.) Go to your appointments and call your team.          Reviewed plan for follow up once discharged to include phone calls, in person and virtual visits to assist pt optimizing their heart failure medication regimen and encouraging healthy lifestyle modifications.  Reminded pt that program will assist them over the next 4-6 weeks and then patient will be transferred to long term care provider .  Reminded pt how to contact [facility] navigator via phone and or via [employer].      Pt given appointment today via phone. Pt will come to clinic tomorrow.      Pt reports some mild dizziness and "low blood pressure" at 111/50.       PT and family verbalize read back of information given.  Encouraged pt and family to read over information often and contact team with any questions or concerns.                  "

## 2022-07-19 ENCOUNTER — CLINICAL SUPPORT (OUTPATIENT)
Dept: CARDIOLOGY | Facility: CLINIC | Age: 77
End: 2022-07-19
Payer: COMMERCIAL

## 2022-07-19 ENCOUNTER — OFFICE VISIT (OUTPATIENT)
Dept: CARDIOLOGY | Facility: CLINIC | Age: 77
End: 2022-07-19
Payer: COMMERCIAL

## 2022-07-19 ENCOUNTER — LAB VISIT (OUTPATIENT)
Dept: LAB | Facility: HOSPITAL | Age: 77
End: 2022-07-19
Payer: COMMERCIAL

## 2022-07-19 VITALS
HEIGHT: 61 IN | BODY MASS INDEX: 27.45 KG/M2 | HEART RATE: 61 BPM | DIASTOLIC BLOOD PRESSURE: 52 MMHG | WEIGHT: 145.38 LBS | SYSTOLIC BLOOD PRESSURE: 95 MMHG

## 2022-07-19 DIAGNOSIS — Z78.9 STATIN INTOLERANCE: ICD-10-CM

## 2022-07-19 DIAGNOSIS — I50.42 CHRONIC COMBINED SYSTOLIC AND DIASTOLIC CONGESTIVE HEART FAILURE: Primary | ICD-10-CM

## 2022-07-19 DIAGNOSIS — R94.39 ABNORMAL NUCLEAR STRESS TEST: ICD-10-CM

## 2022-07-19 DIAGNOSIS — J43.9 PULMONARY EMPHYSEMA, UNSPECIFIED EMPHYSEMA TYPE: Chronic | ICD-10-CM

## 2022-07-19 DIAGNOSIS — J96.11 CHRONIC RESPIRATORY FAILURE WITH HYPOXIA: Chronic | ICD-10-CM

## 2022-07-19 DIAGNOSIS — I10 ESSENTIAL HYPERTENSION: ICD-10-CM

## 2022-07-19 DIAGNOSIS — I50.43 ACUTE ON CHRONIC COMBINED SYSTOLIC AND DIASTOLIC CONGESTIVE HEART FAILURE: ICD-10-CM

## 2022-07-19 DIAGNOSIS — I44.7 LBBB (LEFT BUNDLE BRANCH BLOCK): Chronic | ICD-10-CM

## 2022-07-19 LAB
ALBUMIN SERPL BCP-MCNC: 3.5 G/DL (ref 3.5–5.2)
ALP SERPL-CCNC: 87 U/L (ref 55–135)
ALT SERPL W/O P-5'-P-CCNC: 30 U/L (ref 10–44)
ANION GAP SERPL CALC-SCNC: 11 MMOL/L (ref 8–16)
AST SERPL-CCNC: 12 U/L (ref 10–40)
BASOPHILS # BLD AUTO: 0.01 K/UL (ref 0–0.2)
BASOPHILS NFR BLD: 0.2 % (ref 0–1.9)
BILIRUB SERPL-MCNC: 0.3 MG/DL (ref 0.1–1)
BNP SERPL-MCNC: 789 PG/ML (ref 0–99)
BUN SERPL-MCNC: 23 MG/DL (ref 8–23)
CALCIUM SERPL-MCNC: 9.1 MG/DL (ref 8.7–10.5)
CHLORIDE SERPL-SCNC: 99 MMOL/L (ref 95–110)
CO2 SERPL-SCNC: 33 MMOL/L (ref 23–29)
CREAT SERPL-MCNC: 0.9 MG/DL (ref 0.5–1.4)
DIFFERENTIAL METHOD: ABNORMAL
EOSINOPHIL # BLD AUTO: 0.1 K/UL (ref 0–0.5)
EOSINOPHIL NFR BLD: 2 % (ref 0–8)
ERYTHROCYTE [DISTWIDTH] IN BLOOD BY AUTOMATED COUNT: 15 % (ref 11.5–14.5)
EST. GFR  (AFRICAN AMERICAN): >60 ML/MIN/1.73 M^2
EST. GFR  (NON AFRICAN AMERICAN): >60 ML/MIN/1.73 M^2
GLUCOSE SERPL-MCNC: 85 MG/DL (ref 70–110)
HCT VFR BLD AUTO: 39.7 % (ref 37–48.5)
HGB BLD-MCNC: 12.2 G/DL (ref 12–16)
IMM GRANULOCYTES # BLD AUTO: 0.01 K/UL (ref 0–0.04)
IMM GRANULOCYTES NFR BLD AUTO: 0.2 % (ref 0–0.5)
LYMPHOCYTES # BLD AUTO: 1.9 K/UL (ref 1–4.8)
LYMPHOCYTES NFR BLD: 31 % (ref 18–48)
MAGNESIUM SERPL-MCNC: 2.1 MG/DL (ref 1.6–2.6)
MCH RBC QN AUTO: 24.6 PG (ref 27–31)
MCHC RBC AUTO-ENTMCNC: 30.7 G/DL (ref 32–36)
MCV RBC AUTO: 80 FL (ref 82–98)
MONOCYTES # BLD AUTO: 0.6 K/UL (ref 0.3–1)
MONOCYTES NFR BLD: 10.4 % (ref 4–15)
NEUTROPHILS # BLD AUTO: 3.4 K/UL (ref 1.8–7.7)
NEUTROPHILS NFR BLD: 56.2 % (ref 38–73)
NRBC BLD-RTO: 0 /100 WBC
PHOSPHATE SERPL-MCNC: 3.1 MG/DL (ref 2.7–4.5)
PLATELET # BLD AUTO: 230 K/UL (ref 150–450)
PMV BLD AUTO: 9.9 FL (ref 9.2–12.9)
POTASSIUM SERPL-SCNC: 3.1 MMOL/L (ref 3.5–5.1)
PROT SERPL-MCNC: 6.3 G/DL (ref 6–8.4)
RBC # BLD AUTO: 4.96 M/UL (ref 4–5.4)
SODIUM SERPL-SCNC: 143 MMOL/L (ref 136–145)
WBC # BLD AUTO: 5.96 K/UL (ref 3.9–12.7)

## 2022-07-19 PROCEDURE — 3044F HG A1C LEVEL LT 7.0%: CPT | Mod: CPTII,S$GLB,,

## 2022-07-19 PROCEDURE — 99204 PR OFFICE/OUTPT VISIT, NEW, LEVL IV, 45-59 MIN: ICD-10-PCS | Mod: S$GLB,,,

## 2022-07-19 PROCEDURE — 99999 PR PBB SHADOW E&M-EST. PATIENT-LVL IV: ICD-10-PCS | Mod: PBBFAC,,,

## 2022-07-19 PROCEDURE — 99999 PR PBB SHADOW E&M-EST. PATIENT-LVL I: CPT | Mod: PBBFAC,,,

## 2022-07-19 PROCEDURE — 1126F AMNT PAIN NOTED NONE PRSNT: CPT | Mod: CPTII,S$GLB,,

## 2022-07-19 PROCEDURE — 3008F PR BODY MASS INDEX (BMI) DOCUMENTED: ICD-10-PCS | Mod: CPTII,S$GLB,,

## 2022-07-19 PROCEDURE — 4010F ACE/ARB THERAPY RXD/TAKEN: CPT | Mod: CPTII,S$GLB,,

## 2022-07-19 PROCEDURE — 3044F PR MOST RECENT HEMOGLOBIN A1C LEVEL <7.0%: ICD-10-PCS | Mod: CPTII,S$GLB,,

## 2022-07-19 PROCEDURE — 3288F PR FALLS RISK ASSESSMENT DOCUMENTED: ICD-10-PCS | Mod: CPTII,S$GLB,,

## 2022-07-19 PROCEDURE — 99204 OFFICE O/P NEW MOD 45 MIN: CPT | Mod: S$GLB,,,

## 2022-07-19 PROCEDURE — 80053 COMPREHEN METABOLIC PANEL: CPT

## 2022-07-19 PROCEDURE — 3008F BODY MASS INDEX DOCD: CPT | Mod: CPTII,S$GLB,,

## 2022-07-19 PROCEDURE — 36415 COLL VENOUS BLD VENIPUNCTURE: CPT

## 2022-07-19 PROCEDURE — 1160F PR REVIEW ALL MEDS BY PRESCRIBER/CLIN PHARMACIST DOCUMENTED: ICD-10-PCS | Mod: CPTII,S$GLB,,

## 2022-07-19 PROCEDURE — 99999 PR PBB SHADOW E&M-EST. PATIENT-LVL IV: CPT | Mod: PBBFAC,,,

## 2022-07-19 PROCEDURE — 3074F SYST BP LT 130 MM HG: CPT | Mod: CPTII,S$GLB,,

## 2022-07-19 PROCEDURE — 1160F RVW MEDS BY RX/DR IN RCRD: CPT | Mod: CPTII,S$GLB,,

## 2022-07-19 PROCEDURE — 3288F FALL RISK ASSESSMENT DOCD: CPT | Mod: CPTII,S$GLB,,

## 2022-07-19 PROCEDURE — 1111F DSCHRG MED/CURRENT MED MERGE: CPT | Mod: CPTII,S$GLB,,

## 2022-07-19 PROCEDURE — 1126F PR PAIN SEVERITY QUANTIFIED, NO PAIN PRESENT: ICD-10-PCS | Mod: CPTII,S$GLB,,

## 2022-07-19 PROCEDURE — 83735 ASSAY OF MAGNESIUM: CPT

## 2022-07-19 PROCEDURE — 4010F PR ACE/ARB THEARPY RXD/TAKEN: ICD-10-PCS | Mod: CPTII,S$GLB,,

## 2022-07-19 PROCEDURE — 3078F PR MOST RECENT DIASTOLIC BLOOD PRESSURE < 80 MM HG: ICD-10-PCS | Mod: CPTII,S$GLB,,

## 2022-07-19 PROCEDURE — 3074F PR MOST RECENT SYSTOLIC BLOOD PRESSURE < 130 MM HG: ICD-10-PCS | Mod: CPTII,S$GLB,,

## 2022-07-19 PROCEDURE — 1111F PR DISCHARGE MEDS RECONCILED W/ CURRENT OUTPATIENT MED LIST: ICD-10-PCS | Mod: CPTII,S$GLB,,

## 2022-07-19 PROCEDURE — 3078F DIAST BP <80 MM HG: CPT | Mod: CPTII,S$GLB,,

## 2022-07-19 PROCEDURE — 83880 ASSAY OF NATRIURETIC PEPTIDE: CPT

## 2022-07-19 PROCEDURE — 1159F PR MEDICATION LIST DOCUMENTED IN MEDICAL RECORD: ICD-10-PCS | Mod: CPTII,S$GLB,,

## 2022-07-19 PROCEDURE — 99999 PR PBB SHADOW E&M-EST. PATIENT-LVL I: ICD-10-PCS | Mod: PBBFAC,,,

## 2022-07-19 PROCEDURE — 1159F MED LIST DOCD IN RCRD: CPT | Mod: CPTII,S$GLB,,

## 2022-07-19 PROCEDURE — 1101F PT FALLS ASSESS-DOCD LE1/YR: CPT | Mod: CPTII,S$GLB,,

## 2022-07-19 PROCEDURE — 1101F PR PT FALLS ASSESS DOC 0-1 FALLS W/OUT INJ PAST YR: ICD-10-PCS | Mod: CPTII,S$GLB,,

## 2022-07-19 PROCEDURE — 85025 COMPLETE CBC W/AUTO DIFF WBC: CPT

## 2022-07-19 PROCEDURE — 84100 ASSAY OF PHOSPHORUS: CPT

## 2022-07-19 RX ORDER — FUROSEMIDE 40 MG/1
40 TABLET ORAL DAILY
Qty: 90 TABLET | Refills: 3 | Status: SHIPPED | OUTPATIENT
Start: 2022-07-19 | End: 2023-08-30 | Stop reason: SDUPTHER

## 2022-07-19 RX ORDER — SPIRONOLACTONE 25 MG/1
12.5 TABLET ORAL DAILY
Qty: 30 TABLET | Refills: 11 | Status: SHIPPED | OUTPATIENT
Start: 2022-07-19 | End: 2022-07-26

## 2022-07-19 NOTE — PROGRESS NOTES
HF TCC Provider Note (Initial Clinic) Consult Note    Date of original referral: 7/12/22  Age: 76 y.o.  Gender: female  Ethnicity: Black or   Number of admissions for CHF within the preceding year: 2  Duration of CHF: 2018  Type of Congestive Heart Failure: Combined   Etiology: unspecified. Abnormal Cardiac PET Stress, no prior [facility]  Social history: former smoker, quit 2018 (previously 1ppd x 40 years), denies alcohol use or illicit drug use    Enrolled in Infusion suite: no    Diagnostic Labs:    EKG - 07/12/2022  CXR - 07/12/2022  ECHO - 07/13/2022  Stress test -   Stress echo - 12/21/2021 - The degree of scar on perfusion imaging is out of proportion to the degree of myocardial dysfunction.    There is a small to moderate sized, moderate intensity distal to apical inferior and inferolateral resting perfusion abnormality involving 11% of the LV myocardium in the distribution of the RCA. After pharmacologic stress, this defect is larger and more severe involving 13% of the LV myocardium, indicative of infarct with layla-infarct ischemia.    Within the perfusion abnormality, absolute myocardial perfusion (cc/min/gm) averaged 0.46 cc/min/g at rest, 0.71 cc/min/g at stress and CFR was 1.53 , which equates to moderately to severely reduced coronary flow capacity  within the RCA territory.    The whole heart absolute myocardial perfusion values averaged 0.93 cc/min/g at rest, which is normal; 1.86 cc/min/g at stress, which is low normal; and CFR is 1.99 , which is mildly reduced.    CT attenuation images demonstrate mild diffuse coronary calcifications in the LAD, LCX and RCA territory.    There is severe global hypokinesis at rest and during stress.    The LV cavity size is severely enlarged at rest and stress.    The EKG portion of this study is uninterpretable.    During stress, rare PVCs are noted.    The patient reported no chest pain during the stress test.    There are no prior studies for  comparison.    Pharmacologic stress -   Cardiac catheterization -    Cardiac MRI -     Lab Results   Component Value Date     07/15/2022     07/14/2022    K 3.5 07/15/2022    K 3.4 (L) 07/14/2022     07/15/2022     07/14/2022    CO2 31 (H) 07/15/2022    CO2 31 (H) 07/14/2022     07/15/2022     07/14/2022    BUN 26 (H) 07/15/2022    BUN 25 (H) 07/14/2022    CREATININE 0.8 07/15/2022    CREATININE 1.0 07/14/2022    CALCIUM 9.3 07/15/2022    CALCIUM 9.2 07/14/2022    PROT 7.0 07/12/2022    PROT 6.5 05/03/2022    ALBUMIN 3.8 07/12/2022    ALBUMIN 3.0 (L) 05/03/2022    BILITOT 0.4 07/12/2022    BILITOT 0.3 05/03/2022    ALKPHOS 117 07/12/2022    ALKPHOS 85 05/03/2022    AST 87 (H) 07/12/2022    AST 14 05/03/2022    ALT 70 (H) 07/12/2022    ALT 9 (L) 05/03/2022    ANIONGAP 13 07/15/2022    ANIONGAP 11 07/14/2022    ESTGFRAFRICA >60.0 07/15/2022    ESTGFRAFRICA >60.0 07/14/2022    EGFRNONAA >60.0 07/15/2022    EGFRNONAA 55.6 (A) 07/14/2022       Lab Results   Component Value Date    WBC 9.95 07/14/2022    WBC 7.04 07/13/2022    RBC 4.64 07/14/2022    RBC 4.46 07/13/2022    HGB 11.3 (L) 07/14/2022    HGB 10.6 (L) 07/13/2022    HCT 36.6 (L) 07/14/2022    HCT 34.9 (L) 07/13/2022    MCV 79 (L) 07/14/2022    MCV 78 (L) 07/13/2022    MCH 24.4 (L) 07/14/2022    MCH 23.8 (L) 07/13/2022    MCHC 30.9 (L) 07/14/2022    MCHC 30.4 (L) 07/13/2022    RDW 15.5 (H) 07/14/2022    RDW 14.9 (H) 07/13/2022     07/14/2022     07/13/2022    MPV 10.9 07/14/2022    MPV 11.0 07/13/2022    IMMGR 0.3 07/14/2022    IMMGR 0.4 07/13/2022    IGABS 0.03 07/14/2022    IGABS 0.03 07/13/2022    LYMPH 1.3 07/14/2022    LYMPH 13.3 (L) 07/14/2022    MONO 0.8 07/14/2022    MONO 7.5 07/14/2022    EOS 0.0 07/14/2022    EOS 0.0 07/13/2022    BASO 0.01 07/14/2022    BASO 0.00 07/13/2022    NRBC 0 07/14/2022    NRBC 0 07/13/2022    GRAN 7.8 (H) 07/14/2022    GRAN 78.8 (H) 07/14/2022    EOSINOPHIL 0.0 07/14/2022     EOSINOPHIL 0.0 07/13/2022    BASOPHIL 0.1 07/14/2022    BASOPHIL 0.0 07/13/2022    PLTEST Appears normal 07/12/2022    PLTEST Decreased (A) 05/02/2022    ANISO CANCELED 05/02/2022    ANISO Slight 04/05/2021    HYPO Occasional 05/02/2022       Lab Results   Component Value Date    BNP 1,917 (H) 07/12/2022    BNP 2,135 (H) 05/01/2022    MG 1.9 07/14/2022    MG 1.7 07/13/2022    PHOS 4.9 (H) 07/12/2022    PHOS 3.7 05/03/2022    TROPONINI 0.486 (H) 07/14/2022    TROPONINI 1.167 (H) 07/13/2022    HGBA1C 5.2 07/13/2022    HGBA1C 5.7 (H) 05/01/2022    TSH 0.503 12/08/2021    TSH 0.988 04/02/2021    FREET4 0.93 07/12/2018       Lab Results   Component Value Date    IRON 41 07/05/2022    TIBC 496 (H) 07/12/2018    FERRITIN 35 07/05/2022    CHOL 136 12/09/2021    TRIG 77 12/09/2021    HDL 36 (L) 12/09/2021    LDLCALC 84.6 12/09/2021    CHOLHDL 26.5 12/09/2021    TOTALCHOLEST 3.8 12/09/2021    NONHDLCHOL 100 12/09/2021    COLORU Yellow 07/12/2022    APPEARANCEUA Cloudy (A) 07/12/2022    PHUR 5.0 07/12/2022    SPECGRAV 1.020 07/12/2022    PROTEINUA 2+ (A) 07/12/2022    GLUCUA 3+ (A) 07/12/2022    KETONESU Negative 07/12/2022    BILIRUBINUA Negative 07/12/2022    OCCULTUA Negative 07/12/2022    NITRITE Negative 07/12/2022    LEUKOCYTESUR Negative 07/12/2022       No implanted cardiac devices    Current Outpatient Medications on File Prior to Visit   Medication Sig Dispense Refill    fluticasone furoate-vilanteroL (BREO ELLIPTA) 200-25 mcg/dose DsDv diskus inhaler Inhale 1 puff into the lungs once daily. Controller 60 each 1    furosemide (LASIX) 40 MG tablet Take 1 tablet (40 mg total) by mouth 2 (two) times daily. 60 tablet 1    guaiFENesin (MUCINEX) 600 mg 12 hr tablet Take 2 tablets (1,200 mg total) by mouth 2 (two) times daily as needed for Congestion (expectorant). 60 tablet 0    metoprolol succinate (TOPROL-XL) 25 MG 24 hr tablet Take 1 tablet (25 mg total) by mouth once daily. 30 tablet 11    multivitamin (THERAGRAN)  tablet Take 1 tablet by mouth once daily. 30 tablet 11    predniSONE (DELTASONE) 10 MG tablet Take 4 tablets (40 mg total) by mouth once daily for 2 days, THEN 2 tablets (20 mg total) once daily for 2 days, THEN 1 tablet (10 mg total) once daily for 2 days, THEN 0.5 tablets (5 mg total) once daily for 2 days. 15 tablet 0    pulse oximeter (PULSE OXIMETER) device by Apply Externally route 2 (two) times a day. Use twice daily at 8 AM and 3 PM and record the value in [place] as directed. 1 each 0    sacubitriL-valsartan (ENTRESTO) 24-26 mg per tablet Take 1 tablet by mouth 2 (two) times daily. 180 tablet 3    [DISCONTINUED] spironolactone (ALDACTONE) 25 MG tablet Take 1 tablet (25 mg total) by mouth once daily. 90 tablet 3     No current facility-administered medications on file prior to visit.         HPI:  Patient distance walked not limited by SOB and pace normal, wearing 2-3L supplemental O2 continuously   Patient sleeps on 1 number of pillows   Patient wakes up SOB, has to get out of bed, associated cough- denies PND/orthopnea symptoms. Endorses dayttime dry cough (recent COPD exacerbation, completing prednisone taper)     Palpitations - denies    Dizzy, light-headed, pre-syncope or syncope- endorses frequent dizziness since hospital discharge occurring throughout the day. Denies pre-syncope/syncope    Since discharge frequency of performing weights, home weight and weight change- performing daily weights. Reports weight stable 142-143lbs   Other information felt pertinent to HPI: Ms. Selma Hooper is a 77 yo female with a PMHx of combined CHF (EF 15% in 12/2021), COPD, obesity, and HTN who presents to first [facility] visit following recent admission for ADHF. She was admitted to Hospital Medicine for acute on chronic respiratory failure 2/2 COPD exacerbation and ADHF. She diuresed with IV lasix with good response. Repeat echo during admission again showed EF 15% with G1DD. Resumed Breo for COPD maintenance  therapy. 6MWT qualified for 3L continuous home O2, which was arranged at discharge.     7/19/22: Today her primary complaint is frequent dizziness since hospital discharge. Reports previously was on lasix 20mg daily which was increased to 40mg BID during recent hospital stay. Otherwise denies SOB, wearing continuous supplemental O2 (2-3L). Denies LE swelling.     PHYSICAL:   Vitals:    07/19/22 1108   BP: (!) 95/52   Pulse: 61      Wt Readings from Last 3 Encounters:   07/19/22 66 kg (145 lb 6.4 oz)   07/15/22 64.2 kg (141 lb 8.6 oz)   05/02/22 66.2 kg (146 lb)     JVD: no   Heart rhythm: regular  Cardiac murmur: No    S3: no  S4: no  Lungs: clear  Hepatojugular reflux: no  Edema: no      Echo 7/13/22:  · The left ventricle is severely enlarged with eccentric hypertrophy and severely decreased systolic function. The estimated ejection fraction is 15%.  · There is severe left ventricular global hypokinesis.  · Normal right ventricular size with normal right ventricular systolic function.  · Grade I left ventricular diastolic dysfunction.  · Moderate left atrial enlargement.  · Mild mitral regurgitation.  · The estimated PA systolic pressure is 32 mmHg.  · Normal central venous pressure (3 mmHg).    ASSESSMENT: Chronic combined systolic and diastolic HF    PLAN:      Patient Instructions:    Instruct the patient to notify this clinic if HH, a physician or an advanced care provider wants to change medication one of their HF medications    Activity and Diet restrictions:   o Recommend 2-3 gram sodium restriction and 1500cc- 2000cc fluid restriction.  o Encourage physical activity with graded exercise program.  o Requested patient to weigh themselves daily, and to notify us if their weight increases by more than 3 lbs in 1 day or 5 lbs in 3 days.    Assigned dry weight on home scale: 142-143lbs  Medication changes (include current dose and changed dose): NYHA Class II symptoms. Euvolemic on exam. Hypokalemic on labs.  Start aldactone 12.5mg daily with repeat labs in 1 week and plan to uptitrate as tolerates. Decrease lasix from 40mg BID to 40mg daily with close monitoring of fluid status. Plan to start SGLT2i in future for additional GDMT as tolerates.    Upcoming labs and date anticipated: repeat labs next week with starting aldactone. RTC in 2 weeks for repeat labs and follow up    Cardiac PET Stress with noted ischemia and severely reduced coronary flow capacity within the RCA territory. IC referral placed.    Julieth Us PA-C

## 2022-07-19 NOTE — Clinical Note
Anurag Andrade. I am seeing this mutual patient in HFTCC clinic and she was inquiring about portable O2 concentrator. Would you/your team mind facilitating getting patient set up with portable O2 concentrator (smallest available)? Thank you

## 2022-07-19 NOTE — PROGRESS NOTES
"  Heart Failure Transitional Care Clinic(HFT[facility]) First Week Visit     Pt presents to clinic  and accompanied by family.     Most Recent Hospital Discharge Date: 7/15/22  Last admission Diagnosis/chief complaint:         Visit Vitals:     Wt Readings from Last 3 Encounters:   07/19/22 66 kg (145 lb 6.4 oz)   07/15/22 64.2 kg (141 lb 8.6 oz)   05/02/22 66.2 kg (146 lb)     Temp Readings from Last 3 Encounters:   07/15/22 97.4 °F (36.3 °C) (Oral)   05/03/22 97.6 °F (36.4 °C) (Oral)   12/14/21 98.5 °F (36.9 °C) (Oral)     BP Readings from Last 3 Encounters:   07/19/22 (!) 95/52   07/15/22 120/61   05/03/22 (!) 102/56     Pulse Readings from Last 3 Encounters:   07/19/22 61   07/15/22 90   05/03/22 89            Pt reports the following:  []  Shortness of Breath with activity  []  Shortness of Breath at rest/ sleeping on 2-3 pillows "some days"  []  Fatigue  []  Edema   [] Chest pain or tightness  [] Weight Increase since discharge  [] None of the above     Pt reports being in the GREEN (color) Zone. If in yellow/red, reminded that they should be calling HFTCC today or now.      Medications:   · Medication reconciliation completed today per MA.  Pt reports having all medications available and understands how to take them appropriately. Reminded pt to call prior to making any changes to medications.      Education:    [x] Gave pt new  "Heart Failure Transitional Care Clinic Home Care Guide" .   Reviewed key points as listed below.      · Recommend 2 gram sodium restriction and 1500cc fluid restriction.  · Encourage physical activity with graded exercise program.  · Requested patient to weigh themselves daily, and to notify us if their weight increases by more than 3 lbs in 1 day or 5 lbs in 3 days.      [x] Gave  "Daily Weight and Symptom Tracker".  Reviewed with patient when and how to call  HFTCC according to "Yellow Zone" and "Red Zone".                  [] Pt given list of low/high sodium food list                 " "  Watch for these Signs and Symptoms: If any of these occur, contact HFTCC immediately:   Increase in shortness of breath with movement   Increase in swelling in your legs and ankles   Weight gain of more than 3 pounds in a night or 5 pounds in 3 days.   Difficulty breathing when you are lying down   Worsening fatigue or tiredness   Stomach bloating, a full feeling or a loss of appetite   Increased coughing--especially when you are lying down     MyChart and Care Companion:              Patient active on myChart? Yes, but patient does not use regularly    Contacting our Team:              Reviewed with pt how to contact HFT[facility] RN via phone or [employer] messaging.      HF TCC Program Plan:  Pt educated on follow-up plan while in HFTCC program.   [x]  PT given /reviewed upcoming appointment/check in dates. These will include weekly contact with RN or visits with providers over the next 4-6 weeks.                   [x]  Pt educated that they will transitioned to long term care provider team at week 4-6.  This team will be either Cardiology, PCP or Advanced Heart Failure depending on needs.          Pt was able to verbalize back to RN in their own words correct diet/fluid restrictions, necessity for exercise, warning signs and symptoms, when and how to contact their TCC team .       Plan:     Refer to provider note for details.       [x]  Pt given AVS with follow up appointment within 2  week and medication detail list for ease of use.     [x]  Explained to pt they will have a phone "check in" by RN in/on 1 week     [] Pt met with Russell Manning Dietician today after visit.  Refer to his note for details.     Will follow up with pt at next clinic visit and RN navigator available for pt questions, issues or concerns.     Please refer to provider visit for additional details and assessment.  "

## 2022-07-20 ENCOUNTER — TELEPHONE (OUTPATIENT)
Dept: CARDIOLOGY | Facility: CLINIC | Age: 77
End: 2022-07-20
Payer: COMMERCIAL

## 2022-07-20 ENCOUNTER — NURSE TRIAGE (OUTPATIENT)
Dept: ADMINISTRATIVE | Facility: CLINIC | Age: 77
End: 2022-07-20
Payer: COMMERCIAL

## 2022-07-20 NOTE — TELEPHONE ENCOUNTER
Received IC referral from Julieth Us PA-C.  Spoke to MsWinsome Sandie she stated that she does not want to schedule any appointment now with IC, will wait to discuss with Dr. Rg at upcoming appointment.  Will let ULISES Us know of call.

## 2022-07-21 NOTE — TELEPHONE ENCOUNTER
Recommend patient follow-up with Cardiology concerning Entresto.  She was seen recently, this is 1 of the medications they manage, thank you

## 2022-07-21 NOTE — TELEPHONE ENCOUNTER
"Pt calling regarding her blood pressure of 100/57. Would like to know if she can take her Entresto. I have given her advice, per protocol. I have also secure chatted provider for further information in this regard. Will update once I obtain further information.   I have called patient back and explained that I did not reach an on call provider in this regard. I have given her call back precautions and she verbalizes understanding. Will call back with any questions/concerns.       Reason for Disposition   [1] Systolic BP  AND [2] taking blood pressure medications AND [3] NOT dizzy, lightheaded or weak    Additional Information   Negative: Started suddenly after an allergic medicine, an allergic food, or bee sting   Negative: Shock suspected (e.g., cold/pale/clammy skin, too weak to stand, low BP, rapid pulse)   Negative: Difficult to awaken or acting confused (e.g., disoriented, slurred speech)   Negative: Fainted   Negative: [1] Systolic BP < 90 AND [2] dizzy, lightheaded, or weak   Negative: Chest pain   Negative: Bleeding (e.g., vomiting blood, rectal bleeding or tarry stools, severe vaginal bleeding)(Exception: fainted from sight of small amount of blood; small cut or abrasion)   Negative: Extra heart beats or heart is beating fast  (i.e., "palpitations")   Negative: Sounds like a life-threatening emergency to the triager   Negative: [1] Systolic BP < 80 AND [2] NOT dizzy, lightheaded or weak   Negative: Abdominal pain   Negative: Fever > 100.4 F (38.0 C)   Negative: Major surgery in the past month   Negative: [1] Drinking very little AND [2] dehydration suspected (e.g., no urine > 12 hours, very dry mouth, very lightheaded)   Negative: [1] Fall in systolic BP > 20 mm Hg from normal AND [2] dizzy, lightheaded, or weak   Negative: Patient sounds very sick or weak to the triager   Negative: [1] Systolic BP < 90 AND [2] NOT dizzy, lightheaded or weak   Negative: [1] Systolic BP  AND " [2] taking blood pressure medications AND [3] dizzy, lightheaded or weak    Protocols used: LOW BLOOD PRESSURE-A-AH

## 2022-07-22 ENCOUNTER — PATIENT MESSAGE (OUTPATIENT)
Dept: CARDIOLOGY | Facility: CLINIC | Age: 77
End: 2022-07-22
Payer: COMMERCIAL

## 2022-07-25 ENCOUNTER — OFFICE VISIT (OUTPATIENT)
Dept: INTERNAL MEDICINE | Facility: CLINIC | Age: 77
End: 2022-07-25
Payer: COMMERCIAL

## 2022-07-25 ENCOUNTER — LAB VISIT (OUTPATIENT)
Dept: LAB | Facility: HOSPITAL | Age: 77
End: 2022-07-25
Payer: COMMERCIAL

## 2022-07-25 VITALS
BODY MASS INDEX: 27.66 KG/M2 | DIASTOLIC BLOOD PRESSURE: 60 MMHG | HEIGHT: 61 IN | SYSTOLIC BLOOD PRESSURE: 110 MMHG | HEART RATE: 88 BPM | WEIGHT: 146.5 LBS | OXYGEN SATURATION: 97 %

## 2022-07-25 DIAGNOSIS — J43.9 PULMONARY EMPHYSEMA, UNSPECIFIED EMPHYSEMA TYPE: Chronic | ICD-10-CM

## 2022-07-25 DIAGNOSIS — J96.11 CHRONIC RESPIRATORY FAILURE WITH HYPOXIA: Primary | Chronic | ICD-10-CM

## 2022-07-25 DIAGNOSIS — I50.42 CHRONIC COMBINED SYSTOLIC AND DIASTOLIC CONGESTIVE HEART FAILURE: ICD-10-CM

## 2022-07-25 DIAGNOSIS — Z87.891 PERSONAL HISTORY OF NICOTINE DEPENDENCE: ICD-10-CM

## 2022-07-25 DIAGNOSIS — I50.43 ACUTE ON CHRONIC COMBINED SYSTOLIC AND DIASTOLIC CONGESTIVE HEART FAILURE: ICD-10-CM

## 2022-07-25 DIAGNOSIS — Z78.9 STATIN INTOLERANCE: ICD-10-CM

## 2022-07-25 LAB
ALBUMIN SERPL BCP-MCNC: 3.6 G/DL (ref 3.5–5.2)
ALP SERPL-CCNC: 92 U/L (ref 55–135)
ALT SERPL W/O P-5'-P-CCNC: 13 U/L (ref 10–44)
ANION GAP SERPL CALC-SCNC: 11 MMOL/L (ref 8–16)
AST SERPL-CCNC: 13 U/L (ref 10–40)
BILIRUB SERPL-MCNC: 0.4 MG/DL (ref 0.1–1)
BUN SERPL-MCNC: 21 MG/DL (ref 8–23)
CALCIUM SERPL-MCNC: 9.4 MG/DL (ref 8.7–10.5)
CHLORIDE SERPL-SCNC: 98 MMOL/L (ref 95–110)
CO2 SERPL-SCNC: 35 MMOL/L (ref 23–29)
CREAT SERPL-MCNC: 0.9 MG/DL (ref 0.5–1.4)
EST. GFR  (AFRICAN AMERICAN): >60 ML/MIN/1.73 M^2
EST. GFR  (NON AFRICAN AMERICAN): >60 ML/MIN/1.73 M^2
GLUCOSE SERPL-MCNC: 86 MG/DL (ref 70–110)
POTASSIUM SERPL-SCNC: 4 MMOL/L (ref 3.5–5.1)
PROT SERPL-MCNC: 6.3 G/DL (ref 6–8.4)
SODIUM SERPL-SCNC: 144 MMOL/L (ref 136–145)

## 2022-07-25 PROCEDURE — 3044F PR MOST RECENT HEMOGLOBIN A1C LEVEL <7.0%: ICD-10-PCS | Mod: CPTII,S$GLB,, | Performed by: PHYSICIAN ASSISTANT

## 2022-07-25 PROCEDURE — 99999 PR PBB SHADOW E&M-EST. PATIENT-LVL IV: ICD-10-PCS | Mod: PBBFAC,,, | Performed by: PHYSICIAN ASSISTANT

## 2022-07-25 PROCEDURE — 3078F DIAST BP <80 MM HG: CPT | Mod: CPTII,S$GLB,, | Performed by: PHYSICIAN ASSISTANT

## 2022-07-25 PROCEDURE — 1126F AMNT PAIN NOTED NONE PRSNT: CPT | Mod: CPTII,S$GLB,, | Performed by: PHYSICIAN ASSISTANT

## 2022-07-25 PROCEDURE — 3074F PR MOST RECENT SYSTOLIC BLOOD PRESSURE < 130 MM HG: ICD-10-PCS | Mod: CPTII,S$GLB,, | Performed by: PHYSICIAN ASSISTANT

## 2022-07-25 PROCEDURE — 1126F PR PAIN SEVERITY QUANTIFIED, NO PAIN PRESENT: ICD-10-PCS | Mod: CPTII,S$GLB,, | Performed by: PHYSICIAN ASSISTANT

## 2022-07-25 PROCEDURE — 3044F HG A1C LEVEL LT 7.0%: CPT | Mod: CPTII,S$GLB,, | Performed by: PHYSICIAN ASSISTANT

## 2022-07-25 PROCEDURE — 1101F PR PT FALLS ASSESS DOC 0-1 FALLS W/OUT INJ PAST YR: ICD-10-PCS | Mod: CPTII,S$GLB,, | Performed by: PHYSICIAN ASSISTANT

## 2022-07-25 PROCEDURE — 3078F PR MOST RECENT DIASTOLIC BLOOD PRESSURE < 80 MM HG: ICD-10-PCS | Mod: CPTII,S$GLB,, | Performed by: PHYSICIAN ASSISTANT

## 2022-07-25 PROCEDURE — 3288F FALL RISK ASSESSMENT DOCD: CPT | Mod: CPTII,S$GLB,, | Performed by: PHYSICIAN ASSISTANT

## 2022-07-25 PROCEDURE — 36415 COLL VENOUS BLD VENIPUNCTURE: CPT

## 2022-07-25 PROCEDURE — 1159F PR MEDICATION LIST DOCUMENTED IN MEDICAL RECORD: ICD-10-PCS | Mod: CPTII,S$GLB,, | Performed by: PHYSICIAN ASSISTANT

## 2022-07-25 PROCEDURE — 1159F MED LIST DOCD IN RCRD: CPT | Mod: CPTII,S$GLB,, | Performed by: PHYSICIAN ASSISTANT

## 2022-07-25 PROCEDURE — 1101F PT FALLS ASSESS-DOCD LE1/YR: CPT | Mod: CPTII,S$GLB,, | Performed by: PHYSICIAN ASSISTANT

## 2022-07-25 PROCEDURE — 99495 TCM SERVICES (MODERATE COMPLEXITY): ICD-10-PCS | Mod: S$GLB,,, | Performed by: PHYSICIAN ASSISTANT

## 2022-07-25 PROCEDURE — 3008F PR BODY MASS INDEX (BMI) DOCUMENTED: ICD-10-PCS | Mod: CPTII,S$GLB,, | Performed by: PHYSICIAN ASSISTANT

## 2022-07-25 PROCEDURE — 99495 TRANSJ CARE MGMT MOD F2F 14D: CPT | Mod: S$GLB,,, | Performed by: PHYSICIAN ASSISTANT

## 2022-07-25 PROCEDURE — 80053 COMPREHEN METABOLIC PANEL: CPT

## 2022-07-25 PROCEDURE — 1160F RVW MEDS BY RX/DR IN RCRD: CPT | Mod: CPTII,S$GLB,, | Performed by: PHYSICIAN ASSISTANT

## 2022-07-25 PROCEDURE — 4010F PR ACE/ARB THEARPY RXD/TAKEN: ICD-10-PCS | Mod: CPTII,S$GLB,, | Performed by: PHYSICIAN ASSISTANT

## 2022-07-25 PROCEDURE — 99999 PR PBB SHADOW E&M-EST. PATIENT-LVL IV: CPT | Mod: PBBFAC,,, | Performed by: PHYSICIAN ASSISTANT

## 2022-07-25 PROCEDURE — 4010F ACE/ARB THERAPY RXD/TAKEN: CPT | Mod: CPTII,S$GLB,, | Performed by: PHYSICIAN ASSISTANT

## 2022-07-25 PROCEDURE — 3008F BODY MASS INDEX DOCD: CPT | Mod: CPTII,S$GLB,, | Performed by: PHYSICIAN ASSISTANT

## 2022-07-25 PROCEDURE — 3288F PR FALLS RISK ASSESSMENT DOCUMENTED: ICD-10-PCS | Mod: CPTII,S$GLB,, | Performed by: PHYSICIAN ASSISTANT

## 2022-07-25 PROCEDURE — 3074F SYST BP LT 130 MM HG: CPT | Mod: CPTII,S$GLB,, | Performed by: PHYSICIAN ASSISTANT

## 2022-07-25 PROCEDURE — 1160F PR REVIEW ALL MEDS BY PRESCRIBER/CLIN PHARMACIST DOCUMENTED: ICD-10-PCS | Mod: CPTII,S$GLB,, | Performed by: PHYSICIAN ASSISTANT

## 2022-07-25 NOTE — PROGRESS NOTES
Transitional Care Note  Subjective:       Patient ID: Selma Hooper is a 76 y.o. female.  Chief Complaint: Follow-up    Family and/or Caretaker present at visit?  Yes.  Diagnostic tests reviewed/disposition: No diagnosic tests pending after this hospitalization.  Disease/illness education: Yes  Home health/community services discussion/referrals: Patient does not have home health established from hospital visit.  They do not need home health.  If needed, we will set up home health for the patient.   Establishment or re-establishment of referral orders for community resources: No other necessary community resources.   Discussion with other health care providers: No discussion with other health care providers necessary.   Selma Hooper is an established patient of Phil Quintana MD here today for hospital f/u visit.      Admitted 7/12-7/15 for heart failure and COPD exacerbation.  Tx with lasix, steroids, duonebs.  She improved significantly.  She was d/c on 3 L O2.      Heart failure -   Echo 7/2022 with EF 15%  Spironolactone 25 mg 1/2 tablet daily  Entresto 24-26 mg BID  Lasix 40 mg daily (reduce from 40 mg BID 7/19)  Toprol XL 25 mg daily  Weight at home has been 142-145  Fluid restricts to 1500 mL daily  She has had lower BP and will get lightheaded when BP drops to 100 systolic  Range has been 100-110/50's-60's  CMP from earlier today pending  She has upcoming cardio f/u 8/1 and 8/18    COPD -   Now back on breo, insurance covering  She has f/u with pulm 8/10  No longer smoking        Review of Systems   Constitutional: Negative for chills, diaphoresis, fatigue and fever.   HENT: Negative for congestion and sore throat.    Eyes: Negative for visual disturbance.   Respiratory: Negative for cough, chest tightness and shortness of breath.    Cardiovascular: Negative for chest pain, palpitations and leg swelling.   Gastrointestinal: Negative for abdominal pain, blood in stool, constipation, diarrhea, nausea and  vomiting.   Genitourinary: Negative for dysuria, frequency, hematuria and urgency.   Musculoskeletal: Negative for arthralgias and back pain.   Skin: Negative for rash.   Neurological: Negative for dizziness, syncope, weakness and headaches.   Psychiatric/Behavioral: Negative for dysphoric mood and sleep disturbance. The patient is not nervous/anxious.        Objective:      Physical Exam  Vitals and nursing note reviewed.   Constitutional:       Appearance: Normal appearance. She is well-developed.      Comments: On O2 via NC   HENT:      Head: Normocephalic.      Right Ear: External ear normal.      Left Ear: External ear normal.   Eyes:      Pupils: Pupils are equal, round, and reactive to light.   Cardiovascular:      Rate and Rhythm: Normal rate and regular rhythm.      Heart sounds: Normal heart sounds. No murmur heard.    No friction rub. No gallop.   Pulmonary:      Effort: Pulmonary effort is normal. No respiratory distress.      Breath sounds: Normal breath sounds.   Abdominal:      Palpations: Abdomen is soft.      Tenderness: There is no abdominal tenderness.   Skin:     General: Skin is warm and dry.   Neurological:      Mental Status: She is alert.         Assessment:       1. Chronic respiratory failure with hypoxia    2. Pulmonary emphysema, unspecified emphysema type    3. Chronic combined systolic and diastolic congestive heart failure    4. Personal history of nicotine dependence    5. Statin intolerance        Plan:       Selma was seen today for follow-up.    Diagnoses and all orders for this visit:    Chronic respiratory failure with hypoxia - now on home oxygen 3 L, wearing today with O2 sat 97%    Pulmonary emphysema, unspecified emphysema type - using breo daily, has pulm f/u scheduled 8/10    Chronic combined systolic and diastolic congestive heart failure - weight has been stable, watching fluid and salt, some episodes of LH when BP on lower side of 100 systolic, will contact heart failure  "team regarding medications    Personal history of nicotine dependence - no longer smoking    Statin intolerance    Pt has been given instructions populated from [employer] database and has verbalized understanding of the after visit summary and information contained wherein.    Follow up with a primary care provider. May go to ER for acute shortness of breath, lightheadedness, fever, or any other emergent complaints or changes in condition.    "This note will be shared with the patient"         "

## 2022-07-26 ENCOUNTER — TELEPHONE (OUTPATIENT)
Dept: CARDIOLOGY | Facility: CLINIC | Age: 77
End: 2022-07-26
Payer: COMMERCIAL

## 2022-07-26 RX ORDER — SPIRONOLACTONE 25 MG/1
25 TABLET ORAL DAILY
Qty: 30 TABLET | Refills: 11
Start: 2022-07-26 | End: 2022-09-01

## 2022-07-26 NOTE — TELEPHONE ENCOUNTER
"   Heart Failure Transitional Care Clinic(H[facility]) weekly phone follow up / triage call completed.     TCC RN Navigator spoke with patient who states she just got off phone with patient reviewing lab work.     Current Patient reported weight: 146     Pt reports the following:  []  Shortness of Breath with Activity  []  Shortness of Breath at rest   []  Fatigue  []  Edema   [] Chest pain or tightness  [] Weight Increase since discharge  [x] None of the above      Medications:    Medication compliance reviewed with pt.  Pt reports having medication list available and has all medications at home for use per list.     Education:   Confirmed pt still has "Heart Failure Transitional Care Clinic Home Care Guide"  .     Reminded of key points as listed below.      Recommend 2 -3 gram sodium restriction and 1500 cc-2000 cc fluid restriction.   Encourage physical activity with graded exercise program.   Requested patient to weigh themselves daily, and to notify us if their weight increases by more than 3 lbs in 1 day or 5 lbs in 3 days.   o Reminded to use "Daily weight and symptom tracker".  Even if pt does not have a scale, to use symptom tracker.       Watch for these Signs and Symptoms: If any of these occur, contact [facility] immediately:   Increase in shortness of breath with movement   Increase in swelling in your legs and ankles   Weight gain of more than 3 pounds in a day or 5 pounds in 3 days.   Difficulty breathing when you are lying down   Worsening fatigue or tiredness   Stomach bloating, a full feeling or a loss of appetite   Increased coughing--especially when you are lying down      Pt was able to verbalize back to RN in their own words correct diet/fluid restrictions, necessity for exercise, warning signs and symptoms, when and how to contact their HFT[facility] team.      Pt reminded of upcoming appointment.  PT reports they will attend. Appointments on 8/1/22      Pt reminded of how and when to contact " [facility]:  [phone] (Mon-Fri, 8a-5p) & for urgent issues on the weekend to page the Heart Transplant MD on call.  Pt also encouraged utilize [facility] messaging as well.      Pt  verbalized understanding and in agreement of plan.       Will follow up with pt at next clinic visit and RN navigator available for pt questions, issues or concerns.

## 2022-07-26 NOTE — PROGRESS NOTES
Labs reviewed and are normal/stable with starting aldactone. Okay to increase to aldactone 25mg once daily.

## 2022-08-01 ENCOUNTER — LAB VISIT (OUTPATIENT)
Dept: LAB | Facility: HOSPITAL | Age: 77
End: 2022-08-01
Payer: COMMERCIAL

## 2022-08-01 ENCOUNTER — EDUCATION (OUTPATIENT)
Dept: TRANSPLANT | Facility: CLINIC | Age: 77
End: 2022-08-01
Payer: COMMERCIAL

## 2022-08-01 ENCOUNTER — OFFICE VISIT (OUTPATIENT)
Dept: CARDIOLOGY | Facility: CLINIC | Age: 77
End: 2022-08-01
Payer: COMMERCIAL

## 2022-08-01 VITALS
HEIGHT: 61 IN | HEART RATE: 71 BPM | DIASTOLIC BLOOD PRESSURE: 56 MMHG | SYSTOLIC BLOOD PRESSURE: 112 MMHG | BODY MASS INDEX: 27.64 KG/M2 | WEIGHT: 146.38 LBS

## 2022-08-01 DIAGNOSIS — I10 ESSENTIAL HYPERTENSION: ICD-10-CM

## 2022-08-01 DIAGNOSIS — I44.7 LBBB (LEFT BUNDLE BRANCH BLOCK): ICD-10-CM

## 2022-08-01 DIAGNOSIS — Z78.9 STATIN INTOLERANCE: ICD-10-CM

## 2022-08-01 DIAGNOSIS — J43.9 PULMONARY EMPHYSEMA, UNSPECIFIED EMPHYSEMA TYPE: ICD-10-CM

## 2022-08-01 DIAGNOSIS — J96.11 CHRONIC RESPIRATORY FAILURE WITH HYPOXIA: ICD-10-CM

## 2022-08-01 DIAGNOSIS — I50.42 CHRONIC COMBINED SYSTOLIC AND DIASTOLIC HEART FAILURE: Primary | ICD-10-CM

## 2022-08-01 DIAGNOSIS — I50.43 ACUTE ON CHRONIC COMBINED SYSTOLIC AND DIASTOLIC CONGESTIVE HEART FAILURE: ICD-10-CM

## 2022-08-01 LAB
BNP SERPL-MCNC: 1083 PG/ML (ref 0–99)
MAGNESIUM SERPL-MCNC: NORMAL MG/DL (ref 1.6–2.6)
PHOSPHATE SERPL-MCNC: NORMAL MG/DL (ref 2.7–4.5)

## 2022-08-01 PROCEDURE — 99999 PR PBB SHADOW E&M-EST. PATIENT-LVL IV: ICD-10-PCS | Mod: PBBFAC,,,

## 2022-08-01 PROCEDURE — 1111F PR DISCHARGE MEDS RECONCILED W/ CURRENT OUTPATIENT MED LIST: ICD-10-PCS | Mod: CPTII,S$GLB,,

## 2022-08-01 PROCEDURE — 1101F PR PT FALLS ASSESS DOC 0-1 FALLS W/OUT INJ PAST YR: ICD-10-PCS | Mod: CPTII,S$GLB,,

## 2022-08-01 PROCEDURE — 3288F PR FALLS RISK ASSESSMENT DOCUMENTED: ICD-10-PCS | Mod: CPTII,S$GLB,,

## 2022-08-01 PROCEDURE — 3078F PR MOST RECENT DIASTOLIC BLOOD PRESSURE < 80 MM HG: ICD-10-PCS | Mod: CPTII,S$GLB,,

## 2022-08-01 PROCEDURE — 1160F PR REVIEW ALL MEDS BY PRESCRIBER/CLIN PHARMACIST DOCUMENTED: ICD-10-PCS | Mod: CPTII,S$GLB,,

## 2022-08-01 PROCEDURE — 1160F RVW MEDS BY RX/DR IN RCRD: CPT | Mod: CPTII,S$GLB,,

## 2022-08-01 PROCEDURE — 3288F FALL RISK ASSESSMENT DOCD: CPT | Mod: CPTII,S$GLB,,

## 2022-08-01 PROCEDURE — 1111F DSCHRG MED/CURRENT MED MERGE: CPT | Mod: CPTII,S$GLB,,

## 2022-08-01 PROCEDURE — 36415 COLL VENOUS BLD VENIPUNCTURE: CPT

## 2022-08-01 PROCEDURE — 1126F AMNT PAIN NOTED NONE PRSNT: CPT | Mod: CPTII,S$GLB,,

## 2022-08-01 PROCEDURE — 3044F PR MOST RECENT HEMOGLOBIN A1C LEVEL <7.0%: ICD-10-PCS | Mod: CPTII,S$GLB,,

## 2022-08-01 PROCEDURE — 99214 PR OFFICE/OUTPT VISIT, EST, LEVL IV, 30-39 MIN: ICD-10-PCS | Mod: S$GLB,,,

## 2022-08-01 PROCEDURE — 3008F BODY MASS INDEX DOCD: CPT | Mod: CPTII,S$GLB,,

## 2022-08-01 PROCEDURE — 1159F MED LIST DOCD IN RCRD: CPT | Mod: CPTII,S$GLB,,

## 2022-08-01 PROCEDURE — 83880 ASSAY OF NATRIURETIC PEPTIDE: CPT

## 2022-08-01 PROCEDURE — 99999 PR PBB SHADOW E&M-EST. PATIENT-LVL IV: CPT | Mod: PBBFAC,,,

## 2022-08-01 PROCEDURE — 3008F PR BODY MASS INDEX (BMI) DOCUMENTED: ICD-10-PCS | Mod: CPTII,S$GLB,,

## 2022-08-01 PROCEDURE — 3044F HG A1C LEVEL LT 7.0%: CPT | Mod: CPTII,S$GLB,,

## 2022-08-01 PROCEDURE — 1126F PR PAIN SEVERITY QUANTIFIED, NO PAIN PRESENT: ICD-10-PCS | Mod: CPTII,S$GLB,,

## 2022-08-01 PROCEDURE — 4010F ACE/ARB THERAPY RXD/TAKEN: CPT | Mod: CPTII,S$GLB,,

## 2022-08-01 PROCEDURE — 1101F PT FALLS ASSESS-DOCD LE1/YR: CPT | Mod: CPTII,S$GLB,,

## 2022-08-01 PROCEDURE — 3074F PR MOST RECENT SYSTOLIC BLOOD PRESSURE < 130 MM HG: ICD-10-PCS | Mod: CPTII,S$GLB,,

## 2022-08-01 PROCEDURE — 4010F PR ACE/ARB THEARPY RXD/TAKEN: ICD-10-PCS | Mod: CPTII,S$GLB,,

## 2022-08-01 PROCEDURE — 1159F PR MEDICATION LIST DOCUMENTED IN MEDICAL RECORD: ICD-10-PCS | Mod: CPTII,S$GLB,,

## 2022-08-01 PROCEDURE — 3074F SYST BP LT 130 MM HG: CPT | Mod: CPTII,S$GLB,,

## 2022-08-01 PROCEDURE — 99214 OFFICE O/P EST MOD 30 MIN: CPT | Mod: S$GLB,,,

## 2022-08-01 PROCEDURE — 3078F DIAST BP <80 MM HG: CPT | Mod: CPTII,S$GLB,,

## 2022-08-01 NOTE — PROGRESS NOTES
Met with pt  to discuss 2g Na diet and 1500 ml fluid restriction     Pt reports good kendrick, no n/v/d/c, no problem chew/swallow.     Pt states she eats cheerios with 2% milk. Pt will then snack on fruit and cheez its. Dinner is baked chicken with rice with italian her blend. Pt drinks 1000ml per day with intake of water, 2% milk, and 1 soda.    Pt was recc'd to limit sodium to 700mg per main meal to allow for snacks and sweet treats pt enjoys.  Recc to pt to limit fluid [id number] per day. Will follow up with pt in tcc as needed

## 2022-08-01 NOTE — PROGRESS NOTES
[facility] Provider Note (Follow-up) Consult Note      HPI:     Patient reports SOB on exertion walking between rooms in home when not wearing supplemental O2. Otherwise wearing 2L O2 continuously (SpO2 99%). Presents to clinic in wheelchair               Patient sleeps on 1 number of pillows              Patient wakes up SOB, has to get out of bed, associated cough- denies PND/orthopnea symptoms              Palpitations - denies              Dizzy, light-headed, pre-syncope or syncope- endorses frequent dizziness since hospital discharge occurring throughout the day with associated fatigue and intermittent HAs (described as a frontal pressure). Denies pre-syncope/syncope. Denies current dizziness. Home recorded BP in 110's/50s.               Since discharge frequency of performing weights, home weight and weight change- performing daily weights. Previously weight stable 142-143lbs. 145.4lb on home scale today               Other information felt pertinent to HPI: Ms. 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 She was admitted to Hospital Medicine for acute on chronic respiratory failure 2/2 COPD exacerbation and ADHF. She diuresed with IV lasix with good response. Repeat echo during admission again showed EF 15% with G1DD. Resumed Breo for COPD maintenance therapy. 6MWT qualified for 3L continuous home O2, which was arranged at discharge.      7/19/22: Today her primary complaint is frequent dizziness since hospital discharge. Reports previously was on lasix 20mg daily which was increased to 40mg BID during recent hospital stay. Otherwise denies SOB, wearing continuous supplemental O2 (2-3L). Denies LE swelling.    8/1/22: Last visit we decreased lasix from 40mg BID to 40mg daily, started aldactone 12.5mg daily and uptitrated aldactone last week to 25mg daily. Today her main complaint is  frequent dizziness, unchanged since last visit, with associated fatigue and intermittent HAs (described as a frontal pressure). Denies worsening SOB when wearing supplemental O2 (wearing 2L continuously with SpO2 99%).      PHYSICAL:   Vitals:    08/01/22 1433   BP: (!) 112/56   Pulse: 71      Wt Readings from Last 3 Encounters:   08/01/22 66.4 kg (146 lb 6.4 oz)   07/19/22 66 kg (145 lb 6.4 oz)   07/15/22 64.2 kg (141 lb 8.6 oz)       JVD: no   Heart rhythm: regular  Cardiac murmur: No    S3: no  S4: no  Lungs: clear  Hepatojugular reflux: no  Edema: no      Lab Results   Component Value Date     08/03/2022    K 3.9 08/03/2022    MG SEE COMMENT 08/01/2022     08/03/2022    CO2 33 (H) 08/03/2022    BUN 16 08/03/2022    CREATININE 1.1 08/03/2022     (H) 08/03/2022    CALCIUM 9.6 08/03/2022    AST 13 08/03/2022    ALT 9 (L) 08/03/2022    ALBUMIN 3.7 08/03/2022    PROT 6.4 08/03/2022    BILITOT 0.4 08/03/2022     Lab Results   Component Value Date    BNP 1,083 (H) 08/01/2022     (H) 07/19/2022    BNP 1,917 (H) 07/12/2022       ASSESSMENT: Chronic combined systolic and diastolic HF    PLAN:      Patient Instructions:    Instruct the patient to notify this clinic if HH, a physician or an advanced care provider wants to change medication one of their HF medications    Activity and Diet restrictions:   o Recommend 2-3 gram sodium restriction and 1500cc- 2000cc fluid restriction.  o Encourage physical activity with graded exercise program.  o Requested patient to weigh themselves daily, and to notify us if their weight increases by more than 3 lbs in 1 day or 5 lbs in 3 days.    Assigned dry weight on home scale: 143lbs  Medication changes (include current dose and changed dose): NYHA Class II-III symptoms. Euvolemic on exam. Start SGLT2i for additional GDMT as tolerates. CO2 persistently elevated since discharge with SpO2 99% on 2L supplemental O2. Reduce to 1L with continued monitoring of SpO2  in case hypercapnia contributing to dizziness/fatigue.    RD education provided during visit.    Upcoming labs and date anticipated: repeat labs next week with phone check in. Gen Cards f/u scheduled 8/18    Other diagnostic tests ordered: plan for repeat TTE after 3 months on maximally tolerated GDMT with possible ICD    Julieth Us PA-C

## 2022-08-02 ENCOUNTER — TELEPHONE (OUTPATIENT)
Dept: PULMONOLOGY | Facility: CLINIC | Age: 77
End: 2022-08-02
Payer: COMMERCIAL

## 2022-08-02 NOTE — TELEPHONE ENCOUNTER
Spoke with patient  in regards to scheduled appointment on 8/10/2022 with .  I advised patient that appointment will be rescheduled due to provider book out.  Patient stated she was discharged from the hospital  on 7/15/2022.  She's not sure who scheduled that appointment .  I advised  she's scheduled on 8/25/2022 for a follow up with .  Patient states she would like to keep that scheduled appointment .

## 2022-08-03 ENCOUNTER — LAB VISIT (OUTPATIENT)
Dept: LAB | Facility: HOSPITAL | Age: 77
End: 2022-08-03
Payer: COMMERCIAL

## 2022-08-03 DIAGNOSIS — I50.43 ACUTE ON CHRONIC COMBINED SYSTOLIC AND DIASTOLIC CONGESTIVE HEART FAILURE: ICD-10-CM

## 2022-08-03 LAB
ALBUMIN SERPL BCP-MCNC: 3.7 G/DL (ref 3.5–5.2)
ALP SERPL-CCNC: 104 U/L (ref 55–135)
ALT SERPL W/O P-5'-P-CCNC: 9 U/L (ref 10–44)
ANION GAP SERPL CALC-SCNC: 7 MMOL/L (ref 8–16)
AST SERPL-CCNC: 13 U/L (ref 10–40)
BILIRUB SERPL-MCNC: 0.4 MG/DL (ref 0.1–1)
BUN SERPL-MCNC: 16 MG/DL (ref 8–23)
CALCIUM SERPL-MCNC: 9.6 MG/DL (ref 8.7–10.5)
CHLORIDE SERPL-SCNC: 100 MMOL/L (ref 95–110)
CO2 SERPL-SCNC: 33 MMOL/L (ref 23–29)
CREAT SERPL-MCNC: 1.1 MG/DL (ref 0.5–1.4)
EST. GFR  (NO RACE VARIABLE): 52.7 ML/MIN/1.73 M^2
GLUCOSE SERPL-MCNC: 123 MG/DL (ref 70–110)
POTASSIUM SERPL-SCNC: 3.9 MMOL/L (ref 3.5–5.1)
PROT SERPL-MCNC: 6.4 G/DL (ref 6–8.4)
SODIUM SERPL-SCNC: 140 MMOL/L (ref 136–145)

## 2022-08-03 PROCEDURE — 80053 COMPREHEN METABOLIC PANEL: CPT

## 2022-08-03 PROCEDURE — 36415 COLL VENOUS BLD VENIPUNCTURE: CPT

## 2022-08-04 NOTE — PROGRESS NOTES
CC: Anemia, hematology consultation    HPI: , 76, is here for hematology consultation for anemia.  She has CHF, HTn. She ahs fatigue. Denies upper or lower GI bleeding, melena, hemoptysis, epistaxis, hematuria, vaginal bleeding. No h/o transfusions.  No family h/o anemia. No recent change in weight or appetite. She was hospitalized in 2022 for CHF exacerbation and required diuresis.          Past Medical History:   Diagnosis Date    Acute on chronic respiratory failure with hypoxia and hypercapnia 12/10/2021    CHF (congestive heart failure)     Former smoker 10/24/2018    Hypertension     Smoker 2016         Past Surgical History:   Procedure Laterality Date    BREAST BIOPSY      left  side years ago in high school       CHOLECYSTECTOMY      COLONOSCOPY      COLONOSCOPY N/A 2021    Procedure: COLONOSCOPY;  Surgeon: Shree Amaya MD;  Location: [facility] ([address] [place] [address]);  Service: Endoscopy;  Laterality: N/A;  Do not cancel this order  fully vaccinated-see immunization record  EF 18%  -covid penelope-new inst portal-tb    HYSTERECTOMY         Social History     Socioeconomic History    Marital status: Single    Number of children: 2   Occupational History    Occupation: ret health and      Comment: [employer]   Tobacco Use    Smoking status: Former Smoker     Packs/day: 0.50     Quit date: 2018     Years since quittin.1    Smokeless tobacco: Never Used   Substance and Sexual Activity    Alcohol use: Yes    Drug use: No     Social Determinants of Health     Financial Resource Strain: Medium Risk    Difficulty of Paying Living Expenses: Somewhat hard   Food Insecurity: No Food Insecurity    Worried About Running Out of Food in the Last Year: Never true    Ran Out of Food in the Last Year: Never true   Transportation Needs: No Transportation Needs    Lack of Transportation (Medical): No    Lack of Transportation (Non-Medical): No   Physical Activity:  Unknown    Days of Exercise per Week: 0 days   Stress: No Stress Concern Present    Feeling of Stress : Only a little   Social Connections: Unknown    Frequency of Communication with Friends and Family: Twice a week    Frequency of Social Gatherings with Friends and Family: More than three times a week    Active Member of Clubs or Organizations: No    Marital Status: Living with partner   Housing Stability: Unknown    Unable to Pay for Housing in the Last Year: No    Unstable Housing in the Last Year: No       Review of patient's allergies indicates:   Allergen Reactions    Atorvastatin      Leg cramps         Current Outpatient Medications   Medication Sig    empagliflozin (JARDIANCE) 10 mg tablet Take 1 tablet (10 mg total) by mouth once daily.    fluticasone furoate-vilanteroL (BREO ELLIPTA) 200-25 mcg/dose DsDv diskus inhaler Inhale 1 puff into the lungs once daily. Controller    furosemide (LASIX) 40 MG tablet Take 1 tablet (40 mg total) by mouth once daily.    metoprolol succinate (TOPROL-XL) 25 MG 24 hr tablet Take 1 tablet (25 mg total) by mouth once daily.    pulse oximeter (PULSE OXIMETER) device by Apply Externally route 2 (two) times a day. Use twice daily at 8 AM and 3 PM and record the value in scenioshart as directed.    sacubitriL-valsartan (ENTRESTO) 24-26 mg per tablet Take 1 tablet by mouth 2 (two) times daily.    spironolactone (ALDACTONE) 25 MG tablet Take 1 tablet (25 mg total) by mouth once daily.     No current facility-administered medications for this visit.           Review of Systems   Constitutional: Positive for malaise/fatigue. Negative for chills and fever.   HENT: Negative for congestion, ear discharge, ear pain, hearing loss and nosebleeds.    Eyes: Negative for double vision and photophobia.   Respiratory: Negative for cough and shortness of breath.    Cardiovascular: Negative for chest pain.   Gastrointestinal: Negative for abdominal pain, blood in stool, diarrhea,  heartburn and melena.   Genitourinary: Negative for frequency, hematuria and urgency.   Musculoskeletal: Negative for back pain.   Skin: Negative for rash.   Neurological: Negative for tingling, tremors, sensory change, speech change, focal weakness and headaches.   Endo/Heme/Allergies: Does not bruise/bleed easily.   Psychiatric/Behavioral: Negative for depression and substance abuse. The patient is not nervous/anxious.      Vitals:    08/08/22 0926   BP: (!) 102/54   Pulse: 72   Resp: 20   Temp: 98.2 °F (36.8 °C)           Physical Exam  Constitutional:       Appearance: Normal appearance.   HENT:      Head: Normocephalic and atraumatic.      Mouth/Throat:      Pharynx: No posterior oropharyngeal erythema.   Eyes:      General: No scleral icterus.  Cardiovascular:      Rate and Rhythm: Normal rate and regular rhythm.      Pulses: Normal pulses.      Heart sounds: No murmur heard.  Pulmonary:      Effort: No respiratory distress.      Breath sounds: Normal breath sounds. No stridor.      Comments: She is on oxygen through [place]  Abdominal:      General: There is no distension.      Palpations: There is no mass.      Tenderness: There is no abdominal tenderness.   Musculoskeletal:         General: No swelling.   Lymphadenopathy:      Cervical: No cervical adenopathy.   Skin:     Coloration: Skin is not jaundiced.   Neurological:      General: No focal deficit present.      Mental Status: She is alert and oriented to person, place, and time.      Cranial Nerves: No cranial nerve deficit.       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     Component      Latest Ref Rng & Units 8/3/2022 8/1/2022 7/19/2022 7/14/2022   WBC      3.90 - 12.70 K/uL   5.96 9.95   RBC      4.00  - 5.40 M/uL   4.96 4.64   Hemoglobin      12.0 - 16.0 g/dL   12.2 11.3 (L)   Hematocrit      37.0 - 48.5 %   39.7 36.6 (L)   MCV      82 - 98 fL   80 (L) 79 (L)   MCH      27.0 - 31.0 pg   24.6 (L) 24.4 (L)   MCHC      32.0 - 36.0 g/dL   30.7 (L) 30.9 (L)   RDW      11.5 - 14.5 %   15.0 (H) 15.5 (H)   Platelets      150 - 450 K/uL   230 206   MPV      9.2 - 12.9 fL   9.9 10.9   Immature Granulocytes      0.0 - 0.5 %   0.2 0.3   Gran # (ANC)      1.8 - 7.7 K/uL   3.4 7.8 (H)   Immature Grans (Abs)      0.00 - 0.04 K/uL   0.01 0.03   Lymph #      1.0 - 4.8 K/uL   1.9 1.3   Mono #      0.3 - 1.0 K/uL   0.6 0.8   Eos #      0.0 - 0.5 K/uL   0.1 0.0   Baso #      0.00 - 0.20 K/uL   0.01 0.01   nRBC      0 /100 WBC   0 0   Gran %      38.0 - 73.0 %   56.2 78.8 (H)   Lymph %      18.0 - 48.0 %   31.0 13.3 (L)   Mono %      4.0 - 15.0 %   10.4 7.5   Eosinophil %      0.0 - 8.0 %   2.0 0.0   Basophil %      0.0 - 1.9 %   0.2 0.1   Platelet Estimate             Smudge Cells             Differential Method         Automated Automated   Sodium      136 - 145 mmol/L 140      Potassium      3.5 - 5.1 mmol/L 3.9      Chloride      95 - 110 mmol/L 100      CO2      23 - 29 mmol/L 33 (H)      Glucose      70 - 110 mg/dL 123 (H)      BUN      8 - 23 mg/dL 16      Creatinine      0.5 - 1.4 mg/dL 1.1      Calcium      8.7 - 10.5 mg/dL 9.6      PROTEIN TOTAL      6.0 - 8.4 g/dL 6.4      Albumin      3.5 - 5.2 g/dL 3.7      BILIRUBIN TOTAL      0.1 - 1.0 mg/dL 0.4      Alkaline Phosphatase      55 - 135 U/L 104      AST      10 - 40 U/L 13      ALT      10 - 44 U/L 9 (L)      Anion Gap      8 - 16 mmol/L 7 (L)      eGFR      >60 mL/min/1.73 m:2 52.7 (A)      RBC, UA      0 - 4 /hpf       WBC, UA      0 - 5 /hpf       Bacteria, UA      None-Occ /hpf       Yeast, UA      None       Squam Epithel, UA      /hpf       Hyaline Casts, UA      0-1/lpf /lpf       Microscopic Comment             Iron      30 - 160 ug/dL       Ferritin      20.0 -  300.0 ng/mL       Folate      4.0 - 24.0 ng/mL       Vitamin B-12      210 - 950 pg/mL       Magnesium      1.6 - 2.6 mg/dL  SEE COMMENT       Component      Latest Ref Rng & Units 7/12/2022 7/5/2022 5/3/2022   WBC      3.90 - 12.70 K/uL 9.93  2.48 (L)   RBC      4.00 - 5.40 M/uL 5.21  4.75   Hemoglobin      12.0 - 16.0 g/dL 12.5  11.3 (L)   Hematocrit      37.0 - 48.5 % 41.8  37.5   MCV      82 - 98 fL 80 (L)  79 (L)   MCH      27.0 - 31.0 pg 24.0 (L)  23.8 (L)   MCHC      32.0 - 36.0 g/dL 29.9 (L)  30.1 (L)   RDW      11.5 - 14.5 % 14.8 (H)  13.2   Platelets      150 - 450 K/uL 281  242   MPV      9.2 - 12.9 fL 11.1  11.1   Immature Granulocytes      0.0 - 0.5 % 0.1  0.0   Gran # (ANC)      1.8 - 7.7 K/uL 3.6  1.8   Immature Grans (Abs)      0.00 - 0.04 K/uL 0.01  0.00   Lymph #      1.0 - 4.8 K/uL 5.0 (H)  0.6 (L)   Mono #      0.3 - 1.0 K/uL 1.0  0.1 (L)   Eos #      0.0 - 0.5 K/uL 0.2  0.0   Baso #      0.00 - 0.20 K/uL 0.15  0.00   nRBC      0 /100 WBC 0  0   Gran %      38.0 - 73.0 % 36.5 (L)  71.4   Lymph %      18.0 - 48.0 % 50.8 (H)  25.8   Mono %      4.0 - 15.0 % 9.6  2.8 (L)   Eosinophil %      0.0 - 8.0 % 1.5  0.0   Basophil %      0.0 - 1.9 % 1.5  0.0   Platelet Estimate       Appears normal     Smudge Cells       Present     Differential Method       Automated  Automated   Sodium      136 - 145 mmol/L      Potassium      3.5 - 5.1 mmol/L      Chloride      95 - 110 mmol/L      CO2      23 - 29 mmol/L      Glucose      70 - 110 mg/dL      BUN      8 - 23 mg/dL      Creatinine      0.5 - 1.4 mg/dL      Calcium      8.7 - 10.5 mg/dL      PROTEIN TOTAL      6.0 - 8.4 g/dL      Albumin      3.5 - 5.2 g/dL      BILIRUBIN TOTAL      0.1 - 1.0 mg/dL      Alkaline Phosphatase      55 - 135 U/L      AST      10 - 40 U/L      ALT      10 - 44 U/L      Anion Gap      8 - 16 mmol/L      eGFR      >60 mL/min/1.73 m:2      RBC, UA      0 - 4 /hpf 6 (H)     WBC, UA      0 - 5 /hpf 13 (H)     Bacteria, UA       None-Occ /hpf Few (A)     Yeast, UA      None None     Squam Epithel, UA      /hpf 4     Hyaline Casts, UA      0-1/lpf /lpf 66 (A)     Microscopic Comment       SEE COMMENT     Iron      30 - 160 ug/dL  41    Ferritin      20.0 - 300.0 ng/mL  35    Folate      4.0 - 24.0 ng/mL  7.5    Vitamin B-12      210 - 950 pg/mL  314    Magnesium      1.6 - 2.6 mg/dL          Assessment:    1. Microcytic anemia  2. CHF  3. HTn  4. History of smoking    Plan:    1. She has normal ferritin, B12, folate in July 2022. Hemoglobin electrophoresis normal. She is a carrier of alpha thalassemia. She will have CBC checked today, have LDH, retic count, SPEP and HEATHER checked as well.     2. She follows with cardiology      3. On metoprolol, spironolactone      I reviewed the results of hemoglobin electrophoresis, alpha globin gene assay, b12, folate, ferritin from 7/5/22, complete blood counts from 7/19/22 and comprehensive metabolic profile from 8/3/22 and discussed with the patient.      BMT Chart Routing      Follow up with physician No follow up needed.   Follow up with [facility]    Infusion scheduling note    Injection scheduling note    Labs CBC, iron and TIBC, SPEP, immunofixation, LDH and other   Lab interval:  Retic count   Imaging    Pharmacy appointment    Other referrals          Answers for HPI/ROS submitted by the patient on 8/2/2022  appetite change : No  unexpected weight change: No  mouth sores: No  visual disturbance: No  cough: No  shortness of breath: No  chest pain: No  abdominal pain: No  diarrhea: No  back pain: No  rash: No  headaches: No  adenopathy: No  nervous/ anxious: No

## 2022-08-05 ENCOUNTER — TELEPHONE (OUTPATIENT)
Dept: HEMATOLOGY/ONCOLOGY | Facility: CLINIC | Age: 77
End: 2022-08-05
Payer: COMMERCIAL

## 2022-08-05 ENCOUNTER — PATIENT MESSAGE (OUTPATIENT)
Dept: HEMATOLOGY/ONCOLOGY | Facility: CLINIC | Age: 77
End: 2022-08-05
Payer: COMMERCIAL

## 2022-08-08 ENCOUNTER — OFFICE VISIT (OUTPATIENT)
Dept: HEMATOLOGY/ONCOLOGY | Facility: CLINIC | Age: 77
End: 2022-08-08
Attending: FAMILY MEDICINE
Payer: COMMERCIAL

## 2022-08-08 ENCOUNTER — LAB VISIT (OUTPATIENT)
Dept: LAB | Facility: HOSPITAL | Age: 77
End: 2022-08-08
Payer: COMMERCIAL

## 2022-08-08 VITALS
WEIGHT: 146.38 LBS | TEMPERATURE: 98 F | HEART RATE: 72 BPM | RESPIRATION RATE: 20 BRPM | BODY MASS INDEX: 27.64 KG/M2 | HEIGHT: 61 IN | OXYGEN SATURATION: 97 % | DIASTOLIC BLOOD PRESSURE: 54 MMHG | SYSTOLIC BLOOD PRESSURE: 102 MMHG

## 2022-08-08 DIAGNOSIS — D64.9 ANEMIA, UNSPECIFIED TYPE: ICD-10-CM

## 2022-08-08 DIAGNOSIS — D72.819 LEUKOPENIA, UNSPECIFIED TYPE: ICD-10-CM

## 2022-08-08 DIAGNOSIS — I50.43 ACUTE ON CHRONIC COMBINED SYSTOLIC AND DIASTOLIC CONGESTIVE HEART FAILURE: ICD-10-CM

## 2022-08-08 DIAGNOSIS — I44.7 LBBB (LEFT BUNDLE BRANCH BLOCK): Chronic | ICD-10-CM

## 2022-08-08 DIAGNOSIS — D50.9 IRON DEFICIENCY ANEMIA, UNSPECIFIED IRON DEFICIENCY ANEMIA TYPE: ICD-10-CM

## 2022-08-08 DIAGNOSIS — J44.1 CHRONIC OBSTRUCTIVE PULMONARY DISEASE WITH ACUTE EXACERBATION: Primary | ICD-10-CM

## 2022-08-08 DIAGNOSIS — I10 ESSENTIAL HYPERTENSION: ICD-10-CM

## 2022-08-08 LAB
ALBUMIN SERPL BCP-MCNC: 4 G/DL (ref 3.5–5.2)
ALP SERPL-CCNC: 126 U/L (ref 55–135)
ALT SERPL W/O P-5'-P-CCNC: 10 U/L (ref 10–44)
ANION GAP SERPL CALC-SCNC: 10 MMOL/L (ref 8–16)
AST SERPL-CCNC: 15 U/L (ref 10–40)
BASOPHILS # BLD AUTO: 0.03 K/UL (ref 0–0.2)
BASOPHILS NFR BLD: 0.9 % (ref 0–1.9)
BILIRUB SERPL-MCNC: 0.4 MG/DL (ref 0.1–1)
BNP SERPL-MCNC: 710 PG/ML (ref 0–99)
BUN SERPL-MCNC: 18 MG/DL (ref 8–23)
CALCIUM SERPL-MCNC: 9.8 MG/DL (ref 8.7–10.5)
CHLORIDE SERPL-SCNC: 101 MMOL/L (ref 95–110)
CO2 SERPL-SCNC: 28 MMOL/L (ref 23–29)
CREAT SERPL-MCNC: 1.2 MG/DL (ref 0.5–1.4)
DIFFERENTIAL METHOD: ABNORMAL
EOSINOPHIL # BLD AUTO: 0.1 K/UL (ref 0–0.5)
EOSINOPHIL NFR BLD: 3.6 % (ref 0–8)
ERYTHROCYTE [DISTWIDTH] IN BLOOD BY AUTOMATED COUNT: 15 % (ref 11.5–14.5)
EST. GFR  (NO RACE VARIABLE): 47.5 ML/MIN/1.73 M^2
GLUCOSE SERPL-MCNC: 93 MG/DL (ref 70–110)
HCT VFR BLD AUTO: 39.6 % (ref 37–48.5)
HGB BLD-MCNC: 12.4 G/DL (ref 12–16)
IMM GRANULOCYTES # BLD AUTO: 0 K/UL (ref 0–0.04)
IMM GRANULOCYTES NFR BLD AUTO: 0 % (ref 0–0.5)
IRON SERPL-MCNC: 64 UG/DL (ref 30–160)
LDH SERPL L TO P-CCNC: 362 U/L (ref 110–260)
LYMPHOCYTES # BLD AUTO: 1.3 K/UL (ref 1–4.8)
LYMPHOCYTES NFR BLD: 40.1 % (ref 18–48)
MAGNESIUM SERPL-MCNC: 2.2 MG/DL (ref 1.6–2.6)
MCH RBC QN AUTO: 24.1 PG (ref 27–31)
MCHC RBC AUTO-ENTMCNC: 31.3 G/DL (ref 32–36)
MCV RBC AUTO: 77 FL (ref 82–98)
MONOCYTES # BLD AUTO: 0.4 K/UL (ref 0.3–1)
MONOCYTES NFR BLD: 12.5 % (ref 4–15)
NEUTROPHILS # BLD AUTO: 1.4 K/UL (ref 1.8–7.7)
NEUTROPHILS NFR BLD: 42.9 % (ref 38–73)
NRBC BLD-RTO: 0 /100 WBC
PHOSPHATE SERPL-MCNC: 3.1 MG/DL (ref 2.7–4.5)
PLATELET # BLD AUTO: 221 K/UL (ref 150–450)
PMV BLD AUTO: 9.9 FL (ref 9.2–12.9)
POTASSIUM SERPL-SCNC: 4.5 MMOL/L (ref 3.5–5.1)
PROT SERPL-MCNC: 7.5 G/DL (ref 6–8.4)
RBC # BLD AUTO: 5.14 M/UL (ref 4–5.4)
RETICS/RBC NFR AUTO: 1 % (ref 0.5–2.5)
SATURATED IRON: 15 % (ref 20–50)
SODIUM SERPL-SCNC: 139 MMOL/L (ref 136–145)
TOTAL IRON BINDING CAPACITY: 419 UG/DL (ref 250–450)
TRANSFERRIN SERPL-MCNC: 283 MG/DL (ref 200–375)
WBC # BLD AUTO: 3.29 K/UL (ref 3.9–12.7)

## 2022-08-08 PROCEDURE — 1159F MED LIST DOCD IN RCRD: CPT | Mod: CPTII,S$GLB,, | Performed by: INTERNAL MEDICINE

## 2022-08-08 PROCEDURE — 84466 ASSAY OF TRANSFERRIN: CPT | Performed by: INTERNAL MEDICINE

## 2022-08-08 PROCEDURE — 3074F PR MOST RECENT SYSTOLIC BLOOD PRESSURE < 130 MM HG: ICD-10-PCS | Mod: CPTII,S$GLB,, | Performed by: INTERNAL MEDICINE

## 2022-08-08 PROCEDURE — 1101F PT FALLS ASSESS-DOCD LE1/YR: CPT | Mod: CPTII,S$GLB,, | Performed by: INTERNAL MEDICINE

## 2022-08-08 PROCEDURE — 3044F PR MOST RECENT HEMOGLOBIN A1C LEVEL <7.0%: ICD-10-PCS | Mod: CPTII,S$GLB,, | Performed by: INTERNAL MEDICINE

## 2022-08-08 PROCEDURE — 84100 ASSAY OF PHOSPHORUS: CPT

## 2022-08-08 PROCEDURE — 84165 PATHOLOGIST INTERPRETATION SPE: ICD-10-PCS | Mod: 26,,, | Performed by: PATHOLOGY

## 2022-08-08 PROCEDURE — 99999 PR PBB SHADOW E&M-EST. PATIENT-LVL IV: CPT | Mod: PBBFAC,,, | Performed by: INTERNAL MEDICINE

## 2022-08-08 PROCEDURE — 85025 COMPLETE CBC W/AUTO DIFF WBC: CPT | Performed by: INTERNAL MEDICINE

## 2022-08-08 PROCEDURE — 1111F PR DISCHARGE MEDS RECONCILED W/ CURRENT OUTPATIENT MED LIST: ICD-10-PCS | Mod: CPTII,S$GLB,, | Performed by: INTERNAL MEDICINE

## 2022-08-08 PROCEDURE — 86334 IMMUNOFIX E-PHORESIS SERUM: CPT | Performed by: INTERNAL MEDICINE

## 2022-08-08 PROCEDURE — 83880 ASSAY OF NATRIURETIC PEPTIDE: CPT

## 2022-08-08 PROCEDURE — 99205 OFFICE O/P NEW HI 60 MIN: CPT | Mod: S$GLB,,, | Performed by: INTERNAL MEDICINE

## 2022-08-08 PROCEDURE — 99205 PR OFFICE/OUTPT VISIT, NEW, LEVL V, 60-74 MIN: ICD-10-PCS | Mod: S$GLB,,, | Performed by: INTERNAL MEDICINE

## 2022-08-08 PROCEDURE — 83735 ASSAY OF MAGNESIUM: CPT

## 2022-08-08 PROCEDURE — 80053 COMPREHEN METABOLIC PANEL: CPT

## 2022-08-08 PROCEDURE — 3288F PR FALLS RISK ASSESSMENT DOCUMENTED: ICD-10-PCS | Mod: CPTII,S$GLB,, | Performed by: INTERNAL MEDICINE

## 2022-08-08 PROCEDURE — 3078F PR MOST RECENT DIASTOLIC BLOOD PRESSURE < 80 MM HG: ICD-10-PCS | Mod: CPTII,S$GLB,, | Performed by: INTERNAL MEDICINE

## 2022-08-08 PROCEDURE — 99999 PR PBB SHADOW E&M-EST. PATIENT-LVL IV: ICD-10-PCS | Mod: PBBFAC,,, | Performed by: INTERNAL MEDICINE

## 2022-08-08 PROCEDURE — 3008F BODY MASS INDEX DOCD: CPT | Mod: CPTII,S$GLB,, | Performed by: INTERNAL MEDICINE

## 2022-08-08 PROCEDURE — 1159F PR MEDICATION LIST DOCUMENTED IN MEDICAL RECORD: ICD-10-PCS | Mod: CPTII,S$GLB,, | Performed by: INTERNAL MEDICINE

## 2022-08-08 PROCEDURE — 84165 PROTEIN E-PHORESIS SERUM: CPT | Mod: 26,,, | Performed by: PATHOLOGY

## 2022-08-08 PROCEDURE — 1111F DSCHRG MED/CURRENT MED MERGE: CPT | Mod: CPTII,S$GLB,, | Performed by: INTERNAL MEDICINE

## 2022-08-08 PROCEDURE — 4010F ACE/ARB THERAPY RXD/TAKEN: CPT | Mod: CPTII,S$GLB,, | Performed by: INTERNAL MEDICINE

## 2022-08-08 PROCEDURE — 1126F AMNT PAIN NOTED NONE PRSNT: CPT | Mod: CPTII,S$GLB,, | Performed by: INTERNAL MEDICINE

## 2022-08-08 PROCEDURE — 3008F PR BODY MASS INDEX (BMI) DOCUMENTED: ICD-10-PCS | Mod: CPTII,S$GLB,, | Performed by: INTERNAL MEDICINE

## 2022-08-08 PROCEDURE — 4010F PR ACE/ARB THEARPY RXD/TAKEN: ICD-10-PCS | Mod: CPTII,S$GLB,, | Performed by: INTERNAL MEDICINE

## 2022-08-08 PROCEDURE — 3288F FALL RISK ASSESSMENT DOCD: CPT | Mod: CPTII,S$GLB,, | Performed by: INTERNAL MEDICINE

## 2022-08-08 PROCEDURE — 36415 COLL VENOUS BLD VENIPUNCTURE: CPT

## 2022-08-08 PROCEDURE — 85045 AUTOMATED RETICULOCYTE COUNT: CPT | Performed by: INTERNAL MEDICINE

## 2022-08-08 PROCEDURE — 3078F DIAST BP <80 MM HG: CPT | Mod: CPTII,S$GLB,, | Performed by: INTERNAL MEDICINE

## 2022-08-08 PROCEDURE — 86334 IMMUNOFIX E-PHORESIS SERUM: CPT | Mod: 26,,, | Performed by: PATHOLOGY

## 2022-08-08 PROCEDURE — 83615 LACTATE (LD) (LDH) ENZYME: CPT | Performed by: INTERNAL MEDICINE

## 2022-08-08 PROCEDURE — 86334 PATHOLOGIST INTERPRETATION IFE: ICD-10-PCS | Mod: 26,,, | Performed by: PATHOLOGY

## 2022-08-08 PROCEDURE — 3044F HG A1C LEVEL LT 7.0%: CPT | Mod: CPTII,S$GLB,, | Performed by: INTERNAL MEDICINE

## 2022-08-08 PROCEDURE — 84165 PROTEIN E-PHORESIS SERUM: CPT | Performed by: INTERNAL MEDICINE

## 2022-08-08 PROCEDURE — 1126F PR PAIN SEVERITY QUANTIFIED, NO PAIN PRESENT: ICD-10-PCS | Mod: CPTII,S$GLB,, | Performed by: INTERNAL MEDICINE

## 2022-08-08 PROCEDURE — 1101F PR PT FALLS ASSESS DOC 0-1 FALLS W/OUT INJ PAST YR: ICD-10-PCS | Mod: CPTII,S$GLB,, | Performed by: INTERNAL MEDICINE

## 2022-08-08 PROCEDURE — 3074F SYST BP LT 130 MM HG: CPT | Mod: CPTII,S$GLB,, | Performed by: INTERNAL MEDICINE

## 2022-08-09 LAB
ALBUMIN SERPL ELPH-MCNC: 4.14 G/DL (ref 3.35–5.55)
ALPHA1 GLOB SERPL ELPH-MCNC: 0.32 G/DL (ref 0.17–0.41)
ALPHA2 GLOB SERPL ELPH-MCNC: 1.21 G/DL (ref 0.43–0.99)
B-GLOBULIN SERPL ELPH-MCNC: 0.79 G/DL (ref 0.5–1.1)
GAMMA GLOB SERPL ELPH-MCNC: 0.65 G/DL (ref 0.67–1.58)
INTERPRETATION SERPL IFE-IMP: NORMAL
PROT SERPL-MCNC: 7.1 G/DL (ref 6–8.4)

## 2022-08-11 LAB
PATHOLOGIST INTERPRETATION IFE: NORMAL
PATHOLOGIST INTERPRETATION SPE: NORMAL

## 2022-08-18 ENCOUNTER — OFFICE VISIT (OUTPATIENT)
Dept: INTERNAL MEDICINE | Facility: CLINIC | Age: 77
End: 2022-08-18
Attending: FAMILY MEDICINE
Payer: COMMERCIAL

## 2022-08-18 ENCOUNTER — OFFICE VISIT (OUTPATIENT)
Dept: CARDIOLOGY | Facility: CLINIC | Age: 77
End: 2022-08-18
Payer: COMMERCIAL

## 2022-08-18 VITALS
OXYGEN SATURATION: 98 % | BODY MASS INDEX: 27.47 KG/M2 | HEART RATE: 85 BPM | HEIGHT: 61 IN | WEIGHT: 145.5 LBS | SYSTOLIC BLOOD PRESSURE: 98 MMHG | DIASTOLIC BLOOD PRESSURE: 68 MMHG

## 2022-08-18 VITALS
DIASTOLIC BLOOD PRESSURE: 52 MMHG | BODY MASS INDEX: 27.52 KG/M2 | WEIGHT: 145.75 LBS | SYSTOLIC BLOOD PRESSURE: 98 MMHG | HEART RATE: 71 BPM | HEIGHT: 61 IN

## 2022-08-18 DIAGNOSIS — I44.7 LBBB (LEFT BUNDLE BRANCH BLOCK): Chronic | ICD-10-CM

## 2022-08-18 DIAGNOSIS — I42.8 NICM (NONISCHEMIC CARDIOMYOPATHY): Primary | Chronic | ICD-10-CM

## 2022-08-18 DIAGNOSIS — I50.22 CHRONIC SYSTOLIC CONGESTIVE HEART FAILURE: Primary | ICD-10-CM

## 2022-08-18 DIAGNOSIS — I50.42 CHRONIC COMBINED SYSTOLIC AND DIASTOLIC CONGESTIVE HEART FAILURE: ICD-10-CM

## 2022-08-18 DIAGNOSIS — I42.8 NICM (NONISCHEMIC CARDIOMYOPATHY): ICD-10-CM

## 2022-08-18 DIAGNOSIS — R91.1 LUNG NODULE: ICD-10-CM

## 2022-08-18 DIAGNOSIS — K63.5 POLYP OF COLON, UNSPECIFIED PART OF COLON, UNSPECIFIED TYPE: ICD-10-CM

## 2022-08-18 DIAGNOSIS — J44.1 CHRONIC OBSTRUCTIVE PULMONARY DISEASE WITH ACUTE EXACERBATION: ICD-10-CM

## 2022-08-18 DIAGNOSIS — D56.3 ALPHA THALASSEMIA TRAIT: ICD-10-CM

## 2022-08-18 DIAGNOSIS — I21.4 NSTEMI (NON-ST ELEVATED MYOCARDIAL INFARCTION): ICD-10-CM

## 2022-08-18 DIAGNOSIS — R06.02 SHORTNESS OF BREATH: ICD-10-CM

## 2022-08-18 DIAGNOSIS — Z87.891 PERSONAL HISTORY OF NICOTINE DEPENDENCE: ICD-10-CM

## 2022-08-18 DIAGNOSIS — J96.11 CHRONIC RESPIRATORY FAILURE WITH HYPOXIA: Chronic | ICD-10-CM

## 2022-08-18 DIAGNOSIS — I50.43 ACUTE ON CHRONIC COMBINED SYSTOLIC AND DIASTOLIC CONGESTIVE HEART FAILURE: ICD-10-CM

## 2022-08-18 PROBLEM — Z12.11 ENCOUNTER FOR COLORECTAL CANCER SCREENING: Status: RESOLVED | Noted: 2021-08-23 | Resolved: 2022-08-18

## 2022-08-18 PROBLEM — R74.8 ABNORMAL LIVER ENZYMES: Status: RESOLVED | Noted: 2018-12-10 | Resolved: 2022-08-18

## 2022-08-18 PROBLEM — Z12.12 ENCOUNTER FOR COLORECTAL CANCER SCREENING: Status: RESOLVED | Noted: 2021-08-23 | Resolved: 2022-08-18

## 2022-08-18 PROCEDURE — 3288F FALL RISK ASSESSMENT DOCD: CPT | Mod: CPTII,S$GLB,, | Performed by: FAMILY MEDICINE

## 2022-08-18 PROCEDURE — 1159F PR MEDICATION LIST DOCUMENTED IN MEDICAL RECORD: ICD-10-PCS | Mod: CPTII,S$GLB,, | Performed by: FAMILY MEDICINE

## 2022-08-18 PROCEDURE — 3078F PR MOST RECENT DIASTOLIC BLOOD PRESSURE < 80 MM HG: ICD-10-PCS | Mod: CPTII,S$GLB,, | Performed by: FAMILY MEDICINE

## 2022-08-18 PROCEDURE — 99999 PR PBB SHADOW E&M-EST. PATIENT-LVL IV: CPT | Mod: PBBFAC,,, | Performed by: INTERNAL MEDICINE

## 2022-08-18 PROCEDURE — 3074F SYST BP LT 130 MM HG: CPT | Mod: CPTII,S$GLB,, | Performed by: FAMILY MEDICINE

## 2022-08-18 PROCEDURE — 4010F PR ACE/ARB THEARPY RXD/TAKEN: ICD-10-PCS | Mod: CPTII,S$GLB,, | Performed by: INTERNAL MEDICINE

## 2022-08-18 PROCEDURE — 3044F HG A1C LEVEL LT 7.0%: CPT | Mod: CPTII,S$GLB,, | Performed by: FAMILY MEDICINE

## 2022-08-18 PROCEDURE — 1126F PR PAIN SEVERITY QUANTIFIED, NO PAIN PRESENT: ICD-10-PCS | Mod: CPTII,S$GLB,, | Performed by: FAMILY MEDICINE

## 2022-08-18 PROCEDURE — 1126F AMNT PAIN NOTED NONE PRSNT: CPT | Mod: CPTII,S$GLB,, | Performed by: INTERNAL MEDICINE

## 2022-08-18 PROCEDURE — 99999 PR PBB SHADOW E&M-EST. PATIENT-LVL IV: ICD-10-PCS | Mod: PBBFAC,,, | Performed by: INTERNAL MEDICINE

## 2022-08-18 PROCEDURE — 1126F AMNT PAIN NOTED NONE PRSNT: CPT | Mod: CPTII,S$GLB,, | Performed by: FAMILY MEDICINE

## 2022-08-18 PROCEDURE — 4010F PR ACE/ARB THEARPY RXD/TAKEN: ICD-10-PCS | Mod: CPTII,S$GLB,, | Performed by: FAMILY MEDICINE

## 2022-08-18 PROCEDURE — 1159F PR MEDICATION LIST DOCUMENTED IN MEDICAL RECORD: ICD-10-PCS | Mod: CPTII,S$GLB,, | Performed by: INTERNAL MEDICINE

## 2022-08-18 PROCEDURE — 3008F BODY MASS INDEX DOCD: CPT | Mod: CPTII,S$GLB,, | Performed by: FAMILY MEDICINE

## 2022-08-18 PROCEDURE — 3008F PR BODY MASS INDEX (BMI) DOCUMENTED: ICD-10-PCS | Mod: CPTII,S$GLB,, | Performed by: INTERNAL MEDICINE

## 2022-08-18 PROCEDURE — 99214 OFFICE O/P EST MOD 30 MIN: CPT | Mod: S$GLB,,, | Performed by: FAMILY MEDICINE

## 2022-08-18 PROCEDURE — 1160F PR REVIEW ALL MEDS BY PRESCRIBER/CLIN PHARMACIST DOCUMENTED: ICD-10-PCS | Mod: CPTII,S$GLB,, | Performed by: INTERNAL MEDICINE

## 2022-08-18 PROCEDURE — 1101F PR PT FALLS ASSESS DOC 0-1 FALLS W/OUT INJ PAST YR: ICD-10-PCS | Mod: CPTII,S$GLB,, | Performed by: INTERNAL MEDICINE

## 2022-08-18 PROCEDURE — 1101F PR PT FALLS ASSESS DOC 0-1 FALLS W/OUT INJ PAST YR: ICD-10-PCS | Mod: CPTII,S$GLB,, | Performed by: FAMILY MEDICINE

## 2022-08-18 PROCEDURE — 99999 PR PBB SHADOW E&M-EST. PATIENT-LVL IV: ICD-10-PCS | Mod: PBBFAC,,, | Performed by: FAMILY MEDICINE

## 2022-08-18 PROCEDURE — 3044F HG A1C LEVEL LT 7.0%: CPT | Mod: CPTII,S$GLB,, | Performed by: INTERNAL MEDICINE

## 2022-08-18 PROCEDURE — 1101F PT FALLS ASSESS-DOCD LE1/YR: CPT | Mod: CPTII,S$GLB,, | Performed by: INTERNAL MEDICINE

## 2022-08-18 PROCEDURE — 3288F PR FALLS RISK ASSESSMENT DOCUMENTED: ICD-10-PCS | Mod: CPTII,S$GLB,, | Performed by: FAMILY MEDICINE

## 2022-08-18 PROCEDURE — 3288F FALL RISK ASSESSMENT DOCD: CPT | Mod: CPTII,S$GLB,, | Performed by: INTERNAL MEDICINE

## 2022-08-18 PROCEDURE — 3044F PR MOST RECENT HEMOGLOBIN A1C LEVEL <7.0%: ICD-10-PCS | Mod: CPTII,S$GLB,, | Performed by: INTERNAL MEDICINE

## 2022-08-18 PROCEDURE — 1126F PR PAIN SEVERITY QUANTIFIED, NO PAIN PRESENT: ICD-10-PCS | Mod: CPTII,S$GLB,, | Performed by: INTERNAL MEDICINE

## 2022-08-18 PROCEDURE — 3288F PR FALLS RISK ASSESSMENT DOCUMENTED: ICD-10-PCS | Mod: CPTII,S$GLB,, | Performed by: INTERNAL MEDICINE

## 2022-08-18 PROCEDURE — 3078F DIAST BP <80 MM HG: CPT | Mod: CPTII,S$GLB,, | Performed by: INTERNAL MEDICINE

## 2022-08-18 PROCEDURE — 4010F ACE/ARB THERAPY RXD/TAKEN: CPT | Mod: CPTII,S$GLB,, | Performed by: INTERNAL MEDICINE

## 2022-08-18 PROCEDURE — 3008F PR BODY MASS INDEX (BMI) DOCUMENTED: ICD-10-PCS | Mod: CPTII,S$GLB,, | Performed by: FAMILY MEDICINE

## 2022-08-18 PROCEDURE — 1159F MED LIST DOCD IN RCRD: CPT | Mod: CPTII,S$GLB,, | Performed by: INTERNAL MEDICINE

## 2022-08-18 PROCEDURE — 3074F SYST BP LT 130 MM HG: CPT | Mod: CPTII,S$GLB,, | Performed by: INTERNAL MEDICINE

## 2022-08-18 PROCEDURE — 1160F RVW MEDS BY RX/DR IN RCRD: CPT | Mod: CPTII,S$GLB,, | Performed by: FAMILY MEDICINE

## 2022-08-18 PROCEDURE — 1159F MED LIST DOCD IN RCRD: CPT | Mod: CPTII,S$GLB,, | Performed by: FAMILY MEDICINE

## 2022-08-18 PROCEDURE — 3008F BODY MASS INDEX DOCD: CPT | Mod: CPTII,S$GLB,, | Performed by: INTERNAL MEDICINE

## 2022-08-18 PROCEDURE — 1160F PR REVIEW ALL MEDS BY PRESCRIBER/CLIN PHARMACIST DOCUMENTED: ICD-10-PCS | Mod: CPTII,S$GLB,, | Performed by: FAMILY MEDICINE

## 2022-08-18 PROCEDURE — 99214 PR OFFICE/OUTPT VISIT, EST, LEVL IV, 30-39 MIN: ICD-10-PCS | Mod: S$GLB,,, | Performed by: INTERNAL MEDICINE

## 2022-08-18 PROCEDURE — 99999 PR PBB SHADOW E&M-EST. PATIENT-LVL IV: CPT | Mod: PBBFAC,,, | Performed by: FAMILY MEDICINE

## 2022-08-18 PROCEDURE — 3078F PR MOST RECENT DIASTOLIC BLOOD PRESSURE < 80 MM HG: ICD-10-PCS | Mod: CPTII,S$GLB,, | Performed by: INTERNAL MEDICINE

## 2022-08-18 PROCEDURE — 1101F PT FALLS ASSESS-DOCD LE1/YR: CPT | Mod: CPTII,S$GLB,, | Performed by: FAMILY MEDICINE

## 2022-08-18 PROCEDURE — 4010F ACE/ARB THERAPY RXD/TAKEN: CPT | Mod: CPTII,S$GLB,, | Performed by: FAMILY MEDICINE

## 2022-08-18 PROCEDURE — 3074F PR MOST RECENT SYSTOLIC BLOOD PRESSURE < 130 MM HG: ICD-10-PCS | Mod: CPTII,S$GLB,, | Performed by: INTERNAL MEDICINE

## 2022-08-18 PROCEDURE — 3078F DIAST BP <80 MM HG: CPT | Mod: CPTII,S$GLB,, | Performed by: FAMILY MEDICINE

## 2022-08-18 PROCEDURE — 99214 OFFICE O/P EST MOD 30 MIN: CPT | Mod: S$GLB,,, | Performed by: INTERNAL MEDICINE

## 2022-08-18 PROCEDURE — 99214 PR OFFICE/OUTPT VISIT, EST, LEVL IV, 30-39 MIN: ICD-10-PCS | Mod: S$GLB,,, | Performed by: FAMILY MEDICINE

## 2022-08-18 PROCEDURE — 3044F PR MOST RECENT HEMOGLOBIN A1C LEVEL <7.0%: ICD-10-PCS | Mod: CPTII,S$GLB,, | Performed by: FAMILY MEDICINE

## 2022-08-18 PROCEDURE — 3074F PR MOST RECENT SYSTOLIC BLOOD PRESSURE < 130 MM HG: ICD-10-PCS | Mod: CPTII,S$GLB,, | Performed by: FAMILY MEDICINE

## 2022-08-18 PROCEDURE — 1160F RVW MEDS BY RX/DR IN RCRD: CPT | Mod: CPTII,S$GLB,, | Performed by: INTERNAL MEDICINE

## 2022-08-18 NOTE — PROGRESS NOTES
Subjective:   Patient ID:  Selma Hooper is a 76 y.o. female who presents for follow-up of Follow-up (F/u from hospital visit) and Shortness of Breath    Selma Hooper is a 76 y.o. female who presents for follow-up of NSTEMI (non-ST elevated myocardial infarction) (Hospital f/u 12/8/21), Hypotension, and Medication Management     ICM, DM  Recent discharge:   Ms. Hooper is a 73yo woman with essential HTN (off all meds) who presents with acute hypoxemic respiratory failure 2/2 HTN emergency with flash pulmonary edema and acute new onset systolic congestive heart failure exacerbation. Also with elevated transaminases likely 2/2 congestive hepatopathy (improving), lactic acidosis (now resolved), and elevated troponin, likely 2/2 HTN/CHF. She was initially placed on BiPAP and NTG gtt upon admission with improvement in her BP and taper off gtt. Now weaned off NC and s/p IV lasix with good response. Hypotensive yesterday am, likely 2/2 overdiuresis; improved with 250cc bolus today. Will discharge on GDMT: initiate losartan tomorrow am (held today 2/2 JAMIR), metoprolol succinate, lasix 20. Would consider addition of spironolactone at f/u visit; held off today 2/2 JAMIR due to overdiuresis. Will need timely ischemic w/u as outpatient.     Echo:    1 - Severely depressed left ventricular systolic function (EF 15-20%).     2 - Normal right ventricular systolic function .     3 - Trivial to mild aortic regurgitation.     4 - Trivial to mild mitral regurgitation.     5 - Trivial to mild tricuspid regurgitation.     6 - The estimated PA systolic pressure is 28 mmHg.      NM stress test:  Impression: NORMAL MYOCARDIAL PERFUSION  1. The perfusion scan is free of evidence for myocardial ischemia or injury.   2. There is a mild fixed septal defect consistent with patient's underlying LBBB - left bundle branch block.   3. There is a mild intensity fixed defect in the inferior wall of the left ventricle, secondary to diaphragm attenuation.    4. There is abnormal wall motion at rest showing severe global hypokinesis of the left ventricle.   5. There is resting LV dysfunction with a reduced ejection fraction of 20 %.  (normal is >= 51%)  6. The left ventricular volume is mildly increased at rest.   7. The extracardiac distribution of radioactivity is normal.      HPI:   Patient has been feeling SOB for atleast 6 month that is primarily THEODORE. Since Diuretic her THEODORE has improved. No orthopnea, PND, chest pain or palpitations.   Heavy smoker, mother has heart attack 60s. Brother had CABG in 60s.   NYHA III- feeling SOB on minimal exertion.   No chest pain, Orthopnea, PND.   Denies palpitations or fluttering in the chest  Patent get sob on walking.   Grandparents had CVA.         Echo 3/2019  · Severely decreased left ventricular systolic function. The estimated ejection fraction is 20%  · Eccentric left ventricular hypertrophy.  · Global hypokinetic wall motion.  · Moderate left atrial enlargement.  · Indeterminate left ventricular diastolic function.  · Normal right ventricular systolic function.  · Mild tricuspid regurgitation.  · The estimated PA systolic pressure is 19 mm Hg  · Normal central venous pressure (3 mm Hg).      HPI:   Cannot afford CRT it will cost 7000 and she cannot afford it.  Worsening SOB on minimal exertion, NYHA III.   No chest pain, Orthopnea, PND of heart failure symptoms.   Denies palpitations or fluttering in the chest     HPI:  Recent CHF exacerbation with hospitalization.   Now euvolemic. Reiterated the indication of CRT patient says she cannot afford it.   No chest pain, Orthopnea, PND of heart failure symptoms.   Denies palpitations or fluttering in the chest  We discussed changes in medicine she also had COPD.      Echo 12/2021  · The left ventricle is normal in size with moderate eccentric hypertrophy and severely decreased systolic function.  · There is severe left ventricular global hypokinesis. The estimated ejection  fraction is 15%.  · Left ventricular diastolic dysfunction.  · Normal right ventricular size with normal right ventricular systolic function.  · Normal central venous pressure (3 mmHg).  · There is no significant tricuspid regurgitation and therefore the pulmonary artery systolic pressure cannot be reported.        PET 12/2022       There is a small to moderate sized, moderate intensity distal to apical inferior and inferolateral resting perfusion abnormality involving 11% of the LV myocardium in the distribution of the RCA. After pharmacologic stress, this defect is larger and more severe involving 13% of the LV myocardium, indicative of infarct with layla-infarct ischemia.    Within the perfusion abnormality, absolute myocardial perfusion (cc/min/gm) averaged 0.46 cc/min/g at rest, 0.71 cc/min/g at stress and CFR was 1.53 , which equates to moderately to severely reduced coronary flow capacity  within the RCA territory.    The whole heart absolute myocardial perfusion values averaged 0.93 cc/min/g at rest, which is normal; 1.86 cc/min/g at stress, which is low normal; and CFR is 1.99 , which is mildly reduced.    CT attenuation images demonstrate mild diffuse coronary calcifications in the LAD, LCX and RCA territory.    The gated perfusion images showed an ejection fraction of 14% at rest and 15% during stress. A normal ejection fraction is greater than 53%.    There is severe global hypokinesis at rest and during stress.    The LV cavity size is severely enlarged at rest and stress.    The EKG portion of this study is uninterpretable.    During stress, rare PVCs are noted.    The patient reported no chest pain during the stress test.    There are no prior studies for comparison.     The degree of scar on perfusion imaging is out of proportion to the degree of myocardial dysfunction.        HPI:   Recent admission for CHF exacerbation and type II respiratory failure in 12/2021.   Patient had a bout of  dizziness and felt that her BP was low but she did not have breakfast.   No chest pain, PND.  NYHA II  Occasional exercise.  Denies palpitations or fluttering in the chest      HPI:  Recent hospital discharge 7/2022  Pt admitted to [facility] and started on IV lasix with good response. Pt under the care of Dr. Mi Arias until 7/13, see her documentation for how it pertains to the hospital course. Pt with overall symptomatic improvement into 7/14, and IV lasix transitioned to PO. Repeat echo on this admission again showed EF 15% with G1DD. Discussed home COPD regimen with pt, and she was previously on Breo before her insurance company stopped paying for this; will restart and enlist SW for assistance.  Pt qualified for 3L continuous home O2, which was arranged by [facility]. She continued to do well into 7/15 and deemed appropriate for discharge. Plan discussed with pt, who was agreeable and amenable; medications were discussed and reviewed, outpatient follow-up scheduled, ER precautions were given, all questions were answered to the pt's satisfaction, and Ms. Hooper was subsequently discharged.    Patient is trying to be on low salt diet.   Her BP is low and has occasional dizziness. No LOC, frequent naps  No chest pain, Orthopnea, PND of heart failure symptoms.   Denies palpitations or fluttering in the chest        Patient Active Problem List   Diagnosis    Family history of colon cancer    Colon polyps    Essential hypertension    Dyspnea    Subclinical hyperthyroidism    Elevated troponin    NICM (nonischemic cardiomyopathy)    Lung nodule    Pulmonary emphysema    Anemia    Personal history of nicotine dependence    Noncompliance    LBBB (left bundle branch block)    Acute on chronic combined systolic and diastolic congestive heart failure    Acute on chronic respiratory failure with hypoxia    Statin intolerance    Chronic respiratory failure with hypoxia    Allergy to statin medication    NSTEMI  "(non-ST elevated myocardial infarction)    Chronic obstructive pulmonary disease with acute exacerbation    Chronic combined systolic and diastolic congestive heart failure    COVID-19    Leukopenia    Elevated troponin level    Alpha thalassemia trait     BP (!) 98/52 (BP Location: Left arm, Patient Position: Sitting, BP Method: Medium (Automatic))   Pulse 71   Ht 5' 1" (1.549 m)   Wt 66.1 kg (145 lb 11.6 oz)   LMP  (LMP Unknown)   BMI 27.53 kg/m²   Body mass index is 27.53 kg/m².  CrCl cannot be calculated (Patient's most recent lab result is older than the maximum 7 days allowed.).    Lab Results   Component Value Date     08/08/2022    K 4.5 08/08/2022     08/08/2022    CO2 28 08/08/2022    BUN 18 08/08/2022    CREATININE 1.2 08/08/2022    GLU 93 08/08/2022    HGBA1C 5.2 07/13/2022    MG 2.2 08/08/2022    AST 15 08/08/2022    ALT 10 08/08/2022    ALBUMIN 4.0 08/08/2022    PROT 7.5 08/08/2022    BILITOT 0.4 08/08/2022    WBC 3.29 (L) 08/08/2022    HGB 12.4 08/08/2022    HCT 39.6 08/08/2022    HCT 39 04/02/2021    MCV 77 (L) 08/08/2022     08/08/2022    INR 1.1 05/01/2022    TSH 0.503 12/08/2021    CHOL 136 12/09/2021    HDL 36 (L) 12/09/2021    LDLCALC 84.6 12/09/2021    TRIG 77 12/09/2021       Current Outpatient Medications   Medication Sig    fluticasone furoate-vilanteroL (BREO ELLIPTA) 200-25 mcg/dose DsDv diskus inhaler Inhale 1 puff into the lungs once daily. Controller    furosemide (LASIX) 40 MG tablet Take 1 tablet (40 mg total) by mouth once daily.    metoprolol succinate (TOPROL-XL) 25 MG 24 hr tablet Take 1 tablet (25 mg total) by mouth once daily.    pulse oximeter (PULSE OXIMETER) device by Apply Externally route 2 (two) times a day. Use twice daily at 8 AM and 3 PM and record the value in [facility] as directed.    sacubitriL-valsartan (ENTRESTO) 24-26 mg per tablet Take 1 tablet by mouth 2 (two) times daily.    spironolactone (ALDACTONE) 25 MG tablet Take 1 tablet " (25 mg total) by mouth once daily.     No current facility-administered medications for this visit.       Review of Systems   Constitutional: Negative for chills, decreased appetite, malaise/fatigue, night sweats, weight gain and weight loss.   Eyes: Negative for blurred vision, double vision, visual disturbance and visual halos.   Cardiovascular: Negative for chest pain, claudication, cyanosis, dyspnea on exertion, irregular heartbeat, leg swelling, near-syncope, orthopnea, palpitations, paroxysmal nocturnal dyspnea and syncope.   Respiratory: Negative for cough, hemoptysis, snoring, sputum production and wheezing.    Endocrine: Negative for cold intolerance, heat intolerance, polydipsia and polyphagia.   Hematologic/Lymphatic: Negative for adenopathy and bleeding problem. Does not bruise/bleed easily.   Skin: Negative for flushing, itching, poor wound healing and rash.   Musculoskeletal: Negative for arthritis, back pain, falls, gout, joint pain, joint swelling, muscle cramps, muscle weakness, myalgias, neck pain and stiffness.   Gastrointestinal: Negative for bloating, abdominal pain, anorexia, diarrhea, dysphagia, excessive appetite, flatus, hematemesis, jaundice, melena and nausea.   Genitourinary: Negative for hesitancy and incomplete emptying.   Neurological: Negative for aphonia, brief paralysis, difficulty with concentration, disturbances in coordination, excessive daytime sleepiness, dizziness, focal weakness, light-headedness, loss of balance and weakness.   Psychiatric/Behavioral: Negative for altered mental status, depression, hallucinations, hypervigilance, memory loss, substance abuse and suicidal ideas. The patient does not have insomnia and is not nervous/anxious.        Objective:   Physical Exam  Vitals reviewed: patient with oxygen.   Constitutional:       General: She is not in acute distress.     Appearance: She is well-developed. She is not diaphoretic.   HENT:      Head: Normocephalic and  atraumatic.      Nose: Nose normal.      Mouth/Throat:      Pharynx: No oropharyngeal exudate.   Eyes:      General: No scleral icterus.        Right eye: No discharge.         Left eye: No discharge.      Conjunctiva/sclera: Conjunctivae normal.      Pupils: Pupils are equal, round, and reactive to light.   Neck:      Thyroid: No thyromegaly.      Vascular: No JVD.      Trachea: No tracheal deviation.   Cardiovascular:      Rate and Rhythm: Normal rate and regular rhythm.      Pulses: Intact distal pulses.      Heart sounds: Normal heart sounds. No murmur heard.    No friction rub. No gallop.   Pulmonary:      Effort: Pulmonary effort is normal. No respiratory distress.      Breath sounds: Normal breath sounds. No stridor. No wheezing or rales.   Chest:      Chest wall: No tenderness.   Abdominal:      General: Bowel sounds are normal. There is no distension.      Palpations: Abdomen is soft. There is no mass.      Tenderness: There is no abdominal tenderness. There is no guarding or rebound.   Musculoskeletal:         General: No tenderness. Normal range of motion.      Cervical back: Normal range of motion and neck supple.   Lymphadenopathy:      Cervical: No cervical adenopathy.   Skin:     General: Skin is warm.      Coloration: Skin is not pale.      Findings: No erythema or rash.   Neurological:      Mental Status: She is alert and oriented to person, place, and time.      Cranial Nerves: No cranial nerve deficit.      Motor: No abnormal muscle tone.      Coordination: Coordination normal.      Deep Tendon Reflexes: Reflexes are normal and symmetric.   Psychiatric:         Behavior: Behavior normal.         Thought Content: Thought content normal.         Judgment: Judgment normal.         Assessment:     1. Chronic systolic congestive heart failure    2. Acute on chronic combined systolic and diastolic congestive heart failure    3. NICM (nonischemic cardiomyopathy)    4. LBBB (left bundle branch block)     5. Chronic combined systolic and diastolic congestive heart failure    6. Chronic respiratory failure with hypoxia        Plan:     Selma was seen today for follow-up and shortness of breath.    Diagnoses and all orders for this visit:    Chronic systolic congestive heart failure    Acute on chronic combined systolic and diastolic congestive heart failure  -     Ambulatory referral/consult to Cardiology  -     Ambulatory referral/consult to Cardiology    NICM (nonischemic cardiomyopathy)  -     Ambulatory referral/consult to Cardiology    LBBB (left bundle branch block)    Chronic combined systolic and diastolic congestive heart failure    Chronic respiratory failure with hypoxia      RTC 6 months  Limit sodium intake to less then 2 gram sodium and 1500cc fluid restriction.  Graded exercise program as tolerated.  Call if  more than 3 lbs in 1 day or 5 lbs in 1 week.

## 2022-08-18 NOTE — PROGRESS NOTES
Subjective:       Patient ID: Selma Hooper is a 76 y.o. female.    Chief Complaint: Follow-up    Established patient follows up for management of chronic medical illnesses with complaints today. Please see dictation and ROS for interval problems, specific complaints and disease management discussion.    P, S, Fm, Soc Hx's; Meds, allergies reviewed and reconciled.  Health maintenance file reviewed and addressed items due. Recent applicable lab, imaging and cardiovascular results reviewed.  Problem list items reviewed and modified or added entries (in the overview section) may not be transcribed into this encounter note due to note writer format.    Recent hospital discharge 7/2022  Pt admitted to [facility] and started on IV lasix with good response. Pt under the care of Dr. Mi Arias until 7/13, see her documentation for how it pertains to the hospital course. Pt with overall symptomatic improvement into 7/14, and IV lasix transitioned to PO. Repeat echo on this admission again showed EF 15% with G1DD. Discussed home COPD regimen with pt, and she was previously on Breo before her insurance company stopped paying for this; will restart and enlist SW for assistance.  Pt qualified for 3L continuous home O2, which was arranged by [facility]. She continued to do well into 7/15 and deemed appropriate for discharge. Plan discussed with pt, who was agreeable and amenable; medications were discussed and reviewed, outpatient follow-up scheduled, ER precautions were given, all questions were answered to the pt's satisfaction, and Ms. Hooper was subsequently discharged.    Since seeing me last patient had another hospitalization for congestive heart failure and respiratory insufficiency.  She has subsequently seen cardiologist twice, our physician assistant and also Hematology-Oncology for history of anemia.  Her cardiologist is adjusting her Lasix and requests no medication dose adjustments by other providers outside of the clinic.   They established a dry weight of 143.  She has a pending Pulmonary Clinic appointment and is currently on home oxygen.  She reports chronic dyspnea which is not worse than baseline.  No leg swelling or chest pain present at this time.  She had a colonoscopy in 2021, Hematology-Oncology noted alpha thalassemia and did not make any other recommendations recently.  She has an upcoming GI appointment as well.  She had some concern about being placed on Jardiance, not being fully diabetic.  She has not started this medication yet.  She asked my advice about this and I told her this was a new medication used by cardiologist for cardiac considerations in the absence of diabetes.  I suggest she follow-up with her cardiologist for further discussion.  Her current A1c looks quite good.  5.2.    Review of Systems   Constitutional: Positive for fatigue. Negative for appetite change, chills, diaphoresis and fever.   HENT: Negative for congestion, postnasal drip, rhinorrhea, sore throat and trouble swallowing.    Eyes: Negative for visual disturbance.   Respiratory: Positive for shortness of breath. Negative for cough, choking, chest tightness and wheezing.    Cardiovascular: Negative for chest pain and leg swelling.   Gastrointestinal: Negative for abdominal distention, abdominal pain, diarrhea, nausea and vomiting.   Genitourinary: Negative for difficulty urinating and hematuria.   Musculoskeletal: Negative for arthralgias and myalgias.   Skin: Negative for rash.   Neurological: Negative for weakness, light-headedness and headaches.   Hematological: Does not bruise/bleed easily.   Psychiatric/Behavioral: Negative for decreased concentration and dysphoric mood.       Objective:      Physical Exam  Vitals and nursing note reviewed.   Constitutional:       Appearance: She is well-developed. She is not diaphoretic.   HENT:      Head: Normocephalic and atraumatic.   Eyes:      General: No scleral icterus.     Conjunctiva/sclera:  Conjunctivae normal.   Cardiovascular:      Rate and Rhythm: Normal rate.      Heart sounds: Normal heart sounds. No murmur heard.    No friction rub. No gallop.   Pulmonary:      Effort: Pulmonary effort is normal. No respiratory distress.      Breath sounds: Normal breath sounds. No wheezing or rales.   Abdominal:      General: There is no distension or abdominal bruit.      Tenderness: There is no abdominal tenderness.   Musculoskeletal:         General: No deformity.      Cervical back: Normal range of motion and neck supple.   Skin:     General: Skin is warm and dry.      Findings: No erythema or rash.   Neurological:      Mental Status: She is alert and oriented to person, place, and time.      Cranial Nerves: No cranial nerve deficit.      Motor: No tremor.      Coordination: Coordination normal.      Gait: Gait normal.   Psychiatric:         Behavior: Behavior normal.         Thought Content: Thought content normal.         Judgment: Judgment normal.         Assessment:       1. NICM (nonischemic cardiomyopathy)    2. Chronic respiratory failure with hypoxia    3. Chronic obstructive pulmonary disease with acute exacerbation    4. Chronic combined systolic and diastolic congestive heart failure    5. Alpha thalassemia trait    6. Polyp of colon, unspecified part of colon, unspecified type    7. Lung nodule    8. Shortness of breath    9. NSTEMI (non-ST elevated myocardial infarction)    10. Personal history of nicotine dependence        Plan:     Medication List with Changes/Refills   Current Medications    FLUTICASONE FUROATE-VILANTEROL (BREO ELLIPTA) 200-25 MCG/DOSE DSDV DISKUS INHALER    Inhale 1 puff into the lungs once daily. Controller    FUROSEMIDE (LASIX) 40 MG TABLET    Take 1 tablet (40 mg total) by mouth once daily.    METOPROLOL SUCCINATE (TOPROL-XL) 25 MG 24 HR TABLET    Take 1 tablet (25 mg total) by mouth once daily.    PULSE OXIMETER (PULSE OXIMETER) DEVICE    by Apply Externally route 2  (two) times a day. Use twice daily at 8 AM and 3 PM and record the value in MyChart as directed.    SACUBITRIL-VALSARTAN (ENTRESTO) 24-26 MG PER TABLET    Take 1 tablet by mouth 2 (two) times daily.    SPIRONOLACTONE (ALDACTONE) 25 MG TABLET    Take 1 tablet (25 mg total) by mouth once daily.     Selma was seen today for follow-up.    Diagnoses and all orders for this visit:    NICM (nonischemic cardiomyopathy)  Comments:  Currently stable on followed by Cardiology    Chronic respiratory failure with hypoxia  Comments:  Currently stable on home oxygen pulmonary appointment pending    Chronic obstructive pulmonary disease with acute exacerbation  Comments:  Currently stable on home oxygen with pulmonary ointment pending    Chronic combined systolic and diastolic congestive heart failure  Comments:  Currently stable on followed by Cardiology    Alpha thalassemia trait    Polyp of colon, unspecified part of colon, unspecified type    Lung nodule  Comments:  Upcoming pulmonary appointment noted.    Shortness of breath    NSTEMI (non-ST elevated myocardial infarction)    Personal history of nicotine dependence      See meds, orders, follow up, routing and instructions sections of encounter and AVS. Discussed with patient and provided on AVS.    Lab Results   Component Value Date     08/08/2022    K 4.5 08/08/2022     08/08/2022    BUN 18 08/08/2022    CREATININE 1.2 08/08/2022    GLU 93 08/08/2022    HGBA1C 5.2 07/13/2022    MG 2.2 08/08/2022    AST 15 08/08/2022    ALT 10 08/08/2022    ALBUMIN 4.0 08/08/2022    PROT 7.5 08/08/2022    BILITOT 0.4 08/08/2022    CHOL 136 12/09/2021    HDL 36 (L) 12/09/2021    LDLCALC 84.6 12/09/2021    TRIG 77 12/09/2021    WBC 3.29 (L) 08/08/2022    HGB 12.4 08/08/2022    HCT 39.6 08/08/2022    HCT 39 04/02/2021     08/08/2022    TSH 0.503 12/08/2021     (H) 08/08/2022       Follow Up with me in 6 months.  Currently look stable and approximately at baseline  weight.  Continue care in the specialty clinics.

## 2022-08-19 NOTE — ASSESSMENT & PLAN NOTE
1/11/2022 - Dr. Reyna - Patient had troponin elevation in the setting of congestive heart failure (demand mediated ischemia) and and not true NSTEMI, PET shows prior scar with layla-infact ischemia there is no role of Plavix that I stopped.

## 2022-08-25 ENCOUNTER — OFFICE VISIT (OUTPATIENT)
Dept: PULMONOLOGY | Facility: CLINIC | Age: 77
End: 2022-08-25
Attending: FAMILY MEDICINE
Payer: COMMERCIAL

## 2022-08-25 VITALS
WEIGHT: 147.69 LBS | SYSTOLIC BLOOD PRESSURE: 130 MMHG | OXYGEN SATURATION: 96 % | HEART RATE: 82 BPM | DIASTOLIC BLOOD PRESSURE: 76 MMHG | BODY MASS INDEX: 27.88 KG/M2 | HEIGHT: 61 IN

## 2022-08-25 DIAGNOSIS — J43.9 PULMONARY EMPHYSEMA, UNSPECIFIED EMPHYSEMA TYPE: Chronic | ICD-10-CM

## 2022-08-25 DIAGNOSIS — R91.1 LUNG NODULE: ICD-10-CM

## 2022-08-25 PROCEDURE — 1159F MED LIST DOCD IN RCRD: CPT | Mod: CPTII,S$GLB,, | Performed by: INTERNAL MEDICINE

## 2022-08-25 PROCEDURE — 99999 PR PBB SHADOW E&M-EST. PATIENT-LVL III: ICD-10-PCS | Mod: PBBFAC,,, | Performed by: INTERNAL MEDICINE

## 2022-08-25 PROCEDURE — 99999 PR PBB SHADOW E&M-EST. PATIENT-LVL III: CPT | Mod: PBBFAC,,, | Performed by: INTERNAL MEDICINE

## 2022-08-25 PROCEDURE — 1126F AMNT PAIN NOTED NONE PRSNT: CPT | Mod: CPTII,S$GLB,, | Performed by: INTERNAL MEDICINE

## 2022-08-25 PROCEDURE — 1159F PR MEDICATION LIST DOCUMENTED IN MEDICAL RECORD: ICD-10-PCS | Mod: CPTII,S$GLB,, | Performed by: INTERNAL MEDICINE

## 2022-08-25 PROCEDURE — 99214 PR OFFICE/OUTPT VISIT, EST, LEVL IV, 30-39 MIN: ICD-10-PCS | Mod: S$GLB,,, | Performed by: INTERNAL MEDICINE

## 2022-08-25 PROCEDURE — 3078F PR MOST RECENT DIASTOLIC BLOOD PRESSURE < 80 MM HG: ICD-10-PCS | Mod: CPTII,S$GLB,, | Performed by: INTERNAL MEDICINE

## 2022-08-25 PROCEDURE — 3008F PR BODY MASS INDEX (BMI) DOCUMENTED: ICD-10-PCS | Mod: CPTII,S$GLB,, | Performed by: INTERNAL MEDICINE

## 2022-08-25 PROCEDURE — 99214 OFFICE O/P EST MOD 30 MIN: CPT | Mod: S$GLB,,, | Performed by: INTERNAL MEDICINE

## 2022-08-25 PROCEDURE — 3044F PR MOST RECENT HEMOGLOBIN A1C LEVEL <7.0%: ICD-10-PCS | Mod: CPTII,S$GLB,, | Performed by: INTERNAL MEDICINE

## 2022-08-25 PROCEDURE — 4010F ACE/ARB THERAPY RXD/TAKEN: CPT | Mod: CPTII,S$GLB,, | Performed by: INTERNAL MEDICINE

## 2022-08-25 PROCEDURE — 4010F PR ACE/ARB THEARPY RXD/TAKEN: ICD-10-PCS | Mod: CPTII,S$GLB,, | Performed by: INTERNAL MEDICINE

## 2022-08-25 PROCEDURE — 3075F PR MOST RECENT SYSTOLIC BLOOD PRESS GE 130-139MM HG: ICD-10-PCS | Mod: CPTII,S$GLB,, | Performed by: INTERNAL MEDICINE

## 2022-08-25 PROCEDURE — 3075F SYST BP GE 130 - 139MM HG: CPT | Mod: CPTII,S$GLB,, | Performed by: INTERNAL MEDICINE

## 2022-08-25 PROCEDURE — 3008F BODY MASS INDEX DOCD: CPT | Mod: CPTII,S$GLB,, | Performed by: INTERNAL MEDICINE

## 2022-08-25 PROCEDURE — 1126F PR PAIN SEVERITY QUANTIFIED, NO PAIN PRESENT: ICD-10-PCS | Mod: CPTII,S$GLB,, | Performed by: INTERNAL MEDICINE

## 2022-08-25 PROCEDURE — 3044F HG A1C LEVEL LT 7.0%: CPT | Mod: CPTII,S$GLB,, | Performed by: INTERNAL MEDICINE

## 2022-08-25 PROCEDURE — 1101F PR PT FALLS ASSESS DOC 0-1 FALLS W/OUT INJ PAST YR: ICD-10-PCS | Mod: CPTII,S$GLB,, | Performed by: INTERNAL MEDICINE

## 2022-08-25 PROCEDURE — 3078F DIAST BP <80 MM HG: CPT | Mod: CPTII,S$GLB,, | Performed by: INTERNAL MEDICINE

## 2022-08-25 PROCEDURE — 3288F PR FALLS RISK ASSESSMENT DOCUMENTED: ICD-10-PCS | Mod: CPTII,S$GLB,, | Performed by: INTERNAL MEDICINE

## 2022-08-25 PROCEDURE — 3288F FALL RISK ASSESSMENT DOCD: CPT | Mod: CPTII,S$GLB,, | Performed by: INTERNAL MEDICINE

## 2022-08-25 PROCEDURE — 1101F PT FALLS ASSESS-DOCD LE1/YR: CPT | Mod: CPTII,S$GLB,, | Performed by: INTERNAL MEDICINE

## 2022-08-25 NOTE — PROGRESS NOTES
Subjective:      Patient ID: Selma Hooper is a 76 y.o. female.    Chief Complaint: No chief complaint on file.    HPI 77 yo female with respiratory failure for oxygen secondary to pulmonary emphysema comes for assessment of a pulmonary nodule.  I last saw her in May, 2021. She was hospitalized for CHF in July had a CT of the Chest cone 5/31  showed a 4 mm nodule.against a background of emphysema. Other stable nodule are noted. No new lesions. Her chest x-ray of 5/17 showed hyperluscent lungs and mild cardiomegaly. I do not appreciate any lung nodule. This is only visible on the CT done 5 /31  Treated for CHF: 7/12  Had only a portable Chest x-ray done then.    She has  moderate obstruction on PFT;'s done today: Fev-1: 0.91 liters.  57% Sa02: 98% on 1 liters.      Review of Systems   Constitutional: Negative.    HENT: Negative.     Eyes: Negative.    Respiratory:  Positive for dyspnea on extertion.         On supplemental oxygen   Sa02: 98% of 1 liter    Several small , 4 mm nodules noted in the lower lung fields. No new lesions and no lesions have enlarged.   Cardiovascular: Negative.         Hx if CAD with past STEMI   Genitourinary: Negative.    Musculoskeletal: Negative.    Skin: Negative.    Gastrointestinal: Negative.    Neurological: Negative.    Psychiatric/Behavioral: Negative.     Objective:     Physical Exam  Vitals and nursing note reviewed.   Constitutional:       General: She is not in acute distress.     Appearance: She is well-developed.   HENT:      Head: Normocephalic and atraumatic.      Right Ear: External ear normal.      Left Ear: External ear normal.      Nose: Nose normal.   Eyes:      Conjunctiva/sclera: Conjunctivae normal.      Pupils: Pupils are equal, round, and reactive to light.   Neck:      Thyroid: No thyromegaly.      Vascular: No JVD.   Cardiovascular:      Rate and Rhythm: Normal rate and regular rhythm.      Heart sounds: Normal heart sounds. No murmur heard.    No gallop.    Pulmonary:      Effort: No respiratory distress.      Breath sounds: Normal breath sounds. No stridor. No wheezing or rales.      Comments: Fev-1; 0.91 liters 59% of FVD    Clear to A&P    CT and serial chest x-rays have been reviewed with the patient.  Chest:      Chest wall: No tenderness.   Abdominal:      General: Bowel sounds are normal. There is no distension.      Palpations: Abdomen is soft. There is no mass.      Tenderness: There is no abdominal tenderness. There is no guarding or rebound.   Musculoskeletal:         General: Normal range of motion.      Cervical back: Normal range of motion and neck supple.   Lymphadenopathy:      Cervical: No cervical adenopathy.   Skin:     General: Skin is warm and dry.      Findings: No rash.   Neurological:      Mental Status: She is alert and oriented to person, place, and time.      Cranial Nerves: No cranial nerve deficit.      Deep Tendon Reflexes: Reflexes are normal and symmetric. Reflexes normal.   Psychiatric:         Behavior: Behavior normal.         Thought Content: Thought content normal.         Judgment: Judgment normal.       Assessment:     1. Lung nodule    2. Pulmonary emphysema, unspecified emphysema type      Outpatient Encounter Medications as of 8/25/2022   Medication Sig Dispense Refill    fluticasone furoate-vilanteroL (BREO ELLIPTA) 200-25 mcg/dose DsDv diskus inhaler Inhale 1 puff into the lungs once daily. Controller 60 each 1    furosemide (LASIX) 40 MG tablet Take 1 tablet (40 mg total) by mouth once daily. 90 tablet 3    metoprolol succinate (TOPROL-XL) 25 MG 24 hr tablet Take 1 tablet (25 mg total) by mouth once daily. 30 tablet 11    pulse oximeter (PULSE OXIMETER) device by Apply Externally route 2 (two) times a day. Use twice daily at 8 AM and 3 PM and record the value [facility] as directed. 1 each 0    sacubitriL-valsartan (ENTRESTO) 24-26 mg per tablet Take 1 tablet by mouth 2 (two) times daily. 180 tablet 3    [DISCONTINUED]  spironolactone (ALDACTONE) 25 MG tablet Take 1 tablet (25 mg total) by mouth once daily. 30 tablet 11     No facility-administered encounter medications on file as of 8/25/2022.     No orders of the defined types were placed in this encounter.    Plan:    Will see if we can get her a smaller and lighter oxygen delivery system. Her nodules and stable and unchanged  see report of 5/31  Problem List Items Addressed This Visit       Pulmonary emphysema (Chronic)    Lung nodule    Overview     CT 5/31/2022 stable

## 2022-10-04 NOTE — ED PROVIDER NOTES
"Encounter Date: 5/1/2022       History     Chief Complaint   Patient presents with    Abdominal Pain     Pt reports abdominal pain starting today, pt covid + on home test 4/30/22     77 y/o F with h/o HTN, HFrEF (15%) presents to the ED c/o abdominal pain that started this morning.  She had a positive home COVID test on Friday, prior to that she had a known COVID exposure.  She developed periumbilical abdominal pain that is 8/10 "cramping" this morning.  She reports associated subjective fever, diarrhea, nausea, vomiting, SOB (at her baseline), cough.  She is vaccinated with booster. She denies chest pain, chills, dysuria, hematuria, headache.     The history is provided by the patient.     Review of patient's allergies indicates:   Allergen Reactions    Atorvastatin      Leg cramps     Past Medical History:   Diagnosis Date    Acute on chronic respiratory failure with hypoxia and hypercapnia 12/10/2021    CHF (congestive heart failure)     Former smoker 10/24/2018    Hypertension     Smoker 7/27/2016     Past Surgical History:   Procedure Laterality Date    BREAST BIOPSY      left  side years ago in high school   1962    CHOLECYSTECTOMY      COLONOSCOPY      COLONOSCOPY N/A 8/23/2021    Procedure: COLONOSCOPY;  Surgeon: Shree Amaya MD;  Location: [facility] ([address] [place] [address]);  Service: Endoscopy;  Laterality: N/A;  Do not cancel this order  fully vaccinated-see immunization record  EF 18%  [facility]    HYSTERECTOMY       Family History   Problem Relation Age of Onset    Heart disease Mother     Heart attack Mother     Hypertension Mother     Colon cancer Father         colon    Dementia Father     Hypertension Father     Colon cancer Brother 63        colon    Heart disease Brother     Hypertension Brother     Diabetes Daughter     Crohn's disease Neg Hx     Ulcerative colitis Neg Hx     Stomach cancer Neg Hx      Social History     Tobacco Use    Smoking status: Former " [employer](Resident) Smoker     Packs/day: 0.50     Quit date: 6/14/2018     Years since quitting: 3.8    Smokeless tobacco: Never Used   Substance Use Topics    Alcohol use: Yes    Drug use: No     Review of Systems   Constitutional: Positive for fever (subjective). Negative for chills and diaphoresis.   HENT: Negative for congestion, rhinorrhea and sore throat.    Respiratory: Positive for cough and shortness of breath. Negative for chest tightness and wheezing.    Cardiovascular: Negative for chest pain and palpitations.   Gastrointestinal: Positive for abdominal pain, diarrhea, nausea and vomiting. Negative for constipation.   Genitourinary: Negative for dysuria and hematuria.   Musculoskeletal: Negative for back pain.   Skin: Negative for rash.   Neurological: Negative for light-headedness, numbness and headaches.   Psychiatric/Behavioral: Negative for confusion.       Physical Exam     Initial Vitals [05/01/22 1627]   BP Pulse Resp Temp SpO2   135/70 99 20 98.1 °F (36.7 °C) 96 %      MAP       --         Physical Exam    Nursing note and vitals reviewed.  Constitutional: She appears well-developed and well-nourished. She is not diaphoretic. No distress.   HENT:   Head: Normocephalic and atraumatic.   Neck: Neck supple.   Normal range of motion.  Cardiovascular: Normal rate, regular rhythm and normal heart sounds. Exam reveals no gallop and no friction rub.    No murmur heard.  No LE edema   Pulmonary/Chest: Breath sounds normal. She has no wheezes. She has no rhonchi. She has no rales.   Abdominal: Abdomen is soft. Bowel sounds are normal. There is no abdominal tenderness. There is no rebound and no guarding.   Musculoskeletal:         General: Normal range of motion.      Cervical back: Normal range of motion and neck supple.     Neurological: She is alert and oriented to person, place, and time.   Skin: Skin is warm and dry. No rash noted. No erythema.   Psychiatric: She has a normal mood and affect.         ED Course    Procedures  Labs Reviewed   CBC W/ AUTO DIFFERENTIAL - Abnormal; Notable for the following components:       Result Value    WBC 3.89 (*)     MCV 76 (*)     MCH 23.9 (*)     MCHC 31.3 (*)     Lymph # 0.7 (*)     Gran % 73.1 (*)     All other components within normal limits   COMPREHENSIVE METABOLIC PANEL - Abnormal; Notable for the following components:    ALT 9 (*)     eGFR if  57.2 (*)     eGFR if non  49.6 (*)     All other components within normal limits   C-REACTIVE PROTEIN - Abnormal; Notable for the following components:    CRP 44.0 (*)     All other components within normal limits   LACTATE DEHYDROGENASE - Abnormal; Notable for the following components:     (*)     All other components within normal limits   CK - Abnormal; Notable for the following components:     (*)     All other components within normal limits   TROPONIN I - Abnormal; Notable for the following components:    Troponin I 0.031 (*)     All other components within normal limits   B-TYPE NATRIURETIC PEPTIDE - Abnormal; Notable for the following components:    BNP 2,135 (*)     All other components within normal limits   SARS-COV-2 RDRP GENE - Abnormal; Notable for the following components:    POC Rapid COVID Positive (*)     All other components within normal limits   FERRITIN   LACTIC ACID, PLASMA   LIPASE   URINALYSIS, REFLEX TO URINE CULTURE          Imaging Results          X-Ray Chest AP Portable (Final result)  Result time 05/01/22 19:38:14    Final result by Roberto Krueger MD (05/01/22 19:38:14)                 Impression:      1. Similar appearing chronic interstitial findings, correlation is advised to exclude early change of viral process.      Electronically signed by: Roberto Krueger MD  Date:    05/01/2022  Time:    19:38             Narrative:    EXAMINATION:  XR CHEST AP PORTABLE    CLINICAL HISTORY:  COVID-19;    TECHNIQUE:  Single frontal view of the chest was  performed.    COMPARISON:  12/08/2021    FINDINGS:  The cardiomediastinal silhouette is prominent, magnified by technique, stable.  There is calcification of the aorta..  There is no pleural effusion.  The trachea is midline.  The lungs are symmetrically expanded bilaterally with coarse central hilar interstitial attenuation, similar in appearance to the previous examination..  No large focal consolidation seen.  There is no pneumothorax.  The osseous structures are remarkable for degenerative changes..                                 Medications   ondansetron injection 4 mg (4 mg Intravenous Given 5/1/22 2025)   dexamethasone injection 6 mg (6 mg Intravenous Given 5/1/22 2040)           APC / Resident Notes:   75 y/o F with h/o HTN, HFrEF (15%) presents to the ED c/o abdominal pain that started this morning. VSS. RRR. Lungs clear. No LE edema. Abdomen soft and nontender. DDx includes but is not limited to COVID, pneumonia, hypoxia, pancreatitis, dehydration, JAMIR. Will get labs, CXR.    COVID +. No leukocytosis. CMP unremarkable. CRP elevated. Lactic acid normal. Troponin elevated, but improved from prior. BNP 2135.    CXR no acute changes.    Desat to 92% with ambulation. Given IV dexamethasone.     Admitted to hospital medicine for COVID hypoxia.                  Clinical Impression:   Final diagnoses:  [U07.1] COVID-19 (Primary)          ED Disposition Condition    Admit               Rhonda Velasco PA-C  05/01/22 2101

## 2022-11-11 ENCOUNTER — PATIENT MESSAGE (OUTPATIENT)
Dept: RESEARCH | Facility: HOSPITAL | Age: 77
End: 2022-11-11
Payer: COMMERCIAL

## 2022-11-16 RX ORDER — FLUTICASONE FUROATE AND VILANTEROL 200; 25 UG/1; UG/1
1 POWDER RESPIRATORY (INHALATION) DAILY
Qty: 60 EACH | Refills: 0 | Status: SHIPPED | OUTPATIENT
Start: 2022-11-16 | End: 2023-10-04 | Stop reason: ALTCHOICE

## 2023-03-22 ENCOUNTER — TELEPHONE (OUTPATIENT)
Dept: INTERNAL MEDICINE | Facility: CLINIC | Age: 78
End: 2023-03-22
Payer: COMMERCIAL

## 2023-03-22 NOTE — TELEPHONE ENCOUNTER
Called and spoke to the pt;s daughter. She will reach out to [employer] to see if they can push back the Certification date. I informed her that I will have Dr. Andrade to sigh the paperwork when he returns to the office on tomorrow.

## 2023-03-22 NOTE — TELEPHONE ENCOUNTER
----- Message from Kristine Gorman sent at 3/22/2023  2:17 PM CDT -----  Regardinnd message  Contact: [phone]  Alexsandra, patient daughter call in regards the la paper that is due today  need to be sign today. Alexsandra has been advise that pt's pcp is not in the office, but she want someone to sign for him. Please call and advise. Thank you

## 2023-03-27 ENCOUNTER — TELEPHONE (OUTPATIENT)
Dept: INTERNAL MEDICINE | Facility: CLINIC | Age: 78
End: 2023-03-27
Payer: COMMERCIAL

## 2023-03-27 NOTE — TELEPHONE ENCOUNTER
----- Message from Jina Gonzalez sent at 3/27/2023  9:19 AM CDT -----  Contact: Alexsandra(daughter) [phone]  Alexsandra is requesting a call back about the [facility] re-certification information. Alexsandra states [facility] states they have not received the paperwork.

## 2023-04-13 ENCOUNTER — OFFICE VISIT (OUTPATIENT)
Dept: CARDIOLOGY | Facility: CLINIC | Age: 78
End: 2023-04-13
Payer: COMMERCIAL

## 2023-04-13 VITALS
BODY MASS INDEX: 27.93 KG/M2 | WEIGHT: 147.94 LBS | SYSTOLIC BLOOD PRESSURE: 138 MMHG | DIASTOLIC BLOOD PRESSURE: 84 MMHG | HEART RATE: 104 BPM | OXYGEN SATURATION: 96 % | HEIGHT: 61 IN

## 2023-04-13 DIAGNOSIS — J43.9 PULMONARY EMPHYSEMA, UNSPECIFIED EMPHYSEMA TYPE: Chronic | ICD-10-CM

## 2023-04-13 DIAGNOSIS — J44.1 CHRONIC OBSTRUCTIVE PULMONARY DISEASE WITH ACUTE EXACERBATION: ICD-10-CM

## 2023-04-13 DIAGNOSIS — Z91.199 NONCOMPLIANCE: Chronic | ICD-10-CM

## 2023-04-13 DIAGNOSIS — I42.8 NICM (NONISCHEMIC CARDIOMYOPATHY): Chronic | ICD-10-CM

## 2023-04-13 DIAGNOSIS — J96.11 CHRONIC RESPIRATORY FAILURE WITH HYPOXIA: Chronic | ICD-10-CM

## 2023-04-13 DIAGNOSIS — I10 ESSENTIAL HYPERTENSION: ICD-10-CM

## 2023-04-13 DIAGNOSIS — I21.4 NSTEMI (NON-ST ELEVATED MYOCARDIAL INFARCTION): ICD-10-CM

## 2023-04-13 DIAGNOSIS — I50.42 CHRONIC COMBINED SYSTOLIC AND DIASTOLIC CONGESTIVE HEART FAILURE: ICD-10-CM

## 2023-04-13 DIAGNOSIS — Z78.9 STATIN INTOLERANCE: ICD-10-CM

## 2023-04-13 DIAGNOSIS — I50.22 CHRONIC SYSTOLIC CONGESTIVE HEART FAILURE, NYHA CLASS 4: Primary | ICD-10-CM

## 2023-04-13 DIAGNOSIS — I44.7 LBBB (LEFT BUNDLE BRANCH BLOCK): Chronic | ICD-10-CM

## 2023-04-13 PROCEDURE — 1126F AMNT PAIN NOTED NONE PRSNT: CPT | Mod: CPTII,S$GLB,, | Performed by: INTERNAL MEDICINE

## 2023-04-13 PROCEDURE — 3075F PR MOST RECENT SYSTOLIC BLOOD PRESS GE 130-139MM HG: ICD-10-PCS | Mod: CPTII,S$GLB,, | Performed by: INTERNAL MEDICINE

## 2023-04-13 PROCEDURE — 1126F PR PAIN SEVERITY QUANTIFIED, NO PAIN PRESENT: ICD-10-PCS | Mod: CPTII,S$GLB,, | Performed by: INTERNAL MEDICINE

## 2023-04-13 PROCEDURE — 1101F PR PT FALLS ASSESS DOC 0-1 FALLS W/OUT INJ PAST YR: ICD-10-PCS | Mod: CPTII,S$GLB,, | Performed by: INTERNAL MEDICINE

## 2023-04-13 PROCEDURE — 3288F PR FALLS RISK ASSESSMENT DOCUMENTED: ICD-10-PCS | Mod: CPTII,S$GLB,, | Performed by: INTERNAL MEDICINE

## 2023-04-13 PROCEDURE — 99999 PR PBB SHADOW E&M-EST. PATIENT-LVL IV: ICD-10-PCS | Mod: PBBFAC,,, | Performed by: INTERNAL MEDICINE

## 2023-04-13 PROCEDURE — 1159F MED LIST DOCD IN RCRD: CPT | Mod: CPTII,S$GLB,, | Performed by: INTERNAL MEDICINE

## 2023-04-13 PROCEDURE — 99214 PR OFFICE/OUTPT VISIT, EST, LEVL IV, 30-39 MIN: ICD-10-PCS | Mod: S$GLB,,, | Performed by: INTERNAL MEDICINE

## 2023-04-13 PROCEDURE — 3075F SYST BP GE 130 - 139MM HG: CPT | Mod: CPTII,S$GLB,, | Performed by: INTERNAL MEDICINE

## 2023-04-13 PROCEDURE — 99999 PR PBB SHADOW E&M-EST. PATIENT-LVL IV: CPT | Mod: PBBFAC,,, | Performed by: INTERNAL MEDICINE

## 2023-04-13 PROCEDURE — 99214 OFFICE O/P EST MOD 30 MIN: CPT | Mod: S$GLB,,, | Performed by: INTERNAL MEDICINE

## 2023-04-13 PROCEDURE — 3079F DIAST BP 80-89 MM HG: CPT | Mod: CPTII,S$GLB,, | Performed by: INTERNAL MEDICINE

## 2023-04-13 PROCEDURE — 1159F PR MEDICATION LIST DOCUMENTED IN MEDICAL RECORD: ICD-10-PCS | Mod: CPTII,S$GLB,, | Performed by: INTERNAL MEDICINE

## 2023-04-13 PROCEDURE — 3079F PR MOST RECENT DIASTOLIC BLOOD PRESSURE 80-89 MM HG: ICD-10-PCS | Mod: CPTII,S$GLB,, | Performed by: INTERNAL MEDICINE

## 2023-04-13 PROCEDURE — 3288F FALL RISK ASSESSMENT DOCD: CPT | Mod: CPTII,S$GLB,, | Performed by: INTERNAL MEDICINE

## 2023-04-13 PROCEDURE — 1101F PT FALLS ASSESS-DOCD LE1/YR: CPT | Mod: CPTII,S$GLB,, | Performed by: INTERNAL MEDICINE

## 2023-04-13 RX ORDER — DAPAGLIFLOZIN 5 MG/1
5 TABLET, FILM COATED ORAL DAILY
Qty: 90 TABLET | Refills: 3 | Status: SHIPPED | OUTPATIENT
Start: 2023-04-13

## 2023-04-13 NOTE — PROGRESS NOTES
Subjective:   Patient ID:  Selma Hooper is a 77 y.o. female who presents for follow-up of Establish Care, Follow-up, Chest Pain, Shortness of Breath, and Nausea    Selma Hooper is a 76 y.o. female who presents for follow-up of Follow-up (F/u from hospital visit) and Shortness of Breath     Selma Hooper is a 76 y.o. female who presents for follow-up of NSTEMI (non-ST elevated myocardial infarction) (Hospital f/u 12/8/21), Hypotension, and Medication Management     ICM, DM  Recent discharge:   Ms. Hooper is a 73yo woman with essential HTN (off all meds) who presents with acute hypoxemic respiratory failure 2/2 HTN emergency with flash pulmonary edema and acute new onset systolic congestive heart failure exacerbation. Also with elevated transaminases likely 2/2 congestive hepatopathy (improving), lactic acidosis (now resolved), and elevated troponin, likely 2/2 HTN/CHF. She was initially placed on BiPAP and NTG gtt upon admission with improvement in her BP and taper off gtt. Now weaned off NC and s/p IV lasix with good response. Hypotensive yesterday am, likely 2/2 overdiuresis; improved with 250cc bolus today. Will discharge on GDMT: initiate losartan tomorrow am (held today 2/2 JAMIR), metoprolol succinate, lasix 20. Would consider addition of spironolactone at f/u visit; held off today 2/2 JAMIR due to overdiuresis. Will need timely ischemic w/u as outpatient.     Echo:    1 - Severely depressed left ventricular systolic function (EF 15-20%).     2 - Normal right ventricular systolic function .     3 - Trivial to mild aortic regurgitation.     4 - Trivial to mild mitral regurgitation.     5 - Trivial to mild tricuspid regurgitation.     6 - The estimated PA systolic pressure is 28 mmHg.      NM stress test:  Impression: NORMAL MYOCARDIAL PERFUSION  1. The perfusion scan is free of evidence for myocardial ischemia or injury.   2. There is a mild fixed septal defect consistent with patient's underlying LBBB - left bundle  branch block.   3. There is a mild intensity fixed defect in the inferior wall of the left ventricle, secondary to diaphragm attenuation.   4. There is abnormal wall motion at rest showing severe global hypokinesis of the left ventricle.   5. There is resting LV dysfunction with a reduced ejection fraction of 20 %.  (normal is >= 51%)  6. The left ventricular volume is mildly increased at rest.   7. The extracardiac distribution of radioactivity is normal.      HPI:   Patient has been feeling SOB for atleast 6 month that is primarily THEODORE. Since Diuretic her THEODORE has improved. No orthopnea, PND, chest pain or palpitations.   Heavy smoker, mother has heart attack 60s. Brother had CABG in 60s.   NYHA III- feeling SOB on minimal exertion.   No chest pain, Orthopnea, PND.   Denies palpitations or fluttering in the chest  Patent get sob on walking.   Grandparents had CVA.         Echo 3/2019  Severely decreased left ventricular systolic function. The estimated ejection fraction is 20%  Eccentric left ventricular hypertrophy.  Global hypokinetic wall motion.  Moderate left atrial enlargement.  Indeterminate left ventricular diastolic function.  Normal right ventricular systolic function.  Mild tricuspid regurgitation.  The estimated PA systolic pressure is 19 mm Hg  Normal central venous pressure (3 mm Hg).      HPI:   Cannot afford CRT it will cost 7000 and she cannot afford it.  Worsening SOB on minimal exertion, NYHA III.   No chest pain, Orthopnea, PND of heart failure symptoms.   Denies palpitations or fluttering in the chest     HPI:  Recent CHF exacerbation with hospitalization.   Now euvolemic. Reiterated the indication of CRT patient says she cannot afford it.   No chest pain, Orthopnea, PND of heart failure symptoms.   Denies palpitations or fluttering in the chest  We discussed changes in medicine she also had COPD.      Echo 12/2021  The left ventricle is normal in size with moderate eccentric hypertrophy and  severely decreased systolic function.  There is severe left ventricular global hypokinesis. The estimated ejection fraction is 15%.  Left ventricular diastolic dysfunction.  Normal right ventricular size with normal right ventricular systolic function.  Normal central venous pressure (3 mmHg).  There is no significant tricuspid regurgitation and therefore the pulmonary artery systolic pressure cannot be reported.        PET 12/2022       There is a small to moderate sized, moderate intensity distal to apical inferior and inferolateral resting perfusion abnormality involving 11% of the LV myocardium in the distribution of the RCA. After pharmacologic stress, this defect is larger and more severe involving 13% of the LV myocardium, indicative of infarct with layla-infarct ischemia.    Within the perfusion abnormality, absolute myocardial perfusion (cc/min/gm) averaged 0.46 cc/min/g at rest, 0.71 cc/min/g at stress and CFR was 1.53 , which equates to moderately to severely reduced coronary flow capacity  within the RCA territory.    The whole heart absolute myocardial perfusion values averaged 0.93 cc/min/g at rest, which is normal; 1.86 cc/min/g at stress, which is low normal; and CFR is 1.99 , which is mildly reduced.    CT attenuation images demonstrate mild diffuse coronary calcifications in the LAD, LCX and RCA territory.    The gated perfusion images showed an ejection fraction of 14% at rest and 15% during stress. A normal ejection fraction is greater than 53%.    There is severe global hypokinesis at rest and during stress.    The LV cavity size is severely enlarged at rest and stress.    The EKG portion of this study is uninterpretable.    During stress, rare PVCs are noted.    The patient reported no chest pain during the stress test.    There are no prior studies for comparison.     The degree of scar on perfusion imaging is out of proportion to the degree of myocardial dysfunction.        HPI:   Recent  admission for CHF exacerbation and type II respiratory failure in 12/2021.   Patient had a bout of dizziness and felt that her BP was low but she did not have breakfast.   No chest pain, PND.  NYHA II  Occasional exercise.  Denies palpitations or fluttering in the chest      HPI:  Recent hospital discharge 7/2022  Pt admitted to [facility] and started on IV lasix with good response. Pt under the care of Dr. Mi Arias until 7/13, see her documentation for how it pertains to the hospital course. Pt with overall symptomatic improvement into 7/14, and IV lasix transitioned to PO. Repeat echo on this admission again showed EF 15% with G1DD. Discussed home COPD regimen with pt, and she was previously on Breo before her insurance company stopped paying for this; will restart and enlist SW for assistance.  Pt qualified for 3L continuous home O2, which was arranged by SW. She continued to do well into 7/15 and deemed appropriate for discharge. Plan discussed with pt, who was agreeable and amenable; medications were discussed and reviewed, outpatient follow-up scheduled, ER precautions were given, all questions were answered to the pt's satisfaction, and Ms. Hooper was subsequently discharged.     Patient is trying to be on low salt diet.   Her BP is low and has occasional dizziness. No LOC, frequent naps  No chest pain, Orthopnea, PND of heart failure symptoms.   Denies palpitations or fluttering in the chest     HPI:   Patient is on 1 L oxygen all the time  No chest pain, Orthopnea, PND of heart failure symptoms.   Denies palpitations or fluttering in the chest  NYHA IV    Patient Active Problem List   Diagnosis    Family history of colon cancer    Colon polyps    Essential hypertension    Dyspnea    Subclinical hyperthyroidism    Elevated troponin    NICM (nonischemic cardiomyopathy)    Lung nodule    Pulmonary emphysema    Anemia    Personal history of nicotine dependence    Noncompliance    LBBB (left bundle branch block)  "   Acute on chronic combined systolic and diastolic congestive heart failure    Acute on chronic respiratory failure with hypoxia    Statin intolerance    Chronic respiratory failure with hypoxia    Allergy to statin medication    NSTEMI (non-ST elevated myocardial infarction)    Chronic obstructive pulmonary disease with acute exacerbation    Chronic combined systolic and diastolic congestive heart failure    COVID-19    Leukopenia    Elevated troponin level    Alpha thalassemia trait     /84 (BP Location: Left arm, Patient Position: Sitting, BP Method: Medium (Automatic))   Pulse 104   Ht 5' 1" (1.549 m)   Wt 67.1 kg (147 lb 14.9 oz)   LMP  (LMP Unknown)   SpO2 96%   BMI 27.95 kg/m²   Body mass index is 27.95 kg/m².  CrCl cannot be calculated (Patient's most recent lab result is older than the maximum 7 days allowed.).    Lab Results   Component Value Date     08/08/2022    K 4.5 08/08/2022     08/08/2022    CO2 28 08/08/2022    BUN 18 08/08/2022    CREATININE 1.2 08/08/2022    GLU 93 08/08/2022    HGBA1C 5.2 07/13/2022    MG 2.2 08/08/2022    AST 15 08/08/2022    ALT 10 08/08/2022    ALBUMIN 4.0 08/08/2022    PROT 7.5 08/08/2022    BILITOT 0.4 08/08/2022    WBC 3.29 (L) 08/08/2022    HGB 12.4 08/08/2022    HCT 39.6 08/08/2022    HCT 39 04/02/2021    MCV 77 (L) 08/08/2022     08/08/2022    INR 1.1 05/01/2022    TSH 0.503 12/08/2021    CHOL 136 12/09/2021    HDL 36 (L) 12/09/2021    LDLCALC 84.6 12/09/2021    TRIG 77 12/09/2021       Current Outpatient Medications   Medication Sig    fluticasone furoate-vilanteroL (BREO ELLIPTA) 200-25 mcg/dose DsDv diskus inhaler Inhale 1 puff into the lungs once daily. Controller    furosemide (LASIX) 40 MG tablet Take 1 tablet (40 mg total) by mouth once daily.    metoprolol succinate (TOPROL-XL) 25 MG 24 hr tablet Take 1 tablet (25 mg total) by mouth once daily.    pulse oximeter (PULSE OXIMETER) device by Apply Externally route 2 (two) times a " day. Use twice daily at 8 AM and 3 PM and record the value in [employer] as directed.    sacubitriL-valsartan (ENTRESTO) 24-26 mg per tablet Take 1 tablet by mouth 2 (two) times daily.    spironolactone (ALDACTONE) 25 MG tablet Take 0.5 tablets (12.5 mg total) by mouth once daily. (Patient not taking: Reported on 4/13/2023)     No current facility-administered medications for this visit.       Review of Systems   Constitutional: Negative for chills, decreased appetite, malaise/fatigue, night sweats, weight gain and weight loss.   Eyes:  Negative for blurred vision, double vision, visual disturbance and visual halos.   Cardiovascular:  Negative for chest pain, claudication, cyanosis, dyspnea on exertion, irregular heartbeat, leg swelling, near-syncope, orthopnea, palpitations, paroxysmal nocturnal dyspnea and syncope.   Respiratory:  Negative for cough, hemoptysis, snoring, sputum production and wheezing.    Endocrine: Negative for cold intolerance, heat intolerance, polydipsia and polyphagia.   Hematologic/Lymphatic: Negative for adenopathy and bleeding problem. Does not bruise/bleed easily.   Skin:  Negative for flushing, itching, poor wound healing and rash.   Musculoskeletal:  Negative for arthritis, back pain, falls, gout, joint pain, joint swelling, muscle cramps, muscle weakness, myalgias, neck pain and stiffness.   Gastrointestinal:  Negative for bloating, abdominal pain, anorexia, diarrhea, dysphagia, excessive appetite, flatus, hematemesis, jaundice, melena and nausea.   Genitourinary:  Negative for hesitancy and incomplete emptying.   Neurological:  Negative for aphonia, brief paralysis, difficulty with concentration, disturbances in coordination, excessive daytime sleepiness, dizziness, focal weakness, light-headedness, loss of balance and weakness.   Psychiatric/Behavioral:  Negative for altered mental status, depression, hallucinations, hypervigilance, memory loss, substance abuse and suicidal ideas. The  patient does not have insomnia and is not nervous/anxious.      Objective:   Physical Exam  Constitutional:       General: She is not in acute distress.     Appearance: She is well-developed. She is not diaphoretic.   HENT:      Head: Normocephalic and atraumatic.      Nose: Nose normal.      Mouth/Throat:      Pharynx: No oropharyngeal exudate.   Eyes:      General: No scleral icterus.        Right eye: No discharge.         Left eye: No discharge.      Conjunctiva/sclera: Conjunctivae normal.      Pupils: Pupils are equal, round, and reactive to light.   Neck:      Thyroid: No thyromegaly.      Vascular: No JVD.      Trachea: No tracheal deviation.   Cardiovascular:      Rate and Rhythm: Normal rate and regular rhythm.      Pulses: Intact distal pulses.      Heart sounds: Normal heart sounds. No murmur heard.    No friction rub. No gallop.   Pulmonary:      Effort: Pulmonary effort is normal. No respiratory distress.      Breath sounds: Normal breath sounds. No stridor. No wheezing or rales.   Chest:      Chest wall: No tenderness.   Abdominal:      General: Bowel sounds are normal. There is no distension.      Palpations: Abdomen is soft. There is no mass.      Tenderness: There is no abdominal tenderness. There is no guarding or rebound.   Musculoskeletal:         General: No tenderness. Normal range of motion.      Cervical back: Normal range of motion and neck supple.   Lymphadenopathy:      Cervical: No cervical adenopathy.   Skin:     General: Skin is warm.      Coloration: Skin is not pale.      Findings: No erythema or rash.   Neurological:      Mental Status: She is alert and oriented to person, place, and time.      Cranial Nerves: No cranial nerve deficit.      Motor: No abnormal muscle tone.      Coordination: Coordination normal.      Deep Tendon Reflexes: Reflexes are normal and symmetric. Reflexes normal.   Psychiatric:         Behavior: Behavior normal.         Thought Content: Thought content  normal.         Judgment: Judgment normal.       Assessment:     1. Chronic systolic congestive heart failure, NYHA class 4    2. Statin intolerance    3. Noncompliance    4. NSTEMI (non-ST elevated myocardial infarction)    5. NICM (nonischemic cardiomyopathy)    6. LBBB (left bundle branch block)    7. Essential hypertension    8. Chronic combined systolic and diastolic congestive heart failure    9. Pulmonary emphysema, unspecified emphysema type    10. Chronic obstructive pulmonary disease with acute exacerbation    11. Chronic respiratory failure with hypoxia      Plan:   Selma was seen today for establish care, follow-up, chest pain, shortness of breath and nausea.    Diagnoses and all orders for this visit:    Chronic systolic congestive heart failure, NYHA class 4  -     Echo; Future    Statin intolerance    Noncompliance    NSTEMI (non-ST elevated myocardial infarction)    NICM (nonischemic cardiomyopathy)    LBBB (left bundle branch block)    Essential hypertension    Chronic combined systolic and diastolic congestive heart failure    Pulmonary emphysema, unspecified emphysema type    Chronic obstructive pulmonary disease with acute exacerbation    Chronic respiratory failure with hypoxia      Low salt diet and watch weight call if there is 3-5 pounds weight gain in 1 wk.   Limit sodium intake to less then 2 gram sodium and 1500cc fluid restriction.  Graded exercise program as tolerated.  Call if  more than 3 lbs in 1 day or 5 lbs in 1 week.  Doing well. No change in meds at this time. We discussed ICD. Patient not interested in pursuing the device.

## 2023-04-19 ENCOUNTER — PATIENT MESSAGE (OUTPATIENT)
Dept: CARDIOLOGY | Facility: CLINIC | Age: 78
End: 2023-04-19
Payer: COMMERCIAL

## 2023-04-24 RX ORDER — METOPROLOL SUCCINATE 25 MG/1
25 TABLET, EXTENDED RELEASE ORAL DAILY
Qty: 90 TABLET | Refills: 3 | Status: SHIPPED | OUTPATIENT
Start: 2023-04-24 | End: 2024-04-23

## 2023-05-15 RX ORDER — SACUBITRIL AND VALSARTAN 24; 26 MG/1; MG/1
TABLET, FILM COATED ORAL
Qty: 180 TABLET | Refills: 3 | Status: SHIPPED | OUTPATIENT
Start: 2023-05-15 | End: 2023-11-21 | Stop reason: SDUPTHER

## 2023-08-30 RX ORDER — FUROSEMIDE 40 MG/1
40 TABLET ORAL DAILY
Qty: 90 TABLET | Refills: 3 | Status: SHIPPED | OUTPATIENT
Start: 2023-08-30 | End: 2024-08-29

## 2023-09-20 DIAGNOSIS — Z78.0 MENOPAUSE: ICD-10-CM

## 2023-09-29 ENCOUNTER — HOSPITAL ENCOUNTER (OUTPATIENT)
Dept: RADIOLOGY | Facility: HOSPITAL | Age: 78
Discharge: HOME | End: 2023-09-29
Attending: NURSE PRACTITIONER
Payer: COMMERCIAL

## 2023-09-29 ENCOUNTER — PATIENT MESSAGE (OUTPATIENT)
Dept: INTERNAL MEDICINE | Facility: CLINIC | Age: 78
End: 2023-09-29

## 2023-09-29 ENCOUNTER — OFFICE VISIT (OUTPATIENT)
Dept: INTERNAL MEDICINE | Facility: CLINIC | Age: 78
End: 2023-09-29
Payer: COMMERCIAL

## 2023-09-29 VITALS
OXYGEN SATURATION: 99 % | HEIGHT: 61 IN | DIASTOLIC BLOOD PRESSURE: 66 MMHG | HEART RATE: 87 BPM | SYSTOLIC BLOOD PRESSURE: 124 MMHG | BODY MASS INDEX: 27.18 KG/M2 | WEIGHT: 143.94 LBS

## 2023-09-29 DIAGNOSIS — M25.511 ACUTE PAIN OF BOTH SHOULDERS: Primary | ICD-10-CM

## 2023-09-29 DIAGNOSIS — M25.512 ACUTE PAIN OF BOTH SHOULDERS: ICD-10-CM

## 2023-09-29 DIAGNOSIS — M79.601 BILATERAL ARM PAIN: ICD-10-CM

## 2023-09-29 DIAGNOSIS — M25.511 ACUTE PAIN OF BOTH SHOULDERS: ICD-10-CM

## 2023-09-29 DIAGNOSIS — M79.602 BILATERAL ARM PAIN: ICD-10-CM

## 2023-09-29 DIAGNOSIS — E66.3 OVERWEIGHT (BMI 25.0-29.9): ICD-10-CM

## 2023-09-29 DIAGNOSIS — M25.512 ACUTE PAIN OF BOTH SHOULDERS: Primary | ICD-10-CM

## 2023-09-29 PROCEDURE — 3078F DIAST BP <80 MM HG: CPT | Mod: CPTII,S$GLB,, | Performed by: NURSE PRACTITIONER

## 2023-09-29 PROCEDURE — 99214 PR OFFICE/OUTPT VISIT, EST, LEVL IV, 30-39 MIN: ICD-10-PCS | Mod: S$GLB,,, | Performed by: NURSE PRACTITIONER

## 2023-09-29 PROCEDURE — 73030 X-RAY EXAM OF SHOULDER: CPT | Mod: 26,,, | Performed by: STUDENT IN AN ORGANIZED HEALTH CARE EDUCATION/TRAINING PROGRAM

## 2023-09-29 PROCEDURE — 1159F PR MEDICATION LIST DOCUMENTED IN MEDICAL RECORD: ICD-10-PCS | Mod: CPTII,S$GLB,, | Performed by: NURSE PRACTITIONER

## 2023-09-29 PROCEDURE — 3288F PR FALLS RISK ASSESSMENT DOCUMENTED: ICD-10-PCS | Mod: CPTII,S$GLB,, | Performed by: NURSE PRACTITIONER

## 2023-09-29 PROCEDURE — 99999 PR PBB SHADOW E&M-EST. PATIENT-LVL IV: CPT | Mod: PBBFAC,,, | Performed by: NURSE PRACTITIONER

## 2023-09-29 PROCEDURE — 3074F PR MOST RECENT SYSTOLIC BLOOD PRESSURE < 130 MM HG: ICD-10-PCS | Mod: CPTII,S$GLB,, | Performed by: NURSE PRACTITIONER

## 2023-09-29 PROCEDURE — 73030 XR SHOULDER COMPLETE 2 OR MORE VIEWS BILATERAL: ICD-10-PCS | Mod: 26,,, | Performed by: STUDENT IN AN ORGANIZED HEALTH CARE EDUCATION/TRAINING PROGRAM

## 2023-09-29 PROCEDURE — 99999 PR PBB SHADOW E&M-EST. PATIENT-LVL IV: ICD-10-PCS | Mod: PBBFAC,,, | Performed by: NURSE PRACTITIONER

## 2023-09-29 PROCEDURE — 3074F SYST BP LT 130 MM HG: CPT | Mod: CPTII,S$GLB,, | Performed by: NURSE PRACTITIONER

## 2023-09-29 PROCEDURE — 3078F PR MOST RECENT DIASTOLIC BLOOD PRESSURE < 80 MM HG: ICD-10-PCS | Mod: CPTII,S$GLB,, | Performed by: NURSE PRACTITIONER

## 2023-09-29 PROCEDURE — 99214 OFFICE O/P EST MOD 30 MIN: CPT | Mod: S$GLB,,, | Performed by: NURSE PRACTITIONER

## 2023-09-29 PROCEDURE — 1159F MED LIST DOCD IN RCRD: CPT | Mod: CPTII,S$GLB,, | Performed by: NURSE PRACTITIONER

## 2023-09-29 PROCEDURE — 1101F PT FALLS ASSESS-DOCD LE1/YR: CPT | Mod: CPTII,S$GLB,, | Performed by: NURSE PRACTITIONER

## 2023-09-29 PROCEDURE — 3288F FALL RISK ASSESSMENT DOCD: CPT | Mod: CPTII,S$GLB,, | Performed by: NURSE PRACTITIONER

## 2023-09-29 PROCEDURE — 1101F PR PT FALLS ASSESS DOC 0-1 FALLS W/OUT INJ PAST YR: ICD-10-PCS | Mod: CPTII,S$GLB,, | Performed by: NURSE PRACTITIONER

## 2023-09-29 PROCEDURE — 73030 X-RAY EXAM OF SHOULDER: CPT | Mod: TC,50

## 2023-09-29 RX ORDER — METHOCARBAMOL 500 MG/1
500 TABLET, FILM COATED ORAL 3 TIMES DAILY PRN
Qty: 30 TABLET | Refills: 0 | Status: SHIPPED | OUTPATIENT
Start: 2023-09-29 | End: 2023-10-26

## 2023-09-29 NOTE — PROGRESS NOTES
"Subjective     Patient ID: Selma Hooper is a 77 y.o. female.    Chief Complaint: Arm Pain (Bilateral arm pain started a couple weeks ago, pt currently on 1 liter on oxygen. Pt states that she is supposed to be on 3L.)    Pt of Dr. Andrade, here for, "both arms hurt". Started 2 weeks ago, denies injury, trauma, or falls. However she does carry a large oxygen tank and she was on the bus coming up the steps one day and grabbed the bar and may have pulled something. Raising arms and going backwards causes pain in deltoid and shoulders, left worse than right.    Shoulder Pain   The pain is present in the right shoulder and left shoulder. This is a recurrent problem. The current episode started 1 to 4 weeks ago (2 weeks). There has been no history of extremity trauma. The problem occurs intermittently. The problem has been waxing and waning. The quality of the pain is described as aching. The pain is at a severity of 7/10. The pain is moderate. Pertinent negatives include no fever, headaches, inability to bear weight, itching, joint locking, joint swelling, limited range of motion, numbness, stiffness, tingling or visual symptoms. The symptoms are aggravated by activity. She has tried acetaminophen and cold for the symptoms. The treatment provided moderate relief. Family history does not include arthritis. There is no history of diabetes, Injuries to Extremity or migraines.     Review of Systems   Constitutional:  Negative for activity change, chills and fever.   Respiratory:  Negative for chest tightness and shortness of breath.    Cardiovascular:  Negative for chest pain.   Gastrointestinal:  Negative for abdominal pain, constipation, diarrhea, nausea, vomiting and reflux.   Genitourinary:  Negative for dysuria.   Musculoskeletal:  Positive for arthralgias. Negative for back pain, joint swelling, leg pain, myalgias, neck pain, neck stiffness, stiffness and joint deformity.        As documented in HPI     Integumentary:  " Negative for itching.   Allergic/Immunologic: Negative for environmental allergies, food allergies and immunocompromised state.   Neurological:  Negative for dizziness, tingling, tremors, weakness, numbness, headaches and coordination difficulties.   Hematological:  Negative for adenopathy. Does not bruise/bleed easily.   Psychiatric/Behavioral:  Negative for suicidal ideas.             Review of patient's allergies indicates:   Allergen Reactions    Atorvastatin      Leg cramps         Current Outpatient Medications:     fluticasone furoate-vilanteroL (BREO ELLIPTA) 200-25 mcg/dose DsDv diskus inhaler, Inhale 1 puff into the lungs once daily. Controller, Disp: 60 each, Rfl: 0    furosemide (LASIX) 40 MG tablet, Take 1 tablet (40 mg total) by mouth once daily., Disp: 90 tablet, Rfl: 3    metoprolol succinate (TOPROL-XL) 25 MG 24 hr tablet, Take 1 tablet (25 mg total) by mouth once daily., Disp: 90 tablet, Rfl: 3    pulse oximeter (PULSE OXIMETER) device, by Apply Externally route 2 (two) times a day. Use twice daily at 8 AM and 3 PM and [facility] as directed., Disp: 1 each, Rfl: 0    sacubitriL-valsartan (ENTRESTO) 24-26 mg per tablet, Take 1 tablet by mouth 2 (two) times daily., Disp: 180 tablet, Rfl: 3    dapagliflozin (FARXIGA) 5 mg Tab tablet, Take 1 tablet (5 mg total) by mouth once daily. (Patient not taking: Reported on 9/29/2023), Disp: 90 tablet, Rfl: 3    Patient Active Problem List   Diagnosis    Family history of colon cancer    Colon polyps    Essential hypertension    Dyspnea    Subclinical hyperthyroidism    Elevated troponin    NICM (nonischemic cardiomyopathy)    Lung nodule    Pulmonary emphysema    Anemia    Personal history of nicotine dependence    Noncompliance    LBBB (left bundle branch block)    Acute on chronic combined systolic and diastolic congestive heart failure    Acute on chronic respiratory failure with hypoxia    Statin intolerance    Chronic respiratory failure  with hypoxia    Allergy to statin medication    NSTEMI (non-ST elevated myocardial infarction)    Chronic obstructive pulmonary disease with acute exacerbation    Chronic combined systolic and diastolic congestive heart failure    COVID-19    Leukopenia    Elevated troponin level    Alpha thalassemia trait       Past Medical History:   Diagnosis Date    Abnormal liver enzymes 12/10/2018    Acute on chronic respiratory failure with hypoxia and hypercapnia 12/10/2021    CHF (congestive heart failure)     COPD exacerbation 2022    Former smoker 10/24/2018    Hypertension     Smoker 2016       Past Surgical History:   Procedure Laterality Date    BREAST BIOPSY      left  side years ago in high school       CHOLECYSTECTOMY      COLONOSCOPY      COLONOSCOPY N/A 2021    Procedure: COLONOSCOPY;  Surgeon: Shree Amaya MD;  Location: [facility] ([address] [place] [address]);  Service: Endoscopy;  Laterality: N/A;  Do not cancel this order  fully vaccinated-see immunization record  EF 18%  -covid penelope-new inst portal-tb    HYSTERECTOMY         Social History     Socioeconomic History    Marital status: Single    Number of children: 2   Occupational History    Occupation: ret health and      Comment: [employer]   Tobacco Use    Smoking status: Former     Current packs/day: 0.00     Types: Cigarettes     Quit date: 2018     Years since quittin.2    Smokeless tobacco: Never   Substance and Sexual Activity    Alcohol use: Yes    Drug use: No     Social Determinants of Health     Financial Resource Strain: Medium Risk (2023)    Overall Financial Resource Strain (CARDIA)     Difficulty of Paying Living Expenses: Somewhat hard   Food Insecurity: No Food Insecurity (2023)    Hunger Vital Sign     Worried About Running Out of Food in the Last Year: Never true     Ran Out of Food in the Last Year: Never true   Transportation Needs: No Transportation Needs (2023)    PRAPARE - Transportation     Lack of  "Transportation (Medical): No     Lack of Transportation (Non-Medical): No   Physical Activity: Unknown (4/11/2023)    Exercise Vital Sign     Days of Exercise per Week: 2 days   Stress: Stress Concern Present (4/11/2023)    [place] [employer] - Occupational Stress Questionnaire     Feeling of Stress : To some extent   Social Connections: Unknown (4/11/2023)    Social Connection and Isolation Panel [NHANES]     Frequency of Communication with Friends and Family: More than three times a week     Frequency of Social Gatherings with Friends and Family: Patient refused     Active Member of Clubs or Organizations: No     Attends Club or Organization Meetings: Never   Housing Stability: Unknown (4/11/2023)    Housing Stability Vital Sign     Unable to Pay for Housing in the Last Year: Patient refused     Number of Places Lived in the Last Year: 1     Unstable Housing in the Last Year: No       Family History   Problem Relation Age of Onset    Heart disease Mother     Heart attack Mother     Hypertension Mother     Colon cancer Father         colon    Dementia Father     Hypertension Father     Colon cancer Brother 63        colon    Heart disease Brother     Hypertension Brother     Diabetes Daughter     Crohn's disease Neg Hx     Ulcerative colitis Neg Hx     Stomach cancer Neg Hx        Objective     Vitals:    09/29/23 1422   BP: 124/66   Pulse: 87   SpO2: 99%   Weight: 65.3 kg (143 lb 15.4 oz)   Height: 5' 1" (1.549 m)       Body mass index is 27.2 kg/m².    Physical Exam  Vitals and nursing note reviewed.   Constitutional:       Appearance: Normal appearance.   HENT:      Head: Normocephalic.      Nose: Nose normal.      Mouth/Throat:      Mouth: Mucous membranes are moist.   Eyes:      Conjunctiva/sclera: Conjunctivae normal.   Cardiovascular:      Rate and Rhythm: Normal rate and regular rhythm.      Pulses: Normal pulses.      Heart sounds: Normal heart sounds.   Pulmonary:      Effort: " Pulmonary effort is normal.      Breath sounds: Normal breath sounds.      Comments: On 1 liter of oxygen per NC  Musculoskeletal:         General: Tenderness present. No swelling, deformity or signs of injury.      Right shoulder: Tenderness and crepitus present. No swelling, deformity or effusion.      Left shoulder: Tenderness and crepitus present. No swelling, deformity or effusion.   Skin:     General: Skin is warm and dry.   Neurological:      General: No focal deficit present.      Mental Status: She is alert and oriented to person, place, and time.   Psychiatric:         Mood and Affect: Mood normal.         Behavior: Behavior normal.         Thought Content: Thought content normal.         Judgment: Judgment normal.            Assessment and Plan     1. Acute pain of both shoulders  -     X-Ray Shoulder 2 or More Views Left; Future; Expected date: 09/29/2023  -     methocarbamoL (ROBAXIN) 500 MG Tab; Take 1 tablet (500 mg total) by mouth 3 (three) times daily as needed (shoulder pain).  Dispense: 30 tablet; Refill: 0  -     X-Ray Shoulder 2 or More views Bilateral; Future; Expected date: 09/29/2023    2. Bilateral arm pain  -     X-Ray Shoulder 2 or More Views Left; Future; Expected date: 09/29/2023  -     methocarbamoL (ROBAXIN) 500 MG Tab; Take 1 tablet (500 mg total) by mouth 3 (three) times daily as needed (shoulder pain).  Dispense: 30 tablet; Refill: 0  -     X-Ray Shoulder 2 or More views Bilateral; Future; Expected date: 09/29/2023    3. BMI 27.0-27.9,adult  BMI reviewed    4. Overweight (BMI 25.0-29.9)  BMI reviewed.    Diet and exercise to lose weight.    Check shoulder Xrays, will message with results    Robaxin 500mg every 8 hrs as needed for pain, may alternate with otc tylenol as needed     Ice alternate with heat    Exercises per instructions    Self care instructions provided in AVS             Follow up if symptoms worsen or fail to improve.

## 2023-09-29 NOTE — PATIENT INSTRUCTIONS
Check shoulder Xrays, will message with results    Robaxin 500mg every 8 hrs as needed for pain, may alternate with otc tylenol as needed     Ice alternate with heat    Exercises per instructions

## 2023-10-01 ENCOUNTER — NURSE TRIAGE (OUTPATIENT)
Dept: ADMINISTRATIVE | Facility: CLINIC | Age: 78
End: 2023-10-01
Payer: COMMERCIAL

## 2023-10-01 NOTE — TELEPHONE ENCOUNTER
"SPOK CALL  Pt calling, was seen in clinic and had x-rays done for arm pain on Friday. States she thinks she strained the muscles getting into a van that was taller and she had to pull herself up in. States she was told x-rays were clear. Pt is asking if she can stop the muscle relaxer because she feels she got more relief with the tylenol. Triaged pt. States she isnt having pain now as she is just sitting down but if she moves a certain way the pain is there. Per protocol advised pt to be seen within 24hr. verbalized understanding. Unable to schedule as no appts available. [place] [facility] [place] get [place]. verbalized understanding. Denies any further questions or concerns at this time, advised to call back if they have any that come up. Advised pt to call back with any other concerns or worsening symptoms. Verbalized understanding and will route message to provider.       Reason for Disposition   [1] High-risk adult (e.g., age > 60 years, osteoporosis, chronic steroid use) AND [2] MILD to MODERATE pain    Additional Information   Negative: Serious injury with multiple fractures (broken bones)   Negative: [1] Major bleeding (e.g., actively dripping or spurting) AND [2] can't be stopped   Negative: Sounds like a life-threatening emergency to the triager   Negative: Bullet wound, stabbed by knife, or other serious penetrating wound   Negative: Looks like a broken bone (crooked or deformed)   Negative: Shock suspected (e.g., cold/pale/clammy skin, too weak to stand, low BP, rapid pulse)   Negative: [1] Similar pain previously AND [2] it was from "heart attack"   Negative: [1] Similar pain previously AND [2] it was from "angina" AND [3] not relieved by nitroglycerin   Negative: Sounds like a life-threatening emergency to the triager   Followed an arm injury   Negative: Looks like a dislocated joint   Negative: Can't move injured arm at all   Negative: [1] Bleeding AND [2] won't stop after 10 minutes of direct " pressure (using correct technique)   Negative: Skin is split open or gaping (or length > 1/2 inch or 12 mm)   Negative: [1] Dirt in the wound AND [2] not removed with 15 minutes of scrubbing   Negative: Sounds like a serious injury to the triager   Negative: [1] SEVERE pain AND [2] not improved 2 hours after pain medicine/ice packs   Negative: [1] Large swelling or bruise around joint (wrist, elbow, shoulder) AND [2] can't move injured arm normally (bend or straighten completely)   Negative: [1] After day 2 AND [2] pain at site of injury becomes worse AND [3] unexplained fever occurs   Negative: Suspicious history for the injury   Negative: Can't move injured arm normally (bend or straighten completely)   Negative: Large swelling or bruise (> 2 inches or 5 cm)    Protocols used: Arm Pain-A-, Arm Injury-A-AH

## 2023-10-02 ENCOUNTER — TELEPHONE (OUTPATIENT)
Dept: PULMONOLOGY | Facility: CLINIC | Age: 78
End: 2023-10-02
Payer: COMMERCIAL

## 2023-10-02 DIAGNOSIS — R06.02 SHORTNESS OF BREATH: Primary | ICD-10-CM

## 2023-10-02 NOTE — TELEPHONE ENCOUNTER
Spoke with patient, informed her that I have received her message. I advised patient that according to the last time she had a visit with Dr Pulmonary Department (8/25/22) and last Six Minute Walk appointment (5/18/21). Patient needs to complete a walk test as well as a face to face face visit with physician. I advised patient that Dr Martinez is no longer here at [facility] but however, I can schedule patient appointment with another physician. Pt verbalized that she understand. I advised pt that I can schedule her appointment with Dr Hargrove on 10/4/23 for 11:30 am with six minute walk test prior beginning at 11:00 am. Pt verbalized that she understand and accepted the appointment.

## 2023-10-02 NOTE — TELEPHONE ENCOUNTER
----- Message from Wilmer Small sent at 10/2/2023  9:33 AM CDT -----  Regarding: Pt Advice  Contact: pt [phone]  Pt was a pt of Dr. Delcid, pt have been using oxygen canisters, pt stating she qualifies for portal canisters, she would like to have order sent to [facility], please call pt @[phone]

## 2023-10-04 ENCOUNTER — OFFICE VISIT (OUTPATIENT)
Dept: PULMONOLOGY | Facility: CLINIC | Age: 78
End: 2023-10-04
Payer: COMMERCIAL

## 2023-10-04 ENCOUNTER — HOSPITAL ENCOUNTER (OUTPATIENT)
Dept: PULMONOLOGY | Facility: CLINIC | Age: 78
Discharge: HOME | End: 2023-10-04
Payer: COMMERCIAL

## 2023-10-04 VITALS
DIASTOLIC BLOOD PRESSURE: 68 MMHG | WEIGHT: 144.63 LBS | HEIGHT: 60 IN | SYSTOLIC BLOOD PRESSURE: 120 MMHG | HEART RATE: 69 BPM | OXYGEN SATURATION: 98 % | BODY MASS INDEX: 28.39 KG/M2

## 2023-10-04 VITALS — HEIGHT: 60 IN | BODY MASS INDEX: 28.26 KG/M2 | WEIGHT: 143.94 LBS

## 2023-10-04 DIAGNOSIS — R06.02 SHORTNESS OF BREATH: ICD-10-CM

## 2023-10-04 DIAGNOSIS — J43.2 CENTRILOBULAR EMPHYSEMA: Primary | ICD-10-CM

## 2023-10-04 PROCEDURE — 3288F PR FALLS RISK ASSESSMENT DOCUMENTED: ICD-10-PCS | Mod: CPTII,S$GLB,, | Performed by: INTERNAL MEDICINE

## 2023-10-04 PROCEDURE — 99999 PR PBB SHADOW E&M-EST. PATIENT-LVL III: CPT | Mod: PBBFAC,,, | Performed by: INTERNAL MEDICINE

## 2023-10-04 PROCEDURE — 1159F MED LIST DOCD IN RCRD: CPT | Mod: CPTII,S$GLB,, | Performed by: INTERNAL MEDICINE

## 2023-10-04 PROCEDURE — 94618 PULMONARY STRESS TESTING: ICD-10-PCS | Mod: S$GLB,,, | Performed by: INTERNAL MEDICINE

## 2023-10-04 PROCEDURE — 3078F PR MOST RECENT DIASTOLIC BLOOD PRESSURE < 80 MM HG: ICD-10-PCS | Mod: CPTII,S$GLB,, | Performed by: INTERNAL MEDICINE

## 2023-10-04 PROCEDURE — 1159F PR MEDICATION LIST DOCUMENTED IN MEDICAL RECORD: ICD-10-PCS | Mod: CPTII,S$GLB,, | Performed by: INTERNAL MEDICINE

## 2023-10-04 PROCEDURE — 3074F PR MOST RECENT SYSTOLIC BLOOD PRESSURE < 130 MM HG: ICD-10-PCS | Mod: CPTII,S$GLB,, | Performed by: INTERNAL MEDICINE

## 2023-10-04 PROCEDURE — 1101F PR PT FALLS ASSESS DOC 0-1 FALLS W/OUT INJ PAST YR: ICD-10-PCS | Mod: CPTII,S$GLB,, | Performed by: INTERNAL MEDICINE

## 2023-10-04 PROCEDURE — 3078F DIAST BP <80 MM HG: CPT | Mod: CPTII,S$GLB,, | Performed by: INTERNAL MEDICINE

## 2023-10-04 PROCEDURE — 99214 OFFICE O/P EST MOD 30 MIN: CPT | Mod: 25,S$GLB,, | Performed by: INTERNAL MEDICINE

## 2023-10-04 PROCEDURE — 3074F SYST BP LT 130 MM HG: CPT | Mod: CPTII,S$GLB,, | Performed by: INTERNAL MEDICINE

## 2023-10-04 PROCEDURE — 99999 PR PBB SHADOW E&M-EST. PATIENT-LVL III: ICD-10-PCS | Mod: PBBFAC,,, | Performed by: INTERNAL MEDICINE

## 2023-10-04 PROCEDURE — 3288F FALL RISK ASSESSMENT DOCD: CPT | Mod: CPTII,S$GLB,, | Performed by: INTERNAL MEDICINE

## 2023-10-04 PROCEDURE — 1101F PT FALLS ASSESS-DOCD LE1/YR: CPT | Mod: CPTII,S$GLB,, | Performed by: INTERNAL MEDICINE

## 2023-10-04 PROCEDURE — 94618 PULMONARY STRESS TESTING: CPT | Mod: S$GLB,,, | Performed by: INTERNAL MEDICINE

## 2023-10-04 PROCEDURE — 99214 PR OFFICE/OUTPT VISIT, EST, LEVL IV, 30-39 MIN: ICD-10-PCS | Mod: 25,S$GLB,, | Performed by: INTERNAL MEDICINE

## 2023-10-04 RX ORDER — IPRATROPIUM BROMIDE AND ALBUTEROL SULFATE 2.5; .5 MG/3ML; MG/3ML
3 SOLUTION RESPIRATORY (INHALATION) EVERY 6 HOURS PRN
Qty: 90 ML | Refills: 3 | Status: SHIPPED | OUTPATIENT
Start: 2023-10-04 | End: 2024-10-03

## 2023-10-04 RX ORDER — FLUTICASONE PROPIONATE AND SALMETEROL 250; 50 UG/1; UG/1
1 POWDER RESPIRATORY (INHALATION) 2 TIMES DAILY
Qty: 60 EACH | Refills: 11 | Status: SHIPPED | OUTPATIENT
Start: 2023-10-04 | End: 2024-10-03

## 2023-10-04 NOTE — PROGRESS NOTES
Subjective:      Patient ID: Selma Hooper is a 77 y.o. female.    Chief Complaint: Shortness of Breath and Wheezing    Pt is a 76 yo AAF pmh COPD, Hx of lung nodules, LBBB, NICM, HFrEF, past hx of tobacco use disorder who presents for obtaining portable concentrator. Pt states that she continues to have significant shortness of breath with all activities. Oxygen makes activity more tolerable.     Smoking hx: quit 2018, 0.5 ppd, 25 yo start  Work hx: retired, health and physical education,   Exposure hx: none, no pets, no down material  Inhaler use: Breo (not using daily)  PRN inhaler use: not using  Hx of lung dz:: COPD  Family hx of lung dz: none    Review of Systems   Constitutional:  Positive for activity change. Negative for weight loss and fatigue.   HENT:  Negative for postnasal drip and congestion.    Respiratory:  Positive for shortness of breath, wheezing and dyspnea on extertion. Negative for cough, hemoptysis, sputum production and use of rescue inhaler.    Cardiovascular:  Negative for chest pain, palpitations and leg swelling.   Gastrointestinal:  Negative for nausea and vomiting.   Neurological:  Negative for dizziness, syncope and light-headedness.   Psychiatric/Behavioral:  Negative for confusion and sleep disturbance. The patient is not nervous/anxious.      Objective:     Physical Exam   Constitutional: She is oriented to person, place, and time. She appears well-developed and well-nourished. She is not obese.   HENT:   Head: Normocephalic.   Cardiovascular: Normal rate, regular rhythm and normal heart sounds. Exam reveals no gallop and no friction rub.   No murmur heard.  Pulmonary/Chest: Normal expansion, symmetric chest wall expansion and effort normal. No stridor. No respiratory distress. She has no wheezes. She has no rhonchi. She has no rales.   Musculoskeletal:         General: No edema.   Neurological: She is alert and oriented to person, place, and time. Gait normal.   Skin: No cyanosis.  Nails show no clubbing.   Psychiatric: She has a normal mood and affect. Her behavior is normal. Judgment and thought content normal.   Vitals reviewed.    Personal Diagnostic Review    CT of chest performed on 5/31/22 without contrast revealed bilateral upper lobe predominant emphysema. Multiple stable pulmonary nodules.     Echocardiogram: 7/13/22  The left ventricle is severely enlarged with eccentric hypertrophy and severely decreased systolic function. The estimated ejection fraction is 15%.  There is severe left ventricular global hypokinesis.  Normal right ventricular size with normal right ventricular systolic function.  Grade I left ventricular diastolic dysfunction.  Moderate left atrial enlargement.  Mild mitral regurgitation.  The estimated PA systolic pressure is 32 mmHg.  Normal central venous pressure (3 mmHg).    Pulmonary function tests:   8/28/19  FEV1: 0.91L  (56.5 % predicted),   FVC:  1.59L (77.5 % predicted),   FEV1/FVC:  57     The test was completed without stopping. The total time walked was 360 seconds. During walking, the patient reported:  Dyspnea, Lightheadedness (chest tightness). The patient used a wheelchair for assistance during testing.      10/04/2023---------Distance: 304.8 meters (1000 feet)       O2 Sat % Supplemental Oxygen Heart Rate Blood Pressure Shahana Scale   Pre-exercise  (Resting) 95 % Room Air 74 bpm 102/58 mmHg 0   During Exercise 94 % Room Air 107 bpm 141/73 mmHg 4   Post-exercise  (Recovery) 95 % Room Air  85 bpm 120/68 mmHg        Recovery Time: 136 seconds     Performing nurse/tech: AYDIN Trevino        PREVIOUS STUDY:   05/18/2021---------Distance: 259.08 meters (850 feet)       O2 Sat % Supplemental Oxygen Heart Rate Blood Pressure Shahana Scale   Pre-exercise  (Resting) 97 % Room Air 80 bpm 119/70 mmHg 0.5   During Exercise 92 % Room Air 126 bpm (!) 185/104 mmHg 5-6   Post-exercise  (Recovery) 96 % Room Air  85 bpm 136/71 mmHg           CLINICAL INTERPRETATION:  Six  "minute walk distance is 304.8 meters (1000 feet) with somewhat heavy dyspnea.  During exercise, there was no significant desaturation while breathing room air.  Both blood pressure and heart rate increased significantly with walking.  The patient reported non-pulmonary symptoms during exercise.  Since the previous study in May 2021, exercise capacity may be somewhat improved.  Based upon age and body mass index, exercise capacity is normal.        10/4/2023    11:20 AM 10/4/2023    10:57 AM 9/29/2023     2:22 PM 4/13/2023     1:01 PM 8/25/2022     1:40 PM 8/18/2022     3:07 PM 8/18/2022    10:39 AM   Pulmonary Function Tests   SpO2 98 %  99 % 96 % 96 %  98 %   Ordering Provider  MD Delvin        Performing nurse/tech/RT  AYDIN Trevino        Diagnosis  Qualify for Oxygen        Height 5' (1.524 m) 5' (1.524 m) 5' 1" (1.549 m) 5' 1" (1.549 m) 5' 1" (1.549 m) 5' 1" (1.549 m) 5' 1" (1.549 m)   Weight 65.6 kg (144 lb 10 oz) 65.3 kg (143 lb 15.4 oz) 65.3 kg (143 lb 15.4 oz) 67.1 kg (147 lb 14.9 oz) 67 kg (147 lb 11.3 oz) 66.1 kg (145 lb 11.6 oz) 66 kg (145 lb 8.1 oz)   BMI (Calculated) 28.2 28.1 27.2 28 27.9 27.5 27.5   Patient Race          6MWT Status  completed without stopping        Patient Reported  Dyspnea;Lightheadedness        Was O2 used?  No        6MW Distance walked (feet)  1000 feet        Distance walked (meters)  304.8 meters        Did patient stop?  No        Type of assistive device(s) used?  a wheelchair        Oxygen Saturation  95 %        Supplemental Oxygen  Room Air        Heart Rate  74 bpm        Blood Pressure  102/58        Shahana Dyspnea Rating   nothing at all        Oxygen Saturation  94 %        Supplemental Oxygen  Room Air        Heart Rate  107 bpm        Blood Pressure  141/73        Shahana Dyspnea Rating   somewhat heavy        Recovery Time (seconds)  136 seconds        Oxygen Saturation  95 %        Supplemental Oxygen  Room Air        Heart Rate  85 bpm        Blood " Pressure  120/68        Is procedure ready for interpretation?  Yes        Oxygen Qualification?  No             Assessment:     1. Centrilobular emphysema      Outpatient Encounter Medications as of 10/4/2023   Medication Sig Dispense Refill    dapagliflozin (FARXIGA) 5 mg Tab tablet Take 1 tablet (5 mg total) by mouth once daily. 90 tablet 3    fluticasone furoate-vilanteroL (BREO ELLIPTA) 200-25 mcg/dose DsDv diskus inhaler Inhale 1 puff into the lungs once daily. Controller 60 each 0    furosemide (LASIX) 40 MG tablet Take 1 tablet (40 mg total) by mouth once daily. 90 tablet 3    methocarbamoL (ROBAXIN) 500 MG Tab Take 1 tablet (500 mg total) by mouth 3 (three) times daily as needed (shoulder pain). 30 tablet 0    metoprolol succinate (TOPROL-XL) 25 MG 24 hr tablet Take 1 tablet (25 mg total) by mouth once daily. 90 tablet 3    pulse oximeter (PULSE OXIMETER) device by Apply Externally route 2 (two) times a day. Use twice daily at 8 AM and 3 PM and [facility] the [facility] as directed. 1 each 0    sacubitriL-valsartan (ENTRESTO) 24-26 mg per tablet Take 1 tablet by mouth 2 (two) times daily. 180 tablet 3     No facility-administered encounter medications on file as of 10/4/2023.     Orders Placed This Encounter   Procedures    OXYGEN FOR HOME USE     Order Specific Question:   Liter Flow     Answer:   2     Order Specific Question:   Duration     Answer:   With activity     Order Specific Question:   Qualifying Test Performed at:     Answer:   Activity     Order Specific Question:   Oxygen saturation at rest     Answer:   95     Order Specific Question:   Oxygen saturation with activity     Answer:   94     Order Specific Question:   Oxygen saturation with activity on oxygen     Answer:   98     Comments:   2L     Order Specific Question:   Portable mode:     Answer:   pulse dose acceptable     Order Specific Question:   Mode:     Answer:   Portable concentrator     Order Specific Question:   Route      Answer:   nasal cannula     Order Specific Question:   Device:     Answer:   home concentrator with portable concentrator     Order Specific Question:   Length of need (in months):     Answer:   99 mos     Order Specific Question:   Patient condition with qualifying saturation     Answer:   COPD     Comments:   HFrEF     Order Specific Question:   Height:     Answer:   5' (1.524 m)     Order Specific Question:   Weight:     Answer:   65.6 kg (144 lb 10 oz)     Order Specific Question:   Alternative treatment measures have been tried or considered and deemed clinically ineffective.     Answer:   Yes    NEBULIZER KIT (SUPPLIES) FOR HOME USE     Order Specific Question:   Height:     Answer:   5' (1.524 m)     Order Specific Question:   Weight:     Answer:   65.6 kg (144 lb 10 oz)     Order Specific Question:   Does patient have medical equipment at home?     Answer:   nebulizer     Order Specific Question:   Does patient have medical equipment at home?     Answer:   portable oxygen     Order Specific Question:   Length of need (1-99 months):     Answer:   99     Order Specific Question:   Mask or Mouthpiece?     Answer:   Mouthpiece     Plan:     Pulmonary emphysema  Attempted to prescribe Trelegy, but it is too expensive. Switched to Wixella as more affordable. Unable to afford combivent. Will continue duonebs. Ordered nebulizer supplies. Reordered oxygen.     Follow up in 3 months.     Moreno Hargrove MD   [facility]

## 2023-10-04 NOTE — PROCEDURES
Selma Hooper is a 77 y.o.  female patient, who presents for a 6 minute walk test ordered by MD Delvin.  The diagnosis is Qualify for Oxygen; COPD/Emphysema.  The patient's BMI is 28.1 kg/m2.  Predicted distance (lower limit of normal) is 265.41 meters.      Test Results:    The test was completed without stopping. The total time walked was 360 seconds. During walking, the patient reported:  Dyspnea, Lightheadedness (chest tightness). The patient used a wheelchair for assistance during testing.     10/04/2023---------Distance: 304.8 meters (1000 feet)     O2 Sat % Supplemental Oxygen Heart Rate Blood Pressure Shahana Scale   Pre-exercise  (Resting) 95 % Room Air 74 bpm 102/58 mmHg 0   During Exercise 94 % Room Air 107 bpm 141/73 mmHg 4   Post-exercise  (Recovery) 95 % Room Air  85 bpm 120/68 mmHg      Recovery Time: 136 seconds    Performing nurse/tech: AYDIN Trevino      PREVIOUS STUDY:   05/18/2021---------Distance: 259.08 meters (850 feet)       O2 Sat % Supplemental Oxygen Heart Rate Blood Pressure Shahana Scale   Pre-exercise  (Resting) 97 % Room Air 80 bpm 119/70 mmHg 0.5   During Exercise 92 % Room Air 126 bpm (!) 185/104 mmHg 5-6   Post-exercise  (Recovery) 96 % Room Air  85 bpm 136/71 mmHg        CLINICAL INTERPRETATION:  Six minute walk distance is 304.8 meters (1000 feet) with somewhat heavy dyspnea.  During exercise, there was no significant desaturation while breathing room air.  Both blood pressure and heart rate increased significantly with walking.  The patient reported non-pulmonary symptoms during exercise.  Since the previous study in May 2021, exercise capacity may be somewhat improved.  Based upon age and body mass index, exercise capacity is normal.

## 2023-10-04 NOTE — ASSESSMENT & PLAN NOTE
Attempted to prescribe Trelegy, but it is too expensive. Switched to Wixella as more affordable. Unable to afford combivent. Will continue duonebs. Ordered nebulizer supplies. Reordered oxygen.

## 2023-10-26 ENCOUNTER — OFFICE VISIT (OUTPATIENT)
Dept: INTERNAL MEDICINE | Facility: CLINIC | Age: 78
End: 2023-10-26
Attending: FAMILY MEDICINE
Payer: COMMERCIAL

## 2023-10-26 VITALS
DIASTOLIC BLOOD PRESSURE: 60 MMHG | SYSTOLIC BLOOD PRESSURE: 102 MMHG | WEIGHT: 145.5 LBS | HEIGHT: 60 IN | OXYGEN SATURATION: 95 % | BODY MASS INDEX: 28.57 KG/M2 | HEART RATE: 86 BPM

## 2023-10-26 DIAGNOSIS — Z00.00 ANNUAL PHYSICAL EXAM: ICD-10-CM

## 2023-10-26 DIAGNOSIS — M25.512 ACUTE PAIN OF BOTH SHOULDERS: Primary | ICD-10-CM

## 2023-10-26 DIAGNOSIS — I50.42 CHRONIC COMBINED SYSTOLIC AND DIASTOLIC CONGESTIVE HEART FAILURE: ICD-10-CM

## 2023-10-26 DIAGNOSIS — J96.11 CHRONIC RESPIRATORY FAILURE WITH HYPOXIA: Chronic | ICD-10-CM

## 2023-10-26 DIAGNOSIS — E78.5 HYPERLIPIDEMIA, UNSPECIFIED HYPERLIPIDEMIA TYPE: ICD-10-CM

## 2023-10-26 DIAGNOSIS — I10 HYPERTENSION, ESSENTIAL: ICD-10-CM

## 2023-10-26 DIAGNOSIS — R73.9 ELEVATED BLOOD SUGAR: ICD-10-CM

## 2023-10-26 DIAGNOSIS — M25.511 ACUTE PAIN OF BOTH SHOULDERS: Primary | ICD-10-CM

## 2023-10-26 DIAGNOSIS — I42.8 NICM (NONISCHEMIC CARDIOMYOPATHY): Chronic | ICD-10-CM

## 2023-10-26 DIAGNOSIS — Z12.31 ENCOUNTER FOR SCREENING MAMMOGRAM FOR MALIGNANT NEOPLASM OF BREAST: ICD-10-CM

## 2023-10-26 PROBLEM — I50.43 ACUTE ON CHRONIC COMBINED SYSTOLIC AND DIASTOLIC CONGESTIVE HEART FAILURE: Status: RESOLVED | Noted: 2021-04-02 | Resolved: 2023-10-26

## 2023-10-26 PROBLEM — E11.65 TYPE 2 DIABETES MELLITUS WITH HYPERGLYCEMIA, WITHOUT LONG-TERM CURRENT USE OF INSULIN: Status: RESOLVED | Noted: 2023-10-26 | Resolved: 2023-10-26

## 2023-10-26 PROBLEM — J96.21 ACUTE ON CHRONIC RESPIRATORY FAILURE WITH HYPOXIA: Status: RESOLVED | Noted: 2021-04-02 | Resolved: 2023-10-26

## 2023-10-26 PROBLEM — E11.65 TYPE 2 DIABETES MELLITUS WITH HYPERGLYCEMIA, WITHOUT LONG-TERM CURRENT USE OF INSULIN: Status: ACTIVE | Noted: 2023-10-26

## 2023-10-26 PROBLEM — R79.89 ELEVATED TROPONIN: Status: RESOLVED | Noted: 2018-07-27 | Resolved: 2023-10-26

## 2023-10-26 PROCEDURE — 99214 PR OFFICE/OUTPT VISIT, EST, LEVL IV, 30-39 MIN: ICD-10-PCS | Mod: 25,S$GLB,, | Performed by: FAMILY MEDICINE

## 2023-10-26 PROCEDURE — 1101F PT FALLS ASSESS-DOCD LE1/YR: CPT | Mod: CPTII,S$GLB,, | Performed by: FAMILY MEDICINE

## 2023-10-26 PROCEDURE — 3078F DIAST BP <80 MM HG: CPT | Mod: CPTII,S$GLB,, | Performed by: FAMILY MEDICINE

## 2023-10-26 PROCEDURE — 1159F MED LIST DOCD IN RCRD: CPT | Mod: CPTII,S$GLB,, | Performed by: FAMILY MEDICINE

## 2023-10-26 PROCEDURE — 1126F AMNT PAIN NOTED NONE PRSNT: CPT | Mod: CPTII,S$GLB,, | Performed by: FAMILY MEDICINE

## 2023-10-26 PROCEDURE — 99999 PR PBB SHADOW E&M-EST. PATIENT-LVL V: CPT | Mod: PBBFAC,,, | Performed by: FAMILY MEDICINE

## 2023-10-26 PROCEDURE — 90694 VACC AIIV4 NO PRSRV 0.5ML IM: CPT | Mod: S$GLB,,, | Performed by: FAMILY MEDICINE

## 2023-10-26 PROCEDURE — 1159F PR MEDICATION LIST DOCUMENTED IN MEDICAL RECORD: ICD-10-PCS | Mod: CPTII,S$GLB,, | Performed by: FAMILY MEDICINE

## 2023-10-26 PROCEDURE — 99999 PR PBB SHADOW E&M-EST. PATIENT-LVL V: ICD-10-PCS | Mod: PBBFAC,,, | Performed by: FAMILY MEDICINE

## 2023-10-26 PROCEDURE — 3074F PR MOST RECENT SYSTOLIC BLOOD PRESSURE < 130 MM HG: ICD-10-PCS | Mod: CPTII,S$GLB,, | Performed by: FAMILY MEDICINE

## 2023-10-26 PROCEDURE — 90471 IMMUNIZATION ADMIN: CPT | Mod: S$GLB,,, | Performed by: FAMILY MEDICINE

## 2023-10-26 PROCEDURE — 3288F PR FALLS RISK ASSESSMENT DOCUMENTED: ICD-10-PCS | Mod: CPTII,S$GLB,, | Performed by: FAMILY MEDICINE

## 2023-10-26 PROCEDURE — 3074F SYST BP LT 130 MM HG: CPT | Mod: CPTII,S$GLB,, | Performed by: FAMILY MEDICINE

## 2023-10-26 PROCEDURE — 90471 FLU VACCINE - QUADRIVALENT - ADJUVANTED: ICD-10-PCS | Mod: S$GLB,,, | Performed by: FAMILY MEDICINE

## 2023-10-26 PROCEDURE — 99214 OFFICE O/P EST MOD 30 MIN: CPT | Mod: 25,S$GLB,, | Performed by: FAMILY MEDICINE

## 2023-10-26 PROCEDURE — 1126F PR PAIN SEVERITY QUANTIFIED, NO PAIN PRESENT: ICD-10-PCS | Mod: CPTII,S$GLB,, | Performed by: FAMILY MEDICINE

## 2023-10-26 PROCEDURE — 1160F PR REVIEW ALL MEDS BY PRESCRIBER/CLIN PHARMACIST DOCUMENTED: ICD-10-PCS | Mod: CPTII,S$GLB,, | Performed by: FAMILY MEDICINE

## 2023-10-26 PROCEDURE — 1160F RVW MEDS BY RX/DR IN RCRD: CPT | Mod: CPTII,S$GLB,, | Performed by: FAMILY MEDICINE

## 2023-10-26 PROCEDURE — 90694 FLU VACCINE - QUADRIVALENT - ADJUVANTED: ICD-10-PCS | Mod: S$GLB,,, | Performed by: FAMILY MEDICINE

## 2023-10-26 PROCEDURE — 3288F FALL RISK ASSESSMENT DOCD: CPT | Mod: CPTII,S$GLB,, | Performed by: FAMILY MEDICINE

## 2023-10-26 PROCEDURE — 3078F PR MOST RECENT DIASTOLIC BLOOD PRESSURE < 80 MM HG: ICD-10-PCS | Mod: CPTII,S$GLB,, | Performed by: FAMILY MEDICINE

## 2023-10-26 PROCEDURE — 1101F PR PT FALLS ASSESS DOC 0-1 FALLS W/OUT INJ PAST YR: ICD-10-PCS | Mod: CPTII,S$GLB,, | Performed by: FAMILY MEDICINE

## 2023-10-26 NOTE — PROGRESS NOTES
Subjective:       Patient ID: Selma Hooper is a 78 y.o. female.    Chief Complaint: Arm Pain    Established patient follows up for management of chronic medical illnesses with complaints today. Please see dictation and ROS for interval problems, specific complaints and disease management discussion.    Past, Surgical, Family, Social, Histories; Medications, allergies reviewed and reconciled.  Health maintenance file reviewed and addressed items due. Recent applicable lab, imaging and cardiovascular results reviewed.  Problem list items reviewed and modified or added entries (in the overview section) may not be transcribed into this encounter note due to note writer format.      Saw Kay QUINTANILLA recent for sholder pain. Few weeks.    Pulm recently for dyspnea. SOB after taking muscle relaxer. Notes wheezing.        Review of Systems   Constitutional:  Positive for fatigue. Negative for appetite change, chills, diaphoresis and fever.   HENT:  Negative for congestion, postnasal drip, rhinorrhea, sore throat and trouble swallowing.    Eyes:  Negative for visual disturbance.   Respiratory:  Positive for shortness of breath. Negative for cough, choking, chest tightness and wheezing.    Cardiovascular:  Negative for chest pain and leg swelling.   Gastrointestinal:  Negative for abdominal distention, abdominal pain, diarrhea, nausea and vomiting.   Genitourinary:  Negative for difficulty urinating and hematuria.   Musculoskeletal:  Positive for arthralgias. Negative for myalgias.   Skin:  Negative for rash.   Neurological:  Negative for weakness, light-headedness and headaches.   Hematological:  Does not bruise/bleed easily.   Psychiatric/Behavioral:  Negative for decreased concentration and dysphoric mood.        Objective:      Physical Exam  Vitals and nursing note reviewed.   Constitutional:       Appearance: She is well-developed. She is not diaphoretic.   Eyes:      General: No scleral icterus.  Neck:       Thyroid: No thyromegaly.      Vascular: No JVD.      Trachea: No tracheal deviation.   Cardiovascular:      Rate and Rhythm: Normal rate.      Heart sounds: Normal heart sounds. No murmur heard.     No friction rub. No gallop.   Pulmonary:      Effort: Pulmonary effort is normal. No respiratory distress.      Breath sounds: Normal breath sounds. No wheezing or rales.   Abdominal:      General: There is no distension or abdominal bruit.      Palpations: Abdomen is soft. There is no mass.      Tenderness: There is no abdominal tenderness. There is no guarding or rebound.   Musculoskeletal:      Cervical back: Normal range of motion and neck supple.   Lymphadenopathy:      Cervical: No cervical adenopathy.   Skin:     General: Skin is warm and dry.      Findings: No erythema or rash.   Neurological:      Mental Status: She is alert and oriented to person, place, and time.      Cranial Nerves: No cranial nerve deficit.      Motor: No tremor.      Coordination: Coordination normal.      Gait: Gait normal.   Psychiatric:         Behavior: Behavior normal.         Thought Content: Thought content normal.         Judgment: Judgment normal.         Assessment:       1. Acute pain of both shoulders    2. Chronic combined systolic and diastolic congestive heart failure    3. Chronic respiratory failure with hypoxia    4. NICM (nonischemic cardiomyopathy)    5. Annual physical exam    6. Elevated blood sugar    7. Hyperlipidemia, unspecified hyperlipidemia type    8. Encounter for screening mammogram for malignant neoplasm of breast    9. Hypertension, essential        Plan:     Medication List with Changes/Refills   Current Medications    ALBUTEROL-IPRATROPIUM (DUO-NEB) 2.5 MG-0.5 MG/3 ML NEBULIZER SOLUTION    Take 3 mLs by nebulization every 6 (six) hours as needed for Wheezing. Rescue    DAPAGLIFLOZIN (FARXIGA) 5 MG TAB TABLET    Take 1 tablet (5 mg total) by mouth once daily.    FLUTICASONE-SALMETEROL DISKUS INHALER 250-50  MCG    Inhale 1 puff into the lungs 2 (two) times daily. Controller    FUROSEMIDE (LASIX) 40 MG TABLET    Take 1 tablet (40 mg total) by mouth once daily.    IPRATROPIUM-ALBUTEROL (COMBIVENT)  MCG/ACTUATION INHALER    Inhale 1 puff into the lungs every 6 (six) hours as needed for Wheezing. Rescue    METOPROLOL SUCCINATE (TOPROL-XL) 25 MG 24 HR TABLET    Take 1 tablet (25 mg total) by mouth once daily.    PULSE OXIMETER (PULSE OXIMETER) DEVICE    by Apply Externally route 2 (two) times a day. Use twice daily at 8 AM and 3 PM and record the value in [employer] as directed.    SACUBITRIL-VALSARTAN (ENTRESTO) 24-26 MG PER TABLET    Take 1 tablet by mouth 2 (two) times daily.   Discontinued Medications    METHOCARBAMOL (ROBAXIN) 500 MG TAB    Take 1 tablet (500 mg total) by mouth 3 (three) times daily as needed (shoulder pain).     1. Acute pain of both shoulders  -     Ambulatory referral/consult to Orthopedics; Future; Expected date: 11/02/2023    2. Chronic combined systolic and diastolic congestive heart failure  Overview:  -echo 7/12/2023, followed in cardiology (4/13/2023)      3. Chronic respiratory failure with hypoxia  Overview:  -pulrussell gutierrez 10/4/2023, 6' walk noted, not using O2 now, but has at home      4. NICM (nonischemic cardiomyopathy)  Overview:  -echo 7/12/2023, followed in cardiology (4/13/2023)      5. Annual physical exam  -     Hemoglobin A1C; Future; Expected date: 10/26/2023  -     Comprehensive Metabolic Panel; Future; Expected date: 10/26/2023  -     Lipid Panel; Future; Expected date: 10/26/2023    6. Elevated blood sugar  -     Hemoglobin A1C; Future; Expected date: 10/26/2023    7. Hyperlipidemia, unspecified hyperlipidemia type  -     Lipid Panel; Future; Expected date: 10/26/2023    8. Encounter for screening mammogram for malignant neoplasm of breast  -     Mammo Digital Screening Bilat w/ Gilberto; Future; Expected date: 10/26/2023    9. Hypertension, essential      See meds, orders, follow  up, routing and instructions sections of encounter and AVS. Discussed with patient and provided on AVS.    Discussed diet and exercise as therapeutic modalities for metabolic and other conditions. Provided patient information, which are included as links on the AVS for detailed information.    Lab Results   Component Value Date     08/08/2022    K 4.5 08/08/2022     08/08/2022    BUN 18 08/08/2022    CREATININE 1.2 08/08/2022    GLU 93 08/08/2022    HGBA1C 5.2 07/13/2022    MG 2.2 08/08/2022    AST 15 08/08/2022    ALT 10 08/08/2022    ALBUMIN 4.0 08/08/2022    PROT 7.5 08/08/2022    BILITOT 0.4 08/08/2022    CHOL 136 12/09/2021    HDL 36 (L) 12/09/2021    LDLCALC 84.6 12/09/2021    TRIG 77 12/09/2021    WBC 3.29 (L) 08/08/2022    HGB 12.4 08/08/2022    HCT 39.6 08/08/2022    HCT 39 04/02/2021     08/08/2022    TSH 0.503 12/08/2021     (H) 08/08/2022

## 2023-10-30 ENCOUNTER — LAB VISIT (OUTPATIENT)
Dept: LAB | Facility: HOSPITAL | Age: 78
End: 2023-10-30
Attending: FAMILY MEDICINE
Payer: COMMERCIAL

## 2023-10-30 DIAGNOSIS — Z00.00 ANNUAL PHYSICAL EXAM: ICD-10-CM

## 2023-10-30 DIAGNOSIS — R73.9 ELEVATED BLOOD SUGAR: ICD-10-CM

## 2023-10-30 DIAGNOSIS — E78.5 HYPERLIPIDEMIA, UNSPECIFIED HYPERLIPIDEMIA TYPE: ICD-10-CM

## 2023-10-30 LAB
ALBUMIN SERPL BCP-MCNC: 3.9 G/DL (ref 3.5–5.2)
ALP SERPL-CCNC: 101 U/L (ref 55–135)
ALT SERPL W/O P-5'-P-CCNC: 7 U/L (ref 10–44)
ANION GAP SERPL CALC-SCNC: 11 MMOL/L (ref 8–16)
AST SERPL-CCNC: 14 U/L (ref 10–40)
BILIRUB SERPL-MCNC: 0.4 MG/DL (ref 0.1–1)
BUN SERPL-MCNC: 19 MG/DL (ref 8–23)
CALCIUM SERPL-MCNC: 9.8 MG/DL (ref 8.7–10.5)
CHLORIDE SERPL-SCNC: 104 MMOL/L (ref 95–110)
CHOLEST SERPL-MCNC: 162 MG/DL (ref 120–199)
CHOLEST/HDLC SERPL: 4.9 {RATIO} (ref 2–5)
CO2 SERPL-SCNC: 26 MMOL/L (ref 23–29)
CREAT SERPL-MCNC: 1.1 MG/DL (ref 0.5–1.4)
EST. GFR  (NO RACE VARIABLE): 52.1 ML/MIN/1.73 M^2
ESTIMATED AVG GLUCOSE: 100 MG/DL (ref 68–131)
GLUCOSE SERPL-MCNC: 96 MG/DL (ref 70–110)
HBA1C MFR BLD: 5.1 % (ref 4–5.6)
HDLC SERPL-MCNC: 33 MG/DL (ref 40–75)
HDLC SERPL: 20.4 % (ref 20–50)
LDLC SERPL CALC-MCNC: 100.4 MG/DL (ref 63–159)
NONHDLC SERPL-MCNC: 129 MG/DL
POTASSIUM SERPL-SCNC: 4.6 MMOL/L (ref 3.5–5.1)
PROT SERPL-MCNC: 6.9 G/DL (ref 6–8.4)
SODIUM SERPL-SCNC: 141 MMOL/L (ref 136–145)
TRIGL SERPL-MCNC: 143 MG/DL (ref 30–150)

## 2023-10-30 PROCEDURE — 80061 LIPID PANEL: CPT | Performed by: FAMILY MEDICINE

## 2023-10-30 PROCEDURE — 36415 COLL VENOUS BLD VENIPUNCTURE: CPT | Performed by: FAMILY MEDICINE

## 2023-10-30 PROCEDURE — 83036 HEMOGLOBIN GLYCOSYLATED A1C: CPT | Performed by: FAMILY MEDICINE

## 2023-10-30 PROCEDURE — 80053 COMPREHEN METABOLIC PANEL: CPT | Performed by: FAMILY MEDICINE

## 2023-11-06 ENCOUNTER — HOSPITAL ENCOUNTER (OUTPATIENT)
Dept: RADIOLOGY | Facility: HOSPITAL | Age: 78
Discharge: HOME | End: 2023-11-06
Attending: PHYSICIAN ASSISTANT
Payer: COMMERCIAL

## 2023-11-06 ENCOUNTER — OFFICE VISIT (OUTPATIENT)
Dept: ORTHOPEDICS | Facility: CLINIC | Age: 78
End: 2023-11-06
Attending: FAMILY MEDICINE
Payer: COMMERCIAL

## 2023-11-06 VITALS — HEIGHT: 60 IN | BODY MASS INDEX: 28.57 KG/M2 | WEIGHT: 145.5 LBS

## 2023-11-06 DIAGNOSIS — M25.512 ACUTE PAIN OF BOTH SHOULDERS: ICD-10-CM

## 2023-11-06 DIAGNOSIS — M25.511 ACUTE PAIN OF BOTH SHOULDERS: ICD-10-CM

## 2023-11-06 PROCEDURE — 1125F AMNT PAIN NOTED PAIN PRSNT: CPT | Mod: CPTII,S$GLB,, | Performed by: PHYSICIAN ASSISTANT

## 2023-11-06 PROCEDURE — 99999 PR PBB SHADOW E&M-EST. PATIENT-LVL IV: CPT | Mod: PBBFAC,,, | Performed by: PHYSICIAN ASSISTANT

## 2023-11-06 PROCEDURE — 1125F PR PAIN SEVERITY QUANTIFIED, PAIN PRESENT: ICD-10-PCS | Mod: CPTII,S$GLB,, | Performed by: PHYSICIAN ASSISTANT

## 2023-11-06 PROCEDURE — 99999 PR PBB SHADOW E&M-EST. PATIENT-LVL IV: ICD-10-PCS | Mod: PBBFAC,,, | Performed by: PHYSICIAN ASSISTANT

## 2023-11-06 PROCEDURE — 99203 PR OFFICE/OUTPT VISIT, NEW, LEVL III, 30-44 MIN: ICD-10-PCS | Mod: S$GLB,,, | Performed by: PHYSICIAN ASSISTANT

## 2023-11-06 PROCEDURE — 99203 OFFICE O/P NEW LOW 30 MIN: CPT | Mod: S$GLB,,, | Performed by: PHYSICIAN ASSISTANT

## 2023-11-09 ENCOUNTER — CLINICAL SUPPORT (OUTPATIENT)
Dept: REHABILITATION | Facility: OTHER | Age: 78
End: 2023-11-09
Payer: COMMERCIAL

## 2023-11-09 DIAGNOSIS — R29.898 WEAKNESS OF BOTH ARMS: ICD-10-CM

## 2023-11-09 DIAGNOSIS — M25.512 ACUTE PAIN OF BOTH SHOULDERS: ICD-10-CM

## 2023-11-09 DIAGNOSIS — M25.612 DECREASED RANGE OF MOTION OF LEFT SHOULDER: ICD-10-CM

## 2023-11-09 DIAGNOSIS — R29.3 POSTURAL IMBALANCE: ICD-10-CM

## 2023-11-09 DIAGNOSIS — M25.511 ACUTE PAIN OF BOTH SHOULDERS: ICD-10-CM

## 2023-11-09 DIAGNOSIS — Z74.09 IMPAIRED MOBILITY AND ADLS: ICD-10-CM

## 2023-11-09 DIAGNOSIS — Z78.9 IMPAIRED MOBILITY AND ADLS: ICD-10-CM

## 2023-11-09 PROBLEM — M25.619 DECREASED RANGE OF MOTION OF SHOULDER: Status: ACTIVE | Noted: 2023-11-09

## 2023-11-09 PROCEDURE — 97162 PT EVAL MOD COMPLEX 30 MIN: CPT | Mod: PN

## 2023-11-09 PROCEDURE — 97110 THERAPEUTIC EXERCISES: CPT | Mod: PN

## 2023-11-09 NOTE — PLAN OF CARE
"[facility]   Physical Therapy Initial Evaluation      Name: Selma Hooper  Clinic Number: [id number]    Therapy Diagnosis:   Encounter Diagnoses   Name Primary?    Acute pain of both shoulders     Decreased range of motion of left shoulder     Weakness of both arms     Impaired mobility and ADLs     Postural imbalance         Physician: Cassidy Machado, ANGEL    Physician Orders: PT Eval and Treat   Medical Diagnosis from Referral: M25.511,M25.512 (ICD-10-CM) - Acute pain of both shoulders   Evaluation Date: 11/9/2023  Authorization Period Expiration: 12/31/23  Plan of Care Expiration: 2/2/24  Progress Note Due: 12/9/23  Date of Surgery: n/a  Visit # / Visits authorized: 1/ 1   FOTO: 1/ 3    Precautions: Standard, congestive heart failure.     Time In: 1:40 pm  Time Out: 2:15 pm  Total Billable Time: 35 minutes    Subjective     Date of onset: 2 months ago    History of current condition - Selma reports she is having new onset of shoulder pain bilaterally (L>R). She reports that she had been using oxygen and had been continuously lifting the tank to carry it around and feels that may have started causing the pain in her shoulder. She got xrays which revealed no fractures in the shoulders. She then got an MRI done because she thought that she pulled a muscle and MD told her that it looked like "tendinitis and bursitis" in the shoulder. She was talked to about injections but does not want to get them. She does have congestive heart failure and is concerned about getting any injections. Tylenol does help her a little at night. Patient denies any new numbness or tingling into the hands.     She reports difficulty getting dressed, inability to put her hair up due to pain with lifting L shoulder up high, occasional pain with sleeping. She reports it's typically a constant pain but sometimes there will be a sharp pain.     Falls: none    Imaging: MRI studies, bone scan films:     FINDINGS:  No acute " fracture, dislocation, or osseous destruction.  AC joints are unremarkable.  Soft tissues are unremarkable.    Prior Therapy: None  Social History: Lives with granddaughter.   Occupation: Retired  Prior Level of Function: Independent   Current Level of Function: Independent, trouble with ADLs and household chores.     Pain:  Current 0/10, worst 10/10, best 0/10   Location: B shoulders (L>R)  Description: Aching and Sharp, Throbbing   Aggravating Factors: Dressing, lifting, carrying  Easing Factors: pain medication and rest, icing     Patients goals: decrease pain, improve tolerance with dressing, driving, household chores.      Medical History:   Past Medical History:   Diagnosis Date    Abnormal liver enzymes 12/10/2018    Acute on chronic combined systolic and diastolic congestive heart failure 4/2/2021    Acute on chronic respiratory failure with hypoxia 4/2/2021    Acute on chronic respiratory failure with hypoxia and hypercapnia 12/10/2021    CHF (congestive heart failure)     COPD exacerbation 7/12/2022    Former smoker 10/24/2018    Hypertension     Smoker 7/27/2016       Surgical History:   Selma Hooper  has a past surgical history that includes Cholecystectomy; Hysterectomy; Colonoscopy; Colonoscopy (N/A, 8/23/2021); and Breast biopsy.    Medications:   Selma has a current medication list which includes the following prescription(s): albuterol-ipratropium, farxiga, fluticasone-salmeterol 250-50 mcg/dose, furosemide, ipratropium-albuterol, metoprolol succinate, pulse oximeter, and entresto.    Allergies:   Review of patient's allergies indicates:   Allergen Reactions    Atorvastatin      Leg cramps        Objective      Observation: Patient alert and oriented; good historian.     Posture: Forward posture with rounded shoulders.       Passive Range of Motion:   Shoulder Left Right   Flexion 105 135   Abduction 90 170   ER at 0 40 75   ER at 90 15 50   IR 40 85      Active Range of Motion:   Shoulder Left  Right   Flexion 92 125   Abduction 70 155   ER at 0 38 70   ER at 90 10 45   IR 35 80     Upper Extremity Strength   (L) UE (R) UE   Shoulder flexion: 4-/5 4/5   Shoulder Abduction: 3+/5 4/5   Shoulder extension 4+/5 4+/5   Shoulder ER 4-/5 4/5   Shoulder IR 4-/5 4/5   Lower Trap 3+/5 3+/5   Middle Trap 4/5 4/5   Rhomboids 4/5 4/5   Elbow flexion: 5/5 5/5   Elbow extension: 5/5 5/5     ** pain with all shoulder muscle testing.       Special Tests:  AC Joint Left Right   AC Joint Compression Test Positive for pain negative   Empty Can Test positive negative   Drop Arm test negative negative   Subscaputlaris Lift Off Can't get into testing position  Positive              Hawkin's Lm Positive  positive    Neer's Test negative negative   Speed's test positive Negative                               Apley's Scratch Test   Left Right   Combined ER Gets right behind her ear T1   Combined IR Unable to get behind back  T8   Cross-body reach Anterior opp shoulder (pain) Post opp shoulder       Joint Mobility: good mobility with inferior and posterior glides - no pain noted; GHJ distraction performed with slight pain when performing with movement.     Palpation: Tenderness over B LH bicep tendon, L lateral deltoid, L supraspinatus.     Sensation: intact in B UE    Flexibility: tightness in B pecs.        Intake Outcome Measure for FOTO Shoulder Survey    Therapist reviewed FOTO scores for Selma Hooper on 11/9/2023.   FOTO report - see Media section or FOTO account episode details.    Intake Score: 44%    Goal Score: 63%         Treatment     Total Treatment time (time-based codes) separate from Evaluation: 10 minutes     Selma received the treatments listed below:      therapeutic exercises to develop strength, endurance, ROM, flexibility, and posture for 10 minutes including:  + development, demonstration, and review of HEP:   Table flexion slides   Table scaption slides   Scapular retractions   Low doorway stretch (B  arms and unilateral)   Seated no money with scap retraction   Supine AAROM flexion with cane      Patient Education and Home Exercises     Education provided:   - Pt educated on current diagnosis, pain pathology, muscle and joint anatomy of shoulder.  - Pt educated on benefit of PT, goals for therapy, and plan of care.  - Pt educated on HEP to address range of motion and stretch tight muscles.      Written Home Exercises Provided: yes. Exercises were reviewed and Selma was able to demonstrate them prior to the end of the session.  Selma demonstrated good  understanding of the education provided. See EMR under Patient Instructions for exercises provided during therapy sessions.    Assessment     Selma is a 78 y.o. female referred to outpatient Physical Therapy with a medical diagnosis of M25.511,M25.512 (ICD-10-CM) - Acute pain of both shoulders. Patient presents with chief complaint of B shoulder pain with emphasis on L side compared to R. Patient states difficulty raising arms over head, impaired tolerance with ADL's, and occasional pain when laying on shoulder. Examination revealed significant ROM limitations actively in B shoulders (L>R), limited passive ROM due to pain, weakness in both UE and strength deficits in periscapular region. Positive empty can, reid-rich, subscapularis lift off, and speeds test noted. Tenderness to palpation noted along B LH bicep tendons and along L lateral deltoid and supraspinatus muscle. S/sx consistent with referring diagnosis of acute shoulder pain with findings suggestive of impingement pain as well as patient also presents with rounded posture. HEP was established and pt educated to modify as needed to perform in tolerable ranges. At this time patient would benefit from skilled PT to address her deficits and improve tolerance with dressing, driving, and household chores.     Patient prognosis is Good.   Patient will benefit from skilled outpatient Physical Therapy to  address the deficits stated above and in the chart below, provide patient /family education, and to maximize patientt's level of independence.     Plan of care discussed with patient: Yes  Patient's spiritual, cultural and educational needs considered and patient is agreeable to the plan of care and goals as stated below:     Anticipated Barriers for therapy: standard, congestive heart failure     Medical Necessity is demonstrated by the following  History  Co-morbidities and personal factors that may impact the plan of care [] LOW: no personal factors / co-morbidities  [x] MODERATE: 1-2 personal factors / co-morbidities  [] HIGH: 3+ personal factors / co-morbidities    Moderate / High Support Documentation:   Co-morbidities affecting plan of care: congestive heart failure    Personal Factors: age     Examination  Body Structures and Functions, activity limitations and participation restrictions that may impact the plan of care [] LOW: addressing 1-2 elements  [] MODERATE: 3+ elements  [x] HIGH: 4+ elements (please support below)    Moderate / High Support Documentation:   + postural imbalance  + inability to perform dressing / ADLs  + shoulder ROM and weakness  + pain with driving      Clinical Presentation [] LOW: stable  [x] MODERATE: Evolving  [] HIGH: Unstable     Decision Making/ Complexity Score: moderate       Goals:  Short Term Goals: 4 weeks   Patient will increase active ROM in all planes in B shoulders by 5 degrees for tolerance and independence with ADLs.  Patient will increase passive ROM in all planes in B shoulders by 5 degrees for tolerance with reaching overhead and household chores.  Patient will increase strength in B UE by 1/2 grade for tolerance lifting items, reaching for objects, and driving.  Patient will report decreased L shoulder pain to < 5/10 for tolerance with driving and dressing tasks.  Patient will increase score of FOTO by 10% showing improved overall mobility.      Long Term  Goals: 12 weeks   Patient will increase active ROM in all planes in L shoulder by 10 degrees for tolerance and independence with dressing and doing her hair.  Patient will increase passive ROM in all planes to WNL in L shoulder for independence with reaching overhead and household chores.  Patient will achieve 4+5 strength in B UE for tolerance lifting items, reaching for objects, and driving.  Patient will increase periscapular strength to 4+/5 for independence with upright posture.   Patient will report decreased L shoulder pain to < 3/10 for tolerance with driving and dressing tasks.  Patient will report independence with HEP to maintain progress.  Patient will increase score of FOTO by 15% for improved overall mobility.     Plan     Plan of care Certification: 11/9/2023 to 2/2/24.    Outpatient Physical Therapy 2 times weekly for 12 weeks to include the following interventions: Aquatic Therapy, Manual Therapy, Moist Heat/ Ice, Neuromuscular Re-ed, Patient Education, Self Care, Therapeutic Activities, Therapeutic Exercise, Ultrasound, and Dry Needling .     Emily Odonnell PT        Physician's Signature: _________________________________________ Date: ________________

## 2023-11-13 ENCOUNTER — CLINICAL SUPPORT (OUTPATIENT)
Dept: REHABILITATION | Facility: OTHER | Age: 78
End: 2023-11-13
Payer: COMMERCIAL

## 2023-11-13 ENCOUNTER — DOCUMENTATION ONLY (OUTPATIENT)
Dept: REHABILITATION | Facility: OTHER | Age: 78
End: 2023-11-13

## 2023-11-13 DIAGNOSIS — M25.612 DECREASED RANGE OF MOTION OF LEFT SHOULDER: Primary | ICD-10-CM

## 2023-11-13 DIAGNOSIS — Z74.09 IMPAIRED MOBILITY AND ADLS: ICD-10-CM

## 2023-11-13 DIAGNOSIS — Z78.9 IMPAIRED MOBILITY AND ADLS: ICD-10-CM

## 2023-11-13 DIAGNOSIS — R29.898 WEAKNESS OF BOTH ARMS: ICD-10-CM

## 2023-11-13 DIAGNOSIS — R29.3 POSTURAL IMBALANCE: ICD-10-CM

## 2023-11-13 PROCEDURE — 97530 THERAPEUTIC ACTIVITIES: CPT | Mod: PN,CQ

## 2023-11-13 PROCEDURE — 97140 MANUAL THERAPY 1/> REGIONS: CPT | Mod: PN,CQ

## 2023-11-13 PROCEDURE — 97112 NEUROMUSCULAR REEDUCATION: CPT | Mod: PN,CQ

## 2023-11-13 NOTE — PROGRESS NOTES
[facility]   Physical Therapy Treatment Note      Name: Selma Hooper  Clinic Number: [id number]    Therapy Diagnosis:   Encounter Diagnoses   Name Primary?    Decreased range of motion of left shoulder Yes    Weakness of both arms     Impaired mobility and ADLs     Postural imbalance      Physician: Cassidy Machado PA-C    Visit Date: 11/13/2023    Physician: Cassidy Machado PA-C     Physician Orders: PT Eval and Treat   Medical Diagnosis from Referral: M25.511,M25.512 (ICD-10-CM) - Acute pain of both shoulders   Evaluation Date: 11/9/2023  Authorization Period Expiration: 12/31/23  Plan of Care Expiration: 2/2/24  Progress Note Due: 12/9/23  Date of Surgery: n/a  Visit # / Visits authorized: 2/21   FOTO: 1/ 3     Precautions: Standard, congestive heart failure.      Time In: 12:15 pm  Time Out: 1:00 pm  Total Billable Time: 45 minutes    PTA Visit #: 1/5       Subjective     Patient reports: pain when she first starts HEP, but then it loosens up a bit and feels better. Pt states she has trouble closing her car door with her L, and sometimes turns around and uses R. Pt states she could not put her jacket on today due to pain despite it raining outside. Pt reports pain in L>R today.     She was compliant with home exercise program.  Response to previous treatment: fine, evaluation   Functional change: none, ongoing     Pain: 0/10  Location: L shoulder > R shoulder     Objective      Objective Measures updated at progress report unless specified.     Treatment     Selma received the treatments listed below:      therapeutic exercises to develop strength, endurance, ROM, flexibility, posture, and core stabilization for 00 minutes including:  None today.     manual therapy techniques: Joint mobilizations, Myofacial release, Soft tissue Mobilization, and Friction Massage were applied to the:  for 8 minutes, including:  + L shoulder distraction, Grade I-IV   + L shoulder inferior glide, A/P glide,  "Grave I-IV   + passive range of motion to L shoulder, all planes       neuromuscular re-education activities to improve: Balance, Coordination, Kinesthetic, Proprioception, and Posture for 25 minutes. The following activities were included:  + pulleys 3' flex/3' scaption   Table flexion slides x 20 -- verbal cues to stay in pain free ROM   Table scaption slides x 20 -- verbal cues to stay in pain free ROM  Low doorway stretch (B arms and unilateral) 2 x 30" each arm -- verbal cues to stay in pain free ROM  Supine AAROM flexion with cane     therapeutic activities to improve functional performance for 12  minutes, including:  Scapular retractions 2 x 10   Seated no money with scap retraction      Patient Education and Home Exercises       Education provided:   + Educated pt on exercising in pain free ROM   + Reviewed and educated pt on HEP     Written Home Exercises Provided: Patient instructed to cont prior HEP. Exercises were reviewed and Selma was able to demonstrate them prior to the end of the session.  Selma demonstrated good  understanding of the education provided. See Electronic Medical Record under Patient Instructions for exercises provided during therapy sessions    Assessment     Selma tolerated progressions well today with no adverse effects. Selma required heavy education to exercise in a pain free range. Pt required verbal, tactile, and visual cues to decrease UT substitution during scapular retractions and no moneys -- review and monitor nv. Continue to monitor and progress pt's scapular strength, proprioception, and endurance.     Selma Is progressing well towards her goals.   Patient prognosis is Good.     Patient will continue to benefit from skilled outpatient physical therapy to address the deficits listed in the problem list box on initial evaluation, provide pt/family education and to maximize pt's level of independence in the home and community environment.     Patient's spiritual, cultural " and educational needs considered and pt agreeable to plan of care and goals.     Anticipated barriers to physical therapy: standard, congestive heart failure     Goals:   Short Term Goals: 4 weeks   Patient will increase active ROM in all planes in B shoulders by 5 degrees for tolerance and independence with ADLs.  Patient will increase passive ROM in all planes in B shoulders by 5 degrees for tolerance with reaching overhead and household chores.  Patient will increase strength in B UE by 1/2 grade for tolerance lifting items, reaching for objects, and driving.  Patient will report decreased L shoulder pain to < 5/10 for tolerance with driving and dressing tasks.  Patient will increase score of FOTO by 10% showing improved overall mobility.        Long Term Goals: 12 weeks   Patient will increase active ROM in all planes in L shoulder by 10 degrees for tolerance and independence with dressing and doing her hair.  Patient will increase passive ROM in all planes to WNL in L shoulder for independence with reaching overhead and household chores.  Patient will achieve 4+5 strength in B UE for tolerance lifting items, reaching for objects, and driving.  Patient will increase periscapular strength to 4+/5 for independence with upright posture.   Patient will report decreased L shoulder pain to < 3/10 for tolerance with driving and dressing tasks.  Patient will report independence with HEP to maintain progress.  Patient will increase score of FOTO by 15% for improved overall mobility.     Plan     Plan of care Certification: 11/9/2023 to 2/2/24.     Outpatient Physical Therapy 2 times weekly for 12 weeks to include the following interventions: Aquatic Therapy, Manual Therapy, Moist Heat/ Ice, Neuromuscular Re-ed, Patient Education, Self Care, Therapeutic Activities, Therapeutic Exercise, Ultrasound, and Dry Needling .     Ellen Corona, PTA

## 2023-11-13 NOTE — PROGRESS NOTES
Physical Therapist and Physical Therapist Assistant met face to face to discuss patient's treatment plan and progress towards established goals. Pt will be seen by a physical therapist minimally every 6th visit or every 30 days.    Ellen Corona, ANN  11/13/2023

## 2023-11-16 ENCOUNTER — TELEPHONE (OUTPATIENT)
Dept: CARDIOLOGY | Facility: CLINIC | Age: 78
End: 2023-11-16
Payer: COMMERCIAL

## 2023-11-16 ENCOUNTER — PATIENT MESSAGE (OUTPATIENT)
Dept: CARDIOLOGY | Facility: CLINIC | Age: 78
End: 2023-11-16
Payer: COMMERCIAL

## 2023-11-16 DIAGNOSIS — I42.8 NICM (NONISCHEMIC CARDIOMYOPATHY): ICD-10-CM

## 2023-11-16 DIAGNOSIS — I50.22 CHRONIC SYSTOLIC CONGESTIVE HEART FAILURE, NYHA CLASS 4: Primary | ICD-10-CM

## 2023-11-16 NOTE — TELEPHONE ENCOUNTER
----- Message from Geneva Wilson, RN sent at 2023 12:22 PM CST -----  Regarding: FW: Refill  Spoke w. [facility] pharmacist. She has reached the max amount available on coupon and still needs to get 2 mths of meds to finish the year. Pharmacist will call me back luz marina yadav on renewing with a different coupon.    ----- Message -----  From: Angie Castillo  Sent: 2023  11:39 AM CST  To: Geneva Wilson, RN  Subject: Refill                                           Pt [phone] requesting refill for her Entresto 24-26 mg and she says her coupon has . She wants to know if the doctor can get her another one to help her pay for her medication bedcause it's too expensive.    LOV 23 Dr. Reyna    Thanks

## 2023-11-16 NOTE — TELEPHONE ENCOUNTER
Per pharmacy a new coupon can be issued but not until Jan 1st, only option is referral to pt assistance. Pt was updated, offered asking md for switch to valsartan if applies. She has 10 days worth of Entresto left and just want the referral for now. I told her to make sure to contact me/ the department when she is running low. She verbalized understanding. Referral sent.

## 2023-11-16 NOTE — TELEPHONE ENCOUNTER
----- Message from Kristine Estrada sent at 11/16/2023  3:49 PM CST -----  Regarding: FW: Order for SELMA SÁNCHEZ,     Thank you for submitting a referral to the Pharmacy Patient Assistance Team. We have received your referral for Selma Sánchez. This patient case has been assigned to Alinatricjavi Amin, who will review the case and contact the patient with assistance options. You may review progress in the patient's chart through Telephone Encounter notes from Pharmacy Patient Assistance.       Thank you,     Pharmacy Patient Assistance Team   ----- Message -----  From: Geneva Wilson RN  Sent: 11/16/2023   3:33 PM CST  To: Pharmacy Patient Assistance Team  Subject: Order for SELMA SÁNCHEZ

## 2023-11-20 ENCOUNTER — CLINICAL SUPPORT (OUTPATIENT)
Dept: REHABILITATION | Facility: OTHER | Age: 78
End: 2023-11-20
Payer: COMMERCIAL

## 2023-11-20 DIAGNOSIS — Z78.9 IMPAIRED MOBILITY AND ADLS: ICD-10-CM

## 2023-11-20 DIAGNOSIS — M25.612 DECREASED RANGE OF MOTION OF LEFT SHOULDER: Primary | ICD-10-CM

## 2023-11-20 DIAGNOSIS — R29.898 WEAKNESS OF BOTH ARMS: ICD-10-CM

## 2023-11-20 DIAGNOSIS — Z74.09 IMPAIRED MOBILITY AND ADLS: ICD-10-CM

## 2023-11-20 DIAGNOSIS — R29.3 POSTURAL IMBALANCE: ICD-10-CM

## 2023-11-20 PROCEDURE — 97140 MANUAL THERAPY 1/> REGIONS: CPT | Mod: PN,CQ

## 2023-11-20 PROCEDURE — 97112 NEUROMUSCULAR REEDUCATION: CPT | Mod: PN,CQ

## 2023-11-20 PROCEDURE — 97530 THERAPEUTIC ACTIVITIES: CPT | Mod: PN,CQ

## 2023-11-20 NOTE — PROGRESS NOTES
[facility]   Physical Therapy Treatment Note      Name: Selma Hooper  Clinic Number: [id number]    Therapy Diagnosis:   Encounter Diagnoses   Name Primary?    Decreased range of motion of left shoulder Yes    Weakness of both arms     Impaired mobility and ADLs     Postural imbalance        Physician: Cassidy Machado PA-C    Visit Date: 11/20/2023    Physician: Cassidy Machado PA-C     Physician Orders: PT Eval and Treat   Medical Diagnosis from Referral: M25.511,M25.512 (ICD-10-CM) - Acute pain of both shoulders   Evaluation Date: 11/9/2023  Authorization Period Expiration: 12/31/23  Plan of Care Expiration: 2/2/24  Progress Note Due: 12/9/23  Date of Surgery: n/a  Visit # / Visits authorized: 3/21   FOTO: 1/ 3     Precautions: Standard, congestive heart failure.      Time In: 12:15 pm  Time Out: 1:02 pm  Total Billable Time: 47 minutes    PTA Visit #: 1/5       Subjective     Patient reports: she has a lot of soreness from last visit. Pt states she has not been icing, but had to take a Tylenol. Pt states she has not been keeping up with exercises, due to pain. States she thinks she may have moved through a range of pain during last session, which might have caused an increase in pain. Pt states she still has a lot of pain L>R shoulder with dressing.     She was compliant with home exercise program.  Response to previous treatment: soreness    Functional change: ongoing     Pain: 7/10 R, 0/10 R   Location: L shoulder > R shoulder     Objective      Objective Measures updated at progress report unless specified.     Treatment     Selma received the treatments listed below:      therapeutic exercises to develop strength, endurance, ROM, flexibility, posture, and core stabilization for 00 minutes including:  None today.     manual therapy techniques: Joint mobilizations, Myofacial release, Soft tissue Mobilization, and Friction Massage were applied to the:  for 8 minutes, including:  + L  "shoulder distraction, Grade I-IV   + L shoulder inferior glide, A/P glide, Grave I-IV   + passive range of motion to L shoulder, flexion only today       neuromuscular re-education activities to improve: Balance, Coordination, Kinesthetic, Proprioception, and Posture for 24 minutes. The following activities were included:  pulleys 3' flex/3' scaption -- cued to allow R to pull L shoulder   Table flexion slides x 20 -- verbal cues to stay in pain free ROM   Table scaption slides x 20 -- verbal cues to stay in pain free ROM   Low doorway stretch (B arms and unilateral) 2 x 30" each arm -- verbal cues to stay in pain free ROM  Supine AAROM flexion with cane   + open books (attempted, had increased pain)   + towel roll series (2 towels)    + pec stretch x 2'    + OH flexion with 1# dowel x 20    + scapular retraction    + no money + scapular retraction     therapeutic activities to improve functional performance for 15  minutes, including:  Before mirror:   Seated scapular retractions x 10, 10 x 10"   Seated no money with scap retraction 2 x 10       Patient Education and Home Exercises       Education provided:   + Educated pt on exercising in pain free ROM   + Reviewed and educated pt on HEP     Written Home Exercises Provided: Patient instructed to cont prior HEP. Exercises were reviewed and Selma was able to demonstrate them prior to the end of the session.  Selma demonstrated good  understanding of the education provided. See Electronic Medical Record under Patient Instructions for exercises provided during therapy sessions    Assessment     Selma had good tolerance today. Educated again to focus on exercising in a pain free range, which pt was able to demonstrate. Attempted open books, however pt had increased pain, modulated with towel pec roll stretch. Pt had fair tolerance to AP glides today due to pain with pressure on biceps. Pt noted an increase in pain with abduction on pulleys, therefore passive range of " motion only performed in flexion due to elevated pain levels today. Pt continues to required heavy verbal, tactile, and visual cues to decrease UT substitution during scapular retractions and no money -- performed before mirror for visual cues. Able to demonstrate with several trials. Continue to monitor and progress pts scapular strength, proprioception, and endurance.     Selma Is progressing well towards her goals.   Patient prognosis is Good.     Patient will continue to benefit from skilled outpatient physical therapy to address the deficits listed in the problem list box on initial evaluation, provide pt/family education and to maximize pt's level of independence in the home and community environment.     Patient's spiritual, cultural and educational needs considered and pt agreeable to plan of care and goals.     Anticipated barriers to physical therapy: standard, congestive heart failure     Goals:   Short Term Goals: 4 weeks   Patient will increase active ROM in all planes in B shoulders by 5 degrees for tolerance and independence with ADLs.  Patient will increase passive ROM in all planes in B shoulders by 5 degrees for tolerance with reaching overhead and household chores.  Patient will increase strength in B UE by 1/2 grade for tolerance lifting items, reaching for objects, and driving.  Patient will report decreased L shoulder pain to < 5/10 for tolerance with driving and dressing tasks.  Patient will increase score of FOTO by 10% showing improved overall mobility.        Long Term Goals: 12 weeks   Patient will increase active ROM in all planes in L shoulder by 10 degrees for tolerance and independence with dressing and doing her hair.  Patient will increase passive ROM in all planes to WNL in L shoulder for independence with reaching overhead and household chores.  Patient will achieve 4+5 strength in B UE for tolerance lifting items, reaching for objects, and driving.  Patient will increase  periscapular strength to 4+/5 for independence with upright posture.   Patient will report decreased L shoulder pain to < 3/10 for tolerance with driving and dressing tasks.  Patient will report independence with HEP to maintain progress.  Patient will increase score of FOTO by 15% for improved overall mobility.     Plan     Plan of care Certification: 11/9/2023 to 2/2/24.     Outpatient Physical Therapy 2 times weekly for 12 weeks to include the following interventions: Aquatic Therapy, Manual Therapy, Moist Heat/ Ice, Neuromuscular Re-ed, Patient Education, Self Care, Therapeutic Activities, Therapeutic Exercise, Ultrasound, and Dry Needling .     Ellen Corona, PTA

## 2023-11-21 DIAGNOSIS — I50.22 CHRONIC SYSTOLIC CONGESTIVE HEART FAILURE: Primary | ICD-10-CM

## 2023-11-21 DIAGNOSIS — R35.89 DIURESIS: ICD-10-CM

## 2023-11-21 DIAGNOSIS — I10 HYPERTENSION, ESSENTIAL: ICD-10-CM

## 2023-11-21 RX ORDER — FUROSEMIDE 40 MG/1
40 TABLET ORAL DAILY
Qty: 90 TABLET | Refills: 3 | Status: CANCELLED | OUTPATIENT
Start: 2023-11-21 | End: 2024-11-20

## 2023-11-21 RX ORDER — VALSARTAN 80 MG/1
80 TABLET ORAL DAILY
Qty: 90 TABLET | Refills: 3 | Status: SHIPPED | OUTPATIENT
Start: 2023-11-21 | End: 2024-11-20

## 2023-11-21 RX ORDER — FUROSEMIDE 20 MG/1
TABLET ORAL
Qty: 30 TABLET | Refills: 11 | Status: SHIPPED | OUTPATIENT
Start: 2023-11-21 | End: 2024-01-22 | Stop reason: ALTCHOICE

## 2023-11-21 RX ORDER — SACUBITRIL AND VALSARTAN 24; 26 MG/1; MG/1
1 TABLET, FILM COATED ORAL 2 TIMES DAILY
Qty: 180 TABLET | Refills: 3 | Status: SHIPPED | OUTPATIENT
Start: 2023-11-21

## 2023-11-21 NOTE — TELEPHONE ENCOUNTER
Dr Reyna was updated and stated that while pt is off the Entresto she should take valsartan 80 qd and an additional furosemide 20 qd with a BMP one week later. Pt updated, teaching done and she verbalized understanding. She was told about not taking both Entresto and Valsartan/increased furosemide at the same time and she verbalized understanding.

## 2023-11-21 NOTE — TELEPHONE ENCOUNTER
As per dr Reyna orders (see notes):   Dr Reyna was updated and stated that while pt is off the Entresto she should take valsartan 80 qd and an additional furosemide 20 qd with a BMP one week later.

## 2023-11-21 NOTE — TELEPHONE ENCOUNTER
Spoke w. pt after she counted her meds. She has 21 pills left of Entresto and her labs were scheduled on 12/11. She agreed to date/time of appointment(s).

## 2023-11-27 ENCOUNTER — TELEPHONE (OUTPATIENT)
Dept: CARDIOLOGY | Facility: CLINIC | Age: 78
End: 2023-11-27
Payer: COMMERCIAL

## 2023-11-27 NOTE — TELEPHONE ENCOUNTER
Pt updated on only change was valsartan furosemide due to out of Entresto but all other meds including metoprolol to be taken same as before.   She verbalized understanding.

## 2023-11-27 NOTE — TELEPHONE ENCOUNTER
----- Message from Moose Carreno sent at 11/27/2023  1:40 PM CST -----  Regarding: Questions  Pt would like a callback:    On December 2nd, pt would stop taking Entresto but start Valsartan 80 mg and Furosemide 20 mg. She needs guidance on metoprolol succinate, as well.     Contact@ [phone]      Thanks

## 2023-12-04 ENCOUNTER — OFFICE VISIT (OUTPATIENT)
Dept: INTERNAL MEDICINE | Facility: CLINIC | Age: 78
End: 2023-12-04
Payer: COMMERCIAL

## 2023-12-04 VITALS
OXYGEN SATURATION: 93 % | BODY MASS INDEX: 28.13 KG/M2 | DIASTOLIC BLOOD PRESSURE: 66 MMHG | WEIGHT: 143.31 LBS | SYSTOLIC BLOOD PRESSURE: 100 MMHG | TEMPERATURE: 99 F | HEART RATE: 104 BPM | HEIGHT: 60 IN

## 2023-12-04 DIAGNOSIS — U07.1 COVID-19 VIRUS INFECTION: Primary | ICD-10-CM

## 2023-12-04 DIAGNOSIS — R05.1 ACUTE COUGH: ICD-10-CM

## 2023-12-04 DIAGNOSIS — R06.02 SHORTNESS OF BREATH: ICD-10-CM

## 2023-12-04 LAB
CTP QC/QA: YES
CTP QC/QA: YES
FLUAV AG NPH QL: NEGATIVE
FLUBV AG NPH QL: NEGATIVE
SARS-COV-2 RDRP RESP QL NAA+PROBE: POSITIVE

## 2023-12-04 PROCEDURE — 3288F PR FALLS RISK ASSESSMENT DOCUMENTED: ICD-10-PCS | Mod: CPTII,S$GLB,, | Performed by: STUDENT IN AN ORGANIZED HEALTH CARE EDUCATION/TRAINING PROGRAM

## 2023-12-04 PROCEDURE — 87635: ICD-10-PCS | Mod: QW,S$GLB,, | Performed by: STUDENT IN AN ORGANIZED HEALTH CARE EDUCATION/TRAINING PROGRAM

## 2023-12-04 PROCEDURE — 3074F SYST BP LT 130 MM HG: CPT | Mod: CPTII,S$GLB,, | Performed by: STUDENT IN AN ORGANIZED HEALTH CARE EDUCATION/TRAINING PROGRAM

## 2023-12-04 PROCEDURE — 1160F PR REVIEW ALL MEDS BY PRESCRIBER/CLIN PHARMACIST DOCUMENTED: ICD-10-PCS | Mod: CPTII,S$GLB,, | Performed by: STUDENT IN AN ORGANIZED HEALTH CARE EDUCATION/TRAINING PROGRAM

## 2023-12-04 PROCEDURE — 1101F PT FALLS ASSESS-DOCD LE1/YR: CPT | Mod: CPTII,S$GLB,, | Performed by: STUDENT IN AN ORGANIZED HEALTH CARE EDUCATION/TRAINING PROGRAM

## 2023-12-04 PROCEDURE — 1160F RVW MEDS BY RX/DR IN RCRD: CPT | Mod: CPTII,S$GLB,, | Performed by: STUDENT IN AN ORGANIZED HEALTH CARE EDUCATION/TRAINING PROGRAM

## 2023-12-04 PROCEDURE — 99999 PR PBB SHADOW E&M-EST. PATIENT-LVL V: ICD-10-PCS | Mod: PBBFAC,,, | Performed by: STUDENT IN AN ORGANIZED HEALTH CARE EDUCATION/TRAINING PROGRAM

## 2023-12-04 PROCEDURE — 87804 INFLUENZA ASSAY W/OPTIC: CPT | Mod: QW,S$GLB,, | Performed by: STUDENT IN AN ORGANIZED HEALTH CARE EDUCATION/TRAINING PROGRAM

## 2023-12-04 PROCEDURE — 3074F PR MOST RECENT SYSTOLIC BLOOD PRESSURE < 130 MM HG: ICD-10-PCS | Mod: CPTII,S$GLB,, | Performed by: STUDENT IN AN ORGANIZED HEALTH CARE EDUCATION/TRAINING PROGRAM

## 2023-12-04 PROCEDURE — 3288F FALL RISK ASSESSMENT DOCD: CPT | Mod: CPTII,S$GLB,, | Performed by: STUDENT IN AN ORGANIZED HEALTH CARE EDUCATION/TRAINING PROGRAM

## 2023-12-04 PROCEDURE — 1101F PR PT FALLS ASSESS DOC 0-1 FALLS W/OUT INJ PAST YR: ICD-10-PCS | Mod: CPTII,S$GLB,, | Performed by: STUDENT IN AN ORGANIZED HEALTH CARE EDUCATION/TRAINING PROGRAM

## 2023-12-04 PROCEDURE — 3078F PR MOST RECENT DIASTOLIC BLOOD PRESSURE < 80 MM HG: ICD-10-PCS | Mod: CPTII,S$GLB,, | Performed by: STUDENT IN AN ORGANIZED HEALTH CARE EDUCATION/TRAINING PROGRAM

## 2023-12-04 PROCEDURE — 1159F PR MEDICATION LIST DOCUMENTED IN MEDICAL RECORD: ICD-10-PCS | Mod: CPTII,S$GLB,, | Performed by: STUDENT IN AN ORGANIZED HEALTH CARE EDUCATION/TRAINING PROGRAM

## 2023-12-04 PROCEDURE — 99999 PR PBB SHADOW E&M-EST. PATIENT-LVL V: CPT | Mod: PBBFAC,,, | Performed by: STUDENT IN AN ORGANIZED HEALTH CARE EDUCATION/TRAINING PROGRAM

## 2023-12-04 PROCEDURE — 3078F DIAST BP <80 MM HG: CPT | Mod: CPTII,S$GLB,, | Performed by: STUDENT IN AN ORGANIZED HEALTH CARE EDUCATION/TRAINING PROGRAM

## 2023-12-04 PROCEDURE — 99214 PR OFFICE/OUTPT VISIT, EST, LEVL IV, 30-39 MIN: ICD-10-PCS | Mod: S$GLB,,, | Performed by: STUDENT IN AN ORGANIZED HEALTH CARE EDUCATION/TRAINING PROGRAM

## 2023-12-04 PROCEDURE — 87635 SARS-COV-2 COVID-19 AMP PRB: CPT | Mod: QW,S$GLB,, | Performed by: STUDENT IN AN ORGANIZED HEALTH CARE EDUCATION/TRAINING PROGRAM

## 2023-12-04 PROCEDURE — 87804 POCT INFLUENZA A/B: ICD-10-PCS | Mod: QW,S$GLB,, | Performed by: STUDENT IN AN ORGANIZED HEALTH CARE EDUCATION/TRAINING PROGRAM

## 2023-12-04 PROCEDURE — 99214 OFFICE O/P EST MOD 30 MIN: CPT | Mod: S$GLB,,, | Performed by: STUDENT IN AN ORGANIZED HEALTH CARE EDUCATION/TRAINING PROGRAM

## 2023-12-04 PROCEDURE — 1159F MED LIST DOCD IN RCRD: CPT | Mod: CPTII,S$GLB,, | Performed by: STUDENT IN AN ORGANIZED HEALTH CARE EDUCATION/TRAINING PROGRAM

## 2023-12-04 RX ORDER — BENZONATATE 200 MG/1
200 CAPSULE ORAL 3 TIMES DAILY PRN
Qty: 30 CAPSULE | Refills: 0 | Status: SHIPPED | OUTPATIENT
Start: 2023-12-04 | End: 2023-12-14

## 2023-12-04 RX ORDER — BENZONATATE 200 MG/1
200 CAPSULE ORAL 3 TIMES DAILY PRN
Qty: 30 CAPSULE | Refills: 0 | Status: SHIPPED | OUTPATIENT
Start: 2023-12-04 | End: 2023-12-04 | Stop reason: SDUPTHER

## 2023-12-04 NOTE — PROGRESS NOTES
SUBJECTIVE     Chief Complaint   Patient presents with    Shortness of Breath    Cough       HPI  Selma Hooper is a 78 y.o. female with  HTN, CAD, NICM, pulmonary emphysema, alpha thalassemia trait, subclinical hyperthyroidism  that presents for same-day urgent care evaluation of  cough, SOB .     This is a new patient to me but established to [facility]. PCP is Phil Andrade MD.     Exposed to someone who tested positive for COVID-19 on Saturday, then began to have scratchy throat and cough later that day.   Had some difficulty breathing, started using home Oxygen again. Had some wheezing and started using nebulizer.   Cough is intermittently productive with whitish mucous, worse with Nebulizer.   No fevers, chills, nausea, vomiting.   Symptoms improving with intermittent home O2.   SpO2 98% on 1L.   SpO2 93% ORA at rest.       PAST MEDICAL HISTORY:  Past Medical History:   Diagnosis Date    Abnormal liver enzymes 12/10/2018    Acute on chronic combined systolic and diastolic congestive heart failure 4/2/2021    Acute on chronic respiratory failure with hypoxia 4/2/2021    Acute on chronic respiratory failure with hypoxia and hypercapnia 12/10/2021    CHF (congestive heart failure)     COPD exacerbation 7/12/2022    Former smoker 10/24/2018    Hypertension     Smoker 7/27/2016       PAST SURGICAL HISTORY:  Past Surgical History:   Procedure Laterality Date    BREAST BIOPSY      left  side years ago in high school   1962    CHOLECYSTECTOMY      COLONOSCOPY      COLONOSCOPY N/A 8/23/2021    Procedure: COLONOSCOPY;  Surgeon: Shree Amaya MD;  Location: [address];  Service: Endoscopy;  Laterality: N/A;  Do not cancel this order  fully vaccinated-see immunization record  EF 18%  8/20-covid [facility] portal-tb    HYSTERECTOMY         FAMILY HISTORY:  Family History   Problem Relation Age of Onset    Heart disease Mother     Heart attack Mother     Hypertension Mother     Colon cancer Father          colon    Dementia Father     Hypertension Father     Colon cancer Brother 63        colon    Heart disease Brother     Hypertension Brother     Diabetes Daughter     Crohn's disease Neg Hx     Ulcerative colitis Neg Hx     Stomach cancer Neg Hx        ALLERGIES AND MEDICATIONS: updated and reviewed.  Review of patient's allergies indicates:   Allergen Reactions    Atorvastatin      Leg cramps     Current Outpatient Medications   Medication Sig Dispense Refill    albuterol-ipratropium (DUO-NEB) 2.5 mg-0.5 mg/3 mL nebulizer solution Take 3 mLs by nebulization every 6 (six) hours as needed for Wheezing. Rescue 90 mL 3    dapagliflozin (FARXIGA) 5 mg Tab tablet Take 1 tablet (5 mg total) by mouth once daily. (Patient not taking: Reported on 10/26/2023) 90 tablet 3    fluticasone-salmeterol diskus inhaler 250-50 mcg Inhale 1 puff into the lungs 2 (two) times daily. Controller 60 each 11    furosemide (LASIX) 20 MG tablet Take a 20mg tablet by mouth at mid day in addition to the 40mg once a day while you are not taking Entresto. Stop when restarting Entresto 30 tablet 11    furosemide (LASIX) 40 MG tablet Take 1 tablet (40 mg total) by mouth once daily. 90 tablet 3    ipratropium-albuteroL (COMBIVENT)  mcg/actuation inhaler Inhale 1 puff into the lungs every 6 (six) hours as needed for Wheezing. Rescue 4 g 6    metoprolol succinate (TOPROL-XL) 25 MG 24 hr tablet Take 1 tablet (25 mg total) by mouth once daily. 90 tablet 3    pulse oximeter (PULSE OXIMETER) device by Apply Externally route 2 (two) times a day. Use twice daily at 8 AM and 3 PM and record the value in [place] as directed. 1 each 0    sacubitriL-valsartan (ENTRESTO) 24-26 mg per tablet Take 1 tablet by mouth 2 (two) times daily. Don't take valsartan and Entresto together. 180 tablet 3    valsartan (DIOVAN) 80 MG tablet Take 1 tablet (80 mg total) by mouth once daily. Take valsartan while you are not taking Entresto. Stop when restarting Entresto. 90  tablet 3     No current facility-administered medications for this visit.       ROS  Review of Systems   Constitutional:  Positive for fatigue. Negative for chills and fever.   HENT:  Negative for congestion, postnasal drip, rhinorrhea, sinus pressure, sinus pain, sneezing and sore throat.    Eyes: Negative.    Respiratory:  Positive for cough, shortness of breath (mostly on exetion, washing clothes) and wheezing.    Cardiovascular:  Negative for chest pain and palpitations.   Gastrointestinal:  Negative for abdominal pain, constipation, diarrhea, nausea and vomiting.   Endocrine: Negative.    Genitourinary: Negative.    Musculoskeletal:  Positive for arthralgias (in shoulders). Negative for myalgias.   Skin: Negative.    Allergic/Immunologic: Negative.    Neurological: Negative.    Hematological: Negative.          OBJECTIVE     Physical Exam  Vitals:    12/04/23 1626   BP: 100/66   Pulse: 104   Temp: 98.5 °F (36.9 °C)    Body mass index is 27.99 kg/m².            Physical Exam  Vitals reviewed.   Constitutional:       General: She is not in acute distress.     Appearance: Normal appearance.   HENT:      Head: Normocephalic and atraumatic.      Mouth/Throat:      Mouth: Mucous membranes are moist.      Pharynx: Oropharynx is clear.   Eyes:      Extraocular Movements: Extraocular movements intact.      Conjunctiva/sclera: Conjunctivae normal.      Pupils: Pupils are equal, round, and reactive to light.   Cardiovascular:      Rate and Rhythm: Normal rate and regular rhythm.      Pulses: Normal pulses.      Heart sounds: Normal heart sounds.   Pulmonary:      Effort: Pulmonary effort is normal.      Breath sounds: Examination of the right-middle field reveals decreased breath sounds. Examination of the left-middle field reveals decreased breath sounds. Examination of the right-lower field reveals decreased breath sounds. Examination of the left-lower field reveals decreased breath sounds. Decreased breath sounds  present. No wheezing, rhonchi or rales.   Abdominal:      General: There is no distension.   Musculoskeletal:         General: Normal range of motion.      Cervical back: Normal range of motion and neck supple.   Skin:     General: Skin is warm and dry.   Neurological:      General: No focal deficit present.      Mental Status: She is alert.           Health Maintenance         Date Due Completion Date    Aspirin/Antiplatelet Therapy Never done ---    Shingles Vaccine (1 of 2) Never done ---    RSV Vaccine (Age 60+ and Pregnant patients) (1 - 1-dose 60+ series) Never done ---    Mammogram 01/27/2023 1/27/2022    DEXA Scan 05/18/2023 5/18/2021    COVID-19 Vaccine (4 - 2023-24 season) 09/01/2023 2/10/2022    TETANUS VACCINE 02/21/2027 2/21/2017    Lipid Panel 10/30/2028 10/30/2023              ASSESSMENT     78 y.o. female with     1. COVID-19 virus infection    2. Acute cough    3. Shortness of breath        PLAN:     1. COVID-19 virus infection  - Given chronic conditions, she is at risk for severe infection. Treat with Paxlovid. Medications reviewed for interactions.   - Counseled on symptomatic treatment.   - Reviewed isolation precautions with patient.   - Reviewed and given strict ED precautions, patient verbalized understanding.   - nirmatrelvir-ritonavir 300 mg (150 mg x 2)-100 mg copackaged tablets (EUA); Take 3 tablets by mouth 2 (two) times daily for 5 days. Each dose contains 2 nirmatrelvir (pink tablets) and 1 ritonavir (white tablet). Take all 3 tablets together  Dispense: 30 tablet; Refill: 0    2. Acute cough  - POCT Influenza A/B Rapid Antigen  - POCT COVID-19 Rapid Screening  - benzonatate (TESSALON) 200 MG capsule; Take 1 capsule (200 mg total) by mouth 3 (three) times daily as needed for Cough.  Dispense: 30 capsule; Refill: 0    3. Shortness of breath  - Due to COVID-19 infection.   - Has home O2, 93% on room air today at rest. Can use if exerting self.   - Asked her to monitor SpO2 at home and  instructed her to go to ED if her SpO2 is consistently below 90%.   - X-Ray Chest PA And Lateral; Future        RTC PRN       Alvaro Mancuso MD  Family Medicine  [facility]  12/04/2023    This document was created using voice recognition software ([employer]). Although it may be edited, this document may contain errors related to incorrect recognition of the spoken word. Please call the physician if clarification is needed.       No follow-ups on file.

## 2023-12-04 NOTE — PATIENT INSTRUCTIONS
Recommend isolating at home for five days after your symptom onset. The first day of symptoms is Day 0. You can end your isolation on Thursday, December 7th.     I have sent a prescription for Paxlovid to take to lower the risk of bad infection.   Continue to monitor your blood pressure, I would recommend on not taking your Valsartan today since your blood pressure was on the lower side.     I have sent you some Tessalon pearls to help with your cough.     Go to the ER if you have any worsening shortness of breath, chest pain, inability to eat/drink, any concerning symptom.   Check your oxygen level at home. Go to the ER if you oxygen falls below 90%. (In the 80s or lower).

## 2023-12-05 ENCOUNTER — HOSPITAL ENCOUNTER (OUTPATIENT)
Dept: RADIOLOGY | Facility: HOSPITAL | Age: 78
Discharge: HOME | End: 2023-12-05
Attending: STUDENT IN AN ORGANIZED HEALTH CARE EDUCATION/TRAINING PROGRAM
Payer: COMMERCIAL

## 2023-12-05 ENCOUNTER — TELEPHONE (OUTPATIENT)
Dept: INTERNAL MEDICINE | Facility: CLINIC | Age: 78
End: 2023-12-05
Payer: COMMERCIAL

## 2023-12-05 DIAGNOSIS — R06.02 SHORTNESS OF BREATH: ICD-10-CM

## 2023-12-05 PROCEDURE — 71046 XR CHEST PA AND LATERAL: ICD-10-PCS | Mod: 26,,, | Performed by: RADIOLOGY

## 2023-12-05 PROCEDURE — 71046 X-RAY EXAM CHEST 2 VIEWS: CPT | Mod: 26,,, | Performed by: RADIOLOGY

## 2023-12-05 PROCEDURE — 71046 X-RAY EXAM CHEST 2 VIEWS: CPT | Mod: TC

## 2023-12-05 NOTE — TELEPHONE ENCOUNTER
----- Message from Alvaro Mancuso MD sent at 12/5/2023 12:37 PM CST -----  Please call and let patient know that her x-ray was normal.     Thanks,   TP    ----- Message -----  From: Interface, Rad Results In  Sent: 12/5/2023  10:33 AM CST  To: Alvaro Mancuso MD

## 2023-12-08 ENCOUNTER — TELEPHONE (OUTPATIENT)
Dept: INTERNAL MEDICINE | Facility: CLINIC | Age: 78
End: 2023-12-08
Payer: COMMERCIAL

## 2023-12-08 RX ORDER — PROMETHAZINE HYDROCHLORIDE AND DEXTROMETHORPHAN HYDROBROMIDE 6.25; 15 MG/5ML; MG/5ML
5 SYRUP ORAL EVERY 6 HOURS PRN
Qty: 118 ML | Refills: 0 | Status: SHIPPED | OUTPATIENT
Start: 2023-12-08 | End: 2023-12-18

## 2023-12-08 NOTE — TELEPHONE ENCOUNTER
Rx for Promethazine-DM cough syrup sent to her pharmacy. Can take every 6 hours as needed. May cause drowsiness. Avoid driving when taking it. Please inform.

## 2023-12-08 NOTE — TELEPHONE ENCOUNTER
----- Message from Edda Nielsen sent at 12/8/2023 10:51 AM CST -----  Contact: Mobile  [phone]  Patient said that you have prescribed Tessalon Pearls for her and she said that they are not working for her, patient would like for you to prescribed a cough syrup for her and send it to..    [facility] [place] [address]  [facility] [address]  Phone: [phone] Fax: [phone]

## 2023-12-11 ENCOUNTER — PATIENT MESSAGE (OUTPATIENT)
Dept: RESEARCH | Facility: HOSPITAL | Age: 78
End: 2023-12-11
Payer: COMMERCIAL

## 2023-12-11 ENCOUNTER — HOSPITAL ENCOUNTER (OUTPATIENT)
Dept: RADIOLOGY | Facility: HOSPITAL | Age: 78
Discharge: HOME | End: 2023-12-11
Attending: FAMILY MEDICINE
Payer: COMMERCIAL

## 2023-12-11 DIAGNOSIS — Z78.0 MENOPAUSE: ICD-10-CM

## 2023-12-11 PROCEDURE — 77080 DXA BONE DENSITY AXIAL: CPT | Mod: TC

## 2023-12-11 PROCEDURE — 77080 DXA BONE DENSITY AXIAL SKELETON 1 OR MORE SITES: ICD-10-PCS | Mod: 26,,, | Performed by: INTERNAL MEDICINE

## 2023-12-11 PROCEDURE — 77080 DXA BONE DENSITY AXIAL: CPT | Mod: 26,,, | Performed by: INTERNAL MEDICINE

## 2023-12-14 ENCOUNTER — CLINICAL SUPPORT (OUTPATIENT)
Dept: REHABILITATION | Facility: OTHER | Age: 78
End: 2023-12-14
Payer: COMMERCIAL

## 2023-12-14 ENCOUNTER — HOSPITAL ENCOUNTER (OUTPATIENT)
Dept: RADIOLOGY | Facility: HOSPITAL | Age: 78
Discharge: HOME | End: 2023-12-14
Attending: FAMILY MEDICINE
Payer: COMMERCIAL

## 2023-12-14 DIAGNOSIS — R29.898 WEAKNESS OF BOTH ARMS: ICD-10-CM

## 2023-12-14 DIAGNOSIS — Z12.31 ENCOUNTER FOR SCREENING MAMMOGRAM FOR MALIGNANT NEOPLASM OF BREAST: ICD-10-CM

## 2023-12-14 DIAGNOSIS — M25.612 DECREASED RANGE OF MOTION OF LEFT SHOULDER: Primary | ICD-10-CM

## 2023-12-14 DIAGNOSIS — Z78.9 IMPAIRED MOBILITY AND ADLS: ICD-10-CM

## 2023-12-14 DIAGNOSIS — Z74.09 IMPAIRED MOBILITY AND ADLS: ICD-10-CM

## 2023-12-14 DIAGNOSIS — R29.3 POSTURAL IMBALANCE: ICD-10-CM

## 2023-12-14 PROCEDURE — 77067 SCR MAMMO BI INCL CAD: CPT | Mod: TC

## 2023-12-14 PROCEDURE — 77067 MAMMO DIGITAL SCREENING BILAT WITH TOMO: ICD-10-PCS | Mod: 26,,, | Performed by: RADIOLOGY

## 2023-12-14 PROCEDURE — 77067 SCR MAMMO BI INCL CAD: CPT | Mod: 26,,, | Performed by: RADIOLOGY

## 2023-12-14 PROCEDURE — 97112 NEUROMUSCULAR REEDUCATION: CPT | Mod: PN

## 2023-12-14 PROCEDURE — 77063 BREAST TOMOSYNTHESIS BI: CPT | Mod: 26,,, | Performed by: RADIOLOGY

## 2023-12-14 PROCEDURE — 77063 MAMMO DIGITAL SCREENING BILAT WITH TOMO: ICD-10-PCS | Mod: 26,,, | Performed by: RADIOLOGY

## 2023-12-14 PROCEDURE — 97530 THERAPEUTIC ACTIVITIES: CPT | Mod: PN

## 2023-12-14 NOTE — PROGRESS NOTES
[facility]   Physical Therapy Treatment Note      Name: Selma Hooper  [facility] Number: [id number]    Therapy Diagnosis:   Encounter Diagnoses   Name Primary?    Decreased range of motion of left shoulder Yes    Weakness of both arms     Impaired mobility and ADLs     Postural imbalance        Physician: Cassidy Machado PA-C    Visit Date: 12/14/2023    Physician: Cassidy Machado PA-C     Physician Orders: PT Eval and Treat   Medical Diagnosis from Referral: M25.511,M25.512 (ICD-10-CM) - Acute pain of both shoulders   Evaluation Date: 11/9/2023  Authorization Period Expiration: 12/31/23  Plan of Care Expiration: 2/2/24  Progress Note Due: updated to 1/14/24  Date of Surgery: n/a  Visit # / Visits authorized: 4/21   FOTO: 1/ 3     Precautions: Standard, congestive heart failure.      Time In: 2:20 pm  Time Out: 3:05 pm  Total Billable Time: 45 minutes    PTA Visit #: 1/5       Subjective     Patient reports: she has not been in PT for several weeks due to having COVID. Brought an O2 tank today as she has been more SOB since having COVID.  Continued difficulty with lifting her arm OH with dressing and grooming activities, HHCs, self care. Had difficulty lifting her arms today to get a mammogram this morning.    She was NOT compliant with home exercise program. -- Can OH flex, AROM no money, scap retraction, doorway pec stretch (B and uni), table slides flex/scap    Response to previous treatment: soreness    Functional change: ongoing     Pain: 10/10 R, 8/10 R   Location: L shoulder > R shoulder     Objective      Objective Measures updated at progress report unless specified.     Updated 12/14/2023    Observation: dons MASK due to recent COVID infection, O2 tank with nasal cannula 2* to c/o recent onset of SOB   Posture: Forward posture with rounded shoulders, increased TSP kyphosis      Passive Range of Motion:   Shoulder Left Right   Flexion 105 160   Abduction 90 170   ER at 0 40 75   ER at  90 15 50   IR 40 85      Active Range of Motion:   Shoulder Left Right   Flexion 85 160   Abduction 75 160   ER at 0 38 70   ER at 90 10 45   IR 35 80      Upper Extremity Strength    (L) UE (R) UE   Shoulder flexion: 4-/5 4/5   Shoulder Abduction: 3+/5 4/5   Shoulder extension 4+/5 4+/5   Shoulder ER 4-/5 4/5   Shoulder IR 4-/5 4/5   Lower Trap 3+/5 3+/5   Middle Trap 4/5 4/5   Rhomboids 4/5 4/5   Elbow flexion: 5/5 5/5   Elbow extension: 5/5 5/5      ** pain with all shoulder muscle testing.         Special Tests:  AC Joint Left Right   AC Joint Compression Test Positive for pain negative   Empty Can Test positive negative   Drop Arm test negative negative   Subscaputlaris Lift Off Can't get into testing position  Positive                    Hawkin's Lm Positive  positive    Neer's Test negative negative   Speed's test positive Negative       Apley's Scratch Test    Left Right   Combined ER Gets right behind her ear T1   Combined IR Unable to get behind back  T8   Cross-body reach Anterior opp shoulder (pain) Post opp shoulder         Joint Mobility: good mobility with inferior and posterior glides - no pain noted; GHJ distraction performed with slight pain when performing with movement.      Palpation: Tenderness over B LH bicep tendon, L lateral deltoid, L supraspinatus.      Sensation: intact in B UE     Flexibility: tightness in B pecs.     Treatment     Selma received the treatments listed below:      therapeutic exercises to develop strength, endurance, ROM, flexibility, posture, and core stabilization for 00 minutes including:  None today.     manual therapy techniques: Joint mobilizations, Myofacial release, Soft tissue Mobilization, and Friction Massage were applied to the:  for 8 minutes, including:  L shoulder distraction, Grade I-IV - poor tolerance  L shoulder inferior glide, A/P glide, Grave I-IV - fair tolerance  passive range of motion to L shoulder, flexion only today   STM/MFR to pecs  "breanna/min, subscap, lats    neuromuscular re-education activities to improve: Balance, Coordination, Kinesthetic, Proprioception, and Posture for 12 minutes. The following activities were included:  pulleys 3' flex/3' scaption -- cued to allow R to pull L shoulder   Table flexion slides x 20 -- verbal cues to stay in pain free ROM   Table scaption slides x 20 -- verbal cues to stay in pain free ROM   Low doorway stretch (B arms and unilateral) 2 x 30" each arm -- verbal cues to stay in pain free ROM  Supine AAROM flexion with cane   open books (attempted, had increased pain)   towel roll series (2 towels)    pec stretch x 2'    OH flexion with 1# dowel x 20    +chest press + SA punch x 20 x 1# dowel   +cane Dylan ER (0 deg abd) x 20 x 1# dowel   + scapular retraction    + no money + scapular retraction     therapeutic activities to improve functional performance for 25  minutes, including:  Reassessment   Before mirror:   Seated scapular retractions x 10, 10 x 10"   Seated no money with scap retraction 2 x 10       Patient Education and Home Exercises       Education provided:   + Educated pt on exercising in pain free ROM   + Reviewed and educated pt on HEP     Written Home Exercises Provided: Patient instructed to cont prior HEP. Exercises were reviewed and Selma was able to demonstrate them prior to the end of the session.  Selma demonstrated good  understanding of the education provided. See Electronic Medical Record under Patient Instructions for exercises provided during therapy sessions    Assessment     Reassessment performed today as pt has not been to clinic x 3 weeks due to COVID infection and new onset of SOB. Pt presents with min change/improvement in left shoulder A/PROM with marked myofascial restrictions to mm around axilla, but presents with a 20% improvement in right shoulder ROM. Pain, joint stiffness, and muscle tightness remain primary limiting factors from progression of mobility on L>R shoulder, " with recent COVID case limiting progression towards therapeutic goals.    Selma Is progressing well towards her goals.   Patient prognosis is Good.     Patient will continue to benefit from skilled outpatient physical therapy to address the deficits listed in the problem list box on initial evaluation, provide pt/family education and to maximize pt's level of independence in the home and community environment.     Patient's spiritual, cultural and educational needs considered and pt agreeable to plan of care and goals.     Anticipated barriers to physical therapy: standard, congestive heart failure     Goals:   Short Term Goals: 4 weeks   Patient will increase active ROM in all planes in B shoulders by 5 degrees for tolerance and independence with ADLs.  Patient will increase passive ROM in all planes in B shoulders by 5 degrees for tolerance with reaching overhead and household chores.  Patient will increase strength in B UE by 1/2 grade for tolerance lifting items, reaching for objects, and driving.  Patient will report decreased L shoulder pain to < 5/10 for tolerance with driving and dressing tasks.  Patient will increase score of FOTO by 10% showing improved overall mobility.        Long Term Goals: 12 weeks   Patient will increase active ROM in all planes in L shoulder by 10 degrees for tolerance and independence with dressing and doing her hair.  Patient will increase passive ROM in all planes to WNL in L shoulder for independence with reaching overhead and household chores.  Patient will achieve 4+5 strength in B UE for tolerance lifting items, reaching for objects, and driving.  Patient will increase periscapular strength to 4+/5 for independence with upright posture.   Patient will report decreased L shoulder pain to < 3/10 for tolerance with driving and dressing tasks.  Patient will report independence with HEP to maintain progress.  Patient will increase score of FOTO by 15% for improved overall  mobility.     Plan     Plan of care Certification: 11/9/2023 to 2/2/24.     Outpatient Physical Therapy 2 times weekly for 12 weeks to include the following interventions: Aquatic Therapy, Manual Therapy, Moist Heat/ Ice, Neuromuscular Re-ed, Patient Education, Self Care, Therapeutic Activities, Therapeutic Exercise, Ultrasound, and Dry Needling .     Marissa Holt, PT

## 2023-12-21 ENCOUNTER — CLINICAL SUPPORT (OUTPATIENT)
Dept: REHABILITATION | Facility: OTHER | Age: 78
End: 2023-12-21
Payer: COMMERCIAL

## 2023-12-21 DIAGNOSIS — Z74.09 IMPAIRED MOBILITY AND ADLS: ICD-10-CM

## 2023-12-21 DIAGNOSIS — M25.612 DECREASED RANGE OF MOTION OF LEFT SHOULDER: Primary | ICD-10-CM

## 2023-12-21 DIAGNOSIS — R29.3 POSTURAL IMBALANCE: ICD-10-CM

## 2023-12-21 DIAGNOSIS — Z78.9 IMPAIRED MOBILITY AND ADLS: ICD-10-CM

## 2023-12-21 DIAGNOSIS — R29.898 WEAKNESS OF BOTH ARMS: ICD-10-CM

## 2023-12-21 PROCEDURE — 97110 THERAPEUTIC EXERCISES: CPT | Mod: PN,CQ

## 2023-12-21 PROCEDURE — 97112 NEUROMUSCULAR REEDUCATION: CPT | Mod: PN,CQ

## 2023-12-21 PROCEDURE — 97530 THERAPEUTIC ACTIVITIES: CPT | Mod: PN,CQ

## 2023-12-21 NOTE — PROGRESS NOTES
"[facility]   Physical Therapy Treatment Note      Name: Selma Hooper  Clinic Number: [id number]    Therapy Diagnosis:   Encounter Diagnoses   Name Primary?    Decreased range of motion of left shoulder Yes    Weakness of both arms     Impaired mobility and ADLs     Postural imbalance        Physician: Cassidy Machado PA-C    Visit Date: 12/21/2023    Physician: Cassidy Machado PA-C     Physician Orders: PT Eval and Treat   Medical Diagnosis from Referral: M25.511,M25.512 (ICD-10-CM) - Acute pain of both shoulders   Evaluation Date: 11/9/2023  Authorization Period Expiration: 12/31/23  Plan of Care Expiration: 2/2/24  Progress Note Due: updated to 1/14/24  Date of Surgery: n/a  Visit # / Visits authorized: 5/21   FOTO: 1/ 3     Precautions: Standard, congestive heart failure.      Time In: 2:21 pm  Time Out: 3:00 pm  Total Billable Time: 39 minutes    PTA Visit #: 1/5       Subjective     Patient reports: she is "not too good today". Pt states she is having trouble driving because her shoulders hurt when holding the steering wheel. Pt states after the massage, it feels very sore. Pt states she is really sore today.      She was NOT compliant with home exercise program. -- Can OH flex, AROM no money, scap retraction, doorway pec stretch (B and uni), table slides flex/scap    Response to previous treatment: soreness    Functional change: ongoing     Pain: 0/10 at rest/now, 7/10 at its worst B   Location: L shoulder > R shoulder     Objective      Objective Measures updated at progress report unless specified.     Updated 12/14/2023    Observation: dons MASK due to recent COVID infection, O2 tank with nasal cannula 2* to c/o recent onset of SOB   Posture: Forward posture with rounded shoulders, increased TSP kyphosis      Passive Range of Motion:   Shoulder Left Right   Flexion 105 160   Abduction 90 170   ER at 0 40 75   ER at 90 15 50   IR 40 85      Active Range of Motion:   Shoulder Left " Right   Flexion 85 160   Abduction 75 160   ER at 0 38 70   ER at 90 10 45   IR 35 80      Upper Extremity Strength    (L) UE (R) UE   Shoulder flexion: 4-/5 4/5   Shoulder Abduction: 3+/5 4/5   Shoulder extension 4+/5 4+/5   Shoulder ER 4-/5 4/5   Shoulder IR 4-/5 4/5   Lower Trap 3+/5 3+/5   Middle Trap 4/5 4/5   Rhomboids 4/5 4/5   Elbow flexion: 5/5 5/5   Elbow extension: 5/5 5/5      ** pain with all shoulder muscle testing.         Special Tests:  AC Joint Left Right   AC Joint Compression Test Positive for pain negative   Empty Can Test positive negative   Drop Arm test negative negative   Subscaputlaris Lift Off Can't get into testing position  Positive                    Hawkin's Lm Positive  positive    Neer's Test negative negative   Speed's test positive Negative       Apley's Scratch Test    Left Right   Combined ER Gets right behind her ear T1   Combined IR Unable to get behind back  T8   Cross-body reach Anterior opp shoulder (pain) Post opp shoulder         Joint Mobility: good mobility with inferior and posterior glides - no pain noted; GHJ distraction performed with slight pain when performing with movement.      Palpation: Tenderness over B LH bicep tendon, L lateral deltoid, L supraspinatus.      Sensation: intact in B UE     Flexibility: tightness in B pecs.     Treatment     Selma received the treatments listed below:      therapeutic exercises to develop strength, endurance, ROM, flexibility, posture, and core stabilization for 8 minutes including:  Pec stretch over towel rolls x 2 min    scapular retraction x20 (over towel rolls)     manual therapy techniques: Joint mobilizations, Myofacial release, Soft tissue Mobilization, and Friction Massage were applied to the:  for 00 minutes, including:  L shoulder distraction, Grade I-IV - poor tolerance  L shoulder inferior glide, A/P glide, Grave I-IV - fair tolerance  passive range of motion to L shoulder, flexion only today   STM/MFR to pecs  "breanna/min, subscap, lats    neuromuscular re-education activities to improve: Balance, Coordination, Kinesthetic, Proprioception, and Posture for 23 minutes. The following activities were included:  pulleys 3' flex/3' scaption -- cued to allow R to pull L shoulder   Table flexion slides x 20 -- verbal cues to stay in pain free ROM   Table scaption slides x 20 -- verbal cues to stay in pain free ROM   Low doorway stretch (B arms and unilateral) 2 x 30" each arm -- verbal cues to stay in pain free ROM  Supine AAROM flexion with cane   open books (attempted, had increased pain)   towel roll series (2 towels)    OH flexion with 1# dowel x 20    chest press + SA punch x 20 x 1# dowel   cane Dylan ER (0 deg abd) x 20 x 1# dowel (towel roll under arm)    no money + scapular retraction x20    + swimmers x20    therapeutic activities to improve functional performance for 8  minutes, including:  Reassessment   Reviewed HEP   Seated scapular retractions x 10, 10 x 10"   Seated no money with scap retraction 2 x 10       Patient Education and Home Exercises       Education provided:   - Educated pt on exercising in pain free ROM   - Reviewed and educated pt on HEP   + Educated on DOMS, importance of HEP compliance     Written Home Exercises Provided: Patient instructed to cont prior HEP. Exercises were reviewed and Selma was able to demonstrate them prior to the end of the session.  Selma demonstrated good  understanding of the education provided. See Electronic Medical Record under Patient Instructions for exercises provided during therapy sessions    Assessment     Pt had good to fair tolerance to interventions today with mild increases of pain at end ranges throughout. Pt educated on exercising in a pain free range. Introduced swimmers today, which pt tolerated well. Pt had increased pain with bilateral no moneys, which was relieved by adding towel roll under L arm. Added no moneys to HEP today, which pt demonstrated good " understanding. Pt had juddering with eccentric lowering during serratus punches, but able to complete with appropriate mm fatigue. Manual therapy held today due to pt's subjective of intense soreness from last visit -- consider resuming to improve ROM. Continue to monitor and progress pt as tolerated.     Selma Is progressing well towards her goals.   Patient prognosis is Good.     Patient will continue to benefit from skilled outpatient physical therapy to address the deficits listed in the problem list box on initial evaluation, provide pt/family education and to maximize pt's level of independence in the home and community environment.     Patient's spiritual, cultural and educational needs considered and pt agreeable to plan of care and goals.     Anticipated barriers to physical therapy: standard, congestive heart failure     Goals: updated 12/21/2023   Short Term Goals: 4 weeks   Patient will increase active ROM in all planes in B shoulders by 5 degrees for tolerance and independence with ADLs. (Progressing, not met)  Patient will increase passive ROM in all planes in B shoulders by 5 degrees for tolerance with reaching overhead and household chores.(Progressing, not met)  Patient will increase strength in B UE by 1/2 grade for tolerance lifting items, reaching for objects, and driving.(Progressing, not met)  Patient will report decreased L shoulder pain to < 5/10 for tolerance with driving and dressing tasks.(Progressing, not met)  Patient will increase score of FOTO by 10% showing improved overall mobility.(Progressing, not met)        Long Term Goals: 12 weeks   Patient will increase active ROM in all planes in L shoulder by 10 degrees for tolerance and independence with dressing and doing her hair.(Progressing, not met)  Patient will increase passive ROM in all planes to WNL in L shoulder for independence with reaching overhead and household chores.(Progressing, not met)  Patient will achieve 4+5 strength  in B UE for tolerance lifting items, reaching for objects, and driving.(Progressing, not met)  Patient will increase periscapular strength to 4+/5 for independence with upright posture. (Progressing, not met)  Patient will report decreased L shoulder pain to < 3/10 for tolerance with driving and dressing tasks.(Progressing, not met)  Patient will report independence with HEP to maintain progress.  Patient will increase score of FOTO by 15% for improved overall mobility. (Progressing, not met)    Plan     Plan of care Certification: 11/9/2023 to 2/2/24.     Continue pt's current POC to increase ROM and strength to improve ADLs.     Outpatient Physical Therapy 2 times weekly for 12 weeks to include the following interventions: Aquatic Therapy, Manual Therapy, Moist Heat/ Ice, Neuromuscular Re-ed, Patient Education, Self Care, Therapeutic Activities, Therapeutic Exercise, Ultrasound, and Dry Needling .     Ellen Corona, PTA

## 2023-12-22 ENCOUNTER — DOCUMENTATION ONLY (OUTPATIENT)
Dept: REHABILITATION | Facility: OTHER | Age: 78
End: 2023-12-22
Payer: COMMERCIAL

## 2023-12-22 DIAGNOSIS — Z74.09 IMPAIRED MOBILITY AND ADLS: ICD-10-CM

## 2023-12-22 DIAGNOSIS — M25.612 DECREASED RANGE OF MOTION OF LEFT SHOULDER: ICD-10-CM

## 2023-12-22 DIAGNOSIS — R29.898 WEAKNESS OF BOTH ARMS: Primary | ICD-10-CM

## 2023-12-22 DIAGNOSIS — Z78.9 IMPAIRED MOBILITY AND ADLS: ICD-10-CM

## 2023-12-22 DIAGNOSIS — R29.3 POSTURAL IMBALANCE: ICD-10-CM

## 2023-12-22 NOTE — PROGRESS NOTES
Physical Therapist and Physical Therapist Assistant met face to face to discuss patient's treatment plan and progress towards established goals. Pt will be seen by a physical therapist minimally every 6th visit or every 30 days.    Ellen Corona, ANN  12/22/2023

## 2023-12-26 ENCOUNTER — TELEPHONE (OUTPATIENT)
Dept: RADIOLOGY | Facility: HOSPITAL | Age: 78
End: 2023-12-26
Payer: COMMERCIAL

## 2023-12-26 NOTE — TELEPHONE ENCOUNTER
Spoke with patient and explained mammogram findings.Patient expressed understanding of results. Patient scheduled abnormal mammogram follow up appointment at [facility] [place] on 1/2/2024.

## 2023-12-27 ENCOUNTER — TELEPHONE (OUTPATIENT)
Dept: CARDIOLOGY | Facility: CLINIC | Age: 78
End: 2023-12-27
Payer: COMMERCIAL

## 2023-12-27 NOTE — TELEPHONE ENCOUNTER
Teaching done on transition fr valsartan to Entresto (after new year). Pt verbalized understanding.   She stated that she has been having a dry cough and sob when lying done. Also had covid earlier this month and is supposed to address w. pcp. I told her to contact us if not improving after starting the Entresto and pulmonary etiology ruled out. She verbalized understanding.

## 2023-12-27 NOTE — TELEPHONE ENCOUNTER
----- Message from Tatiana Espinoza MA sent at 12/27/2023  4:33 PM CST -----  Bethanie Bean - I called this lady to see if I could get more info but went to  - I did not leave a msg.    Thanks  Marlen  ----- Message -----  From: Sarahi Bentley  Sent: 12/27/2023  11:06 AM CST  To: Axel Buchanan Staff    Type:  Patient Returning Call    Who Called:pt   Who Left Message for Patient:  Does the patient know what this is regarding?:pt   Would the patient rather a call back or a response via [facility]? call  Best Call Back Number:[phone]  Additional Information: pt states she would like to speak with [place] in regards to whether or not she needs to stay off the valsartan rx while on the entresto. Pt would like to speak with office asap

## 2023-12-28 ENCOUNTER — HOSPITAL ENCOUNTER (OUTPATIENT)
Dept: RADIOLOGY | Facility: HOSPITAL | Age: 78
Discharge: HOME | End: 2023-12-28
Attending: STUDENT IN AN ORGANIZED HEALTH CARE EDUCATION/TRAINING PROGRAM
Payer: COMMERCIAL

## 2023-12-28 ENCOUNTER — OFFICE VISIT (OUTPATIENT)
Dept: INTERNAL MEDICINE | Facility: CLINIC | Age: 78
End: 2023-12-28
Payer: COMMERCIAL

## 2023-12-28 ENCOUNTER — PATIENT MESSAGE (OUTPATIENT)
Dept: INTERNAL MEDICINE | Facility: CLINIC | Age: 78
End: 2023-12-28
Payer: COMMERCIAL

## 2023-12-28 VITALS
SYSTOLIC BLOOD PRESSURE: 127 MMHG | HEIGHT: 61 IN | HEART RATE: 92 BPM | DIASTOLIC BLOOD PRESSURE: 82 MMHG | WEIGHT: 144.19 LBS | OXYGEN SATURATION: 100 % | BODY MASS INDEX: 27.22 KG/M2

## 2023-12-28 DIAGNOSIS — R06.02 SHORTNESS OF BREATH: ICD-10-CM

## 2023-12-28 DIAGNOSIS — I50.42 CHRONIC COMBINED SYSTOLIC AND DIASTOLIC CONGESTIVE HEART FAILURE: ICD-10-CM

## 2023-12-28 DIAGNOSIS — R05.2 SUBACUTE COUGH: Primary | ICD-10-CM

## 2023-12-28 DIAGNOSIS — J43.2 CENTRILOBULAR EMPHYSEMA: ICD-10-CM

## 2023-12-28 DIAGNOSIS — R05.2 SUBACUTE COUGH: ICD-10-CM

## 2023-12-28 PROCEDURE — 71046 X-RAY EXAM CHEST 2 VIEWS: CPT | Mod: 26,,, | Performed by: RADIOLOGY

## 2023-12-28 PROCEDURE — 1101F PR PT FALLS ASSESS DOC 0-1 FALLS W/OUT INJ PAST YR: ICD-10-PCS | Mod: CPTII,S$GLB,, | Performed by: STUDENT IN AN ORGANIZED HEALTH CARE EDUCATION/TRAINING PROGRAM

## 2023-12-28 PROCEDURE — 1125F PR PAIN SEVERITY QUANTIFIED, PAIN PRESENT: ICD-10-PCS | Mod: CPTII,S$GLB,, | Performed by: STUDENT IN AN ORGANIZED HEALTH CARE EDUCATION/TRAINING PROGRAM

## 2023-12-28 PROCEDURE — 71046 X-RAY EXAM CHEST 2 VIEWS: CPT | Mod: TC

## 2023-12-28 PROCEDURE — 3079F PR MOST RECENT DIASTOLIC BLOOD PRESSURE 80-89 MM HG: ICD-10-PCS | Mod: CPTII,S$GLB,, | Performed by: STUDENT IN AN ORGANIZED HEALTH CARE EDUCATION/TRAINING PROGRAM

## 2023-12-28 PROCEDURE — 99214 OFFICE O/P EST MOD 30 MIN: CPT | Mod: S$GLB,,, | Performed by: STUDENT IN AN ORGANIZED HEALTH CARE EDUCATION/TRAINING PROGRAM

## 2023-12-28 PROCEDURE — 1125F AMNT PAIN NOTED PAIN PRSNT: CPT | Mod: CPTII,S$GLB,, | Performed by: STUDENT IN AN ORGANIZED HEALTH CARE EDUCATION/TRAINING PROGRAM

## 2023-12-28 PROCEDURE — 1159F PR MEDICATION LIST DOCUMENTED IN MEDICAL RECORD: ICD-10-PCS | Mod: CPTII,S$GLB,, | Performed by: STUDENT IN AN ORGANIZED HEALTH CARE EDUCATION/TRAINING PROGRAM

## 2023-12-28 PROCEDURE — 3288F FALL RISK ASSESSMENT DOCD: CPT | Mod: CPTII,S$GLB,, | Performed by: STUDENT IN AN ORGANIZED HEALTH CARE EDUCATION/TRAINING PROGRAM

## 2023-12-28 PROCEDURE — 99214 PR OFFICE/OUTPT VISIT, EST, LEVL IV, 30-39 MIN: ICD-10-PCS | Mod: S$GLB,,, | Performed by: STUDENT IN AN ORGANIZED HEALTH CARE EDUCATION/TRAINING PROGRAM

## 2023-12-28 PROCEDURE — 3079F DIAST BP 80-89 MM HG: CPT | Mod: CPTII,S$GLB,, | Performed by: STUDENT IN AN ORGANIZED HEALTH CARE EDUCATION/TRAINING PROGRAM

## 2023-12-28 PROCEDURE — 1159F MED LIST DOCD IN RCRD: CPT | Mod: CPTII,S$GLB,, | Performed by: STUDENT IN AN ORGANIZED HEALTH CARE EDUCATION/TRAINING PROGRAM

## 2023-12-28 PROCEDURE — 99999 PR PBB SHADOW E&M-EST. PATIENT-LVL IV: ICD-10-PCS | Mod: PBBFAC,,, | Performed by: STUDENT IN AN ORGANIZED HEALTH CARE EDUCATION/TRAINING PROGRAM

## 2023-12-28 PROCEDURE — 3074F SYST BP LT 130 MM HG: CPT | Mod: CPTII,S$GLB,, | Performed by: STUDENT IN AN ORGANIZED HEALTH CARE EDUCATION/TRAINING PROGRAM

## 2023-12-28 PROCEDURE — 3074F PR MOST RECENT SYSTOLIC BLOOD PRESSURE < 130 MM HG: ICD-10-PCS | Mod: CPTII,S$GLB,, | Performed by: STUDENT IN AN ORGANIZED HEALTH CARE EDUCATION/TRAINING PROGRAM

## 2023-12-28 PROCEDURE — 1101F PT FALLS ASSESS-DOCD LE1/YR: CPT | Mod: CPTII,S$GLB,, | Performed by: STUDENT IN AN ORGANIZED HEALTH CARE EDUCATION/TRAINING PROGRAM

## 2023-12-28 PROCEDURE — 99999 PR PBB SHADOW E&M-EST. PATIENT-LVL IV: CPT | Mod: PBBFAC,,, | Performed by: STUDENT IN AN ORGANIZED HEALTH CARE EDUCATION/TRAINING PROGRAM

## 2023-12-28 PROCEDURE — 1160F PR REVIEW ALL MEDS BY PRESCRIBER/CLIN PHARMACIST DOCUMENTED: ICD-10-PCS | Mod: CPTII,S$GLB,, | Performed by: STUDENT IN AN ORGANIZED HEALTH CARE EDUCATION/TRAINING PROGRAM

## 2023-12-28 PROCEDURE — 3288F PR FALLS RISK ASSESSMENT DOCUMENTED: ICD-10-PCS | Mod: CPTII,S$GLB,, | Performed by: STUDENT IN AN ORGANIZED HEALTH CARE EDUCATION/TRAINING PROGRAM

## 2023-12-28 PROCEDURE — 1160F RVW MEDS BY RX/DR IN RCRD: CPT | Mod: CPTII,S$GLB,, | Performed by: STUDENT IN AN ORGANIZED HEALTH CARE EDUCATION/TRAINING PROGRAM

## 2023-12-28 PROCEDURE — 71046 XR CHEST PA AND LATERAL: ICD-10-PCS | Mod: 26,,, | Performed by: RADIOLOGY

## 2023-12-28 RX ORDER — PREDNISONE 20 MG/1
20 TABLET ORAL DAILY
Qty: 5 TABLET | Refills: 0 | Status: SHIPPED | OUTPATIENT
Start: 2023-12-28 | End: 2024-01-02

## 2023-12-28 RX ORDER — PREDNISONE 20 MG/1
20 TABLET ORAL DAILY
Qty: 5 TABLET | Refills: 0 | Status: SHIPPED | OUTPATIENT
Start: 2023-12-28 | End: 2023-12-28

## 2023-12-28 RX ORDER — PROMETHAZINE HYDROCHLORIDE AND DEXTROMETHORPHAN HYDROBROMIDE 6.25; 15 MG/5ML; MG/5ML
5 SYRUP ORAL EVERY 4 HOURS PRN
Qty: 118 ML | Refills: 0 | Status: SHIPPED | OUTPATIENT
Start: 2023-12-28 | End: 2024-01-07

## 2023-12-28 NOTE — PROGRESS NOTES
SUBJECTIVE     Chief Complaint   Patient presents with    covid f/u       HPI  Selma Hooper is a 78 y.o. female with  HTN, chronic combined systolic & diastolic CHF, CAD w/ h/o NSTEMI, LBBB, COPD, chronic hypoxic respiratory failure, lung nodule, alpha thalassemia trait, subclinical hyperthyroidism, statin intolerance, impaired mobility and ADLs, h/o nicotine dependence  that presents for follow-up of recent COVID-19 infection.     PCP is Phil Andrade MD.     Seen by me on 12/4/23 for same-day urgent care appointment for SOB, cough. Tested positive for COVID-19. SpO2 98% on intermittent home O2 (1L NC). SpO2 93% ORA at rest in clinic that day. Treated with Paxlovid, Benzonatate. CXR showed no acute processes. Benzonatate was not effective for managing cough, Rx for Promethazine-DM sent to her on 12//8/23.     Today complains of continued dry cough following COVID-19 infection, worse in the evening. Responsive to Promethazine-DM. Has also been having increased SOB, will only be able to walk 20 feet and then will get winded. Recently discontinued Entresto due to insurance reason, no on Valsartan monotherapy. Will resume Entresto next month. Now using supplemental oxygen more frequently. Denies lower extremity edema. Weight only up 1 lb since last visit.     Patient also has COPD/pulmonary emphysema. She was prescribed Wixela BID by her pulomonologist. States it is not working as well as before. However, she states she is only using it once a day and not twice daily as her pulmonologist had suggested. Using DuoNebs PRN at home with good effect. Denies fever, chills, nausea, vomiting.       PAST MEDICAL HISTORY:  Past Medical History:   Diagnosis Date    Abnormal liver enzymes 12/10/2018    Acute on chronic combined systolic and diastolic congestive heart failure 4/2/2021    Acute on chronic respiratory failure with hypoxia 4/2/2021    Acute on chronic respiratory failure with hypoxia and hypercapnia 12/10/2021     CHF (congestive heart failure)     COPD exacerbation 7/12/2022    Former smoker 10/24/2018    Hypertension     Smoker 7/27/2016       PAST SURGICAL HISTORY:  Past Surgical History:   Procedure Laterality Date    BREAST BIOPSY      left  side years ago in high school   1962    CHOLECYSTECTOMY      COLONOSCOPY      COLONOSCOPY N/A 8/23/2021    Procedure: COLONOSCOPY;  Surgeon: Shree Amaya MD;  Location: [facility] ([address] [place] [address]);  Service: Endoscopy;  Laterality: N/A;  Do not cancel this order  fully vaccinated-see immunization record  EF 18%  8/20-covid [place]    HYSTERECTOMY         FAMILY HISTORY:  Family History   Problem Relation Age of Onset    Heart disease Mother     Heart attack Mother     Hypertension Mother     Colon cancer Father         colon    Dementia Father     Hypertension Father     Colon cancer Brother 63        colon    Heart disease Brother     Hypertension Brother     Diabetes Daughter     Crohn's disease Neg Hx     Ulcerative colitis Neg Hx     Stomach cancer Neg Hx        ALLERGIES AND MEDICATIONS: updated and reviewed.  Review of patient's allergies indicates:   Allergen Reactions    Atorvastatin      Leg cramps     Current Outpatient Medications   Medication Sig Dispense Refill    albuterol-ipratropium (DUO-NEB) 2.5 mg-0.5 mg/3 mL nebulizer solution Take 3 mLs by nebulization every 6 (six) hours as needed for Wheezing. Rescue 90 mL 3    dapagliflozin (FARXIGA) 5 mg Tab tablet Take 1 tablet (5 mg total) by mouth once daily. 90 tablet 3    fluticasone-salmeterol diskus inhaler 250-50 mcg Inhale 1 puff into the lungs 2 (two) times daily. Controller 60 each 11    furosemide (LASIX) 20 MG tablet Take a 20mg tablet by mouth at mid day in addition to the 40mg once a day while you are not taking Entresto. Stop when restarting Entresto 30 tablet 11    furosemide (LASIX) 40 MG tablet Take 1 tablet (40 mg total) by mouth once daily. 90 tablet 3    ipratropium-albuteroL (COMBIVENT)   mcg/actuation inhaler Inhale 1 puff into the lungs every 6 (six) hours as needed for Wheezing. Rescue 4 g 6    metoprolol succinate (TOPROL-XL) 25 MG 24 hr tablet Take 1 tablet (25 mg total) by mouth once daily. 90 tablet 3    pulse oximeter (PULSE OXIMETER) device by Apply Externally route 2 (two) times a day. Use twice daily at 8 AM and 3 PM and record the value in [facility] as directed. 1 each 0    sacubitriL-valsartan (ENTRESTO) 24-26 mg per tablet Take 1 tablet by mouth 2 (two) times daily. Don't take valsartan and Entresto together. 180 tablet 3    valsartan (DIOVAN) 80 MG tablet Take 1 tablet (80 mg total) by mouth once daily. Take valsartan while you are not taking Entresto. Stop when restarting Entresto. 90 tablet 3     No current facility-administered medications for this visit.       ROS  Review of Systems   Constitutional:  Negative for activity change, chills and fever.   HENT:  Negative for congestion and hearing loss.    Eyes:  Negative for pain and visual disturbance.   Respiratory:  Positive for cough, shortness of breath and wheezing.    Cardiovascular:  Negative for chest pain and palpitations.   Gastrointestinal:  Negative for abdominal pain, constipation, diarrhea, nausea and vomiting.   Endocrine: Negative.    Genitourinary: Negative.    Musculoskeletal:  Negative for arthralgias and myalgias.   Skin: Negative.    Allergic/Immunologic: Negative.    Neurological:  Negative for dizziness, light-headedness and headaches.   Hematological: Negative.          OBJECTIVE     Physical Exam  Vitals:    12/28/23 1010   BP: 127/82   Pulse: 92    Body mass index is 27.24 kg/m².            Physical Exam  Vitals reviewed.   Constitutional:       General: She is not in acute distress.     Appearance: Normal appearance.   HENT:      Head: Normocephalic and atraumatic.      Mouth/Throat:      Mouth: Mucous membranes are moist.      Pharynx: Oropharynx is clear.   Eyes:      Extraocular Movements:  Extraocular movements intact.      Conjunctiva/sclera: Conjunctivae normal.      Pupils: Pupils are equal, round, and reactive to light.   Cardiovascular:      Rate and Rhythm: Normal rate and regular rhythm.      Pulses: Normal pulses.      Heart sounds: Normal heart sounds.   Pulmonary:      Effort: Pulmonary effort is normal.      Breath sounds: Examination of the right-lower field reveals decreased breath sounds. Examination of the left-lower field reveals decreased breath sounds. Decreased breath sounds present. No wheezing, rhonchi or rales.   Abdominal:      General: Bowel sounds are normal. There is no distension.      Palpations: Abdomen is soft. There is no mass.      Tenderness: There is no abdominal tenderness. There is no guarding.   Musculoskeletal:         General: Normal range of motion.      Cervical back: Normal range of motion and neck supple. No rigidity or tenderness.      Right lower leg: No edema.      Left lower leg: No edema.   Lymphadenopathy:      Cervical: No cervical adenopathy.   Skin:     General: Skin is warm and dry.   Neurological:      General: No focal deficit present.      Mental Status: She is alert.   Psychiatric:         Mood and Affect: Mood normal.         Behavior: Behavior normal.           Health Maintenance         Date Due Completion Date    Aspirin/Antiplatelet Therapy Never done ---    Shingles Vaccine (1 of 2) Never done ---    RSV Vaccine (Age 60+ and Pregnant patients) (1 - 1-dose 60+ series) Never done ---    COVID-19 Vaccine (4 - 2023-24 season) 09/01/2023 2/10/2022    Mammogram 12/14/2024 12/14/2023    DEXA Scan 12/11/2025 12/11/2023    TETANUS VACCINE 02/21/2027 2/21/2017    Lipid Panel 10/30/2028 10/30/2023              ASSESSMENT     78 y.o. female with     1. Subacute cough    2. Shortness of breath    3. Centrilobular emphysema    4. Chronic combined systolic and diastolic congestive heart failure        PLAN:     1. Subacute cough  - Likely post-viral due  to recent COVID-19 infection. Reassurance provided.   - X-Ray Chest PA And Lateral; Future  - promethazine-dextromethorphan (PROMETHAZINE-DM) 6.25-15 mg/5 mL Syrp; Take 5 mLs by mouth every 4 (four) hours as needed.  Dispense: 118 mL; Refill: 0    2. Shortness of breath  - With increased THEODORE and reliance on home oxygen. Suspect this may be due to under-treatment of her COPD due to not taking controller medication as prescribed. Counseled her at length about need to take it twice daily. She verbalized understanding. Can continue DuoNebs PRN.   - Recommended following up with her pulmonologist.   - Given that symptoms have also started since discontinuing Entresto, will obtain BNP.   - X-Ray Chest PA And Lateral; Future  - B-TYPE NATRIURETIC PEPTIDE; Future  - CBC Auto Differential; Future    3. Centrilobular emphysema  - Care as above, will also send short course of prednisone to cover for any possible exacerbation of COPD.   - predniSONE (DELTASONE) 20 MG tablet; Take 1 tablet (20 mg total) by mouth once daily. for 5 days  Dispense: 5 tablet; Refill: 0    4. Chronic combined systolic and diastolic congestive heart failure  - To restart Entresto after new year. Recommended follow up with Cardiology.   - BNP ordered as above, but otherwise appears euvolemic on exam today.     RTC PRN       Alvaro Mancuso MD  Family Medicine  [facility]  12/28/2023    This document was created using voice recognition software (M*Modal Fluency Direct). Although it may be edited, this document may contain errors related to incorrect recognition of the spoken word. Please call the physician if clarification is needed.       No follow-ups on file.

## 2023-12-28 NOTE — PATIENT INSTRUCTIONS
Start taking your Wixela (Disk inhaler) TWICE A DAY. Can continue the DuoNEBs Nebulizer as needed every 6 hours. Follow up with your pulmonologist, cardiologist.     I sent a prescription for five days of prednisone. Take a tablet twice a day. This will help your COPD.

## 2023-12-29 ENCOUNTER — TELEPHONE (OUTPATIENT)
Dept: INTERNAL MEDICINE | Facility: CLINIC | Age: 78
End: 2023-12-29
Payer: COMMERCIAL

## 2023-12-29 NOTE — TELEPHONE ENCOUNTER
Called and informed pt of recent blood work results and recommendations. Pt expressed understanding with no further questions

## 2023-12-29 NOTE — TELEPHONE ENCOUNTER
----- Message from Alvaro Mancuso MD sent at 12/29/2023  7:45 AM CST -----  Please inform her of the following: Blood work overall looks stable. Recommend following up with her pulmonologist and cardiologist about her shortness of breath.   ----- Message -----  From: Blake, [facility] Interface  Sent: 12/28/2023   2:29 PM CST  To: Alvaro Mancuso MD

## 2023-12-30 NOTE — PROGRESS NOTES
Subjective:     Patient ID: Selma Hooper is a 78 y.o. female.    Chief Complaint: Pain of the Left Shoulder and Pain of the Right Shoulder    HPI    Patient is a 78 year old female who presents to clinic with chief complaint of a-traumatic bilateral left greater than right constant sharp shoulder pain with decreased ROM. First noticed once started carring an O2 Tank. Is to avoid NSAID due to PMHX and has though of injections but would like to avoid. Pain is increased with  reaching over head and at night.     Review of Systems   Constitutional: Negative for chills and fever.   Cardiovascular:  Negative for chest pain.   Respiratory:  Negative for cough and shortness of breath.    Skin:  Negative for color change, dry skin, itching, nail changes, poor wound healing and rash.   Musculoskeletal:         Shoulder pain    Neurological:  Negative for dizziness.   Psychiatric/Behavioral:  Negative for altered mental status. The patient is not nervous/anxious.    All other systems reviewed and are negative.      Objective:       General    Constitutional: She is oriented to person, place, and time. She appears well-developed and well-nourished. No distress.   HENT:   Head: Atraumatic.   Eyes: Conjunctivae are normal.   Cardiovascular:  Normal rate.            Pulmonary/Chest: Effort normal.   Neurological: She is alert and oriented to person, place, and time.   Psychiatric: She has a normal mood and affect. Her behavior is normal.         Back (L-Spine & T-Spine) / Neck (C-Spine) Exam     Tenderness   The patient is tender to palpation of the right trapezial and left trapezial.   Right Shoulder Exam     Range of Motion   Active abduction:  normal   Forward Flexion:  140   Adduction: normal  External Rotation 0 degrees:  abnormal   External Rotation 90 degrees: abnormal  Internal rotation 0 degrees:  abnormal   Internal rotation 90 degrees:  abnormal     Tests & Signs   Cross arm: negative  Drop arm: negative  Impingement:  positive  Belly Press: negative  Speed's Test: negative    Left Shoulder Exam     Tenderness   The patient is tender to palpation of the biceps tendon.    Range of Motion   Active abduction:  normal   Forward Flexion:  140   Adduction: normal  External Rotation 0 degrees:  abnormal   External Rotation 90 degrees: abnormal  Internal rotation 0 degrees:  abnormal   Internal rotation 90 degrees:  abnormal     Tests & Signs   Cross arm: negative  Drop arm: negative  Impingement: positive  Belly Press: negative  Speed's Test: negative         Physical Exam  Constitutional:       General: She is not in acute distress.     Appearance: She is well-developed and well-nourished. She is not diaphoretic.   HENT:      Head: Atraumatic.   Eyes:      Conjunctiva/sclera: Conjunctivae normal.   Cardiovascular:      Rate and Rhythm: Normal rate.   Pulmonary:      Effort: Pulmonary effort is normal.   Neurological:      Mental Status: She is alert and oriented to person, place, and time.   Psychiatric:         Mood and Affect: Mood and affect normal.         Behavior: Behavior normal.         RADS: done 09/29/23:  No acute fracture, dislocation, or osseous destruction.  AC joints are unremarkable.  Soft tissues are unremarkable.     Assessment:     Encounter Diagnosis   Name Primary?    Acute pain of both shoulders        Plan:      Discussed treatment options/ plan with the patient. At this time will order PT. Patient is to make appointment herself. Return to clinic as needed>

## 2024-01-01 ENCOUNTER — TELEPHONE (OUTPATIENT)
Dept: INTERNAL MEDICINE | Facility: CLINIC | Age: 79
End: 2024-01-01
Payer: COMMERCIAL

## 2024-01-01 ENCOUNTER — OFFICE VISIT (OUTPATIENT)
Dept: PULMONOLOGY | Facility: CLINIC | Age: 79
End: 2024-01-01
Payer: COMMERCIAL

## 2024-01-01 ENCOUNTER — TELEPHONE (OUTPATIENT)
Dept: CARDIOLOGY | Facility: CLINIC | Age: 79
End: 2024-01-01
Payer: COMMERCIAL

## 2024-01-01 ENCOUNTER — OFFICE VISIT (OUTPATIENT)
Dept: NEPHROLOGY | Facility: CLINIC | Age: 79
End: 2024-01-01
Payer: COMMERCIAL

## 2024-01-01 ENCOUNTER — OFFICE VISIT (OUTPATIENT)
Dept: CARDIOLOGY | Facility: CLINIC | Age: 79
End: 2024-01-01
Payer: COMMERCIAL

## 2024-01-01 ENCOUNTER — HOSPITAL ENCOUNTER (OUTPATIENT)
Dept: CARDIOLOGY | Facility: CLINIC | Age: 79
Discharge: HOME | End: 2024-04-22
Payer: COMMERCIAL

## 2024-01-01 ENCOUNTER — DOCUMENTATION ONLY (OUTPATIENT)
Dept: CARDIOLOGY | Facility: CLINIC | Age: 79
End: 2024-01-01
Payer: COMMERCIAL

## 2024-01-01 ENCOUNTER — HOSPITAL ENCOUNTER (INPATIENT)
Facility: HOSPITAL | Age: 79
LOS: 10 days | DRG: 291 | End: 2024-06-07
Attending: EMERGENCY MEDICINE | Admitting: INTERNAL MEDICINE
Payer: COMMERCIAL

## 2024-01-01 ENCOUNTER — NURSE TRIAGE (OUTPATIENT)
Dept: ADMINISTRATIVE | Facility: CLINIC | Age: 79
End: 2024-01-01
Payer: COMMERCIAL

## 2024-01-01 ENCOUNTER — HOSPITAL ENCOUNTER (INPATIENT)
Facility: HOSPITAL | Age: 79
LOS: 2 days | Discharge: HOME HEALTH | DRG: 280 | End: 2024-05-16
Attending: EMERGENCY MEDICINE | Admitting: INTERNAL MEDICINE
Payer: COMMERCIAL

## 2024-01-01 ENCOUNTER — DOCUMENTATION ONLY (OUTPATIENT)
Dept: CARDIOLOGY | Facility: CLINIC | Age: 79
End: 2024-01-01

## 2024-01-01 ENCOUNTER — LAB VISIT (OUTPATIENT)
Dept: LAB | Facility: HOSPITAL | Age: 79
End: 2024-01-01
Payer: COMMERCIAL

## 2024-01-01 ENCOUNTER — LAB VISIT (OUTPATIENT)
Dept: LAB | Facility: HOSPITAL | Age: 79
End: 2024-01-01
Attending: FAMILY MEDICINE
Payer: COMMERCIAL

## 2024-01-01 ENCOUNTER — TELEPHONE (OUTPATIENT)
Dept: PHARMACY | Facility: CLINIC | Age: 79
End: 2024-01-01
Payer: COMMERCIAL

## 2024-01-01 ENCOUNTER — TELEPHONE (OUTPATIENT)
Dept: RADIOLOGY | Facility: HOSPITAL | Age: 79
End: 2024-01-01
Payer: COMMERCIAL

## 2024-01-01 ENCOUNTER — OFFICE VISIT (OUTPATIENT)
Dept: INTERNAL MEDICINE | Facility: CLINIC | Age: 79
End: 2024-01-01
Attending: FAMILY MEDICINE
Payer: COMMERCIAL

## 2024-01-01 ENCOUNTER — TELEPHONE (OUTPATIENT)
Dept: SURGERY | Facility: CLINIC | Age: 79
End: 2024-01-01
Payer: COMMERCIAL

## 2024-01-01 ENCOUNTER — HOSPITAL ENCOUNTER (OUTPATIENT)
Dept: RADIOLOGY | Facility: HOSPITAL | Age: 79
Discharge: HOME | End: 2024-01-02
Attending: FAMILY MEDICINE
Payer: COMMERCIAL

## 2024-01-01 ENCOUNTER — HOSPITAL ENCOUNTER (OUTPATIENT)
Dept: RADIOLOGY | Facility: HOSPITAL | Age: 79
Discharge: HOME | End: 2024-03-11
Attending: PHYSICIAN ASSISTANT
Payer: COMMERCIAL

## 2024-01-01 ENCOUNTER — TELEPHONE (OUTPATIENT)
Dept: NEPHROLOGY | Facility: CLINIC | Age: 79
End: 2024-01-01
Payer: COMMERCIAL

## 2024-01-01 ENCOUNTER — PATIENT MESSAGE (OUTPATIENT)
Dept: CARDIOLOGY | Facility: CLINIC | Age: 79
End: 2024-01-01
Payer: COMMERCIAL

## 2024-01-01 ENCOUNTER — OFFICE VISIT (OUTPATIENT)
Dept: ORTHOPEDICS | Facility: CLINIC | Age: 79
End: 2024-01-01
Payer: COMMERCIAL

## 2024-01-01 ENCOUNTER — HOSPITAL ENCOUNTER (OUTPATIENT)
Dept: RADIOLOGY | Facility: HOSPITAL | Age: 79
Discharge: HOME | End: 2024-01-23
Attending: FAMILY MEDICINE
Payer: COMMERCIAL

## 2024-01-01 ENCOUNTER — PATIENT OUTREACH (OUTPATIENT)
Dept: ADMINISTRATIVE | Facility: CLINIC | Age: 79
End: 2024-01-01
Payer: COMMERCIAL

## 2024-01-01 ENCOUNTER — OFFICE VISIT (OUTPATIENT)
Dept: INTERNAL MEDICINE | Facility: CLINIC | Age: 79
End: 2024-01-01
Payer: COMMERCIAL

## 2024-01-01 VITALS
SYSTOLIC BLOOD PRESSURE: 120 MMHG | DIASTOLIC BLOOD PRESSURE: 79 MMHG | HEART RATE: 102 BPM | OXYGEN SATURATION: 95 % | WEIGHT: 142.44 LBS | HEIGHT: 61 IN | BODY MASS INDEX: 26.89 KG/M2

## 2024-01-01 VITALS
RESPIRATION RATE: 14 BRPM | HEIGHT: 61 IN | SYSTOLIC BLOOD PRESSURE: 87 MMHG | BODY MASS INDEX: 25.59 KG/M2 | OXYGEN SATURATION: 77 % | TEMPERATURE: 105 F | WEIGHT: 135.56 LBS | HEART RATE: 128 BPM | DIASTOLIC BLOOD PRESSURE: 53 MMHG

## 2024-01-01 VITALS
DIASTOLIC BLOOD PRESSURE: 67 MMHG | OXYGEN SATURATION: 94 % | SYSTOLIC BLOOD PRESSURE: 99 MMHG | DIASTOLIC BLOOD PRESSURE: 55 MMHG | WEIGHT: 137.81 LBS | HEIGHT: 61 IN | SYSTOLIC BLOOD PRESSURE: 92 MMHG | BODY MASS INDEX: 26.02 KG/M2 | BODY MASS INDEX: 26.65 KG/M2 | HEART RATE: 100 BPM | HEART RATE: 93 BPM | WEIGHT: 141.13 LBS | TEMPERATURE: 98 F | HEIGHT: 61 IN | OXYGEN SATURATION: 97 % | RESPIRATION RATE: 18 BRPM

## 2024-01-01 VITALS
TEMPERATURE: 98 F | OXYGEN SATURATION: 98 % | SYSTOLIC BLOOD PRESSURE: 100 MMHG | RESPIRATION RATE: 16 BRPM | BODY MASS INDEX: 25.89 KG/M2 | DIASTOLIC BLOOD PRESSURE: 60 MMHG | HEART RATE: 85 BPM | WEIGHT: 137.13 LBS | HEIGHT: 61 IN

## 2024-01-01 VITALS
BODY MASS INDEX: 26.89 KG/M2 | HEART RATE: 73 BPM | RESPIRATION RATE: 18 BRPM | HEIGHT: 61 IN | TEMPERATURE: 97 F | DIASTOLIC BLOOD PRESSURE: 57 MMHG | WEIGHT: 142.44 LBS | SYSTOLIC BLOOD PRESSURE: 98 MMHG

## 2024-01-01 VITALS
WEIGHT: 142.88 LBS | OXYGEN SATURATION: 98 % | HEIGHT: 61 IN | BODY MASS INDEX: 26.98 KG/M2 | SYSTOLIC BLOOD PRESSURE: 112 MMHG | HEART RATE: 88 BPM | DIASTOLIC BLOOD PRESSURE: 62 MMHG

## 2024-01-01 VITALS
BODY MASS INDEX: 25.75 KG/M2 | OXYGEN SATURATION: 99 % | HEART RATE: 84 BPM | WEIGHT: 136.38 LBS | DIASTOLIC BLOOD PRESSURE: 59 MMHG | SYSTOLIC BLOOD PRESSURE: 103 MMHG | HEIGHT: 61 IN

## 2024-01-01 VITALS
DIASTOLIC BLOOD PRESSURE: 60 MMHG | BODY MASS INDEX: 26.73 KG/M2 | SYSTOLIC BLOOD PRESSURE: 108 MMHG | OXYGEN SATURATION: 93 % | WEIGHT: 141.56 LBS | HEART RATE: 97 BPM | HEIGHT: 61 IN

## 2024-01-01 VITALS
DIASTOLIC BLOOD PRESSURE: 68 MMHG | HEART RATE: 88 BPM | HEIGHT: 61 IN | BODY MASS INDEX: 27.52 KG/M2 | OXYGEN SATURATION: 98 % | SYSTOLIC BLOOD PRESSURE: 112 MMHG | WEIGHT: 145.75 LBS

## 2024-01-01 DIAGNOSIS — I50.9 CONGESTIVE HEART FAILURE, UNSPECIFIED HF CHRONICITY, UNSPECIFIED HEART FAILURE TYPE: ICD-10-CM

## 2024-01-01 DIAGNOSIS — J96.22 ACUTE ON CHRONIC RESPIRATORY FAILURE WITH HYPOXIA AND HYPERCAPNIA: ICD-10-CM

## 2024-01-01 DIAGNOSIS — R92.8 ABNORMAL MAMMOGRAM: Primary | ICD-10-CM

## 2024-01-01 DIAGNOSIS — R06.02 SOB (SHORTNESS OF BREATH): Primary | ICD-10-CM

## 2024-01-01 DIAGNOSIS — J43.2 CENTRILOBULAR EMPHYSEMA: Primary | ICD-10-CM

## 2024-01-01 DIAGNOSIS — Z09 HOSPITAL DISCHARGE FOLLOW-UP: Primary | ICD-10-CM

## 2024-01-01 DIAGNOSIS — I50.42 CHRONIC COMBINED SYSTOLIC AND DIASTOLIC CONGESTIVE HEART FAILURE: ICD-10-CM

## 2024-01-01 DIAGNOSIS — R06.02 SOB (SHORTNESS OF BREATH): ICD-10-CM

## 2024-01-01 DIAGNOSIS — J96.02 ACUTE RESPIRATORY FAILURE WITH HYPOXIA AND HYPERCAPNIA: ICD-10-CM

## 2024-01-01 DIAGNOSIS — I10 HYPERTENSION, ESSENTIAL: ICD-10-CM

## 2024-01-01 DIAGNOSIS — R07.9 CHEST PAIN: ICD-10-CM

## 2024-01-01 DIAGNOSIS — J96.11 CHRONIC HYPOXEMIC RESPIRATORY FAILURE: ICD-10-CM

## 2024-01-01 DIAGNOSIS — J43.9 PULMONARY EMPHYSEMA, UNSPECIFIED EMPHYSEMA TYPE: Chronic | ICD-10-CM

## 2024-01-01 DIAGNOSIS — Z91.199 NONCOMPLIANCE: Chronic | ICD-10-CM

## 2024-01-01 DIAGNOSIS — J96.21 ACUTE ON CHRONIC RESPIRATORY FAILURE WITH HYPOXIA AND HYPERCAPNIA: ICD-10-CM

## 2024-01-01 DIAGNOSIS — R79.89 ELEVATED TROPONIN LEVEL: ICD-10-CM

## 2024-01-01 DIAGNOSIS — J96.22 ACUTE ON CHRONIC RESPIRATORY FAILURE WITH HYPOXIA AND HYPERCAPNIA: Primary | ICD-10-CM

## 2024-01-01 DIAGNOSIS — I42.8 NICM (NONISCHEMIC CARDIOMYOPATHY): Chronic | ICD-10-CM

## 2024-01-01 DIAGNOSIS — R92.8 ABNORMAL FINDING ON BREAST IMAGING: ICD-10-CM

## 2024-01-01 DIAGNOSIS — I50.9 HEART FAILURE, UNSPECIFIED: Primary | ICD-10-CM

## 2024-01-01 DIAGNOSIS — R53.1 WEAKNESS: ICD-10-CM

## 2024-01-01 DIAGNOSIS — J96.21 ACUTE ON CHRONIC RESPIRATORY FAILURE WITH HYPOXIA AND HYPERCAPNIA: Primary | ICD-10-CM

## 2024-01-01 DIAGNOSIS — R06.02 SHORTNESS OF BREATH: ICD-10-CM

## 2024-01-01 DIAGNOSIS — I44.7 LBBB (LEFT BUNDLE BRANCH BLOCK): Primary | Chronic | ICD-10-CM

## 2024-01-01 DIAGNOSIS — R92.8 ABNORMAL MAMMOGRAM: ICD-10-CM

## 2024-01-01 DIAGNOSIS — R20.2 PARESTHESIAS: ICD-10-CM

## 2024-01-01 DIAGNOSIS — R91.1 LUNG NODULE: ICD-10-CM

## 2024-01-01 DIAGNOSIS — I44.7 LBBB (LEFT BUNDLE BRANCH BLOCK): Chronic | ICD-10-CM

## 2024-01-01 DIAGNOSIS — I50.9 CHF (CONGESTIVE HEART FAILURE): ICD-10-CM

## 2024-01-01 DIAGNOSIS — Z51.5 PALLIATIVE CARE ENCOUNTER: ICD-10-CM

## 2024-01-01 DIAGNOSIS — N18.31 STAGE 3A CHRONIC KIDNEY DISEASE: Primary | ICD-10-CM

## 2024-01-01 DIAGNOSIS — Z87.891 PERSONAL HISTORY OF NICOTINE DEPENDENCE: ICD-10-CM

## 2024-01-01 DIAGNOSIS — J43.8 OTHER EMPHYSEMA: Chronic | ICD-10-CM

## 2024-01-01 DIAGNOSIS — I48.92 ATRIAL FLUTTER: ICD-10-CM

## 2024-01-01 DIAGNOSIS — Z88.8 ALLERGY TO STATIN MEDICATION: ICD-10-CM

## 2024-01-01 DIAGNOSIS — Z00.00 ANNUAL PHYSICAL EXAM: Primary | ICD-10-CM

## 2024-01-01 DIAGNOSIS — J96.01 ACUTE RESPIRATORY FAILURE WITH HYPOXIA AND HYPERCAPNIA: ICD-10-CM

## 2024-01-01 DIAGNOSIS — M25.511 ACUTE PAIN OF BOTH SHOULDERS: ICD-10-CM

## 2024-01-01 DIAGNOSIS — J96.11 CHRONIC HYPOXEMIC RESPIRATORY FAILURE: Primary | ICD-10-CM

## 2024-01-01 DIAGNOSIS — I50.9 ACUTE ON CHRONIC CONGESTIVE HEART FAILURE, UNSPECIFIED HEART FAILURE TYPE: ICD-10-CM

## 2024-01-01 DIAGNOSIS — R00.0 INCREASED HEART RATE: ICD-10-CM

## 2024-01-01 DIAGNOSIS — E11.65 TYPE 2 DIABETES MELLITUS WITH HYPERGLYCEMIA, WITHOUT LONG-TERM CURRENT USE OF INSULIN: ICD-10-CM

## 2024-01-01 DIAGNOSIS — R00.0 TACHYCARDIA: ICD-10-CM

## 2024-01-01 DIAGNOSIS — N18.31 STAGE 3A CHRONIC KIDNEY DISEASE: ICD-10-CM

## 2024-01-01 DIAGNOSIS — M25.511 ACUTE PAIN OF BOTH SHOULDERS: Primary | ICD-10-CM

## 2024-01-01 DIAGNOSIS — R57.0 CARDIOGENIC SHOCK: Primary | ICD-10-CM

## 2024-01-01 DIAGNOSIS — M25.512 ACUTE PAIN OF BOTH SHOULDERS: Primary | ICD-10-CM

## 2024-01-01 DIAGNOSIS — I50.42 CHRONIC COMBINED SYSTOLIC AND DIASTOLIC CONGESTIVE HEART FAILURE: Primary | ICD-10-CM

## 2024-01-01 DIAGNOSIS — Z78.9 STATIN INTOLERANCE: ICD-10-CM

## 2024-01-01 DIAGNOSIS — M25.512 ACUTE PAIN OF BOTH SHOULDERS: ICD-10-CM

## 2024-01-01 LAB
ALBUMIN SERPL BCP-MCNC: 3.3 G/DL (ref 3.5–5.2)
ALBUMIN SERPL BCP-MCNC: 3.4 G/DL (ref 3.5–5.2)
ALBUMIN SERPL BCP-MCNC: 3.5 G/DL (ref 3.5–5.2)
ALBUMIN SERPL BCP-MCNC: 3.7 G/DL (ref 3.5–5.2)
ALLENS TEST: ABNORMAL
ALLENS TEST: NORMAL
ALP SERPL-CCNC: 108 U/L (ref 55–135)
ALP SERPL-CCNC: 122 U/L (ref 55–135)
ALP SERPL-CCNC: 71 U/L (ref 55–135)
ALP SERPL-CCNC: 73 U/L (ref 55–135)
ALP SERPL-CCNC: 86 U/L (ref 55–135)
ALP SERPL-CCNC: 90 U/L (ref 55–135)
ALP SERPL-CCNC: 91 U/L (ref 55–135)
ALP SERPL-CCNC: 92 U/L (ref 55–135)
ALP SERPL-CCNC: 93 U/L (ref 55–135)
ALP SERPL-CCNC: 94 U/L (ref 55–135)
ALP SERPL-CCNC: 94 U/L (ref 55–135)
ALP SERPL-CCNC: 96 U/L (ref 55–135)
ALP SERPL-CCNC: 99 U/L (ref 55–135)
ALT SERPL W/O P-5'-P-CCNC: 102 U/L (ref 10–44)
ALT SERPL W/O P-5'-P-CCNC: 119 U/L (ref 10–44)
ALT SERPL W/O P-5'-P-CCNC: 18 U/L (ref 10–44)
ALT SERPL W/O P-5'-P-CCNC: 18 U/L (ref 10–44)
ALT SERPL W/O P-5'-P-CCNC: 20 U/L (ref 10–44)
ALT SERPL W/O P-5'-P-CCNC: 21 U/L (ref 10–44)
ALT SERPL W/O P-5'-P-CCNC: 30 U/L (ref 10–44)
ALT SERPL W/O P-5'-P-CCNC: 51 U/L (ref 10–44)
ALT SERPL W/O P-5'-P-CCNC: 53 U/L (ref 10–44)
ALT SERPL W/O P-5'-P-CCNC: 54 U/L (ref 10–44)
ALT SERPL W/O P-5'-P-CCNC: 59 U/L (ref 10–44)
ALT SERPL W/O P-5'-P-CCNC: 71 U/L (ref 10–44)
ALT SERPL W/O P-5'-P-CCNC: 89 U/L (ref 10–44)
AMORPH CRY UR QL COMP ASSIST: ABNORMAL
ANION GAP SERPL CALC-SCNC: 10 MMOL/L (ref 8–16)
ANION GAP SERPL CALC-SCNC: 11 MMOL/L (ref 8–16)
ANION GAP SERPL CALC-SCNC: 12 MMOL/L (ref 8–16)
ANION GAP SERPL CALC-SCNC: 13 MMOL/L (ref 8–16)
ANION GAP SERPL CALC-SCNC: 13 MMOL/L (ref 8–16)
ANION GAP SERPL CALC-SCNC: 14 MMOL/L (ref 8–16)
ANION GAP SERPL CALC-SCNC: 15 MMOL/L (ref 8–16)
ANION GAP SERPL CALC-SCNC: 16 MMOL/L (ref 8–16)
ANION GAP SERPL CALC-SCNC: 19 MMOL/L (ref 8–16)
APTT PPP: 109.1 SEC (ref 21–32)
APTT PPP: 36.7 SEC (ref 21–32)
APTT PPP: 45.7 SEC (ref 21–32)
APTT PPP: 49.3 SEC (ref 21–32)
APTT PPP: 49.4 SEC (ref 21–32)
APTT PPP: 51.5 SEC (ref 21–32)
APTT PPP: 54.6 SEC (ref 21–32)
APTT PPP: 55.4 SEC (ref 21–32)
APTT PPP: 55.8 SEC (ref 21–32)
APTT PPP: 56.6 SEC (ref 21–32)
APTT PPP: 60.4 SEC (ref 21–32)
APTT PPP: 72 SEC (ref 21–32)
ASCENDING AORTA: 2.85 CM
ASCENDING AORTA: 3.1 CM
AST SERPL-CCNC: 12 U/L (ref 10–40)
AST SERPL-CCNC: 13 U/L (ref 10–40)
AST SERPL-CCNC: 142 U/L (ref 10–40)
AST SERPL-CCNC: 17 U/L (ref 10–40)
AST SERPL-CCNC: 191 U/L (ref 10–40)
AST SERPL-CCNC: 20 U/L (ref 10–40)
AST SERPL-CCNC: 256 U/L (ref 10–40)
AST SERPL-CCNC: 45 U/L (ref 10–40)
AST SERPL-CCNC: 61 U/L (ref 10–40)
AST SERPL-CCNC: 65 U/L (ref 10–40)
AST SERPL-CCNC: 68 U/L (ref 10–40)
AST SERPL-CCNC: 72 U/L (ref 10–40)
AST SERPL-CCNC: 85 U/L (ref 10–40)
AV INDEX (PROSTH): 0.36
AV INDEX (PROSTH): 0.47
AV MEAN GRADIENT: 4 MMHG
AV MEAN GRADIENT: 4 MMHG
AV PEAK GRADIENT: 8 MMHG
AV PEAK GRADIENT: 9 MMHG
AV VALVE AREA BY VELOCITY RATIO: 1.26 CM²
AV VALVE AREA BY VELOCITY RATIO: 2.22 CM²
AV VALVE AREA: 1.36 CM²
AV VALVE AREA: 1.78 CM²
AV VELOCITY RATIO: 0.33
AV VELOCITY RATIO: 0.59
B-OH-BUTYR BLD STRIP-SCNC: 0.1 MMOL/L (ref 0–0.5)
BACTERIA #/AREA URNS AUTO: ABNORMAL /HPF
BACTERIA BLD CULT: ABNORMAL
BACTERIA BLD CULT: NORMAL
BACTERIA UR CULT: ABNORMAL
BASOPHILS # BLD AUTO: 0 K/UL (ref 0–0.2)
BASOPHILS # BLD AUTO: 0.01 K/UL (ref 0–0.2)
BASOPHILS # BLD AUTO: 0.01 K/UL (ref 0–0.2)
BASOPHILS # BLD AUTO: 0.02 K/UL (ref 0–0.2)
BASOPHILS # BLD AUTO: 0.02 K/UL (ref 0–0.2)
BASOPHILS # BLD AUTO: 0.03 K/UL (ref 0–0.2)
BASOPHILS # BLD AUTO: 0.03 K/UL (ref 0–0.2)
BASOPHILS # BLD AUTO: 0.04 K/UL (ref 0–0.2)
BASOPHILS # BLD AUTO: 0.07 K/UL (ref 0–0.2)
BASOPHILS # BLD AUTO: 0.12 K/UL (ref 0–0.2)
BASOPHILS NFR BLD: 0 % (ref 0–1.9)
BASOPHILS NFR BLD: 0.1 % (ref 0–1.9)
BASOPHILS NFR BLD: 0.2 % (ref 0–1.9)
BASOPHILS NFR BLD: 0.3 % (ref 0–1.9)
BASOPHILS NFR BLD: 0.3 % (ref 0–1.9)
BASOPHILS NFR BLD: 0.5 % (ref 0–1.9)
BASOPHILS NFR BLD: 0.6 % (ref 0–1.9)
BASOPHILS NFR BLD: 0.7 % (ref 0–1.9)
BASOPHILS NFR BLD: 0.7 % (ref 0–1.9)
BASOPHILS NFR BLD: 0.9 % (ref 0–1.9)
BASOPHILS NFR BLD: 1.5 % (ref 0–1.9)
BILIRUB SERPL-MCNC: 0.3 MG/DL (ref 0.1–1)
BILIRUB SERPL-MCNC: 0.4 MG/DL (ref 0.1–1)
BILIRUB SERPL-MCNC: 0.5 MG/DL (ref 0.1–1)
BILIRUB SERPL-MCNC: 0.5 MG/DL (ref 0.1–1)
BILIRUB UR QL STRIP: NEGATIVE
BILIRUB UR QL STRIP: NEGATIVE
BNP SERPL-MCNC: 1533 PG/ML (ref 0–99)
BNP SERPL-MCNC: 2066 PG/ML (ref 0–99)
BNP SERPL-MCNC: 2320 PG/ML (ref 0–99)
BSA FOR ECHO PROCEDURE: 1.63 M2
BSA FOR ECHO PROCEDURE: 1.66 M2
BUN SERPL-MCNC: 14 MG/DL (ref 8–23)
BUN SERPL-MCNC: 16 MG/DL (ref 8–23)
BUN SERPL-MCNC: 17 MG/DL (ref 8–23)
BUN SERPL-MCNC: 20 MG/DL (ref 8–23)
BUN SERPL-MCNC: 22 MG/DL (ref 8–23)
BUN SERPL-MCNC: 22 MG/DL (ref 8–23)
BUN SERPL-MCNC: 23 MG/DL (ref 6–30)
BUN SERPL-MCNC: 23 MG/DL (ref 8–23)
BUN SERPL-MCNC: 25 MG/DL (ref 8–23)
BUN SERPL-MCNC: 30 MG/DL (ref 8–23)
BUN SERPL-MCNC: 31 MG/DL (ref 8–23)
BUN SERPL-MCNC: 32 MG/DL (ref 8–23)
BUN SERPL-MCNC: 41 MG/DL (ref 8–23)
BUN SERPL-MCNC: 42 MG/DL (ref 8–23)
BUN SERPL-MCNC: 45 MG/DL (ref 8–23)
BUN SERPL-MCNC: 47 MG/DL (ref 8–23)
BUN SERPL-MCNC: 53 MG/DL (ref 8–23)
BUN SERPL-MCNC: 57 MG/DL (ref 8–23)
BUN SERPL-MCNC: 58 MG/DL (ref 8–23)
CALCIUM SERPL-MCNC: 10.1 MG/DL (ref 8.7–10.5)
CALCIUM SERPL-MCNC: 9 MG/DL (ref 8.7–10.5)
CALCIUM SERPL-MCNC: 9.3 MG/DL (ref 8.7–10.5)
CALCIUM SERPL-MCNC: 9.5 MG/DL (ref 8.7–10.5)
CALCIUM SERPL-MCNC: 9.5 MG/DL (ref 8.7–10.5)
CALCIUM SERPL-MCNC: 9.6 MG/DL (ref 8.7–10.5)
CALCIUM SERPL-MCNC: 9.7 MG/DL (ref 8.7–10.5)
CALCIUM SERPL-MCNC: 9.8 MG/DL (ref 8.7–10.5)
CALCIUM SERPL-MCNC: 9.8 MG/DL (ref 8.7–10.5)
CALCIUM SERPL-MCNC: 9.9 MG/DL (ref 8.7–10.5)
CHLORIDE SERPL-SCNC: 100 MMOL/L (ref 95–110)
CHLORIDE SERPL-SCNC: 101 MMOL/L (ref 95–110)
CHLORIDE SERPL-SCNC: 101 MMOL/L (ref 95–110)
CHLORIDE SERPL-SCNC: 102 MMOL/L (ref 95–110)
CHLORIDE SERPL-SCNC: 102 MMOL/L (ref 95–110)
CHLORIDE SERPL-SCNC: 83 MMOL/L (ref 95–110)
CHLORIDE SERPL-SCNC: 84 MMOL/L (ref 95–110)
CHLORIDE SERPL-SCNC: 85 MMOL/L (ref 95–110)
CHLORIDE SERPL-SCNC: 86 MMOL/L (ref 95–110)
CHLORIDE SERPL-SCNC: 88 MMOL/L (ref 95–110)
CHLORIDE SERPL-SCNC: 89 MMOL/L (ref 95–110)
CHLORIDE SERPL-SCNC: 92 MMOL/L (ref 95–110)
CHLORIDE SERPL-SCNC: 93 MMOL/L (ref 95–110)
CHLORIDE SERPL-SCNC: 94 MMOL/L (ref 95–110)
CHLORIDE SERPL-SCNC: 96 MMOL/L (ref 95–110)
CHLORIDE SERPL-SCNC: 96 MMOL/L (ref 95–110)
CHLORIDE SERPL-SCNC: 97 MMOL/L (ref 95–110)
CHLORIDE SERPL-SCNC: 99 MMOL/L (ref 95–110)
CHOLEST SERPL-MCNC: 142 MG/DL (ref 120–199)
CHOLEST/HDLC SERPL: 4.4 {RATIO} (ref 2–5)
CK SERPL-CCNC: 1059 U/L (ref 20–180)
CK SERPL-CCNC: 1619 U/L (ref 20–180)
CK SERPL-CCNC: 2673 U/L (ref 20–180)
CK SERPL-CCNC: 3658 U/L (ref 20–180)
CK SERPL-CCNC: 4628 U/L (ref 20–180)
CK SERPL-CCNC: 5089 U/L (ref 20–180)
CK SERPL-CCNC: 5329 U/L (ref 20–180)
CK SERPL-CCNC: 6219 U/L (ref 20–180)
CK SERPL-CCNC: 6652 U/L (ref 20–180)
CK SERPL-CCNC: 6948 U/L (ref 20–180)
CK SERPL-CCNC: 6965 U/L (ref 20–180)
CK SERPL-CCNC: 727 U/L (ref 20–180)
CLARITY UR REFRACT.AUTO: ABNORMAL
CLARITY UR REFRACT.AUTO: CLEAR
CO2 SERPL-SCNC: 25 MMOL/L (ref 23–29)
CO2 SERPL-SCNC: 27 MMOL/L (ref 23–29)
CO2 SERPL-SCNC: 28 MMOL/L (ref 23–29)
CO2 SERPL-SCNC: 28 MMOL/L (ref 23–29)
CO2 SERPL-SCNC: 29 MMOL/L (ref 23–29)
CO2 SERPL-SCNC: 30 MMOL/L (ref 23–29)
CO2 SERPL-SCNC: 31 MMOL/L (ref 23–29)
CO2 SERPL-SCNC: 32 MMOL/L (ref 23–29)
CO2 SERPL-SCNC: 33 MMOL/L (ref 23–29)
CO2 SERPL-SCNC: 34 MMOL/L (ref 23–29)
CO2 SERPL-SCNC: 35 MMOL/L (ref 23–29)
CO2 SERPL-SCNC: 35 MMOL/L (ref 23–29)
CO2 SERPL-SCNC: 36 MMOL/L (ref 23–29)
CO2 SERPL-SCNC: 38 MMOL/L (ref 23–29)
COLOR UR AUTO: ABNORMAL
COLOR UR AUTO: COLORLESS
COMMENT: NORMAL
CREAT SERPL-MCNC: 1 MG/DL (ref 0.5–1.4)
CREAT SERPL-MCNC: 1.1 MG/DL (ref 0.5–1.4)
CREAT SERPL-MCNC: 1.2 MG/DL (ref 0.5–1.4)
CREAT SERPL-MCNC: 1.4 MG/DL (ref 0.5–1.4)
CREAT SERPL-MCNC: 1.6 MG/DL (ref 0.5–1.4)
CREAT SERPL-MCNC: 1.8 MG/DL (ref 0.5–1.4)
CREAT SERPL-MCNC: 1.9 MG/DL (ref 0.5–1.4)
CREAT SERPL-MCNC: 2 MG/DL (ref 0.5–1.4)
CREAT SERPL-MCNC: 2.3 MG/DL (ref 0.5–1.4)
CREAT SERPL-MCNC: 2.4 MG/DL (ref 0.5–1.4)
CREAT SERPL-MCNC: 2.6 MG/DL (ref 0.5–1.4)
CREAT UR-MCNC: 25 MG/DL (ref 15–325)
CREAT UR-MCNC: 34 MG/DL (ref 15–325)
CREAT UR-MCNC: 34 MG/DL (ref 15–325)
CRP SERPL-MCNC: 8.5 MG/L (ref 0–8.2)
CV ECHO LV RWT: 0.24 CM
CV ECHO LV RWT: 0.25 CM
DELSYS: ABNORMAL
DELSYS: NORMAL
DIFFERENTIAL METHOD BLD: ABNORMAL
DOP CALC AO PEAK VEL: 1.45 M/S
DOP CALC AO PEAK VEL: 1.47 M/S
DOP CALC AO VTI: 23.81 CM
DOP CALC AO VTI: 27.85 CM
DOP CALC LVOT AREA: 3.8 CM2
DOP CALC LVOT AREA: 3.8 CM2
DOP CALC LVOT DIAMETER: 2.2 CM
DOP CALC LVOT DIAMETER: 2.2 CM
DOP CALC LVOT PEAK VEL: 0.48 M/S
DOP CALC LVOT PEAK VEL: 0.86 M/S
DOP CALC LVOT STROKE VOLUME: 37.8 CM3
DOP CALC LVOT STROKE VOLUME: 42.33 CM3
DOP CALCLVOT PEAK VEL VTI: 11.14 CM
DOP CALCLVOT PEAK VEL VTI: 9.95 CM
E WAVE DECELERATION TIME: 80.61 MSEC
E/A RATIO: 0.45
E/E' RATIO: 17.11 M/S
E/E' RATIO: 9.11 M/S
ECHO LV POSTERIOR WALL: 0.8 CM (ref 0.6–1.1)
ECHO LV POSTERIOR WALL: 0.9 CM (ref 0.6–1.1)
EJECTION FRACTION: 10 %
EOSINOPHIL # BLD AUTO: 0 K/UL (ref 0–0.5)
EOSINOPHIL # BLD AUTO: 0.1 K/UL (ref 0–0.5)
EOSINOPHIL # BLD AUTO: 0.2 K/UL (ref 0–0.5)
EOSINOPHIL NFR BLD: 0 % (ref 0–8)
EOSINOPHIL NFR BLD: 0.1 % (ref 0–8)
EOSINOPHIL NFR BLD: 0.2 % (ref 0–8)
EOSINOPHIL NFR BLD: 0.5 % (ref 0–8)
EOSINOPHIL NFR BLD: 0.9 % (ref 0–8)
EOSINOPHIL NFR BLD: 0.9 % (ref 0–8)
EOSINOPHIL NFR BLD: 1.3 % (ref 0–8)
EOSINOPHIL NFR BLD: 1.3 % (ref 0–8)
EOSINOPHIL NFR BLD: 1.7 % (ref 0–8)
EOSINOPHIL NFR BLD: 1.7 % (ref 0–8)
EOSINOPHIL NFR BLD: 2.4 % (ref 0–8)
EP: 10
ERYTHROCYTE [DISTWIDTH] IN BLOOD BY AUTOMATED COUNT: 14.2 % (ref 11.5–14.5)
ERYTHROCYTE [DISTWIDTH] IN BLOOD BY AUTOMATED COUNT: 14.2 % (ref 11.5–14.5)
ERYTHROCYTE [DISTWIDTH] IN BLOOD BY AUTOMATED COUNT: 14.3 % (ref 11.5–14.5)
ERYTHROCYTE [DISTWIDTH] IN BLOOD BY AUTOMATED COUNT: 14.6 % (ref 11.5–14.5)
ERYTHROCYTE [DISTWIDTH] IN BLOOD BY AUTOMATED COUNT: 14.7 % (ref 11.5–14.5)
ERYTHROCYTE [DISTWIDTH] IN BLOOD BY AUTOMATED COUNT: 14.9 % (ref 11.5–14.5)
ERYTHROCYTE [SEDIMENTATION RATE] IN BLOOD BY WESTERGREN METHOD: 12 MM/H
EST. GFR  (NO RACE VARIABLE): 18.5 ML/MIN/1.73 M^2
EST. GFR  (NO RACE VARIABLE): 20.4 ML/MIN/1.73 M^2
EST. GFR  (NO RACE VARIABLE): 21.5 ML/MIN/1.73 M^2
EST. GFR  (NO RACE VARIABLE): 25.4 ML/MIN/1.73 M^2
EST. GFR  (NO RACE VARIABLE): 27 ML/MIN/1.73 M^2
EST. GFR  (NO RACE VARIABLE): 28.8 ML/MIN/1.73 M^2
EST. GFR  (NO RACE VARIABLE): 33.2 ML/MIN/1.73 M^2
EST. GFR  (NO RACE VARIABLE): 39 ML/MIN/1.73 M^2
EST. GFR  (NO RACE VARIABLE): 52.1 ML/MIN/1.73 M^2
EST. GFR  (NO RACE VARIABLE): 58.4 ML/MIN/1.73 M^2
ESTIMATED AVG GLUCOSE: 105 MG/DL (ref 68–131)
FINAL PATHOLOGIC DIAGNOSIS: NORMAL
FIO2: 28
FLOW CYTOMETRY ANTIBODIES ANALYZED - LYMPH NODE: NORMAL
FLOW CYTOMETRY COMMENT - LYMPH NODE: NORMAL
FLOW CYTOMETRY INTERPRETATION - LYMPH NODE: NORMAL
FLOW: 10
FLOW: 2
FLOW: 2
FLOW: 3
FLOW: 5
FRACTIONAL SHORTENING: 2 % (ref 28–44)
FRACTIONAL SHORTENING: 5 % (ref 28–44)
GLUCOSE SERPL-MCNC: 101 MG/DL (ref 70–110)
GLUCOSE SERPL-MCNC: 104 MG/DL (ref 70–110)
GLUCOSE SERPL-MCNC: 109 MG/DL (ref 70–110)
GLUCOSE SERPL-MCNC: 109 MG/DL (ref 70–110)
GLUCOSE SERPL-MCNC: 111 MG/DL (ref 70–110)
GLUCOSE SERPL-MCNC: 113 MG/DL (ref 70–110)
GLUCOSE SERPL-MCNC: 117 MG/DL (ref 70–110)
GLUCOSE SERPL-MCNC: 136 MG/DL (ref 70–110)
GLUCOSE SERPL-MCNC: 189 MG/DL (ref 70–110)
GLUCOSE SERPL-MCNC: 194 MG/DL (ref 70–110)
GLUCOSE SERPL-MCNC: 199 MG/DL (ref 70–110)
GLUCOSE SERPL-MCNC: 261 MG/DL (ref 70–110)
GLUCOSE SERPL-MCNC: 295 MG/DL (ref 70–110)
GLUCOSE SERPL-MCNC: 308 MG/DL (ref 70–110)
GLUCOSE SERPL-MCNC: 472 MG/DL (ref 70–110)
GLUCOSE SERPL-MCNC: 85 MG/DL (ref 70–110)
GLUCOSE SERPL-MCNC: 88 MG/DL (ref 70–110)
GLUCOSE SERPL-MCNC: 89 MG/DL (ref 70–110)
GLUCOSE SERPL-MCNC: 89 MG/DL (ref 70–110)
GLUCOSE SERPL-MCNC: 93 MG/DL (ref 70–110)
GLUCOSE SERPL-MCNC: 99 MG/DL (ref 70–110)
GLUCOSE UR QL STRIP: NEGATIVE
GLUCOSE UR QL STRIP: NEGATIVE
GROSS: NORMAL
HBA1C MFR BLD: 5.3 % (ref 4–5.6)
HCO3 UR-SCNC: 28.2 MMOL/L (ref 24–28)
HCO3 UR-SCNC: 30.8 MMOL/L (ref 24–28)
HCO3 UR-SCNC: 31.3 MMOL/L (ref 24–28)
HCO3 UR-SCNC: 31.4 MMOL/L (ref 24–28)
HCO3 UR-SCNC: 31.9 MMOL/L (ref 24–28)
HCO3 UR-SCNC: 32.3 MMOL/L (ref 24–28)
HCO3 UR-SCNC: 33.5 MMOL/L (ref 24–28)
HCO3 UR-SCNC: 33.8 MMOL/L (ref 24–28)
HCO3 UR-SCNC: 34.9 MMOL/L (ref 24–28)
HCO3 UR-SCNC: 36 MMOL/L (ref 24–28)
HCO3 UR-SCNC: 36.7 MMOL/L (ref 24–28)
HCO3 UR-SCNC: 39 MMOL/L (ref 24–28)
HCO3 UR-SCNC: 40.8 MMOL/L (ref 24–28)
HCO3 UR-SCNC: 41.9 MMOL/L (ref 24–28)
HCO3 UR-SCNC: 41.9 MMOL/L (ref 24–28)
HCO3 UR-SCNC: 42.2 MMOL/L (ref 24–28)
HCO3 UR-SCNC: 42.4 MMOL/L (ref 24–28)
HCO3 UR-SCNC: 42.6 MMOL/L (ref 24–28)
HCO3 UR-SCNC: 42.7 MMOL/L (ref 24–28)
HCT VFR BLD AUTO: 31.1 % (ref 37–48.5)
HCT VFR BLD AUTO: 31.8 % (ref 37–48.5)
HCT VFR BLD AUTO: 32.9 % (ref 37–48.5)
HCT VFR BLD AUTO: 33.4 % (ref 37–48.5)
HCT VFR BLD AUTO: 33.9 % (ref 37–48.5)
HCT VFR BLD AUTO: 34.1 % (ref 37–48.5)
HCT VFR BLD AUTO: 34.5 % (ref 37–48.5)
HCT VFR BLD AUTO: 34.9 % (ref 37–48.5)
HCT VFR BLD AUTO: 37 % (ref 37–48.5)
HCT VFR BLD AUTO: 37.9 % (ref 37–48.5)
HCT VFR BLD AUTO: 38.3 % (ref 37–48.5)
HCT VFR BLD AUTO: 38.5 % (ref 37–48.5)
HCT VFR BLD AUTO: 40.8 % (ref 37–48.5)
HCT VFR BLD CALC: 39 %PCV (ref 36–54)
HDLC SERPL-MCNC: 32 MG/DL (ref 40–75)
HDLC SERPL: 22.5 % (ref 20–50)
HGB BLD-MCNC: 10 G/DL (ref 12–16)
HGB BLD-MCNC: 10.2 G/DL (ref 12–16)
HGB BLD-MCNC: 10.4 G/DL (ref 12–16)
HGB BLD-MCNC: 10.4 G/DL (ref 12–16)
HGB BLD-MCNC: 10.5 G/DL (ref 12–16)
HGB BLD-MCNC: 10.8 G/DL (ref 12–16)
HGB BLD-MCNC: 11 G/DL (ref 12–16)
HGB BLD-MCNC: 11.2 G/DL (ref 12–16)
HGB BLD-MCNC: 11.4 G/DL (ref 12–16)
HGB BLD-MCNC: 11.5 G/DL (ref 12–16)
HGB BLD-MCNC: 11.8 G/DL (ref 12–16)
HGB BLD-MCNC: 12.5 G/DL (ref 12–16)
HGB BLD-MCNC: 9.7 G/DL (ref 12–16)
HGB UR QL STRIP: ABNORMAL
HGB UR QL STRIP: NEGATIVE
HYALINE CASTS UR QL AUTO: 4 /LPF
IMM GRANULOCYTES # BLD AUTO: 0.01 K/UL (ref 0–0.04)
IMM GRANULOCYTES # BLD AUTO: 0.02 K/UL (ref 0–0.04)
IMM GRANULOCYTES # BLD AUTO: 0.03 K/UL (ref 0–0.04)
IMM GRANULOCYTES # BLD AUTO: 0.04 K/UL (ref 0–0.04)
IMM GRANULOCYTES # BLD AUTO: 0.04 K/UL (ref 0–0.04)
IMM GRANULOCYTES # BLD AUTO: 0.05 K/UL (ref 0–0.04)
IMM GRANULOCYTES # BLD AUTO: 0.1 K/UL (ref 0–0.04)
IMM GRANULOCYTES # BLD AUTO: 0.17 K/UL (ref 0–0.04)
IMM GRANULOCYTES NFR BLD AUTO: 0.1 % (ref 0–0.5)
IMM GRANULOCYTES NFR BLD AUTO: 0.2 % (ref 0–0.5)
IMM GRANULOCYTES NFR BLD AUTO: 0.3 % (ref 0–0.5)
IMM GRANULOCYTES NFR BLD AUTO: 0.4 % (ref 0–0.5)
IMM GRANULOCYTES NFR BLD AUTO: 0.5 % (ref 0–0.5)
IMM GRANULOCYTES NFR BLD AUTO: 0.7 % (ref 0–0.5)
IMM GRANULOCYTES NFR BLD AUTO: 1 % (ref 0–0.5)
INFLUENZA A, MOLECULAR: NEGATIVE
INFLUENZA B, MOLECULAR: NEGATIVE
INR PPP: 1 (ref 0.8–1.2)
INR PPP: 1 (ref 0.8–1.2)
INR PPP: 1.2 (ref 0.8–1.2)
INTERVENTRICULAR SEPTUM: 0.7 CM (ref 0.6–1.1)
INTERVENTRICULAR SEPTUM: 0.75 CM (ref 0.6–1.1)
IP: 18
KETONES UR QL STRIP: NEGATIVE
KETONES UR QL STRIP: NEGATIVE
LA MAJOR: 5.18 CM
LA MAJOR: 5.29 CM
LA MINOR: 4.77 CM
LA MINOR: 5.88 CM
LA WIDTH: 4.18 CM
LA WIDTH: 4.39 CM
LACTATE SERPL-SCNC: 0.7 MMOL/L (ref 0.5–2.2)
LACTATE SERPL-SCNC: 2 MMOL/L (ref 0.5–2.2)
LACTATE SERPL-SCNC: 2.2 MMOL/L (ref 0.5–2.2)
LACTATE SERPL-SCNC: 3.7 MMOL/L (ref 0.5–2.2)
LACTATE SERPL-SCNC: 4.5 MMOL/L (ref 0.5–2.2)
LACTATE SERPL-SCNC: 4.7 MMOL/L (ref 0.5–2.2)
LDH SERPL L TO P-CCNC: 0.78 MMOL/L (ref 0.36–1.25)
LDH SERPL L TO P-CCNC: 1.53 MMOL/L (ref 0.5–2.2)
LDH SERPL L TO P-CCNC: 2.13 MMOL/L (ref 0.5–2.2)
LDH SERPL L TO P-CCNC: 2.2 MMOL/L (ref 0.5–2.2)
LDH SERPL L TO P-CCNC: 2.6 MMOL/L (ref 0.5–2.2)
LDH SERPL L TO P-CCNC: 3.98 MMOL/L (ref 0.5–2.2)
LDH SERPL L TO P-CCNC: 4.66 MMOL/L (ref 0.5–2.2)
LDH SERPL L TO P-CCNC: 4.81 MMOL/L (ref 0.5–2.2)
LDH SERPL L TO P-CCNC: 5.32 MMOL/L (ref 0.5–2.2)
LDH SERPL L TO P-CCNC: 5.42 MMOL/L (ref 0.5–2.2)
LDLC SERPL CALC-MCNC: 91.8 MG/DL (ref 63–159)
LEFT ATRIUM SIZE: 3.41 CM
LEFT ATRIUM SIZE: 5.04 CM
LEFT ATRIUM VOLUME INDEX MOD: 37.8 ML/M2
LEFT ATRIUM VOLUME INDEX MOD: 46.6 ML/M2
LEFT ATRIUM VOLUME INDEX: 43.5 ML/M2
LEFT ATRIUM VOLUME INDEX: 55.1 ML/M2
LEFT ATRIUM VOLUME MOD: 60.79 CM3
LEFT ATRIUM VOLUME MOD: 76 CM3
LEFT ATRIUM VOLUME: 70.08 CM3
LEFT ATRIUM VOLUME: 89.83 CM3
LEFT INTERNAL DIMENSION IN SYSTOLE: 6.25 CM (ref 2.1–4)
LEFT INTERNAL DIMENSION IN SYSTOLE: 7.09 CM (ref 2.1–4)
LEFT VENTRICLE DIASTOLIC VOLUME INDEX: 126.7 ML/M2
LEFT VENTRICLE DIASTOLIC VOLUME INDEX: 183.06 ML/M2
LEFT VENTRICLE DIASTOLIC VOLUME: 206.52 ML
LEFT VENTRICLE DIASTOLIC VOLUME: 294.73 ML
LEFT VENTRICLE MASS INDEX: 122 G/M2
LEFT VENTRICLE MASS INDEX: 172 G/M2
LEFT VENTRICLE SYSTOLIC VOLUME INDEX: 121.3 ML/M2
LEFT VENTRICLE SYSTOLIC VOLUME INDEX: 163.4 ML/M2
LEFT VENTRICLE SYSTOLIC VOLUME: 197.71 ML
LEFT VENTRICLE SYSTOLIC VOLUME: 263.12 ML
LEFT VENTRICULAR INTERNAL DIMENSION IN DIASTOLE: 6.37 CM (ref 3.5–6)
LEFT VENTRICULAR INTERNAL DIMENSION IN DIASTOLE: 7.5 CM (ref 3.5–6)
LEFT VENTRICULAR MASS: 198.88 G
LEFT VENTRICULAR MASS: 276.57 G
LEUKOCYTE ESTERASE UR QL STRIP: ABNORMAL
LEUKOCYTE ESTERASE UR QL STRIP: NEGATIVE
LV LATERAL E/E' RATIO: 10.25 M/S
LV LATERAL E/E' RATIO: 15.4 M/S
LV SEPTAL E/E' RATIO: 19.25 M/S
LV SEPTAL E/E' RATIO: 8.2 M/S
LYMPHOCYTES # BLD AUTO: 0.5 K/UL (ref 1–4.8)
LYMPHOCYTES # BLD AUTO: 0.6 K/UL (ref 1–4.8)
LYMPHOCYTES # BLD AUTO: 0.7 K/UL (ref 1–4.8)
LYMPHOCYTES # BLD AUTO: 0.8 K/UL (ref 1–4.8)
LYMPHOCYTES # BLD AUTO: 0.9 K/UL (ref 1–4.8)
LYMPHOCYTES # BLD AUTO: 0.9 K/UL (ref 1–4.8)
LYMPHOCYTES # BLD AUTO: 1.1 K/UL (ref 1–4.8)
LYMPHOCYTES # BLD AUTO: 1.2 K/UL (ref 1–4.8)
LYMPHOCYTES # BLD AUTO: 4.4 K/UL (ref 1–4.8)
LYMPHOCYTES NFR BLD: 11.5 % (ref 18–48)
LYMPHOCYTES NFR BLD: 12.9 % (ref 18–48)
LYMPHOCYTES NFR BLD: 14.3 % (ref 18–48)
LYMPHOCYTES NFR BLD: 16.8 % (ref 18–48)
LYMPHOCYTES NFR BLD: 20.6 % (ref 18–48)
LYMPHOCYTES NFR BLD: 28.5 % (ref 18–48)
LYMPHOCYTES NFR BLD: 3.3 % (ref 18–48)
LYMPHOCYTES NFR BLD: 3.5 % (ref 18–48)
LYMPHOCYTES NFR BLD: 55.8 % (ref 18–48)
LYMPHOCYTES NFR BLD: 6 % (ref 18–48)
LYMPHOCYTES NFR BLD: 8 % (ref 18–48)
LYMPHOCYTES NFR BLD: 8.1 % (ref 18–48)
LYMPHOCYTES NFR BLD: 8.2 % (ref 18–48)
Lab: NORMAL
MAGNESIUM SERPL-MCNC: 1.8 MG/DL (ref 1.6–2.6)
MAGNESIUM SERPL-MCNC: 1.9 MG/DL (ref 1.6–2.6)
MAGNESIUM SERPL-MCNC: 1.9 MG/DL (ref 1.6–2.6)
MAGNESIUM SERPL-MCNC: 2 MG/DL (ref 1.6–2.6)
MAGNESIUM SERPL-MCNC: 2.1 MG/DL (ref 1.6–2.6)
MAGNESIUM SERPL-MCNC: 2.1 MG/DL (ref 1.6–2.6)
MAGNESIUM SERPL-MCNC: 2.2 MG/DL (ref 1.6–2.6)
MAGNESIUM SERPL-MCNC: 2.3 MG/DL (ref 1.6–2.6)
MAGNESIUM SERPL-MCNC: 2.4 MG/DL (ref 1.6–2.6)
MAGNESIUM SERPL-MCNC: 2.6 MG/DL (ref 1.6–2.6)
MCH RBC QN AUTO: 24 PG (ref 27–31)
MCH RBC QN AUTO: 24.1 PG (ref 27–31)
MCH RBC QN AUTO: 24.2 PG (ref 27–31)
MCH RBC QN AUTO: 24.3 PG (ref 27–31)
MCH RBC QN AUTO: 24.4 PG (ref 27–31)
MCH RBC QN AUTO: 24.5 PG (ref 27–31)
MCH RBC QN AUTO: 24.5 PG (ref 27–31)
MCH RBC QN AUTO: 24.6 PG (ref 27–31)
MCH RBC QN AUTO: 24.8 PG (ref 27–31)
MCH RBC QN AUTO: 24.9 PG (ref 27–31)
MCHC RBC AUTO-ENTMCNC: 29.8 G/DL (ref 32–36)
MCHC RBC AUTO-ENTMCNC: 30.1 G/DL (ref 32–36)
MCHC RBC AUTO-ENTMCNC: 30.3 G/DL (ref 32–36)
MCHC RBC AUTO-ENTMCNC: 30.3 G/DL (ref 32–36)
MCHC RBC AUTO-ENTMCNC: 30.5 G/DL (ref 32–36)
MCHC RBC AUTO-ENTMCNC: 30.6 G/DL (ref 32–36)
MCHC RBC AUTO-ENTMCNC: 30.6 G/DL (ref 32–36)
MCHC RBC AUTO-ENTMCNC: 30.9 G/DL (ref 32–36)
MCHC RBC AUTO-ENTMCNC: 31.2 G/DL (ref 32–36)
MCHC RBC AUTO-ENTMCNC: 31.4 G/DL (ref 32–36)
MCHC RBC AUTO-ENTMCNC: 31.4 G/DL (ref 32–36)
MCHC RBC AUTO-ENTMCNC: 31.6 G/DL (ref 32–36)
MCHC RBC AUTO-ENTMCNC: 31.9 G/DL (ref 32–36)
MCV RBC AUTO: 77 FL (ref 82–98)
MCV RBC AUTO: 77 FL (ref 82–98)
MCV RBC AUTO: 78 FL (ref 82–98)
MCV RBC AUTO: 79 FL (ref 82–98)
MCV RBC AUTO: 80 FL (ref 82–98)
MCV RBC AUTO: 81 FL (ref 82–98)
MCV RBC AUTO: 81 FL (ref 82–98)
MCV RBC AUTO: 82 FL (ref 82–98)
MICROSCOPIC COMMENT: ABNORMAL
MIN VOL: 15.2
MODE: ABNORMAL
MONOCYTES # BLD AUTO: 0.3 K/UL (ref 0.3–1)
MONOCYTES # BLD AUTO: 0.4 K/UL (ref 0.3–1)
MONOCYTES # BLD AUTO: 0.5 K/UL (ref 0.3–1)
MONOCYTES # BLD AUTO: 0.6 K/UL (ref 0.3–1)
MONOCYTES # BLD AUTO: 0.6 K/UL (ref 0.3–1)
MONOCYTES # BLD AUTO: 0.7 K/UL (ref 0.3–1)
MONOCYTES # BLD AUTO: 0.7 K/UL (ref 0.3–1)
MONOCYTES # BLD AUTO: 0.8 K/UL (ref 0.3–1)
MONOCYTES # BLD AUTO: 0.9 K/UL (ref 0.3–1)
MONOCYTES # BLD AUTO: 0.9 K/UL (ref 0.3–1)
MONOCYTES # BLD AUTO: 1.2 K/UL (ref 0.3–1)
MONOCYTES NFR BLD: 12.9 % (ref 4–15)
MONOCYTES NFR BLD: 2.3 % (ref 4–15)
MONOCYTES NFR BLD: 5.1 % (ref 4–15)
MONOCYTES NFR BLD: 6.4 % (ref 4–15)
MONOCYTES NFR BLD: 6.9 % (ref 4–15)
MONOCYTES NFR BLD: 7.8 % (ref 4–15)
MONOCYTES NFR BLD: 8 % (ref 4–15)
MONOCYTES NFR BLD: 8.8 % (ref 4–15)
MONOCYTES NFR BLD: 9 % (ref 4–15)
MONOCYTES NFR BLD: 9 % (ref 4–15)
MONOCYTES NFR BLD: 9.4 % (ref 4–15)
MONOCYTES NFR BLD: 9.8 % (ref 4–15)
MONOCYTES NFR BLD: 9.9 % (ref 4–15)
MRSA ID BY PCR: NEGATIVE
MV PEAK A VEL: 0.92 M/S
MV PEAK E VEL: 0.41 M/S
MV PEAK E VEL: 0.77 M/S
MV STENOSIS PRESSURE HALF TIME: 23.38 MS
MV VALVE AREA P 1/2 METHOD: 9.41 CM2
NEUTROPHILS # BLD AUTO: 10.5 K/UL (ref 1.8–7.7)
NEUTROPHILS # BLD AUTO: 13.6 K/UL (ref 1.8–7.7)
NEUTROPHILS # BLD AUTO: 15.5 K/UL (ref 1.8–7.7)
NEUTROPHILS # BLD AUTO: 2.3 K/UL (ref 1.8–7.7)
NEUTROPHILS # BLD AUTO: 2.5 K/UL (ref 1.8–7.7)
NEUTROPHILS # BLD AUTO: 3 K/UL (ref 1.8–7.7)
NEUTROPHILS # BLD AUTO: 3.4 K/UL (ref 1.8–7.7)
NEUTROPHILS # BLD AUTO: 4.5 K/UL (ref 1.8–7.7)
NEUTROPHILS # BLD AUTO: 4.6 K/UL (ref 1.8–7.7)
NEUTROPHILS # BLD AUTO: 5.3 K/UL (ref 1.8–7.7)
NEUTROPHILS # BLD AUTO: 6.2 K/UL (ref 1.8–7.7)
NEUTROPHILS # BLD AUTO: 6.9 K/UL (ref 1.8–7.7)
NEUTROPHILS # BLD AUTO: 8.6 K/UL (ref 1.8–7.7)
NEUTROPHILS NFR BLD: 31.2 % (ref 38–73)
NEUTROPHILS NFR BLD: 56 % (ref 38–73)
NEUTROPHILS NFR BLD: 67 % (ref 38–73)
NEUTROPHILS NFR BLD: 71.6 % (ref 38–73)
NEUTROPHILS NFR BLD: 77.3 % (ref 38–73)
NEUTROPHILS NFR BLD: 77.9 % (ref 38–73)
NEUTROPHILS NFR BLD: 80.3 % (ref 38–73)
NEUTROPHILS NFR BLD: 80.5 % (ref 38–73)
NEUTROPHILS NFR BLD: 81.2 % (ref 38–73)
NEUTROPHILS NFR BLD: 81.6 % (ref 38–73)
NEUTROPHILS NFR BLD: 88.5 % (ref 38–73)
NEUTROPHILS NFR BLD: 90.5 % (ref 38–73)
NEUTROPHILS NFR BLD: 91.2 % (ref 38–73)
NITRITE UR QL STRIP: NEGATIVE
NITRITE UR QL STRIP: NEGATIVE
NONHDLC SERPL-MCNC: 110 MG/DL
NRBC BLD-RTO: 0 /100 WBC
OHS CV RV/LV RATIO: 0.43 CM
OHS CV RV/LV RATIO: 0.6 CM
OHS QRS DURATION: 150 MS
OHS QRS DURATION: 158 MS
OHS QRS DURATION: 160 MS
OHS QRS DURATION: 166 MS
OHS QRS DURATION: 170 MS
OHS QRS DURATION: 172 MS
OHS QRS DURATION: 172 MS
OHS QRS DURATION: 176 MS
OHS QTC CALCULATION: 468 MS
OHS QTC CALCULATION: 512 MS
OHS QTC CALCULATION: 512 MS
OHS QTC CALCULATION: 518 MS
OHS QTC CALCULATION: 528 MS
OHS QTC CALCULATION: 531 MS
OHS QTC CALCULATION: 542 MS
OHS QTC CALCULATION: 550 MS
OHS QTC CALCULATION: 556 MS
OHS QTC CALCULATION: 568 MS
PCO2 BLDA: 50.1 MMHG (ref 35–45)
PCO2 BLDA: 58.6 MMHG (ref 35–45)
PCO2 BLDA: 58.6 MMHG (ref 35–45)
PCO2 BLDA: 59 MMHG (ref 35–45)
PCO2 BLDA: 60.2 MMHG (ref 35–45)
PCO2 BLDA: 61.2 MMHG (ref 35–45)
PCO2 BLDA: 61.4 MMHG (ref 35–45)
PCO2 BLDA: 62.3 MMHG (ref 35–45)
PCO2 BLDA: 64.1 MMHG (ref 35–45)
PCO2 BLDA: 64.2 MMHG (ref 35–45)
PCO2 BLDA: 64.9 MMHG (ref 35–45)
PCO2 BLDA: 65.7 MMHG (ref 35–45)
PCO2 BLDA: 65.8 MMHG (ref 35–45)
PCO2 BLDA: 66.3 MMHG (ref 35–45)
PCO2 BLDA: 67.7 MMHG (ref 35–45)
PCO2 BLDA: 68 MMHG (ref 35–45)
PCO2 BLDA: 68.2 MMHG (ref 35–45)
PCO2 BLDA: 68.9 MMHG (ref 35–45)
PCO2 BLDA: 95.5 MMHG (ref 35–45)
PH SMN: 7.13 [PH] (ref 7.35–7.45)
PH SMN: 7.29 [PH] (ref 7.35–7.45)
PH SMN: 7.31 [PH] (ref 7.35–7.45)
PH SMN: 7.33 [PH] (ref 7.35–7.45)
PH SMN: 7.33 [PH] (ref 7.35–7.45)
PH SMN: 7.34 [PH] (ref 7.35–7.45)
PH SMN: 7.36 [PH] (ref 7.35–7.45)
PH SMN: 7.37 [PH] (ref 7.35–7.45)
PH SMN: 7.38 [PH] (ref 7.35–7.45)
PH SMN: 7.38 [PH] (ref 7.35–7.45)
PH SMN: 7.4 [PH] (ref 7.35–7.45)
PH SMN: 7.42 [PH] (ref 7.35–7.45)
PH SMN: 7.45 [PH] (ref 7.35–7.45)
PH UR STRIP: 6 [PH] (ref 5–8)
PH UR STRIP: 8 [PH] (ref 5–8)
PHOSPHATE SERPL-MCNC: 3.1 MG/DL (ref 2.7–4.5)
PHOSPHATE SERPL-MCNC: 3.2 MG/DL (ref 2.7–4.5)
PHOSPHATE SERPL-MCNC: 3.5 MG/DL (ref 2.7–4.5)
PHOSPHATE SERPL-MCNC: 4 MG/DL (ref 2.7–4.5)
PHOSPHATE SERPL-MCNC: 4.5 MG/DL (ref 2.7–4.5)
PHOSPHATE SERPL-MCNC: 4.8 MG/DL (ref 2.7–4.5)
PHOSPHATE SERPL-MCNC: 4.8 MG/DL (ref 2.7–4.5)
PHOSPHATE SERPL-MCNC: 4.9 MG/DL (ref 2.7–4.5)
PHOSPHATE SERPL-MCNC: 4.9 MG/DL (ref 2.7–4.5)
PHOSPHATE SERPL-MCNC: 5.4 MG/DL (ref 2.7–4.5)
PISA MRMAX VEL: 0.04 M/S
PISA TR MAX VEL: 2.7 M/S
PISA TR MAX VEL: 2.89 M/S
PLATELET # BLD AUTO: 163 K/UL (ref 150–450)
PLATELET # BLD AUTO: 172 K/UL (ref 150–450)
PLATELET # BLD AUTO: 180 K/UL (ref 150–450)
PLATELET # BLD AUTO: 180 K/UL (ref 150–450)
PLATELET # BLD AUTO: 186 K/UL (ref 150–450)
PLATELET # BLD AUTO: 196 K/UL (ref 150–450)
PLATELET # BLD AUTO: 197 K/UL (ref 150–450)
PLATELET # BLD AUTO: 210 K/UL (ref 150–450)
PLATELET # BLD AUTO: 225 K/UL (ref 150–450)
PLATELET # BLD AUTO: 232 K/UL (ref 150–450)
PLATELET # BLD AUTO: 232 K/UL (ref 150–450)
PLATELET # BLD AUTO: 250 K/UL (ref 150–450)
PLATELET # BLD AUTO: 293 K/UL (ref 150–450)
PMV BLD AUTO: 10.1 FL (ref 9.2–12.9)
PMV BLD AUTO: 10.1 FL (ref 9.2–12.9)
PMV BLD AUTO: 10.2 FL (ref 9.2–12.9)
PMV BLD AUTO: 10.3 FL (ref 9.2–12.9)
PMV BLD AUTO: 10.4 FL (ref 9.2–12.9)
PMV BLD AUTO: 10.6 FL (ref 9.2–12.9)
PMV BLD AUTO: 10.7 FL (ref 9.2–12.9)
PMV BLD AUTO: 10.9 FL (ref 9.2–12.9)
PMV BLD AUTO: 9.4 FL (ref 9.2–12.9)
PO2 BLDA: 123 MMHG (ref 80–100)
PO2 BLDA: 27 MMHG (ref 40–60)
PO2 BLDA: 28 MMHG (ref 40–60)
PO2 BLDA: 29 MMHG (ref 40–60)
PO2 BLDA: 29 MMHG (ref 40–60)
PO2 BLDA: 30 MMHG (ref 40–60)
PO2 BLDA: 31 MMHG (ref 40–60)
PO2 BLDA: 31 MMHG (ref 40–60)
PO2 BLDA: 32 MMHG (ref 40–60)
PO2 BLDA: 33 MMHG (ref 40–60)
PO2 BLDA: 33 MMHG (ref 40–60)
PO2 BLDA: 37 MMHG (ref 40–60)
PO2 BLDA: 39 MMHG (ref 40–60)
PO2 BLDA: 40 MMHG (ref 40–60)
PO2 BLDA: 51 MMHG (ref 40–60)
PO2 BLDA: 63 MMHG (ref 80–100)
PO2 BLDA: 96 MMHG (ref 40–60)
POC BE: 11 MMOL/L
POC BE: 11 MMOL/L
POC BE: 14 MMOL/L
POC BE: 16 MMOL/L
POC BE: 17 MMOL/L
POC BE: 18 MMOL/L
POC BE: 18 MMOL/L
POC BE: 19 MMOL/L
POC BE: 3 MMOL/L
POC BE: 3 MMOL/L
POC BE: 4 MMOL/L
POC BE: 5 MMOL/L
POC BE: 6 MMOL/L
POC BE: 6 MMOL/L
POC BE: 8 MMOL/L
POC BE: 8 MMOL/L
POC BE: 9 MMOL/L
POC IONIZED CALCIUM: 1.1 MMOL/L (ref 1.06–1.42)
POC SATURATED O2: 46 % (ref 95–100)
POC SATURATED O2: 48 % (ref 95–100)
POC SATURATED O2: 51 % (ref 95–100)
POC SATURATED O2: 52 % (ref 95–100)
POC SATURATED O2: 53 % (ref 95–100)
POC SATURATED O2: 55 % (ref 95–100)
POC SATURATED O2: 55 % (ref 95–100)
POC SATURATED O2: 56 % (ref 95–100)
POC SATURATED O2: 57 % (ref 95–100)
POC SATURATED O2: 57 % (ref 95–100)
POC SATURATED O2: 58 % (ref 95–100)
POC SATURATED O2: 58 % (ref 95–100)
POC SATURATED O2: 59 % (ref 95–100)
POC SATURATED O2: 66 % (ref 95–100)
POC SATURATED O2: 70 % (ref 95–100)
POC SATURATED O2: 71 % (ref 95–100)
POC SATURATED O2: 71 % (ref 95–100)
POC SATURATED O2: 92 % (ref 95–100)
POC SATURATED O2: 97 % (ref 95–100)
POC SATURATED O2: 99 % (ref 95–100)
POC TCO2 (MEASURED): 32 MMOL/L (ref 23–29)
POC TCO2: 30 MMOL/L (ref 23–27)
POC TCO2: 33 MMOL/L (ref 24–29)
POC TCO2: 34 MMOL/L (ref 24–29)
POC TCO2: 35 MMOL/L (ref 24–29)
POC TCO2: 35 MMOL/L (ref 24–29)
POC TCO2: 36 MMOL/L (ref 24–29)
POC TCO2: 37 MMOL/L (ref 24–29)
POC TCO2: 38 MMOL/L (ref 24–29)
POC TCO2: 39 MMOL/L (ref 24–29)
POC TCO2: 41 MMOL/L (ref 24–29)
POC TCO2: 43 MMOL/L (ref 24–29)
POC TCO2: 44 MMOL/L (ref 23–27)
POC TCO2: 44 MMOL/L (ref 24–29)
POC TCO2: 45 MMOL/L (ref 24–29)
POCT GLUCOSE: 100 MG/DL (ref 70–110)
POCT GLUCOSE: 101 MG/DL (ref 70–110)
POCT GLUCOSE: 103 MG/DL (ref 70–110)
POCT GLUCOSE: 103 MG/DL (ref 70–110)
POCT GLUCOSE: 104 MG/DL (ref 70–110)
POCT GLUCOSE: 105 MG/DL (ref 70–110)
POCT GLUCOSE: 105 MG/DL (ref 70–110)
POCT GLUCOSE: 106 MG/DL (ref 70–110)
POCT GLUCOSE: 107 MG/DL (ref 70–110)
POCT GLUCOSE: 109 MG/DL (ref 70–110)
POCT GLUCOSE: 109 MG/DL (ref 70–110)
POCT GLUCOSE: 110 MG/DL (ref 70–110)
POCT GLUCOSE: 111 MG/DL (ref 70–110)
POCT GLUCOSE: 113 MG/DL (ref 70–110)
POCT GLUCOSE: 114 MG/DL (ref 70–110)
POCT GLUCOSE: 121 MG/DL (ref 70–110)
POCT GLUCOSE: 122 MG/DL (ref 70–110)
POCT GLUCOSE: 126 MG/DL (ref 70–110)
POCT GLUCOSE: 127 MG/DL (ref 70–110)
POCT GLUCOSE: 127 MG/DL (ref 70–110)
POCT GLUCOSE: 138 MG/DL (ref 70–110)
POCT GLUCOSE: 146 MG/DL (ref 70–110)
POCT GLUCOSE: 150 MG/DL (ref 70–110)
POCT GLUCOSE: 152 MG/DL (ref 70–110)
POCT GLUCOSE: 179 MG/DL (ref 70–110)
POCT GLUCOSE: 189 MG/DL (ref 70–110)
POCT GLUCOSE: 263 MG/DL (ref 70–110)
POCT GLUCOSE: 375 MG/DL (ref 70–110)
POCT GLUCOSE: 87 MG/DL (ref 70–110)
POCT GLUCOSE: 98 MG/DL (ref 70–110)
POTASSIUM BLD-SCNC: 6.1 MMOL/L (ref 3.5–5.1)
POTASSIUM SERPL-SCNC: 3.1 MMOL/L (ref 3.5–5.1)
POTASSIUM SERPL-SCNC: 3.3 MMOL/L (ref 3.5–5.1)
POTASSIUM SERPL-SCNC: 3.6 MMOL/L (ref 3.5–5.1)
POTASSIUM SERPL-SCNC: 3.9 MMOL/L (ref 3.5–5.1)
POTASSIUM SERPL-SCNC: 3.9 MMOL/L (ref 3.5–5.1)
POTASSIUM SERPL-SCNC: 4 MMOL/L (ref 3.5–5.1)
POTASSIUM SERPL-SCNC: 4 MMOL/L (ref 3.5–5.1)
POTASSIUM SERPL-SCNC: 4.2 MMOL/L (ref 3.5–5.1)
POTASSIUM SERPL-SCNC: 4.4 MMOL/L (ref 3.5–5.1)
POTASSIUM SERPL-SCNC: 4.6 MMOL/L (ref 3.5–5.1)
POTASSIUM SERPL-SCNC: 4.8 MMOL/L (ref 3.5–5.1)
POTASSIUM SERPL-SCNC: 4.8 MMOL/L (ref 3.5–5.1)
PROCALCITONIN SERPL IA-MCNC: <0.02 NG/ML
PROT SERPL-MCNC: 5.9 G/DL (ref 6–8.4)
PROT SERPL-MCNC: 6.3 G/DL (ref 6–8.4)
PROT SERPL-MCNC: 6.3 G/DL (ref 6–8.4)
PROT SERPL-MCNC: 6.5 G/DL (ref 6–8.4)
PROT SERPL-MCNC: 6.6 G/DL (ref 6–8.4)
PROT SERPL-MCNC: 6.7 G/DL (ref 6–8.4)
PROT SERPL-MCNC: 6.7 G/DL (ref 6–8.4)
PROT SERPL-MCNC: 6.9 G/DL (ref 6–8.4)
PROT SERPL-MCNC: 6.9 G/DL (ref 6–8.4)
PROT UR QL STRIP: ABNORMAL
PROT UR QL STRIP: NEGATIVE
PROT UR-MCNC: 39 MG/DL (ref 0–15)
PROT/CREAT UR: 1.15 MG/G{CREAT} (ref 0–0.2)
PROTHROMBIN TIME: 10.8 SEC (ref 9–12.5)
PROTHROMBIN TIME: 11 SEC (ref 9–12.5)
PROTHROMBIN TIME: 12.9 SEC (ref 9–12.5)
PROVIDER CREDENTIALS: ABNORMAL
PROVIDER NOTIFIED: ABNORMAL
RA MAJOR: 3.77 CM
RA MAJOR: 4 CM
RA PRESSURE ESTIMATED: 3 MMHG
RA PRESSURE ESTIMATED: 3 MMHG
RA WIDTH: 3.01 CM
RA WIDTH: 3.88 CM
RBC # BLD AUTO: 3.96 M/UL (ref 4–5.4)
RBC # BLD AUTO: 4.14 M/UL (ref 4–5.4)
RBC # BLD AUTO: 4.23 M/UL (ref 4–5.4)
RBC # BLD AUTO: 4.24 M/UL (ref 4–5.4)
RBC # BLD AUTO: 4.26 M/UL (ref 4–5.4)
RBC # BLD AUTO: 4.34 M/UL (ref 4–5.4)
RBC # BLD AUTO: 4.41 M/UL (ref 4–5.4)
RBC # BLD AUTO: 4.42 M/UL (ref 4–5.4)
RBC # BLD AUTO: 4.59 M/UL (ref 4–5.4)
RBC # BLD AUTO: 4.7 M/UL (ref 4–5.4)
RBC # BLD AUTO: 4.71 M/UL (ref 4–5.4)
RBC # BLD AUTO: 4.83 M/UL (ref 4–5.4)
RBC # BLD AUTO: 5.09 M/UL (ref 4–5.4)
RBC #/AREA URNS AUTO: 69 /HPF (ref 0–4)
RIGHT VENTRICULAR END-DIASTOLIC DIMENSION: 3.22 CM
RIGHT VENTRICULAR END-DIASTOLIC DIMENSION: 3.8 CM
RV TB RVSP: 6 MMHG
RV TB RVSP: 6 MMHG
SAMPLE: ABNORMAL
SAMPLE: NORMAL
SARS-COV-2 RDRP RESP QL NAA+PROBE: NEGATIVE
SINUS: 2.61 CM
SINUS: 2.78 CM
SITE: ABNORMAL
SITE: NORMAL
SODIUM BLD-SCNC: 137 MMOL/L (ref 136–145)
SODIUM SERPL-SCNC: 132 MMOL/L (ref 136–145)
SODIUM SERPL-SCNC: 134 MMOL/L (ref 136–145)
SODIUM SERPL-SCNC: 135 MMOL/L (ref 136–145)
SODIUM SERPL-SCNC: 136 MMOL/L (ref 136–145)
SODIUM SERPL-SCNC: 137 MMOL/L (ref 136–145)
SODIUM SERPL-SCNC: 137 MMOL/L (ref 136–145)
SODIUM SERPL-SCNC: 139 MMOL/L (ref 136–145)
SODIUM SERPL-SCNC: 141 MMOL/L (ref 136–145)
SODIUM SERPL-SCNC: 142 MMOL/L (ref 136–145)
SODIUM SERPL-SCNC: 143 MMOL/L (ref 136–145)
SODIUM SERPL-SCNC: 144 MMOL/L (ref 136–145)
SODIUM SERPL-SCNC: 144 MMOL/L (ref 136–145)
SODIUM UR-SCNC: 114 MMOL/L (ref 20–250)
SODIUM UR-SCNC: 125 MMOL/L (ref 20–250)
SP GR UR STRIP: 1.01 (ref 1–1.03)
SP GR UR STRIP: 1.01 (ref 1–1.03)
SP02: 94
SP02: 95
SP02: 95
SP02: 96
SPECIMEN SOURCE: NORMAL
SPONT RATE: 28
SQUAMOUS #/AREA URNS AUTO: 0 /HPF
STAPH AUREUS ID BY PCR: NEGATIVE
STJ: 2.19 CM
STJ: 2.46 CM
TDI LATERAL: 0.04 M/S
TDI LATERAL: 0.05 M/S
TDI SEPTAL: 0.04 M/S
TDI SEPTAL: 0.05 M/S
TDI: 0.05 M/S
TDI: 0.05 M/S
TIME NOTIFIED: 1316
TR MAX PG: 29 MMHG
TR MAX PG: 33 MMHG
TRICUSPID ANNULAR PLANE SYSTOLIC EXCURSION: 2.02 CM
TRICUSPID ANNULAR PLANE SYSTOLIC EXCURSION: 2.1 CM
TRIGL SERPL-MCNC: 91 MG/DL (ref 30–150)
TROPONIN I SERPL DL<=0.01 NG/ML-MCNC: 0.07 NG/ML (ref 0–0.03)
TROPONIN I SERPL DL<=0.01 NG/ML-MCNC: 0.35 NG/ML (ref 0–0.03)
TROPONIN I SERPL DL<=0.01 NG/ML-MCNC: 0.48 NG/ML (ref 0–0.03)
TROPONIN I SERPL DL<=0.01 NG/ML-MCNC: 0.69 NG/ML (ref 0–0.03)
TSH SERPL DL<=0.005 MIU/L-ACNC: 0.89 UIU/ML (ref 0.4–4)
TV REST PULMONARY ARTERY PRESSURE: 32 MMHG
TV REST PULMONARY ARTERY PRESSURE: 36 MMHG
URN SPEC COLLECT METH UR: ABNORMAL
URN SPEC COLLECT METH UR: ABNORMAL
UUN UR-MCNC: 106 MG/DL (ref 140–1050)
UUN UR-MCNC: 53 MG/DL (ref 140–1050)
VANCOMYCIN SERPL-MCNC: 10.9 UG/ML
VANCOMYCIN SERPL-MCNC: 16 UG/ML
VERBAL RESULT READBACK PERFORMED: YES
WBC # BLD AUTO: 10.59 K/UL (ref 3.9–12.7)
WBC # BLD AUTO: 11.55 K/UL (ref 3.9–12.7)
WBC # BLD AUTO: 14.97 K/UL (ref 3.9–12.7)
WBC # BLD AUTO: 17.49 K/UL (ref 3.9–12.7)
WBC # BLD AUTO: 4.03 K/UL (ref 3.9–12.7)
WBC # BLD AUTO: 4.46 K/UL (ref 3.9–12.7)
WBC # BLD AUTO: 4.47 K/UL (ref 3.9–12.7)
WBC # BLD AUTO: 5.59 K/UL (ref 3.9–12.7)
WBC # BLD AUTO: 6.47 K/UL (ref 3.9–12.7)
WBC # BLD AUTO: 6.84 K/UL (ref 3.9–12.7)
WBC # BLD AUTO: 7.58 K/UL (ref 3.9–12.7)
WBC # BLD AUTO: 7.89 K/UL (ref 3.9–12.7)
WBC # BLD AUTO: 8.61 K/UL (ref 3.9–12.7)
WBC #/AREA URNS AUTO: 13 /HPF (ref 0–5)
Z-SCORE OF LEFT VENTRICULAR DIMENSION IN END DIASTOLE: 3.26
Z-SCORE OF LEFT VENTRICULAR DIMENSION IN END DIASTOLE: 4.96
Z-SCORE OF LEFT VENTRICULAR DIMENSION IN END SYSTOLE: 6.2
Z-SCORE OF LEFT VENTRICULAR DIMENSION IN END SYSTOLE: 7.25

## 2024-01-01 PROCEDURE — 84100 ASSAY OF PHOSPHORUS: CPT

## 2024-01-01 PROCEDURE — 80202 ASSAY OF VANCOMYCIN: CPT

## 2024-01-01 PROCEDURE — 94761 N-INVAS EAR/PLS OXIMETRY MLT: CPT

## 2024-01-01 PROCEDURE — 99233 SBSQ HOSP IP/OBS HIGH 50: CPT | Mod: ,,, | Performed by: INTERNAL MEDICINE

## 2024-01-01 PROCEDURE — 25000003 PHARM REV CODE 250: Performed by: INTERNAL MEDICINE

## 2024-01-01 PROCEDURE — 99999 PR PBB SHADOW E&M-EST. PATIENT-LVL V: CPT | Mod: PBBFAC,,,

## 2024-01-01 PROCEDURE — 63600175 PHARM REV CODE 636 W HCPCS: Performed by: NURSE PRACTITIONER

## 2024-01-01 PROCEDURE — 1101F PT FALLS ASSESS-DOCD LE1/YR: CPT | Mod: CPTII,S$GLB,, | Performed by: INTERNAL MEDICINE

## 2024-01-01 PROCEDURE — 94640 AIRWAY INHALATION TREATMENT: CPT

## 2024-01-01 PROCEDURE — 82803 BLOOD GASES ANY COMBINATION: CPT

## 2024-01-01 PROCEDURE — 21400001 HC TELEMETRY ROOM

## 2024-01-01 PROCEDURE — 27000221 HC OXYGEN, UP TO 24 HOURS

## 2024-01-01 PROCEDURE — 80048 BASIC METABOLIC PNL TOTAL CA: CPT | Mod: XB | Performed by: INTERNAL MEDICINE

## 2024-01-01 PROCEDURE — 99498 ADVNCD CARE PLAN ADDL 30 MIN: CPT | Mod: ,,, | Performed by: STUDENT IN AN ORGANIZED HEALTH CARE EDUCATION/TRAINING PROGRAM

## 2024-01-01 PROCEDURE — 99999 PR PBB SHADOW E&M-EST. PATIENT-LVL IV: CPT | Mod: PBBFAC,,, | Performed by: FAMILY MEDICINE

## 2024-01-01 PROCEDURE — 25000003 PHARM REV CODE 250

## 2024-01-01 PROCEDURE — 85025 COMPLETE CBC W/AUTO DIFF WBC: CPT | Mod: 91 | Performed by: STUDENT IN AN ORGANIZED HEALTH CARE EDUCATION/TRAINING PROGRAM

## 2024-01-01 PROCEDURE — 83880 ASSAY OF NATRIURETIC PEPTIDE: CPT

## 2024-01-01 PROCEDURE — 25000242 PHARM REV CODE 250 ALT 637 W/ HCPCS: Performed by: EMERGENCY MEDICINE

## 2024-01-01 PROCEDURE — 83605 ASSAY OF LACTIC ACID: CPT | Mod: 91 | Performed by: INTERNAL MEDICINE

## 2024-01-01 PROCEDURE — 99999 PR PBB SHADOW E&M-EST. PATIENT-LVL III: CPT | Mod: PBBFAC,,, | Performed by: INTERNAL MEDICINE

## 2024-01-01 PROCEDURE — 93005 ELECTROCARDIOGRAM TRACING: CPT

## 2024-01-01 PROCEDURE — 63600175 PHARM REV CODE 636 W HCPCS: Performed by: STUDENT IN AN ORGANIZED HEALTH CARE EDUCATION/TRAINING PROGRAM

## 2024-01-01 PROCEDURE — 36415 COLL VENOUS BLD VENIPUNCTURE: CPT

## 2024-01-01 PROCEDURE — 97165 OT EVAL LOW COMPLEX 30 MIN: CPT

## 2024-01-01 PROCEDURE — 99223 1ST HOSP IP/OBS HIGH 75: CPT | Mod: ,,, | Performed by: INTERNAL MEDICINE

## 2024-01-01 PROCEDURE — 99900035 HC TECH TIME PER 15 MIN (STAT)

## 2024-01-01 PROCEDURE — 20000000 HC ICU ROOM

## 2024-01-01 PROCEDURE — 3078F DIAST BP <80 MM HG: CPT | Mod: CPTII,S$GLB,, | Performed by: INTERNAL MEDICINE

## 2024-01-01 PROCEDURE — 85730 THROMBOPLASTIN TIME PARTIAL: CPT | Performed by: INTERNAL MEDICINE

## 2024-01-01 PROCEDURE — 77065 DX MAMMO INCL CAD UNI: CPT | Mod: 26,RT,, | Performed by: RADIOLOGY

## 2024-01-01 PROCEDURE — 80053 COMPREHEN METABOLIC PANEL: CPT

## 2024-01-01 PROCEDURE — 25000242 PHARM REV CODE 250 ALT 637 W/ HCPCS

## 2024-01-01 PROCEDURE — 25000003 PHARM REV CODE 250: Performed by: STUDENT IN AN ORGANIZED HEALTH CARE EDUCATION/TRAINING PROGRAM

## 2024-01-01 PROCEDURE — 83735 ASSAY OF MAGNESIUM: CPT

## 2024-01-01 PROCEDURE — 83880 ASSAY OF NATRIURETIC PEPTIDE: CPT | Performed by: EMERGENCY MEDICINE

## 2024-01-01 PROCEDURE — 3288F FALL RISK ASSESSMENT DOCD: CPT | Mod: CPTII,S$GLB,, | Performed by: STUDENT IN AN ORGANIZED HEALTH CARE EDUCATION/TRAINING PROGRAM

## 2024-01-01 PROCEDURE — 11000001 HC ACUTE MED/SURG PRIVATE ROOM

## 2024-01-01 PROCEDURE — 87150 DNA/RNA AMPLIFIED PROBE: CPT | Performed by: EMERGENCY MEDICINE

## 2024-01-01 PROCEDURE — 83735 ASSAY OF MAGNESIUM: CPT | Performed by: INTERNAL MEDICINE

## 2024-01-01 PROCEDURE — 99999 PR PBB SHADOW E&M-EST. PATIENT-LVL IV: CPT | Mod: PBBFAC,,, | Performed by: STUDENT IN AN ORGANIZED HEALTH CARE EDUCATION/TRAINING PROGRAM

## 2024-01-01 PROCEDURE — 76642 ULTRASOUND BREAST LIMITED: CPT | Mod: TC,RT

## 2024-01-01 PROCEDURE — 99233 SBSQ HOSP IP/OBS HIGH 50: CPT | Mod: ,,, | Performed by: STUDENT IN AN ORGANIZED HEALTH CARE EDUCATION/TRAINING PROGRAM

## 2024-01-01 PROCEDURE — 80053 COMPREHEN METABOLIC PANEL: CPT | Performed by: EMERGENCY MEDICINE

## 2024-01-01 PROCEDURE — 73030 X-RAY EXAM OF SHOULDER: CPT | Mod: 26,,, | Performed by: RADIOLOGY

## 2024-01-01 PROCEDURE — 25000003 PHARM REV CODE 250: Performed by: HOSPITALIST

## 2024-01-01 PROCEDURE — 80053 COMPREHEN METABOLIC PANEL: CPT | Mod: 91 | Performed by: INTERNAL MEDICINE

## 2024-01-01 PROCEDURE — 3074F SYST BP LT 130 MM HG: CPT | Mod: CPTII,S$GLB,, | Performed by: NURSE PRACTITIONER

## 2024-01-01 PROCEDURE — 99213 OFFICE O/P EST LOW 20 MIN: CPT | Mod: S$GLB,,, | Performed by: INTERNAL MEDICINE

## 2024-01-01 PROCEDURE — 63600175 PHARM REV CODE 636 W HCPCS: Performed by: EMERGENCY MEDICINE

## 2024-01-01 PROCEDURE — 63600175 PHARM REV CODE 636 W HCPCS

## 2024-01-01 PROCEDURE — 82010 KETONE BODYS QUAN: CPT | Performed by: STUDENT IN AN ORGANIZED HEALTH CARE EDUCATION/TRAINING PROGRAM

## 2024-01-01 PROCEDURE — 99999 PR PBB SHADOW E&M-EST. PATIENT-LVL IV: CPT | Mod: PBBFAC,,, | Performed by: PHYSICIAN ASSISTANT

## 2024-01-01 PROCEDURE — 88342 IMHCHEM/IMCYTCHM 1ST ANTB: CPT | Mod: 26,,, | Performed by: PATHOLOGY

## 2024-01-01 PROCEDURE — 1159F MED LIST DOCD IN RCRD: CPT | Mod: CPTII,S$GLB,, | Performed by: FAMILY MEDICINE

## 2024-01-01 PROCEDURE — 85025 COMPLETE CBC W/AUTO DIFF WBC: CPT | Performed by: EMERGENCY MEDICINE

## 2024-01-01 PROCEDURE — 80048 BASIC METABOLIC PNL TOTAL CA: CPT | Mod: XB

## 2024-01-01 PROCEDURE — 25000003 PHARM REV CODE 250: Performed by: FAMILY MEDICINE

## 2024-01-01 PROCEDURE — 80048 BASIC METABOLIC PNL TOTAL CA: CPT | Mod: 91,XB | Performed by: INTERNAL MEDICINE

## 2024-01-01 PROCEDURE — 63600175 PHARM REV CODE 636 W HCPCS: Performed by: INTERNAL MEDICINE

## 2024-01-01 PROCEDURE — 1126F AMNT PAIN NOTED NONE PRSNT: CPT | Mod: CPTII,S$GLB,, | Performed by: NURSE PRACTITIONER

## 2024-01-01 PROCEDURE — 84484 ASSAY OF TROPONIN QUANT: CPT | Mod: 91 | Performed by: INTERNAL MEDICINE

## 2024-01-01 PROCEDURE — 63600150 PHARM REV CODE 636: Performed by: EMERGENCY MEDICINE

## 2024-01-01 PROCEDURE — 1111F DSCHRG MED/CURRENT MED MERGE: CPT | Mod: CPTII,S$GLB,, | Performed by: INTERNAL MEDICINE

## 2024-01-01 PROCEDURE — 97161 PT EVAL LOW COMPLEX 20 MIN: CPT

## 2024-01-01 PROCEDURE — 73030 X-RAY EXAM OF SHOULDER: CPT | Mod: TC,50

## 2024-01-01 PROCEDURE — 80048 BASIC METABOLIC PNL TOTAL CA: CPT | Mod: XB | Performed by: STUDENT IN AN ORGANIZED HEALTH CARE EDUCATION/TRAINING PROGRAM

## 2024-01-01 PROCEDURE — 36415 COLL VENOUS BLD VENIPUNCTURE: CPT | Performed by: INTERNAL MEDICINE

## 2024-01-01 PROCEDURE — 84540 ASSAY OF URINE/UREA-N: CPT

## 2024-01-01 PROCEDURE — 27201068 US BREAST BIOPSY WITH IMAGING 1ST SITE RIGHT

## 2024-01-01 PROCEDURE — 93000 ELECTROCARDIOGRAM COMPLETE: CPT | Mod: S$GLB,,, | Performed by: INTERNAL MEDICINE

## 2024-01-01 PROCEDURE — 99232 SBSQ HOSP IP/OBS MODERATE 35: CPT | Mod: ,,, | Performed by: INTERNAL MEDICINE

## 2024-01-01 PROCEDURE — 83605 ASSAY OF LACTIC ACID: CPT | Mod: 91 | Performed by: NURSE PRACTITIONER

## 2024-01-01 PROCEDURE — 85025 COMPLETE CBC W/AUTO DIFF WBC: CPT

## 2024-01-01 PROCEDURE — 99497 ADVNCD CARE PLAN 30 MIN: CPT | Mod: ,,, | Performed by: STUDENT IN AN ORGANIZED HEALTH CARE EDUCATION/TRAINING PROGRAM

## 2024-01-01 PROCEDURE — 97535 SELF CARE MNGMENT TRAINING: CPT

## 2024-01-01 PROCEDURE — 36415 COLL VENOUS BLD VENIPUNCTURE: CPT | Performed by: STUDENT IN AN ORGANIZED HEALTH CARE EDUCATION/TRAINING PROGRAM

## 2024-01-01 PROCEDURE — 1101F PT FALLS ASSESS-DOCD LE1/YR: CPT | Mod: CPTII,S$GLB,, | Performed by: NURSE PRACTITIONER

## 2024-01-01 PROCEDURE — 80048 BASIC METABOLIC PNL TOTAL CA: CPT | Mod: XB | Performed by: NURSE PRACTITIONER

## 2024-01-01 PROCEDURE — 99495 TRANSJ CARE MGMT MOD F2F 14D: CPT | Mod: S$GLB,,, | Performed by: NURSE PRACTITIONER

## 2024-01-01 PROCEDURE — 5A09357 ASSISTANCE WITH RESPIRATORY VENTILATION, LESS THAN 24 CONSECUTIVE HOURS, CONTINUOUS POSITIVE AIRWAY PRESSURE: ICD-10-PCS | Performed by: INTERNAL MEDICINE

## 2024-01-01 PROCEDURE — 82550 ASSAY OF CK (CPK): CPT | Performed by: INTERNAL MEDICINE

## 2024-01-01 PROCEDURE — 88189 FLOWCYTOMETRY/READ 16 & >: CPT | Mod: ,,, | Performed by: PATHOLOGY

## 2024-01-01 PROCEDURE — 1126F AMNT PAIN NOTED NONE PRSNT: CPT | Mod: CPTII,S$GLB,, | Performed by: FAMILY MEDICINE

## 2024-01-01 PROCEDURE — 84443 ASSAY THYROID STIM HORMONE: CPT

## 2024-01-01 PROCEDURE — 1159F MED LIST DOCD IN RCRD: CPT | Mod: CPTII,S$GLB,, | Performed by: PHYSICIAN ASSISTANT

## 2024-01-01 PROCEDURE — 99285 EMERGENCY DEPT VISIT HI MDM: CPT | Mod: 25

## 2024-01-01 PROCEDURE — 99223 1ST HOSP IP/OBS HIGH 75: CPT | Mod: ,,, | Performed by: STUDENT IN AN ORGANIZED HEALTH CARE EDUCATION/TRAINING PROGRAM

## 2024-01-01 PROCEDURE — 27100171 HC OXYGEN HIGH FLOW UP TO 24 HOURS

## 2024-01-01 PROCEDURE — 94660 CPAP INITIATION&MGMT: CPT

## 2024-01-01 PROCEDURE — 84300 ASSAY OF URINE SODIUM: CPT

## 2024-01-01 PROCEDURE — 1101F PT FALLS ASSESS-DOCD LE1/YR: CPT | Mod: CPTII,S$GLB,, | Performed by: STUDENT IN AN ORGANIZED HEALTH CARE EDUCATION/TRAINING PROGRAM

## 2024-01-01 PROCEDURE — 84100 ASSAY OF PHOSPHORUS: CPT | Performed by: PHYSICIAN ASSISTANT

## 2024-01-01 PROCEDURE — 88184 FLOWCYTOMETRY/ TC 1 MARKER: CPT | Performed by: PATHOLOGY

## 2024-01-01 PROCEDURE — 1159F MED LIST DOCD IN RCRD: CPT | Mod: CPTII,S$GLB,,

## 2024-01-01 PROCEDURE — 27000190 HC CPAP FULL FACE MASK W/VALVE

## 2024-01-01 PROCEDURE — 80048 BASIC METABOLIC PNL TOTAL CA: CPT | Performed by: EMERGENCY MEDICINE

## 2024-01-01 PROCEDURE — 20600001 HC STEP DOWN PRIVATE ROOM

## 2024-01-01 PROCEDURE — 63600175 PHARM REV CODE 636 W HCPCS: Performed by: HOSPITALIST

## 2024-01-01 PROCEDURE — 82550 ASSAY OF CK (CPK): CPT | Mod: 91

## 2024-01-01 PROCEDURE — 82550 ASSAY OF CK (CPK): CPT

## 2024-01-01 PROCEDURE — 85025 COMPLETE CBC W/AUTO DIFF WBC: CPT | Performed by: STUDENT IN AN ORGANIZED HEALTH CARE EDUCATION/TRAINING PROGRAM

## 2024-01-01 PROCEDURE — 85610 PROTHROMBIN TIME: CPT | Mod: 91 | Performed by: STUDENT IN AN ORGANIZED HEALTH CARE EDUCATION/TRAINING PROGRAM

## 2024-01-01 PROCEDURE — 77065 DX MAMMO INCL CAD UNI: CPT | Mod: TC,RT

## 2024-01-01 PROCEDURE — 1125F AMNT PAIN NOTED PAIN PRSNT: CPT | Mod: CPTII,S$GLB,, | Performed by: PHYSICIAN ASSISTANT

## 2024-01-01 PROCEDURE — 94640 AIRWAY INHALATION TREATMENT: CPT | Mod: XB

## 2024-01-01 PROCEDURE — 93010 ELECTROCARDIOGRAM REPORT: CPT | Mod: ,,, | Performed by: INTERNAL MEDICINE

## 2024-01-01 PROCEDURE — 3074F SYST BP LT 130 MM HG: CPT | Mod: CPTII,S$GLB,, | Performed by: INTERNAL MEDICINE

## 2024-01-01 PROCEDURE — 3074F SYST BP LT 130 MM HG: CPT | Mod: CPTII,S$GLB,, | Performed by: STUDENT IN AN ORGANIZED HEALTH CARE EDUCATION/TRAINING PROGRAM

## 2024-01-01 PROCEDURE — 99213 OFFICE O/P EST LOW 20 MIN: CPT | Mod: 25,S$GLB,, | Performed by: PHYSICIAN ASSISTANT

## 2024-01-01 PROCEDURE — 84484 ASSAY OF TROPONIN QUANT: CPT | Performed by: EMERGENCY MEDICINE

## 2024-01-01 PROCEDURE — 1111F DSCHRG MED/CURRENT MED MERGE: CPT | Mod: CPTII,S$GLB,, | Performed by: NURSE PRACTITIONER

## 2024-01-01 PROCEDURE — 85610 PROTHROMBIN TIME: CPT

## 2024-01-01 PROCEDURE — 99205 OFFICE O/P NEW HI 60 MIN: CPT | Mod: S$GLB,,, | Performed by: INTERNAL MEDICINE

## 2024-01-01 PROCEDURE — 1159F MED LIST DOCD IN RCRD: CPT | Mod: CPTII,S$GLB,, | Performed by: INTERNAL MEDICINE

## 2024-01-01 PROCEDURE — 82570 ASSAY OF URINE CREATININE: CPT

## 2024-01-01 PROCEDURE — 85730 THROMBOPLASTIN TIME PARTIAL: CPT | Mod: 91 | Performed by: INTERNAL MEDICINE

## 2024-01-01 PROCEDURE — 80061 LIPID PANEL: CPT

## 2024-01-01 PROCEDURE — 77061 BREAST TOMOSYNTHESIS UNI: CPT | Mod: TC,RT

## 2024-01-01 PROCEDURE — 85025 COMPLETE CBC W/AUTO DIFF WBC: CPT | Mod: 91 | Performed by: INTERNAL MEDICINE

## 2024-01-01 PROCEDURE — 37799 UNLISTED PX VASCULAR SURGERY: CPT

## 2024-01-01 PROCEDURE — 88305 TISSUE EXAM BY PATHOLOGIST: CPT | Performed by: PATHOLOGY

## 2024-01-01 PROCEDURE — 87088 URINE BACTERIA CULTURE: CPT

## 2024-01-01 PROCEDURE — 1101F PT FALLS ASSESS-DOCD LE1/YR: CPT | Mod: CPTII,S$GLB,, | Performed by: FAMILY MEDICINE

## 2024-01-01 PROCEDURE — 36415 COLL VENOUS BLD VENIPUNCTURE: CPT | Performed by: NURSE PRACTITIONER

## 2024-01-01 PROCEDURE — 3288F FALL RISK ASSESSMENT DOCD: CPT | Mod: CPTII,S$GLB,, | Performed by: NURSE PRACTITIONER

## 2024-01-01 PROCEDURE — G2211 COMPLEX E/M VISIT ADD ON: HCPCS | Mod: S$GLB,,, | Performed by: INTERNAL MEDICINE

## 2024-01-01 PROCEDURE — 83735 ASSAY OF MAGNESIUM: CPT | Mod: 91

## 2024-01-01 PROCEDURE — 25500020 PHARM REV CODE 255: Performed by: INTERNAL MEDICINE

## 2024-01-01 PROCEDURE — 93005 ELECTROCARDIOGRAM TRACING: CPT | Performed by: INTERNAL MEDICINE

## 2024-01-01 PROCEDURE — 83735 ASSAY OF MAGNESIUM: CPT | Performed by: PHYSICIAN ASSISTANT

## 2024-01-01 PROCEDURE — 82800 BLOOD PH: CPT

## 2024-01-01 PROCEDURE — 99214 OFFICE O/P EST MOD 30 MIN: CPT | Mod: S$GLB,,, | Performed by: STUDENT IN AN ORGANIZED HEALTH CARE EDUCATION/TRAINING PROGRAM

## 2024-01-01 PROCEDURE — 80053 COMPREHEN METABOLIC PANEL: CPT | Performed by: PHYSICIAN ASSISTANT

## 2024-01-01 PROCEDURE — 96376 TX/PRO/DX INJ SAME DRUG ADON: CPT

## 2024-01-01 PROCEDURE — 80047 BASIC METABLC PNL IONIZED CA: CPT

## 2024-01-01 PROCEDURE — 36620 INSERTION CATHETER ARTERY: CPT

## 2024-01-01 PROCEDURE — 99397 PER PM REEVAL EST PAT 65+ YR: CPT | Mod: S$GLB,,, | Performed by: FAMILY MEDICINE

## 2024-01-01 PROCEDURE — 3288F FALL RISK ASSESSMENT DOCD: CPT | Mod: CPTII,S$GLB,, | Performed by: FAMILY MEDICINE

## 2024-01-01 PROCEDURE — 87040 BLOOD CULTURE FOR BACTERIA: CPT | Mod: 59 | Performed by: EMERGENCY MEDICINE

## 2024-01-01 PROCEDURE — 83605 ASSAY OF LACTIC ACID: CPT

## 2024-01-01 PROCEDURE — 84100 ASSAY OF PHOSPHORUS: CPT | Performed by: INTERNAL MEDICINE

## 2024-01-01 PROCEDURE — 85025 COMPLETE CBC W/AUTO DIFF WBC: CPT | Performed by: PHYSICIAN ASSISTANT

## 2024-01-01 PROCEDURE — 3074F SYST BP LT 130 MM HG: CPT | Mod: CPTII,S$GLB,,

## 2024-01-01 PROCEDURE — 1126F AMNT PAIN NOTED NONE PRSNT: CPT | Mod: CPTII,S$GLB,, | Performed by: STUDENT IN AN ORGANIZED HEALTH CARE EDUCATION/TRAINING PROGRAM

## 2024-01-01 PROCEDURE — 3288F FALL RISK ASSESSMENT DOCD: CPT | Mod: CPTII,S$GLB,,

## 2024-01-01 PROCEDURE — 99291 CRITICAL CARE FIRST HOUR: CPT | Mod: ,,, | Performed by: INTERNAL MEDICINE

## 2024-01-01 PROCEDURE — 3078F DIAST BP <80 MM HG: CPT | Mod: CPTII,S$GLB,, | Performed by: FAMILY MEDICINE

## 2024-01-01 PROCEDURE — 20610 DRAIN/INJ JOINT/BURSA W/O US: CPT | Mod: 50,S$GLB,, | Performed by: PHYSICIAN ASSISTANT

## 2024-01-01 PROCEDURE — 19083 BX BREAST 1ST LESION US IMAG: CPT | Mod: RT,,, | Performed by: RADIOLOGY

## 2024-01-01 PROCEDURE — 77061 BREAST TOMOSYNTHESIS UNI: CPT | Mod: 26,RT,, | Performed by: RADIOLOGY

## 2024-01-01 PROCEDURE — 3078F DIAST BP <80 MM HG: CPT | Mod: CPTII,S$GLB,, | Performed by: PHYSICIAN ASSISTANT

## 2024-01-01 PROCEDURE — 1126F AMNT PAIN NOTED NONE PRSNT: CPT | Mod: CPTII,S$GLB,,

## 2024-01-01 PROCEDURE — 1111F DSCHRG MED/CURRENT MED MERGE: CPT | Mod: CPTII,S$GLB,,

## 2024-01-01 PROCEDURE — 1160F RVW MEDS BY RX/DR IN RCRD: CPT | Mod: CPTII,S$GLB,,

## 2024-01-01 PROCEDURE — 85730 THROMBOPLASTIN TIME PARTIAL: CPT | Performed by: STUDENT IN AN ORGANIZED HEALTH CARE EDUCATION/TRAINING PROGRAM

## 2024-01-01 PROCEDURE — 3288F FALL RISK ASSESSMENT DOCD: CPT | Mod: CPTII,S$GLB,, | Performed by: INTERNAL MEDICINE

## 2024-01-01 PROCEDURE — 83605 ASSAY OF LACTIC ACID: CPT | Performed by: EMERGENCY MEDICINE

## 2024-01-01 PROCEDURE — 1160F RVW MEDS BY RX/DR IN RCRD: CPT | Mod: CPTII,S$GLB,, | Performed by: NURSE PRACTITIONER

## 2024-01-01 PROCEDURE — 87077 CULTURE AEROBIC IDENTIFY: CPT

## 2024-01-01 PROCEDURE — 99223 1ST HOSP IP/OBS HIGH 75: CPT | Mod: 25,,, | Performed by: INTERNAL MEDICINE

## 2024-01-01 PROCEDURE — 94644 CONT INHLJ TX 1ST HOUR: CPT

## 2024-01-01 PROCEDURE — 99999 PR PBB SHADOW E&M-EST. PATIENT-LVL V: CPT | Mod: PBBFAC,,, | Performed by: NURSE PRACTITIONER

## 2024-01-01 PROCEDURE — 87086 URINE CULTURE/COLONY COUNT: CPT

## 2024-01-01 PROCEDURE — 94761 N-INVAS EAR/PLS OXIMETRY MLT: CPT | Mod: XB

## 2024-01-01 PROCEDURE — U0002 COVID-19 LAB TEST NON-CDC: HCPCS | Performed by: EMERGENCY MEDICINE

## 2024-01-01 PROCEDURE — 86140 C-REACTIVE PROTEIN: CPT | Performed by: EMERGENCY MEDICINE

## 2024-01-01 PROCEDURE — 81003 URINALYSIS AUTO W/O SCOPE: CPT | Performed by: NURSE PRACTITIONER

## 2024-01-01 PROCEDURE — 97530 THERAPEUTIC ACTIVITIES: CPT

## 2024-01-01 PROCEDURE — 81001 URINALYSIS AUTO W/SCOPE: CPT

## 2024-01-01 PROCEDURE — 82962 GLUCOSE BLOOD TEST: CPT

## 2024-01-01 PROCEDURE — 25000003 PHARM REV CODE 250: Performed by: NURSE PRACTITIONER

## 2024-01-01 PROCEDURE — 3078F DIAST BP <80 MM HG: CPT | Mod: CPTII,S$GLB,,

## 2024-01-01 PROCEDURE — 1126F AMNT PAIN NOTED NONE PRSNT: CPT | Mod: CPTII,S$GLB,, | Performed by: INTERNAL MEDICINE

## 2024-01-01 PROCEDURE — 88342 IMHCHEM/IMCYTCHM 1ST ANTB: CPT | Mod: 59 | Performed by: PATHOLOGY

## 2024-01-01 PROCEDURE — 99214 OFFICE O/P EST MOD 30 MIN: CPT | Mod: S$GLB,,,

## 2024-01-01 PROCEDURE — 97116 GAIT TRAINING THERAPY: CPT

## 2024-01-01 PROCEDURE — 93010 ELECTROCARDIOGRAM REPORT: CPT | Mod: 76,,, | Performed by: INTERNAL MEDICINE

## 2024-01-01 PROCEDURE — 3078F DIAST BP <80 MM HG: CPT | Mod: CPTII,S$GLB,, | Performed by: STUDENT IN AN ORGANIZED HEALTH CARE EDUCATION/TRAINING PROGRAM

## 2024-01-01 PROCEDURE — 80048 BASIC METABOLIC PNL TOTAL CA: CPT | Performed by: FAMILY MEDICINE

## 2024-01-01 PROCEDURE — 88341 IMHCHEM/IMCYTCHM EA ADD ANTB: CPT | Mod: 26,,, | Performed by: PATHOLOGY

## 2024-01-01 PROCEDURE — 88341 IMHCHEM/IMCYTCHM EA ADD ANTB: CPT | Performed by: PATHOLOGY

## 2024-01-01 PROCEDURE — 83036 HEMOGLOBIN GLYCOSYLATED A1C: CPT | Performed by: EMERGENCY MEDICINE

## 2024-01-01 PROCEDURE — 3074F SYST BP LT 130 MM HG: CPT | Mod: CPTII,S$GLB,, | Performed by: FAMILY MEDICINE

## 2024-01-01 PROCEDURE — 3074F SYST BP LT 130 MM HG: CPT | Mod: CPTII,S$GLB,, | Performed by: PHYSICIAN ASSISTANT

## 2024-01-01 PROCEDURE — 3078F DIAST BP <80 MM HG: CPT | Mod: CPTII,S$GLB,, | Performed by: NURSE PRACTITIONER

## 2024-01-01 PROCEDURE — 25000003 PHARM REV CODE 250: Performed by: EMERGENCY MEDICINE

## 2024-01-01 PROCEDURE — 96374 THER/PROPH/DIAG INJ IV PUSH: CPT

## 2024-01-01 PROCEDURE — 84100 ASSAY OF PHOSPHORUS: CPT | Mod: 91

## 2024-01-01 PROCEDURE — 84484 ASSAY OF TROPONIN QUANT: CPT | Mod: 91

## 2024-01-01 PROCEDURE — 85610 PROTHROMBIN TIME: CPT | Performed by: EMERGENCY MEDICINE

## 2024-01-01 PROCEDURE — 88185 FLOWCYTOMETRY/TC ADD-ON: CPT | Performed by: PATHOLOGY

## 2024-01-01 PROCEDURE — 87502 INFLUENZA DNA AMP PROBE: CPT | Performed by: EMERGENCY MEDICINE

## 2024-01-01 PROCEDURE — 83605 ASSAY OF LACTIC ACID: CPT | Performed by: STUDENT IN AN ORGANIZED HEALTH CARE EDUCATION/TRAINING PROGRAM

## 2024-01-01 PROCEDURE — 84145 PROCALCITONIN (PCT): CPT | Performed by: EMERGENCY MEDICINE

## 2024-01-01 PROCEDURE — 76642 ULTRASOUND BREAST LIMITED: CPT | Mod: 26,RT,, | Performed by: RADIOLOGY

## 2024-01-01 PROCEDURE — 87186 SC STD MICRODIL/AGAR DIL: CPT

## 2024-01-01 PROCEDURE — 1101F PT FALLS ASSESS-DOCD LE1/YR: CPT | Mod: CPTII,S$GLB,,

## 2024-01-01 PROCEDURE — 80202 ASSAY OF VANCOMYCIN: CPT | Performed by: INTERNAL MEDICINE

## 2024-01-01 PROCEDURE — 83735 ASSAY OF MAGNESIUM: CPT | Performed by: NURSE PRACTITIONER

## 2024-01-01 PROCEDURE — 99497 ADVNCD CARE PLAN 30 MIN: CPT | Mod: 25,,, | Performed by: STUDENT IN AN ORGANIZED HEALTH CARE EDUCATION/TRAINING PROGRAM

## 2024-01-01 PROCEDURE — 88305 TISSUE EXAM BY PATHOLOGIST: CPT | Mod: 26,,, | Performed by: PATHOLOGY

## 2024-01-01 PROCEDURE — 1160F RVW MEDS BY RX/DR IN RCRD: CPT | Mod: CPTII,S$GLB,, | Performed by: FAMILY MEDICINE

## 2024-01-01 PROCEDURE — 1159F MED LIST DOCD IN RCRD: CPT | Mod: CPTII,S$GLB,, | Performed by: NURSE PRACTITIONER

## 2024-01-01 PROCEDURE — 02HV33Z INSERTION OF INFUSION DEVICE INTO SUPERIOR VENA CAVA, PERCUTANEOUS APPROACH: ICD-10-PCS | Performed by: INTERNAL MEDICINE

## 2024-01-01 RX ORDER — IPRATROPIUM BROMIDE AND ALBUTEROL SULFATE 2.5; .5 MG/3ML; MG/3ML
SOLUTION RESPIRATORY (INHALATION)
Status: COMPLETED
Start: 2024-01-01 | End: 2024-01-01

## 2024-01-01 RX ORDER — FAMOTIDINE 20 MG/1
20 TABLET, FILM COATED ORAL DAILY
Status: DISCONTINUED | OUTPATIENT
Start: 2024-01-01 | End: 2024-01-01

## 2024-01-01 RX ORDER — HYDROCODONE BITARTRATE AND ACETAMINOPHEN 10; 325 MG/1; MG/1
1 TABLET ORAL EVERY 4 HOURS PRN
Status: DISCONTINUED | OUTPATIENT
Start: 2024-01-01 | End: 2024-01-01

## 2024-01-01 RX ORDER — POTASSIUM CHLORIDE 14.9 MG/ML
60 INJECTION INTRAVENOUS
Status: DISCONTINUED | OUTPATIENT
Start: 2024-01-01 | End: 2024-01-01

## 2024-01-01 RX ORDER — GLUCAGON 1 MG
1 KIT INJECTION
Status: DISCONTINUED | OUTPATIENT
Start: 2024-01-01 | End: 2024-01-01 | Stop reason: HOSPADM

## 2024-01-01 RX ORDER — OXYCODONE HYDROCHLORIDE 5 MG/1
5 TABLET ORAL EVERY 8 HOURS PRN
Status: DISCONTINUED | OUTPATIENT
Start: 2024-01-01 | End: 2024-01-01

## 2024-01-01 RX ORDER — FUROSEMIDE 10 MG/ML
80 INJECTION INTRAMUSCULAR; INTRAVENOUS EVERY 12 HOURS
Status: COMPLETED | OUTPATIENT
Start: 2024-01-01 | End: 2024-01-01

## 2024-01-01 RX ORDER — POTASSIUM CHLORIDE 29.8 MG/ML
80 INJECTION INTRAVENOUS
Status: DISCONTINUED | OUTPATIENT
Start: 2024-01-01 | End: 2024-01-01

## 2024-01-01 RX ORDER — FUROSEMIDE 10 MG/ML
40 INJECTION INTRAMUSCULAR; INTRAVENOUS
Status: COMPLETED | OUTPATIENT
Start: 2024-01-01 | End: 2024-01-01

## 2024-01-01 RX ORDER — ACETAMINOPHEN 325 MG/1
650 TABLET ORAL EVERY 4 HOURS PRN
Status: DISCONTINUED | OUTPATIENT
Start: 2024-01-01 | End: 2024-01-01 | Stop reason: HOSPADM

## 2024-01-01 RX ORDER — MAGNESIUM SULFATE 1 G/100ML
1 INJECTION INTRAVENOUS ONCE AS NEEDED
Status: DISPENSED | OUTPATIENT
Start: 2024-01-01 | End: 2024-01-01

## 2024-01-01 RX ORDER — METOPROLOL SUCCINATE 25 MG/1
25 TABLET, EXTENDED RELEASE ORAL DAILY
Status: DISCONTINUED | OUTPATIENT
Start: 2024-01-01 | End: 2024-01-01 | Stop reason: HOSPADM

## 2024-01-01 RX ORDER — FUROSEMIDE 10 MG/ML
40 INJECTION INTRAMUSCULAR; INTRAVENOUS DAILY
Status: DISCONTINUED | OUTPATIENT
Start: 2024-01-01 | End: 2024-01-01

## 2024-01-01 RX ORDER — GABAPENTIN 100 MG/1
100 CAPSULE ORAL 3 TIMES DAILY
Status: DISCONTINUED | OUTPATIENT
Start: 2024-01-01 | End: 2024-01-01

## 2024-01-01 RX ORDER — FUROSEMIDE 80 MG/1
80 TABLET ORAL DAILY
Status: DISCONTINUED | OUTPATIENT
Start: 2024-01-01 | End: 2024-01-01

## 2024-01-01 RX ORDER — HYDROMORPHONE HYDROCHLORIDE 1 MG/ML
0.5 INJECTION, SOLUTION INTRAMUSCULAR; INTRAVENOUS; SUBCUTANEOUS ONCE
Status: COMPLETED | OUTPATIENT
Start: 2024-01-01 | End: 2024-01-01

## 2024-01-01 RX ORDER — IPRATROPIUM BROMIDE AND ALBUTEROL SULFATE 2.5; .5 MG/3ML; MG/3ML
3 SOLUTION RESPIRATORY (INHALATION)
Status: DISCONTINUED | OUTPATIENT
Start: 2024-01-01 | End: 2024-01-01

## 2024-01-01 RX ORDER — LORAZEPAM 2 MG/ML
1 INJECTION INTRAMUSCULAR
Status: DISCONTINUED | OUTPATIENT
Start: 2024-01-01 | End: 2024-01-01 | Stop reason: HOSPADM

## 2024-01-01 RX ORDER — FUROSEMIDE 10 MG/ML
80 INJECTION INTRAMUSCULAR; INTRAVENOUS
Status: COMPLETED | OUTPATIENT
Start: 2024-01-01 | End: 2024-01-01

## 2024-01-01 RX ORDER — PROCHLORPERAZINE EDISYLATE 5 MG/ML
5 INJECTION INTRAMUSCULAR; INTRAVENOUS EVERY 6 HOURS PRN
Status: DISCONTINUED | OUTPATIENT
Start: 2024-01-01 | End: 2024-01-01 | Stop reason: HOSPADM

## 2024-01-01 RX ORDER — SODIUM CHLORIDE 0.9 % (FLUSH) 0.9 %
10 SYRINGE (ML) INJECTION
Status: DISCONTINUED | OUTPATIENT
Start: 2024-01-01 | End: 2024-01-01 | Stop reason: HOSPADM

## 2024-01-01 RX ORDER — FUROSEMIDE 40 MG/1
40 TABLET ORAL DAILY
Status: DISCONTINUED | OUTPATIENT
Start: 2024-01-01 | End: 2024-01-01 | Stop reason: HOSPADM

## 2024-01-01 RX ORDER — HYDROMORPHONE HYDROCHLORIDE 1 MG/ML
1 INJECTION, SOLUTION INTRAMUSCULAR; INTRAVENOUS; SUBCUTANEOUS
Status: DISCONTINUED | OUTPATIENT
Start: 2024-01-01 | End: 2024-01-01

## 2024-01-01 RX ORDER — IBUPROFEN 200 MG
24 TABLET ORAL
Status: DISCONTINUED | OUTPATIENT
Start: 2024-01-01 | End: 2024-01-01 | Stop reason: HOSPADM

## 2024-01-01 RX ORDER — ACETAMINOPHEN 325 MG/1
650 TABLET ORAL EVERY 6 HOURS
Status: DISCONTINUED | OUTPATIENT
Start: 2024-01-01 | End: 2024-01-01

## 2024-01-01 RX ORDER — LIDOCAINE HYDROCHLORIDE 10 MG/ML
2 INJECTION, SOLUTION EPIDURAL; INFILTRATION; INTRACAUDAL; PERINEURAL ONCE
Status: COMPLETED | OUTPATIENT
Start: 2024-01-01 | End: 2024-01-01

## 2024-01-01 RX ORDER — AMOXICILLIN 250 MG
1 CAPSULE ORAL DAILY PRN
Status: DISCONTINUED | OUTPATIENT
Start: 2024-01-01 | End: 2024-01-01 | Stop reason: HOSPADM

## 2024-01-01 RX ORDER — ALUMINUM HYDROXIDE, MAGNESIUM HYDROXIDE, AND SIMETHICONE 1200; 120; 1200 MG/30ML; MG/30ML; MG/30ML
30 SUSPENSION ORAL 4 TIMES DAILY PRN
Status: DISCONTINUED | OUTPATIENT
Start: 2024-01-01 | End: 2024-01-01 | Stop reason: HOSPADM

## 2024-01-01 RX ORDER — OXYCODONE HYDROCHLORIDE 5 MG/1
5 TABLET ORAL EVERY 4 HOURS PRN
Status: DISCONTINUED | OUTPATIENT
Start: 2024-01-01 | End: 2024-01-01

## 2024-01-01 RX ORDER — ASPIRIN 325 MG
325 TABLET, DELAYED RELEASE (ENTERIC COATED) ORAL ONCE
Status: COMPLETED | OUTPATIENT
Start: 2024-01-01 | End: 2024-01-01

## 2024-01-01 RX ORDER — IBUPROFEN 200 MG
16 TABLET ORAL
Status: DISCONTINUED | OUTPATIENT
Start: 2024-01-01 | End: 2024-01-01 | Stop reason: HOSPADM

## 2024-01-01 RX ORDER — SACUBITRIL AND VALSARTAN 24; 26 MG/1; MG/1
1 TABLET, FILM COATED ORAL 2 TIMES DAILY
Qty: 180 TABLET | Refills: 3 | Status: ON HOLD | OUTPATIENT
Start: 2024-01-01 | End: 2024-01-01

## 2024-01-01 RX ORDER — HYDROMORPHONE HYDROCHLORIDE 2 MG/ML
0.5 INJECTION INTRAMUSCULAR; INTRAVENOUS; SUBCUTANEOUS
Status: DISCONTINUED | OUTPATIENT
Start: 2024-01-01 | End: 2024-01-01

## 2024-01-01 RX ORDER — PREDNISONE 20 MG/1
40 TABLET ORAL DAILY
Status: DISCONTINUED | OUTPATIENT
Start: 2024-01-01 | End: 2024-01-01 | Stop reason: HOSPADM

## 2024-01-01 RX ORDER — INSULIN ASPART 100 [IU]/ML
0-5 INJECTION, SOLUTION INTRAVENOUS; SUBCUTANEOUS EVERY 6 HOURS PRN
Status: DISCONTINUED | OUTPATIENT
Start: 2024-01-01 | End: 2024-01-01

## 2024-01-01 RX ORDER — IPRATROPIUM BROMIDE AND ALBUTEROL SULFATE 2.5; .5 MG/3ML; MG/3ML
3 SOLUTION RESPIRATORY (INHALATION)
Status: COMPLETED | OUTPATIENT
Start: 2024-01-01 | End: 2024-01-01

## 2024-01-01 RX ORDER — NAPROXEN SODIUM 220 MG/1
81 TABLET, FILM COATED ORAL DAILY
Status: DISCONTINUED | OUTPATIENT
Start: 2024-01-01 | End: 2024-01-01

## 2024-01-01 RX ORDER — SODIUM CHLORIDE 0.9 % (FLUSH) 0.9 %
5 SYRINGE (ML) INJECTION
Status: DISCONTINUED | OUTPATIENT
Start: 2024-01-01 | End: 2024-01-01 | Stop reason: HOSPADM

## 2024-01-01 RX ORDER — INSULIN ASPART 100 [IU]/ML
0-5 INJECTION, SOLUTION INTRAVENOUS; SUBCUTANEOUS
Status: DISCONTINUED | OUTPATIENT
Start: 2024-01-01 | End: 2024-01-01 | Stop reason: HOSPADM

## 2024-01-01 RX ORDER — LORAZEPAM 2 MG/ML
0.5 INJECTION INTRAMUSCULAR EVERY 4 HOURS PRN
Status: DISCONTINUED | OUTPATIENT
Start: 2024-01-01 | End: 2024-01-01

## 2024-01-01 RX ORDER — POTASSIUM CHLORIDE 20 MEQ/1
20 TABLET, EXTENDED RELEASE ORAL ONCE AS NEEDED
Status: ACTIVE | OUTPATIENT
Start: 2024-01-01 | End: 2024-01-01

## 2024-01-01 RX ORDER — ACETAMINOPHEN 325 MG/1
650 TABLET ORAL EVERY 6 HOURS PRN
Status: DISCONTINUED | OUTPATIENT
Start: 2024-01-01 | End: 2024-01-01

## 2024-01-01 RX ORDER — POTASSIUM CHLORIDE 20 MEQ/1
60 TABLET, EXTENDED RELEASE ORAL ONCE AS NEEDED
Status: ACTIVE | OUTPATIENT
Start: 2024-01-01 | End: 2024-01-01

## 2024-01-01 RX ORDER — ALBUTEROL SULFATE 2.5 MG/.5ML
SOLUTION RESPIRATORY (INHALATION)
Status: DISCONTINUED
Start: 2024-01-01 | End: 2024-01-01

## 2024-01-01 RX ORDER — AMOXICILLIN 250 MG
2 CAPSULE ORAL DAILY
Status: DISCONTINUED | OUTPATIENT
Start: 2024-01-01 | End: 2024-01-01

## 2024-01-01 RX ORDER — SPIRONOLACTONE 25 MG/1
25 TABLET ORAL DAILY
Qty: 30 TABLET | Refills: 0 | Status: SHIPPED | OUTPATIENT
Start: 2024-01-01 | End: 2025-05-17

## 2024-01-01 RX ORDER — TRIAMCINOLONE ACETONIDE 40 MG/ML
40 INJECTION, SUSPENSION INTRA-ARTICULAR; INTRAMUSCULAR
Status: COMPLETED | OUTPATIENT
Start: 2024-01-01 | End: 2024-01-01

## 2024-01-01 RX ORDER — FUROSEMIDE 10 MG/ML
120 INJECTION INTRAMUSCULAR; INTRAVENOUS 3 TIMES DAILY
Status: DISCONTINUED | OUTPATIENT
Start: 2024-01-01 | End: 2024-01-01

## 2024-01-01 RX ORDER — IPRATROPIUM BROMIDE AND ALBUTEROL SULFATE 2.5; .5 MG/3ML; MG/3ML
9 SOLUTION RESPIRATORY (INHALATION) ONCE
Status: COMPLETED | OUTPATIENT
Start: 2024-01-01 | End: 2024-01-01

## 2024-01-01 RX ORDER — HYDROMORPHONE HYDROCHLORIDE 1 MG/ML
0.5 INJECTION, SOLUTION INTRAMUSCULAR; INTRAVENOUS; SUBCUTANEOUS
Status: DISCONTINUED | OUTPATIENT
Start: 2024-01-01 | End: 2024-01-01

## 2024-01-01 RX ORDER — HEPARIN SODIUM,PORCINE/D5W 25000/250
0-40 INTRAVENOUS SOLUTION INTRAVENOUS CONTINUOUS
Status: DISCONTINUED | OUTPATIENT
Start: 2024-01-01 | End: 2024-01-01

## 2024-01-01 RX ORDER — MAGNESIUM SULFATE HEPTAHYDRATE 40 MG/ML
2 INJECTION, SOLUTION INTRAVENOUS ONCE AS NEEDED
Status: ACTIVE | OUTPATIENT
Start: 2024-01-01 | End: 2024-01-01

## 2024-01-01 RX ORDER — OXYBUTYNIN CHLORIDE 5 MG/1
5 TABLET ORAL ONCE
Status: COMPLETED | OUTPATIENT
Start: 2024-01-01 | End: 2024-01-01

## 2024-01-01 RX ORDER — NOREPINEPHRINE BITARTRATE/D5W 4MG/250ML
PLASTIC BAG, INJECTION (ML) INTRAVENOUS
Status: DISPENSED
Start: 2024-01-01 | End: 2024-01-01

## 2024-01-01 RX ORDER — LIDOCAINE HYDROCHLORIDE 10 MG/ML
INJECTION, SOLUTION EPIDURAL; INFILTRATION; INTRACAUDAL; PERINEURAL
Status: DISPENSED
Start: 2024-01-01 | End: 2024-01-01

## 2024-01-01 RX ORDER — MAGNESIUM SULFATE HEPTAHYDRATE 40 MG/ML
2 INJECTION, SOLUTION INTRAVENOUS
Status: DISCONTINUED | OUTPATIENT
Start: 2024-01-01 | End: 2024-01-01

## 2024-01-01 RX ORDER — LORAZEPAM 2 MG/ML
INJECTION INTRAMUSCULAR
Status: COMPLETED
Start: 2024-01-01 | End: 2024-01-01

## 2024-01-01 RX ORDER — POTASSIUM CHLORIDE 20 MEQ/1
40 TABLET, EXTENDED RELEASE ORAL ONCE AS NEEDED
Status: ACTIVE | OUTPATIENT
Start: 2024-01-01 | End: 2024-01-01

## 2024-01-01 RX ORDER — SACUBITRIL AND VALSARTAN 24; 26 MG/1; MG/1
1 TABLET, FILM COATED ORAL 2 TIMES DAILY
Qty: 180 TABLET | Refills: 3 | Status: SHIPPED | OUTPATIENT
Start: 2024-01-01 | End: 2024-01-01 | Stop reason: ALTCHOICE

## 2024-01-01 RX ORDER — GLYCOPYRROLATE 0.2 MG/ML
0.1 INJECTION INTRAMUSCULAR; INTRAVENOUS 3 TIMES DAILY PRN
Status: DISCONTINUED | OUTPATIENT
Start: 2024-01-01 | End: 2024-01-01 | Stop reason: HOSPADM

## 2024-01-01 RX ORDER — MORPHINE SULFATE 2 MG/ML
2 INJECTION, SOLUTION INTRAMUSCULAR; INTRAVENOUS EVERY 4 HOURS PRN
Status: DISCONTINUED | OUTPATIENT
Start: 2024-01-01 | End: 2024-01-01

## 2024-01-01 RX ORDER — HEPARIN SODIUM 5000 [USP'U]/ML
5000 INJECTION, SOLUTION INTRAVENOUS; SUBCUTANEOUS EVERY 8 HOURS
Status: DISCONTINUED | OUTPATIENT
Start: 2024-01-01 | End: 2024-01-01

## 2024-01-01 RX ORDER — POLYETHYLENE GLYCOL 3350 17 G/17G
17 POWDER, FOR SOLUTION ORAL 2 TIMES DAILY PRN
Status: DISCONTINUED | OUTPATIENT
Start: 2024-01-01 | End: 2024-01-01 | Stop reason: HOSPADM

## 2024-01-01 RX ORDER — MORPHINE SULFATE 2 MG/ML
1 INJECTION, SOLUTION INTRAMUSCULAR; INTRAVENOUS EVERY 4 HOURS PRN
Status: DISCONTINUED | OUTPATIENT
Start: 2024-01-01 | End: 2024-01-01

## 2024-01-01 RX ORDER — FUROSEMIDE 10 MG/ML
120 INJECTION INTRAMUSCULAR; INTRAVENOUS ONCE
Status: COMPLETED | OUTPATIENT
Start: 2024-01-01 | End: 2024-01-01

## 2024-01-01 RX ORDER — MAGNESIUM SULFATE 1 G/100ML
1 INJECTION INTRAVENOUS ONCE
Status: COMPLETED | OUTPATIENT
Start: 2024-01-01 | End: 2024-01-01

## 2024-01-01 RX ORDER — NOREPINEPHRINE BITARTRATE/D5W 4MG/250ML
0-3 PLASTIC BAG, INJECTION (ML) INTRAVENOUS CONTINUOUS
Status: DISCONTINUED | OUTPATIENT
Start: 2024-01-01 | End: 2024-01-01

## 2024-01-01 RX ORDER — METOPROLOL SUCCINATE 25 MG/1
25 TABLET, EXTENDED RELEASE ORAL DAILY
Qty: 90 TABLET | Refills: 3 | Status: SHIPPED | OUTPATIENT
Start: 2024-01-01 | End: 2025-04-12

## 2024-01-01 RX ORDER — FUROSEMIDE 10 MG/ML
40 INJECTION INTRAMUSCULAR; INTRAVENOUS EVERY 12 HOURS
Status: DISCONTINUED | OUTPATIENT
Start: 2024-01-01 | End: 2024-01-01

## 2024-01-01 RX ORDER — OXYCODONE HYDROCHLORIDE 5 MG/1
5 TABLET ORAL EVERY 4 HOURS PRN
Status: DISCONTINUED | OUTPATIENT
Start: 2024-01-01 | End: 2024-01-01 | Stop reason: HOSPADM

## 2024-01-01 RX ORDER — FLUTICASONE FUROATE AND VILANTEROL 100; 25 UG/1; UG/1
1 POWDER RESPIRATORY (INHALATION) DAILY
Status: DISCONTINUED | OUTPATIENT
Start: 2024-01-01 | End: 2024-01-01 | Stop reason: HOSPADM

## 2024-01-01 RX ORDER — POTASSIUM CHLORIDE 29.8 MG/ML
40 INJECTION INTRAVENOUS
Status: DISCONTINUED | OUTPATIENT
Start: 2024-01-01 | End: 2024-01-01

## 2024-01-01 RX ORDER — TALC
6 POWDER (GRAM) TOPICAL NIGHTLY PRN
Status: DISCONTINUED | OUTPATIENT
Start: 2024-01-01 | End: 2024-01-01 | Stop reason: HOSPADM

## 2024-01-01 RX ORDER — NALOXONE HCL 0.4 MG/ML
0.02 VIAL (ML) INJECTION
Status: DISCONTINUED | OUTPATIENT
Start: 2024-01-01 | End: 2024-01-01 | Stop reason: HOSPADM

## 2024-01-01 RX ORDER — BACLOFEN 5 MG/1
5 TABLET ORAL 3 TIMES DAILY
Status: DISCONTINUED | OUTPATIENT
Start: 2024-01-01 | End: 2024-01-01

## 2024-01-01 RX ORDER — SODIUM CHLORIDE 9 MG/ML
INJECTION, SOLUTION INTRAVENOUS CONTINUOUS
Status: DISCONTINUED | OUTPATIENT
Start: 2024-01-01 | End: 2024-01-01

## 2024-01-01 RX ORDER — BACLOFEN 5 MG/1
5 TABLET ORAL 2 TIMES DAILY
Status: DISCONTINUED | OUTPATIENT
Start: 2024-01-01 | End: 2024-01-01

## 2024-01-01 RX ORDER — ALBUTEROL SULFATE 2.5 MG/.5ML
10 SOLUTION RESPIRATORY (INHALATION) ONCE
Status: COMPLETED | OUTPATIENT
Start: 2024-01-01 | End: 2024-01-01

## 2024-01-01 RX ORDER — ONDANSETRON HYDROCHLORIDE 2 MG/ML
4 INJECTION, SOLUTION INTRAVENOUS EVERY 8 HOURS PRN
Status: DISCONTINUED | OUTPATIENT
Start: 2024-01-01 | End: 2024-01-01

## 2024-01-01 RX ORDER — CLOPIDOGREL BISULFATE 75 MG/1
75 TABLET ORAL DAILY
Status: DISCONTINUED | OUTPATIENT
Start: 2024-01-01 | End: 2024-01-01

## 2024-01-01 RX ORDER — NIFEDIPINE 30 MG/1
30 TABLET, EXTENDED RELEASE ORAL DAILY
Status: DISCONTINUED | OUTPATIENT
Start: 2024-01-01 | End: 2024-01-01

## 2024-01-01 RX ORDER — LIDOCAINE HYDROCHLORIDE AND EPINEPHRINE 20; 10 MG/ML; UG/ML
9 INJECTION, SOLUTION INFILTRATION; PERINEURAL ONCE
Status: COMPLETED | OUTPATIENT
Start: 2024-01-01 | End: 2024-01-01

## 2024-01-01 RX ORDER — ONDANSETRON HYDROCHLORIDE 2 MG/ML
4 INJECTION, SOLUTION INTRAVENOUS EVERY 6 HOURS PRN
Status: DISCONTINUED | OUTPATIENT
Start: 2024-01-01 | End: 2024-01-01 | Stop reason: HOSPADM

## 2024-01-01 RX ORDER — ACETAMINOPHEN 325 MG/1
650 TABLET ORAL EVERY 4 HOURS PRN
Status: DISCONTINUED | OUTPATIENT
Start: 2024-01-01 | End: 2024-01-01

## 2024-01-01 RX ORDER — DOBUTAMINE HYDROCHLORIDE 400 MG/100ML
2.5 INJECTION INTRAVENOUS CONTINUOUS
Status: DISCONTINUED | OUTPATIENT
Start: 2024-01-01 | End: 2024-01-01

## 2024-01-01 RX ORDER — PREDNISONE 20 MG/1
40 TABLET ORAL DAILY
Qty: 4 TABLET | Refills: 0 | Status: SHIPPED | OUTPATIENT
Start: 2024-01-01 | End: 2024-01-01

## 2024-01-01 RX ORDER — AMLODIPINE BESYLATE 2.5 MG/1
2.5 TABLET ORAL DAILY
Status: DISCONTINUED | OUTPATIENT
Start: 2024-01-01 | End: 2024-01-01

## 2024-01-01 RX ORDER — INDOMETHACIN 25 MG/1
CAPSULE ORAL
Status: DISPENSED
Start: 2024-01-01 | End: 2024-01-01

## 2024-01-01 RX ORDER — HYDROMORPHONE HYDROCHLORIDE 1 MG/ML
1 INJECTION, SOLUTION INTRAMUSCULAR; INTRAVENOUS; SUBCUTANEOUS
Status: DISCONTINUED | OUTPATIENT
Start: 2024-01-01 | End: 2024-01-01 | Stop reason: HOSPADM

## 2024-01-01 RX ORDER — FUROSEMIDE 10 MG/ML
60 INJECTION INTRAMUSCULAR; INTRAVENOUS EVERY 12 HOURS
Status: DISCONTINUED | OUTPATIENT
Start: 2024-01-01 | End: 2024-01-01

## 2024-01-01 RX ORDER — MORPHINE SULFATE/PF 1 MG/2 ML
4 SYRINGE (ML) INTRAVENOUS ONCE
Status: DISCONTINUED | OUTPATIENT
Start: 2024-01-01 | End: 2024-01-01

## 2024-01-01 RX ORDER — METOLAZONE 2.5 MG/1
5 TABLET ORAL ONCE
Qty: 2 TABLET | Refills: 0 | Status: DISCONTINUED | OUTPATIENT
Start: 2024-01-01 | End: 2024-01-01

## 2024-01-01 RX ORDER — SIMETHICONE 80 MG
1 TABLET,CHEWABLE ORAL 4 TIMES DAILY PRN
Status: DISCONTINUED | OUTPATIENT
Start: 2024-01-01 | End: 2024-01-01 | Stop reason: HOSPADM

## 2024-01-01 RX ORDER — GLUCAGON 1 MG
1 KIT INJECTION
Status: DISCONTINUED | OUTPATIENT
Start: 2024-01-01 | End: 2024-01-01

## 2024-01-01 RX ORDER — BISACODYL 10 MG/1
10 SUPPOSITORY RECTAL DAILY PRN
Status: DISCONTINUED | OUTPATIENT
Start: 2024-01-01 | End: 2024-01-01 | Stop reason: HOSPADM

## 2024-01-01 RX ORDER — SPIRONOLACTONE 25 MG/1
25 TABLET ORAL DAILY
Status: DISCONTINUED | OUTPATIENT
Start: 2024-01-01 | End: 2024-01-01 | Stop reason: HOSPADM

## 2024-01-01 RX ORDER — MAGNESIUM SULFATE HEPTAHYDRATE 40 MG/ML
4 INJECTION, SOLUTION INTRAVENOUS
Status: DISCONTINUED | OUTPATIENT
Start: 2024-01-01 | End: 2024-01-01

## 2024-01-01 RX ORDER — IPRATROPIUM BROMIDE AND ALBUTEROL SULFATE 2.5; .5 MG/3ML; MG/3ML
3 SOLUTION RESPIRATORY (INHALATION)
Status: DISCONTINUED | OUTPATIENT
Start: 2024-01-01 | End: 2024-01-01 | Stop reason: HOSPADM

## 2024-01-01 RX ORDER — SODIUM BICARBONATE 42 MG/ML
2 INJECTION, SOLUTION INTRAVENOUS ONCE
Status: COMPLETED | OUTPATIENT
Start: 2024-01-01 | End: 2024-01-01

## 2024-01-01 RX ORDER — FAMOTIDINE 20 MG/1
20 TABLET, FILM COATED ORAL 2 TIMES DAILY
Status: DISCONTINUED | OUTPATIENT
Start: 2024-01-01 | End: 2024-01-01

## 2024-01-01 RX ORDER — FUROSEMIDE 10 MG/ML
80 INJECTION INTRAMUSCULAR; INTRAVENOUS EVERY 12 HOURS
Status: DISCONTINUED | OUTPATIENT
Start: 2024-01-01 | End: 2024-01-01

## 2024-01-01 RX ORDER — ENOXAPARIN SODIUM 100 MG/ML
40 INJECTION SUBCUTANEOUS EVERY 24 HOURS
Status: DISCONTINUED | OUTPATIENT
Start: 2024-01-01 | End: 2024-01-01 | Stop reason: HOSPADM

## 2024-01-01 RX ORDER — FUROSEMIDE 10 MG/ML
INJECTION INTRAMUSCULAR; INTRAVENOUS
Status: DISPENSED
Start: 2024-01-01 | End: 2024-01-01

## 2024-01-01 RX ORDER — ACETAMINOPHEN 500 MG
1000 TABLET ORAL EVERY 8 HOURS PRN
Status: DISCONTINUED | OUTPATIENT
Start: 2024-01-01 | End: 2024-01-01 | Stop reason: HOSPADM

## 2024-01-01 RX ORDER — POTASSIUM CHLORIDE 14.9 MG/ML
20 INJECTION INTRAVENOUS ONCE
Status: COMPLETED | OUTPATIENT
Start: 2024-01-01 | End: 2024-01-01

## 2024-01-01 RX ORDER — MORPHINE SULFATE 2 MG/ML
4 INJECTION, SOLUTION INTRAMUSCULAR; INTRAVENOUS ONCE
Status: COMPLETED | OUTPATIENT
Start: 2024-01-01 | End: 2024-01-01

## 2024-01-01 RX ORDER — POTASSIUM CHLORIDE 20 MEQ/1
20 TABLET, EXTENDED RELEASE ORAL ONCE
Status: COMPLETED | OUTPATIENT
Start: 2024-01-01 | End: 2024-01-01

## 2024-01-01 RX ORDER — IPRATROPIUM BROMIDE AND ALBUTEROL SULFATE 2.5; .5 MG/3ML; MG/3ML
3 SOLUTION RESPIRATORY (INHALATION) EVERY 4 HOURS PRN
Status: DISCONTINUED | OUTPATIENT
Start: 2024-01-01 | End: 2024-01-01 | Stop reason: HOSPADM

## 2024-01-01 RX ORDER — POLYETHYLENE GLYCOL 3350 17 G/17G
17 POWDER, FOR SOLUTION ORAL DAILY
Status: DISCONTINUED | OUTPATIENT
Start: 2024-01-01 | End: 2024-01-01

## 2024-01-01 RX ORDER — CLOPIDOGREL BISULFATE 300 MG/1
600 TABLET, FILM COATED ORAL ONCE
Status: COMPLETED | OUTPATIENT
Start: 2024-01-01 | End: 2024-01-01

## 2024-01-01 RX ADMIN — IPRATROPIUM BROMIDE AND ALBUTEROL SULFATE 3 ML: .5; 3 SOLUTION RESPIRATORY (INHALATION) at 01:05

## 2024-01-01 RX ADMIN — FLUTICASONE FUROATE AND VILANTEROL TRIFENATATE 1 PUFF: 100; 25 POWDER RESPIRATORY (INHALATION) at 08:05

## 2024-01-01 RX ADMIN — HYDROMORPHONE HYDROCHLORIDE 1 MG: 1 INJECTION, SOLUTION INTRAMUSCULAR; INTRAVENOUS; SUBCUTANEOUS at 10:06

## 2024-01-01 RX ADMIN — NITROGLYCERIN 1 INCH: 20 OINTMENT TOPICAL at 05:06

## 2024-01-01 RX ADMIN — CLOPIDOGREL BISULFATE 75 MG: 75 TABLET ORAL at 08:05

## 2024-01-01 RX ADMIN — FAMOTIDINE 20 MG: 20 TABLET ORAL at 08:05

## 2024-01-01 RX ADMIN — HYDROCODONE BITARTRATE AND ACETAMINOPHEN 1 TABLET: 10; 325 TABLET ORAL at 06:06

## 2024-01-01 RX ADMIN — FLUTICASONE FUROATE AND VILANTEROL TRIFENATATE 1 PUFF: 100; 25 POWDER RESPIRATORY (INHALATION) at 08:06

## 2024-01-01 RX ADMIN — OXYBUTYNIN CHLORIDE 5 MG: 5 TABLET ORAL at 06:05

## 2024-01-01 RX ADMIN — FUROSEMIDE 80 MG: 10 INJECTION, SOLUTION INTRAVENOUS at 05:05

## 2024-01-01 RX ADMIN — FUROSEMIDE 120 MG: 40 TABLET ORAL at 03:06

## 2024-01-01 RX ADMIN — HYDROMORPHONE HYDROCHLORIDE 0.5 MG: 0.5 INJECTION, SOLUTION INTRAMUSCULAR; INTRAVENOUS; SUBCUTANEOUS at 12:06

## 2024-01-01 RX ADMIN — ENOXAPARIN SODIUM 40 MG: 40 INJECTION SUBCUTANEOUS at 05:05

## 2024-01-01 RX ADMIN — ASPIRIN 325 MG: 325 TABLET, COATED ORAL at 08:05

## 2024-01-01 RX ADMIN — AMLODIPINE BESYLATE 2.5 MG: 2.5 TABLET ORAL at 09:06

## 2024-01-01 RX ADMIN — HEPARIN SODIUM 14 UNITS/KG/HR: 10000 INJECTION, SOLUTION INTRAVENOUS at 04:06

## 2024-01-01 RX ADMIN — LORAZEPAM 0.5 MG: 2 INJECTION INTRAMUSCULAR; INTRAVENOUS at 08:06

## 2024-01-01 RX ADMIN — ONDANSETRON 4 MG: 2 INJECTION INTRAMUSCULAR; INTRAVENOUS at 11:05

## 2024-01-01 RX ADMIN — NITROGLYCERIN 1 INCH: 20 OINTMENT TOPICAL at 06:06

## 2024-01-01 RX ADMIN — HYDROCODONE BITARTRATE AND ACETAMINOPHEN 1 TABLET: 10; 325 TABLET ORAL at 12:06

## 2024-01-01 RX ADMIN — OXYCODONE 5 MG: 5 TABLET ORAL at 07:06

## 2024-01-01 RX ADMIN — NIFEDIPINE 30 MG: 30 TABLET, FILM COATED, EXTENDED RELEASE ORAL at 12:05

## 2024-01-01 RX ADMIN — VANCOMYCIN HYDROCHLORIDE 1000 MG: 1 INJECTION, POWDER, LYOPHILIZED, FOR SOLUTION INTRAVENOUS at 09:05

## 2024-01-01 RX ADMIN — SODIUM CHLORIDE: 9 INJECTION, SOLUTION INTRAVENOUS at 04:05

## 2024-01-01 RX ADMIN — HEPARIN SODIUM 12 UNITS/KG/HR: 10000 INJECTION, SOLUTION INTRAVENOUS at 03:05

## 2024-01-01 RX ADMIN — OXYCODONE HYDROCHLORIDE 5 MG: 5 TABLET ORAL at 11:05

## 2024-01-01 RX ADMIN — FUROSEMIDE 60 MG: 10 INJECTION, SOLUTION INTRAMUSCULAR; INTRAVENOUS at 06:05

## 2024-01-01 RX ADMIN — FUROSEMIDE 30 MG/HR: 10 INJECTION, SOLUTION INTRAMUSCULAR; INTRAVENOUS at 04:05

## 2024-01-01 RX ADMIN — POTASSIUM BICARBONATE 50 MEQ: 978 TABLET, EFFERVESCENT ORAL at 05:05

## 2024-01-01 RX ADMIN — ALBUTEROL SULFATE 10 MG: 2.5 SOLUTION RESPIRATORY (INHALATION) at 01:05

## 2024-01-01 RX ADMIN — LIDOCAINE HYDROCHLORIDE,EPINEPHRINE BITARTRATE 9 ML: 20; .01 INJECTION, SOLUTION INFILTRATION; PERINEURAL at 04:01

## 2024-01-01 RX ADMIN — ASPIRIN 81 MG CHEWABLE TABLET 81 MG: 81 TABLET CHEWABLE at 08:05

## 2024-01-01 RX ADMIN — SPIRONOLACTONE 25 MG: 25 TABLET ORAL at 02:05

## 2024-01-01 RX ADMIN — OXYCODONE HYDROCHLORIDE 5 MG: 5 TABLET ORAL at 04:05

## 2024-01-01 RX ADMIN — ONDANSETRON 4 MG: 2 INJECTION INTRAMUSCULAR; INTRAVENOUS at 09:06

## 2024-01-01 RX ADMIN — FLUTICASONE FUROATE AND VILANTEROL TRIFENATATE 1 PUFF: 100; 25 POWDER RESPIRATORY (INHALATION) at 07:05

## 2024-01-01 RX ADMIN — FAMOTIDINE 20 MG: 20 TABLET ORAL at 09:06

## 2024-01-01 RX ADMIN — HEPARIN SODIUM 14 UNITS/KG/HR: 10000 INJECTION, SOLUTION INTRAVENOUS at 03:05

## 2024-01-01 RX ADMIN — SODIUM BICARBONATE 2 ML: 42 INJECTION, SOLUTION INTRAVENOUS at 04:01

## 2024-01-01 RX ADMIN — GABAPENTIN 100 MG: 100 CAPSULE ORAL at 09:06

## 2024-01-01 RX ADMIN — HYDROMORPHONE HYDROCHLORIDE 0.5 MG: 0.5 INJECTION, SOLUTION INTRAMUSCULAR; INTRAVENOUS; SUBCUTANEOUS at 11:06

## 2024-01-01 RX ADMIN — DOBUTAMINE HYDROCHLORIDE 2.5 MCG/KG/MIN: 400 INJECTION INTRAVENOUS at 03:05

## 2024-01-01 RX ADMIN — CLOPIDOGREL BISULFATE 75 MG: 75 TABLET ORAL at 09:06

## 2024-01-01 RX ADMIN — HYDROMORPHONE HYDROCHLORIDE 0.5 MG: 0.5 INJECTION, SOLUTION INTRAMUSCULAR; INTRAVENOUS; SUBCUTANEOUS at 09:06

## 2024-01-01 RX ADMIN — IPRATROPIUM BROMIDE AND ALBUTEROL SULFATE 3 ML: 2.5; .5 SOLUTION RESPIRATORY (INHALATION) at 02:05

## 2024-01-01 RX ADMIN — ONDANSETRON 4 MG: 2 INJECTION INTRAMUSCULAR; INTRAVENOUS at 08:06

## 2024-01-01 RX ADMIN — NITROGLYCERIN 1 INCH: 20 OINTMENT TOPICAL at 12:06

## 2024-01-01 RX ADMIN — PREDNISONE 40 MG: 20 TABLET ORAL at 10:05

## 2024-01-01 RX ADMIN — POTASSIUM CHLORIDE 20 MEQ: 1500 TABLET, EXTENDED RELEASE ORAL at 10:05

## 2024-01-01 RX ADMIN — TRIAMCINOLONE ACETONIDE 40 MG: 40 INJECTION, SUSPENSION INTRA-ARTICULAR; INTRAMUSCULAR at 07:03

## 2024-01-01 RX ADMIN — PREDNISONE 40 MG: 20 TABLET ORAL at 09:05

## 2024-01-01 RX ADMIN — ASPIRIN 81 MG CHEWABLE TABLET 81 MG: 81 TABLET CHEWABLE at 09:06

## 2024-01-01 RX ADMIN — ASPIRIN 81 MG CHEWABLE TABLET 81 MG: 81 TABLET CHEWABLE at 10:06

## 2024-01-01 RX ADMIN — HYDROMORPHONE HYDROCHLORIDE 1 MG: 1 INJECTION, SOLUTION INTRAMUSCULAR; INTRAVENOUS; SUBCUTANEOUS at 04:06

## 2024-01-01 RX ADMIN — FLUTICASONE FUROATE AND VILANTEROL TRIFENATATE 1 PUFF: 100; 25 POWDER RESPIRATORY (INHALATION) at 09:06

## 2024-01-01 RX ADMIN — NITROGLYCERIN 1 INCH: 20 OINTMENT TOPICAL at 11:06

## 2024-01-01 RX ADMIN — CEFEPIME 2 G: 2 INJECTION, POWDER, FOR SOLUTION INTRAVENOUS at 08:05

## 2024-01-01 RX ADMIN — BACLOFEN 5 MG: 5 TABLET ORAL at 10:06

## 2024-01-01 RX ADMIN — MORPHINE SULFATE 1 MG: 2 INJECTION, SOLUTION INTRAMUSCULAR; INTRAVENOUS at 03:05

## 2024-01-01 RX ADMIN — FAMOTIDINE 20 MG: 20 TABLET ORAL at 09:05

## 2024-01-01 RX ADMIN — HYDROMORPHONE HYDROCHLORIDE 0.5 MG: 0.5 INJECTION, SOLUTION INTRAMUSCULAR; INTRAVENOUS; SUBCUTANEOUS at 08:06

## 2024-01-01 RX ADMIN — ACETAMINOPHEN 650 MG: 325 TABLET ORAL at 05:06

## 2024-01-01 RX ADMIN — FUROSEMIDE 20 MG/HR: 10 INJECTION, SOLUTION INTRAMUSCULAR; INTRAVENOUS at 02:05

## 2024-01-01 RX ADMIN — IPRATROPIUM BROMIDE AND ALBUTEROL SULFATE 3 ML: 2.5; .5 SOLUTION RESPIRATORY (INHALATION) at 07:05

## 2024-01-01 RX ADMIN — LIDOCAINE HYDROCHLORIDE 20 MG: 10 INJECTION, SOLUTION EPIDURAL; INFILTRATION; INTRACAUDAL; PERINEURAL at 04:01

## 2024-01-01 RX ADMIN — HEPARIN SODIUM 12 UNITS/KG/HR: 10000 INJECTION, SOLUTION INTRAVENOUS at 07:05

## 2024-01-01 RX ADMIN — HYDROCODONE BITARTRATE AND ACETAMINOPHEN 1 TABLET: 10; 325 TABLET ORAL at 08:06

## 2024-01-01 RX ADMIN — FUROSEMIDE 40 MG: 10 INJECTION, SOLUTION INTRAVENOUS at 01:05

## 2024-01-01 RX ADMIN — FUROSEMIDE 80 MG: 10 INJECTION, SOLUTION INTRAVENOUS at 12:05

## 2024-01-01 RX ADMIN — LORAZEPAM 0.5 MG: 2 INJECTION INTRAMUSCULAR; INTRAVENOUS at 04:06

## 2024-01-01 RX ADMIN — FUROSEMIDE 120 MG: 10 INJECTION, SOLUTION INTRAVENOUS at 09:06

## 2024-01-01 RX ADMIN — IPRATROPIUM BROMIDE AND ALBUTEROL SULFATE 3 ML: 2.5; .5 SOLUTION RESPIRATORY (INHALATION) at 08:05

## 2024-01-01 RX ADMIN — ONDANSETRON 4 MG: 2 INJECTION INTRAMUSCULAR; INTRAVENOUS at 12:05

## 2024-01-01 RX ADMIN — GLYCOPYRROLATE 0.1 MG: 0.2 INJECTION INTRAMUSCULAR; INTRAVENOUS at 08:06

## 2024-01-01 RX ADMIN — IPRATROPIUM BROMIDE AND ALBUTEROL SULFATE 9 ML: 2.5; .5 SOLUTION RESPIRATORY (INHALATION) at 12:05

## 2024-01-01 RX ADMIN — FAMOTIDINE 20 MG: 20 TABLET ORAL at 10:06

## 2024-01-01 RX ADMIN — FUROSEMIDE 20 MG/HR: 10 INJECTION, SOLUTION INTRAMUSCULAR; INTRAVENOUS at 05:05

## 2024-01-01 RX ADMIN — ONDANSETRON 4 MG: 2 INJECTION INTRAMUSCULAR; INTRAVENOUS at 08:05

## 2024-01-01 RX ADMIN — IPRATROPIUM BROMIDE AND ALBUTEROL SULFATE 3 ML: 2.5; .5 SOLUTION RESPIRATORY (INHALATION) at 09:05

## 2024-01-01 RX ADMIN — HYDROMORPHONE HYDROCHLORIDE 1 MG: 1 INJECTION, SOLUTION INTRAMUSCULAR; INTRAVENOUS; SUBCUTANEOUS at 08:06

## 2024-01-01 RX ADMIN — HYDROMORPHONE HYDROCHLORIDE 1 MG: 0.5 INJECTION, SOLUTION INTRAMUSCULAR; INTRAVENOUS; SUBCUTANEOUS at 03:06

## 2024-01-01 RX ADMIN — HYDROCODONE BITARTRATE AND ACETAMINOPHEN 1 TABLET: 10; 325 TABLET ORAL at 05:06

## 2024-01-01 RX ADMIN — FUROSEMIDE 120 MG: 40 TABLET ORAL at 09:06

## 2024-01-01 RX ADMIN — CLOPIDOGREL BISULFATE 75 MG: 75 TABLET ORAL at 10:06

## 2024-01-01 RX ADMIN — HYDROMORPHONE HYDROCHLORIDE 1 MG: 1 INJECTION, SOLUTION INTRAMUSCULAR; INTRAVENOUS; SUBCUTANEOUS at 06:06

## 2024-01-01 RX ADMIN — FUROSEMIDE 120 MG: 10 INJECTION, SOLUTION INTRAVENOUS at 02:06

## 2024-01-01 RX ADMIN — ONDANSETRON 4 MG: 2 INJECTION INTRAMUSCULAR; INTRAVENOUS at 11:06

## 2024-01-01 RX ADMIN — SENNOSIDES AND DOCUSATE SODIUM 2 TABLET: 50; 8.6 TABLET ORAL at 09:06

## 2024-01-01 RX ADMIN — MORPHINE SULFATE 4 MG: 2 INJECTION, SOLUTION INTRAMUSCULAR; INTRAVENOUS at 11:05

## 2024-01-01 RX ADMIN — NOREPINEPHRINE BITARTRATE 0.02 MCG/KG/MIN: 4 INJECTION, SOLUTION INTRAVENOUS at 12:05

## 2024-01-01 RX ADMIN — HYDROCODONE BITARTRATE AND ACETAMINOPHEN 1 TABLET: 10; 325 TABLET ORAL at 02:05

## 2024-01-01 RX ADMIN — FUROSEMIDE 120 MG: 10 INJECTION, SOLUTION INTRAMUSCULAR; INTRAVENOUS at 05:05

## 2024-01-01 RX ADMIN — NIFEDIPINE 30 MG: 30 TABLET, FILM COATED, EXTENDED RELEASE ORAL at 08:05

## 2024-01-01 RX ADMIN — IPRATROPIUM BROMIDE AND ALBUTEROL SULFATE 3 ML: 2.5; .5 SOLUTION RESPIRATORY (INHALATION) at 01:05

## 2024-01-01 RX ADMIN — ACETAMINOPHEN 650 MG: 325 SUPPOSITORY RECTAL at 11:06

## 2024-01-01 RX ADMIN — CHLOROTHIAZIDE SODIUM 500 MG: 500 INJECTION, POWDER, LYOPHILIZED, FOR SOLUTION INTRAVENOUS at 04:05

## 2024-01-01 RX ADMIN — MORPHINE SULFATE 1 MG: 2 INJECTION, SOLUTION INTRAMUSCULAR; INTRAVENOUS at 02:05

## 2024-01-01 RX ADMIN — MAGNESIUM SULFATE HEPTAHYDRATE 2 G: 40 INJECTION, SOLUTION INTRAVENOUS at 04:05

## 2024-01-01 RX ADMIN — MAGNESIUM SULFATE HEPTAHYDRATE 1 G: 500 INJECTION, SOLUTION INTRAMUSCULAR; INTRAVENOUS at 01:05

## 2024-01-01 RX ADMIN — NITROGLYCERIN 1 INCH: 20 OINTMENT TOPICAL at 05:05

## 2024-01-01 RX ADMIN — HYDROCODONE BITARTRATE AND ACETAMINOPHEN 1 TABLET: 10; 325 TABLET ORAL at 11:05

## 2024-01-01 RX ADMIN — POLYETHYLENE GLYCOL 3350 17 G: 17 POWDER, FOR SOLUTION ORAL at 09:06

## 2024-01-01 RX ADMIN — HYDROCODONE BITARTRATE AND ACETAMINOPHEN 1 TABLET: 10; 325 TABLET ORAL at 10:06

## 2024-01-01 RX ADMIN — HYDROMORPHONE HYDROCHLORIDE 1 MG: 1 INJECTION, SOLUTION INTRAMUSCULAR; INTRAVENOUS; SUBCUTANEOUS at 12:06

## 2024-01-01 RX ADMIN — VANCOMYCIN HYDROCHLORIDE 1250 MG: 1.25 INJECTION, POWDER, LYOPHILIZED, FOR SOLUTION INTRAVENOUS at 11:05

## 2024-01-01 RX ADMIN — HYDROMORPHONE HYDROCHLORIDE 0.5 MG: 1 INJECTION, SOLUTION INTRAMUSCULAR; INTRAVENOUS; SUBCUTANEOUS at 12:06

## 2024-01-01 RX ADMIN — FUROSEMIDE 20 MG/HR: 10 INJECTION, SOLUTION INTRAMUSCULAR; INTRAVENOUS at 03:06

## 2024-01-01 RX ADMIN — ACETAMINOPHEN 650 MG: 325 SUPPOSITORY RECTAL at 08:06

## 2024-01-01 RX ADMIN — FUROSEMIDE 120 MG: 40 TABLET ORAL at 05:06

## 2024-01-01 RX ADMIN — POTASSIUM CHLORIDE 20 MEQ: 200 INJECTION, SOLUTION INTRAVENOUS at 12:05

## 2024-01-01 RX ADMIN — BISACODYL 10 MG: 10 SUPPOSITORY RECTAL at 08:06

## 2024-01-01 RX ADMIN — FUROSEMIDE 80 MG: 10 INJECTION, SOLUTION INTRAMUSCULAR; INTRAVENOUS at 02:05

## 2024-01-01 RX ADMIN — NITROGLYCERIN 1 INCH: 20 OINTMENT TOPICAL at 02:05

## 2024-01-01 RX ADMIN — FLUTICASONE FUROATE AND VILANTEROL TRIFENATATE 1 PUFF: 100; 25 POWDER RESPIRATORY (INHALATION) at 09:05

## 2024-01-01 RX ADMIN — DOBUTAMINE HYDROCHLORIDE 5 MCG/KG/MIN: 400 INJECTION INTRAVENOUS at 06:05

## 2024-01-01 RX ADMIN — OXYCODONE 5 MG: 5 TABLET ORAL at 06:06

## 2024-01-01 RX ADMIN — MORPHINE SULFATE 1 MG: 2 INJECTION, SOLUTION INTRAMUSCULAR; INTRAVENOUS at 07:06

## 2024-01-01 RX ADMIN — LORAZEPAM 0.5 MG: 2 INJECTION INTRAMUSCULAR; INTRAVENOUS at 12:06

## 2024-01-01 RX ADMIN — NIFEDIPINE 30 MG: 30 TABLET, FILM COATED, EXTENDED RELEASE ORAL at 09:06

## 2024-01-01 RX ADMIN — FUROSEMIDE 120 MG: 10 INJECTION, SOLUTION INTRAVENOUS at 12:06

## 2024-01-01 RX ADMIN — HYDROMORPHONE HYDROCHLORIDE 0.5 MG: 1 INJECTION, SOLUTION INTRAMUSCULAR; INTRAVENOUS; SUBCUTANEOUS at 06:06

## 2024-01-01 RX ADMIN — OXYCODONE HYDROCHLORIDE 5 MG: 5 TABLET ORAL at 09:05

## 2024-01-01 RX ADMIN — NITROGLYCERIN 1 INCH: 20 OINTMENT TOPICAL at 11:05

## 2024-01-01 RX ADMIN — NOREPINEPHRINE BITARTRATE 0.03 MCG/KG/MIN: 4 INJECTION, SOLUTION INTRAVENOUS at 12:05

## 2024-01-01 RX ADMIN — ACETAMINOPHEN 650 MG: 325 TABLET ORAL at 06:06

## 2024-01-01 RX ADMIN — FUROSEMIDE 80 MG: 10 INJECTION, SOLUTION INTRAVENOUS at 03:05

## 2024-01-01 RX ADMIN — HUMAN ALBUMIN MICROSPHERES AND PERFLUTREN 0.11 MG: 10; .22 INJECTION, SOLUTION INTRAVENOUS at 10:05

## 2024-01-01 RX ADMIN — HYDROMORPHONE HYDROCHLORIDE 1 MG: 0.5 INJECTION, SOLUTION INTRAMUSCULAR; INTRAVENOUS; SUBCUTANEOUS at 12:06

## 2024-01-01 RX ADMIN — FUROSEMIDE 10 MG/HR: 10 INJECTION, SOLUTION INTRAMUSCULAR; INTRAVENOUS at 02:05

## 2024-01-01 RX ADMIN — HYDROMORPHONE HYDROCHLORIDE 0.5 MG: 1 INJECTION, SOLUTION INTRAMUSCULAR; INTRAVENOUS; SUBCUTANEOUS at 08:06

## 2024-01-01 RX ADMIN — CEFTRIAXONE 2 G: 2 INJECTION, POWDER, FOR SOLUTION INTRAMUSCULAR; INTRAVENOUS at 09:06

## 2024-01-01 RX ADMIN — HYDROMORPHONE HYDROCHLORIDE 1 MG: 1 INJECTION, SOLUTION INTRAMUSCULAR; INTRAVENOUS; SUBCUTANEOUS at 11:06

## 2024-01-01 RX ADMIN — MORPHINE SULFATE 1 MG: 2 INJECTION, SOLUTION INTRAMUSCULAR; INTRAVENOUS at 12:05

## 2024-01-01 RX ADMIN — BACLOFEN 5 MG: 5 TABLET ORAL at 03:06

## 2024-01-01 RX ADMIN — FUROSEMIDE 30 MG/HR: 10 INJECTION, SOLUTION INTRAMUSCULAR; INTRAVENOUS at 12:05

## 2024-01-01 RX ADMIN — MORPHINE SULFATE 1 MG: 2 INJECTION, SOLUTION INTRAMUSCULAR; INTRAVENOUS at 09:05

## 2024-01-01 RX ADMIN — CLOPIDOGREL BISULFATE 600 MG: 300 TABLET, FILM COATED ORAL at 08:05

## 2024-01-01 RX ADMIN — ACETAMINOPHEN 650 MG: 325 TABLET ORAL at 11:06

## 2024-01-01 RX ADMIN — AMLODIPINE BESYLATE 2.5 MG: 2.5 TABLET ORAL at 10:06

## 2024-01-01 RX ADMIN — NOREPINEPHRINE BITARTRATE 0.04 MCG/KG/MIN: 4 INJECTION, SOLUTION INTRAVENOUS at 12:05

## 2024-01-01 RX ADMIN — FUROSEMIDE 30 MG/HR: 10 INJECTION, SOLUTION INTRAMUSCULAR; INTRAVENOUS at 09:05

## 2024-01-01 RX ADMIN — BACLOFEN 5 MG: 5 TABLET ORAL at 08:06

## 2024-01-01 RX ADMIN — POTASSIUM BICARBONATE 25 MEQ: 978 TABLET, EFFERVESCENT ORAL at 12:05

## 2024-01-01 RX ADMIN — MORPHINE SULFATE 1 MG: 2 INJECTION, SOLUTION INTRAMUSCULAR; INTRAVENOUS at 07:05

## 2024-01-03 NOTE — TELEPHONE ENCOUNTER
Spoke with patient. Reviewed breast biopsy procedure and reviewed instructions for breast biopsy. Patient expressed understanding and all questions were answered. Provided patient with my phone number to call for any further concerns or questions.   Patient scheduled breast biopsy at the [facility] on 1/23/2024.

## 2024-01-07 NOTE — PROGRESS NOTES
Subjective:      Patient ID: Selma Hooper is a 78 y.o. female.    Chief Complaint: Centrilobular emphysema    Pt is a 79 yo AAF pmh COPD, Hx of lung nodules, LBBB, NICM, HFrEF, past hx of tobacco use disorder who presents for obtaining portable concentrator. Pt states that she continues to have significant shortness of breath with all activities. Oxygen makes activity more tolerable.      Smoking hx: quit 2018, 0.5 ppd, 23 yo start  Work hx: retired, health and physical education,   Exposure hx: none, no pets, no down material  Inhaler use: Wixella  PRN inhaler use: not using  Hx of lung dz: COPD  Family hx of lung dz: none    Since last being seen patient had covid in 12/2023. Has started using oxygen since then. Oxygen levels 92-93% at rest. If active then will be as low as 85%. She was treated with the paxlovid. Has noticed chest pressure with activity that started 3-4 months ago. Improves with rest. Denies palpitations.     Review of Systems   Constitutional:  Positive for activity change. Negative for weight loss and fatigue.   HENT:  Negative for postnasal drip and congestion.    Respiratory:  Positive for shortness of breath, wheezing and dyspnea on extertion. Negative for cough, hemoptysis, sputum production and use of rescue inhaler.    Cardiovascular:  Positive for chest pain (pressure with activity for 3-4 months). Negative for palpitations and leg swelling.   Gastrointestinal:  Negative for nausea and vomiting.   Neurological:  Negative for dizziness, syncope and light-headedness.   Psychiatric/Behavioral:  Negative for confusion and sleep disturbance. The patient is not nervous/anxious.      Objective:     Physical Exam   Constitutional: She is oriented to person, place, and time. She appears well-developed and well-nourished. She is not obese.   HENT:   Head: Normocephalic.   Cardiovascular: Normal rate, regular rhythm and normal heart sounds. Exam reveals no gallop and no friction rub.   No murmur  heard.  Pulmonary/Chest: Normal expansion, symmetric chest wall expansion and effort normal. No stridor. No respiratory distress. She has no wheezes. She has no rhonchi. She has no rales.   Musculoskeletal:         General: No edema.   Neurological: She is alert and oriented to person, place, and time. Gait normal.   Skin: No cyanosis. Nails show no clubbing.   Psychiatric: She has a normal mood and affect. Her behavior is normal. Judgment and thought content normal.   Vitals reviewed.  Walk with desaturation to 91% on room air. Stopped due to dyspnea and chest pressure.     Personal Diagnostic Review    CT of chest performed on 5/31/22 without contrast revealed bilateral upper lobe predominant emphysema. Multiple stable pulmonary nodules.      Echocardiogram: 7/13/22  The left ventricle is severely enlarged with eccentric hypertrophy and severely decreased systolic function. The estimated ejection fraction is 15%.  There is severe left ventricular global hypokinesis.  Normal right ventricular size with normal right ventricular systolic function.  Grade I left ventricular diastolic dysfunction.  Moderate left atrial enlargement.  Mild mitral regurgitation.  The estimated PA systolic pressure is 32 mmHg.  Normal central venous pressure (3 mmHg).     Pulmonary function tests:   8/28/19  FEV1: 0.91L  (56.5 % predicted),   FVC:  1.59L (77.5 % predicted),   FEV1/FVC:  57     The test was completed without stopping. The total time walked was 360 seconds. During walking, the patient reported:  Dyspnea, Lightheadedness (chest tightness). The patient used a wheelchair for assistance during testing.      10/04/2023---------Distance: 304.8 meters (1000 feet)       O2 Sat % Supplemental Oxygen Heart Rate Blood Pressure Shahana Scale   Pre-exercise  (Resting) 95 % Room Air 74 bpm 102/58 mmHg 0   During Exercise 94 % Room Air 107 bpm 141/73 mmHg 4   Post-exercise  (Recovery) 95 % Room Air  85 bpm 120/68 mmHg        Recovery Time:  "136 seconds     Performing nurse/tech: AYDIN Trevino     PREVIOUS STUDY:   05/18/2021---------Distance: 259.08 meters (850 feet)       O2 Sat % Supplemental Oxygen Heart Rate Blood Pressure Shahana Scale   Pre-exercise  (Resting) 97 % Room Air 80 bpm 119/70 mmHg 0.5   During Exercise 92 % Room Air 126 bpm (!) 185/104 mmHg 5-6   Post-exercise  (Recovery) 96 % Room Air  85 bpm 136/71 mmHg           CLINICAL INTERPRETATION:  Six minute walk distance is 304.8 meters (1000 feet) with somewhat heavy dyspnea.  During exercise, there was no significant desaturation while breathing room air.  Both blood pressure and heart rate increased significantly with walking.  The patient reported non-pulmonary symptoms during exercise.  Since the previous study in May 2021, exercise capacity may be somewhat improved.  Based upon age and body mass index, exercise capacity is normal.        12/28/2023    10:10 AM 12/4/2023     4:54 PM 12/4/2023     4:26 PM 11/6/2023     9:38 AM 10/26/2023     3:38 PM 10/4/2023    11:20 AM 10/4/2023    10:57 AM   Pulmonary Function Tests   SpO2 100 % 93 % 98 %  95 % 98 %    Ordering Provider       MD Delvin   Performing nurse/tech/RT       AYDIN Trevino   Diagnosis       Qualify for Oxygen   Height 5' 1" (1.549 m)  5' (1.524 m) 5' (1.524 m) 5' (1.524 m) 5' (1.524 m) 5' (1.524 m)   Weight 65.4 kg (144 lb 2.9 oz)  65 kg (143 lb 4.8 oz) 66 kg (145 lb 8.1 oz) 66 kg (145 lb 8.1 oz) 65.6 kg (144 lb 10 oz) 65.3 kg (143 lb 15.4 oz)   BMI (Calculated) 27.3  28 28.4 28.4 28.2 28.1   Patient Race          6MWT Status       completed without stopping   Patient Reported       Dyspnea;Lightheadedness   Was O2 used?       No   6MW Distance walked (feet)       1000 feet   Distance walked (meters)       304.8 meters   Did patient stop?       No   Type of assistive device(s) used?       a wheelchair   Oxygen Saturation       95 %   Supplemental Oxygen       Room Air   Heart Rate       74 bpm   Blood Pressure       " 102/58   Shahana Dyspnea Rating        nothing at all   Oxygen Saturation       94 %   Supplemental Oxygen       Room Air   Heart Rate       107 bpm   Blood Pressure       141/73   Shahana Dyspnea Rating        somewhat heavy   Recovery Time (seconds)       136 seconds   Oxygen Saturation       95 %   Supplemental Oxygen       Room Air   Heart Rate       85 bpm   Blood Pressure       120/68   Is procedure ready for interpretation?       Yes   Oxygen Qualification?       No        Assessment:     1. Centrilobular emphysema    2. Chronic hypoxemic respiratory failure       Outpatient Encounter Medications as of 2024   Medication Sig Dispense Refill    albuterol-ipratropium (DUO-NEB) 2.5 mg-0.5 mg/3 mL nebulizer solution Take 3 mLs by nebulization every 6 (six) hours as needed for Wheezing. Rescue 90 mL 3    [] benzonatate (TESSALON) 200 MG capsule Take 1 capsule (200 mg total) by mouth 3 (three) times daily as needed for Cough. 30 capsule 0    dapagliflozin (FARXIGA) 5 mg Tab tablet Take 1 tablet (5 mg total) by mouth once daily. 90 tablet 3    fluticasone-salmeterol diskus inhaler 250-50 mcg Inhale 1 puff into the lungs 2 (two) times daily. Controller 60 each 11    furosemide (LASIX) 20 MG tablet Take a 20mg tablet by mouth at mid day in addition to the 40mg once a day while you are not taking Entresto. Stop when restarting Entresto 30 tablet 11    furosemide (LASIX) 40 MG tablet Take 1 tablet (40 mg total) by mouth once daily. 90 tablet 3    ipratropium-albuteroL (COMBIVENT)  mcg/actuation inhaler Inhale 1 puff into the lungs every 6 (six) hours as needed for Wheezing. Rescue 4 g 6    metoprolol succinate (TOPROL-XL) 25 MG 24 hr tablet Take 1 tablet (25 mg total) by mouth once daily. 90 tablet 3    [] predniSONE (DELTASONE) 20 MG tablet Take 1 tablet (20 mg total) by mouth once daily. for 5 days 5 tablet 0    [] promethazine-dextromethorphan (PROMETHAZINE-DM) 6.25-15 mg/5 mL Syrp Take 5 mLs  by mouth every 6 (six) hours as needed (cough). 118 mL 0    promethazine-dextromethorphan (PROMETHAZINE-DM) 6.25-15 mg/5 mL Syrp Take 5 mLs by mouth every 4 (four) hours as needed. 118 mL 0    pulse oximeter (PULSE OXIMETER) device by Apply Externally route 2 (two) times a day. Use twice daily at 8 AM and 3 PM and [facility] as directed. 1 each 0    sacubitriL-valsartan (ENTRESTO) 24-26 mg per tablet Take 1 tablet by mouth 2 (two) times daily. Don't take valsartan and Entresto together. 180 tablet 3    valsartan (DIOVAN) 80 MG tablet Take 1 tablet (80 mg total) by mouth once daily. Take valsartan while you are not taking Entresto. Stop when restarting Entresto. 90 tablet 3     No facility-administered encounter medications on file as of 1/8/2024.     No orders of the defined types were placed in this encounter.    Plan:     Pulmonary emphysema  Was using Wixella once a day. Discussed with symptoms continue use of Wixella BID to help with exercise tolerance. Continue use of PRN duonebs. Has not needed more than 1-2x/week. Symptoms worsened during covid, but have been improving since. Oxygen use with activity and sleep.     Chronic hypoxemic respiratory failure  Patient with Hypoxic Respiratory failure which is Chronic.  she is on home oxygen at 2 LPM. Supplemental oxygen was provided and noted- [unfilled].   Signs/symptoms of respiratory failure include- tachypnea and increased work of breathing. Contributing diagnoses includes - CHF and COPD Labs and images were reviewed. Patient Has not had a recent ABG. Will treat underlying causes and adjust management of respiratory failure as follows-   - continue use of oxygen with activity when oxygen saturations are <88%.   - continue inhalers  - continue GDMT, low sodium diet for heart failure; discuss chest pressure with cardiologist and PCP.     Follow up in 6 months.     Moreno Hargrove MD  [facility]

## 2024-01-08 NOTE — ASSESSMENT & PLAN NOTE
Was using Wixella once a day. Discussed with symptoms continue use of Wixella BID to help with exercise tolerance. Continue use of PRN duonebs. Has not needed more than 1-2x/week. Symptoms worsened during covid, but have been improving since. Oxygen use with activity and sleep.

## 2024-01-08 NOTE — ASSESSMENT & PLAN NOTE
Patient with Hypoxic Respiratory failure which is Chronic.  she is on home oxygen at 2 LPM. Supplemental oxygen was provided and noted- [unfilled].   Signs/symptoms of respiratory failure include- tachypnea and increased work of breathing. Contributing diagnoses includes - CHF and COPD Labs and images were reviewed. Patient Has not had a recent ABG. Will treat underlying causes and adjust management of respiratory failure as follows-   - continue use of oxygen with activity when oxygen saturations are <88%.   - continue inhalers  - continue GDMT, low sodium diet for heart failure; discuss chest pressure with cardiologist and PCP.

## 2024-01-25 NOTE — TELEPHONE ENCOUNTER
Pt had biopsy of right breast yesterday. Was told after 24 hours she could take off dressing and clean area but to leave butterfly bandage/tape on for a week. Pt states she has removed the dressing and there is no butterfly or she may have accidentally removed that too. Also asking if she should put a bandaid over the area. No AVS in chart following procedure yesterday to refer to. Pt denies any new or worsening symptoms since procedure and states the area looks fine. Just some slight bruising which she was told would be normal.    Dispo- call provider within 24 hours. Messages routed to staff requesting prompt call back to pt. Per nurse knowledge, advised pt that if she has cleaned the area as instructed, it is ok to put a band aid on tonight and to expect a call back from providers office in the morning to clarify further care instructions. Pt verbalizes understanding.  Reason for Disposition   [1] Caller has NON-URGENT question AND [2] triager unable to answer question    Additional Information   Negative: Sounds like a life-threatening emergency to the triager   Negative: [1] Widespread rash AND [2] bright red, sunburn-like   Negative: [1] SEVERE headache AND [2] after spinal (epidural) anesthesia   Negative: [1] Vomiting AND [2] persists > 4 hours   Negative: [1] Vomiting AND [2] abdomen looks much more swollen than usual   Negative: [1] Drinking very little AND [2] dehydration suspected (e.g., no urine > 12 hours, very dry mouth, very lightheaded)   Negative: Patient sounds very sick or weak to the triager   Negative: Sounds like a serious complication to the triager   Negative: Fever > 100.4 F (38.0 C)   Negative: [1] SEVERE post-op pain (e.g., excruciating, pain scale 8-10) AND [2] not controlled with pain medications   Negative: [1] Caller has URGENT question AND [2] triager unable to answer question   Negative: [1] Headache AND [2] after spinal (epidural) anesthesia AND [3] not severe   Negative: Fever  present > 3 days (72 hours)   Negative: [1] MILD-MODERATE post-op pain (e.g., pain scale 1-7) AND [2] not controlled with pain medications    Protocols used: Post-Op Symptoms and [id number]

## 2024-01-29 PROBLEM — I21.4 NSTEMI (NON-ST ELEVATED MYOCARDIAL INFARCTION): Status: RESOLVED | Noted: 2021-12-08 | Resolved: 2024-01-29

## 2024-01-31 NOTE — TELEPHONE ENCOUNTER
Patient called with results of breast biopsy from 1/23/2024,   no atypia/benign, all questions answered, pt advised to follow up in 6 months with repeat imaging per core biopsy protocol.  reviewed biopsy results and results are benign and concordant. Patient verbalized understanding.       Raine Brewster MD  1/30/2024  2:40 PM CST Back to Top      Benign and concordant for both pathology and flow cytometry results. Routine six month follow-up is recommended.

## 2024-02-02 NOTE — TELEPHONE ENCOUNTER
Please call patient and explain that test(s) show breast biopsy was negative.    Please see orders for  mammogram  and please schedule in 6 months.     Thank you.

## 2024-02-10 PROBLEM — R92.8 ABNORMAL MAMMOGRAM: Status: ACTIVE | Noted: 2024-01-01

## 2024-02-15 NOTE — TELEPHONE ENCOUNTER
----- Message from Sheree No RN sent at 2/14/2024  3:07 PM CST -----  I spoke to the patient, she is aware, Mammo is scheduled for July 2024. Thank you.   ----- Message -----  From: Phil Andrade MD  Sent: 2/13/2024   6:49 AM CST  To: Nurse Lupe    I received automated notification that patient has not read their results and comments on My Chart. Please CALL and advise patient of the comments noted with the original message and items forwarded to staff for action. Please confirm that patient has been contacted and received notification. Thank you.    Please review and complete any tasks (such as scheduling tests and follow ups) that were ordered and sent to staff for action.    Thank you

## 2024-03-07 NOTE — TELEPHONE ENCOUNTER
Returned call spoke to daughter she wants to get [facility] paper work filled out she stated she is unsure how to do so she stated that she will get more information and call back tomorrow.

## 2024-03-07 NOTE — TELEPHONE ENCOUNTER
----- Message from Lucia Vazquez sent at 3/7/2024  3:22 PM CST -----  Contact: [phone]  [place] afternoon patient daughter would like to request that the office fax over the [facility] for for her for her mother. This can be fax to [phone] and the office # [phone] opt#2

## 2024-03-12 NOTE — TELEPHONE ENCOUNTER
Called pt daughter to inform her that [facility] paperwork was received and is awaiting PCP signature.

## 2024-03-12 NOTE — TELEPHONE ENCOUNTER
----- Message from Kristine Gorman sent at 3/12/2024 11:11 AM CDT -----  Contact: [phone]  patient daughter would like to request that the office fax over the [facility] for for her for her mother. This can be fax to [phone] and the office # [phone] opt#2  Please call and advise. Thank you.

## 2024-03-14 NOTE — TELEPHONE ENCOUNTER
----- Message from Francine Llamas sent at 3/14/2024 11:59 AM CDT -----  Regarding: Clearance  Xena calling for patient clearance she fax over on 2/29/24. Please call back @ [phone]/ fax [phone]. Thank you Francine

## 2024-03-14 NOTE — TELEPHONE ENCOUNTER
Returned call to Xena at [facility]- Told her we did not receive the form ( dental work not scheduled yet). She will fax the form again.    Francine Llamas [facility] Staff  Caller: Unspecified (Today, 11:59 AM)  Xena calling for patient clearance she fax over on 2/29/24. Please call back @ [phone]/ fax [phone]. Thank you Francine

## 2024-04-04 NOTE — PROGRESS NOTES
Subjective:      Patient ID: Selma Hooper is a 78 y.o. female.    Chief Complaint: Emphysema and Shortness of Breath    Pt is a 79 yo AAF pmh COPD, Hx of lung nodules, LBBB, NICM, HFrEF, past hx of tobacco use disorder who presents for obtaining portable concentrator. Pt states that she continues to have significant shortness of breath with all activities. Oxygen makes activity more tolerable.      Smoking hx: quit 2018, 0.5 ppd, 23 yo start  Work hx: retired, health and physical education,   Exposure hx: none, no pets, no down material  Inhaler use: Wixella  PRN inhaler use: not using  Hx of lung dz: COPD  Family hx of lung dz: none     Since last being seen has had improvement in chest discomfort with activity. Has plans to see Cardiology later this month. 2 weeks onset of shortness of breath with intermittent wheezing. Has been using wheezing with duonebs. Only used Wixella for one week.     Review of Systems   Constitutional:  Positive for activity change. Negative for weight loss and fatigue.   HENT:  Negative for postnasal drip and congestion.    Respiratory:  Positive for shortness of breath, wheezing and dyspnea on extertion. Negative for cough, hemoptysis, sputum production and use of rescue inhaler.    Cardiovascular:  Positive for chest pain (pressure with activity for 3-4 months). Negative for palpitations and leg swelling.   Gastrointestinal:  Negative for nausea and vomiting.   Neurological:  Negative for dizziness, syncope and light-headedness.   Psychiatric/Behavioral:  Negative for confusion and sleep disturbance. The patient is not nervous/anxious.      Objective:     Physical Exam   Constitutional: She is oriented to person, place, and time. She appears well-developed and well-nourished. She is not obese.   HENT:   Head: Normocephalic.   Cardiovascular: Normal rate, regular rhythm and normal heart sounds. Exam reveals no gallop and no friction rub.   No murmur heard.  Pulmonary/Chest: Normal  expansion, symmetric chest wall expansion and effort normal. No stridor. No respiratory distress. She has no wheezes. She has no rhonchi. She has no rales.   Musculoskeletal:         General: No edema.   Neurological: She is alert and oriented to person, place, and time. Gait normal.   Skin: No cyanosis. Nails show no clubbing.   Psychiatric: She has a normal mood and affect. Her behavior is normal. Judgment and thought content normal.   Vitals reviewed.    Personal Diagnostic Review    CT of chest performed on 5/31/22 without contrast revealed bilateral upper lobe predominant emphysema. Multiple stable pulmonary nodules.      Echocardiogram: 7/13/22  The left ventricle is severely enlarged with eccentric hypertrophy and severely decreased systolic function. The estimated ejection fraction is 15%.  There is severe left ventricular global hypokinesis.  Normal right ventricular size with normal right ventricular systolic function.  Grade I left ventricular diastolic dysfunction.  Moderate left atrial enlargement.  Mild mitral regurgitation.  The estimated PA systolic pressure is 32 mmHg.  Normal central venous pressure (3 mmHg).     Pulmonary function tests:   8/28/19  FEV1: 0.91L  (56.5 % predicted),   FVC:  1.59L (77.5 % predicted),   FEV1/FVC:  57     The test was completed without stopping. The total time walked was 360 seconds. During walking, the patient reported:  Dyspnea, Lightheadedness (chest tightness). The patient used a wheelchair for assistance during testing.      10/04/2023---------Distance: 304.8 meters (1000 feet)       O2 Sat % Supplemental Oxygen Heart Rate Blood Pressure Shahana Scale   Pre-exercise  (Resting) 95 % Room Air 74 bpm 102/58 mmHg 0   During Exercise 94 % Room Air 107 bpm 141/73 mmHg 4   Post-exercise  (Recovery) 95 % Room Air  85 bpm 120/68 mmHg        Recovery Time: 136 seconds     Performing nurse/tech: AYDIN Trevino     PREVIOUS STUDY:   05/18/2021---------Distance: 259.08 meters (850  "feet)       O2 Sat % Supplemental Oxygen Heart Rate Blood Pressure Shahana Scale   Pre-exercise  (Resting) 97 % Room Air 80 bpm 119/70 mmHg 0.5   During Exercise 92 % Room Air 126 bpm (!) 185/104 mmHg 5-6   Post-exercise  (Recovery) 96 % Room Air  85 bpm 136/71 mmHg           CLINICAL INTERPRETATION:  Six minute walk distance is 304.8 meters (1000 feet) with somewhat heavy dyspnea.  During exercise, there was no significant desaturation while breathing room air.  Both blood pressure and heart rate increased significantly with walking.  The patient reported non-pulmonary symptoms during exercise.  Since the previous study in May 2021, exercise capacity may be somewhat improved.  Based upon age and body mass index, exercise capacity is normal.        3/11/2024    11:03 AM 1/8/2024     9:53 AM 12/28/2023    10:10 AM 12/4/2023     4:54 PM 12/4/2023     4:26 PM 11/6/2023     9:38 AM 10/26/2023     3:38 PM   Pulmonary Function Tests   SpO2  95 % 100 % 93 % 98 %  95 %   Height 5' 1" (1.549 m) 5' 1" (1.549 m) 5' 1" (1.549 m)  5' (1.524 m) 5' (1.524 m) 5' (1.524 m)   Weight 64.6 kg (142 lb 6.7 oz) 64.6 kg (142 lb 6.7 oz) 65.4 kg (144 lb 2.9 oz)  65 kg (143 lb 4.8 oz) 66 kg (145 lb 8.1 oz) 66 kg (145 lb 8.1 oz)   BMI (Calculated) 26.9 26.9 27.3  28 28.4 28.4     Assessment:     1. Centrilobular emphysema      Outpatient Encounter Medications as of 4/5/2024   Medication Sig Dispense Refill    albuterol-ipratropium (DUO-NEB) 2.5 mg-0.5 mg/3 mL nebulizer solution Take 3 mLs by nebulization every 6 (six) hours as needed for Wheezing. Rescue 90 mL 3    dapagliflozin (FARXIGA) 5 mg Tab tablet Take 1 tablet (5 mg total) by mouth once daily. 90 tablet 3    fluticasone-salmeterol diskus inhaler 250-50 mcg Inhale 1 puff into the lungs 2 (two) times daily. Controller 60 each 11    furosemide (LASIX) 40 MG tablet Take 1 tablet (40 mg total) by mouth once daily. 90 tablet 3    ipratropium-albuteroL (COMBIVENT)  mcg/actuation inhaler " Inhale 1 puff into the lungs every 6 (six) hours as needed for Wheezing. Rescue 4 g 6    metoprolol succinate (TOPROL-XL) 25 MG 24 hr tablet Take 1 tablet (25 mg total) by mouth once daily. 90 tablet 3    pulse oximeter (PULSE OXIMETER) device by Apply Externally route 2 (two) times a day. Use twice daily at 8 AM and 3 PM and [facility] the value [facility] directed. 1 each 0    sacubitriL-valsartan (ENTRESTO) 24-26 mg per tablet Take 1 tablet by mouth 2 (two) times daily. Don't take valsartan and Entresto together. 180 tablet 3    valsartan (DIOVAN) 80 MG tablet Take 1 tablet (80 mg total) by mouth once daily. Take valsartan while you are not taking Entresto. Stop when restarting Entresto. 90 tablet 3    [] triamcinolone acetonide injection 40 mg       [] triamcinolone acetonide injection 40 mg        No facility-administered encounter medications on file as of 2024.     No orders of the defined types were placed in this encounter.    Plan:      Pulmonary emphysema  Noted on CT chest with COPD based on PFTs. Restart Wixella. Continue PRN albuterol and duonebs.     Follow up in 3 months.     Moreno Hargrove MD  [facility]

## 2024-04-05 PROBLEM — J44.1 CHRONIC OBSTRUCTIVE PULMONARY DISEASE WITH ACUTE EXACERBATION: Status: RESOLVED | Noted: 2021-12-10 | Resolved: 2024-01-01

## 2024-04-07 NOTE — TELEPHONE ENCOUNTER
-R 1/2024. Path neg - rec'd 6 month f/u [facility] (due June 2024). Patient was informed (1/31/2024 tel note)

## 2024-04-24 NOTE — TELEPHONE ENCOUNTER
Contacted patient concerning message about her Entresto.  Patient has private insurance and has a deductible and will not qualify for PAP.  I suggested patient contact the insurance to see how much her deductible is and what it will cost her once the deductible has been met.  Patient understands

## 2024-04-29 PROBLEM — R29.898 WEAKNESS OF BOTH ARMS: Status: RESOLVED | Noted: 2023-01-01 | Resolved: 2024-01-01

## 2024-04-29 NOTE — ASSESSMENT & PLAN NOTE
-7/15/2018 d/c summary note reference to med changes by PCP - per ANGEL Panda (7/3/2018) was seeing [facility] at the time    -NSTEMI ?? 12/21/2021 hospitalization

## 2024-04-29 NOTE — PROGRESS NOTES
Subjective:       Patient ID: Selma Hooper is a 78 y.o. female.    Chief Complaint: Follow-up    Established patient for an annual wellness check/physical exam and also chronic disease management. Specific complaints - see dictation, M*model entries and please see ROS.  Past, Surgical, Family, Social Histories; Medications, Allergies reviewed and reconciled.  Health maintenance file reviewed and addressed items due. Recent applicable lab, imaging and cardiovascular results reviewed.  Problem list items reviewed and modified or added entries (in the overview section) may not be transcribed into this encounter note due to note writer format.      Dyspnea, progressing, using O2, walking n from parking lot.  Last 6 minute walk test did not qualify for oxygen concentrator.  Uses oxygen for most activities.    Recent pulm and cardiology visits.  No significant chest pain or peripheral edema.  Prior myalgia reaction to atorvastatin.  Not currently on statin.      Review of Systems   Constitutional:  Negative for appetite change, chills, diaphoresis, fatigue and fever.   HENT:  Negative for congestion, postnasal drip, rhinorrhea, sore throat and trouble swallowing.    Eyes:  Negative for visual disturbance.   Respiratory:  Positive for shortness of breath. Negative for cough, choking, chest tightness and wheezing.    Cardiovascular:  Negative for chest pain and leg swelling.   Gastrointestinal:  Negative for abdominal distention, abdominal pain, diarrhea, nausea and vomiting.   Genitourinary:  Negative for difficulty urinating and hematuria.   Musculoskeletal:  Negative for arthralgias and myalgias.   Skin:  Negative for rash.   Neurological:  Positive for weakness. Negative for light-headedness and headaches.   Hematological:  Does not bruise/bleed easily.   Psychiatric/Behavioral:  Negative for decreased concentration and dysphoric mood.        Objective:      Physical Exam  Vitals and nursing note reviewed.    Constitutional:       Appearance: She is well-developed. She is not diaphoretic.   HENT:      Head: Normocephalic and atraumatic.   Eyes:      General: No scleral icterus.     Conjunctiva/sclera: Conjunctivae normal.   Cardiovascular:      Rate and Rhythm: Normal rate.      Heart sounds: Heart sounds are distant. No murmur heard.     No friction rub. No gallop.   Pulmonary:      Effort: Pulmonary effort is normal. No respiratory distress.      Breath sounds: Decreased breath sounds present. No wheezing or rales.   Abdominal:      General: There is no distension or abdominal bruit.      Tenderness: There is no abdominal tenderness.   Musculoskeletal:         General: No deformity.      Cervical back: Normal range of motion and neck supple.   Skin:     General: Skin is warm and dry.      Findings: No erythema or rash.   Neurological:      Mental Status: She is alert and oriented to person, place, and time.      Cranial Nerves: No cranial nerve deficit.      Motor: No tremor.      Coordination: Coordination normal.      Gait: Gait normal.   Psychiatric:         Behavior: Behavior normal.         Thought Content: Thought content normal.         Judgment: Judgment normal.         Assessment:       1. Annual physical exam    2. Personal history of nicotine dependence    3. Pulmonary emphysema, unspecified emphysema type    4. NICM (nonischemic cardiomyopathy)    5. Lung nodule    6. Hypertension, essential    7. Allergy to statin medication    8. Chronic combined systolic and diastolic congestive heart failure    9. Elevated troponin level    10. Abnormal mammogram    11. Shortness of breath        Plan:     Medication List with Changes/Refills   Current Medications    ALBUTEROL-IPRATROPIUM (DUO-NEB) 2.5 MG-0.5 MG/3 ML NEBULIZER SOLUTION    Take 3 mLs by nebulization every 6 (six) hours as needed for Wheezing. Rescue    EMPAGLIFLOZIN (JARDIANCE) 10 MG TABLET    Take 1 tablet (10 mg total) by mouth once daily.     FLUTICASONE-SALMETEROL DISKUS INHALER 250-50 MCG    Inhale 1 puff into the lungs 2 (two) times daily. Controller    FUROSEMIDE (LASIX) 40 MG TABLET    Take 1 tablet (40 mg total) by mouth once daily.    IPRATROPIUM-ALBUTEROL (COMBIVENT)  MCG/ACTUATION INHALER    Inhale 1 puff into the lungs every 6 (six) hours as needed for Wheezing. Rescue    METOPROLOL SUCCINATE (TOPROL-XL) 25 MG 24 HR TABLET    Take 1 tablet (25 mg total) by mouth once daily.    SACUBITRIL-VALSARTAN (ENTRESTO) 24-26 MG PER TABLET    Take 1 tablet by mouth 2 (two) times daily. Don't take valsartan and Entresto together.     1. Annual physical exam    2. Personal history of nicotine dependence  Overview:  -lifelong until quit 2018    Orders:  -     CT Chest Lung Screening Low Dose; Future; Expected date: 04/29/2024    3. Pulmonary emphysema, unspecified emphysema type  Overview:  -long term prior smoker  -ARF w/ CHF 7/15/2018 d/c summ  -PFTs 8/29/2019  -CT 5/34/2022  -followed in pulmonology and on home O2, 4/5/2024 summary      4. NICM (nonischemic cardiomyopathy)  Overview:  -onset circa 7/15/2018 d/c summary  -echo 7/12/2023, followed in cardiology (4/13/2023 summary)    Assessment & Plan:  -7/15/2018 d/c summary note reference to med changes by PCP - per ANGEL Panda (7/3/2018) was seeing [facility] at the time    -NSTEMI ?? 12/21/2021 hospitalization      5. Lung nodule  Overview:  CT 5/31/2022 stable      6. Hypertension, essential    7. Allergy to statin medication    8. Chronic combined systolic and diastolic congestive heart failure  Overview:  -onset circa 7/15/2018 d/c summary  -echo 7/12/2023, followed in cardiology (4/13/2023 summary)    Assessment & Plan:  Jardiance per cardiology      9. Elevated troponin level  Overview:  -prior elevations have been attributed to CHF, NICM      10. Abnormal mammogram  Overview:  -R 1/2024. Path neg - rec'd 6 month f/u [facility] (due June 2024). Patient was informed (1/31/2024 tel  note)    Assessment & Plan:  Patient aware due for follow-up in saw      11. Shortness of breath  Assessment & Plan:  -likely combination of cardiac and pulmonary conditions, not decompensated at this time        See meds, orders, follow up, routing and instructions sections of encounter and AVS. Discussed with patient and provided on AVS.    Discussed diet and exercise as therapeutic modalities for metabolic and other conditions. Provided patient information, which are included as links on the AVS for detailed information.    Lab Results   Component Value Date     04/19/2024    K 3.9 04/19/2024     04/19/2024    BUN 14 04/19/2024    CREATININE 1.0 04/19/2024     04/19/2024    HGBA1C 5.1 10/30/2023    MG 2.2 08/08/2022    AST 14 10/30/2023    ALT 7 (L) 10/30/2023    ALBUMIN 3.9 10/30/2023    PROT 6.9 10/30/2023    BILITOT 0.4 10/30/2023    CHOL 162 10/30/2023    HDL 33 (L) 10/30/2023    LDLCALC 100.4 10/30/2023    TRIG 143 10/30/2023    WBC 3.95 12/28/2023    HGB 11.5 (L) 12/28/2023    HCT 37.8 12/28/2023    HCT 39 04/02/2021     12/28/2023    TSH 0.503 12/08/2021     (H) 12/28/2023       Acceptable recent laboratory, patient would not like to check anything additional at this time.  We placed a CT order, she declined at this time.  Follow-up in 6 months.

## 2024-05-02 NOTE — TELEPHONE ENCOUNTER
----- Message from Kristine Estrada sent at 5/2/2024 10:49 AM CDT -----  Regarding: FW: Order for SELMA SÁNCHEZ,     Thank you for submitting a referral to the Pharmacy Patient Assistance Team. We have received your referral for Selma Sánchez. This [facility], who will review the case and contact the patient with assistance options. You may review progress in the patient's chart through Telephone Encounter notes from Pharmacy Patient Assistance.       Thank you,   Pharmacy Patient Assistance Team  ----- Message -----  From: Geneva Wilson RN  Sent: 5/2/2024  10:34 AM CDT  To: Pharmacy Patient Assistance Team  Subject: Order for SELMA SÁNCHEZ

## 2024-05-06 NOTE — TELEPHONE ENCOUNTER
Pt states that she has not been contacted by pt assistance yet . I told her to call me back if no call by Thursday. She verbalized understanding.

## 2024-05-06 NOTE — TELEPHONE ENCOUNTER
----- Message from Abigail Hanonn MA sent at 5/6/2024  9:50 AM CDT -----  The patient would like to talk to talk  to you about  patient Assistance last visit was on 4- rai Vital . Please call [phone]. Thank you

## 2024-05-14 NOTE — ASSESSMENT & PLAN NOTE
Patient with acute kidney injury/acute renal failure likely due to cardiorenal. JAMIR is currently stable. Baseline creatinine  ~1.0  - Labs reviewed- Renal function/electrolytes with Estimated Creatinine Clearance: 29.3 mL/min (based on SCr of 1.4 mg/dL). according to latest data. Monitor urine output and serial BMP and adjust therapy as needed. Avoid nephrotoxins and renally dose meds for GFR listed above.  - Mild JAMIR with Cr 1.4 on admission, baseline closer to 1.0  - Monitor for improvement with diuresis  - Urine studies

## 2024-05-14 NOTE — ASSESSMENT & PLAN NOTE
Patient is identified as having Combined Systolic and Diastolic heart failure that is Acute on chronic. CHF is currently uncontrolled due to Dyspnea not returned to baseline after 1 doses of IV diuretic, >3 pillow orthopnea, and Pulmonary edema/pleural effusion on CXR. Latest ECHO performed and demonstrates- Results for orders placed during the hospital encounter of 07/12/22    Echo    Interpretation Summary  · The left ventricle is severely enlarged with eccentric hypertrophy and severely decreased systolic function. The estimated ejection fraction is 15%.  · There is severe left ventricular global hypokinesis.  · Normal right ventricular size with normal right ventricular systolic function.  · Grade I left ventricular diastolic dysfunction.  · Moderate left atrial enlargement.  · Mild mitral regurgitation.  · The estimated PA systolic pressure is 32 mmHg.  · Normal central venous pressure (3 mmHg).  . Continue Beta Blocker, Furosemide, and ARNI and monitor clinical status closely. Monitor on telemetry. Patient is on CHF pathway.  Monitor strict Is&Os and daily weights.  Place on fluid restriction of 1.5 L. Cardiology has been consulted by ED. Continue to stress to patient importance of self efficacy and  on diet for CHF. Last BNP reviewed- and noted below   Recent Labs   Lab 05/14/24  0200   BNP 2,320*   - Possibly exacerbated by eating salty foods lately. Does not weigh herself/follow any fluid restriction.  - Not able to afford prescribed Jardiance, reports cardiology clinic is getting her on a patient assistance program. Compliant with BB, Entresto, and lasix 40 daily.   - S/p 80 mg Lasix IVP in ED  - Lasix 40 mg IV daily   - TTE

## 2024-05-14 NOTE — HPI
Selma Hooper is a 78 y.o. female with PMHx of f Hypertension, Hyperlipidemia, NICM/HFrEF, LBBB, COPD, On home O2. [place] [facility]. She has been needing to use her home oxygen (1.5L) at all times the last few days. Normally she is able to do things like walk to the bathroom without it. She felt some relief of SOB/wheezing 2 days ago after using nebulizer once. However, yesterday evening she was feeling increasingly SOB and developed orthopnea. Normally lies flat but was SOB even when propped up with 2 pillows. Endorses mild ankle edema. She attempted to use her nebulizer and take an extra 40 mg of lasix but this did not help with her SOB. She then called her daughter who brought her here. She reports feeling diaphoretic when she came in 2/2 her distress. She has had a dry cough for about one week now and has been taking zyrtec without much relief. Denies chest pain, palpitations, fever, chills, abdominal pain, nausea, vomiting. She is compliant with her medications. She was on a Jardiance trial for 14 days but cannot afford it anymore. She takes Lasix 40 mg daily. She does not weight herself because her scale is broken. She reports normal weight is about 140 lb. She normally follows a low salt diet but ate salty crawfish yesterday prior to symptom onset. She does not follow any fluid restriction.     In the ED:  Tachypneic, tachycardic, and hypoxic. HR initially 130s and RR 20-30s and saturating 92% on 3L NC. She was placed on BiPAP and weaned to 2-3L NC with improvement in respiratory status. K 3.1. Cr 1.4 (baseline ~1.0). BNP 2,320. Troponin 0.068. CRP 8.5. Influenza and Covid-19 negative. Procal negative. LA 2.2. EKG with sinus tachycardia and LBBB (seen on previous EKGs). CXR with diffuse coarse/increased interstitial attenuation with bibasilar predominance. She was given lasix 80 mg IVP and duo nebs x3 with improvement in symptoms. She was seen by cardiology in the ED. Admitted to  for CHF/COPD  exacerbation.

## 2024-05-14 NOTE — ED NOTES
Assumed care for pt after recieving report from nightshift RN. Pt. resting in bed in NAD, RR e/u. Vital signs stable and within desired limits at this time of assessment. Pt. offered bathroom assistance and denies need at this time. Explanation of care/wait provided. Pt verbalizes no needs at this time. Bed in low, locked position with rails up and call bell in reach. Pt's white board updated with today's care team and plan.   Patient identifiers for Selma Hooper 78 y.o. female checked and correct.  Chief Complaint   Patient presents with    Shortness of Breath     Intermittent SOB x 3 days. Pt labored in triage. Satting 89% on 1.5 L from home. Hx CHF, COPD. States compliant with lasix.      Past Medical History:   Diagnosis Date    Abnormal liver enzymes 12/10/2018    Acute on chronic combined systolic and diastolic congestive heart failure 4/2/2021    Acute on chronic respiratory failure with hypoxia 4/2/2021    Acute on chronic respiratory failure with hypoxia and hypercapnia 12/10/2021    CHF (congestive heart failure)     COPD exacerbation 7/12/2022    Former smoker 10/24/2018    Hypertension     Smoker 7/27/2016     Allergies reported:   Review of patient's allergies indicates:   Allergen Reactions    Atorvastatin      Leg cramps         LOC: Patient is awake, alert, and aware of environment with an appropriate affect. Patient is oriented x 4 and speaking appropriately.  APPEARANCE: Patient resting comfortably and in no acute distress. Patient is clean and well groomed, patient's clothing is properly fastened.  HEENT: WDL  SKIN: The skin is warm and dry. Patient has normal skin turgor and moist mucus membranes.   MUSCULOSKELETAL: Patient is moving all extremities well, no obvious deformities noted. Pulses intact.   RESPIRATORY: Airway is open and patent. Respirations are spontaneous and non-labored with normal effort and rate. Pt on 2L O2 nasal canula   CARDIAC: Patient has a normal rate and rhythm. 85 on  cardiac monitor. No peripheral edema noted. Denies chest pain and SOB at at time of assessment.   ABDOMEN: No distention noted. Soft and non-tender upon palpation.  NEUROLOGICAL: pupils 3mm, PERRL. Facial expression is symmetrical. Hand grasps are equal bilaterally. Normal sensation in all extremities when touched with finger.

## 2024-05-14 NOTE — ASSESSMENT & PLAN NOTE
Chronic, controlled. Latest blood pressure and vitals reviewed-     Temp:  [96.8 °F (36 °C)-98 °F (36.7 °C)]   Pulse:  []   Resp:  [20-40]   BP: ()/(54-83)   SpO2:  [92 %-99 %] .   Home meds for hypertension were reviewed and noted below.   Hypertension Medications               furosemide (LASIX) 40 MG tablet Take 1 tablet (40 mg total) by mouth once daily.    metoprolol succinate (TOPROL-XL) 25 MG 24 hr tablet Take 1 tablet (25 mg total) by mouth once daily.    sacubitriL-valsartan (ENTRESTO) 24-26 mg per tablet Take 1 tablet by mouth 2 (two) times daily. Don't take valsartan and Entresto together.     While in the hospital, will manage blood pressure as follows; Continue home antihypertensive regimen  Will utilize p.r.n. blood pressure medication only if patient's blood pressure greater than 180/110 and she develops symptoms such as worsening chest pain or shortness of breath.

## 2024-05-14 NOTE — ASSESSMENT & PLAN NOTE
- Last A1c 5.1 (10/2023). Not on DM medications (with exception of Jardiance for CHF)  -  on admission, repeating A1c. May need SSI

## 2024-05-14 NOTE — SUBJECTIVE & OBJECTIVE
Past Medical History:   Diagnosis Date    Abnormal liver enzymes 12/10/2018    Acute on chronic combined systolic and diastolic congestive heart failure 4/2/2021    Acute on chronic respiratory failure with hypoxia 4/2/2021    Acute on chronic respiratory failure with hypoxia and hypercapnia 12/10/2021    CHF (congestive heart failure)     COPD exacerbation 7/12/2022    Former smoker 10/24/2018    Hypertension     Smoker 7/27/2016       Past Surgical History:   Procedure Laterality Date    BREAST BIOPSY      left  side years ago in high school   1962    CHOLECYSTECTOMY      COLONOSCOPY      COLONOSCOPY N/A 8/23/2021    Procedure: COLONOSCOPY;  Surgeon: Shree Amaya MD;  Location: [facility] ([address] [place] [address]);  Service: Endoscopy;  Laterality: N/A;  Do not cancel this order  fully vaccinated-see immunization record  EF 18%  8/20-covid [facility]    HYSTERECTOMY         Review of patient's allergies indicates:   Allergen Reactions    Atorvastatin      Leg cramps       No current facility-administered medications on file prior to encounter.     Current Outpatient Medications on File Prior to Encounter   Medication Sig    albuterol-ipratropium (DUO-NEB) 2.5 mg-0.5 mg/3 mL nebulizer solution Take 3 mLs by nebulization every 6 (six) hours as needed for Wheezing. Rescue    empagliflozin (JARDIANCE) 10 mg tablet Take 1 tablet (10 mg total) by mouth once daily.    fluticasone-salmeterol diskus inhaler 250-50 mcg Inhale 1 puff into the lungs 2 (two) times daily. Controller    furosemide (LASIX) 40 MG tablet Take 1 tablet (40 mg total) by mouth once daily.    ipratropium-albuteroL (COMBIVENT)  mcg/actuation inhaler Inhale 1 puff into the lungs every 6 (six) hours as needed for Wheezing. Rescue (Patient not taking: Reported on 4/29/2024)    metoprolol succinate (TOPROL-XL) 25 MG 24 hr tablet Take 1 tablet (25 mg total) by mouth once daily.    sacubitriL-valsartan (ENTRESTO) 24-26 mg per tablet Take 1 tablet by mouth  2 (two) times daily. Don't take valsartan and Entresto together.     Family History       Problem Relation (Age of Onset)    Colon cancer Father, Brother (63)    Dementia Father    Diabetes Daughter    Heart attack Mother    Heart disease Mother, Brother    Hypertension Mother, Father, Brother          Tobacco Use    Smoking status: Former     Current packs/day: 0.00     Types: Cigarettes     Quit date: 2018     Years since quittin.9     Passive exposure: Past    Smokeless tobacco: Never   Substance and Sexual Activity    Alcohol use: Yes    Drug use: No    Sexual activity: Not on file     Review of Systems   Constitutional:  Negative for chills and fever.   HENT:  Negative for trouble swallowing.    Eyes:  Negative for photophobia and visual disturbance.   Respiratory:  Positive for cough, shortness of breath and wheezing.    Cardiovascular:  Positive for leg swelling. Negative for chest pain and palpitations.   Gastrointestinal:  Negative for abdominal pain, constipation, diarrhea, nausea and vomiting.   Genitourinary:  Negative for dysuria and frequency.   Musculoskeletal:  Negative for arthralgias and back pain.   Skin:  Negative for rash and wound.   Neurological:  Negative for light-headedness and headaches.   Psychiatric/Behavioral:  Negative for agitation and confusion.      Objective:     Vital Signs (Most Recent):  Temp: 96.8 °F (36 °C) (24 0121)  Pulse: 84 (24 0500)  Resp: 20 (24 0500)  BP: (!) 101/58 (24 0500)  SpO2: 97 % (24 0500) Vital Signs (24h Range):  Temp:  [96.8 °F (36 °C)] 96.8 °F (36 °C)  Pulse:  [] 84  Resp:  [20-40] 20  SpO2:  [92 %-99 %] 97 %  BP: ()/(54-83) 101/58     Weight: 64 kg (141 lb)  Body mass index is 26.64 kg/m².     Physical Exam  Vitals and nursing note reviewed.   Constitutional:       General: She is not in acute distress.     Interventions: Nasal cannula in place.   HENT:      Head: Normocephalic and atraumatic.      Nose:  "Nose normal.      Mouth/Throat:      Pharynx: Oropharynx is clear.   Eyes:      Extraocular Movements: Extraocular movements intact.      Conjunctiva/sclera: Conjunctivae normal.   Cardiovascular:      Rate and Rhythm: Normal rate and regular rhythm.      Heart sounds: Normal heart sounds.   Pulmonary:      Effort: Pulmonary effort is normal. No respiratory distress.      Comments: Saturating 97% on 3L NC, RR 16, no distress   Abdominal:      General: Bowel sounds are normal. There is no distension.      Palpations: Abdomen is soft.      Tenderness: There is abdominal tenderness (suprapubic TTP).   Musculoskeletal:         General: Normal range of motion.      Cervical back: Normal range of motion. No tenderness.      Right lower leg: No edema.      Left lower leg: No edema.   Skin:     General: Skin is warm and dry.   Neurological:      General: No focal deficit present.      Mental Status: She is alert and oriented to person, place, and time.   Psychiatric:         Mood and Affect: Mood normal.         Behavior: Behavior normal.                Significant Labs: All pertinent labs within the past 24 hours have been reviewed.  Blood Culture: No results for input(s): "LABBLOO" in the last 48 hours.  BMP:   Recent Labs   Lab 05/14/24 0200   *      K 3.1*   CL 99   CO2 27   BUN 17   CREATININE 1.4   CALCIUM 9.0     CBC:   Recent Labs   Lab 05/14/24 0200   WBC 7.89   HGB 11.4*   HCT 38.3        CMP:   Recent Labs   Lab 05/14/24 0200      K 3.1*   CL 99   CO2 27   *   BUN 17   CREATININE 1.4   CALCIUM 9.0   PROT 6.7   ALBUMIN 3.4*   BILITOT 0.3   ALKPHOS 96   AST 45*   ALT 30   ANIONGAP 15     Cardiac Markers:   Recent Labs   Lab 05/14/24 0200   BNP 2,320*     Coagulation:   Recent Labs   Lab 05/14/24 0200   INR 1.0     Lactic Acid:   Recent Labs   Lab 05/14/24  0302   LACTATE 2.2     Troponin:   Recent Labs   Lab 05/14/24 0200   TROPONINI 0.068*       Significant Imaging: I have " reviewed all pertinent imaging results/findings within the past 24 hours.  X-Ray Chest AP Portable  Narrative: EXAMINATION:  XR CHEST AP PORTABLE    CLINICAL HISTORY:  CHF;    TECHNIQUE:  Single frontal view of the chest was performed.    COMPARISON:  12/28/2023    FINDINGS:  Cardiac monitoring leads overlie the chest.  There is unchanged prominence of the cardiomediastinal silhouette.  There is atherosclerosis of the thoracic aorta.  The lungs are symmetrically expanded with diffuse coarse/increased interstitial attenuation with bibasilar predominance.  Findings could reflect underlying interstitial edema or infectious process superimposed upon a background of chronic interstitial change.  No confluent airspace consolidation, substantial volume of pleural fluid or pneumothorax identified.  Visualized osseous structures appear intact with degenerative change.  Impression: Please see above.    Electronically signed by: Sara Parsons MD  Date:    05/14/2024  Time:    02:47

## 2024-05-14 NOTE — ASSESSMENT & PLAN NOTE
Patient's COPD is with exacerbation noted by continued dyspnea and worsening of baseline hypoxia currently.  Patient is currently off COPD Pathway (2/2 being on CHF pathway). Continue scheduled inhalers Steroids and Supplemental oxygen and monitor respiratory status closely.

## 2024-05-14 NOTE — ACP (ADVANCE CARE PLANNING)
Advance Care Planning     Date: 05/14/2024    Code Status  In light of the patients advanced and life limiting illness,I engaged the the patient in a voluntary conversation about the patient's preferences for care  at the very end of life. The patient wishes to have a natural, peaceful death.  Along those lines, the patient does not wish to have CPR or other invasive treatments performed when her heart and/or breathing stops. I communicated to the patient that a DNR order would be placed in her medical record to reflect this preference.  I spent a total of 10 minutes engaging the patient in this advance care planning discussion.

## 2024-05-14 NOTE — CONSULTS
[facility] - Emergency Dept  Cardiology  Consult Note    Patient Name: Selma Hooper  MRN: [id number]  Admission Date: 5/14/2024  Hospital Length of Stay: 0 days  Code Status: DNR   Attending Provider: Abel Hall MD   Consulting Provider: Ancelmo Eduardo MD  Primary Care Physician: Phil Andrade MD  Principal Problem:Acute on chronic combined systolic and diastolic congestive heart failure    Patient information was obtained from patient, past medical records, and ER records.     Inpatient consult to Cardiology  Consult performed by: Ancelmo Eduardo MD  Consult ordered by: Abel Hall MD        Subjective:     Chief Complaint:  SOB     HPI:   78F with PMH HTN, HLD NICM/HFrEF (LVEF 15% in 2022), LBBB, COPD, On home O2. She presents to [facility] for SOB. She has been needing to use her home oxygen (1.5L) at all times the last few days. Normally she is able to do things like walk to the bathroom without it. She felt some relief of SOB/wheezing 2 days ago after using nebulizer once. However, yesterday evening she was feeling increasingly SOB and developed orthopnea. Normally lies flat but was SOB even when propped up with 2 pillows. Endorses mild ankle edema. She attempted to use her nebulizer and take an extra 40 mg of lasix but this did not help with her SOB. She reports feeling diaphoretic when she came in 2/2 her distress. She has had a dry cough for about one week now and has been taking zyrtec without much relief. Denies chest pain, palpitations, fever, chills, abdominal pain, nausea, vomiting. She is compliant with her medications. She was on a Jardiance trial for 14 days but cannot afford it anymore. She takes Lasix 40 mg daily. She does not weight herself because her scale is broken. She reports normal weight is about 140 lb. She normally follows a low salt diet but ate salty crawfish yesterday prior to symptom onset. She does not follow any fluid restriction.      In the ED:  Tachypneic, tachycardic,  and hypoxic. HR initially 130s and RR 20-30s and saturating 92% on 3L NC. She was placed on BiPAP and weaned to 2-3L NC with improvement in respiratory status. K 3.1. Cr 1.4 (baseline ~1.0). BNP 2,320. Troponin 0.068 -> 0.34 -> 0.69. LA 2.2 -> 2.6. EKG with sinus tachycardia and LBBB (seen on previous EKGs). CXR with diffuse coarse/increased interstitial attenuation with bibasilar predominance. She was given lasix 80 mg IVP with 1L UOP and duo nebs x3 with improvement in symptoms. She was seen by cardiology in the ED. Admitted to  for CHF/COPD exacerbation.     Cardiology consulted for CHF w EF 5-10%. Pt with increasing SOB, THEODORE, orthopnea for last few days despite increased lasix dose. Uptrending troponin to 0.69, and uptrending lactate to 2.6. Initially required BiPAP, now on 3L NC. TTE shows worsening LVEF, now at 5-10% with eccentric hypertrophy and indeterminate diastolic dysfunction. Recently seen by Dr. Reyna in clinic, trialed on Jardiance. Once again offered device eval, but pt refused, and requested her code status be changed to DNR (see ACP note by Dr. Hall). Pt reports medication compliance with toprol, entresto, jardiance, lasix. Of note, pt had PET stress in 2021 resting perfusion abnormality in the RCA territory that increases with stress, indicative of infarct with layla-infarct ischemia.     No new subjective & objective note has been filed under this hospital service since the last note was generated.    Assessment and Plan:     * Acute on chronic combined systolic and diastolic congestive heart failure  Cardiology consulted for CHF w EF 5-10%. Pt with increasing SOB, THEODORE, orthopnea for last few days despite increased lasix dose. Uptrending troponin to 0.69, and uptrending lactate to 2.6, BNP 2300. Initially required BiPAP, now on 3L NC. TTE shows worsening LVEF, now at 5-10% with eccentric hypertrophy and indeterminate diastolic dysfunction. Recently seen by Dr. Reyna in clinic, trialed  on Jardiance. Once again offered device eval, but pt refused, and requested her code status be changed to DNR (see ACP note by Dr. Hall). Pt reports medication compliance with toprol, entresto, jardiance, lasix. Of note, pt had PET stress in 2021 resting perfusion abnormality in the RCA territory that increases with stress, indicative of infarct with layla-infarct ischemia.     Most likely acute CHF exacerbation 2/2 high salt intake recently. JVD to near the mandible, despite TTE read of CVP at 3. Recommend diuresis, restarting home medications, and adding spironolactone when able.     Recommendations  - Increase Lasix to achieve ~1.5L net negative daily  - trend trop to peak  - continue home toprol  - restart entresto after improvement of JAMIR  - add spironolactone after improvement of JAMIR        VTE Risk Mitigation (From admission, onward)           Ordered     enoxaparin injection 40 mg  Every 24 hours         05/14/24 1454     IP VTE HIGH RISK PATIENT  Once         05/14/24 0520     Place sequential compression device  Until discontinued         05/14/24 0520                    Thank you for your consult. I will follow-up with patient. Please contact us if you have any additional questions.    Ancelmo Eduardo MD  Cardiology   [facility] - Emergency Dept

## 2024-05-14 NOTE — PROGRESS NOTES
78F with PMHx of Hypertension, Hyperlipidemia, NICM/HFrEF, LBBB, COPD on home O2 admitted for decompensated HF. She is currently on 3L NC. TTE showing EF of 5-10% (TTE 7/2022 with EF 15%). Troponin trending up. Pt denies chest pain and states SOB is improving. She was offered AICD in the past, but refused at that time. She states she has never had angiogram, but had stress test 12/2021.     Plan:  Increase lasix to 40mg IV BID  Trend trops, likely type 2 NSTEMI   Cardiology consult

## 2024-05-14 NOTE — ASSESSMENT & PLAN NOTE
Patient has hypokalemia which is Acute and currently uncontrolled. Most recent potassium levels reviewed-   Lab Results   Component Value Date    K 3.1 (L) 05/14/2024   Will continue potassium replacement per protocol and recheck repeat levels after replacement completed. Monitor will diuresing

## 2024-05-14 NOTE — ED TRIAGE NOTES
Patient presents to the ED with complaints SOB of inc. Work of breathing, states that she was at home and all of a sudden pt. Frazer like she could not breath, denies any chest pain at this time, pt. Is extremely diaphoretic and tachypnic upon arrival.

## 2024-05-14 NOTE — H&P
[facility]  Hospital Medicine  History & Physical    Patient Name: Selma Hooper  MRN: [id number]  Patient Class: IP- Inpatient  Admission Date: 5/14/2024  Attending Physician: Abel Hall MD   Primary Care Provider: Phil Andrade MD         Patient information was obtained from patient, past medical records, and ER records.     Subjective:     Principal Problem:Acute on chronic combined systolic and diastolic congestive heart failure    Chief Complaint:   Chief Complaint   Patient presents with    Shortness of Breath     Intermittent SOB x 3 days. Pt labored in triage. Satting 89% on 1.5 L from home. Hx CHF, COPD. States compliant with lasix.         HPI: Selma Hooper is a 78 y.o. female with PMHx of f Hypertension, Hyperlipidemia, NICM/HFrEF, LBBB, COPD, On home O2. She presents to [facility] for SOB. She has been needing to use her home oxygen (1.5L) at all times the last few days. Normally she is able to do things like walk to the bathroom without it. She felt some relief of SOB/wheezing 2 days ago after using nebulizer once. However, yesterday evening she was feeling increasingly SOB and developed orthopnea. Normally lies flat but was SOB even when propped up with 2 pillows. Endorses mild ankle edema. She attempted to use her nebulizer and take an extra 40 mg of lasix but this did not help with her SOB. She then called her daughter who brought her here. She reports feeling diaphoretic when she came in 2/2 her distress. She has had a dry cough for about one week now and has been taking zyrtec without much relief. Denies chest pain, palpitations, fever, chills, abdominal pain, nausea, vomiting. She is compliant with her medications. She was on a Jardiance trial for 14 days but cannot afford it anymore. She takes Lasix 40 mg daily. She does not weight herself because her scale is broken. She reports normal weight is about 140 lb. She normally follows a low salt diet but ate salty crawfish  yesterday prior to symptom onset. She does not follow any fluid restriction.     In the ED:  Tachypneic, tachycardic, and hypoxic. HR initially 130s and RR 20-30s and saturating 92% on 3L NC. She was placed on BiPAP and weaned to 2-3L NC with improvement in respiratory status. K 3.1. Cr 1.4 (baseline ~1.0). BNP 2,320. Troponin 0.068. CRP 8.5. Influenza and Covid-19 negative. Procal negative. LA 2.2. EKG with sinus tachycardia and LBBB (seen on previous EKGs). CXR with diffuse coarse/increased interstitial attenuation with bibasilar predominance. She was given lasix 80 mg IVP and duo nebs x3 with improvement in symptoms. She was seen by cardiology in the ED. Admitted to  for CHF/COPD exacerbation.     Past Medical History:   Diagnosis Date    Abnormal liver enzymes 12/10/2018    Acute on chronic combined systolic and diastolic congestive heart failure 4/2/2021    Acute on chronic respiratory failure with hypoxia 4/2/2021    Acute on chronic respiratory failure with hypoxia and hypercapnia 12/10/2021    CHF (congestive heart failure)     COPD exacerbation 7/12/2022    Former smoker 10/24/2018    Hypertension     Smoker 7/27/2016       Past Surgical History:   Procedure Laterality Date    BREAST BIOPSY      left  side years ago in high school   1962    CHOLECYSTECTOMY      COLONOSCOPY      COLONOSCOPY N/A 8/23/2021    Procedure: COLONOSCOPY;  Surgeon: Shree Amaya MD;  Location: [address];  Service: Endoscopy;  Laterality: N/A;  Do not cancel this order  fully vaccinated-see immunization record  EF 18%  8/20-covid [facility] portal-[facility]    HYSTERECTOMY         Review of patient's allergies indicates:   Allergen Reactions    Atorvastatin      Leg cramps       No current facility-administered medications on file prior to encounter.     Current Outpatient Medications on File Prior to Encounter   Medication Sig    albuterol-ipratropium (DUO-NEB) 2.5 mg-0.5 mg/3 mL nebulizer solution Take 3 mLs by nebulization  every 6 (six) hours as needed for Wheezing. Rescue    empagliflozin (JARDIANCE) 10 mg tablet Take 1 tablet (10 mg total) by mouth once daily.    fluticasone-salmeterol diskus inhaler 250-50 mcg Inhale 1 puff into the lungs 2 (two) times daily. Controller    furosemide (LASIX) 40 MG tablet Take 1 tablet (40 mg total) by mouth once daily.    ipratropium-albuteroL (COMBIVENT)  mcg/actuation inhaler Inhale 1 puff into the lungs every 6 (six) hours as needed for Wheezing. Rescue (Patient not taking: Reported on 2024)    metoprolol succinate (TOPROL-XL) 25 MG 24 hr tablet Take 1 tablet (25 mg total) by mouth once daily.    sacubitriL-valsartan (ENTRESTO) 24-26 mg per tablet Take 1 tablet by mouth 2 (two) times daily. Don't take valsartan and Entresto together.     Family History       Problem Relation (Age of Onset)    Colon cancer Father, Brother (63)    Dementia Father    Diabetes Daughter    Heart attack Mother    Heart disease Mother, Brother    Hypertension Mother, Father, Brother          Tobacco Use    Smoking status: Former     Current packs/day: 0.00     Types: Cigarettes     Quit date: 2018     Years since quittin.9     Passive exposure: Past    Smokeless tobacco: Never   Substance and Sexual Activity    Alcohol use: Yes    Drug use: No    Sexual activity: Not on file     Review of Systems   Constitutional:  Negative for chills and fever.   HENT:  Negative for trouble swallowing.    Eyes:  Negative for photophobia and visual disturbance.   Respiratory:  Positive for cough, shortness of breath and wheezing.    Cardiovascular:  Positive for leg swelling. Negative for chest pain and palpitations.   Gastrointestinal:  Negative for abdominal pain, constipation, diarrhea, nausea and vomiting.   Genitourinary:  Negative for dysuria and frequency.   Musculoskeletal:  Negative for arthralgias and back pain.   Skin:  Negative for rash and wound.   Neurological:  Negative for light-headedness and  "headaches.   Psychiatric/Behavioral:  Negative for agitation and confusion.      Objective:     Vital Signs (Most Recent):  Temp: 96.8 °F (36 °C) (05/14/24 0121)  Pulse: 84 (05/14/24 0500)  Resp: 20 (05/14/24 0500)  BP: (!) 101/58 (05/14/24 0500)  SpO2: 97 % (05/14/24 0500) Vital Signs (24h Range):  Temp:  [96.8 °F (36 °C)] 96.8 °F (36 °C)  Pulse:  [] 84  Resp:  [20-40] 20  SpO2:  [92 %-99 %] 97 %  BP: ()/(54-83) 101/58     Weight: 64 kg (141 lb)  Body mass index is 26.64 kg/m².     Physical Exam  Vitals and nursing note reviewed.   Constitutional:       General: She is not in acute distress.     Interventions: Nasal cannula in place.   HENT:      Head: Normocephalic and atraumatic.      Nose: Nose normal.      Mouth/Throat:      Pharynx: Oropharynx is clear.   Eyes:      Extraocular Movements: Extraocular movements intact.      Conjunctiva/sclera: Conjunctivae normal.   Cardiovascular:      Rate and Rhythm: Normal rate and regular rhythm.      Heart sounds: Normal heart sounds.   Pulmonary:      Effort: Pulmonary effort is normal. No respiratory distress.      Comments: Saturating 97% on 3L NC, RR 16, no distress   Abdominal:      General: Bowel sounds are normal. There is no distension.      Palpations: Abdomen is soft.      Tenderness: There is abdominal tenderness (suprapubic TTP).   Musculoskeletal:         General: Normal range of motion.      Cervical back: Normal range of motion. No tenderness.      Right lower leg: No edema.      Left lower leg: No edema.   Skin:     General: Skin is warm and dry.   Neurological:      General: No focal deficit present.      Mental Status: She is alert and oriented to person, place, and time.   Psychiatric:         Mood and Affect: Mood normal.         Behavior: Behavior normal.                Significant Labs: All pertinent labs within the past 24 hours have been reviewed.  Blood Culture: No results for input(s): "LABBLOO" in the last 48 hours.  BMP:   Recent " Labs   Lab 05/14/24  0200   *      K 3.1*   CL 99   CO2 27   BUN 17   CREATININE 1.4   CALCIUM 9.0     CBC:   Recent Labs   Lab 05/14/24 0200   WBC 7.89   HGB 11.4*   HCT 38.3        CMP:   Recent Labs   Lab 05/14/24 0200      K 3.1*   CL 99   CO2 27   *   BUN 17   CREATININE 1.4   CALCIUM 9.0   PROT 6.7   ALBUMIN 3.4*   BILITOT 0.3   ALKPHOS 96   AST 45*   ALT 30   ANIONGAP 15     Cardiac Markers:   Recent Labs   Lab 05/14/24 0200   BNP 2,320*     Coagulation:   Recent Labs   Lab 05/14/24 0200   INR 1.0     Lactic Acid:   Recent Labs   Lab 05/14/24  0302   LACTATE 2.2     Troponin:   Recent Labs   Lab 05/14/24 0200   TROPONINI 0.068*       Significant Imaging: I have reviewed all pertinent imaging results/findings within the past 24 hours.  X-Ray Chest AP Portable  Narrative: EXAMINATION:  XR CHEST AP PORTABLE    CLINICAL HISTORY:  CHF;    TECHNIQUE:  Single frontal view of the chest was performed.    COMPARISON:  12/28/2023    FINDINGS:  Cardiac monitoring leads overlie the chest.  There is unchanged prominence of the cardiomediastinal silhouette.  There is atherosclerosis of the thoracic aorta.  The lungs are symmetrically expanded with diffuse coarse/increased interstitial attenuation with bibasilar predominance.  Findings could reflect underlying interstitial edema or infectious process superimposed upon a background of chronic interstitial change.  No confluent airspace consolidation, substantial volume of pleural fluid or pneumothorax identified.  Visualized osseous structures appear intact with degenerative change.  Impression: Please see above.    Electronically signed by: Sara Parsons MD  Date:    05/14/2024  Time:    02:47     Assessment/Plan:     Type 2 diabetes mellitus with hyperglycemia, without long-term current use of insulin  - Last A1c 5.1 (10/2023). Not on DM medications (with exception of Jardiance for CHF)  -  on admission, repeating A1c. May need  SSI    Elevated troponin level  - Troponin 0.068> trend to peak/flat. Initial trop is below previous values.   - EKG with known LBBB. No CP, do not currently suspect ACS.   - Suspect demand ischemia from hypervolemia/tachycardia.       COPD exacerbation  Patient's COPD is with exacerbation noted by continued dyspnea and worsening of baseline hypoxia currently.  Patient is currently off COPD Pathway (2/2 being on CHF pathway). Continue scheduled inhalers Steroids and Supplemental oxygen and monitor respiratory status closely.     Acute on chronic respiratory failure with hypoxia and hypercapnia  Patient with Hypercapnic and Hypoxic Respiratory failure which is Acute on chronic.  she is on home oxygen at 1.5 LPM. Supplemental oxygen was provided and noted- Oxygen Concentration (%):  [40] 40    Signs/symptoms of respiratory failure include- tachypnea, increased work of breathing, respiratory distress, and wheezing. Contributing diagnoses includes - CHF and COPD Labs and images were reviewed. Patient Has recent ABG, which has been reviewed. Will treat underlying causes and adjust management of respiratory failure as follows- See COPD and CHF    Allergy to statin medication  - Noted. Not on a statin.    Hypokalemia  Patient has hypokalemia which is Acute and currently uncontrolled. Most recent potassium levels reviewed-   Lab Results   Component Value Date    K 3.1 (L) 05/14/2024   Will continue potassium replacement per protocol and recheck repeat levels after replacement completed. Monitor will diuresing     Acute on chronic combined systolic and diastolic congestive heart failure  Patient is identified as having Combined Systolic and Diastolic heart failure that is Acute on chronic. CHF is currently uncontrolled due to Dyspnea not returned to baseline after 1 doses of IV diuretic, >3 pillow orthopnea, and Pulmonary edema/pleural effusion on CXR. Latest ECHO performed and demonstrates- Results for orders placed during  the hospital encounter of 07/12/22    Echo    Interpretation Summary  · The left ventricle is severely enlarged with eccentric hypertrophy and severely decreased systolic function. The estimated ejection fraction is 15%.  · There is severe left ventricular global hypokinesis.  · Normal right ventricular size with normal right ventricular systolic function.  · Grade I left ventricular diastolic dysfunction.  · Moderate left atrial enlargement.  · Mild mitral regurgitation.  · The estimated PA systolic pressure is 32 mmHg.  · Normal central venous pressure (3 mmHg).  . Continue Beta Blocker, Furosemide, and ARNI and monitor clinical status closely. Monitor on telemetry. Patient is on CHF pathway.  Monitor strict Is&Os and daily weights.  Place on fluid restriction of 1.5 L. Cardiology has been consulted by ED. Continue to stress to patient importance of self efficacy and  on diet for CHF. Last BNP reviewed- and noted below   Recent Labs   Lab 05/14/24  0200   BNP 2,320*   - Possibly exacerbated by eating salty foods lately. Does not weigh herself/follow any fluid restriction.  - Not able to afford prescribed Jardiance, reports cardiology clinic is getting her on a patient assistance program. Compliant with BB, Entresto, and lasix 40 daily.   - S/p 80 mg Lasix IVP in ED  - Lasix 40 mg IV daily   - TTE    LBBB (left bundle branch block)  History noted.    JAMIR (acute kidney injury)  Patient with acute kidney injury/acute renal failure likely due to cardiorenal. JAMIR is currently stable. Baseline creatinine  ~1.0  - Labs reviewed- Renal function/electrolytes with Estimated Creatinine Clearance: 29.3 mL/min (based on SCr of 1.4 mg/dL). according to latest data. Monitor urine output and serial BMP and adjust therapy as needed. Avoid nephrotoxins and renally dose meds for GFR listed above.  - Mild JAMIR with Cr 1.4 on admission, baseline closer to 1.0  - Monitor for improvement with diuresis  - Urine  studies    Hypertension, essential  Chronic, controlled. Latest blood pressure and vitals reviewed-     Temp:  [96.8 °F (36 °C)-98 °F (36.7 °C)]   Pulse:  []   Resp:  [20-40]   BP: ()/(54-83)   SpO2:  [92 %-99 %] .   Home meds for hypertension were reviewed and noted below.   Hypertension Medications               furosemide (LASIX) 40 MG tablet Take 1 tablet (40 mg total) by mouth once daily.    metoprolol succinate (TOPROL-XL) 25 MG 24 hr tablet Take 1 tablet (25 mg total) by mouth once daily.    sacubitriL-valsartan (ENTRESTO) 24-26 mg per tablet Take 1 tablet by mouth 2 (two) times daily. Don't take valsartan and Entresto together.     While in the hospital, will manage blood pressure as follows; Continue home antihypertensive regimen  Will utilize p.r.n. blood pressure medication only if patient's blood pressure greater than 180/110 and she develops symptoms such as worsening chest pain or shortness of breath.      VTE Risk Mitigation (From admission, onward)           Ordered     IP VTE HIGH RISK PATIENT  Once         05/14/24 0520     Place sequential compression device  Until discontinued         05/14/24 0520                                    Nicole York PA-C  Department of Hospital Medicine  [facility]

## 2024-05-14 NOTE — ED PROVIDER NOTES
Encounter Date: 5/14/2024       History     Chief Complaint   Patient presents with    Shortness of Breath     Intermittent SOB x 3 days. Pt labored in triage. Satting 89% on 1.5 L from home. Hx CHF, COPD. States compliant with lasix.      HPI    This is a 78-year-old female history of COPD CHF chronic respiratory failure with hypoxia and hypercapnia on 1.5 L of home oxygen presents the ER for evaluation shortness of breath.  Onset 3 days, intermittent, endorses improvement with breathing treatments.  She reports compliance medication.  Prior to arrival she reported her shortness of breath significantly worsened, she took an extra furosemide with no improvement in came the ER for further evaluation.    Review of patient's allergies indicates:   Allergen Reactions    Atorvastatin      Leg cramps     Past Medical History:   Diagnosis Date    Abnormal liver enzymes 12/10/2018    Acute on chronic combined systolic and diastolic congestive heart failure 4/2/2021    Acute on chronic respiratory failure with hypoxia 4/2/2021    Acute on chronic respiratory failure with hypoxia and hypercapnia 12/10/2021    CHF (congestive heart failure)     COPD exacerbation 7/12/2022    Former smoker 10/24/2018    Hypertension     Smoker 7/27/2016     Past Surgical History:   Procedure Laterality Date    BREAST BIOPSY      left  side years ago in high school   1962    CHOLECYSTECTOMY      COLONOSCOPY      COLONOSCOPY N/A 8/23/2021    Procedure: COLONOSCOPY;  Surgeon: Shree Amaya MD;  Location: [facility] ([address] [place] [address]);  Service: Endoscopy;  Laterality: N/A;  Do not cancel this order  fully vaccinated-see immunization record  EF 18%  8/20-covid [facility]-new inst portal-tb    HYSTERECTOMY       Family History   Problem Relation Name Age of Onset    Heart disease Mother      Heart attack Mother      Hypertension Mother      Colon cancer Father          colon    Dementia Father      Hypertension Father      Colon cancer Brother  63        colon     Heart disease Brother      Hypertension Brother      Diabetes Daughter      Crohn's disease Neg Hx      Ulcerative colitis Neg Hx      Stomach cancer Neg Hx       Social History     Tobacco Use    Smoking status: Former     Current packs/day: 0.00     Types: Cigarettes     Quit date: 2018     Years since quittin.9     Passive exposure: Past    Smokeless tobacco: Never   Substance Use Topics    Alcohol use: Yes    Drug use: No     Review of Systems   Constitutional:  Positive for fatigue.   Respiratory:  Positive for shortness of breath.    All other systems reviewed and are negative.      Physical Exam     Initial Vitals [24 0121]   BP Pulse Resp Temp SpO2   137/82 (!) 126 (!) 26 96.8 °F (36 °C) (!) 92 %      MAP       --         Physical Exam    Nursing note and vitals reviewed.  Constitutional:   Elderly chronically ill-appearing acute distress   HENT:   Head: Normocephalic and atraumatic.   Eyes: Pupils are equal, round, and reactive to light.   Neck:   Normal range of motion.  Cardiovascular:            Tachycardic rate and rhythm   Pulmonary/Chest:   Respiratory distress speaking 3-4 word sentences increased respiratory effort diffuse wheezing and crackles   Abdominal: Abdomen is soft. She exhibits no distension. There is no abdominal tenderness.   Musculoskeletal:         General: Normal range of motion.      Cervical back: Normal range of motion.     Neurological: She is alert and oriented to person, place, and time. GCS score is 15. GCS eye subscore is 4. GCS verbal subscore is 5. GCS motor subscore is 6.   Skin: Skin is warm and dry. Capillary refill takes less than 2 seconds.   Psychiatric: She has a normal mood and affect. Thought content normal.         ED Course   Procedures  Labs Reviewed   CBC W/ AUTO DIFFERENTIAL - Abnormal; Notable for the following components:       Result Value    Hemoglobin 11.4 (*)     MCH 24.3 (*)     MCHC 29.8 (*)     Gran % 31.2 (*)     Lymph % 55.8 (*)     All  other components within normal limits   COMPREHENSIVE METABOLIC PANEL - Abnormal; Notable for the following components:    Potassium 3.1 (*)     Glucose 308 (*)     Albumin 3.4 (*)     AST 45 (*)     eGFR 39.0 (*)     All other components within normal limits   TROPONIN I - Abnormal; Notable for the following components:    Troponin I 0.068 (*)     All other components within normal limits   B-TYPE NATRIURETIC PEPTIDE - Abnormal; Notable for the following components:    BNP 2,320 (*)     All other components within normal limits   C-REACTIVE PROTEIN - Abnormal; Notable for the following components:    CRP 8.5 (*)     All other components within normal limits    Narrative:     ADD ON CRP PER DR GRAHAM BURK/ORDER# [id number] @ 2:234AM   ISTAT PROCEDURE - Abnormal; Notable for the following components:    POC PCO2 58.6 (*)     POC PO2 96 (*)     POC HCO3 33.5 (*)     POC BE 8 (*)     POC TCO2 35 (*)     All other components within normal limits   INFLUENZA A & B BY MOLECULAR   CULTURE, BLOOD   CULTURE, BLOOD   PROTIME-INR   SARS-COV-2 RNA AMPLIFICATION, QUAL   PROCALCITONIN   C-REACTIVE PROTEIN   LACTIC ACID, PLASMA   HEMOGLOBIN A1C   TROPONIN I   UREA NITROGEN, URINE, RANDOM   SODIUM, URINE, RANDOM   CREATININE, URINE, RANDOM   ISTAT LACTATE          Imaging Results              X-Ray Chest AP Portable (Final result)  Result time 05/14/24 02:47:38      Final result by Sara Parsons MD (05/14/24 02:47:38)                   Impression:      Please see above.      Electronically signed by: Sara Parsons MD  Date:    05/14/2024  Time:    02:47               Narrative:    EXAMINATION:  XR CHEST AP PORTABLE    CLINICAL HISTORY:  CHF;    TECHNIQUE:  Single frontal view of the chest was performed.    COMPARISON:  12/28/2023    FINDINGS:  Cardiac monitoring leads overlie the chest.  There is unchanged prominence of the cardiomediastinal silhouette.  There is atherosclerosis of the thoracic aorta.  The lungs are  symmetrically expanded with diffuse coarse/increased interstitial attenuation with bibasilar predominance.  Findings could reflect underlying interstitial edema or infectious process superimposed upon a background of chronic interstitial change.  No confluent airspace consolidation, substantial volume of pleural fluid or pneumothorax identified.  Visualized osseous structures appear intact with degenerative change.                                       Medications   sodium chloride 0.9% flush 5 mL (has no administration in time range)   albuterol-ipratropium 2.5 mg-0.5 mg/3 mL nebulizer solution 3 mL (has no administration in time range)   melatonin tablet 6 mg (has no administration in time range)   polyethylene glycol packet 17 g (has no administration in time range)   bisacodyL suppository 10 mg (has no administration in time range)   simethicone chewable tablet 80 mg (has no administration in time range)   aluminum-magnesium hydroxide-simethicone 200-200-20 mg/5 mL suspension 30 mL (has no administration in time range)   acetaminophen tablet 650 mg (has no administration in time range)   acetaminophen tablet 1,000 mg (has no administration in time range)   naloxone 0.4 mg/mL injection 0.02 mg (has no administration in time range)   glucose chewable tablet 16 g (has no administration in time range)   glucose chewable tablet 24 g (has no administration in time range)   glucagon (human recombinant) injection 1 mg (has no administration in time range)   dextrose 10% bolus 125 mL 125 mL (has no administration in time range)   dextrose 10% bolus 250 mL 250 mL (has no administration in time range)   sodium chloride 0.9% flush 10 mL (has no administration in time range)   furosemide injection 40 mg (has no administration in time range)   albuterol-ipratropium 2.5 mg-0.5 mg/3 mL nebulizer solution 3 mL (has no administration in time range)   empagliflozin (Jardiance) tablet 10 mg (has no administration in time range)    fluticasone furoate-vilanteroL 100-25 mcg/dose diskus inhaler 1 puff (has no administration in time range)   metoprolol succinate (TOPROL-XL) 24 hr tablet 25 mg (has no administration in time range)   sacubitriL-valsartan 24-26 mg per tablet 1 tablet (has no administration in time range)   predniSONE tablet 40 mg (has no administration in time range)   albuterol-ipratropium 2.5 mg-0.5 mg/3 mL nebulizer solution 3 mL (3 mLs Nebulization Given 5/14/24 0140)   furosemide injection 80 mg (80 mg Intravenous Given 5/14/24 6469)   potassium bicarbonate disintegrating tablet 50 mEq (50 mEq Oral Given 5/14/24 2200)     Medical Decision Making  This is a 78-year-old female multiple medical problems presents the ER for evaluation of shortness of breath.  Onset 3 days intermittently, improved with breathing treatments.  She reports prior to arrival she had increased work of breathing no improvement with her breathing treatments, she took an extra fluid pill but had no improvement in came the ER.  She arrived in the ER, acute distress, accessory muscle use diminished breath sounds with wheezing and crackles throughout.  Differential includes COPD exacerbation CHF exacerbation, pneumonia other cause.  Will plan blood work symptomatic support duo nebs low threshold for BiPAP.    Amount and/or Complexity of Data Reviewed  External Data Reviewed: labs, radiology, ECG and notes.  Labs: ordered. Decision-making details documented in ED Course.  Radiology: ordered and independent interpretation performed. Decision-making details documented in ED Course.  ECG/medicine tests: ordered and independent interpretation performed. Decision-making details documented in ED Course.    Risk  Prescription drug management.  Decision regarding hospitalization.               ED Course as of 05/14/24 0624   Tue May 14, 2024   0141 Went to re-evaluate patient, endorses no improvement with duo nebs.  Still tachypneic speaking short sentences.   Discussed with patient will plan BiPAP. [SE]   0258 Brain natriuretic peptide(!) [SE]   0258 Troponin I(!) [SE]   0258 Comprehensive metabolic panel(!) [SE]   0258 CBC auto differential(!) [SE]   0258 Protime-INR [SE]   0258 ISTAT PROCEDURE(!!) [SE]   0258 X-Ray Chest AP Portable  Resting in bed no distress patient with some improvement on BiPAP.  Given duo nebs and Lasix.  Still no urinary output.  Given EF of 15% discussed with Cardiology will accepted their service likely need ICU. [SE]   0338 Patient urinating.  Seen evaluated by cards.  Will re-evaluate in about an hour will attempt to wean patient from BiPAP. [SE]      ED Course User Index  [SE] Kalyan Trinidad MD                       Critical Care Time: 65 minutes hypoxic hypercapnic respiratory failure requiring BiPAP  Critical care was time spent personally by me on the following activities: evaluating this patient's organ dysfunction, development of treatment plan, discussing treatment plan with patient or surrogate and bedside caregivers, discussions with consultants, evaluation of patient's response to treatment, examination of patient, ordering and performing treatments and interventions, ordering and review of laboratory studies, ordering and review of radiographic studies, re-evaluation of patient's condition. This critical care time did not overlap with that of any other provider or involve time for any procedures.      Clinical Impression:  Final diagnoses:  [R06.02] Shortness of breath  [J96.21, J96.22] Acute on chronic respiratory failure with hypoxia and hypercapnia (Primary)  [I50.9] Acute on chronic congestive heart failure, unspecified heart failure type          ED Disposition Condition    Admit Stable                Kalyan Trinidad MD  05/14/24 1052

## 2024-05-14 NOTE — ASSESSMENT & PLAN NOTE
Patient with Hypercapnic and Hypoxic Respiratory failure which is Acute on chronic.  she is on home oxygen at 1.5 LPM. Supplemental oxygen was provided and noted- Oxygen Concentration (%):  [40] 40    Signs/symptoms of respiratory failure include- tachypnea, increased work of breathing, respiratory distress, and wheezing. Contributing diagnoses includes - CHF and COPD Labs and images were reviewed. Patient Has recent ABG, which has been reviewed. Will treat underlying causes and adjust management of respiratory failure as follows- See COPD and CHF

## 2024-05-14 NOTE — ASSESSMENT & PLAN NOTE
Cardiology consulted for CHF w EF 5-10%. Pt with increasing SOB, THEODORE, orthopnea for last few days despite increased lasix dose. Uptrending troponin to 0.69, and uptrending lactate to 2.6, BNP 2300. Initially required BiPAP, now on 3L NC. TTE shows worsening LVEF, now at 5-10% with eccentric hypertrophy and indeterminate diastolic dysfunction. Recently seen by Dr. Reyna in clinic, trialed on Jardiance. Once again offered device eval, but pt refused, and requested her code status be changed to DNR (see ACP note by Dr. Hall). Pt reports medication compliance with toprol, entresto, jardiance, lasix. Of note, pt had PET stress in 2021 resting perfusion abnormality in the RCA territory that increases with stress, indicative of infarct with layla-infarct ischemia.     Most likely acute CHF exacerbation 2/2 high salt intake recently. JVD to near the mandible, despite TTE read of CVP at 3. Recommend diuresis, restarting home medications, and adding spironolactone when able.     Recommendations  - Increase Lasix to achieve ~1.5L net negative daily  - trend trop to peak  - continue home toprol  - restart entresto after improvement of JAMIR  - add spironolactone after improvement of JAMIR

## 2024-05-15 PROBLEM — Z71.89 ACP (ADVANCE CARE PLANNING): Status: ACTIVE | Noted: 2021-08-23

## 2024-05-15 NOTE — ASSESSMENT & PLAN NOTE
Patient with Hypercapnic and Hypoxic Respiratory failure which is Acute on chronic.  she is on home oxygen at 1.5 LPM. Supplemental oxygen was provided and noted- Oxygen Concentration (%):  [28-32] 28    Signs/symptoms of respiratory failure include- tachypnea, increased work of breathing, respiratory distress, and wheezing. Contributing diagnoses includes - CHF and COPD Labs and images were reviewed. Patient Has recent ABG, which has been reviewed. Will treat underlying causes and adjust management of respiratory failure as follows- See COPD and CHF

## 2024-05-15 NOTE — NURSING
Nurses Note -- 4 Eyes      5/14/2024   8:14 PM      Skin assessed during: Admit      [x] No Altered Skin Integrity Present    []Prevention Measures Documented      [] Yes- Altered Skin Integrity Present or Discovered   [] LDA Added if Not in Epic (Describe Wound)   [] New Altered Skin Integrity was Present on Admit and Documented in LDA   [] Wound Image Taken    Wound Care Consulted? No    Attending Nurse:  Sherry Desir RN/Staff Member:  Herb

## 2024-05-15 NOTE — PLAN OF CARE
Monitor I&Os  Problem: Adult Inpatient Plan of Care  Goal: Plan of Care Review  Outcome: Progressing  Goal: Patient-Specific Goal (Individualized)  Outcome: Progressing  Goal: Absence of Hospital-Acquired Illness or Injury  Outcome: Progressing  Goal: Optimal Comfort and Wellbeing  Outcome: Progressing  Goal: Readiness for Transition of Care  Outcome: Progressing     Problem: Infection  Goal: Absence of Infection Signs and Symptoms  Outcome: Progressing     Problem: Diabetes Comorbidity  Goal: Blood Glucose Level Within Targeted Range  Outcome: Progressing     Problem: Acute Kidney Injury/Impairment  Goal: Fluid and Electrolyte Balance  Outcome: Progressing  Goal: Improved Oral Intake  Outcome: Progressing  Goal: Effective Renal Function  Outcome: Progressing

## 2024-05-15 NOTE — PROGRESS NOTES
"[facility]) nurse navigator notified of [facility] candidate in need of education and introduction to 4-6 week program.      PT aao x 3 while lying in bed with grandson at bedside. Introduced self to pt as [facility] nurse navigator.     Patient given "[facility] Home Care Guide" which includes "Daily weight and symptom tracker".  Encouraged pt and family to review information.      Reviewed the following key points of [facility] program with pt and family:  Take your medications as directed. Call Ms. Us if any Health Care Professional changes your heart/fluid medications.   Weight yourself daily. Daily dry weights. Upon waking, empty your bladder, weigh with as little clothes as possible before eating or drinking. Record weight in Symptom Tracker and compare these weights for fluid gain. Weight gain overnight of 3 - 4lbs is fluid, also gain of 5lbs in 3 days is fluid as well. Call us!  Follow low salt and limit fluid diet. Salt/sodium restrictions 2000 - 3000mg, see page 4. Sodium makes you hold onto fluid. High sodium foods: deli meat, deli cheese, sausages, hot dogs, fast food, restaurant food, anything in a box, bottle, or can. Cook with fresh or frozen ingredients and use seasonings that are labeled NO SALT/SODIUM. Check portions sizes, salt is reported for 1 portion. A can may contain 2 - 3 portions. Fluid restriction 1.5 - 2 liters per day, see page 6, measure all your fluid, the milk in your cereal, broth in your soup and ice cream because anything that melts at room temperature is a liquid.  Stop smoking and start exercising. Brisk walking is good, don't walk like you are going to the hangman and DON'T WALK IN THE HEAT! Start slow and increase as tolerated. Remember if you don't use it, you lose it.   Go to all your appointments and call your team. Have your weight, blood pressure and pulse ready when we call to do the phone check-ins and call us if you have " fluid gain or questions.        Pt reminded to follow Symptom tracker and to call at the onset of symptoms according to tracker.     Reviewed plan for follow up once discharged to include phone calls, in person and virtual visits to assist pt optimizing their heart failure medication regimen and encouraging healthy lifestyle modifications.  Reminded pt that program will assist them over the next 4-6 weeks and then patient will be transferred to long term care provider .  Reminded pt how to contact [facility] navigator via phone and or via [employer].     Pt instructed appointment will be printed on hospital discharge paperwork.     Pt also reminded RN will call 48-72 hours after discharge to check on them.     PT and family verbalize read back of some information given.  Encouraged pt and family to read over information often and contact team with any questions or concerns.

## 2024-05-15 NOTE — SUBJECTIVE & OBJECTIVE
Interval History: NAEON, AF, HDS, on home 2L NC.     500cc UOP after IV lasix 60mg x2 doses.     Troponin peaked    Lactate cleared.    Review of Systems   Constitutional: Negative for fever.   Eyes:  Negative for visual disturbance.   Cardiovascular:  Positive for dyspnea on exertion. Negative for chest pain and syncope.   Gastrointestinal:  Positive for bloating. Negative for nausea and vomiting.     Objective:     Vital Signs (Most Recent):  Temp: 96.8 °F (36 °C) (05/15/24 0815)  Pulse: 84 (05/15/24 0922)  Resp: 16 (05/15/24 0922)  BP: 103/65 (05/15/24 0815)  SpO2: 95 % (05/15/24 0922) Vital Signs (24h Range):  Temp:  [96.8 °F (36 °C)-98.3 °F (36.8 °C)] 96.8 °F (36 °C)  Pulse:  [] 84  Resp:  [16-20] 16  SpO2:  [89 %-98 %] 95 %  BP: ()/(51-65) 103/65     Weight: 64 kg (141 lb)  Body mass index is 26.64 kg/m².     SpO2: 95 %         Intake/Output Summary (Last 24 hours) at 5/15/2024 1124  Last data filed at 5/15/2024 1032  Gross per 24 hour   Intake 470 ml   Output 925 ml   Net -455 ml       Lines/Drains/Airways       Drain  Duration             Female External Urinary Catheter w/ Suction 05/14/24 0755 1 day              Peripheral Intravenous Line  Duration                  Peripheral IV - Single Lumen 05/14/24 0159 20 G Anterior;Left Hand 1 day                       Physical Exam  Vitals and nursing note reviewed.   Constitutional:       General: She is not in acute distress.     Appearance: She is not ill-appearing.   HENT:      Mouth/Throat:      Mouth: Mucous membranes are moist.      Pharynx: Oropharynx is clear.   Eyes:      Conjunctiva/sclera: Conjunctivae normal.   Cardiovascular:      Rate and Rhythm: Normal rate and regular rhythm.   Pulmonary:      Comments: Bibasilar crackles. Decreased breath sounds bilaterally.  Abdominal:      Palpations: Abdomen is soft.      Tenderness: There is no abdominal tenderness.   Musculoskeletal:         General: No swelling.      Cervical back: Normal range  of motion.      Right lower leg: No edema.      Left lower leg: No edema.   Skin:     General: Skin is warm and dry.      Coloration: Skin is not jaundiced.   Neurological:      General: No focal deficit present.      Mental Status: She is alert and oriented to person, place, and time.            Significant Labs: CMP   Recent Labs   Lab 05/14/24  0200 05/14/24  2129 05/15/24  0324    144 141   K 3.1* 3.6 4.4   CL 99 96 94*   CO2 27 33* 35*   * 136* 117*   BUN 17 22 22   CREATININE 1.4 1.1 1.0   CALCIUM 9.0 9.8 9.8   PROT 6.7  --  6.3   ALBUMIN 3.4*  --  3.3*   BILITOT 0.3  --  0.3   ALKPHOS 96  --  73   AST 45*  --  20   ALT 30  --  21   ANIONGAP 15 15 12   , CBC   Recent Labs   Lab 05/14/24  0200 05/15/24  0324   WBC 7.89 4.46   HGB 11.4* 10.2*   HCT 38.3 33.9*    232   , Troponin   Recent Labs   Lab 05/14/24  0600 05/14/24  1258 05/14/24  1959   TROPONINI 0.346* 0.690* 0.485*   , and All pertinent lab results from the last 24 hours have been reviewed.

## 2024-05-15 NOTE — PROGRESS NOTES
[facility]  Cardiology  Progress Note    Patient Name: Selma Hooper  MRN: [id number]  Admission Date: 5/14/2024  Hospital Length of Stay: 1 days  Code Status: DNR   Attending Physician: Abel Hall MD   Primary Care Physician: Phil Andrade MD  Expected Discharge Date: 5/17/2024  Principal Problem:Acute on chronic combined systolic and diastolic congestive heart failure    Subjective:     Hospital Course:   No notes on file    Interval History: NAEON, AF, HDS, on home 2L NC.     500cc UOP after IV lasix 60mg x2 doses.     Troponin peaked    Lactate cleared.    Review of Systems   Constitutional: Negative for fever.   Eyes:  Negative for visual disturbance.   Cardiovascular:  Positive for dyspnea on exertion. Negative for chest pain and syncope.   Gastrointestinal:  Positive for bloating. Negative for nausea and vomiting.     Objective:     Vital Signs (Most Recent):  Temp: 96.8 °F (36 °C) (05/15/24 0815)  Pulse: 84 (05/15/24 0922)  Resp: 16 (05/15/24 0922)  BP: 103/65 (05/15/24 0815)  SpO2: 95 % (05/15/24 0922) Vital Signs (24h Range):  Temp:  [96.8 °F (36 °C)-98.3 °F (36.8 °C)] 96.8 °F (36 °C)  Pulse:  [] 84  Resp:  [16-20] 16  SpO2:  [89 %-98 %] 95 %  BP: ()/(51-65) 103/65     Weight: 64 kg (141 lb)  Body mass index is 26.64 kg/m².     SpO2: 95 %         Intake/Output Summary (Last 24 hours) at 5/15/2024 1124  Last data filed at 5/15/2024 1032  Gross per 24 hour   Intake 470 ml   Output 925 ml   Net -455 ml       Lines/Drains/Airways       Drain  Duration             Female External Urinary Catheter w/ Suction 05/14/24 0755 1 day              Peripheral Intravenous Line  Duration                  Peripheral IV - Single Lumen 05/14/24 0159 20 G Anterior;Left Hand 1 day                       Physical Exam  Vitals and nursing note reviewed.   Constitutional:       General: She is not in acute distress.     Appearance: She is not ill-appearing.   HENT:      Mouth/Throat:      Mouth:  Mucous membranes are moist.      Pharynx: Oropharynx is clear.   Eyes:      Conjunctiva/sclera: Conjunctivae normal.   Cardiovascular:      Rate and Rhythm: Normal rate and regular rhythm.   Pulmonary:      Comments: Bibasilar crackles. Decreased breath sounds bilaterally.  Abdominal:      Palpations: Abdomen is soft.      Tenderness: There is no abdominal tenderness.   Musculoskeletal:         General: No swelling.      Cervical back: Normal range of motion.      Right lower leg: No edema.      Left lower leg: No edema.   Skin:     General: Skin is warm and dry.      Coloration: Skin is not jaundiced.   Neurological:      General: No focal deficit present.      Mental Status: She is alert and oriented to person, place, and time.            Significant Labs: CMP   Recent Labs   Lab 05/14/24  0200 05/14/24  2129 05/15/24  0324    144 141   K 3.1* 3.6 4.4   CL 99 96 94*   CO2 27 33* 35*   * 136* 117*   BUN 17 22 22   CREATININE 1.4 1.1 1.0   CALCIUM 9.0 9.8 9.8   PROT 6.7  --  6.3   ALBUMIN 3.4*  --  3.3*   BILITOT 0.3  --  0.3   ALKPHOS 96  --  73   AST 45*  --  20   ALT 30  --  21   ANIONGAP 15 15 12   , CBC   Recent Labs   Lab 05/14/24  0200 05/15/24  0324   WBC 7.89 4.46   HGB 11.4* 10.2*   HCT 38.3 33.9*    232   , Troponin   Recent Labs   Lab 05/14/24  0600 05/14/24  1258 05/14/24  1959   TROPONINI 0.346* 0.690* 0.485*   , and All pertinent lab results from the last 24 hours have been reviewed.  Assessment and Plan:         * Acute on chronic combined systolic and diastolic congestive heart failure  Cardiology consulted for CHF w EF 5-10%. Pt with increasing SOB, THEODORE, orthopnea for last few days despite increased lasix dose. Uptrending troponin to 0.69, and uptrending lactate to 2.6, BNP 2300. Initially required BiPAP, now on 3L NC. TTE shows worsening LVEF, now at 5-10% with eccentric hypertrophy and indeterminate diastolic dysfunction. Recently seen by Dr. Reyna in clinic, trialed on  Jardiance. Once again offered device eval, but pt refused, and requested her code status be changed to DNR (see ACP note by Dr. Hall). Pt reports medication compliance with toprol, entresto, jardiance, lasix. Of note, pt had PET stress in 2021 resting perfusion abnormality in the RCA territory that increases with stress, indicative of infarct with layla-infarct ischemia.     Most likely acute CHF exacerbation 2/2 high salt intake recently. JVD to near the mandible, despite TTE read of CVP at 3. Recommend diuresis, restarting home medications, and adding spironolactone when able.     Recommendations  - Increase IV Lasix to 80mg BID for net negative 2-3L  - trend trop to peak  - continue home toprol  - can start losartan for afterload reduction while sBP is 100s   - add spironolactone now that JAMIR has resolved        VTE Risk Mitigation (From admission, onward)           Ordered     enoxaparin injection 40 mg  Every 24 hours         05/14/24 1454     IP VTE HIGH RISK PATIENT  Once         05/14/24 0520     Place sequential compression device  Until discontinued         05/14/24 0520                    Ancelmo Eduardo MD  Cardiology  [facility] S[facility]

## 2024-05-15 NOTE — PROGRESS NOTES
[facility] Medicine  Progress Note    Patient Name: Selma Hooper  MRN: [id number]  Patient Class: IP- Inpatient   Admission Date: 5/14/2024  Length of Stay: 1 days  Attending Physician: Abel Hall MD  Primary Care Provider: Phil Andrade MD        Subjective:     Principal Problem:Acute on chronic combined systolic and diastolic congestive heart failure        HPI:  Selma Hooper is a 78 y.o. female with PMHx of f Hypertension, Hyperlipidemia, NICM/HFrEF, LBBB, COPD, On home O2. [place] [facility] [place]. She has been needing to use her home oxygen (1.5L) at all times the last few days. Normally she is able to do things like walk to the bathroom without it. She felt some relief of SOB/wheezing 2 days ago after using nebulizer once. However, yesterday evening she was feeling increasingly SOB and developed orthopnea. Normally lies flat but was SOB even when propped up with 2 pillows. Endorses mild ankle edema. She attempted to use her nebulizer and take an extra 40 mg of lasix but this did not help with her SOB. She then called her daughter who brought her here. She reports feeling diaphoretic when she came in 2/2 her distress. She has had a dry cough for about one week now and has been taking zyrtec without much relief. Denies chest pain, palpitations, fever, chills, abdominal pain, nausea, vomiting. She is compliant with her medications. She was on a Jardiance trial for 14 days but cannot afford it anymore. She takes Lasix 40 mg daily. She does not weight herself because her scale is broken. She reports normal weight is about 140 lb. She normally follows a low salt diet but ate salty crawfish yesterday prior to symptom onset. She does not follow any fluid restriction.     In the ED:  Tachypneic, tachycardic, and hypoxic. HR initially 130s and RR 20-30s and saturating 92% on 3L NC. She was placed on BiPAP and weaned to 2-3L NC with improvement in respiratory status. K 3.1. Cr 1.4  (baseline ~1.0). BNP 2,320. Troponin 0.068. CRP 8.5. Influenza and Covid-19 negative. Procal negative. LA 2.2. EKG with sinus tachycardia and LBBB (seen on previous EKGs). CXR with diffuse coarse/increased interstitial attenuation with bibasilar predominance. She was given lasix 80 mg IVP and duo nebs x3 with improvement in symptoms. She was seen by cardiology in the ED. Admitted to  for CHF/COPD exacerbation.     Overview/Hospital Course:  No notes on file    Interval History: NAEON. Insufficient diuresis. +JVD and bibasilar rales noted. Trops trending down. Pt currently on 2L NC. Denies chest pain. Blood culture growing suspected staph in 1 bottle, MRSA/Staph PCR negative - likely contamination.     Review of Systems   Constitutional:  Negative for chills and fever.   HENT:  Negative for trouble swallowing.    Eyes:  Negative for photophobia and visual disturbance.   Respiratory:  Positive for cough, shortness of breath and wheezing.    Cardiovascular:  Positive for leg swelling. Negative for chest pain and palpitations.   Gastrointestinal:  Negative for abdominal pain, constipation, diarrhea, nausea and vomiting.   Genitourinary:  Negative for dysuria and frequency.   Musculoskeletal:  Negative for arthralgias and back pain.   Skin:  Negative for rash and wound.   Neurological:  Negative for light-headedness and headaches.   Psychiatric/Behavioral:  Negative for agitation and confusion.      Objective:     Vital Signs (Most Recent):  Temp: 97.3 °F (36.3 °C) (05/15/24 1127)  Pulse: 98 (05/15/24 1146)  Resp: 16 (05/15/24 1127)  BP: (!) 107/55 (05/15/24 1127)  SpO2: (!) 93 % (05/15/24 1127) Vital Signs (24h Range):  Temp:  [96.8 °F (36 °C)-98.3 °F (36.8 °C)] 97.3 °F (36.3 °C)  Pulse:  [] 98  Resp:  [16-20] 16  SpO2:  [89 %-98 %] 93 %  BP: ()/(51-65) 107/55     Weight: 64 kg (141 lb)  Body mass index is 26.64 kg/m².    Intake/Output Summary (Last 24 hours) at 5/15/2024 1157  Last data filed at 5/15/2024  1032  Gross per 24 hour   Intake 470 ml   Output 925 ml   Net -455 ml         Physical Exam  Vitals and nursing note reviewed.   Constitutional:       General: She is not in acute distress.     Interventions: Nasal cannula in place.   HENT:      Head: Normocephalic and atraumatic.      Nose: Nose normal.      Mouth/Throat:      Pharynx: Oropharynx is clear.   Eyes:      Extraocular Movements: Extraocular movements intact.      Conjunctiva/sclera: Conjunctivae normal.   Cardiovascular:      Rate and Rhythm: Normal rate and regular rhythm.      Heart sounds: Normal heart sounds.      Comments: +JVD  Pulmonary:      Effort: Pulmonary effort is normal. No respiratory distress.      Breath sounds: Rales present.   Abdominal:      General: Bowel sounds are normal. There is no distension.      Palpations: Abdomen is soft.      Tenderness: There is abdominal tenderness (suprapubic TTP).   Musculoskeletal:         General: Normal range of motion.      Cervical back: Normal range of motion. No tenderness.      Right lower leg: No edema.      Left lower leg: No edema.   Skin:     General: Skin is warm and dry.   Neurological:      General: No focal deficit present.      Mental Status: She is alert and oriented to person, place, and time.   Psychiatric:         Mood and Affect: Mood normal.         Behavior: Behavior normal.             Significant Labs: All pertinent labs within the past 24 hours have been reviewed.    Significant Imaging: I have reviewed all pertinent imaging results/findings within the past 24 hours.    Assessment/Plan:      * Acute on chronic combined systolic and diastolic congestive heart failure  Patient is identified as having Combined Systolic and Diastolic heart failure that is Acute on chronic. CHF is currently uncontrolled due to Dyspnea not returned to baseline after 1 doses of IV diuretic, >3 pillow orthopnea, and Pulmonary edema/pleural effusion on CXR. Latest ECHO performed and demonstrates-  Results for orders placed during the hospital encounter of 07/12/22    Echo    Interpretation Summary  · The left ventricle is severely enlarged with eccentric hypertrophy and severely decreased systolic function. The estimated ejection fraction is 15%.  · There is severe left ventricular global hypokinesis.  · Normal right ventricular size with normal right ventricular systolic function.  · Grade I left ventricular diastolic dysfunction.  · Moderate left atrial enlargement.  · Mild mitral regurgitation.  · The estimated PA systolic pressure is 32 mmHg.  · Normal central venous pressure (3 mmHg).  . Continue Beta Blocker, Furosemide, and ARNI and monitor clinical status closely. Monitor on telemetry. Patient is on CHF pathway.  Monitor strict Is&Os and daily weights.  Place on fluid restriction of 1.5 L. Cardiology has been consulted by ED. Continue to stress to patient importance of self efficacy and  on diet for CHF. Last BNP reviewed- and noted below   Recent Labs   Lab 05/14/24  0200   BNP 2,320*     - Possibly exacerbated by eating salty foods lately. Does not weigh herself/follow any fluid restriction.  - Not able to afford prescribed Jardiance, reports cardiology clinic is getting her on a patient assistance program. Compliant with BB, Entresto, and lasix 40 daily.   - S/p 80 mg Lasix IVP in ED  TTE 5/14 showed EF of 5-10%. Patient requesting medical management only, doesn't want invasive measures  Increase lasix to 80mg BID and add spironolactone 25mg daily    Type 2 diabetes mellitus with hyperglycemia, without long-term current use of insulin  - Last A1c 5.1 (10/2023). Not on DM medications (with exception of Jardiance for CHF)  -  on admission, repeating A1c.   -SSI while on steroids    Elevated troponin level  - Troponin 0.068> trend to peak/flat. Initial trop is below previous values.   - EKG with known LBBB. No CP, do not currently suspect ACS.   - Suspect demand ischemia from  hypervolemia/tachycardia.   - Trops have trended down      COPD exacerbation  Patient's COPD is with exacerbation noted by continued dyspnea and worsening of baseline hypoxia currently.  Patient is currently off COPD Pathway (2/2 being on CHF pathway). Continue scheduled inhalers Steroids and Supplemental oxygen and monitor respiratory status closely.     Acute on chronic respiratory failure with hypoxia and hypercapnia  Patient with Hypercapnic and Hypoxic Respiratory failure which is Acute on chronic.  she is on home oxygen at 1.5 LPM. Supplemental oxygen was provided and noted- Oxygen Concentration (%):  [28-32] 28    Signs/symptoms of respiratory failure include- tachypnea, increased work of breathing, respiratory distress, and wheezing. Contributing diagnoses includes - CHF and COPD Labs and images were reviewed. Patient Has recent ABG, which has been reviewed. Will treat underlying causes and adjust management of respiratory failure as follows- See COPD and CHF      ACP (advance care planning)  Advance Care Planning  Date: 05/14/2024     Code Status  In light of the patients advanced and life limiting illness,I engaged the the patient in a voluntary conversation about the patient's preferences for care  at the very end of life. The patient wishes to have a natural, peaceful death.  Along those lines, the patient does not wish to have CPR or other invasive treatments performed when her heart and/or breathing stops. I communicated to the patient that a DNR order would be placed in her medical record to reflect this preference.  I spent a total of 10 minutes engaging the patient in this advance care planning discussion.      Allergy to statin medication  - Noted. Not on a statin.    Hypokalemia  Patient has hypokalemia which is Acute and currently uncontrolled. Most recent potassium levels reviewed-   Lab Results   Component Value Date    K 4.4 05/15/2024   Will continue potassium replacement per protocol and  recheck repeat levels after replacement completed. Monitor will diuresing     LBBB (left bundle branch block)  History noted.    JAMIR (acute kidney injury)  Patient with acute kidney injury/acute renal failure likely due to cardiorenal. JAMIR is currently stable. Baseline creatinine  ~1.0  - Labs reviewed- Renal function/electrolytes with Estimated Creatinine Clearance: 41 mL/min (based on SCr of 1 mg/dL). according to latest data. Monitor urine output and serial BMP and adjust therapy as needed. Avoid nephrotoxins and renally dose meds for GFR listed above.  - Mild JAMIR with Cr 1.4 on admission, baseline closer to 1.0  - Monitor for improvement with diuresis    Hypertension, essential  Chronic, controlled. Latest blood pressure and vitals reviewed-     Temp:  [96.8 °F (36 °C)-98.3 °F (36.8 °C)]   Pulse:  []   Resp:  [16-20]   BP: ()/(51-65)   SpO2:  [89 %-98 %] .   Home meds for hypertension were reviewed and noted below.   Hypertension Medications               furosemide (LASIX) 40 MG tablet Take 1 tablet (40 mg total) by mouth once daily.    metoprolol succinate (TOPROL-XL) 25 MG 24 hr tablet Take 1 tablet (25 mg total) by mouth once daily.    sacubitriL-valsartan (ENTRESTO) 24-26 mg per tablet Take 1 tablet by mouth 2 (two) times daily. Don't take valsartan and Entresto together.     While in the hospital, will manage blood pressure as follows; Continue home antihypertensive regimen  Will utilize p.r.n. blood pressure medication only if patient's blood pressure greater than 180/110 and she develops symptoms such as worsening chest pain or shortness of breath.      VTE Risk Mitigation (From admission, onward)           Ordered     enoxaparin injection 40 mg  Every 24 hours         05/14/24 1454     IP VTE HIGH RISK PATIENT  Once         05/14/24 0520     Place sequential compression device  Until discontinued         05/14/24 0520                    Discharge Planning   GERA: 5/17/2024     Code Status:  DNR   Is the patient medically ready for discharge?:     Reason for patient still in hospital (select all that apply): Treatment                     Abel Hall MD  Department of Hospital Medicine   [facility] Surg

## 2024-05-15 NOTE — ASSESSMENT & PLAN NOTE
Patient with acute kidney injury/acute renal failure likely due to cardiorenal. JAMIR is currently stable. Baseline creatinine  ~1.0  - Labs reviewed- Renal function/electrolytes with Estimated Creatinine Clearance: 41 mL/min (based on SCr of 1 mg/dL). according to latest data. Monitor urine output and serial BMP and adjust therapy as needed. Avoid nephrotoxins and renally dose meds for GFR listed above.  - Mild JAMIR with Cr 1.4 on admission, baseline closer to 1.0  - Monitor for improvement with diuresis

## 2024-05-15 NOTE — ASSESSMENT & PLAN NOTE
Patient is identified as having Combined Systolic and Diastolic heart failure that is Acute on chronic. CHF is currently uncontrolled due to Dyspnea not returned to baseline after 1 doses of IV diuretic, >3 pillow orthopnea, and Pulmonary edema/pleural effusion on CXR. Latest ECHO performed and demonstrates- Results for orders placed during the hospital encounter of 07/12/22    Echo    Interpretation Summary  · The left ventricle is severely enlarged with eccentric hypertrophy and severely decreased systolic function. The estimated ejection fraction is 15%.  · There is severe left ventricular global hypokinesis.  · Normal right ventricular size with normal right ventricular systolic function.  · Grade I left ventricular diastolic dysfunction.  · Moderate left atrial enlargement.  · Mild mitral regurgitation.  · The estimated PA systolic pressure is 32 mmHg.  · Normal central venous pressure (3 mmHg).  . Continue Beta Blocker, Furosemide, and ARNI and monitor clinical status closely. Monitor on telemetry. Patient is on CHF pathway.  Monitor strict Is&Os and daily weights.  Place on fluid restriction of 1.5 L. Cardiology has been consulted by ED. Continue to stress to patient importance of self efficacy and  on diet for CHF. Last BNP reviewed- and noted below   Recent Labs   Lab 05/14/24  0200   BNP 2,320*     - Possibly exacerbated by eating salty foods lately. Does not weigh herself/follow any fluid restriction.  - Not able to afford prescribed Jardiance, reports cardiology clinic is getting her on a patient assistance program. Compliant with BB, Entresto, and lasix 40 daily.   - S/p 80 mg Lasix IVP in ED  TTE 5/14 showed EF of 5-10%. Patient requesting medical management only, doesn't want invasive measures  Increase lasix to 80mg BID and add spironolactone 25mg daily

## 2024-05-15 NOTE — SUBJECTIVE & OBJECTIVE
Interval History: NAEON. Insufficient diuresis. +JVD and bibasilar rales noted. Trops trending down. Pt currently on 2L NC. Denies chest pain. Blood culture growing suspected staph in 1 bottle, MRSA/Staph PCR negative - likely contamination.     Review of Systems   Constitutional:  Negative for chills and fever.   HENT:  Negative for trouble swallowing.    Eyes:  Negative for photophobia and visual disturbance.   Respiratory:  Positive for cough, shortness of breath and wheezing.    Cardiovascular:  Positive for leg swelling. Negative for chest pain and palpitations.   Gastrointestinal:  Negative for abdominal pain, constipation, diarrhea, nausea and vomiting.   Genitourinary:  Negative for dysuria and frequency.   Musculoskeletal:  Negative for arthralgias and back pain.   Skin:  Negative for rash and wound.   Neurological:  Negative for light-headedness and headaches.   Psychiatric/Behavioral:  Negative for agitation and confusion.      Objective:     Vital Signs (Most Recent):  Temp: 97.3 °F (36.3 °C) (05/15/24 1127)  Pulse: 98 (05/15/24 1146)  Resp: 16 (05/15/24 1127)  BP: (!) 107/55 (05/15/24 1127)  SpO2: (!) 93 % (05/15/24 1127) Vital Signs (24h Range):  Temp:  [96.8 °F (36 °C)-98.3 °F (36.8 °C)] 97.3 °F (36.3 °C)  Pulse:  [] 98  Resp:  [16-20] 16  SpO2:  [89 %-98 %] 93 %  BP: ()/(51-65) 107/55     Weight: 64 kg (141 lb)  Body mass index is 26.64 kg/m².    Intake/Output Summary (Last 24 hours) at 5/15/2024 1157  Last data filed at 5/15/2024 1032  Gross per 24 hour   Intake 470 ml   Output 925 ml   Net -455 ml         Physical Exam  Vitals and nursing note reviewed.   Constitutional:       General: She is not in acute distress.     Interventions: Nasal cannula in place.   HENT:      Head: Normocephalic and atraumatic.      Nose: Nose normal.      Mouth/Throat:      Pharynx: Oropharynx is clear.   Eyes:      Extraocular Movements: Extraocular movements intact.      Conjunctiva/sclera: Conjunctivae  normal.   Cardiovascular:      Rate and Rhythm: Normal rate and regular rhythm.      Heart sounds: Normal heart sounds.      Comments: +JVD  Pulmonary:      Effort: Pulmonary effort is normal. No respiratory distress.      Breath sounds: Rales present.   Abdominal:      General: Bowel sounds are normal. There is no distension.      Palpations: Abdomen is soft.      Tenderness: There is abdominal tenderness (suprapubic TTP).   Musculoskeletal:         General: Normal range of motion.      Cervical back: Normal range of motion. No tenderness.      Right lower leg: No edema.      Left lower leg: No edema.   Skin:     General: Skin is warm and dry.   Neurological:      General: No focal deficit present.      Mental Status: She is alert and oriented to person, place, and time.   Psychiatric:         Mood and Affect: Mood normal.         Behavior: Behavior normal.             Significant Labs: All pertinent labs within the past 24 hours have been reviewed.    Significant Imaging: I have reviewed all pertinent imaging results/findings within the past 24 hours.

## 2024-05-15 NOTE — PROGRESS NOTES
"Pharmacokinetic Initial Assessment: IV Vancomycin    Assessment/Plan:    Initiate intravenous vancomycin with loading dose of 1250 mg once with subsequent doses when random concentrations are less than 20 mcg/mL  Desired empiric serum trough concentration is 15 to 20 mcg/mL  Draw vancomycin random level on 5/15 at 2200.  Pharmacy will continue to follow and monitor vancomycin.      Please contact pharmacy at extension [phone] with any questions regarding this assessment.     Thank you for the consult,   Nicci Ng       Patient brief summary:  Selma Hooper is a 78 y.o. female initiated on antimicrobial therapy with IV Vancomycin for treatment of suspected bacteremia    Drug Allergies:   Review of patient's allergies indicates:   Allergen Reactions    Atorvastatin      Leg cramps       Actual Body Weight:   64 kg    Renal Function:   Estimated Creatinine Clearance: 29.3 mL/min (based on SCr of 1.4 mg/dL).    Dialysis Method (if applicable):  N/A    CBC (last 72 hours):  Recent Labs   Lab Result Units 05/14/24  0200 05/14/24  0603   WBC K/uL 7.89  --    Hemoglobin g/dL 11.4*  --    Hemoglobin A1C %  --  5.3   Hematocrit % 38.3  --    Platelets K/uL 293  --    Gran % % 31.2*  --    Lymph % % 55.8*  --    Mono % % 9.0  --    Eosinophil % % 2.4  --    Basophil % % 1.5  --    Differential Method  Automated  --        Metabolic Panel (last 72 hours):  Recent Labs   Lab Result Units 05/14/24  0200 05/14/24  0607   Sodium mmol/L 141  --    Sodium, Urine mmol/L  --  125   Potassium mmol/L 3.1*  --    Chloride mmol/L 99  --    CO2 mmol/L 27  --    Glucose mg/dL 308*  --    BUN mg/dL 17  --    Creatinine mg/dL 1.4  --    Creatinine, Urine mg/dL  --  25.0   Albumin g/dL 3.4*  --    Total Bilirubin mg/dL 0.3  --    Alkaline Phosphatase U/L 96  --    AST U/L 45*  --    ALT U/L 30  --        Drug levels (last 3 results):  No results for input(s): "VANCOMYCINRA", "VANCORANDOM", "VANCOMYCINPE", "VANCOPEAK", "VANCOMYCINTR", " ""[facility]" in the last 72 hours.    Microbiologic Results:  Microbiology Results (last 7 days)       Procedure Component Value Units Date/Time    Blood culture #1 **CANNOT BE ORDERED STAT** [[id number]] Collected: 05/14/24 0201    Order Status: Completed Specimen: Blood from Peripheral, Hand, Left Updated: 05/14/24 2221     Blood Culture, Routine Gram stain aer bottle: Gram positive cocci in clusters resembling Staph      Results called to and read back by: Sherry Arora RN. 05/14/2024  22:20    MRSA/SA Rapid ID by PCR from Blood culture [[id number]] Collected: 05/14/24 0201    Order Status: No result Updated: 05/14/24 2152    Blood culture #2 **CANNOT BE ORDERED STAT** [[id number]] Collected: 05/14/24 0200    Order Status: Completed Specimen: Blood from Peripheral, Wrist, Right Updated: 05/14/24 0915     Blood Culture, Routine No Growth to date    Influenza A & B by Molecular [[id number]] Collected: 05/14/24 0247    Order Status: Completed Specimen: Nasopharyngeal Swab Updated: 05/14/24 0314     Influenza A, Molecular Negative     Influenza B, Molecular Negative     Flu A & B Source Nasal swab            "

## 2024-05-15 NOTE — ASSESSMENT & PLAN NOTE
- Last A1c 5.1 (10/2023). Not on DM medications (with exception of Jardiance for CHF)  -  on admission, repeating A1c.   -SSI while on steroids

## 2024-05-15 NOTE — ASSESSMENT & PLAN NOTE
Patient has hypokalemia which is Acute and currently uncontrolled. Most recent potassium levels reviewed-   Lab Results   Component Value Date    K 4.4 05/15/2024   Will continue potassium replacement per protocol and recheck repeat levels after replacement completed. Monitor will diuresing

## 2024-05-15 NOTE — PLAN OF CARE
[facility] - Med Surg  Initial Discharge Assessment       Primary Care Provider: Phil Andrade MD    Admission Diagnosis: Shortness of breath [R06.02]  CHF (congestive heart failure) [I50.9]  Chest pain [R07.9]  Acute on chronic respiratory failure with hypoxia and hypercapnia [J96.21, J96.22]  Acute on chronic congestive heart failure, unspecified heart failure type [I50.9]    Admission Date: 5/14/2024  Expected Discharge Date: 5/17/2024    Transition of Care Barriers: (P) None    Payor: BLUE CROSS BLUE SHIELD / Plan: [place] HMO / Product Type: HMO /     Extended Emergency Contact Information  Primary Emergency Contact: David Hooper  Address: [address] [place] [address] [facility]  Mobile Phone: [phone]  Relation: Daughter    Discharge Plan A: (P) Home with family  Discharge Plan B: (P) Home      [facility]  [address] [address]  Phone: [phone] Fax: [phone]      CM met with patient to discuss discharge planning. Patient lives with David Hooper (granddaughter) [phone] in a single story home with three steps to enter. Prior to hospital admission, patient was independent with ADLs and states that she only uses 1.5 liters of supplemental oxygen at home. Will continue to follow for post acute needs. Discharge Plan A and Plan B have been determined by review of patient's clinical status, future medical and therapeutic needs, and coverage/benefits for post-acute care in coordination with multidisciplinary team members.  Initial Assessment (most recent)       Adult Discharge Assessment - 05/15/24 1435          Discharge Assessment    Assessment Type Discharge Planning Assessment     Confirmed/corrected address, phone number and insurance Yes     Confirmed Demographics Correct on Facesheet     Source of Information patient     Communicated GERA with patient/caregiver Date not available/Unable to determine     Reason For  Admission Acute on chronic combined systolic and diastolic heart failure     People in Home grandchild(elsie)     Facility Arrived From: home     Do you expect to return to your current living situation? Yes     Do you have help at home or someone to help you manage your care at home? Yes     Who are your caregiver(s) and their phone number(s)? David Hooper (granddaughter) [phone]     Prior to hospitilization cognitive status: Alert/Oriented     Current cognitive status: Alert/Oriented     Walking or Climbing Stairs Difficulty no     Dressing/Bathing Difficulty no     Home Accessibility stairs to enter home (P)      Number of Stairs, Main Entrance three (P)      Home Layout Able to live on 1st floor (P)      Equipment Currently Used at Home oxygen (P)      Readmission within 30 days? No (P)      Patient currently being followed by outpatient case management? No (P)      Do you currently have service(s) that help you manage your care at home? No (P)      Do you take prescription medications? Yes (P)      Do you have prescription coverage? Yes (P)      Coverage BCBS (P)      Do you have any problems affording any of your prescribed medications? No (P)      Is the patient taking medications as prescribed? yes (P)      Who is going to help you get home at discharge? David Hooper (granddaughter) [phone] (P)      How do you get to doctors appointments? car, drives self;family or friend will provide (P)      Are you on dialysis? No (P)      Do you take coumadin? No (P)      Discharge Plan A Home with family (P)      Discharge Plan B Home (P)      DME Needed Upon Discharge  none (P)      Discharge Plan discussed with: Patient (P)      Transition of Care Barriers None (P)         Physical Activity    On average, how many days per week do you engage in moderate to strenuous exercise (like a brisk walk)? 0 days (P)      On average, how many minutes do you engage in exercise at this level? 0 min (P)         Financial  Resource Strain    How hard is it for you to pay for the very basics like food, housing, medical care, and heating? Somewhat hard (P)         Housing Stability    In the last 12 months, was there a time when you were not able to pay the mortgage or rent on time? No (P)      At any time in the past 12 months, were you homeless or living in a shelter (including now)? No (P)         Transportation Needs    Has the lack of transportation kept you from medical appointments, meetings, work or from getting things needed for daily living? No (P)         Food Insecurity    Within the past 12 months, you worried that your food would run out before you got the money to buy more. Never true (P)      Within the past 12 months, the food you bought just didn't last and you didn't have money to get more. Never true (P)         Stress    Do you feel stress - tense, restless, nervous, or anxious, or unable to sleep at night because your mind is troubled all the time - these days? Only a little (P)         Social Isolation    How often do you feel lonely or isolated from those around you?  Never (P)         Alcohol Use    Q1: How often do you have a drink containing alcohol? Never (P)      Q2: How many drinks containing alcohol do you have on a typical day when you are drinking? Patient does not drink (P)      Q3: How often do you have six or more drinks on one occasion? Never (P)         Utilities    In the past 12 months has the electric, gas, oil, or water company threatened to shut off services in your home? No (P)         Health Literacy    How often do you need to have someone help you when you read instructions, pamphlets, or other written material from your doctor or pharmacy? Sometimes (P)         OTHER    Name(s) of People in Home David Hooper (granddaughter) [phone] (P)                           JAYLON Cueto  Case Management  [phone]

## 2024-05-15 NOTE — PROGRESS NOTES
Pharmacokinetic Assessment Follow Up: IV Vancomycin    Therapy with vancomycin complete and/or consult discontinued by provider. Pharmacy will sign off, please re-consult as needed.    Kate Osman, Maria IsabelD

## 2024-05-15 NOTE — TELEPHONE ENCOUNTER
----- Message from Anca Amin sent at 5/15/2024  4:56 PM CDT -----  Regarding: RE: Assistance  Sorry, I was out of the office a few days.  This is will apply to all of her medications.     Anca  ----- Message -----  From: Geneva Wilson RN  Sent: 5/15/2024   4:47 PM CDT  To: Geneva Wilson RN; Anca Amin  Subject: RE: Assistance                                   Hi,   Does your comment also apply to the Jardiance? She is back in the hospital w. worsened Heart failure likely worse due to unable to afford meds. She can be switched to Farxiga if easier to get assistance vita.    Thanks for your help in this matter.        Geneva  General Cardiology Triage RN  ----- Message -----  From: Anca Amin  Sent: 5/9/2024   1:45 PM CDT  To: Geneva Wilson RN  Subject: RE: Assistance                                   April 24, 2024  Me     DG    4/24/24 10:23 AM  Note  Contacted patient concerning message about her Entresto.  Patient has private insurance and has a deductible and will not qualify for PAP.  I suggested patient contact the insurance to see how much her deductible is and what it will cost her once the deductible has been met.  Patient understands     Good afternoon,     I spoke with the patient on 04/24/2024 and put a note in Epic. I will contact the patient again and go over thing that was talked about on that date.    Anca  ----- Message -----  From: Geneva Wilson RN  Sent: 5/9/2024   1:18 PM CDT  To: Geneva Wilson RN; Anca Amin  Subject: Assistance                                       Hi,  Pt states that she has not been contacted by the patient assistance department yet.   A referral for her was placed on 11/23/23 and again on 5/2/24.    Thanks for your help in this matter.      Geneva  General Cardiology Triage RN

## 2024-05-15 NOTE — CONSULTS
Food & Nutrition  Education    Diet Education: Fluid and Salt Restriction  Time Spent: 10-15 minutes  Learners: Patient      Nutrition Education provided with handouts: Low-Sodium Nutrition Therapy, Fluid- Restricted Nutrition Therapy      Comments: RD spoke with patient regarding low-sodium nutrition therapy.  RD explained eating less sodium helps if you have high blood pressure, heart failure, or kidney or liver disease. Eat more fresh foods. Limiting processed meats. Not all processed foods are unhealthy, but some may have too much sodium. Select foods with 140 mg of sodium or less per serving. Check serving sizes on the Nutrition Facts label. Reviewed other options to season your food such as salt-free seasonings. Reviewed high sodium foods that should be avoided. Encouraged healthy, fresh foods that are low in sodium that are good for consumption. RD encourages patient to monitor fluid intake as well discussing anything that is a liquid at room temperature must be accounted for in total fluid intake, if fluid restriction is applicable.       All questions and concerns answered. Dietitian's contact information provided.       Follow-Up: Yes (1x/ week)    Please Re-consult as needed        Thanks!    Swapan Mohan MS, RD, LDN

## 2024-05-15 NOTE — ASSESSMENT & PLAN NOTE
Cardiology consulted for CHF w EF 5-10%. Pt with increasing SOB, THEODORE, orthopnea for last few days despite increased lasix dose. Uptrending troponin to 0.69, and uptrending lactate to 2.6, BNP 2300. Initially required BiPAP, now on 3L NC. TTE shows worsening LVEF, now at 5-10% with eccentric hypertrophy and indeterminate diastolic dysfunction. Recently seen by Dr. Reyna in clinic, trialed on Jardiance. Once again offered device eval, but pt refused, and requested her code status be changed to DNR (see ACP note by Dr. Hall). Pt reports medication compliance with toprol, entresto, jardiance, lasix. Of note, pt had PET stress in 2021 resting perfusion abnormality in the RCA territory that increases with stress, indicative of infarct with layla-infarct ischemia.     Most likely acute CHF exacerbation 2/2 high salt intake recently. JVD to near the mandible, despite TTE read of CVP at 3. Recommend diuresis, restarting home medications, and adding spironolactone when able.     Recommendations  - Increase IV Lasix to 80mg BID for net negative 2-3L  - trend trop to peak  - continue home toprol  - can start losartan for afterload reduction while sBP is 100s   - add spironolactone now that JAMIR has resolved   Negative Screen

## 2024-05-15 NOTE — ASSESSMENT & PLAN NOTE
- Troponin 0.068> trend to peak/flat. Initial trop is below previous values.   - EKG with known LBBB. No CP, do not currently suspect ACS.   - Suspect demand ischemia from hypervolemia/tachycardia.   - Trops have trended down

## 2024-05-15 NOTE — ASSESSMENT & PLAN NOTE
Chronic, controlled. Latest blood pressure and vitals reviewed-     Temp:  [96.8 °F (36 °C)-98.3 °F (36.8 °C)]   Pulse:  []   Resp:  [16-20]   BP: ()/(51-65)   SpO2:  [89 %-98 %] .   Home meds for hypertension were reviewed and noted below.   Hypertension Medications               furosemide (LASIX) 40 MG tablet Take 1 tablet (40 mg total) by mouth once daily.    metoprolol succinate (TOPROL-XL) 25 MG 24 hr tablet Take 1 tablet (25 mg total) by mouth once daily.    sacubitriL-valsartan (ENTRESTO) 24-26 mg per tablet Take 1 tablet by mouth 2 (two) times daily. Don't take valsartan and Entresto together.     While in the hospital, will manage blood pressure as follows; Continue home antihypertensive regimen  Will utilize p.r.n. blood pressure medication only if patient's blood pressure greater than 180/110 and she develops symptoms such as worsening chest pain or shortness of breath.

## 2024-05-16 NOTE — SUBJECTIVE & OBJECTIVE
Interval History: AF, HDS.     NSVT overnight for 8 beats, asymptomatic    1L UOP last 24h with 80 lasix q12h    IVC on POCUS is fully collapsible. Euvolemic on exam.        Review of Systems   Constitutional: Negative for fever.   Eyes:  Negative for visual disturbance.   Cardiovascular:  Positive for dyspnea on exertion. Negative for chest pain and syncope.   Gastrointestinal:  Positive for bloating. Negative for nausea and vomiting.     Objective:     Vital Signs (Most Recent):  Temp: 97.9 °F (36.6 °C) (05/16/24 0750)  Pulse: 78 (05/16/24 0827)  Resp: 19 (05/16/24 0827)  BP: (!) 99/57 (05/16/24 0750)  SpO2: 98 % (05/16/24 0828) Vital Signs (24h Range):  Temp:  [97.2 °F (36.2 °C)-98.1 °F (36.7 °C)] 97.9 °F (36.6 °C)  Pulse:  [] 78  Resp:  [16-19] 19  SpO2:  [92 %-98 %] 98 %  BP: ()/(55-69) 99/57     Weight: 64 kg (141 lb 1.5 oz)  Body mass index is 26.66 kg/m².     SpO2: 98 %         Intake/Output Summary (Last 24 hours) at 5/16/2024 0851  Last data filed at 5/16/2024 0548  Gross per 24 hour   Intake 100 ml   Output 1025 ml   Net -925 ml       Lines/Drains/Airways       Drain  Duration             Female External Urinary Catheter w/ Suction 05/14/24 0755 2 days              Peripheral Intravenous Line  Duration                  Peripheral IV - Single Lumen 05/14/24 0159 20 G Anterior;Left Hand 2 days                       Physical Exam  Vitals and nursing note reviewed.   Constitutional:       General: She is not in acute distress.     Appearance: She is not ill-appearing.   HENT:      Mouth/Throat:      Mouth: Mucous membranes are moist.      Pharynx: Oropharynx is clear.   Eyes:      Conjunctiva/sclera: Conjunctivae normal.   Cardiovascular:      Rate and Rhythm: Normal rate and regular rhythm.   Pulmonary:      Breath sounds: Wheezing (end expiratory wheeze) present.      Comments: Minimal Bibasilar crackles. Decreased breath sounds bilaterally.  Abdominal:      Palpations: Abdomen is soft.       Tenderness: There is no abdominal tenderness.   Musculoskeletal:         General: No swelling.      Cervical back: Normal range of motion.      Right lower leg: No edema.      Left lower leg: No edema.   Skin:     General: Skin is warm and dry.      Coloration: Skin is not jaundiced.   Neurological:      General: No focal deficit present.      Mental Status: She is alert and oriented to person, place, and time.            Significant Labs: CMP   Recent Labs   Lab 05/14/24  2129 05/15/24  0324 05/16/24  0541    141 141   K 3.6 4.4 3.9   CL 96 94* 97   CO2 33* 35* 34*   * 117* 111*   BUN 22 22 31*   CREATININE 1.1 1.0 1.0   CALCIUM 9.8 9.8 9.6   PROT  --  6.3 5.9*   ALBUMIN  --  3.3* 3.3*   BILITOT  --  0.3 0.3   ALKPHOS  --  73 71   AST  --  20 17   ALT  --  21 18   ANIONGAP 15 12 10   , CBC   Recent Labs   Lab 05/15/24  0324 05/16/24  0541   WBC 4.46 5.59   HGB 10.2* 9.7*   HCT 33.9* 31.1*    232   , and All pertinent lab results from the last 24 hours have been reviewed.

## 2024-05-16 NOTE — ASSESSMENT & PLAN NOTE
Patient with acute kidney injury/acute renal failure likely due to cardiorenal. JAMIR is currently stable. Baseline creatinine  ~1.0  - Labs reviewed- Renal function/electrolytes with Estimated Creatinine Clearance: 41 mL/min (based on SCr of 1 mg/dL). according to latest data. Monitor urine output and serial BMP and adjust therapy as needed. Avoid nephrotoxins and renally dose meds for GFR listed above.  - Mild JAMIR with Cr 1.4 on admission, baseline closer to 1.0  - Monitor for improvement with diuresis  - Improved  - Follow up with nephrology outpatient

## 2024-05-16 NOTE — PT/OT/SLP EVAL
Occupational Therapy   Evaluation and Discharge Note    Name: Selma Hooper  MRN: [id number]  Admitting Diagnosis: Acute on chronic combined systolic and diastolic congestive heart failure  Recent Surgery: * No surgery found *      Recommendations:     Discharge Recommendations: No Therapy Indicated  Discharge Equipment Recommendations: none  Barriers to discharge:  None    Assessment:     Selma Hooper is a 78 y.o. female with a medical diagnosis of Acute on chronic combined systolic and diastolic congestive heart failure. At this time, patient is functioning at their prior level of function and does not require further acute OT services.     Plan:     During this hospitalization, patient does not require further acute OT services.  Please re-consult if situation changes.    Plan of Care Reviewed with: patient    Subjective     Chief Complaint: SOB  Patient/Family Comments/goals: Pt.reprots it is really just the SOB she is having trouble with    Occupational Profile:  Pt lives with her daughter and granddaughter in a 1 story home with 3 GIOVANA with R UE rail  Prior to admission, patients level of function was independent and drives.    Equipment used at home: oxygen.     Upon discharge, patient will have assistance from daughter and granddaughter.    Pain/Comfort:  Pain Rating 1: 0/10    Patients cultural, spiritual, Hinduism conflicts given the current situation: no    Objective:     Communicated with: Nurse prior to session.  Patient found sitting edge of bed with oxygen, PureWick, telemetry upon OT entry to room.    General Precautions: Standard, fall  Orthopedic Precautions: N/A  Braces: N/A  Respiratory Status: Nasal cannula, flow  L/min     Occupational Performance:    Functional Mobility/Transfers:  Patient completed Sit <> Stand Transfer with independence  with  no assistive device   Functional Mobility: Pt demonstrated functional mobility training to simulate household to/ from bathroom with No AD with Ind and  no LOB and good visual search and navigation strategies.   Additional gait training the hallway with PT with no AD    Activities of Daily Living:  Grooming: independence oral care and facial hygiene standing at sink in bathroom    Cognitive/Visual Perceptual:  Cognitive/Psychosocial Skills:     -       Oriented to: Person, Place, Time, and Situation   -       Follows Commands/attention:Follows multistep  commands  -       Communication: clear/fluent  -       Memory: No Deficits noted  -       Safety awareness/insight to disability: impaired   -       Mood/Affect/Coping skills/emotional control: Appropriate to situation    Physical Exam:  Dominant hand:    -       R hand  Upper Extremity Range of Motion:     -       Right Upper Extremity: WFL  -       Left Upper Extremity: WFL   - limited internal rotation   Upper Extremity Strength:    -       Right Upper Extremity: WFL  -       Left Upper Extremity: WFL   Strength:    -       Right Upper Extremity: WFL  -       Left Upper Extremity: WFL    AMPAC 6 Click ADL:  AMPAC Total Score: 24    Treatment & Education:  Pt. Educated on energy conservation techniques upon discharge   Pt educated on role of occupational therapy, POC, and safety during ADLs and functional mobility. Pt and OT discussed importance of safe, continued mobility to optimize daily living skills. Pt verbalized understanding. Pt given instruction to call for medical staff/nurse for assistance.   PT present for coeval due to pt's multiple medical comorbidities and functional/cognition deficits requiring two skilled therapists to appropriately progress pt's musculoskeletal strength, neuromuscular control, and endurance while taking into consideration medical acuity and pt safety.     Patient left up in chair with all lines intact and call button in reach    GOALS:   Multidisciplinary Problems       Occupational Therapy Goals       Not on file              Multidisciplinary Problems (Resolved)           Problem: Occupational Therapy    Goal Priority Disciplines Outcome Interventions   Occupational Therapy Goal   (Resolved)     OT, PT/OT Met                        History:     Past Medical History:   Diagnosis Date    Abnormal liver enzymes 12/10/2018    Acute on chronic combined systolic and diastolic congestive heart failure 4/2/2021    Acute on chronic respiratory failure with hypoxia 4/2/2021    Acute on chronic respiratory failure with hypoxia and hypercapnia 12/10/2021    CHF (congestive heart failure)     COPD exacerbation 7/12/2022    Former smoker 10/24/2018    Hypertension     Smoker 7/27/2016         Past Surgical History:   Procedure Laterality Date    BREAST BIOPSY      left  side years ago in high school   1962    CHOLECYSTECTOMY      COLONOSCOPY      COLONOSCOPY N/A 8/23/2021    Procedure: COLONOSCOPY;  Surgeon: Shree Amaya MD;  Location: [facility] ([address] [place] [address]);  Service: Endoscopy;  Laterality: N/A;  Do not cancel this order  fully vaccinated-see immunization record  EF 18%  8/20-covid [facility]    HYSTERECTOMY         Time Tracking:     OT Date of Treatment: 05/16/24  OT Start Time: 1049  OT Stop Time: 1111  OT Total Time (min): 22 min    Billable Minutes:Evaluation 10  Self Care/Home Management 12    5/16/2024

## 2024-05-16 NOTE — ASSESSMENT & PLAN NOTE
Cardiology consulted for CHF w EF 5-10%. Pt with increasing SOB, THEODORE, orthopnea for last few days despite increased lasix dose. Uptrending troponin to 0.69, and uptrending lactate to 2.6, BNP 2300. Initially required BiPAP, now on 3L NC. TTE shows worsening LVEF, now at 5-10% with eccentric hypertrophy and indeterminate diastolic dysfunction. Recently seen by Dr. Reyna in clinic, trialed on Jardiance. Once again offered device eval, but pt refused, and requested her code status be changed to DNR (see ACP note by Dr. Hall). Pt reports medication compliance with toprol, entresto, jardiance, lasix. Of note, pt had PET stress in 2021 resting perfusion abnormality in the RCA territory that increases with stress, indicative of infarct with layla-infarct ischemia.     Most likely acute CHF exacerbation 2/2 high salt intake recently. JVD to near the mandible, despite TTE read of CVP at 3. Recommend diuresis, restarting home medications, and adding spironolactone when able.     5/16: Bedside POCUS shows fully collapsible IVC. BUN increased to 31 from 22. Euvolemic    Recommendations  - Decrease Lasix to PO 40mg qd (home dose)   - advise to double dose if having >2lb/day or 5lb/week weight gain  - continue home toprol  - can start losartan for afterload reduction if SBP >90 and pt tolerates   - avoid significant/symptomatic hypotension, but given her EF 5-10%, pt needs afterload reduction  - continue spironolactone  - needs close f/u w Dr. Reyna following discharge.

## 2024-05-16 NOTE — PLAN OF CARE
Problem: Adult Inpatient Plan of Care  Goal: Plan of Care Review  Outcome: Met  Goal: Patient-Specific Goal (Individualized)  Outcome: Met  Goal: Absence of Hospital-Acquired Illness or Injury  Outcome: Met  Goal: Optimal Comfort and Wellbeing  Outcome: Met  Goal: Readiness for Transition of Care  Outcome: Met     Problem: Infection  Goal: Absence of Infection Signs and Symptoms  Outcome: Met     Problem: Diabetes Comorbidity  Goal: Blood Glucose Level Within Targeted Range  Outcome: Met     Problem: Acute Kidney Injury/Impairment  Goal: Fluid and Electrolyte Balance  Outcome: Met  Goal: Improved Oral Intake  Outcome: Met  Goal: Effective Renal Function  Outcome: Met

## 2024-05-16 NOTE — ASSESSMENT & PLAN NOTE
Patient with Hypercapnic and Hypoxic Respiratory failure which is Acute on chronic.  she is on home oxygen at 1.5 LPM. Supplemental oxygen was provided and noted-      Signs/symptoms of respiratory failure include- tachypnea, increased work of breathing, respiratory distress, and wheezing. Contributing diagnoses includes - CHF and COPD Labs and images were reviewed. Patient Has recent ABG, which has been reviewed. Will treat underlying causes and adjust management of respiratory failure as follows- See COPD and CHF

## 2024-05-16 NOTE — ASSESSMENT & PLAN NOTE
Patient is identified as having Combined Systolic and Diastolic heart failure that is Acute on chronic. CHF is currently uncontrolled due to Dyspnea not returned to baseline after 1 doses of IV diuretic, >3 pillow orthopnea, and Pulmonary edema/pleural effusion on CXR. Latest ECHO performed and demonstrates- Results for orders placed during the hospital encounter of 07/12/22    Echo    Interpretation Summary  · The left ventricle is severely enlarged with eccentric hypertrophy and severely decreased systolic function. The estimated ejection fraction is 15%.  · There is severe left ventricular global hypokinesis.  · Normal right ventricular size with normal right ventricular systolic function.  · Grade I left ventricular diastolic dysfunction.  · Moderate left atrial enlargement.  · Mild mitral regurgitation.  · The estimated PA systolic pressure is 32 mmHg.  · Normal central venous pressure (3 mmHg).  . Continue Beta Blocker, Furosemide, and ARNI and monitor clinical status closely. Monitor on telemetry. Patient is on CHF pathway.  Monitor strict Is&Os and daily weights.  Place on fluid restriction of 1.5 L. Cardiology has been consulted by ED. Continue to stress to patient importance of self efficacy and  on diet for CHF. Last BNP reviewed- and noted below   Recent Labs   Lab 05/14/24  0200   BNP 2,320*     - Possibly exacerbated by eating salty foods lately. Does not weigh herself/follow any fluid restriction.  - Not able to afford prescribed Jardiance, reports cardiology clinic is getting her on a patient assistance program. Compliant with BB, Entresto, and lasix 40 daily.   - S/p 80 mg Lasix IVP in ED  TTE 5/14 showed EF of 5-10%. Patient requesting medical management only, doesn't want invasive measures  Increased lasix to 80mg BID and added spironolactone 25mg daily  Has diuresed well. IVC on POCUS fully collapsible 5/16/24. Euvolemic on exam. Switch to home dose of lasix 40mg daily and cont  spironolactone 25mg daily  Follow up with cardiology outpatient. HH orders placed.

## 2024-05-16 NOTE — PLAN OF CARE
Pt. Evaluated and performing at baseline   No goals established at this time.  Please re-consult if pt's status changes     Problem: Occupational Therapy  Goal: Occupational Therapy Goal  Outcome: Met

## 2024-05-16 NOTE — NURSING
Telemetry called , pt had 8 beats of V-tach. Pt lying in bed, resting, I called and informed DAISY Schmitz PA-C, who placed new orders and labs.

## 2024-05-16 NOTE — ASSESSMENT & PLAN NOTE
Chronic, controlled. Latest blood pressure and vitals reviewed-     Temp:  [97.2 °F (36.2 °C)-98.1 °F (36.7 °C)]   Pulse:  []   Resp:  [16-19]   BP: ()/(55-69)   SpO2:  [92 %-98 %] .   Home meds for hypertension were reviewed and noted below.   Hypertension Medications               furosemide (LASIX) 40 MG tablet Take 1 tablet (40 mg total) by mouth once daily.    metoprolol succinate (TOPROL-XL) 25 MG 24 hr tablet Take 1 tablet (25 mg total) by mouth once daily.    sacubitriL-valsartan (ENTRESTO) 24-26 mg per tablet Take 1 tablet by mouth 2 (two) times daily. Don't take valsartan and Entresto together.     While in the hospital, will manage blood pressure as follows; Continue home antihypertensive regimen  Will utilize p.r.n. blood pressure medication only if patient's blood pressure greater than 180/110 and she develops symptoms such as worsening chest pain or shortness of breath.

## 2024-05-16 NOTE — PLAN OF CARE
Problem: Physical Therapy  Goal: Physical Therapy Goal  Outcome: Met   PT D/Abdias due to pt able to perform functional mobility including up/down steps. Juliana Francois PT 5/16/24

## 2024-05-16 NOTE — PROGRESS NOTES
[facility] - Med Surg  Cardiology  Progress Note    Patient Name: Selma Hooper  MRN: [id number]  Admission Date: 5/14/2024  Hospital Length of Stay: 2 days  Code Status: DNR   Attending Physician: Abel Hall MD   Primary Care Physician: Phil Andrade MD  Expected Discharge Date: 5/17/2024  Principal Problem:Acute on chronic combined systolic and diastolic congestive heart failure    Subjective:     Hospital Course:   No notes on file    Interval History: AF, HDS.     NSVT overnight for 8 beats, asymptomatic    1L UOP last 24h with 80 lasix q12h    IVC on POCUS is fully collapsible. Euvolemic on exam.        Review of Systems   Constitutional: Negative for fever.   Eyes:  Negative for visual disturbance.   Cardiovascular:  Positive for dyspnea on exertion. Negative for chest pain and syncope.   Gastrointestinal:  Positive for bloating. Negative for nausea and vomiting.     Objective:     Vital Signs (Most Recent):  Temp: 97.9 °F (36.6 °C) (05/16/24 0750)  Pulse: 78 (05/16/24 0827)  Resp: 19 (05/16/24 0827)  BP: (!) 99/57 (05/16/24 0750)  SpO2: 98 % (05/16/24 0828) Vital Signs (24h Range):  Temp:  [97.2 °F (36.2 °C)-98.1 °F (36.7 °C)] 97.9 °F (36.6 °C)  Pulse:  [] 78  Resp:  [16-19] 19  SpO2:  [92 %-98 %] 98 %  BP: ()/(55-69) 99/57     Weight: 64 kg (141 lb 1.5 oz)  Body mass index is 26.66 kg/m².     SpO2: 98 %         Intake/Output Summary (Last 24 hours) at 5/16/2024 0851  Last data filed at 5/16/2024 0548  Gross per 24 hour   Intake 100 ml   Output 1025 ml   Net -925 ml       Lines/Drains/Airways       Drain  Duration             Female External Urinary Catheter w/ Suction 05/14/24 0755 2 days              Peripheral Intravenous Line  Duration                  Peripheral IV - Single Lumen 05/14/24 0159 20 G Anterior;Left Hand 2 days                       Physical Exam  Vitals and nursing note reviewed.   Constitutional:       General: She is not in acute distress.     Appearance: She is not  ill-appearing.   HENT:      Mouth/Throat:      Mouth: Mucous membranes are moist.      Pharynx: Oropharynx is clear.   Eyes:      Conjunctiva/sclera: Conjunctivae normal.   Cardiovascular:      Rate and Rhythm: Normal rate and regular rhythm.   Pulmonary:      Breath sounds: Wheezing (end expiratory wheeze) present.      Comments: Minimal Bibasilar crackles. Decreased breath sounds bilaterally.  Abdominal:      Palpations: Abdomen is soft.      Tenderness: There is no abdominal tenderness.   Musculoskeletal:         General: No swelling.      Cervical back: Normal range of motion.      Right lower leg: No edema.      Left lower leg: No edema.   Skin:     General: Skin is warm and dry.      Coloration: Skin is not jaundiced.   Neurological:      General: No focal deficit present.      Mental Status: She is alert and oriented to person, place, and time.            Significant Labs: CMP   Recent Labs   Lab 05/14/24  2129 05/15/24  0324 05/16/24  0541    141 141   K 3.6 4.4 3.9   CL 96 94* 97   CO2 33* 35* 34*   * 117* 111*   BUN 22 22 31*   CREATININE 1.1 1.0 1.0   CALCIUM 9.8 9.8 9.6   PROT  --  6.3 5.9*   ALBUMIN  --  3.3* 3.3*   BILITOT  --  0.3 0.3   ALKPHOS  --  73 71   AST  --  20 17   ALT  --  21 18   ANIONGAP 15 12 10   , CBC   Recent Labs   Lab 05/15/24  0324 05/16/24  0541   WBC 4.46 5.59   HGB 10.2* 9.7*   HCT 33.9* 31.1*    232   , and All pertinent lab results from the last 24 hours have been reviewed.      Assessment and Plan:         * Acute on chronic combined systolic and diastolic congestive heart failure  Cardiology consulted for CHF w EF 5-10%. Pt with increasing SOB, THEODORE, orthopnea for last few days despite increased lasix dose. Uptrending troponin to 0.69, and uptrending lactate to 2.6, BNP 2300. Initially required BiPAP, now on 3L NC. TTE shows worsening LVEF, now at 5-10% with eccentric hypertrophy and indeterminate diastolic dysfunction. Recently seen by Dr. Reyna in  clinic, trialed on Jardiance. Once again offered device eval, but pt refused, and requested her code status be changed to DNR (see ACP note by Dr. Hall). Pt reports medication compliance with toprol, entresto, jardiance, lasix. Of note, pt had PET stress in 2021 resting perfusion abnormality in the RCA territory that increases with stress, indicative of infarct with layla-infarct ischemia.     Most likely acute CHF exacerbation 2/2 high salt intake recently. JVD to near the mandible, despite TTE read of CVP at 3. Recommend diuresis, restarting home medications, and adding spironolactone when able.     5/16: Bedside POCUS shows fully collapsible IVC. BUN increased to 31 from 22. Euvolemic    Recommendations  - Decrease Lasix to PO 40mg qd (home dose)   - advise to double dose if having >2lb/day or 5lb/week weight gain  - continue home toprol  - can start losartan for afterload reduction if SBP >90 and pt tolerates   - avoid significant/symptomatic hypotension, but given her EF 5-10%, pt needs afterload reduction  - continue spironolactone  - needs close f/u w Dr. Reyna following discharge.     Cardiology will sign off. Please reach out with any questions or concerns regarding Selma Hooper      VTE Risk Mitigation (From admission, onward)           Ordered     enoxaparin injection 40 mg  Every 24 hours         05/14/24 1454     IP VTE HIGH RISK PATIENT  Once         05/14/24 0520     Place sequential compression device  Until discontinued         05/14/24 0520                    Ancelmo Eduardo MD  Cardiology  [facility]

## 2024-05-16 NOTE — ASSESSMENT & PLAN NOTE
Patient has hypokalemia which is Acute and currently uncontrolled. Most recent potassium levels reviewed-   Lab Results   Component Value Date    K 3.9 05/16/2024   Will continue potassium replacement per protocol and recheck repeat levels after replacement completed. Monitor will diuresing

## 2024-05-16 NOTE — PLAN OF CARE
Problem: Adult Inpatient Plan of Care  Goal: Plan of Care Review  Outcome: Progressing  Goal: Patient-Specific Goal (Individualized)  Outcome: Progressing  Goal: Absence of Hospital-Acquired Illness or Injury  Outcome: Progressing  Goal: Optimal Comfort and Wellbeing  Outcome: Progressing  Goal: Readiness for Transition of Care  Outcome: Progressing     Problem: Infection  Goal: Absence of Infection Signs and Symptoms  Outcome: Progressing     Problem: Diabetes Comorbidity  Goal: Blood Glucose Level Within Targeted Range  Outcome: Progressing     Problem: Acute Kidney Injury/Impairment  Goal: Fluid and Electrolyte Balance  Outcome: Progressing  Goal: Improved Oral Intake  Outcome: Progressing  Goal: Effective Renal Function  Outcome: Progressing

## 2024-05-16 NOTE — PLAN OF CARE
[facility] - Med Surg      HOME HEALTH ORDERS  FACE TO FACE ENCOUNTER    Patient Name: Selma Hooper  YOB: 1945    PCP: Phil Andrade MD   PCP Address: [address] / [place] [address]  PCP Phone Number: [phone]  PCP Fax: [phone]    Encounter Date: 5/14/24    Admit to Home Health    Diagnoses:  Active Hospital Problems    Diagnosis  POA    *Acute on chronic combined systolic and diastolic congestive heart failure [I50.43]  Yes    Type 2 diabetes mellitus with hyperglycemia, without long-term current use of insulin [E11.65]  Yes    COPD exacerbation [J44.1]  Yes    Elevated troponin level [R79.89]  Yes     -prior elevations have been attributed to CHF, NICM      Acute on chronic respiratory failure with hypoxia and hypercapnia [J96.21, J96.22]  Yes    ACP (advance care planning) [Z71.89]  Not Applicable    Allergy to statin medication [Z88.8]  Not Applicable    Hypokalemia [E87.6]  Yes    LBBB (left bundle branch block) [I44.7]  Yes     Chronic    JAMIR (acute kidney injury) [N17.9]  Yes    Hypertension, essential [I10]  Yes      Resolved Hospital Problems   No resolved problems to display.       Follow Up Appointments:  Future Appointments   Date Time Provider Department Center   5/24/2024 12:30 PM LAB, APPOINTMENT [facility] LAB VNP [facility]   5/24/2024  1:00 PM Julieth Us PA-C [facility]   5/24/2024  1:00 PM [facility] HEART FAILURE NURSE [facility]   7/22/2024 10:30 AM [facility] TANSEY MAMMO4 [facility] MAMMO [facility]   10/4/2024 11:30 AM Moreno Hargrove MD [facility] PULMSVC [facility]       Allergies:  Review of patient's allergies indicates:   Allergen Reactions    Atorvastatin      Leg cramps       Medications: Review discharge medications with patient and family and provide education.    Current Facility-Administered Medications   Medication Dose Route Frequency Provider Last Rate Last Admin    acetaminophen tablet 1,000 mg  1,000 mg Oral Q8H PRN Chela  Nicole ZAVALA PA-C        acetaminophen tablet 650 mg  650 mg Oral Q4H PRN Nicole York PA-C        albuterol-ipratropium 2.5 mg-0.5 mg/3 mL nebulizer solution 3 mL  3 mL Nebulization Q4H PRN Nicole York PA-C        albuterol-ipratropium 2.5 mg-0.5 mg/3 mL nebulizer solution 3 mL  3 mL Nebulization Q6H WAKE Nicole York PA-C   3 mL at 05/16/24 0825    aluminum-magnesium hydroxide-simethicone 200-200-20 mg/5 mL suspension 30 mL  30 mL Oral QID PRN Nicole York PA-C        bisacodyL suppository 10 mg  10 mg Rectal Daily PRN Nicole York PA-C        dextrose 10% bolus 125 mL 125 mL  12.5 g Intravenous PRN Nicole York PA-C        dextrose 10% bolus 125 mL 125 mL  12.5 g Intravenous PRN Abel Hall MD        dextrose 10% bolus 250 mL 250 mL  25 g Intravenous PRN Nicole York PA-C        dextrose 10% bolus 250 mL 250 mL  25 g Intravenous PRN Abel Hall MD        enoxaparin injection 40 mg  40 mg Subcutaneous Q24H (prophylaxis, 1700) Abel Hall MD   40 mg at 05/15/24 1727    fluticasone furoate-vilanteroL 100-25 mcg/dose diskus inhaler 1 puff  1 puff Inhalation Daily Nicole York PA-C   1 puff at 05/16/24 0827    [START ON 5/17/2024] furosemide tablet 40 mg  40 mg Oral Daily Abel Hall MD        glucagon (human recombinant) injection 1 mg  1 mg Intramuscular PRN Nicole York PA-C        glucagon (human recombinant) injection 1 mg  1 mg Intramuscular PRN Abel Hall MD        glucose chewable tablet 16 g  16 g Oral PRN Nicole York PA-C        glucose chewable tablet 16 g  16 g Oral PRN Abel Hall MD        glucose chewable tablet 24 g  24 g Oral PRN Nicole York PA-C        glucose chewable tablet 24 g  24 g Oral PRN Abel Hall MD        insulin aspart U-100 pen 0-5 Units  0-5 Units Subcutaneous QID (AC + HS) PRN Abel Hall MD        melatonin tablet 6 mg  6 mg Oral Nightly PRN Nicole York  ANGEL ZAVALA        metoprolol succinate (TOPROL-XL) 24 hr tablet 25 mg  25 mg Oral Daily Abel Hall MD        naloxone 0.4 mg/mL injection 0.02 mg  0.02 mg Intravenous PRN Nicole York PA-C        polyethylene glycol packet 17 g  17 g Oral BID PRN Nicole York PA-C        predniSONE tablet 40 mg  40 mg Oral Daily Nicole York PA-C   40 mg at 05/16/24 0932    simethicone chewable tablet 80 mg  1 tablet Oral QID PRN Nicole York PA-C        sodium chloride 0.9% flush 10 mL  10 mL Intravenous PRN Nicole York PA-C        sodium chloride 0.9% flush 5 mL  5 mL Intravenous PRN Nicole York PA-C        spironolactone tablet 25 mg  25 mg Oral Daily Abel Hall MD   25 mg at 05/15/24 1407     Current Discharge Medication List        START taking these medications    Details   predniSONE (DELTASONE) 20 MG tablet Take 2 tablets (40 mg total) by mouth once daily. for 2 days  Qty: 4 tablet, Refills: 0      spironolactone (ALDACTONE) 25 MG tablet Take 1 tablet (25 mg total) by mouth once daily.  Qty: 30 tablet, Refills: 0    Comments: .           CONTINUE these medications which have CHANGED    Details   sacubitriL-valsartan (ENTRESTO) 24-26 mg per tablet Take 1 tablet by mouth 2 (two) times daily. HOLD UNTIL FOLLOW UP APPT WITH PCP AND/OR CARDIOLOGY  Qty: 180 tablet, Refills: 3           CONTINUE these medications which have NOT CHANGED    Details   albuterol-ipratropium (DUO-NEB) 2.5 mg-0.5 mg/3 mL nebulizer solution Take 3 mLs by nebulization every 6 (six) hours as needed for Wheezing. Rescue  Qty: 90 mL, Refills: 3    Associated Diagnoses: Centrilobular emphysema      empagliflozin (JARDIANCE) 10 mg tablet Take 1 tablet (10 mg total) by mouth once daily.  Qty: 30 tablet, Refills: 2      fluticasone-salmeterol diskus inhaler 250-50 mcg Inhale 1 puff into the lungs 2 (two) times daily. Controller  Qty: 60 each, Refills: 11    Associated Diagnoses: Centrilobular emphysema       furosemide (LASIX) 40 MG tablet Take 1 tablet (40 mg total) by mouth once daily.  Qty: 90 tablet, Refills: 3      metoprolol succinate (TOPROL-XL) 25 MG 24 hr tablet Take 1 tablet (25 mg total) by mouth once daily.  Qty: 90 tablet, Refills: 3    Comments: .           STOP taking these medications       ipratropium-albuteroL (COMBIVENT)  mcg/actuation inhaler Comments:   Reason for Stopping:                 I have seen and examined this patient within the last 30 days. My clinical findings that support the need for the home health skilled services and home bound status are the following:no   Weakness/numbness causing balance and gait disturbance due to Heart Failure and Weakness/Debility making it taxing to leave home.  Medical restrictions requiring assistance of another human to leave home due to  Dyspnea on exertion (SOB), Unstable ambulation, and Home oxygen requirement.     Diet:   cardiac diet and fluid restriction 1.5L daily    Labs:  BMP in 1 week    Referrals/ Consults  Physical Therapy to evaluate and treat. Evaluate for home safety and equipment needs; Establish/upgrade home exercise program. Perform / instruct on therapeutic exercises, gait training, transfer training, and Range of Motion.  Occupational Therapy to evaluate and treat. Evaluate home environment for safety and equipment needs. Perform/Instruct on transfers, ADL training, ROM, and therapeutic exercises.    Activities:   activity as tolerated    Nursing:   Agency to admit patient within 24 hours of hospital discharge unless specified on physician order or at patient request    SN to complete comprehensive assessment including routine vital signs. Instruct on disease process and s/s of complications to report to MD. Review/verify medication list sent home with the patient at time of discharge  and instruct patient/caregiver as needed. Frequency may be adjusted depending on start of care date.     Skilled nurse to perform up to 3 visits  PRN for symptoms related to diagnosis    Notify MD if SBP > 160 or < 90; DBP > 90 or < 50; HR > 120 or < 50; Temp > 101; O2 < 88%; Other:       Ok to schedule additional visits based on staff availability and patient request on consecutive days within the home health episode.    When multiple disciplines ordered:    Start of Care occurs on Sunday - Wednesday schedule remaining discipline evaluations as ordered on separate consecutive days following the start of care.    Thursday SOC -schedule subsequent evaluations Friday and Monday the following week.     Friday - Saturday SOC - schedule subsequent discipline evaluations on consecutive days starting Monday of the following week.    For all post-discharge communication and subsequent orders please contact patient's primary care physician.    Miscellaneous   Heart Failure:      SN to instruct on the following:    Instruct on the definition of CHF.   Instruct on the signs/sympoms of CHF to be reported.   Instruct on and monitor daily weights.   Instruct on factors that cause exacerbation.   Instruct on action, dose, schedule, and side effects of medications.   Instruct on diet as prescribed.   Instruct on activity allowed.   Instruct on life-style modifications for life long management of CHF   SN to assess compliance with daily weights, diet, medications, fluid retention,    safety precautions, activities permitted and life-style modifications.   Additional 1-2 SN visits per week as needed for signs and symptoms     of CHF exacerbation.      For Weight Gain > 2-3 lbs in 1 day or 4-6 lbs over 1 week notify PCP:  CHF DIURETIC SLIDING SCALE     Skilled nurse visits daily to instruct and monitor medication adherence until target weight. Then resume prior order frequency.     If weight gain exceeds 5 lbs over target weight, call MD  If weight gain of 3-4 lbs over target weight, then:     Increase Furosemide - current dose (mg/day) to 80mg double dose and frequency of  daily until target weight achieved or maximum 3 days.     If already on max oral daily dose, see IV diuretic instructions.    After 3 days of increased oral diuretic dose, get BMP. If patient is on increased oral diuretic dose greater than 5 days, repeat BMP at day 7 of increased dose.     Potassium supplementation   Scr > 1.5 mg/dl  Scr  1.5 mg/dl    K < 3.0 - NOTIFY MD and 40 mEq bid  40 mEq tid   K- 3.1-3.3  20 mEq bid  20 mEq tid    K 3.4-3.7  10 mEq bid  10 mEq tid      If target weight not reached after 5 days of increased oral diuretic, proceed to IV diuretic with daily patient contact to include face-to-face visit and telephone encounters.       Home Oxygen:  Oxygen at 2 L/min nasal canula to be used:  Continuously., Assess oxygen saturation via pulse oximeter as needed for increase in SOB., and Notify physician if oxygen saturation less than 88%    Home Health Aide:  Physical Therapy Three times weekly, Occupational Therapy Three times weekly, and Home Health Aide Three times weekly    Wound Care Orders  no    I certify that this patient is confined to her home and needs physical therapy and occupational therapy.

## 2024-05-16 NOTE — DISCHARGE SUMMARY
[facility]  Discharge Summary      Patient Name: Selma Hooper  MRN: [id number]  JUAQUIN: [id number]  Patient Class: IP- Inpatient  Admission Date: 5/14/2024  Hospital Length of Stay: 2 days  Discharge Date and Time:  05/16/2024 12:44 PM  Attending Physician: Abel Hall MD   Discharging Provider: Abel Hall MD  Primary Care Provider: Phil Andrade MD  [facility] Medicine Team: [facility]OS[facility] MED A Abel Hall MD  Primary Care Team: [facility] A    HPI:   Selma Hooper is a 78 y.o. female with PMHx of f Hypertension, Hyperlipidemia, NICM/HFrEF, LBBB, COPD, On home O2. She presents to [facility] for SOB. She has been needing to use her home oxygen (1.5L) at all times the last few days. Normally she is able to do things like walk to the bathroom without it. She felt some relief of SOB/wheezing 2 days ago after using nebulizer once. However, yesterday evening she was feeling increasingly SOB and developed orthopnea. Normally lies flat but was SOB even when propped up with 2 pillows. Endorses mild ankle edema. She attempted to use her nebulizer and take an extra 40 mg of lasix but this did not help with her SOB. She then called her daughter who brought her here. She reports feeling diaphoretic when she came in 2/2 her distress. She has had a dry cough for about one week now and has been taking zyrtec without much relief. Denies chest pain, palpitations, fever, chills, abdominal pain, nausea, vomiting. She is compliant with her medications. She was on a Jardiance trial for 14 days but cannot afford it anymore. She takes Lasix 40 mg daily. She does not weight herself because her scale is broken. She reports normal weight is about 140 lb. She normally follows a low salt diet but ate salty crawfish yesterday prior to symptom onset. She does not follow any fluid restriction.     In the ED:  Tachypneic, tachycardic, and hypoxic. HR initially 130s and RR 20-30s and saturating 92% on 3L NC. She was  placed on BiPAP and weaned to 2-3L NC with improvement in respiratory status. K 3.1. Cr 1.4 (baseline ~1.0). BNP 2,320. Troponin 0.068. CRP 8.5. Influenza and Covid-19 negative. Procal negative. LA 2.2. EKG with sinus tachycardia and LBBB (seen on previous EKGs). CXR with diffuse coarse/increased interstitial attenuation with bibasilar predominance. She was given lasix 80 mg IVP and duo nebs x3 with improvement in symptoms. She was seen by cardiology in the ED. Admitted to  for CHF/COPD exacerbation.     * No surgery found *      Hospital Course:   See individual problem list.     Goals of Care Treatment Preferences:  Code Status: DNR      Consults:   Consults (From admission, onward)          Status Ordering Provider     Inpatient consult to Cardiology  Once        Provider:  (Not yet assigned)    Completed DIMITRI HODGE     Inpatient consult to Social Work/Case Management  Once        Provider:  (Not yet assigned)    Acknowledged RADHA KAPLAN     Inpatient consult to Registered Dietitian/Nutritionist  Once        Provider:  (Not yet assigned)    Completed RADHA KAPLAN          Physical Exam  Vitals and nursing note reviewed.   Constitutional:       General: She is not in acute distress.     Interventions: Nasal cannula in place.   HENT:      Head: Normocephalic and atraumatic.      Nose: Nose normal.      Mouth/Throat:      Pharynx: Oropharynx is clear.   Eyes:      Extraocular Movements: Extraocular movements intact.      Conjunctiva/sclera: Conjunctivae normal.   Cardiovascular:      Rate and Rhythm: Normal rate and regular rhythm.      Heart sounds: Normal heart sounds.   Pulmonary:      Effort: Pulmonary effort is normal. No respiratory distress.      Breath sounds: Minimal bibasilar crackles, improved  Abdominal:      General: Bowel sounds are normal. There is no distension.      Palpations: Abdomen is soft.   Musculoskeletal:         General: Normal range of motion.      Cervical back:  Normal range of motion. No tenderness.      Right lower leg: No edema.      Left lower leg: No edema.   Skin:     General: Skin is warm and dry.   Neurological:      General: No focal deficit present.      Mental Status: She is alert and oriented to person, place, and time.   Psychiatric:         Mood and Affect: Mood normal.         Behavior: Behavior normal.     Pulmonary  COPD exacerbation  Patient's COPD is with exacerbation noted by continued dyspnea and worsening of baseline hypoxia currently.  Patient is currently off COPD Pathway (2/2 being on CHF pathway). Continue scheduled inhalers Steroids and Supplemental oxygen and monitor respiratory status closely.   Complete 5 day course of prednisone    Acute on chronic respiratory failure with hypoxia and hypercapnia  Patient with Hypercapnic and Hypoxic Respiratory failure which is Acute on chronic.  she is on home oxygen at 1.5 LPM. Supplemental oxygen was provided and noted-      Signs/symptoms of respiratory failure include- tachypnea, increased work of breathing, respiratory distress, and wheezing. Contributing diagnoses includes - CHF and COPD Labs and images were reviewed. Patient Has recent ABG, which has been reviewed. Will treat underlying causes and adjust management of respiratory failure as follows- See COPD and CHF      Cardiac/Vascular  * Acute on chronic combined systolic and diastolic congestive heart failure  Patient is identified as having Combined Systolic and Diastolic heart failure that is Acute on chronic. CHF is currently uncontrolled due to Dyspnea not returned to baseline after 1 doses of IV diuretic, >3 pillow orthopnea, and Pulmonary edema/pleural effusion on CXR. Latest ECHO performed and demonstrates- Results for orders placed during the hospital encounter of 07/12/22    Echo    Interpretation Summary  · The left ventricle is severely enlarged with eccentric hypertrophy and severely decreased systolic function. The estimated ejection  fraction is 15%.  · There is severe left ventricular global hypokinesis.  · Normal right ventricular size with normal right ventricular systolic function.  · Grade I left ventricular diastolic dysfunction.  · Moderate left atrial enlargement.  · Mild mitral regurgitation.  · The estimated PA systolic pressure is 32 mmHg.  · Normal central venous pressure (3 mmHg).  . Continue Beta Blocker, Furosemide, and ARNI and monitor clinical status closely. Monitor on telemetry. Patient is on CHF pathway.  Monitor strict Is&Os and daily weights.  Place on fluid restriction of 1.5 L. Cardiology has been consulted by ED. Continue to stress to patient importance of self efficacy and  on diet for CHF. Last BNP reviewed- and noted below   Recent Labs   Lab 05/14/24  0200   BNP 2,320*     - Possibly exacerbated by eating salty foods lately. Does not weigh herself/follow any fluid restriction.  - Not able to afford prescribed Jardiance, reports cardiology clinic is getting her on a patient assistance program. Compliant with BB, Entresto, and lasix 40 daily.   - S/p 80 mg Lasix IVP in ED  TTE 5/14 showed EF of 5-10%. Patient requesting medical management only, doesn't want invasive measures  Increased lasix to 80mg BID and added spironolactone 25mg daily  Has diuresed well. IVC on POCUS fully collapsible 5/16/24. Euvolemic on exam. Switch to home dose of lasix 40mg daily and cont spironolactone 25mg daily  Follow up with cardiology outpatient.  orders placed.    Elevated troponin level  - Troponin 0.068> trend to peak/flat. Initial trop is below previous values.   - EKG with known LBBB. No CP, do not currently suspect ACS.   - Suspect demand ischemia from hypervolemia/tachycardia.   - Trops have trended down      LBBB (left bundle branch block)  History noted.    Hypertension, essential  Chronic, controlled. Latest blood pressure and vitals reviewed-     Temp:  [97.2 °F (36.2 °C)-98.1 °F (36.7 °C)]   Pulse:  []   Resp:   [16-19]   BP: ()/(55-69)   SpO2:  [92 %-98 %] .   Home meds for hypertension were reviewed and noted below.   Hypertension Medications               furosemide (LASIX) 40 MG tablet Take 1 tablet (40 mg total) by mouth once daily.    metoprolol succinate (TOPROL-XL) 25 MG 24 hr tablet Take 1 tablet (25 mg total) by mouth once daily.    sacubitriL-valsartan (ENTRESTO) 24-26 mg per tablet Take 1 tablet by mouth 2 (two) times daily. Don't take valsartan and Entresto together.     While in the hospital, will manage blood pressure as follows; Continue home antihypertensive regimen  Will utilize p.r.n. blood pressure medication only if patient's blood pressure greater than 180/110 and she develops symptoms such as worsening chest pain or shortness of breath.    Renal/  Hypokalemia  Patient has hypokalemia which is Acute and currently uncontrolled. Most recent potassium levels reviewed-   Lab Results   Component Value Date    K 3.9 05/16/2024   Will continue potassium replacement per protocol and recheck repeat levels after replacement completed. Monitor will diuresing     JAMIR (acute kidney injury)  Patient with acute kidney injury/acute renal failure likely due to cardiorenal. JAMIR is currently stable. Baseline creatinine  ~1.0  - Labs reviewed- Renal function/electrolytes with Estimated Creatinine Clearance: 41 mL/min (based on SCr of 1 mg/dL). according to latest data. Monitor urine output and serial BMP and adjust therapy as needed. Avoid nephrotoxins and renally dose meds for GFR listed above.  - Mild JAMIR with Cr 1.4 on admission, baseline closer to 1.0  - Monitor for improvement with diuresis  - Improved  - Follow up with nephrology outpatient    Endocrine  Type 2 diabetes mellitus with hyperglycemia, without long-term current use of insulin  - Last A1c 5.1 (10/2023). Not on DM medications (with exception of Jardiance for CHF)  -  on admission, repeating A1c.   -SSI while on steroids    Palliative  Care  ACP (advance care planning)  Advance Care Planning  Date: 05/14/2024     Code Status  In light of the patients advanced and life limiting illness,I engaged the the patient in a voluntary conversation about the patient's preferences for care  at the very end of life. The patient wishes to have a natural, peaceful death.  Along those lines, the patient does not wish to have CPR or other invasive treatments performed when her heart and/or breathing stops. I communicated to the patient that a DNR order would be placed in her medical record to reflect this preference.  I spent a total of 10 minutes engaging the patient in this advance care planning discussion.      Other  Allergy to statin medication  - Noted. Not on a statin.      Final Active Diagnoses:    Diagnosis Date Noted POA    PRINCIPAL PROBLEM:  Acute on chronic combined systolic and diastolic congestive heart failure [I50.43] 04/02/2021 Yes    Type 2 diabetes mellitus with hyperglycemia, without long-term current use of insulin [E11.65] 10/26/2023 Yes    COPD exacerbation [J44.1] 07/12/2022 Yes    Elevated troponin level [R79.89] 07/12/2022 Yes    Acute on chronic respiratory failure with hypoxia and hypercapnia [J96.21, J96.22] 12/10/2021 Yes    ACP (advance care planning) [Z71.89] 08/23/2021 Not Applicable    Allergy to statin medication [Z88.8] 08/16/2021 Not Applicable    Hypokalemia [E87.6] 04/02/2021 Yes    LBBB (left bundle branch block) [I44.7] 06/20/2019 Yes     Chronic    JAMIR (acute kidney injury) [N17.9] 07/14/2018 Yes    Hypertension, essential [I10] 06/25/2015 Yes      Problems Resolved During this Admission:       Discharged Condition: good    Disposition: Home or Self Care    Follow Up:    Patient Instructions:      Basic metabolic panel   Standing Status: Future Standing Exp. Date: 08/14/25     Ambulatory referral/consult to Cardiology   Standing Status: Future   Referral Priority: Routine Referral Type: Consultation   Referral Reason:  Specialty Services Required   Requested Specialty: Cardiology   Number of Visits Requested: 1     Ambulatory referral/consult to Nephrology   Standing Status: Future   Referral Priority: Routine Referral Type: Consultation   Referral Reason: Specialty Services Required   Requested Specialty: Nephrology   Number of Visits Requested: 1     Diet Cardiac   Order Comments: Fluid restriction 1.5L daily     Notify your health care provider if you experience any of the following:  temperature >100.4     Notify your health care provider if you experience any of the following:  persistent nausea and vomiting or diarrhea     Notify your health care provider if you experience any of the following:  difficulty breathing or increased cough     Notify your health care provider if you experience any of the following:  severe persistent headache     Notify your health care provider if you experience any of the following:  persistent dizziness, light-headedness, or visual disturbances     Notify your health care provider if you experience any of the following:  increased confusion or weakness     Activity as tolerated       Significant Diagnostic Studies: Labs: All labs within the past 24 hours have been reviewed    Pending Diagnostic Studies:       None           Medications:  Reconciled Home Medications:      Medication List        START taking these medications      predniSONE 20 MG tablet  Commonly known as: DELTASONE  Take 2 tablets (40 mg total) by mouth once daily. for 2 days  Start taking on: May 17, 2024     spironolactone 25 MG tablet  Commonly known as: ALDACTONE  Take 1 tablet (25 mg total) by mouth once daily.  Start taking on: May 17, 2024            CHANGE how you take these medications      albuterol-ipratropium 2.5 mg-0.5 mg/3 mL nebulizer solution  Commonly known as: DUO-NEB  Take 3 mLs by nebulization every 6 (six) hours as needed for Wheezing. Rescue  What changed: Another medication with the same name was  removed. Continue taking this medication, and follow the directions you see here.     ENTRESTO 24-26 mg per tablet  Generic drug: sacubitriL-valsartan  Take 1 tablet by mouth 2 (two) times daily. HOLD UNTIL FOLLOW UP APPT WITH PCP AND/OR CARDIOLOGY  What changed: additional instructions            CONTINUE taking these medications      fluticasone-salmeterol 250-50 mcg/dose 250-50 mcg/dose diskus inhaler  Commonly known as: ADVAIR  Inhale 1 puff into the lungs 2 (two) times daily. Controller     furosemide 40 MG tablet  Commonly known as: LASIX  Take 1 tablet (40 mg total) by mouth once daily.     JARDIANCE 10 mg tablet  Generic drug: empagliflozin  Take 1 tablet (10 mg total) by mouth once daily.     metoprolol succinate 25 MG 24 hr tablet  Commonly known as: TOPROL-XL  Take 1 tablet (25 mg total) by mouth once daily.              Indwelling Lines/Drains at time of discharge:   Lines/Drains/Airways       Drain  Duration             Female External Urinary Catheter w/ Suction 05/14/24 7145 2 days                    Time spent on the discharge of patient: 35 minutes         Abel Hall MD  Department of Hospital Medicine  [facility]

## 2024-05-16 NOTE — PT/OT/SLP EVAL
Physical Therapy Evaluation/co eval with OT/D/C Summary    Patient Name:  Selma Hooper   MRN:  [id number]    Recommendations:     Discharge Recommendations: No Therapy Indicated   Discharge Equipment Recommendations: none   Barriers to discharge: None    Assessment:     Selma Hooper is a 78 y.o. female admitted with a medical diagnosis of Acute on chronic combined systolic and diastolic congestive heart failure.  She presents with the following impairments/functional limitations: impaired cardiopulmonary response to activity . PT D/Abdias due to pt able to perform functional mobility including up/down steps.      Recent Surgery: * No surgery found *      Plan:       (D/C PT due to pt able to perform functional mobility including up/down steps)   Plan of Care Expires:  06/15/24    Subjective   Pt c/o SOB after going up/down steps    Pain/Comfort:  Pain Rating 1: 0/10  Pain Rating Post-Intervention 1: 0/10    Patients cultural, spiritual, Judaism conflicts given the current situation: no    Living Environment:  Pt lives with her daughter and granddaughter in a 1 story home with 3 GIOVANA with R UE rail  Prior to admission, patients level of function was independent and drives.  Equipment used at home: oxygen.   Upon discharge, patient will have assistance from family.    Objective:     Communicated with nurse prior to session.  Patient found sitting edge of bed with oxygen, PureWick, telemetry  upon PT entry to room.    General Precautions: Standard, fall  Orthopedic Precautions:N/A   Braces: N/A  Respiratory Status: Nasal cannula, flow 3 L/min    Exams:  Cognitive Exam:  Patient is oriented to Person, Place, and Time  Sensation:    -       Intact  light/touch B LE  RLE ROM: WFL  RLE Strength: WFL  LLE ROM: WFL  LLE Strength: WFL    Functional Mobility:  Transfers:     Sit to Stand:  modified independence with no AD  Gait: 140ft then 16ft with no AD with supervision to manage ox tank. Pt performed gait at slow pace  Stairs:   Pt ascended/descended 4 stair(s) with No Assistive Device with right handrail with Supervision or Set-up Assistance.   Co-treat with OT due to complexity of pt and need for skilled hands for safe intervention.   AM-PAC 6 CLICK MOBILITY  Total Score:24   Pt ambulated to the bathroom and stood at the sink ~ 6 min with mod independent to perform ADLs    Patient left up in chair with all lines intact, call button in reach, and nurse notified.    GOALS:   Multidisciplinary Problems       Physical Therapy Goals       Not on file              Multidisciplinary Problems (Resolved)          Problem: Physical Therapy    Goal Priority Disciplines Outcome Goal Variances Interventions   Physical Therapy Goal   (Resolved)     PT, PT/OT Met                         History:     Past Medical History:   Diagnosis Date    Abnormal liver enzymes 12/10/2018    Acute on chronic combined systolic and diastolic congestive heart failure 4/2/2021    Acute on chronic respiratory failure with hypoxia 4/2/2021    Acute on chronic respiratory failure with hypoxia and hypercapnia 12/10/2021    CHF (congestive heart failure)     COPD exacerbation 7/12/2022    Former smoker 10/24/2018    Hypertension     Smoker 7/27/2016       Past Surgical History:   Procedure Laterality Date    BREAST BIOPSY      left  side years ago in high school   1962    CHOLECYSTECTOMY      COLONOSCOPY      COLONOSCOPY N/A 8/23/2021    Procedure: COLONOSCOPY;  Surgeon: Shree Amaya MD;  Location: [facility] ([address] [place] [address]);  Service: Endoscopy;  Laterality: N/A;  Do not cancel this order  fully vaccinated-see immunization record  EF 18%  8/20-covid [facility]    HYSTERECTOMY         Time Tracking:     PT Received On: 05/16/24  PT Start Time: 1048     PT Stop Time: 1111  PT Total Time (min): 23 min     Billable Minutes: Evaluation 13 and Gait Training 10      05/16/2024

## 2024-05-16 NOTE — ASSESSMENT & PLAN NOTE
Patient's COPD is with exacerbation noted by continued dyspnea and worsening of baseline hypoxia currently.  Patient is currently off COPD Pathway (2/2 being on CHF pathway). Continue scheduled inhalers Steroids and Supplemental oxygen and monitor respiratory status closely.   Complete 5 day course of prednisone

## 2024-05-17 NOTE — TELEPHONE ENCOUNTER
----- Message from Annelise Malik sent at 5/17/2024  9:09 AM CDT -----  Regarding: Appt  Contact: [phone]  Type:  Needs Medical Advice    Who Called: Selma  Would the patient rather a call back or a response via [facility]? Call  Best Call Back Number: [phone]  Additional Information: Patient has an appt scheduled for 5/21. Patient recently d/c from hosp

## 2024-05-17 NOTE — PROGRESS NOTES
C3 nurse spoke with Selma Hooper for a TCC post hospital discharge follow up call. The patient has a scheduled [facility] appointment with Kay No on 05/24/2024 @ 1000.

## 2024-05-21 PROBLEM — N18.31 STAGE 3A CHRONIC KIDNEY DISEASE: Status: ACTIVE | Noted: 2024-01-01

## 2024-05-21 NOTE — PROGRESS NOTES
CHIEF COMPLAINT/HPI: Selma is a 78 y.o. woman with NICM(nonischemic cardiomyopathy), HTN, combined systolic and diastolic HF( EF 5-10%), with hospitalizations due to DAHF., last was after she ate crawfish, and CKD.  She is new to my care and to the clinic, accompanied by her daughter, and using NC and wheelchair.     Past Medical History:   Diagnosis Date    Abnormal liver enzymes 12/10/2018    Acute on chronic combined systolic and diastolic congestive heart failure 4/2/2021    Acute on chronic respiratory failure with hypoxia 4/2/2021    Acute on chronic respiratory failure with hypoxia and hypercapnia 12/10/2021    CHF (congestive heart failure)     COPD exacerbation 7/12/2022    Former smoker 10/24/2018    Hypertension     Smoker 7/27/2016       Selma reports that she quit smoking about 5 years ago. Her smoking use included cigarettes. She has been exposed to tobacco smoke. She has never used smokeless tobacco. She reports current alcohol use. She reports that she does not use drugs.    Family History   Problem Relation Name Age of Onset    Heart disease Mother      Heart attack Mother      Hypertension Mother      Colon cancer Father          colon    Dementia Father      Hypertension Father      Colon cancer Brother  63        colon    Heart disease Brother      Hypertension Brother      Diabetes Daughter      Crohn's disease Neg Hx      Ulcerative colitis Neg Hx      Stomach cancer Neg Hx         ROS:  Review of Systems   Constitutional:  Negative for activity change, appetite change, chills, fever and unexpected weight change.   HENT:  Negative for congestion, ear discharge, hearing loss, nosebleeds, sore throat and tinnitus.    Eyes:  Negative for photophobia, pain, redness and visual disturbance.   Respiratory:  Negative for cough, chest tightness, shortness of breath and wheezing.    Cardiovascular:  Negative for chest pain, palpitations and leg swelling.   Gastrointestinal:  Negative for abdominal  "distention, constipation, diarrhea, nausea and vomiting.   Endocrine: Negative for cold intolerance, heat intolerance, polydipsia and polyuria.   Genitourinary:  Negative for decreased urine volume, difficulty urinating, dysuria, flank pain and hematuria.   Musculoskeletal:  Negative for arthralgias, gait problem, joint swelling and neck stiffness.   Skin:  Negative for rash.   Neurological:  Negative for dizziness, tremors, weakness, numbness and headaches.   Psychiatric/Behavioral:  Negative for confusion and sleep disturbance.          PE:    Vitals:    05/21/24 1104   BP: 99/67   Pulse: 100   SpO2: 97%   Weight: 62.5 kg (137 lb 12.6 oz)   Height: 5' 1" (1.549 m)     Physical Exam  Constitutional:       General: She is not in acute distress.     Appearance: She is not diaphoretic.   HENT:      Head: Normocephalic and atraumatic.      Right Ear: External ear normal.      Left Ear: External ear normal.   Eyes:      General:         Right eye: No discharge.         Left eye: No discharge.      Conjunctiva/sclera: Conjunctivae normal.      Pupils: Pupils are equal, round, and reactive to light.   Neck:      Vascular: No JVD.   Cardiovascular:      Rate and Rhythm: Normal rate and regular rhythm.      Heart sounds: No murmur heard.     No friction rub. No gallop.   Pulmonary:      Effort: Pulmonary effort is normal. No respiratory distress.      Breath sounds: Normal breath sounds. No stridor. No wheezing or rales.   Chest:      Chest wall: No tenderness.   Abdominal:      Palpations: Abdomen is soft.      Tenderness: There is no abdominal tenderness. There is no guarding or rebound.   Musculoskeletal:         General: No tenderness.      Cervical back: Normal range of motion and neck supple.   Skin:     Findings: No erythema or rash.   Neurological:      Mental Status: She is alert and oriented to person, place, and time.         Impression/Plan:    1. NICM (nonischemic cardiomyopathy)    2. Acute on chronic " "respiratory failure with hypoxia and hypercapnia    3. Type 2 diabetes mellitus with hyperglycemia, without long-term current use of insulin    4. Hypertension, essential    5. Chronic combined systolic and diastolic congestive heart failure    6. Stage 3a chronic kidney disease      # CKD 3a: due to CRS, HTN, AKIs and possible hypoxic from COPD/hx of smoking.  -counseled on salt and water intake and on diet and diuretic compliance.  -US.  -Jardiance ordered by cardiology but not started due to " expensive"    Lab Results   Component Value Date    CREATININE 1.0 05/16/2024     Protein Creatinine Ratios:    No results found for: "UTPCR"      Acid-Base:   Lab Results   Component Value Date     05/16/2024    K 3.9 05/16/2024    CO2 34 (H) 05/16/2024     #HTN: Blood pressures acceptable. On lasix, spironolactone, Entersto and metoprolol.   -counseled extensively on low salt diet.  #   Lab Results   Component Value Date    CALCIUM 9.6 05/16/2024    PHOS 4.0 05/16/2024       # Anemia:   Lab Results   Component Value Date    HGB 9.7 (L) 05/16/2024    Iron panel    # DM:  Last HbA1C   Lab Results   Component Value Date    HGBA1C 5.3 05/14/2024       # Lipid management:   Lab Results   Component Value Date    LDLCALC 100.4 10/30/2023     Visit today included increased complexity associated with the care of the episodic problem CKD, Cardiorenal syndrome, anemia  addressed and managing the longitudinal care of the patient due to the serious and/or complex managed problem(s) .    # ESRD planing: none     Follow up in 4-6m with labs.      "

## 2024-05-24 NOTE — PROGRESS NOTES
Subjective     Patient ID: Selma Hooper is a 78 y.o. female.    Chief Complaint: Follow-up (Hospital )    Pt of Dr. Andrade, here for hospital follow up    Present with daughter today. Doing ok, BP still low, has Cardiology follow up next Monday with testing. On Digital Medicine, will have them check BP cuff at [facility] today.    I have reviewed the hospital discharge summary below:    Discharge Summary by Abel Hall MD at 5/16/2024 12:44 PM  Version 1 of 1  Author: Abel Hall MD Service: Hospital Medicine Author Type: Physician   Filed: 5/16/2024 12:45 PM Creation Time: 5/16/2024 12:44 PM Status: Signed   : Abel Hall MD (Physician)   [facility]  Discharge Summary        Patient Name: Selma Hooper  MRN: [id number]  JUAQUIN: [id number]  Patient Class: IP- Inpatient  Admission Date: 5/14/2024  Hospital Length of Stay: 2 days  Discharge Date and Time:  05/16/2024 12:44 PM  Attending Physician: Abel Hall MD   Discharging Provider: Abel Hall MD  Primary Care Provider: Phil Andrade MD  Hospital Medicine Team: [facility] HOSP MED A Abel Hall MD  Primary Care Team: [facility] HOSP MED A     HPI:   Selma Hooper is a 78 y.o. female with PMHx of f Hypertension, Hyperlipidemia, NICM/HFrEF, LBBB, COPD, On home O2. She presents to [facility] for SOB. She has been needing to use her home oxygen (1.5L) at all times the last few days. Normally she is able to do things like walk to the bathroom without it. She felt some relief of SOB/wheezing 2 days ago after using nebulizer once. However, yesterday evening she was feeling increasingly SOB and developed orthopnea. Normally lies flat but was SOB even when propped up with 2 pillows. Endorses mild ankle edema. She attempted to use her nebulizer and take an extra 40 mg of lasix but this did not help with her SOB. She then called her daughter who brought her here. She reports feeling diaphoretic when she came in 2/2 her distress. She has  had a dry cough for about one week now and has been taking zyrtec without much relief. Denies chest pain, palpitations, fever, chills, abdominal pain, nausea, vomiting. She is compliant with her medications. She was on a Jardiance trial for 14 days but cannot afford it anymore. She takes Lasix 40 mg daily. She does not weight herself because her scale is broken. She reports normal weight is about 140 lb. She normally follows a low salt diet but ate salty crawfish yesterday prior to symptom onset. She does not follow any fluid restriction.      In the ED:  Tachypneic, tachycardic, and hypoxic. HR initially 130s and RR 20-30s and saturating 92% on 3L NC. She was placed on BiPAP and weaned to 2-3L NC with improvement in respiratory status. K 3.1. Cr 1.4 (baseline ~1.0). BNP 2,320. Troponin 0.068. CRP 8.5. Influenza and Covid-19 negative. Procal negative. LA 2.2. EKG with sinus tachycardia and LBBB (seen on previous EKGs). CXR with diffuse coarse/increased interstitial attenuation with bibasilar predominance. She was given lasix 80 mg IVP and duo nebs x3 with improvement in symptoms. She was seen by cardiology in the ED. Admitted to  for CHF/COPD exacerbation.      * No surgery found *       Hospital Course:   See individual problem list.      Goals of Care Treatment Preferences:  Code Status: DNR        Consults:   Consults (From admission, onward)            Status Ordering Provider       Inpatient consult to Cardiology  Once        Provider:  (Not yet assigned)    Completed DIMITRI HODGE       Inpatient consult to Social Work/Case Management  Once        Provider:  (Not yet assigned)    Acknowledged RADHA KAPLAN       Inpatient consult to Registered Dietitian/Nutritionist  Once        Provider:  (Not yet assigned)    Completed RADHA KAPLAN             Physical Exam  Vitals and nursing note reviewed.   Constitutional:       General: She is not in acute distress.     Interventions: Nasal cannula in  place.   HENT:      Head: Normocephalic and atraumatic.      Nose: Nose normal.      Mouth/Throat:      Pharynx: Oropharynx is clear.   Eyes:      Extraocular Movements: Extraocular movements intact.      Conjunctiva/sclera: Conjunctivae normal.   Cardiovascular:      Rate and Rhythm: Normal rate and regular rhythm.      Heart sounds: Normal heart sounds.   Pulmonary:      Effort: Pulmonary effort is normal. No respiratory distress.      Breath sounds: Minimal bibasilar crackles, improved  Abdominal:      General: Bowel sounds are normal. There is no distension.      Palpations: Abdomen is soft.   Musculoskeletal:         General: Normal range of motion.      Cervical back: Normal range of motion. No tenderness.      Right lower leg: No edema.      Left lower leg: No edema.   Skin:     General: Skin is warm and dry.   Neurological:      General: No focal deficit present.      Mental Status: She is alert and oriented to person, place, and time.   Psychiatric:         Mood and Affect: Mood normal.         Behavior: Behavior normal.      Pulmonary  COPD exacerbation  Patient's COPD is with exacerbation noted by continued dyspnea and worsening of baseline hypoxia currently.  Patient is currently off COPD Pathway (2/2 being on CHF pathway). Continue scheduled inhalers Steroids and Supplemental oxygen and monitor respiratory status closely.   Complete 5 day course of prednisone     Acute on chronic respiratory failure with hypoxia and hypercapnia  Patient with Hypercapnic and Hypoxic Respiratory failure which is Acute on chronic.  she is on home oxygen at 1.5 LPM. Supplemental oxygen was provided and noted-       Signs/symptoms of respiratory failure include- tachypnea, increased work of breathing, respiratory distress, and wheezing. Contributing diagnoses includes - CHF and COPD Labs and images were reviewed. Patient Has recent ABG, which has been reviewed. Will treat underlying causes and adjust management of  respiratory failure as follows- See COPD and CHF        Cardiac/Vascular  * Acute on chronic combined systolic and diastolic congestive heart failure  Patient is identified as having Combined Systolic and Diastolic heart failure that is Acute on chronic. CHF is currently uncontrolled due to Dyspnea not returned to baseline after 1 doses of IV diuretic, >3 pillow orthopnea, and Pulmonary edema/pleural effusion on CXR. Latest ECHO performed and demonstrates- Results for orders placed during the hospital encounter of 07/12/22     Echo     Interpretation Summary  · The left ventricle is severely enlarged with eccentric hypertrophy and severely decreased systolic function. The estimated ejection fraction is 15%.  · There is severe left ventricular global hypokinesis.  · Normal right ventricular size with normal right ventricular systolic function.  · Grade I left ventricular diastolic dysfunction.  · Moderate left atrial enlargement.  · Mild mitral regurgitation.  · The estimated PA systolic pressure is 32 mmHg.  · Normal central venous pressure (3 mmHg).  . Continue Beta Blocker, Furosemide, and ARNI and monitor clinical status closely. Monitor on telemetry. Patient is on CHF pathway.  Monitor strict Is&Os and daily weights.  Place on fluid restriction of 1.5 L. Cardiology has been consulted by ED. Continue to stress to patient importance of self efficacy and  on diet for CHF. Last BNP reviewed- and noted below       Recent Labs   Lab 05/14/24  0200   BNP 2,320*      - Possibly exacerbated by eating salty foods lately. Does not weigh herself/follow any fluid restriction.  - Not able to afford prescribed Jardiance, reports cardiology clinic is getting her on a patient assistance program. Compliant with BB, Entresto, and lasix 40 daily.   - S/p 80 mg Lasix IVP in ED  TTE 5/14 showed EF of 5-10%. Patient requesting medical management only, doesn't want invasive measures  Increased lasix to 80mg BID and added  spironolactone 25mg daily  Has diuresed well. IVC on POCUS fully collapsible 5/16/24. Euvolemic on exam. Switch to home dose of lasix 40mg daily and cont spironolactone 25mg daily  Follow up with cardiology outpatient.  orders placed.     Elevated troponin level  - Troponin 0.068> trend to peak/flat. Initial trop is below previous values.   - EKG with known LBBB. No CP, do not currently suspect ACS.   - Suspect demand ischemia from hypervolemia/tachycardia.   - Trops have trended down        LBBB (left bundle branch block)  History noted.     Hypertension, essential  Chronic, controlled. Latest blood pressure and vitals reviewed-      Temp:  [97.2 °F (36.2 °C)-98.1 °F (36.7 °C)]   Pulse:  []   Resp:  [16-19]   BP: ()/(55-69)   SpO2:  [92 %-98 %] .   Home meds for hypertension were reviewed and noted below.   Hypertension Medications                    furosemide (LASIX) 40 MG tablet Take 1 tablet (40 mg total) by mouth once daily.     metoprolol succinate (TOPROL-XL) 25 MG 24 hr tablet Take 1 tablet (25 mg total) by mouth once daily.     sacubitriL-valsartan (ENTRESTO) 24-26 mg per tablet Take 1 tablet by mouth 2 (two) times daily. Don't take valsartan and Entresto together.      While in the hospital, will manage blood pressure as follows; Continue home antihypertensive regimen  Will utilize p.r.n. blood pressure medication only if patient's blood pressure greater than 180/110 and she develops symptoms such as worsening chest pain or shortness of breath.     Renal/  Hypokalemia  Patient has hypokalemia which is Acute and currently uncontrolled. Most recent potassium levels reviewed-         Lab Results   Component Value Date     K 3.9 05/16/2024   Will continue potassium replacement per protocol and recheck repeat levels after replacement completed. Monitor will diuresing      JAMIR (acute kidney injury)  Patient with acute kidney injury/acute renal failure likely due to cardiorenal. JAMIR is currently  stable. Baseline creatinine  ~1.0  - Labs reviewed- Renal function/electrolytes with Estimated Creatinine Clearance: 41 mL/min (based on SCr of 1 mg/dL). according to latest data. Monitor urine output and serial BMP and adjust therapy as needed. Avoid nephrotoxins and renally dose meds for GFR listed above.  - Mild JAMIR with Cr 1.4 on admission, baseline closer to 1.0  - Monitor for improvement with diuresis  - Improved  - Follow up with nephrology outpatient     Endocrine  Type 2 diabetes mellitus with hyperglycemia, without long-term current use of insulin  - Last A1c 5.1 (10/2023). Not on DM medications (with exception of Jardiance for CHF)  -  on admission, repeating A1c.   -SSI while on steroids     Palliative Care  ACP (advance care planning)  Advance Care Planning  Date: 05/14/2024     Code Status  In light of the patients advanced and life limiting illness,I engaged the the patient in a voluntary conversation about the patient's preferences for care  at the very end of life. The patient wishes to have a natural, peaceful death.  Along those lines, the patient does not wish to have CPR or other invasive treatments performed when her heart and/or breathing stops. I communicated to the patient that a DNR order would be placed in her medical record to reflect this preference.  I spent a total of 10 minutes engaging the patient in this advance care planning discussion.        Other  Allergy to statin medication  - Noted. Not on a statin.               Final Active Diagnoses:     Diagnosis Date Noted POA    PRINCIPAL PROBLEM:  Acute on chronic combined systolic and diastolic congestive heart failure [I50.43] 04/02/2021 Yes    Type 2 diabetes mellitus with hyperglycemia, without long-term current use of insulin [E11.65] 10/26/2023 Yes    COPD exacerbation [J44.1] 07/12/2022 Yes    Elevated troponin level [R79.89] 07/12/2022 Yes    Acute on chronic respiratory failure with hypoxia and hypercapnia [J96.21,  J96.22] 12/10/2021 Yes    ACP (advance care planning) [Z71.89] 08/23/2021 Not Applicable    Allergy to statin medication [Z88.8] 08/16/2021 Not Applicable    Hypokalemia [E87.6] 04/02/2021 Yes    LBBB (left bundle branch block) [I44.7] 06/20/2019 Yes       Chronic    JAMIR (acute kidney injury) [N17.9] 07/14/2018 Yes    Hypertension, essential [I10] 06/25/2015 Yes       Problems Resolved During this Admission:         Discharged Condition: good     Disposition: Home or Self Care     Follow Up:     Patient Instructions:             Basic metabolic panel   Standing Status: Future Standing Exp. Date: 08/14/25           Ambulatory referral/consult to Cardiology    Standing Status: Future   Referral Priority: Routine Referral Type: Consultation    Referral Reason: Specialty Services Required    Requested Specialty: Cardiology    Number of Visits Requested: 1            Ambulatory referral/consult to Nephrology    Standing Status: Future   Referral Priority: Routine Referral Type: Consultation    Referral Reason: Specialty Services Required    Requested Specialty: Nephrology    Number of Visits Requested: 1           Diet Cardiac   Order Comments: Fluid restriction 1.5L daily      Notify your health care provider if you experience any of the following:  temperature >100.4      Notify your health care provider if you experience any of the following:  persistent nausea and vomiting or diarrhea      Notify your health care provider if you experience any of the following:  difficulty breathing or increased cough      Notify your health care provider if you experience any of the following:  severe persistent headache      Notify your health care provider if you experience any of the following:  persistent dizziness, light-headedness, or visual disturbances      Notify your health care provider if you experience any of the following:  increased confusion or weakness      Activity as tolerated         Significant Diagnostic  Studies: Labs: All labs within the past 24 hours have been reviewed     Pending Diagnostic Studies:         None             Medications:  Reconciled Home Medications:       Medication List          START taking these medications       predniSONE 20 MG tablet  Commonly known as: DELTASONE  Take 2 tablets (40 mg total) by mouth once daily. for 2 days  Start taking on: May 17, 2024      spironolactone 25 MG tablet  Commonly known as: ALDACTONE  Take 1 tablet (25 mg total) by mouth once daily.  Start taking on: May 17, 2024                CHANGE how you take these medications       albuterol-ipratropium 2.5 mg-0.5 mg/3 mL nebulizer solution  Commonly known as: DUO-NEB  Take 3 mLs by nebulization every 6 (six) hours as needed for Wheezing. Rescue  What changed: Another medication with the same name was removed. Continue taking this medication, and follow the directions you see here.      ENTRESTO 24-26 mg per tablet  Generic drug: sacubitriL-valsartan  Take 1 tablet by mouth 2 (two) times daily. HOLD UNTIL FOLLOW UP APPT WITH PCP AND/OR CARDIOLOGY  What changed: additional instructions                CONTINUE taking these medications       fluticasone-salmeterol 250-50 mcg/dose 250-50 mcg/dose diskus inhaler  Commonly known as: ADVAIR  Inhale 1 puff into the lungs 2 (two) times daily. Controller      furosemide 40 MG tablet  Commonly known as: LASIX  Take 1 tablet (40 mg total) by mouth once daily.      JARDIANCE 10 mg tablet  Generic drug: empagliflozin  Take 1 tablet (10 mg total) by mouth once daily.      metoprolol succinate 25 MG 24 hr tablet  Commonly known as: TOPROL-XL  Take 1 tablet (25 mg total) by mouth once daily.                   Indwelling Lines/Drains at time of discharge:   Lines/Drains/Airways         Drain  Duration                Female External Urinary Catheter w/ Suction 05/14/24 8784 2 days                          Time spent on the discharge of patient: 35 minutes           Abel Hall,  MD  Department of Hospital Medicine  [facility]            Review of Systems   Constitutional:  Negative for activity change, appetite change and unexpected weight change.   HENT:  Negative for dental problem and hearing loss.    Eyes:  Negative for visual disturbance.   Respiratory:  Negative for apnea, cough, chest tightness and shortness of breath.    Cardiovascular:  Negative for chest pain, palpitations and leg swelling.   Gastrointestinal:  Negative for abdominal distention, abdominal pain, anal bleeding, blood in stool, constipation, diarrhea, nausea, rectal pain and vomiting.   Endocrine: Negative for cold intolerance, heat intolerance, polydipsia, polyphagia and polyuria.   Genitourinary:  Negative for difficulty urinating, hematuria, menstrual problem, pelvic pain and vaginal pain.   Musculoskeletal:  Negative for arthralgias.   Integumentary:  Negative for color change.   Allergic/Immunologic: Negative for environmental allergies, food allergies and immunocompromised state.   Neurological:  Negative for dizziness, speech difficulty, weakness, light-headedness, numbness and headaches.   Hematological:  Negative for adenopathy. Does not bruise/bleed easily.   Psychiatric/Behavioral:  Negative for agitation, behavioral problems, sleep disturbance and suicidal ideas.             Review of patient's allergies indicates:   Allergen Reactions    Atorvastatin      Leg cramps         Current Outpatient Medications:     albuterol-ipratropium (DUO-NEB) 2.5 mg-0.5 mg/3 mL nebulizer solution, Take 3 mLs by nebulization every 6 (six) hours as needed for Wheezing. Rescue, Disp: 90 mL, Rfl: 3    empagliflozin (JARDIANCE) 10 mg tablet, Take 1 tablet (10 mg total) by mouth once daily., Disp: 30 tablet, Rfl: 2    fluticasone-salmeterol diskus inhaler 250-50 mcg, Inhale 1 puff into the lungs 2 (two) times daily. Controller, Disp: 60 each, Rfl: 11    furosemide (LASIX) 40 MG tablet, Take 1 tablet (40 mg total) by  mouth once daily., Disp: 90 tablet, Rfl: 3    metoprolol succinate (TOPROL-XL) 25 MG 24 hr tablet, Take 1 tablet (25 mg total) by mouth once daily., Disp: 90 tablet, Rfl: 3    sacubitriL-valsartan (ENTRESTO) 24-26 mg per tablet, Take 1 tablet by mouth 2 (two) times daily. HOLD UNTIL FOLLOW UP APPT WITH PCP AND/OR CARDIOLOGY, Disp: 180 tablet, Rfl: 3    spironolactone (ALDACTONE) 25 MG tablet, Take 1 tablet (25 mg total) by mouth once daily., Disp: 30 tablet, Rfl: 0    Patient Active Problem List   Diagnosis    Family history of colon cancer    Colon polyps    Hypertension, essential    Dyspnea    Subclinical hyperthyroidism    JAMIR (acute kidney injury)    NICM (nonischemic cardiomyopathy)    Lung nodule    Pulmonary emphysema    Anemia    Personal history of nicotine dependence    Noncompliance    LBBB (left bundle branch block)    Acute on chronic combined systolic and diastolic congestive heart failure    Hypokalemia    Statin intolerance    Allergy to statin medication    ACP (advance care planning)    Acute on chronic respiratory failure with hypoxia and hypercapnia    Chronic combined systolic and diastolic congestive heart failure    COVID-19    Leukopenia    COPD exacerbation    Elevated troponin level    Alpha thalassemia trait    Type 2 diabetes mellitus with hyperglycemia, without long-term current use of insulin    Decreased range of motion of shoulder    Impaired mobility and ADLs    Postural imbalance    Abnormal mammogram    Stage 3a chronic kidney disease       Past Medical History:   Diagnosis Date    Abnormal liver enzymes 12/10/2018    Acute on chronic combined systolic and diastolic congestive heart failure 4/2/2021    Acute on chronic respiratory failure with hypoxia 4/2/2021    Acute on chronic respiratory failure with hypoxia and hypercapnia 12/10/2021    CHF (congestive heart failure)     COPD exacerbation 7/12/2022    Former smoker 10/24/2018    Hypertension     Smoker 7/27/2016       Past  Surgical History:   Procedure Laterality Date    BREAST BIOPSY      left  side years ago in high school       CHOLECYSTECTOMY      COLONOSCOPY      COLONOSCOPY N/A 2021    Procedure: COLONOSCOPY;  Surgeon: Shree Amaya MD;  Location: [facility] ([address] [place] [address]);  Service: Endoscopy;  Laterality: N/A;  Do not cancel this order  fully vaccinated-see immunization record  EF 18%  -covid [place]    HYSTERECTOMY         Social History     Socioeconomic History    Marital status: Single    Number of children: 2   Occupational History    Occupation: ret health and      Comment: [employer]   Tobacco Use    Smoking status: Former     Current packs/day: 0.00     Types: Cigarettes     Quit date: 2018     Years since quittin.9     Passive exposure: Past    Smokeless tobacco: Never   Substance and Sexual Activity    Alcohol use: Yes     Comment: occasional drinker, not a daily drinker    Drug use: No    Sexual activity: Not Currently     Partners: Male     Social Determinants of Health     Financial Resource Strain: Medium Risk (5/15/2024)    Overall Financial Resource Strain (CARDIA)     Difficulty of Paying Living Expenses: Somewhat hard   Food Insecurity: No Food Insecurity (5/15/2024)    Hunger Vital Sign     Worried About Running Out of Food in the Last Year: Never true     Ran Out of Food in the Last Year: Never true   Transportation Needs: No Transportation Needs (5/15/2024)    TRANSPORTATION NEEDS     Transportation : No   Physical Activity: Inactive (5/15/2024)    Exercise Vital Sign     Days of Exercise per Week: 0 days     Minutes of Exercise per Session: 0 min   Stress: No Stress Concern Present (5/15/2024)    [employer] [place] [employer] - Occupational Stress Questionnaire     Feeling of Stress : Only a little   Housing Stability: Low Risk  (5/15/2024)    Housing Stability Vital Sign     Unable to Pay for Housing in the Last Year: No     Homeless in the Last Year: No  "      Family History   Problem Relation Name Age of Onset    Heart disease Mother Dottie     Heart attack Mother Dottie     Hypertension Mother Dottie     Arthritis Mother Dottie             Colon cancer Father Kp Sr.         colon    Dementia Father Kp Sr.     Hypertension Father Kp Sr.     Cancer Father Kp Sr.     Colon cancer Brother Kp 63        colon    Heart disease Brother Kp     Hypertension Brother Kp     Diabetes Daughter      Cancer Brother Alonso     Crohn's disease Neg Hx      Ulcerative colitis Neg Hx      Stomach cancer Neg Hx         Objective     Vitals:    24 1011 24 1023   BP: 98/62 100/60   Pulse: 85    Resp: 16    Temp: 97.7 °F (36.5 °C)    TempSrc: Oral    SpO2: 98%    Weight: 62.2 kg (137 lb 2 oz)    Height: 5' 1" (1.549 m)    PainSc: 0-No pain      Body mass index is 25.91 kg/m².    Physical Exam  Vitals and nursing note reviewed.   Constitutional:       Appearance: Normal appearance.   HENT:      Head: Normocephalic.      Nose: Nose normal.      Mouth/Throat:      Mouth: Mucous membranes are moist.   Eyes:      Conjunctiva/sclera: Conjunctivae normal.   Cardiovascular:      Rate and Rhythm: Normal rate and regular rhythm.      Heart sounds: Normal heart sounds.   Pulmonary:      Effort: Pulmonary effort is normal.      Breath sounds: Normal breath sounds.      Comments: On oxygen 2 L/NC  Musculoskeletal:         General: Normal range of motion.      Cervical back: Normal range of motion.   Skin:     General: Skin is warm and dry.   Neurological:      General: No focal deficit present.      Mental Status: She is alert and oriented to person, place, and time.   Psychiatric:         Mood and Affect: Mood normal.         Behavior: Behavior normal.         Thought Content: Thought content normal.         Judgment: Judgment normal.            Assessment and Plan     1. Hospital discharge follow-up  2. Acute on chronic respiratory failure with hypoxia and " hypercapnia  3. Hypertension, essential  Keep Cardiology Monday as scheduled    4. BMI 25.0-25.9,adult  BMI reviewed           Follow up if symptoms worsen or fail to improve.

## 2024-05-24 NOTE — PROGRESS NOTES
[facility] Provider Note (Initial Clinic) Consult Note    Age: 78 y.o.  Gender: female  Ethnicity: Black or   Number of admissions for CHF within the preceding year: 1  Duration of CHF: 2018  Type of Congestive Heart Failure: Combined   Social history: former smoker, quit 2018 (previously 1ppd x 40 years), denies alcohol use or illicit drug use   Enrolled in Infusion suite: no    Diagnostic Labs:   EKG - 05/16/2024  CXR - 05/14/2024  ECHO - 05/14/2024  Stress test -   Stress echo - 12/21/2021  Pharmacologic stress -   Cardiac catheterization -    Cardiac MRI -     Lab Results   Component Value Date     05/22/2024     05/16/2024    K 4.4 05/22/2024    K 3.9 05/16/2024     05/22/2024    CL 97 05/16/2024    CO2 28 05/22/2024    CO2 34 (H) 05/16/2024    GLU 89 05/22/2024     (H) 05/16/2024    BUN 14 05/22/2024    BUN 31 (H) 05/16/2024    CREATININE 1.1 05/22/2024    CREATININE 1.0 05/16/2024    CALCIUM 9.3 05/22/2024    CALCIUM 9.6 05/16/2024    PROT 5.9 (L) 05/16/2024    PROT 6.3 05/15/2024    ALBUMIN 3.3 (L) 05/16/2024    ALBUMIN 3.3 (L) 05/15/2024    BILITOT 0.3 05/16/2024    BILITOT 0.3 05/15/2024    ALKPHOS 71 05/16/2024    ALKPHOS 73 05/15/2024    AST 17 05/16/2024    AST 20 05/15/2024    ALT 18 05/16/2024    ALT 21 05/15/2024    ANIONGAP 11 05/22/2024    ANIONGAP 10 05/16/2024    ESTGFRAFRICA >60.0 07/25/2022    ESTGFRAFRICA >60.0 07/19/2022    EGFRNONAA >60.0 07/25/2022    EGFRNONAA >60.0 07/19/2022       Lab Results   Component Value Date    WBC 5.59 05/16/2024    WBC 4.46 05/15/2024    RBC 3.96 (L) 05/16/2024    RBC 4.23 05/15/2024    HGB 9.7 (L) 05/16/2024    HGB 10.2 (L) 05/15/2024    HCT 31.1 (L) 05/16/2024    HCT 33.9 (L) 05/15/2024    MCV 79 (L) 05/16/2024    MCV 80 (L) 05/15/2024    MCH 24.5 (L) 05/16/2024    MCH 24.1 (L) 05/15/2024    MCHC 31.2 (L) 05/16/2024    MCHC 30.1 (L) 05/15/2024    RDW 14.7 (H) 05/16/2024    RDW 14.2 05/15/2024     05/16/2024    PLT  232 05/15/2024    MPV 10.7 05/16/2024    MPV 10.6 05/15/2024    IMMGR 0.2 05/16/2024    IMMGR 0.4 05/15/2024    IGABS 0.01 05/16/2024    IGABS 0.02 05/15/2024    LYMPH 0.7 (L) 05/16/2024    LYMPH 12.9 (L) 05/16/2024    MONO 0.4 05/16/2024    MONO 6.4 05/16/2024    EOS 0.0 05/16/2024    EOS 0.0 05/15/2024    BASO 0.00 05/16/2024    BASO 0.01 05/15/2024    NRBC 0 05/16/2024    NRBC 0 05/15/2024    GRAN 4.5 05/16/2024    GRAN 80.3 (H) 05/16/2024    EOSINOPHIL 0.2 05/16/2024    EOSINOPHIL 0.0 05/15/2024    BASOPHIL 0.0 05/16/2024    BASOPHIL 0.2 05/15/2024    PLTEST Appears normal 07/12/2022    PLTEST Decreased (A) 05/02/2022    ANISO CANCELED 05/02/2022    ANISO Slight 04/05/2021    HYPO Occasional 05/02/2022       Lab Results   Component Value Date    BNP 2,320 (H) 05/14/2024     (H) 12/28/2023    MG 2.1 05/16/2024    MG 2.3 05/15/2024    PHOS 4.0 05/16/2024    PHOS 3.1 08/08/2022    TROPONINI 0.485 (H) 05/14/2024    TROPONINI 0.690 (H) 05/14/2024    HGBA1C 5.3 05/14/2024    HGBA1C 5.1 10/30/2023    TSH 0.503 12/08/2021    TSH 0.988 04/02/2021    FREET4 0.93 07/12/2018       Lab Results   Component Value Date    IRON 64 08/08/2022    TIBC 419 08/08/2022    FERRITIN 35 07/05/2022    CHOL 162 10/30/2023    TRIG 143 10/30/2023    HDL 33 (L) 10/30/2023    LDLCALC 100.4 10/30/2023    CHOLHDL 20.4 10/30/2023    TOTALCHOLEST 4.9 10/30/2023    NONHDLCHOL 129 10/30/2023    COLORU Colorless (A) 05/14/2024    APPEARANCEUA Clear 05/14/2024    PHUR 8.0 05/14/2024    SPECGRAV 1.010 05/14/2024    PROTEINUA Negative 05/14/2024    GLUCUA Negative 05/14/2024    KETONESU Negative 05/14/2024    BILIRUBINUA Negative 05/14/2024    OCCULTUA Negative 05/14/2024    NITRITE Negative 05/14/2024    LEUKOCYTESUR Negative 05/14/2024       No implanted cardiac devices    Current Outpatient Medications on File Prior to Visit   Medication Sig Dispense Refill    albuterol-ipratropium (DUO-NEB) 2.5 mg-0.5 mg/3 mL nebulizer solution Take 3 mLs by  "nebulization every 6 (six) hours as needed for Wheezing. Rescue 90 mL 3    empagliflozin (JARDIANCE) 10 mg tablet Take 1 tablet (10 mg total) by mouth once daily. 30 tablet 2    fluticasone-salmeterol diskus inhaler 250-50 mcg Inhale 1 puff into the lungs 2 (two) times daily. Controller 60 each 11    furosemide (LASIX) 40 MG tablet Take 1 tablet (40 mg total) by mouth once daily. 90 tablet 3    metoprolol succinate (TOPROL-XL) 25 MG 24 hr tablet Take 1 tablet (25 mg total) by mouth once daily. 90 tablet 3    sacubitriL-valsartan (ENTRESTO) 24-26 mg per tablet Take 1 tablet by mouth 2 (two) times daily. HOLD UNTIL FOLLOW UP APPT WITH PCP AND/OR CARDIOLOGY 180 tablet 3    spironolactone (ALDACTONE) 25 MG tablet Take 1 tablet (25 mg total) by mouth once daily. 30 tablet 0     No current facility-administered medications on file prior to visit.         HPI:  Patient estimates can walk 50 feet and pace slow. Presents to clinic in wheelchair. Wearing 2L supplemental O2 continuously   Patient sleeps on 1 number of pillows   Patient wakes up SOB, has to get out of bed, associated cough- denies PND symptoms   Palpitations - denies   Dizzy, light-headed, pre-syncope or syncope- endorses intermittent lightheadedness/"fogginess" since recent admission. Denies pre-syncope/syncope. Enrolled in HTN management program with softer home recorded BP of 100s/60s.   Since discharge frequency of performing weights, home weight and weight change- performing daily weights. Reports weight downtrending on home scale 140lbs on hospital discharge, 135lbs today    Other information felt pertinent to HPI: Selma Hooper is a 79 yo female with a PMHx of HTN, HLD (intolerant to statins), HFrEF, LBBB, COPD, On home O2 who presents to first [facility] visit following recent admission for ADHF. On ED presentation BNP 2,320. Troponin 0.068. CRP 8.5. Influenza and Covid-19 negative. Procal negative. LA 2.2. EKG with sinus tachycardia and LBBB (seen on " previous EKGs). CXR with diffuse coarse/increased interstitial attenuation with bibasilar predominance. She was given lasix 80mg IVP and duo nebs x3 and admitted to  for CHF/COPD exacerbation. TTE during admission showed EF of 5-10%. Patient requesting medical management only, doesn't want invasive measures. She was diuresed with IVP lasix 80mg BID and subsequently discharged.     PHYSICAL:   Vitals:    05/27/24 1331   BP: (!) 103/59   Pulse: 84      @[id number](3)@    JVD: no   Heart rhythm: regular  Cardiac murmur: No    S3: no  S4: no  Lungs:  crackles in posterior lung bases  Hepatojugular reflux: no  Edema: no      Echo 5/14/24:    Left Ventricle: The left ventricle is severely dilated. Severely increased ventricular mass. Normal wall thickness. There is eccentric hypertrophy. Severe global hypokinesis present. There is severely reduced systolic function with a visually estimated ejection fraction of 5 - 10%. There is diastolic dysfunction but grade cannot be determined.    Right Ventricle: Normal right ventricular cavity size. Wall thickness is normal. Right ventricle wall motion  is normal. Systolic function is normal.    Left Atrium: Left atrium is moderately dilated.    Mitral Valve: There is mild regurgitation.    Pulmonary Artery: The estimated pulmonary artery systolic pressure is 32 mmHg.    IVC/SVC: Normal venous pressure at 3 mmHg.       ASSESSMENT: HFrEF    PLAN:      Patient Instructions:   Instruct the patient to notify this clinic if HH, a physician or an advanced care provider wants to change medication one of their HF medications   Activity and Diet restrictions:   Recommend 2-3 gram sodium restriction and 1500cc- 2000cc fluid restriction.  Encourage physical activity with graded exercise program.  Requested patient to weigh themselves daily, and to notify us if their weight increases by more than 3 lbs in 1 day or 5 lbs in 3 days.    Assigned dry weight on home scale: 135lbs  Medication  changes (include current dose and changed dose): NYHA Class IV symptoms. Not grossly volume overloaded on exam. Taking lasix 40mg daily and aldactone 25mg daily. Unable to afford jardiance. Gen cards in contact with payment assistance. Currently holding entresto 2/2 softer pressures. Will discontinue. Labs were still pending during clinic visit, so plan was pending results to reduce aldactone frequency to 25mg every other day (as unable to cut in half for 12.5mg daily) with plan to start low dose ACEi/ARB as tolerates for additional GDMT (as with softer pressures, unlikely to tolerate entresto). Now in ED 5/28 for new weakness and foot drop. Will monitor course.   Other diagnostic tests ordered: She has severe cardiomyopathy with severe global hypokinesis; declining ICD. Introduced palliative care for further GOC discussions/supportive care, declines at this time.     Julieth Us PA-C

## 2024-05-24 NOTE — TELEPHONE ENCOUNTER
Spoke to insurance due to pt unable to afford the Jardiance even w.  coupon, not eligible for assistance so far, not eligible for tiers reduction as per insurance. Likely same cost issue w. Farxiga as per [facility]. Getting the med for 3 mths through insurance mail order pharmacy would be even more expensive ($800s). Pt updated and verbalized understanding.

## 2024-05-27 NOTE — PROGRESS NOTES
"PT here with her daughter for her first [facility] appt of her second enrollment. Hospital Education repeated as follows;    Reviewed the following key points of [facility] program with pt & daughter:              1.) Take your medications as directed. Call Ms Us if any health Care Professional changes your Heart/Fluid medications.               2.) Weight yourself daily. Daily Dry Weights. Upon waking ,empty your bladder, weigh with as little clothes as possible before eating/drinking. Record weight in Symptom Tracker and compare these weights for fluid gain. Weight gain overnight of 3-4 lbs is Fluid, also a gain of 5 lbs in 3 days is Fluid as well. Call US!              3.) Follow low salt and limited fluid diet. Salt/Sodium Restriction 0967-4323 mg, see page 4. Sodium makes you hold onto Fluid. High Sodium Foods;Deli Meat/Cheese, Sausages/Hot Dogs, Fast Food/Restaurant Food, Anything in a box, bottle or can. Cook with Fresh or Frozen ingredients and use seasonings that are labeled NO SALT. Check Portion Sizes, Salt is reported for 1 portion. A can may contain 2-3 portions. Fluid Restriction 1.5 -2 Liters/Day, see page 6, measure all of your Fluid, the milk in your cereal, broth in your soup and ice cream because anything that melts at room temp is a liquid.              4.) Stop smoking and start exercising. Brisk walking is good, don't walk like you are going to the [employer] and DON"T WALK IN THE HEAT! Start low and increase as tolerated. Remember if you don't use it you lose it.              5.) Go to your appointments and call your team. Have your weight and BP/P ready when we call to do the Phone Check ins and call us if you have fluid gain or questions.     Stressed our [facility] number and the after hours number for the Hospital where she will page the Heart Transplant MD on call.  Home Care Guide given to daughter as they don't live in the same home.    All questions answered to PT/Daughter's satisfaction.    "

## 2024-05-27 NOTE — PATIENT INSTRUCTIONS
Will call today with lab results and further aldactone instructions.    Continue to keep daily blood pressure log.     Stop entresto. Will plan to start low dose valsartan pending blood pressure trend.    Will reach out to Pulmonology for portable concentrator orders.

## 2024-05-28 PROBLEM — J43.9 PULMONARY EMPHYSEMA: Chronic | Status: RESOLVED | Noted: 2018-12-07 | Resolved: 2024-01-01

## 2024-05-28 PROBLEM — J44.9 COPD (CHRONIC OBSTRUCTIVE PULMONARY DISEASE): Status: ACTIVE | Noted: 2024-01-01

## 2024-05-28 PROBLEM — N17.0 ACUTE RENAL FAILURE WITH ACUTE TUBULAR NECROSIS SUPERIMPOSED ON STAGE 3A CHRONIC KIDNEY DISEASE: Status: ACTIVE | Noted: 2018-07-14

## 2024-05-28 PROBLEM — N18.31 ACUTE RENAL FAILURE WITH ACUTE TUBULAR NECROSIS SUPERIMPOSED ON STAGE 3A CHRONIC KIDNEY DISEASE: Status: ACTIVE | Noted: 2018-07-14

## 2024-05-28 PROBLEM — R57.0 CARDIOGENIC SHOCK: Status: ACTIVE | Noted: 2024-01-01

## 2024-05-28 PROBLEM — I49.9 ARRHYTHMIA: Status: ACTIVE | Noted: 2024-01-01

## 2024-05-28 PROBLEM — I50.20 HFREF (HEART FAILURE WITH REDUCED EJECTION FRACTION): Status: ACTIVE | Noted: 2024-01-01

## 2024-05-28 PROBLEM — Z66 DNR (DO NOT RESUSCITATE): Status: ACTIVE | Noted: 2024-01-01

## 2024-05-28 PROBLEM — R94.31 LONG QT INTERVAL: Status: ACTIVE | Noted: 2024-01-01

## 2024-05-28 NOTE — ED NOTES
LOC: The patient is awake, alert and aware of environment with an appropriate affect, the patient is oriented x 3 and speaking appropriately.  APPEARANCE: Patient resting comfortably and in no acute distress, patient is clean and well groomed, patient's clothing is properly fastened.  SKIN: The skin is warm and dry, color consistent with ethnicity, patient has normal skin turgor and moist mucus membranes, skin intact, no breakdown or bruising noted.  MUSCULOSKELETAL: Patient moving all extremities spontaneously, no obvious swelling or deformities noted.  RESPIRATORY: Airway is open and patent, respirations are spontaneous, patient has a normal effort and rate, no accessory muscle use noted,   CARDIAC: Patient has a normal rate and regular rhythm, no periphreal edema noted, capillary refill < 3 seconds.  ABDOMEN: Soft and non tender to palpation, no distention noted, normoactive bowel sounds present in all four quadrants.  NEUROLOGIC:  facial expression is symmetrical, patient moving all extremities spontaneously. Follows all commands appropriately, numbness to right foot.

## 2024-05-28 NOTE — H&P
[facility] - Cardiac Intensive Care  Cardiology  History and Physical     Patient Name: Selma Hooper  MRN: [id number]  Admission Date: 5/28/2024  Code Status: DNR   Attending Provider: Tonio Botello MD   Primary Care Physician: Phil Andrade MD  Principal Problem:NICM (nonischemic cardiomyopathy)    Patient information was obtained from relative(s), past medical records, and ER records.     Subjective:     Chief Complaint:  Dyspnea     HPI:  Ms. Hooper is a 77 yo F with CHF, COPD on 2 L O2 at baseline who presented to [facility] ED complaining initially of right lower extremity numbness. Symptoms started at 3 am when she started to have right leg numbness, weakness and had to drag her feet for 20 minutes. During workup of her symptoms at the ED the patient was taken to MRI machine where she was laying flat and started experiencing shortness of breath. Once back in her ED room she notifies the nurse and she was found to be tachycardic on the monitor and was cool and clammy. As per chart review: Pt's previous echo done on 5/14/24 showed left ventricle severely dilated. Severely increased ventricular mass. Severe global hypokinesis and ejection fraction of 5 - 10%.  Pt was recently admitted for CHF exacerbation.     At the ED the VS were remarkable for tachycardia of up to 151, /85 and SPO2 97% on BIPAP.  Lab work remarkable for Hgb 10.8, Creatinine 1.6 from baseline of 0.9, Glucose 472, BNP 2066 and Lactic acid 4.5. Blood gas analisis showed pH 7.132, CO2 95.5, O2 51 and HCO3 31.9. ISTAT also showed potassium of 6.1. Cardiology was consulted and pt was admitted to CCU for further management. Code status discussed with patient and daughter and decided to be DNR.         Past Medical History:   Diagnosis Date    Abnormal liver enzymes 12/10/2018    Acute on chronic combined systolic and diastolic congestive heart failure 4/2/2021    Acute on chronic respiratory failure with hypoxia 4/2/2021    Acute on chronic  respiratory failure with hypoxia and hypercapnia 12/10/2021    CHF (congestive heart failure)     COPD exacerbation 7/12/2022    Former smoker 10/24/2018    Hypertension     Smoker 7/27/2016       Past Surgical History:   Procedure Laterality Date    BREAST BIOPSY      left  side years ago in high school   1962    CHOLECYSTECTOMY      COLONOSCOPY      COLONOSCOPY N/A 08/23/2021    Procedure: COLONOSCOPY;  Surgeon: Shree Amaya MD;  Location: [address]);  Service: Endoscopy;  Laterality: N/A;  Do not cancel this order  fully vaccinated-see immunization record  EF 18%  8/20-covid [facility]    HYSTERECTOMY         Review of patient's allergies indicates:   Allergen Reactions    Atorvastatin      Leg cramps       No current facility-administered medications on file prior to encounter.     Current Outpatient Medications on File Prior to Encounter   Medication Sig    albuterol-ipratropium (DUO-NEB) 2.5 mg-0.5 mg/3 mL nebulizer solution Take 3 mLs by nebulization every 6 (six) hours as needed for Wheezing. Rescue    empagliflozin (JARDIANCE) 10 mg tablet Take 1 tablet (10 mg total) by mouth once daily.    fluticasone-salmeterol diskus inhaler 250-50 mcg Inhale 1 puff into the lungs 2 (two) times daily. Controller    furosemide (LASIX) 40 MG tablet Take 1 tablet (40 mg total) by mouth once daily.    metoprolol succinate (TOPROL-XL) 25 MG 24 hr tablet Take 1 tablet (25 mg total) by mouth once daily.    spironolactone (ALDACTONE) 25 MG tablet Take 1 tablet (25 mg total) by mouth once daily.     Family History       Problem Relation (Age of Onset)    Arthritis Mother    Cancer Father, Brother    Colon cancer Father, Brother (63)    Dementia Father    Diabetes Daughter    Heart attack Mother    Heart disease Mother, Brother    Hypertension Mother, Father, Brother          Tobacco Use    Smoking status: Former     Current packs/day: 0.00     Types: Cigarettes     Quit date: 6/14/2018     Years since  quittin.9     Passive exposure: Past    Smokeless tobacco: Never   Substance and Sexual Activity    Alcohol use: Yes     Comment: occasional drinker, not a daily drinker    Drug use: No    Sexual activity: Not Currently     Partners: Male     Review of Systems   Unable to perform ROS: Other   Respiratory:  Wheezing: on BIPAP.      Objective:     Vital Signs (Most Recent):  Temp: 96.9 °F (36.1 °C) (24 1457)  Pulse: (!) 128 (24 1645)  Resp: (!) 36 (24 1645)  BP: 119/74 (24 1645)  SpO2: 95 % (24 164) Vital Signs (24h Range):  Temp:  [96.9 °F (36.1 °C)-98.1 °F (36.7 °C)] 96.9 °F (36.1 °C)  Pulse:  [] 128  Resp:  [16-48] 36  SpO2:  [94 %-100 %] 95 %  BP: ()/(54-85) 119/74     Weight: 62 kg (136 lb 11 oz)  Body mass index is 25.83 kg/m².    SpO2: 95 %         Intake/Output Summary (Last 24 hours) at 2024 1706  Last data filed at 2024 1600  Gross per 24 hour   Intake 27.75 ml   Output --   Net 27.75 ml       Lines/Drains/Airways       Central Venous Catheter Line  Duration             Trialysis (Dialysis) Catheter 24 1613 right internal jugular <1 day              Drain  Duration             Female External Urinary Catheter w/ Suction 24 0729 <1 day              Peripheral Intravenous Line  Duration                  Peripheral IV - Single Lumen 24 0607 22 G Posterior;Right Hand <1 day         Peripheral IV - Single Lumen 24 1259 20 G Left Hand <1 day                     Physical Exam  Vitals and nursing note reviewed.   Constitutional:       Appearance: Normal appearance. She is ill-appearing.      Comments: L radial arterial line   Eyes:      Extraocular Movements: Extraocular movements intact.      Conjunctiva/sclera: Conjunctivae normal.   Neck:      Comments: RIJ CVC in place dressing C/D/I  Cardiovascular:      Rate and Rhythm: Normal rate and regular rhythm.      Comments: JVD indeterminate 2/2 CVC  Pulmonary:      Effort: Pulmonary  effort is normal.      Breath sounds: Normal breath sounds. No rales.   Abdominal:      General: Bowel sounds are normal. There is distension.      Palpations: Abdomen is soft.   Musculoskeletal:      Cervical back: Normal range of motion.      Right lower leg: No edema.      Left lower leg: No edema.   Skin:     General: Skin is warm and dry.   Neurological:      General: No focal deficit present.      Mental Status: She is alert and oriented to person, place, and time.          Significant Labs: All pertinent lab results from the last 24 hours have been reviewed.    Significant Imaging:  Reviewed  Assessment and Plan:     * Cardiogenic shock  See NICM.    - Dobutamine and nitroprusside ggt    Long QT interval  With wide QRS tachycardia on admission. Qtc 568 on admission.     - K goal 4, Mg 2    COPD (chronic obstructive pulmonary disease)  Patient's COPD is with exacerbation noted by continued dyspnea and worsening of baseline hypoxia currently.  Patient is currently off COPD Pathway. Continue scheduled inhalers Supplemental oxygen and monitor respiratory status closely.     Prior smoking history of 0.5 ppd, 25 yo start, quit 2018.    - Continue maintenance inhaler  - rescue BIPAP    HFrEF (heart failure with reduced ejection fraction)  See NICM    DNR (do not resuscitate)  Pt DNR per chart review and reconfirmed in the ED on this admission. Patient does not want intubation either.    Type 2 diabetes mellitus with hyperglycemia, without long-term current use of insulin  Last A1C 5 during admission two weeks prior to current ICU admission. Not on home DM regimen. Hyperglycemic during this admission.    - LDSSI    Acute on chronic respiratory failure with hypoxia and hypercapnia  Patient with Hypercapnic and Hypoxic Respiratory failure which is Acute on chronic.  she is on home oxygen at 1.5 LPM. Supplemental oxygen was provided and noted- Oxygen Concentration (%):  [50-60] 60    .   Signs/symptoms of respiratory  failure include- tachypnea, increased work of breathing, and respiratory distress. Contributing diagnoses includes - CHF and COPD Labs and images were reviewed. Patient Has recent ABG, which has been reviewed. Will treat underlying causes and adjust management of respiratory failure as follows-     - BIPAP on admission to CCU  - Do not intubate, does not align with patient goals    Acute on chronic combined systolic and diastolic congestive heart failure  Patient is identified as having Combined Systolic and Diastolic heart failure that is Acute on chronic. CHF is currently . Latest ECHO performed and demonstrates- Results for orders placed during the hospital encounter of 05/14/24    Echo    Interpretation Summary    Left Ventricle: The left ventricle is severely dilated. Severely increased ventricular mass. Normal wall thickness. There is eccentric hypertrophy. Severe global hypokinesis present. There is severely reduced systolic function with a visually estimated ejection fraction of 5 - 10%. There is diastolic dysfunction but grade cannot be determined.    Right Ventricle: Normal right ventricular cavity size. Wall thickness is normal. Right ventricle wall motion  is normal. Systolic function is normal.    Left Atrium: Left atrium is moderately dilated.    Mitral Valve: There is mild regurgitation.    Pulmonary Artery: The estimated pulmonary artery systolic pressure is 32 mmHg.    IVC/SVC: Normal venous pressure at 3 mmHg.  . Monitor clinical status closely. Monitor on telemetry. Patient is off CHF pathway.  Monitor strict Is&Os and daily weights.  Place on fluid restriction of 1.5 L. Cardiology has been consulted. Continue to stress to patient importance of self efficacy and  on diet for CHF. Last BNP reviewed- and noted below   Recent Labs   Lab 05/28/24  1259   BNP 2,066*     - Lasix ggt    LBBB (left bundle branch block)  Noted on EKG dating as far back as 03/20/19. Reconfirmed on admission EKG.      NICM (nonischemic cardiomyopathy)  Echo from 05/24/24 with EF 5-10% with global hypokinesis. GDMT includes Toprol 25mg qd, spironolactone 25mg qd.  Unable to afford Jardiance even with coupon. Entresto held on prior admission and DC'd at clinic visit on day prior to admission given continued soft BP. Clinic plan was to introduce low dose ACE/ARB for additional GDMT. Diuresis with Lasix 40mg qd. Patient declined establishing with Palliative Care during recent clinic visit.      - Add GDMT back once patient stabilizes    JAMIR (acute kidney injury)  Patient with acute kidney injury/acute renal failure likely due to pre-renal azotemia due to IVVD JAMIR is currently worsening. Baseline creatinine  0.9  - Labs reviewed- Renal function/electrolytes with Estimated Creatinine Clearance: 25.3 mL/min (A) (based on SCr of 1.6 mg/dL (H)). according to latest data. Monitor urine output and serial BMP and adjust therapy as needed. Avoid nephrotoxins and renally dose meds for GFR listed above.    Plan:    - CMP qd, trend Cr  - strict I/O  - daily weights  - Ucr, Uurea  - renally dose all medications  - avoid nephrotoxic agents, NSAIDs, IV contrast, ACE/ARB  - If JAMIR doesn't improve consider US retroperitoneal      Hypertension, essential  Chronic, controlled. Latest blood pressure and vitals reviewed-     Temp:  [96.9 °F (36.1 °C)-98.1 °F (36.7 °C)]   Pulse:  []   Resp:  [16-48]   BP: ()/(54-85)   SpO2:  [94 %-100 %] .   Home meds for hypertension were reviewed and noted below.   Hypertension Medications               furosemide (LASIX) 40 MG tablet Take 1 tablet (40 mg total) by mouth once daily.    metoprolol succinate (TOPROL-XL) 25 MG 24 hr tablet Take 1 tablet (25 mg total) by mouth once daily.    spironolactone (ALDACTONE) 25 MG tablet Take 1 tablet (25 mg total) by mouth once daily.            While in the hospital, will manage blood pressure as follows; Adjust home antihypertensive regimen as follows- resume  home meds once more stable    Will utilize p.r.n. blood pressure medication only if patient's blood pressure greater than 180/110 and she develops symptoms such as worsening chest pain or shortness of breath.        VTE Risk Mitigation (From admission, onward)           Ordered     heparin (porcine) injection 5,000 Units  Every 8 hours         05/28/24 1624     IP VTE HIGH RISK PATIENT  Once         05/28/24 1624     Place sequential compression device  Until discontinued         05/28/24 1624                    Je Hawkins MD  Cardiology   [facility] - Cardiac Intensive Care

## 2024-05-28 NOTE — ASSESSMENT & PLAN NOTE
Last A1C 5 during admission two weeks prior to current ICU admission. Not on home DM regimen. Hyperglycemic during this admission.    - LDSSI

## 2024-05-28 NOTE — HPI
79 y/o F with PMHx of CHF, COPD (on 2L NC at home) [place] [facility] [place] with complaint of right lower extremity numbness X 2 hours.  Patient woke up around 3:00 a.m. this morning with right lower extremity numbness and tingling. Per chart review, at 1 point she had an episode of right lower extremity weakness that lasted about 30 minutes, she was dragging her foot during that time. She had an episode of right lower extremity tingling while being transported with EMS to the ED but her symptoms had resolved when she arrived to the ED. Patient was recently admitted for CHF exacerbation. Patient begun to have SOB at the ED and was admitted by cardiology for CHF exacerbation. Patient also found to have A-flutter on admission. MRI Brain obtained and is concerning for right temporal lobe/inferior basal ganglia region subacute infarct. Vascular neurology consulted for MRI Brain findings.

## 2024-05-28 NOTE — ED TRIAGE NOTES
"Selma Hooper, a 78 y.o. female presents to the ED w/ complaint of right leg "tingling" this am which has since resolved. States   was able to walk  it off but it "felta sleep"    Triage note:  Chief Complaint   Patient presents with    Numbness     Arrives via EMS for right leg numbness that started this morning x2 hours, states that the numbness went away when EMS arrived, VAN -, denies any weakness currently. On 2 L NC at home,     Review of patient's allergies indicates:   Allergen Reactions    Atorvastatin      Leg cramps     Past Medical History:   Diagnosis Date    Abnormal liver enzymes 12/10/2018    Acute on chronic combined systolic and diastolic congestive heart failure 4/2/2021    Acute on chronic respiratory failure with hypoxia 4/2/2021    Acute on chronic respiratory failure with hypoxia and hypercapnia 12/10/2021    CHF (congestive heart failure)     COPD exacerbation 7/12/2022    Former smoker 10/24/2018    Hypertension     Smoker 7/27/2016      LOC: The patient is awake, alert, aware of environment with an appropriate affect. Oriented x4, speaking appropriately  APPEARANCE: Pt resting comfortably, in no acute distress, pt is clean and well groomed, clothing properly fastened  SKIN:The skin is warm and dry, color consistent with ethnicity, patient has normal skin turgor and moist mucus membranes, bruising noted to left hand which patient states is from a prior blood draw  RESPIRATORY:Airway is open and patent, respirations are spontaneous, patient has a normal effort and rate, no accessory muscle use noted.pt chronically on 2lnc  CARDIAC: Normal rate and rhythm, no peripheral edema noted, capillary refill < 3 seconds, bilateral radial pulses 2+.  ABDOMEN: Soft, non tender, non distended. Bowel sounds present x 4 quadrants.   NEUROLOGIC: PERRLA, facial expression is symmetrical, patient moving all extremities spontaneously, normal sensation in all extremities when touched with a finger.  Follows all " commands appropriately  MUSCULOSKELETAL: Patient moving all extremities spontaneously, no obvious swelling or deformities noted.

## 2024-05-28 NOTE — ASSESSMENT & PLAN NOTE
Echo from 05/24/24 with EF 5-10% with global hypokinesis. GDMT includes Toprol 25mg qd, spironolactone 25mg qd.  Unable to afford Jardiance even with coupon. Entresto held on prior admission and DC'd at clinic visit on day prior to admission given continued soft BP. Clinic plan was to introduce low dose ACE/ARB for additional GDMT. Diuresis with Lasix 40mg qd. Patient declined establishing with Palliative Care during recent clinic visit.      - Add GDMT back once patient stabilizes

## 2024-05-28 NOTE — ED NOTES
MD at bedside to obtain consent for cental line and arterial line.  Discussing with patient and daughter.

## 2024-05-28 NOTE — ASSESSMENT & PLAN NOTE
Patient's COPD is with exacerbation noted by continued dyspnea and worsening of baseline hypoxia currently. Prior smoking history of 0.5 ppd, 23 yo start, quit 2018.    - Continue maintenance inhaler  - rescue BIPAP

## 2024-05-28 NOTE — ED NOTES
Upon returning to ED from MRI, pt began c/o SOB after lying flat in MRI machine.  Pt was cool and clammy.  Pt reconnected to cardiac, blood pressure & pulse ox monitoring.  Pt tachycardic on monitor.  MD notified.

## 2024-05-28 NOTE — ED PROVIDER NOTES
Encounter Date: 2024       History     Chief Complaint   Patient presents with    Numbness     Arrives via EMS for right leg numbness that started this morning x2 hours, states that the numbness went away when EMS arrived, VAN -, denies any weakness currently. On 2 L NC at home,     HPI  Review of patient's allergies indicates:   Allergen Reactions    Atorvastatin      Leg cramps     Past Medical History:   Diagnosis Date    Abnormal liver enzymes 12/10/2018    Acute on chronic combined systolic and diastolic congestive heart failure 2021    Acute on chronic respiratory failure with hypoxia 2021    Acute on chronic respiratory failure with hypoxia and hypercapnia 12/10/2021    CHF (congestive heart failure)     COPD exacerbation 2022    Former smoker 10/24/2018    Hypertension     Smoker 2016     Past Surgical History:   Procedure Laterality Date    BREAST BIOPSY      left  side years ago in high school       CHOLECYSTECTOMY      COLONOSCOPY      COLONOSCOPY N/A 2021    Procedure: COLONOSCOPY;  Surgeon: Shree Amaya MD;  Location: [address]);  Service: Endoscopy;  Laterality: N/A;  Do not cancel this order  fully vaccinated-see immunization record  EF 18%  [facility]    HYSTERECTOMY       Family History   Problem Relation Name Age of Onset    Heart disease Mother Dottie     Heart attack Mother Dottie     Hypertension Mother Dottie     Arthritis Mother Dottie             Colon cancer Father Kp Sr.         colon    Dementia Father Kp Sr.     Hypertension Father Kp Sr.     Cancer Father Kp Sr.     Colon cancer Brother Kp 63        colon    Heart disease Brother Kp     Hypertension Brother Kp     Diabetes Daughter      Cancer Brother Alonso     Crohn's disease Neg Hx      Ulcerative colitis Neg Hx      Stomach cancer Neg Hx       Social History     Tobacco Use    Smoking status: Former     Current packs/day: 0.00     Types:  Cigarettes     Quit date: 2018     Years since quittin.9     Passive exposure: Past    Smokeless tobacco: Never   Substance Use Topics    Alcohol use: Yes     Comment: occasional drinker, not a daily drinker    Drug use: No     Review of Systems    Physical Exam     Initial Vitals [24 0508]   BP Pulse Resp Temp SpO2   123/68 88 16 98.1 °F (36.7 °C) 100 %      MAP       --         Physical Exam    ED Course   Procedures  Labs Reviewed   CBC W/ AUTO DIFFERENTIAL - Abnormal; Notable for the following components:       Result Value    Hemoglobin 10.8 (*)     Hematocrit 34.9 (*)     MCV 79 (*)     MCH 24.5 (*)     MCHC 30.9 (*)     RDW 14.6 (*)     Lymph # 0.9 (*)     All other components within normal limits   COMPREHENSIVE METABOLIC PANEL - Abnormal; Notable for the following components:    Albumin 3.4 (*)     eGFR 58.4 (*)     All other components within normal limits   LIPID PANEL - Abnormal; Notable for the following components:    HDL 32 (*)     All other components within normal limits   ISTAT PROCEDURE - Abnormal; Notable for the following components:    POC Glucose 295 (*)     POC Potassium 6.1 (*)     POC TCO2 (MEASURED) 32 (*)     All other components within normal limits   ISTAT LACTATE - Abnormal; Notable for the following components:    POC Lactate 4.66 (*)     All other components within normal limits   PROTIME-INR   TSH   B-TYPE NATRIURETIC PEPTIDE   LACTIC ACID, PLASMA   BASIC METABOLIC PANEL   POCT GLUCOSE, HAND-HELD DEVICE   POCT GLUCOSE   ISTAT CHEM8        ECG Results              ECG 12 lead (Final result)        Collection Time Result Time QRS Duration OHS QTC Calculation    24 05:59:27 24 07:41:17 170 528                     Final result by Interface, [facility] (24 07:41:23)                   Narrative:    Test Reason : R29.818,    Vent. Rate : 074 BPM     Atrial Rate : 074 BPM     P-R Int : 172 ms          QRS Dur : 170 ms      QT Int : 476 ms       P-R-T  Axes : 072 066 270 degrees     QTc Int : 528 ms    Normal sinus rhythm  Left bundle branch block  Abnormal ECG  When compared with ECG of 16-MAY-2024 05:14,  Inverted T waves have replaced nonspecific T wave abnormality in Inferior  leads  Inverted T waves have replaced nonspecific T wave abnormality in Lateral  leads  Confirmed by Neal GRUBBS MD [id number]) on 5/28/2024 7:41:16 AM    Referred By: [email or web address]   SELF           Confirmed By:Neal GRUBBS MD                                  Imaging Results              MRI Brain Without Contrast (In process)                      Medications   furosemide (Lasix) 200 mg in sodium chloride 0.9% SolP 100 mL continuous infusion (conc: 2 mg/mL) (has no administration in time range)   albuterol-ipratropium 2.5 mg-0.5 mg/3 mL nebulizer solution 9 mL (9 mLs Nebulization Given 5/28/24 1239)   furosemide injection 40 mg (40 mg Intravenous Given 5/28/24 1320)   furosemide injection 40 mg (40 mg Intravenous Given 5/28/24 1321)     Medical Decision Making  Amount and/or Complexity of Data Reviewed  Labs: ordered.  Radiology: ordered.    Risk  Prescription drug management.                                      Clinical Impression:  Final diagnoses:  [R53.1] Weakness (Primary)  [R29.818] Acute focal neurological deficit  [R20.2] Paresthesias  [R00.0] Tachycardia

## 2024-05-28 NOTE — ASSESSMENT & PLAN NOTE
Patient is identified as having Combined Systolic and Diastolic heart failure that is Acute on chronic. CHF is currently . Latest ECHO performed and demonstrates- Results for orders placed during the hospital encounter of 05/14/24    Echo    Interpretation Summary    Left Ventricle: The left ventricle is severely dilated. Severely increased ventricular mass. Normal wall thickness. There is eccentric hypertrophy. Severe global hypokinesis present. There is severely reduced systolic function with a visually estimated ejection fraction of 5 - 10%. There is diastolic dysfunction but grade cannot be determined.    Right Ventricle: Normal right ventricular cavity size. Wall thickness is normal. Right ventricle wall motion  is normal. Systolic function is normal.    Left Atrium: Left atrium is moderately dilated.    Mitral Valve: There is mild regurgitation.    Pulmonary Artery: The estimated pulmonary artery systolic pressure is 32 mmHg.    IVC/SVC: Normal venous pressure at 3 mmHg.  . Monitor clinical status closely. Monitor on telemetry. Patient is off CHF pathway.  Monitor strict Is&Os and daily weights.  Place on fluid restriction of 1.5 L. Cardiology has been consulted. Continue to stress to patient importance of self efficacy and  on diet for CHF. Last BNP reviewed- and noted below   Recent Labs   Lab 05/28/24  1259   BNP 2,066*     - Lasix ggt

## 2024-05-28 NOTE — PROCEDURES
"Selma Hooper is a 78 y.o. female patient.    Temp: 96.9 °F (36.1 °C) (24 1457)  Pulse: (!) 128 (24 1645)  Resp: (!) 36 (24 1645)  BP: 119/74 (24 1645)  SpO2: 95 % (24 1645)  Weight: 62 kg (136 lb 11 oz) (24 1520)  Height: 5' 1" (154.9 cm) (24 1520)       Arterial Line    Date/Time: 2024 5:42 PM  Location procedure was performed: [facility] CARDIOLOGY    Performed by: Natalio Angel MD  Authorized by: Haroldo Rosario MD  Assisting provider: Haroldo Rosario MD  Pre-op Diagnosis: Cardiogenic Shock  Post-operative diagnosis: Cardiogenic Shock  Consent Done: Yes  Consent: Verbal consent obtained. Written consent obtained.  Risks and benefits: risks, benefits and alternatives were discussed  Consent given by: Daughter.  Patient understanding: patient states understanding of the procedure being performed  Patient consent: the patient's understanding of the procedure matches consent given  Procedure consent: procedure consent matches procedure scheduled  Relevant documents: relevant documents present and verified  Imaging studies: imaging studies available  Patient identity confirmed: , MRN, name and verbally with patient  Time out: Immediately prior to procedure a "time out" was called to verify the correct patient, procedure, equipment, support staff and site/side marked as required.  Preparation: Patient was prepped and draped in the usual sterile fashion.  Indications: multiple ABGs, respiratory failure and hemodynamic monitoring  Location: left radial  Anesthesia: see MAR for details    Anesthesia:  Anesthetic total: 3 mL    Patient sedated: no  Needle gauge: 22  Number of attempts: 2  Complications: No  Estimated blood loss (mL): 5  Specimens: No  Implants: No  Post-procedure: line sutured and dressing applied  Post-procedure CMS: normal  Patient tolerance: Patient tolerated the procedure well with no immediate complications          2024    "

## 2024-05-28 NOTE — HPI
78 year old female with Hx of HFrEF(5-10%), COPD on 2 L O2 at baseline who presented to [facility] [place] ED complaining initially of right lower extremity numbness. Per primary team and chart review, symptoms started at 3 am when she started to have right leg numbness and weakness. This prompted her to present to the ED. She underwent MRI and while in the MRI she became short of breath and was found to be tachycardic in the 140s. On exam she was reported to be cool and clammy. Labs were significant for elevated Lactic 4.5, BNP 2,066, Cr. 1.6, with K of 6.1. Cardiology was consulted and patient was admitted to CCU service for further management.    EP consulted for Atrial Flutter

## 2024-05-28 NOTE — PROVIDER PROGRESS NOTES - EMERGENCY DEPT.
Encounter Date: 5/28/2024    ED Physician Progress Notes        12:51 PM  Patient care signed out to me this morning from Dr. Baxter pending MRI brain for TIA work up. Patient had transient R leg numbness at home which improved upon arrival to the ED.   I had seen patient prior to MRI and she appeared well on her home 2 L NC, normal RR, asymptomatic  Patient presented back from MRI in respiratory distress. Placed on bipap  Wide complex tachycardia w HR in the 140's, history of LBBB  Her extremities are cold and she is diaphoretic, clammy  Reviewed her ECHO, last EF of 10%, lasix ordered, repeat labs ordered  History of COPD, duonebs ordered as she has very poor exchange on auscultation  Cardiology was consulted and Dr. Li presented bedside  We reviewed tele, EKG and he does not think her tachycardia represents V tach  Repeat labs ordered and she has a lactic of 4.6, JAMIR (had normal creatinine on presentation today)    She was given lasix 80 mg iv and infusion was ordered but coming from pharmacy  Keys was placed and no urine output in the ED  Additional 40 mg IV lasix was given  We were considered for possible aortic dissection given her symptoms she presented over night with   Also considered PE as she hypoxic and tachycardic  Legs without swelling, asymmetry, IVC does not appear dilated on exam but she is on bipap  Patient continued to be tachypneic and ill appearing, RR did not improve with albuterol  She was still anuric, bladder scan of 0    Critical Care  Date: 05/29/2024  Performed by: Yuliya Ko MD    Authorized by: Yuliya Ko MD   Total critical care time (exclusive of procedural time) : 75 minutes  Critical care was necessary to treat or prevent imminent or life-threatening deterioration of the following conditions:  cardiogenic shock      Final diagnoses:  [R53.1] Weakness  [R20.2] Paresthesias  [R00.0] Tachycardia  [R57.0] Cardiogenic shock (Primary)  [J96.01, J96.02]  Acute respiratory failure with hypoxia and hypercapnia

## 2024-05-28 NOTE — ASSESSMENT & PLAN NOTE
Chronic, controlled. Latest blood pressure and vitals reviewed-     Temp:  [96.9 °F (36.1 °C)-98.1 °F (36.7 °C)]   Pulse:  []   Resp:  [16-48]   BP: ()/(54-85)   SpO2:  [94 %-100 %] .   Home meds for hypertension were reviewed and noted below.   Hypertension Medications               furosemide (LASIX) 40 MG tablet Take 1 tablet (40 mg total) by mouth once daily.    metoprolol succinate (TOPROL-XL) 25 MG 24 hr tablet Take 1 tablet (25 mg total) by mouth once daily.    spironolactone (ALDACTONE) 25 MG tablet Take 1 tablet (25 mg total) by mouth once daily.            While in the hospital, will manage blood pressure as follows; Adjust home antihypertensive regimen as follows- resume home meds once more stable    Will utilize p.r.n. blood pressure medication only if patient's blood pressure greater than 180/110 and she develops symptoms such as worsening chest pain or shortness of breath.

## 2024-05-28 NOTE — ED NOTES
Pt transported to 3084 with RN and respiratory. Pt remained on all monitoring devices and bipap for transport. Daughter given room number to meet pt on unit.

## 2024-05-28 NOTE — ASSESSMENT & PLAN NOTE
Pt DNR per chart review and reconfirmed in the ED on this admission. Patient does not want intubation either.

## 2024-05-28 NOTE — TELEPHONE ENCOUNTER
"Alexsandra Hooper calls to inform us of her Mother being taken to [facility] ED for leg numbness and "dragging her foot". PT awaiting MRI/CT to r/o stroke. PT went to ED ~ 5 AM.  "

## 2024-05-28 NOTE — CONSULTS
[facility] - Cardiac Intensive Care  Cardiac Electrophysiology  Consult Note    Admission Date: 5/28/2024  Code Status: DNR   Attending Provider: Toino Botello MD  Consulting Provider: Nevin Vital MD  Principal Problem:Cardiogenic shock    Inpatient consult to Electrophysiology  Consult performed by: Nevin Vital MD  Consult ordered by: Jose Yanez MD        Subjective:     HPI:   78 year old female with Hx of HFrEF(5-10%), Hypertension, and COPD on 2 L O2 at baseline who presented to [facility] ED complaining initially of right lower extremity numbness. Per primary team and chart review, symptoms started at 3 am when she started to have right leg numbness and weakness. This prompted her to present to the ED. She underwent MRI and while in the MRI she became short of breath and was found to be tachycardic in the 140s. On exam she was reported to be cool and clammy. Labs were significant for elevated Lactic 4.5, BNP 2,066, Cr. 1.6, with K of 6.1. Cardiology was consulted and patient was admitted to CCU service for further management.    EP consulted for Atrial Flutter    Past Medical History:   Diagnosis Date    Abnormal liver enzymes 12/10/2018    Acute on chronic combined systolic and diastolic congestive heart failure 4/2/2021    Acute on chronic respiratory failure with hypoxia 4/2/2021    Acute on chronic respiratory failure with hypoxia and hypercapnia 12/10/2021    CHF (congestive heart failure)     COPD exacerbation 7/12/2022    Former smoker 10/24/2018    Hypertension     Smoker 7/27/2016       Past Surgical History:   Procedure Laterality Date    BREAST BIOPSY      left  side years ago in high school   1962    CHOLECYSTECTOMY      COLONOSCOPY      COLONOSCOPY N/A 08/23/2021    Procedure: COLONOSCOPY;  Surgeon: Shree Amaya MD;  Location: [facility] ([address] [place] [address]);  Service: Endoscopy;  Laterality: N/A;  Do not cancel this order  fully vaccinated-see immunization record  EF 18%  8/20-covid  [facility] portal-tb    HYSTERECTOMY         Review of patient's allergies indicates:   Allergen Reactions    Atorvastatin      Leg cramps       No current facility-administered medications on file prior to encounter.     Current Outpatient Medications on File Prior to Encounter   Medication Sig    albuterol-ipratropium (DUO-NEB) 2.5 mg-0.5 mg/3 mL nebulizer solution Take 3 mLs by nebulization every 6 (six) hours as needed for Wheezing. Rescue    empagliflozin (JARDIANCE) 10 mg tablet Take 1 tablet (10 mg total) by mouth once daily.    fluticasone-salmeterol diskus inhaler 250-50 mcg Inhale 1 puff into the lungs 2 (two) times daily. Controller    furosemide (LASIX) 40 MG tablet Take 1 tablet (40 mg total) by mouth once daily.    metoprolol succinate (TOPROL-XL) 25 MG 24 hr tablet Take 1 tablet (25 mg total) by mouth once daily.    spironolactone (ALDACTONE) 25 MG tablet Take 1 tablet (25 mg total) by mouth once daily.     Family History       Problem Relation (Age of Onset)    Arthritis Mother    Cancer Father, Brother    Colon cancer Father, Brother (63)    Dementia Father    Diabetes Daughter    Heart attack Mother    Heart disease Mother, Brother    Hypertension Mother, Father, Brother          Tobacco Use    Smoking status: Former     Current packs/day: 0.00     Types: Cigarettes     Quit date: 2018     Years since quittin.9     Passive exposure: Past    Smokeless tobacco: Never   Substance and Sexual Activity    Alcohol use: Yes     Comment: occasional drinker, not a daily drinker    Drug use: No    Sexual activity: Not Currently     Partners: Male       Objective:     Vital Signs (Most Recent):  Temp: 96.9 °F (36.1 °C) (24 1457)  Pulse: (!) 128 (24 1645)  Resp: (!) 36 (24 1645)  BP: 119/74 (24 1645)  SpO2: 95 % (24 1645) Vital Signs (24h Range):  Temp:  [96.9 °F (36.1 °C)-98.1 °F (36.7 °C)] 96.9 °F (36.1 °C)  Pulse:  [] 128  Resp:  [16-48] 36  SpO2:  [94 %-100  %] 95 %  BP: ()/(54-85) 119/74       Weight: 62 kg (136 lb 11 oz)  Body mass index is 25.83 kg/m².    SpO2: 95 %        Physical Exam  Constitutional:       Comments: Physical Exam was limited as patient was undergoing emergent central line placement   Eyes:      Conjunctiva/sclera: Conjunctivae normal.   Cardiovascular:      Rate and Rhythm: Tachycardia present.   Pulmonary:      Comments: BiPAP      Significant Labs: All pertinent lab results from the last 24 hours have been reviewed.    Significant Imaging:     Echocardiogram (5/14/2024):    Left Ventricle: The left ventricle is severely dilated. Severely increased ventricular mass. Normal wall thickness. There is eccentric hypertrophy. Severe global hypokinesis present. There is severely reduced systolic function with a visually estimated ejection fraction of 5 - 10%. There is diastolic dysfunction but grade cannot be determined.    Right Ventricle: Normal right ventricular cavity size. Wall thickness is normal. Right ventricle wall motion  is normal. Systolic function is normal.    Left Atrium: Left atrium is moderately dilated.    Mitral Valve: There is mild regurgitation.    Pulmonary Artery: The estimated pulmonary artery systolic pressure is 32 mmHg.    IVC/SVC: Normal venous pressure at 3 mmHg.        Assessment and Plan:     Arrhythmia  - Patient admitted with concern for stroke and ADHF/Cardiogenic shock  - ECG appears consistent with Atrial flutter with 1:1 conduction; however, difficult at this time to be sure. It may likely be SVT. Limited information from telemetry just placed but appears variable.  - Would recommend TATA with adenosine challenge +/- DCCV  - Patient is at high risk for stroke. Would avoid chemical conversion at this time unless hemodynamically unstable. Appears to be maintaining systolic BP >100  - Please ensure patient is NPO midnight  - Will need to discuss with Vascular Neurology regarding anticoagulation given suspicion for  stroke and risk. [id number] Score: 7 (If including TIA/stroke)  - Will need to discuss code status with patients family.       Thank you for this consult.Please follow up Attending Attestation for further recommendations. Please call if any questions.      Nevin Vital MD  Cardiac Electrophysiology  [facility] - Cardiac Intensive Care

## 2024-05-28 NOTE — ASSESSMENT & PLAN NOTE
Patient's COPD is with exacerbation noted by continued dyspnea and worsening of baseline hypoxia currently.  Patient is currently off COPD Pathway. Continue scheduled inhalers Supplemental oxygen and monitor respiratory status closely.     Prior smoking history of 0.5 ppd, 25 yo start, quit 2018.    - Continue maintenance inhaler  - rescue BIPAP

## 2024-05-28 NOTE — ASSESSMENT & PLAN NOTE
- Patient admitted with concern for stroke and ADHF/Cardiogenic shock  - ECG appears consistent with Atrial flutter with 1:1 conduction; however, difficult at this time to be sure. It may likely be SVT. Limited information from telemetry just placed but appears variable.  - Would recommend TATA with adenosine challenge +/- DCCV  - Patient is at high risk for stroke. Would avoid chemical conversion at this time unless hemodynamically unstable. Appears to be maintaining systolic BP >100  - Please ensure patient is NPO midnight  - Will need to discuss with Vascular Neurology regarding anticoagulation given suspicion for stroke and risk. [id number] Score: 7 (If including TIA/stroke)  - Will need to discuss code status with patients family.

## 2024-05-28 NOTE — ASSESSMENT & PLAN NOTE
Patient with acute kidney injury/acute renal failure likely due to pre-renal azotemia due to IVVD JAMIR is currently worsening. Baseline creatinine  0.9  - Labs reviewed- Renal function/electrolytes with Estimated Creatinine Clearance: 25.3 mL/min (A) (based on SCr of 1.6 mg/dL (H)). according to latest data. Monitor urine output and serial BMP and adjust therapy as needed. Avoid nephrotoxins and renally dose meds for GFR listed above.    Plan:    - CMP qd, trend Cr  - strict I/O  - daily weights  - Ucr, Uurea  - renally dose all medications  - avoid nephrotoxic agents, NSAIDs, IV contrast, ACE/ARB  - If JAMIR doesn't improve consider US retroperitoneal

## 2024-05-28 NOTE — PROCEDURES
"Selma Hooper is a 78 y.o. female patient.    Temp: 96.9 °F (36.1 °C) (05/28/24 1457)  Pulse: (!) 128 (05/28/24 1645)  Resp: (!) 36 (05/28/24 1645)  BP: 119/74 (05/28/24 1645)  SpO2: 95 % (05/28/24 1645)  Weight: 62 kg (136 lb 11 oz) (05/28/24 1520)  Height: 5' 1" (154.9 cm) (05/28/24 1520)       Central Line    Date/Time: 5/28/2024 5:37 PM    Performed by: Natalio Angel MD  Authorized by: Haroldo Rosario MD    Supervisor Name:  Haroldo Rosario MD  Location procedure was performed:  [facility] CARDIOLOGY  Assisting Provider:  Haroldo Rosario MD  Pre-operative diagnosis:  Cardiogenic Shock  Post-operative diagnosis:  Cardiogenic Shock  Consent Done ?:  Yes  Time out complete?: Verified correct patient, procedure, equipment, staff, and site/side    Indications:  Hemodynamic monitoring, med administration and hemodialysis  Anesthesia:  See MAR for details  Preparation:  Skin prepped with ChloraPrep  Skin prep agent dried: Skin prep agent completely dried prior to procedure    Sterile barriers: All five maximal sterile barriers used - gloves, gown, cap, mask and large sterile sheet    Hand hygiene: Hand hygiene performed immediately prior to central venous catheter insertion    Location:  Right internal jugular  Catheter type:  Trialysis  Catheter size:  13 Fr  Inserted Catheter Length (cm):  30  Ultrasound guidance: Yes    Vessel Caliber:  Large   patent  Comprressibility:  Normal  Doppler:  Not done  Needle advanced into vessel with real time ultrasound guidance.    Guidewire confirmed in vessel.    Steril sheath on probe.    Sterile gel used.  Manometry: Yes    Number of attempts:  2  Securement:  Line sutured, chlorhexidine patch, sterile dressing applied and blood return through all ports  Complications: No    Estimated blood loss (mL):  5  Specimens: No    Implants: No    XRay:  Placement verified by x-ray, no pneumothorax on x-ray, successful placement and tip termination  Adverse Events:  NoneTermination Site: " right atrium      5/28/2024

## 2024-05-28 NOTE — ED NOTES
Daughter and patient wish to continue prior DNR status.  Pt does not want to be intubated or resuscitated.  Cardiology and Dr. Ko at bedside.

## 2024-05-28 NOTE — SUBJECTIVE & OBJECTIVE
Past Medical History:   Diagnosis Date    Abnormal liver enzymes 12/10/2018    Acute on chronic combined systolic and diastolic congestive heart failure 4/2/2021    Acute on chronic respiratory failure with hypoxia 4/2/2021    Acute on chronic respiratory failure with hypoxia and hypercapnia 12/10/2021    CHF (congestive heart failure)     COPD exacerbation 7/12/2022    Former smoker 10/24/2018    Hypertension     Smoker 7/27/2016       Past Surgical History:   Procedure Laterality Date    BREAST BIOPSY      left  side years ago in high school   1962    CHOLECYSTECTOMY      COLONOSCOPY      COLONOSCOPY N/A 08/23/2021    Procedure: COLONOSCOPY;  Surgeon: Shree Amaya MD;  Location: [facility] ([address] [place] [address]);  Service: Endoscopy;  Laterality: N/A;  Do not cancel this order  fully vaccinated-see immunization record  EF 18%  8/20-covid [facility] portal-tb    HYSTERECTOMY         Review of patient's allergies indicates:   Allergen Reactions    Atorvastatin      Leg cramps       No current facility-administered medications on file prior to encounter.     Current Outpatient Medications on File Prior to Encounter   Medication Sig    albuterol-ipratropium (DUO-NEB) 2.5 mg-0.5 mg/3 mL nebulizer solution Take 3 mLs by nebulization every 6 (six) hours as needed for Wheezing. Rescue    empagliflozin (JARDIANCE) 10 mg tablet Take 1 tablet (10 mg total) by mouth once daily.    fluticasone-salmeterol diskus inhaler 250-50 mcg Inhale 1 puff into the lungs 2 (two) times daily. Controller    furosemide (LASIX) 40 MG tablet Take 1 tablet (40 mg total) by mouth once daily.    metoprolol succinate (TOPROL-XL) 25 MG 24 hr tablet Take 1 tablet (25 mg total) by mouth once daily.    spironolactone (ALDACTONE) 25 MG tablet Take 1 tablet (25 mg total) by mouth once daily.     Family History       Problem Relation (Age of Onset)    Arthritis Mother    Cancer Father, Brother    Colon cancer Father, Brother (63)    Dementia Father    Diabetes  Daughter    Heart attack Mother    Heart disease Mother, Brother    Hypertension Mother, Father, Brother          Tobacco Use    Smoking status: Former     Current packs/day: 0.00     Types: Cigarettes     Quit date: 2018     Years since quittin.9     Passive exposure: Past    Smokeless tobacco: Never   Substance and Sexual Activity    Alcohol use: Yes     Comment: occasional drinker, not a daily drinker    Drug use: No    Sexual activity: Not Currently     Partners: Male     Review of Systems   Unable to perform ROS: Other   Respiratory:  Wheezing: on BIPAP.      Objective:     Vital Signs (Most Recent):  Temp: 96.9 °F (36.1 °C) (24 1457)  Pulse: (!) 128 (24 1645)  Resp: (!) 36 (24 1645)  BP: 119/74 (24 164)  SpO2: 95 % (24 164) Vital Signs (24h Range):  Temp:  [96.9 °F (36.1 °C)-98.1 °F (36.7 °C)] 96.9 °F (36.1 °C)  Pulse:  [] 128  Resp:  [16-48] 36  SpO2:  [94 %-100 %] 95 %  BP: ()/(54-85) 119/74     Weight: 62 kg (136 lb 11 oz)  Body mass index is 25.83 kg/m².    SpO2: 95 %         Intake/Output Summary (Last 24 hours) at 2024 1706  Last data filed at 2024 1600  Gross per 24 hour   Intake 27.75 ml   Output --   Net 27.75 ml       Lines/Drains/Airways       Central Venous Catheter Line  Duration             Trialysis (Dialysis) Catheter 24 1613 right internal jugular <1 day              Drain  Duration             Female External Urinary Catheter w/ Suction 24 0729 <1 day              Peripheral Intravenous Line  Duration                  Peripheral IV - Single Lumen 24 0607 22 G Posterior;Right Hand <1 day         Peripheral IV - Single Lumen 24 1259 20 G Left Hand <1 day                     Physical Exam  Vitals and nursing note reviewed.   Constitutional:       Appearance: Normal appearance. She is ill-appearing.      Comments: L radial arterial line   Eyes:      Extraocular Movements: Extraocular movements intact.       Conjunctiva/sclera: Conjunctivae normal.   Neck:      Comments: RIJ CVC in place dressing C/D/I  Cardiovascular:      Rate and Rhythm: Normal rate and regular rhythm.      Comments: JVD indeterminate 2/2 CVC  Pulmonary:      Effort: Pulmonary effort is normal.      Breath sounds: Normal breath sounds. No rales.   Abdominal:      General: Bowel sounds are normal. There is distension.      Palpations: Abdomen is soft.   Musculoskeletal:      Cervical back: Normal range of motion.      Right lower leg: No edema.      Left lower leg: No edema.   Skin:     General: Skin is warm and dry.   Neurological:      General: No focal deficit present.      Mental Status: She is alert and oriented to person, place, and time.          Significant Labs: All pertinent lab results from the last 24 hours have been reviewed.    Significant Imaging:  Reviewed

## 2024-05-28 NOTE — ASSESSMENT & PLAN NOTE
Patient with Hypercapnic and Hypoxic Respiratory failure which is Acute on chronic.  she is on home oxygen at 1.5 LPM. Supplemental oxygen was provided and noted- Oxygen Concentration (%):  [50-60] 60    .   Signs/symptoms of respiratory failure include- tachypnea, increased work of breathing, and respiratory distress. Contributing diagnoses includes - CHF and COPD Labs and images were reviewed. Patient Has recent ABG, which has been reviewed. Will treat underlying causes and adjust management of respiratory failure as follows-     - BIPAP on admission to CCU  - Do not intubate, does not align with patient goals

## 2024-05-28 NOTE — ED NOTES
Pt c/o intermittent right leg pain but states she does not want any pain meds at this time.  Pt waiting on MRI.  Updated with status.  Call button within reach.     22

## 2024-05-28 NOTE — ASSESSMENT & PLAN NOTE
Pt with an EF of 5-10% that had suspected flash pulmonary edema while getting a MRI. She required BiPAP at that time and received IV Lasix 40mg x3 in the ED with no response. Central line and A-line were placed; pt was started on Dobutamine 5mcg and Lasix 30mg/hr gtt.     Hemos during the initiation of Dobutamine and Lasix gtt:     CVP 10, SvO2 51, CO 3.3, CI 2, and SVR 1945.     Hemos this AM:    CVP 5, SvO2 71, CO 5.5, CI 3.4, and .     -Dobutamine weaned to 2.5mcg this AM.     Repeat Hemos this afternoon:     CVP 6 , SvO2 55, CO 3.5, CI 2.1, and SVR 1394    - Dobutamine 2.5 gtt  - Lasix d/slava this AM.

## 2024-05-28 NOTE — ED NOTES
I-STAT Chem-8+ Results:   Value Reference Range   Sodium 137 136-145 mmol/L   Potassium  6.1 3.5-5.1 mmol/L   Chloride 101  mmol/L   Ionized Calcium 1.01 1.06-1.42 mmol/L   CO2 (measured) 32 23-29 mmol/L   Glucose 295  mg/dL   BUN 23 6-30 mg/dL   Creatinine 1.2 0.5-1.4 mg/dL   Hematocrit 39 36-54%

## 2024-05-28 NOTE — CARE UPDATE
CCU Care Update Note    530PM     Hemodynamics    CVP:  10   SCVO2:  51   Cardiac Output:  3.3   Cardiac Index:  2   SVR:  1945       I/Os    I/O this shift:  In: 27.8 [I.V.:27.8]  Out: -       Intake/Output Summary (Last 24 hours) at 5/28/2024 1733  Last data filed at 5/28/2024 1600  Gross per 24 hour   Intake 27.75 ml   Output --   Net 27.75 ml       Net IO Since Admission: 27.75 mL [05/28/24 1733]    Diuretics:     Continuous Infusions:      sodium chloride 0.9%   Intravenous Continuous 5 mL/hr at 05/28/24 1652 New Bag at 05/28/24 1652    DOBUTamine IV infusion (non-titrating)  5 mcg/kg/min Intravenous Continuous        furosemide (Lasix) 500 mg in 50 mL infusion (conc: 10 mg/mL)  30 mg/hr Intravenous Continuous 3 mL/hr at 05/28/24 1646 30 mg/hr at 05/28/24 1646    nitroPRUSSide  0-10 mcg/kg/min Intravenous Continuous           Mechanical support: None    Plan:  - Trialysis line placed as patient is not urinated, and A-line placed for hemodynamic monitoring  - Obtained hemodynamics as above and patient in cardiogenic shock, started dobutamine for inotropic support and nipride for afterload reduction  - Ordered diuril and will order furosemide 120mg  - Follow hemodynamics soon  - Plan discussed with attending     7PM     Hemodynamics      CVP:  5   SCVO2:  46   Cardiac Output:  2.49   Cardiac Index:  1.53   SVR:  1862       I/Os    Lactic acid 5.32  pH 7.3, pCO2 62.3    I/O this shift:  In: -   Out: 50 [Urine:50]      Intake/Output Summary (Last 24 hours) at 5/28/2024 2005  Last data filed at 5/28/2024 1938  Gross per 24 hour   Intake 102.34 ml   Output 75 ml   Net 27.34 ml       Net IO Since Admission: 27.34 mL [05/28/24 2005]    Diuretics: Received Furosemide 120mg and Chlorothiazide 500mg     Continuous Infusions:      sodium chloride 0.9%   Intravenous Continuous 5 mL/hr at 05/28/24 1800 Rate Verify at 05/28/24 1800    DOBUTamine IV infusion (non-titrating)  5 mcg/kg/min Intravenous Continuous 4.7 mL/hr at  05/28/24 1834 5 mcg/kg/min at 05/28/24 1834    furosemide (Lasix) 500 mg in 50 mL infusion (conc: 10 mg/mL)  30 mg/hr Intravenous Continuous 3 mL/hr at 05/28/24 1800 30 mg/hr at 05/28/24 1800    heparin (porcine) in D5W  0-40 Units/kg/hr (Adjusted) Intravenous Continuous        nitroPRUSSide  0-10 mcg/kg/min Intravenous Continuous           Mechanical support: None    Plan:  - Patient complaining of severe lower extremity pain,   - Examined pulses and they are not palpable, right leg is cold, numb below knee and cannot move toes  - Called interventional due to ongoing ischemia, but due to patient DNR/DNI, not wanting procedures to be done, EF 5-10%, and cardiogenic shock, agree that she is a candidate for invasive interventions  - No UOP and patient volume overload in acute heart failure, will discuss with nephro dialysis  - Discussed with family and they are aware of poor prognosis and patient's decision to be DNR/DNI. Will continue with medical management for now.   - Plan discussed with attending     Jose Jeff MD  Cardiovascular Disease PGY-IV  [facility]

## 2024-05-28 NOTE — SUBJECTIVE & OBJECTIVE
Past Medical History:   Diagnosis Date    Abnormal liver enzymes 12/10/2018    Acute on chronic combined systolic and diastolic congestive heart failure 4/2/2021    Acute on chronic respiratory failure with hypoxia 4/2/2021    Acute on chronic respiratory failure with hypoxia and hypercapnia 12/10/2021    CHF (congestive heart failure)     COPD exacerbation 7/12/2022    Former smoker 10/24/2018    Hypertension     Smoker 7/27/2016       Past Surgical History:   Procedure Laterality Date    BREAST BIOPSY      left  side years ago in high school   1962    CHOLECYSTECTOMY      COLONOSCOPY      COLONOSCOPY N/A 08/23/2021    Procedure: COLONOSCOPY;  Surgeon: Shree Amaya MD;  Location: [facility] ([address] [place] [address]);  Service: Endoscopy;  Laterality: N/A;  Do not cancel this order  fully vaccinated-see immunization record  EF 18%  8/20-covid [facility]    HYSTERECTOMY         Review of patient's allergies indicates:   Allergen Reactions    Atorvastatin      Leg cramps       No current facility-administered medications on file prior to encounter.     Current Outpatient Medications on File Prior to Encounter   Medication Sig    albuterol-ipratropium (DUO-NEB) 2.5 mg-0.5 mg/3 mL nebulizer solution Take 3 mLs by nebulization every 6 (six) hours as needed for Wheezing. Rescue    empagliflozin (JARDIANCE) 10 mg tablet Take 1 tablet (10 mg total) by mouth once daily.    fluticasone-salmeterol diskus inhaler 250-50 mcg Inhale 1 puff into the lungs 2 (two) times daily. Controller    furosemide (LASIX) 40 MG tablet Take 1 tablet (40 mg total) by mouth once daily.    metoprolol succinate (TOPROL-XL) 25 MG 24 hr tablet Take 1 tablet (25 mg total) by mouth once daily.    spironolactone (ALDACTONE) 25 MG tablet Take 1 tablet (25 mg total) by mouth once daily.     Family History       Problem Relation (Age of Onset)    Arthritis Mother    Cancer Father, Brother    Colon cancer Father, Brother (63)    Dementia Father    Diabetes  Daughter    Heart attack Mother    Heart disease Mother, Brother    Hypertension Mother, Father, Brother          Tobacco Use    Smoking status: Former     Current packs/day: 0.00     Types: Cigarettes     Quit date: 2018     Years since quittin.9     Passive exposure: Past    Smokeless tobacco: Never   Substance and Sexual Activity    Alcohol use: Yes     Comment: occasional drinker, not a daily drinker    Drug use: No    Sexual activity: Not Currently     Partners: Male       Objective:     Vital Signs (Most Recent):  Temp: 96.9 °F (36.1 °C) (24 1457)  Pulse: (!) 128 (24 1645)  Resp: (!) 36 (24 1645)  BP: 119/74 (24 1645)  SpO2: 95 % (24 1645) Vital Signs (24h Range):  Temp:  [96.9 °F (36.1 °C)-98.1 °F (36.7 °C)] 96.9 °F (36.1 °C)  Pulse:  [] 128  Resp:  [16-48] 36  SpO2:  [94 %-100 %] 95 %  BP: ()/(54-85) 119/74       Weight: 62 kg (136 lb 11 oz)  Body mass index is 25.83 kg/m².    SpO2: 95 %        Physical Exam  Constitutional:       Comments: Physical Exam was limited as patient was undergoing emergent central line placement   Eyes:      Conjunctiva/sclera: Conjunctivae normal.   Cardiovascular:      Rate and Rhythm: Tachycardia present.   Pulmonary:      Comments: BiPAP      Significant Labs: All pertinent lab results from the last 24 hours have been reviewed.    Significant Imaging:     Echocardiogram (2024):    Left Ventricle: The left ventricle is severely dilated. Severely increased ventricular mass. Normal wall thickness. There is eccentric hypertrophy. Severe global hypokinesis present. There is severely reduced systolic function with a visually estimated ejection fraction of 5 - 10%. There is diastolic dysfunction but grade cannot be determined.    Right Ventricle: Normal right ventricular cavity size. Wall thickness is normal. Right ventricle wall motion  is normal. Systolic function is normal.    Left Atrium: Left atrium is moderately dilated.     Mitral Valve: There is mild regurgitation.    Pulmonary Artery: The estimated pulmonary artery systolic pressure is 32 mmHg.    IVC/SVC: Normal venous pressure at 3 mmHg.

## 2024-05-28 NOTE — PLAN OF CARE
Called to see patient after she returned from MRI.    Full H&P and management plan to come from admitting CCU fellow.    I discussed code status with the patient and her POA at bedside and confirmed that her CODE STATUS is indeed still DNR.    Several other Mds including ER physician and RNs in the room at the time of conversation.    Dr. Li

## 2024-05-28 NOTE — PROVIDER PROGRESS NOTES - EMERGENCY DEPT.
ED Resident HAND-OFF NOTE:  5/28/2024  Selma Hooper is a 78 y.o. female w/ a PMHx significant for CHF (last EF 5% to 10%), COPD on 2 L at baseline presenting to the ED w/right lower extremity numbness.    I assumed care of patient from off-going ED physician team pending:    Labs:  - CBC WNL.  No leukocytosis, reducing concern for acute infectious process.  Mild anemia consistent w/ the patient's based.  - CMP WNL. Significantly reduced concern for acute metabolic abnormality.  No signs of significant JAMIR.  - repeat CMP shows declining renal function w/ creatinine of 1.6 and GFR of 33.2 concerning for hypoperfusion.  - BNP significantly elevated to 2066 consistent w/CHF and concerning for CHF exacerbation. Previous lab values yesterday showed BNP of 1533.  Two weeks ago patient had lab value of 2320.     -MRI: No evidence of acute ischemia.  No intracranial bleed identified    On my evaluation Selma Hooper, I appreciate:  BP (!) 117/58   Pulse 71   Temp 97.5 °F (36.4 °C) (Oral)   Resp 20   Wt 61.8 kg (136 lb 3.9 oz)   LMP  (LMP Unknown)   SpO2 99%   Breastfeeding No   BMI 25.74 kg/m²     Patient returned from MRI at aprox. 12:15 with worsening SOB, tachypnea, and O2 sats in low 90's on 5L NC. Patient will be placed on BiPAP.  Patient's clinical status continued to decline in BiPAP settings were adjusted accordingly w/ administration of DuoNebs w/mild transient improvement.  Lactate significantly elevated to 4.66.  VBG shows respiratory acidosis w/ elevated pCO2 of 95.5; this is likely acute on chronic as patient is bicarb also elevated to 31.9 w/ known history of COPD.  At this time, bedside US was performed showing poor EF w/o evidence of pericardial effusion.  IVC largely WNL, reducing concern for significant volume overload or PE.  Due to patient's continuing decline, Cardiology was consulted and she was given Lasix for diuresis as we are concerned w/flash pulmonary edema.  Further evaluation difficult  due to patient's inability to undergo additional imaging.  Patient will be admitted to CICU for further evaluation.  Of note, discussion was had w/ patient and family who confirmed that patient was DNR/DNI.    Thus far, Selma Hooper has received:  Medications - No data to display            Disposition:  Patient will be admitted to CICU.  I have discussed and counseled Selma Hooper regarding exam, results, diagnosis, treatment, and plan.  ______________________    5/28/2024

## 2024-05-28 NOTE — ED PROVIDER NOTES
Encounter Date: 5/28/2024     Source of History:   Patient, EMS, and medical record, without language barrier or      Chief complaint:  Numbness (Arrives via EMS for right leg numbness that started this morning x2 hours, states that the numbness went away when EMS arrived, VAN -, denies any weakness currently. On 2 L NC at home,)    HPI:  Selma Hooper is a 78 y.o. female with history of CHF, COPD on 2 L presenting with chief complaint of right lower extremity numbness.  Patient woke up around 3:00 a.m. this morning with right lower extremity numbness and tingling.  She states at 1 point she had an episode of right lower extremity weakness that lasted about 30 minutes.  She states that she was dragging her foot during this time.  She had an episode of right lower extremity tingling while being transported with EMS but her symptoms have since resolved.  Patient was recently admitted for CHF exacerbation.  She denies current weakness    This is the extent to the patients complaints today here in the emergency department.        Review of patient's allergies indicates:   Allergen Reactions    Atorvastatin      Leg cramps     PMH:  As per HPI and below:  Past Medical History:   Diagnosis Date    Abnormal liver enzymes 12/10/2018    Acute on chronic combined systolic and diastolic congestive heart failure 4/2/2021    Acute on chronic respiratory failure with hypoxia 4/2/2021    Acute on chronic respiratory failure with hypoxia and hypercapnia 12/10/2021    CHF (congestive heart failure)     COPD exacerbation 7/12/2022    Former smoker 10/24/2018    Hypertension     Smoker 7/27/2016     Past Surgical History:   Procedure Laterality Date    BREAST BIOPSY      left  side years ago in high school   1962    CHOLECYSTECTOMY      COLONOSCOPY      COLONOSCOPY N/A 08/23/2021    Procedure: COLONOSCOPY;  Surgeon: Shree Amaya MD;  Location: [address]);  Service: Endoscopy;  Laterality: N/A;  Do not cancel this  order  fully vaccinated-see immunization record  EF 18%  -covid [facility]    HYSTERECTOMY       Social History     Socioeconomic History    Marital status: Single    Number of children: 2   Occupational History    Occupation: ret health and      Comment: [employer]   Tobacco Use    Smoking status: Former     Current packs/day: 0.00     Types: Cigarettes     Quit date: 2018     Years since quittin.9     Passive exposure: Past    Smokeless tobacco: Never   Substance and Sexual Activity    Alcohol use: Yes     Comment: occasional drinker, not a daily drinker    Drug use: No    Sexual activity: Not Currently     Partners: Male     Social Determinants of Health     Financial Resource Strain: Medium Risk (5/15/2024)    Overall Financial Resource Strain (CARDIA)     Difficulty of Paying Living Expenses: Somewhat hard   Food Insecurity: No Food Insecurity (5/15/2024)    Hunger Vital Sign     Worried About Running Out of Food in the Last Year: Never true     Ran Out of Food in the Last Year: Never true   Transportation Needs: No Transportation Needs (5/15/2024)    TRANSPORTATION NEEDS     Transportation : No   Physical Activity: Inactive (5/15/2024)    Exercise Vital Sign     Days of Exercise per Week: 0 days     Minutes of Exercise per Session: 0 min   Stress: No Stress Concern Present (5/15/2024)    [employer] - Occupational Stress Questionnaire     Feeling of Stress : Only a little   Housing Stability: Low Risk  (5/15/2024)    Housing Stability Vital Sign     Unable to Pay for Housing in the Last Year: No     Homeless in the Last Year: No     Vitals:    /60   Pulse 86   Temp 98.1 °F (36.7 °C) (Oral)   Resp 16   Wt 61.8 kg (136 lb 3.9 oz)   LMP  (LMP Unknown)   SpO2 99%   Breastfeeding No   BMI 25.74 kg/m²     Physical Exam  Vitals and nursing note reviewed.   Constitutional:       General: She is not in acute distress.     Appearance: She is  not toxic-appearing.   HENT:      Head: Normocephalic and atraumatic.      Mouth/Throat:      Mouth: Mucous membranes are moist.   Eyes:      General: No scleral icterus.  Cardiovascular:      Rate and Rhythm: Normal rate and regular rhythm.      Pulses: Normal pulses.      Heart sounds: Normal heart sounds. No murmur heard.     No friction rub. No gallop.   Pulmonary:      Effort: Pulmonary effort is normal. No respiratory distress.      Breath sounds: Normal breath sounds. No stridor. No wheezing, rhonchi or rales.   Abdominal:      General: Abdomen is flat. There is no distension.      Palpations: Abdomen is soft.      Tenderness: There is no abdominal tenderness. There is no guarding.   Musculoskeletal:         General: No swelling or deformity.      Cervical back: Normal range of motion and neck supple.      Comments: Negative straight leg raise bilaterally   Skin:     General: Skin is warm and dry.      Capillary Refill: Capillary refill takes less than 2 seconds.      Coloration: Skin is not jaundiced.      Findings: No rash.   Neurological:      Mental Status: She is alert.      Comments:   -Moves all extremities and carries on conversation  -II: PERRL; III/IV/VI: EOMI w/out evidence of nystagmus or pain;  -V: no deficits appreciated to light touch bilateral face;   -VII: no facial weakness, no facial asymmetry. Eyebrow raise symmetric. Smile symmetric;   -IX/X: palate midline, and raises symmetrically; XI: shoulder shrug 5/5 bilaterally; XII: tongue is midline w/out asymmetry.   -Strength 5/5 to right upper and lower extremities,  -Strength 5/5 to left upper and lower extremities,  -Sensation intact to light touch to bilateral upper and lower extremities         Procedures    Laboratory Studies:  Labs that have been ordered have been independently reviewed and interpreted by myself.  Labs Reviewed   CBC W/ AUTO DIFFERENTIAL   COMPREHENSIVE METABOLIC PANEL   PROTIME-INR   TSH   LIPID PANEL   POCT GLUCOSE,  HAND-HELD DEVICE     Imaging Results    None       I decided to obtain the patient's medical records.  Summary of Medical Records:  Recent admission on 05/14/24 for heart failure exacerbation    Medications - No data to display  MDM:    78 y.o. female with right lower extremity weakness and numbness that has resolved    Differential Dx:  Differential includes but is not limited to TIA, radiculopathy, less likely CVA, doubt ICH    ED Management:  TIA workup started for an acute presentation of an emergent condition with multiple comorbidities.  Will obtain MRI brain to assess for stroke.  Patient has likely sustained a TIA.  Patient was signed out to oncoming team pending labs and MRI and final disposition      Medical Decision Making  Amount and/or Complexity of Data Reviewed  Labs: ordered.  Radiology: ordered.            Diagnostic Impression:    Final diagnoses:  [R53.1] Weakness (Primary)  [R29.818] Acute focal neurological deficit  [R20.2] Paresthesias             Attending Attestation:   Physician Attestation Statement for Resident:  As the supervising MD   Physician Attestation Statement: I have personally seen and examined this patient.   I agree with the above history.  -:   As the supervising MD I agree with the above PE.     As the supervising MD I agree with the above treatment, course, plan, and disposition.     I have reviewed the following: old records at this facility.                 Yinka Wing MD  Resident  05/28/24 0531       Yanira Baxter MD  05/28/24 [id number]

## 2024-05-28 NOTE — CONSULTS
Chart reviewed by Vascular Neurology provider. Case discussed with Vascular Neurology on-call attending.      Briefly, 79 y/o F with PMHx of CHF, COPD (on 2L NC at home) presents to [facility] ED this morning with complaint of right lower extremity numbness X 2 hours. Patient woke up around 3:00 a.m. this morning with right lower extremity numbness and tingling. Per chart review, at a point she had an episode of right lower extremity weakness that lasted about 30 minutes, she was dragging her foot during that time. She had an episode of right lower extremity tingling while being transported with EMS to the ED but her symptoms had resolved when she arrived to the ED. Intermittently complaining of leg pain. Patient with intermittent R leg pain complaints. She was recently admitted for CHF exacerbation. She begun to have SOB at the ED and was admitted by cardiology for CHF exacerbation. Patient also found to have A-flutter. MRI Brain obtained and is concerning for right temporal lobe/inferior basal ganglia region subacute infarct. Vascular neurology consulted for recommendation on anticoagulant for A-flutter in the setting of subacute stroke.      Recommendation:  -Lesion on MRI Brain does not appear to be a stroke that happened recently and unlikely the cause of the patient's R leg symptoms.   -Can obtain MRI Brain with contrast as recommended by radiology   -Primary team to decide whether anticoagulant is needed or not    Thank you for including us in the care of this patient. Vascular Neurology will sign off.  If the patient develops new symptoms concerning for acute stroke, please do not hesitate to re-consult.

## 2024-05-28 NOTE — HPI
Ms. Hooper is a 79 yo F with CHF, COPD on 2 L O2 at baseline who presented to [facility] ED complaining initially of right lower extremity numbness. Symptoms started at 3 am when she started to have right leg numbness, weakness and had to drag her feet for 20 minutes. During workup of her symptoms at the ED the patient was taken to MRI machine where she was laying flat and started experiencing shortness of breath. Once back in her ED room she notifies the nurse and she was found to be tachycardic on the monitor and was cool and clammy. As per chart review: Pt's previous echo done on 5/14/24 showed left ventricle severely dilated. Severely increased ventricular mass. Severe global hypokinesis and ejection fraction of 5 - 10%.  Pt was recently admitted for CHF exacerbation.     At the ED the VS were remarkable for tachycardia of up to 151, /85 and SPO2 97% on BIPAP.  Lab work remarkable for Hgb 10.8, Creatinine 1.6 from baseline of 0.9, Glucose 472, BNP 2066 and Lactic acid 4.5. Blood gas analisis showed pH 7.132, CO2 95.5, O2 51 and HCO3 31.9. ISTAT also showed potassium of 6.1. Cardiology was consulted and pt was admitted to CCU for further management. Code status discussed with patient and daughter and decided to be DNR.

## 2024-05-29 PROBLEM — I99.8 ACUTE LOWER LIMB ISCHEMIA: Status: ACTIVE | Noted: 2024-01-01

## 2024-05-29 NOTE — HPI
Ms Hooper is a 78-year-old woman with HFrEF (most recent TTE with EF of 5-10%), COPD with chronic hypoxic respiratory failure on supplemental oxygen (2 liters via nasal cannula as outpatient), prior tobacco abuse, CKD IIIa (baseline creatinine ~1)  and hypertension who was admitted to CCU on 5/28 with cardiogenic shock after presenting to the ED with complaints of right lower extremity numbness and associated weakness which had started roughly around 3 AM earlier that morning and upon going to MRI she became dyspneic and was noted to be cold to the touch with tachycardia.  CBC was notable for microcytic anemia with hemoglobin 10.8. Metabolic panel was notable for albumin of 3.4 and creatinine 1.1 which would subsequently rise to 1.8. Lactate was elevated at 4.5 which would rise to 5.3. VBG on presentation showed pH of 7.132, pCO2 95.5 and bicarbonate 31.9. BNP was ~2K with chest x-ray showing enlarged cardiac silhouette and abnormal increased reticular lung markings seen throughout both lung fields concerning for diffuse pulmonary edema. She would receive a cumulative dose of 240 mg IV Lasix (40 mg IV x3 plus 120 mg IV) and Diuril 500 mg IV in addition to being started on Lasix infusion at 30 mg/hr and dobutamine at 5 mcg/kg/minute. Only 250 mL of documented UOP thus far. UA showed +1 protein, +3 occult blood (69 RBCs/hpf), +2 leukocytes (13 WBCs/hpf) and many bacteria. Urine sodium was 114. UPCR, CPK and retroperitoneal US still pending at this time. Nephrology has been consulted for assistance with management of ARF in the setting of cardiogenic shock.

## 2024-05-29 NOTE — PROGRESS NOTES
[facility] - Cardiac [facility]ensive [facility]  Cardiology  Progress Note    Patient Name: Selma Hooper  MRN: [id number]  Admission Date: 5/28/2024  Hospital Length of Stay: 1 days  Code Status: DNR   Attending Physician: Tonio Botello MD   Primary Care Physician: Phil Andrade MD  Expected Discharge Date: 6/4/2024  Principal Problem:Cardiogenic shock    Subjective:     Interval History:     Pt was seen and examined at bedside. Improvement in Hemos overnight with diuresis and Dobutamine 5. Lasix d/slava and Dobutamine weaned to 2.5. Pt is off of BiPaP and on 4L NC at this time.     Review of Systems   Constitutional: Negative for chills, diaphoresis, fever, malaise/fatigue and night sweats.   HENT:  Negative for congestion, hearing loss, hoarse voice, sore throat and stridor.    Eyes:  Negative for blurred vision.   Cardiovascular:  Positive for dyspnea on exertion and orthopnea. Negative for chest pain, claudication, cyanosis, leg swelling, near-syncope, palpitations, paroxysmal nocturnal dyspnea and syncope.   Respiratory:  Positive for shortness of breath. Negative for cough, sleep disturbances due to breathing, snoring, sputum production and wheezing.    Skin:  Negative for dry skin and rash.   Musculoskeletal:  Negative for arthritis, back pain, joint pain, muscle cramps, muscle weakness and myalgias.   Gastrointestinal:  Negative for bloating, abdominal pain, change in bowel habit, constipation, diarrhea, dysphagia, nausea and vomiting.   Genitourinary:  Negative for dysuria and urgency.   Neurological:  Positive for focal weakness and numbness. Negative for difficulty with concentration, excessive daytime sleepiness, dizziness, headaches, light-headedness, tremors and weakness.   Psychiatric/Behavioral:  Negative for altered mental status and depression. The patient does not have insomnia.      Objective:     Vital Signs (Most Recent):  Temp: 97.7 °F (36.5 °C) (05/29/24 1101)  Pulse: 92 (05/29/24 1301)  Resp: 12  (05/29/24 1301)  BP: 109/66 (05/29/24 1101)  SpO2: 97 % (05/29/24 1301) Vital Signs (24h Range):  Temp:  [96.9 °F (36.1 °C)-97.9 °F (36.6 °C)] 97.7 °F (36.5 °C)  Pulse:  [] 92  Resp:  [12-48] 12  SpO2:  [86 %-99 %] 97 %  BP: ()/(45-85) 109/66  Arterial Line BP: ()/(45-57) 105/50     Weight: 62 kg (136 lb 11 oz)  Body mass index is 25.83 kg/m².     SpO2: 97 %         Intake/Output Summary (Last 24 hours) at 5/29/2024 1346  Last data filed at 5/29/2024 1301  Gross per 24 hour   Intake 1309.72 ml   Output 1345 ml   Net -35.28 ml       Lines/Drains/Airways       Central Venous Catheter Line  Duration             Trialysis (Dialysis) Catheter 05/28/24 1613 right internal jugular <1 day              Drain  Duration                  Urethral Catheter 05/28/24 1200 1 day              Arterial Line  Duration             Arterial Line 05/28/24 1818 Left Radial <1 day              Peripheral Intravenous Line  Duration                  Peripheral IV - Single Lumen 05/28/24 1259 20 G Left Hand 1 day                       Physical Exam  Vitals and nursing note reviewed.   Constitutional:       Appearance: Normal appearance. She is ill-appearing.      Comments: L radial arterial line   Eyes:      Extraocular Movements: Extraocular movements intact.      Conjunctiva/sclera: Conjunctivae normal.   Neck:      Comments: RIJ CVC in place dressing C/D/I  Cardiovascular:      Rate and Rhythm: Regular rhythm. Tachycardia present.      Pulses: Decreased pulses (RLE).      Comments: JVD indeterminate 2/2 CVC  Pulmonary:      Effort: Pulmonary effort is normal.      Breath sounds: Normal breath sounds. No rales.   Abdominal:      General: Bowel sounds are normal. There is no distension.      Palpations: Abdomen is soft.   Musculoskeletal:      Cervical back: Normal range of motion.      Right lower leg: No edema.      Left lower leg: No edema.   Skin:     General: Skin is warm and dry.   Neurological:      General: No focal  deficit present.      Mental Status: She is alert and oriented to person, place, and time.      Sensory: Sensory deficit (RLE) present.      Motor: Weakness (RLE) present.            Significant Labs/Significant Imaging:     Recent Labs   Lab 05/27/24  1302 05/28/24  0611 05/28/24  1304 05/28/24  1805 05/28/24 2005 05/29/24  0254   WBC 4.03 4.47  --  17.49* 14.97* 11.55   HGB 11.2* 10.8*  --  12.5 11.8* 11.5*   HCT 37.0 34.9* 39 40.8 38.5 37.9   MCV 81* 79*  --  80* 80* 81*   RBC 4.59 4.41  --  5.09 4.83 4.71   MCH 24.4* 24.5*  --  24.6* 24.4* 24.4*   MCHC 30.3* 30.9*  --  30.6* 30.6* 30.3*   RDW 14.6* 14.6*  --  14.2 14.6* 14.6*    197  --  250 196 210   MPV 10.9 10.1  --  10.6 10.6 10.6   GRAN 56.0  2.3 67.0  3.0  --  88.5*  15.5* 90.5*  13.6* 91.2*  10.5*   LYMPH 28.5  1.2 20.6  0.9*  --  3.3*  0.6* 3.5*  0.5* 6.0*  0.7*   MONO 12.9  0.5 9.8  0.4  --  6.9  1.2* 5.1  0.8 2.3*  0.3   EOSINOPHIL 1.7 1.3  --  0.1 0.0 0.0   BASOPHIL 0.7 0.9  --  0.2 0.2 0.1       Recent Labs   Lab 05/27/24  1302 05/28/24  0611 05/28/24  1402 05/28/24  1625 05/28/24 2157 05/29/24  0254 05/29/24  0919    142 139 144 143 142 139   K 4.2 3.6 4.4 3.6 3.3* 4.2 4.6    101 100 102 102 100 96   CO2 31* 29 25 28 27 27 30*   BUN 17 17 16 20 23 25* 30*   CREATININE 1.1 1.0 1.6* 1.8* 1.9* 2.0* 2.3*   GLU 85 101 472* 261* 189* 199* 194*   CALCIUM 9.9 9.3 9.3 9.5 9.6 9.7 9.7   PROT 6.9 6.3  --  6.9  --  6.7 6.6   ALBUMIN 3.7 3.4*  --  3.5  --  3.4* 3.4*   ALKPHOS 94 93  --  122  --  108 99   BILITOT 0.4 0.3  --  0.3  --  0.3 0.4   ALT 20 18  --  119*  --  102* 89*   AST 12 13  --  256*  --  191* 142*   ANIONGAP 11 12 14 14 14 15 13   MG 2.0  --   --  2.2  --  1.8  --    PHOS 3.1  --   --  3.2  --  3.5  --        Recent Labs   Lab 05/28/24  1817   COLORU Yell*   APPEARANCEUA Hazy*   PHUR 6.0   SPECGRAV 1.010   PROTEINUA 1+*   GLUCUA Negative   KETONESU Negative   BILIRUBINUA Negative   OCCULTUA 3+*   NITRITE  Negative   LEUKOCYTESUR 2+*   RBCUA 69*   WBCUA 13*   BACTERIA Many*   SQUAMEPITHEL 0   HYALINECASTS 4*   MICROCMT SEE COMMENT       Recent Labs   Lab 05/29/24  0339 05/29/24  0838 05/29/24  1255   PH 7.330* 7.369 7.339*   PCO2 61.4* 58.6* 64.9*   PO2 40 39* 32*   HCO3 32.3* 33.8* 34.9*   POCSATURATED 70 71 55   BE 6* 8* 9*       Recent Labs   Lab 05/28/24 2157 05/29/24  0950   * 1059*       Recent Labs   Lab 05/28/24  0611 05/28/24 2005 05/28/24 2157 05/29/24  0254 05/29/24  0919   INR 1.0 1.2  --   --   --    APTT  --  109.1* 72.0* 60.4* 55.4*       Lab Results   Component Value Date    HGBA1C 5.3 05/14/2024       Recent Labs   Lab 05/28/24  0611   TSH 0.889       Microbiology Results (last 7 days)       Procedure Component Value Units Date/Time    Urine culture [[id number]] Collected: 05/28/24 1817    Order Status: No result Specimen: Urine Updated: 05/28/24 2214            Echo    Result Date: 5/29/2024    Left Ventricle: The left ventricle is severely dilated. Severely increased ventricular mass. Normal wall thickness. There is severe eccentric hypertrophy. Severe global hypokinesis present. There is severely reduced systolic function with a visually estimated ejection fraction of 5 - 10%. Ejection fraction by visual approximation is 10% or less. Grade I diastolic dysfunction. Normal left ventricular filling pressure.   Right Ventricle: Normal right ventricular cavity size. Wall thickness is normal. Systolic function is normal.   Left Atrium: Left atrium is moderately dilated.   Aortic Valve: The aortic valve is a trileaflet valve. There is mild annular calcification present. There is no stenosis. There is no significant regurgitation.   Mitral Valve: There is mild bileaflet sclerosis. There is mild to mild- moderate regurgitation.   Tricuspid Valve: There is trace regurgitation.   Aorta: Aortic root is normal in size measuring 2.78 cm. Ascending aorta is normal measuring 2.85 cm.   Pulmonary Artery:  The estimated pulmonary artery systolic pressure is 36 mmHg.   IVC/SVC: Normal venous pressure at 3 mmHg.     US Lower Extremity Arteries Bilateral    Result Date: 5/29/2024  EXAMINATION: US LOWER EXTREMITY ARTERIES BILATERAL CLINICAL HISTORY: Right lower extremity concern for ischemia; TECHNIQUE: Bilateral lower extremity arterial duplex ultrasound examination performed. Multiple gray scale and color doppler images were obtained in addition to waveform analysis. COMPARISON: None FINDINGS: The peak systolic velocities on the right are as follows, in centimeters/second: Common femoral artery: 36.1 Deep femoral artery: 59 Superficial femoral artery, proximal: 14.4 Superficial femoral artery, mid portion: Occluded Superficial femoral artery, distal: Occluded Popliteal artery: Occluded Posterior tibial artery: Occluded Anterior tibial artery: Occluded The peak systolic velocities on the left are as follows, in centimeters/second: Common femoral artery: 169 Deep femoral artery: 209 Superficial femoral artery, proximal: 151 Superficial femoral artery, mid portion: 159 Superficial femoral artery, distal: 410 Popliteal artery: 111 Posterior tibial artery: 36 Anterior tibial artery: 43 There are monophasic waveforms in patent vessels of the right lower extremity.  There are triphasic waveforms in the left common femoral artery and deep femoral artery.  The remaining waveforms in the left lower extremity are monophasic.     There is occlusion of the right mid and distal superficial femoral artery, popliteal artery, and anterior and posterior tibial arteries. There is severe stenosis of the distal left superficial femoral artery. This report was flagged in Epic as abnormal. This result was discovered at 03:04.  Findings were discussed via telephone by Edda Donnelly MD with Jose Jeff MD on 5/29/2024 at 03:34.  Patient name and medical record number were verified, read back was performed. Electronically  signed by resident: Edda Donnelly Date:    05/29/2024 Time:    02:52 Electronically signed by: Xiang Rosa Date:    05/29/2024 Time:    04:12    X-Ray Chest AP Portable    Result Date: 5/28/2024  EXAMINATION: XR CHEST AP PORTABLE CLINICAL HISTORY: central line placement; TECHNIQUE: Single frontal view of the chest was performed. COMPARISON: 05/28/2024 FINDINGS: Interval placement of a right IJ catheter which terminates in the distal SVC.  The heart remains enlarged.  Calcified atheromatous disease affects the tortuous aorta.  Diffuse reticular opacities throughout the lungs, likely related to pulmonary edema however underlying inflammatory/infectious process cannot be excluded.  Skeletal structures are intact.     As above. Electronically signed by: Lakeshia Arambula MD Date:    05/28/2024 Time:    22:27    X-Ray Chest AP Portable    Result Date: 5/28/2024  EXAMINATION: XR CHEST AP PORTABLE CLINICAL HISTORY: shortness of breath; TECHNIQUE: Single frontal view of the chest was performed. COMPARISON: 05/14/2024 FINDINGS: The cardiac silhouette is enlarged. There is abnormal increased reticular lung markings seen throughout both lung fields, concerning for edema or an underlying inflammatory or infectious process.  No large pleural effusion.  No pneumothorax. The osseous structures appear normal.     Abnormal diffuse increased reticular lung markings, consider edema or underlying inflammation/infectious process Electronically signed by: Blessing Sanders MD Date:    05/28/2024 Time:    16:53    MRI Brain Without Contrast    Result Date: 5/28/2024  EXAMINATION: MRI BRAIN WITHOUT CONTRAST CLINICAL HISTORY: Stroke, follow up;Acute focal neurological deficit; TECHNIQUE: Multiplanar multisequence MR imaging of the brain was performed without contrast. COMPARISON: None. FINDINGS: Parenchyma: There is no restricted diffusion to suggest acute or subacute ischemic infarct.Generalized pattern age-related parenchymal  volume loss noted.  Nonspecific areas of T2/FLAIR hyperintense signal in the frontoparietal white matter and lis may reflect sequela of chronic small vessel ischemic disease. Additionally, there is a nonspecific somewhat lobulated appearing T1 and T2/FLAIR hyperintense lesion in the mesial right temporal/hippocampal region (axial FLAIR series 8, image 11; coronal T2 series 12, image 15), measuring on the order of 7 x 17 x 8 mm. Additional comments: There is no midline shift, abnormal extra-axial fluid collection, or acute intracranial hemorrhage. The basal cisterns are patent. Ventricles: Normal. Flow voids: The normal major intracranial arterial flow voids are visualized. Sinuses and mastoid air cells: Essentially clear Orbits: Normal Midline structures: The pituitary and craniocervical junction are normal. Marrow: Normal     1. No evidence of acute ischemia or recent infarction, as questioned. 2. Indeterminate T1 and T2-FLAIR hyperintense lesion in the mesial right temporal lobe/inferior basal ganglia region.  No definite restricted diffusion or significant susceptibility-related signal loss at this location.  Evolving subacute infarct would be considered.  Further characterization with contrast-enhanced sequences would be recommended as neoplastic etiology is not excluded.  Inflammatory/demyelinating or infectious processes would additionally be considered. Electronically signed by: Joel Acosta Date:    05/28/2024 Time:    14:10    Echo    Result Date: 5/14/2024    Left Ventricle: The left ventricle is severely dilated. Severely increased ventricular mass. Normal wall thickness. There is eccentric hypertrophy. Severe global hypokinesis present. There is severely reduced systolic function with a visually estimated ejection fraction of 5 - 10%. There is diastolic dysfunction but grade cannot be determined.   Right Ventricle: Normal right ventricular cavity size. Wall thickness is normal. Right ventricle wall  motion  is normal. Systolic function is normal.   Left Atrium: Left atrium is moderately dilated.   Mitral Valve: There is mild regurgitation.   Pulmonary Artery: The estimated pulmonary artery systolic pressure is 32 mmHg.   IVC/SVC: Normal venous pressure at 3 mmHg.     X-Ray Chest AP Portable    Result Date: 5/14/2024  EXAMINATION: XR CHEST AP PORTABLE CLINICAL HISTORY: CHF; TECHNIQUE: Single frontal view of the chest was performed. COMPARISON: 12/28/2023 FINDINGS: Cardiac monitoring leads overlie the chest.  There is unchanged prominence of the cardiomediastinal silhouette.  There is atherosclerosis of the thoracic aorta.  The lungs are symmetrically expanded with diffuse coarse/increased interstitial attenuation with bibasilar predominance.  Findings could reflect underlying interstitial edema or infectious process superimposed upon a background of chronic interstitial change.  No confluent airspace consolidation, substantial volume of pleural fluid or pneumothorax identified.  Visualized osseous structures appear intact with degenerative change.     Please see above. Electronically signed by: Sara Parsons MD Date:    05/14/2024 Time:    02:47    Assessment and Plan:       * Cardiogenic shock  Pt with an EF of 5-10% that had suspected flash pulmonary edema while getting a MRI. She required BiPAP at that time and received IV Lasix 40mg x3 in the ED with no response. Central line and A-line were placed; pt was started on Dobutamine 5mcg and Lasix 30mg/hr gtt.     Hemos during the initiation of Dobutamine and Lasix gtt:     CVP 10, SvO2 51, CO 3.3, CI 2, and SVR 1945.     Hemos this AM:    CVP 5, SvO2 71, CO 5.5, CI 3.4, and .     -Dobutamine weaned to 2.5mcg this AM.     Repeat Hemos this afternoon:     CVP 6 , SvO2 55, CO 3.5, CI 2.1, and SVR 1394    - Dobutamine 2.5 gtt  - Lasix d/slava this AM.     Acute lower limb ischemia  Pt presenting with RLE weakness and decreased sensation to the knee that started  around 3AM the day of admit. Initial thought was TIA as pt had palpable pulses on admit, however pt developed worsening RLE pain during admission prompting LE Arterial US.    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 There is severe stenosis of the distal left superficial femoral artery.  -CPK: 727     -ASA, Plavix, Heparin    Long QT interval  With wide QRS tachycardia on admission. Qtc 568 on admission.     - K goal 4, Mg 2    COPD (chronic obstructive pulmonary disease)  Patient's COPD is with exacerbation noted by continued dyspnea and worsening of baseline hypoxia currently.  Patient is currently off COPD Pathway. Continue scheduled inhalers Supplemental oxygen and monitor respiratory status closely.     Prior smoking history of 0.5 ppd, 23 yo start, quit 2018.    - Continue maintenance inhaler  - Supplemental O2 PRN    HFrEF (heart failure with reduced ejection fraction)  See NICM    DNR (do not resuscitate)  Pt DNR per chart review and reconfirmed in the ED on this admission. Patient does not want intubation either.    Type 2 diabetes mellitus with hyperglycemia, without long-term current use of insulin  Last A1C 5 during admission two weeks prior to current ICU admission. Not on home DM regimen. Hyperglycemic during this admission.    - LDSSI    Acute on chronic respiratory failure with hypoxia and hypercapnia  Patient with Hypercapnic and Hypoxic Respiratory failure which is Acute on chronic.  she is on home oxygen at 1.5 LPM. Supplemental oxygen was provided and noted.    Respiratory status has improved, pt on 6L NC this AM.       Signs/symptoms of respiratory failure include- tachypnea, increased work of breathing, and respiratory distress. Contributing diagnoses includes - CHF and COPD Labs and images were reviewed. Patient Has recent ABG, which has been reviewed. Will treat underlying causes and adjust  management of respiratory failure as follows-     - Do not intubate, does not align with patient goals    Acute on chronic combined systolic and diastolic congestive heart failure  Patient is identified as having Combined Systolic and Diastolic heart failure that is Acute on chronic. CHF is currently . Latest ECHO performed and demonstrates- Results for orders placed during the hospital encounter of 05/14/24    Echo    Interpretation Summary    Left Ventricle: The left ventricle is severely dilated. Severely increased ventricular mass. Normal wall thickness. There is eccentric hypertrophy. Severe global hypokinesis present. There is severely reduced systolic function with a visually estimated ejection fraction of 5 - 10%. There is diastolic dysfunction but grade cannot be determined.    Right Ventricle: Normal right ventricular cavity size. Wall thickness is normal. Right ventricle wall motion  is normal. Systolic function is normal.    Left Atrium: Left atrium is moderately dilated.    Mitral Valve: There is mild regurgitation.    Pulmonary Artery: The estimated pulmonary artery systolic pressure is 32 mmHg.    IVC/SVC: Normal venous pressure at 3 mmHg.  . Monitor clinical status closely. Monitor on telemetry. Patient is off CHF pathway.  Monitor strict Is&Os and daily weights.  Place on fluid restriction of 1.5 L. Cardiology has been consulted. Continue to stress to patient importance of self efficacy and  on diet for CHF. Last BNP reviewed- and noted below   Recent Labs   Lab 05/28/24  1259   BNP 2,066*       -Net - 337ml overnight. Lasix d/slava this AM. CVP of 5 this AM.     LBBB (left bundle branch block)  Noted on EKG dating as far back as 03/2019. Reconfirmed on admission EKG.     NICM (nonischemic cardiomyopathy)  Echo from 05/24/24 with EF 5-10% with global hypokinesis. GDMT includes Toprol 25mg qd, spironolactone 25mg qd.  Unable to afford Jardiance even with coupon. Entresto held on prior admission  and DC'd at clinic visit on day prior to admission given continued soft BP. Clinic plan was to introduce low dose ACE/ARB for additional GDMT. Diuresis with Lasix 40mg qd. Patient declined establishing with Palliative Care during recent clinic visit.      - Add GDMT back once patient stabilizes    Acute renal failure with acute tubular necrosis superimposed on stage 3a chronic kidney disease  Nephrology consulted. Urinary sediment with granular, halfy muddy brown and waxy casts suggestive of ATN in the setting of cardiogenic shock.    Cr of 2.3 this AM. Baseline Cr of 1.0.     Plan:    - CMP qd, trend Cr  - strict I/O  - daily weights  - Ucr, Uurea  - renally dose all medications  - avoid nephrotoxic agents, NSAIDs, IV contrast, ACE/ARB  - If JAMIR doesn't improve consider US retroperitoneal      Hypertension, essential  Chronic, controlled. Latest blood pressure and vitals reviewed-     Temp:  [96.9 °F (36.1 °C)-97.9 °F (36.6 °C)]   Pulse:  []   Resp:  [18-48]   BP: ()/(45-85)   SpO2:  [86 %-99 %]   Arterial Line BP: ()/(45-57) .   Home meds for hypertension were reviewed and noted below.   Hypertension Medications               furosemide (LASIX) 40 MG tablet Take 1 tablet (40 mg total) by mouth once daily.    metoprolol succinate (TOPROL-XL) 25 MG 24 hr tablet Take 1 tablet (25 mg total) by mouth once daily.    spironolactone (ALDACTONE) 25 MG tablet Take 1 tablet (25 mg total) by mouth once daily.            While in the hospital, will manage blood pressure as follows; Adjust home antihypertensive regimen as follows- resume home meds once more stable    Will utilize p.r.n. blood pressure medication only if patient's blood pressure greater than 180/110 and she develops symptoms such as worsening chest pain or shortness of breath.        VTE Risk Mitigation (From admission, onward)           Ordered     IP VTE HIGH RISK PATIENT  Once         05/28/24 2104     Place sequential compression device   Until discontinued         05/28/24 2104     heparin 25,000 units in dextrose 5% (100 units/ml) IV bolus from bag LOW INTENSITY nomogram - [facility]  As needed (PRN)        Question:  Heparin Infusion Adjustment (DO NOT MODIFY ANSWER)  Answer:  [email or web address]\Images\Pharmacy\HeparinInfusions\heparin LOW INTENSITY nomogram for [facility] [id number]    05/28/24 1948     heparin 25,000 units in dextrose 5% (100 units/ml) IV bolus from bag LOW INTENSITY nomogram - [facility]  As needed (PRN)        Question:  Heparin Infusion Adjustment (DO NOT MODIFY ANSWER)  Answer:  [email or web address]\epic\Images\Pharmacy\HeparinInfusions\heparin LOW INTENSITY nomogram for [facility] [id number]    05/28/24 1948     heparin 25,000 units in dextrose 5% 250 mL (100 units/mL) infusion LOW INTENSITY nomogram - [facility]  Continuous        Question:  Begin at (units/kg/hr)  Answer:  12    05/28/24 1948     Place sequential compression device  Until discontinued         05/28/24 6379                    Natalio Angel MD, PGY-II  Cardiology  [facility] - Cardiac Intensive Care

## 2024-05-29 NOTE — SUBJECTIVE & OBJECTIVE
Interval History; patient seen this morning on nasal cannula. Denies chest pain or shortness of breath. Continues to have right leg pain. Creatinine up trending this morning.       Objective:     Vital Signs (Most Recent):  Temp: 97.7 °F (36.5 °C) (05/29/24 1101)  Pulse: 92 (05/29/24 1301)  Resp: 12 (05/29/24 1301)  BP: 109/66 (05/29/24 1101)  SpO2: 97 % (05/29/24 1301) Vital Signs (24h Range):  Temp:  [96.9 °F (36.1 °C)-97.9 °F (36.6 °C)] 97.7 °F (36.5 °C)  Pulse:  [] 92  Resp:  [12-48] 12  SpO2:  [86 %-99 %] 97 %  BP: ()/(45-85) 109/66  Arterial Line BP: ()/(45-57) 105/50     Weight: 62 kg (136 lb 11 oz) (05/29/24 0901)  Body mass index is 25.83 kg/m².  Body surface area is 1.63 meters squared.    I/O last 3 completed shifts:  In: 707.8 [P.O.:125; I.V.:407.1; IV Piggyback:175.7]  Out: 1045 [Urine:970; Emesis/NG output:75]     Physical Exam  Vitals and nursing note reviewed.   Constitutional:       General: She is awake. She is not in acute distress.     Appearance: She is normal weight. She is ill-appearing. She is not diaphoretic.      Interventions: Nasal cannula in place.   HENT:      Head: Normocephalic and atraumatic.      Right Ear: External ear normal.      Left Ear: External ear normal.      Mouth/Throat:      Mouth: Mucous membranes are moist.      Pharynx: Oropharynx is clear. No oropharyngeal exudate or posterior oropharyngeal erythema.   Eyes:      General: No scleral icterus.        Right eye: No discharge.         Left eye: No discharge.      Extraocular Movements: Extraocular movements intact.      Conjunctiva/sclera: Conjunctivae normal.   Neck:      Comments: Trialysis catheter to right internal jugular vein.  Cardiovascular:      Rate and Rhythm: Normal rate and regular rhythm.      Heart sounds: Murmur heard.   Pulmonary:      Effort: Tachypnea present. No respiratory distress.      Breath sounds: No wheezing, rhonchi or rales.   Abdominal:      General: Bowel sounds are  normal. There is no distension.      Palpations: Abdomen is soft.      Tenderness: There is no abdominal tenderness.   Genitourinary:     Comments: Keys catheter in place.  Musculoskeletal:      Cervical back: Neck supple.      Right lower leg: No edema.      Left lower leg: No edema.   Skin:     General: Skin is warm and dry.      Coloration: Skin is not jaundiced.   Neurological:      General: No focal deficit present.      Mental Status: She is alert. Mental status is at baseline.      Cranial Nerves: No cranial nerve deficit.      Motor: No weakness.   Psychiatric:         Mood and Affect: Mood normal.         Behavior: Behavior normal. Behavior is cooperative.          Significant Labs:  ABGs:   Recent Labs   Lab 05/29/24  1255   PH 7.339*   PCO2 64.9*   HCO3 34.9*   POCSATURATED 55   BE 9*     BMP:   Recent Labs   Lab 05/29/24 0254 05/29/24  0919   * 194*    139   K 4.2 4.6    96   CO2 27 30*   BUN 25* 30*   CREATININE 2.0* 2.3*   CALCIUM 9.7 9.7   MG 1.8  --      CBC:   Recent Labs   Lab 05/29/24 0254   WBC 11.55   RBC 4.71   HGB 11.5*   HCT 37.9      MCV 81*   MCH 24.4*   MCHC 30.3*     CMP:   Recent Labs   Lab 05/29/24 0919   *   CALCIUM 9.7   ALBUMIN 3.4*   PROT 6.6      K 4.6   CO2 30*   CL 96   BUN 30*   CREATININE 2.3*   ALKPHOS 99   ALT 89*   *   BILITOT 0.4     Coagulation:   Recent Labs   Lab 05/28/24 2005 05/28/24 2157 05/29/24 0919   INR 1.2  --   --    APTT 109.1*   < > 55.4*    < > = values in this interval not displayed.     LFTs:   Recent Labs   Lab 05/29/24 0919   ALT 89*   *   ALKPHOS 99   BILITOT 0.4   PROT 6.6   ALBUMIN 3.4*     Microbiology Results (last 7 days)       Procedure Component Value Units Date/Time    Urine culture [[id number]] Collected: 05/28/24 1817    Order Status: No result Specimen: Urine Updated: 05/28/24 2214          Specimen (24h ago, onward)      None          TSH:   Recent Labs   Lab 05/28/24  0611   TSH  0.889     Recent Labs   Lab 05/28/24  1817   COLORU Russell*   SPECGRAV 1.010   PHUR 6.0   PROTEINUA 1+*   BACTERIA Many*   NITRITE Negative   LEUKOCYTESUR 2+*   HYALINECASTS 4*     Urinary sediment on 05/28/2024:                                              Significant Imaging:  I have reviewed all imagining in the last 24 hours.

## 2024-05-29 NOTE — ASSESSMENT & PLAN NOTE
- urinary sediment with granular, half muddy brown and waxy casts suggestive of acute tubular injury in the setting of cardiogenic shock  - concern for critical limb ischemia so will follow-up CPK in addition have added on UPCR and ordered retroperitoneal US  - can continue Lasix 30 mg/hr and schedule Diuril 500 mg IV BID as serum potassium improved and CVP down to 5 from 10 mmHg  - serial RFPs and magnesium levels in light of active diuresis   - please avoid hypotension/major fluctuations in BP (keep MAP > 65 mmHg)  - renal diet/tube feeds when not NPO with volume restriction per primary team  - strict I/O's and daily weights  - renally all dose medications to eGFR   - avoid nephrotoxic agents wean feasible (i.e. NSAIDs, intra-arterial contrast, supra-therapeutic vancomycin levels, etc.)  - no acute indications for RRT at this time however will continue to monitor closely and consent obtained should acute indications for RRT arise

## 2024-05-29 NOTE — ASSESSMENT & PLAN NOTE
Patient with Hypercapnic and Hypoxic Respiratory failure which is Acute on chronic.  she is on home oxygen at 1.5 LPM. Supplemental oxygen was provided and noted.    Respiratory status has improved, pt on 6L NC this AM.       Signs/symptoms of respiratory failure include- tachypnea, increased work of breathing, and respiratory distress. Contributing diagnoses includes - CHF and COPD Labs and images were reviewed. Patient Has recent ABG, which has been reviewed. Will treat underlying causes and adjust management of respiratory failure as follows-     - Do not intubate, does not align with patient goals

## 2024-05-29 NOTE — ASSESSMENT & PLAN NOTE
Patient's COPD is with exacerbation noted by continued dyspnea and worsening of baseline hypoxia currently.  Patient is currently off COPD Pathway. Continue scheduled inhalers Supplemental oxygen and monitor respiratory status closely.     Prior smoking history of 0.5 ppd, 23 yo start, quit 2018.    - Continue maintenance inhaler  - Supplemental O2 PRN

## 2024-05-29 NOTE — PLAN OF CARE
Cardiac ICU Care Plan    POC reviewed with Selma Hooper and family. Questions and concerns addressed.     - Pt admitted from the ED  - LA- 4.8  - Trialysis and lynsey placed  - CVP- 10  - SVO2- 51  -  started  - Lasix drip started, 120 mg push given  - Diuril given  - pain in RLE increasing- pain meds requested- awaiting MD orders    Neuro:  Padmini Coma Scale  Best Eye Response: 4-->(E4) spontaneous  Best Motor Response: 6-->(M6) obeys commands  Best Verbal Response: 5-->(V5) oriented  Padmini Coma Scale Score: 15  Assessment Qualifiers: no eye obstruction present, patient not sedated/intubated  Pupil PERRLA: yes    24 hr Temp:  [96.9 °F (36.1 °C)-98.1 °F (36.7 °C)]      CV:  Rhythm: sinus tachycardia   DVT prophylaxis: VTE Required Core Measure: Pharmacological prophylaxis initiated/maintained    CVP (mean): 10 mmHg (05/28/24 1630)      Pulses  Right Radial Pulse: 2+ (normal)  Left Radial Pulse: 2+ (normal)  Right Dorsalis Pedis Pulse: 1+ (weak)  Left Dorsalis Pedis Pulse: 1+ (weak)  Right Posterior Tibial Pulse: 1+ (weak)  Left Posterior Tibial Pulse: 1+ (weak)    Resp:  Flow (L/min) (Oxygen Therapy): 2  Oxygen Concentration (%): 60    GI/:  GI prophylaxis: no  Diet/Nutrition Received: NPO  Last Bowel Movement: 05/27/24  Voiding Characteristics: urethral catheter (bladder)       Urethral Catheter 05/28/24 1200-Reason for Continuing Urinary Catheterization: Critically ill in ICU and requiring hourly monitoring of intake/output   Intake/Output Summary (Last 24 hours) at 5/28/2024 1903  Last data filed at 5/28/2024 1800  Gross per 24 hour   Intake 102.34 ml   Output 25 ml   Net 77.34 ml          Labs/Accuchecks:  Recent Labs   Lab 05/27/24  1302 05/28/24  0611 05/28/24  1304 05/28/24  1805   WBC 4.03 4.47  --  17.49*   RBC 4.59 4.41  --  5.09   HGB 11.2* 10.8*  --  12.5   HCT 37.0 34.9* 39 40.8    197  --  250      Recent Labs   Lab 05/28/24  0611   INR 1.0      Recent Labs     05/28/24  1625      K  "3.6   CO2 28      BUN 20   CREATININE 1.8*   ALKPHOS 122   *   *   BILITOT 0.3     No results for input(s): "CPK", "CPKMB", "MB", "TROPONINI" in the last 168 hours.   Recent Labs     05/28/24  1315 05/28/24  1627 05/28/24  1801   PH 7.132* 7.289* 7.358   PCO2 95.5* 64.2* 50.1*   PO2 51 31* 123*   HCO3 31.9* 30.8* 28.2*   POCSATURATED 71 51 99   BE 3* 4* 3*       Electrolytes: No replacement orders  Accuchecks: ACHS    Gtts/LDAs:   sodium chloride 0.9%   Intravenous Continuous 5 mL/hr at 05/28/24 1800 Rate Verify at 05/28/24 1800    DOBUTamine IV infusion (non-titrating)  5 mcg/kg/min Intravenous Continuous 4.7 mL/hr at 05/28/24 1834 5 mcg/kg/min at 05/28/24 1834    furosemide (Lasix) 500 mg in 50 mL infusion (conc: 10 mg/mL)  30 mg/hr Intravenous Continuous 3 mL/hr at 05/28/24 1800 30 mg/hr at 05/28/24 1800    nitroPRUSSide  0-10 mcg/kg/min Intravenous Continuous           Lines/Drains/Airways       Central Venous Catheter Line  Duration             Trialysis (Dialysis) Catheter 05/28/24 1613 right internal jugular <1 day              Drain  Duration                  Urethral Catheter 05/28/24 1200 <1 day              Arterial Line  Duration             Arterial Line 05/28/24 1818 Left Radial <1 day              Peripheral Intravenous Line  Duration                  Peripheral IV - Single Lumen 05/28/24 1259 20 G Left Hand <1 day                    Skin/Wounds  Bathing/Skin Care: bath, complete;dressed/undressed;electrode patches/site rotation;incontinence care;linen changed (05/28/24 1501)  Wounds: No  Wound care consulted: No  "

## 2024-05-29 NOTE — ASSESSMENT & PLAN NOTE
Nephrology consulted. Urinary sediment with granular, halfy muddy brown and waxy casts suggestive of ATN in the setting of cardiogenic shock.    Cr of 2.3 this AM. Baseline Cr of 1.0.     Plan:    - CMP qd, trend Cr  - strict I/O  - daily weights  - Ucr, Uurea  - renally dose all medications  - avoid nephrotoxic agents, NSAIDs, IV contrast, ACE/ARB  - If JAMIR doesn't improve consider US retroperitoneal

## 2024-05-29 NOTE — ASSESSMENT & PLAN NOTE
- Patient admitted with concern for stroke and ADHF/Cardiogenic shock  - Repeat ECG 5/29: Sinus tachycardia, LBBB  - No need for adenosine challenge or TATA/DCCV  - Recommend weaning off inotropes if able to improve rates and treating other underlying causes  - Event monitor on Discharge

## 2024-05-29 NOTE — PROGRESS NOTES
Pharmacist Renal Dose Adjustment Note    Selma Hooper is a 78 y.o. female being treated with the medication famotidine    Patient Data:    Vital Signs (Most Recent):  Temp: 97.6 °F (36.4 °C) (05/28/24 1901)  Pulse: 108 (05/28/24 2101)  Resp: (!) 26 (05/28/24 2101)  BP: (!) 92/53 (05/28/24 1901)  SpO2: (!) 93 % (05/28/24 2101) Vital Signs (72h Range):  Temp:  [96.9 °F (36.1 °C)-98.1 °F (36.7 °C)]   Pulse:  []   Resp:  [16-48]   BP: ()/(53-85)   SpO2:  [86 %-100 %]   Arterial Line BP: ()/(48-57)      Recent Labs   Lab 05/28/24  0611 05/28/24  1402 05/28/24  1625   CREATININE 1.0 1.6* 1.8*     Serum creatinine: 1.8 mg/dL (H) 05/28/24 1625  Estimated creatinine clearance: 22.5 mL/min (A)    Medication: famotidine 20 mg twice daily will be changed to famotidine 20 mg daily    Maria Isabel JonesD

## 2024-05-29 NOTE — ASSESSMENT & PLAN NOTE
Patient is identified as having Systolic (HFrEF) heart failure that is Acute on chronic. CHF is currently uncontrolled due to Dyspnea not returned to baseline after 3 doses of IV diuretic, Rales/crackles on pulmonary exam, and Pulmonary edema/pleural effusion on CXR. Latest ECHO performed and demonstrates- Results for orders placed during the hospital encounter of 05/14/24    Echo    Interpretation Summary    Left Ventricle: The left ventricle is severely dilated. Severely increased ventricular mass. Normal wall thickness. There is eccentric hypertrophy. Severe global hypokinesis present. There is severely reduced systolic function with a visually estimated ejection fraction of 5 - 10%. There is diastolic dysfunction but grade cannot be determined.    Right Ventricle: Normal right ventricular cavity size. Wall thickness is normal. Right ventricle wall motion  is normal. Systolic function is normal.    Left Atrium: Left atrium is moderately dilated.    Mitral Valve: There is mild regurgitation.    Pulmonary Artery: The estimated pulmonary artery systolic pressure is 32 mmHg.    IVC/SVC: Normal venous pressure at 3 mmHg.    Recent Labs   Lab 05/28/24  1259   BNP 2,066*     - management per primary team  - on dobutamine at 5 mcg/kg/min  - diuresis as detailed above

## 2024-05-29 NOTE — ASSESSMENT & PLAN NOTE
Chronic, controlled. Latest blood pressure and vitals reviewed-     Temp:  [96.9 °F (36.1 °C)-97.9 °F (36.6 °C)]   Pulse:  []   Resp:  [18-48]   BP: ()/(45-85)   SpO2:  [86 %-99 %]   Arterial Line BP: ()/(45-57) .   Home meds for hypertension were reviewed and noted below.   Hypertension Medications               furosemide (LASIX) 40 MG tablet Take 1 tablet (40 mg total) by mouth once daily.    metoprolol succinate (TOPROL-XL) 25 MG 24 hr tablet Take 1 tablet (25 mg total) by mouth once daily.    spironolactone (ALDACTONE) 25 MG tablet Take 1 tablet (25 mg total) by mouth once daily.            While in the hospital, will manage blood pressure as follows; Adjust home antihypertensive regimen as follows- resume home meds once more stable    Will utilize p.r.n. blood pressure medication only if patient's blood pressure greater than 180/110 and she develops symptoms such as worsening chest pain or shortness of breath.

## 2024-05-29 NOTE — PLAN OF CARE
Problem: Acute Kidney Injury/Impairment  Goal: Fluid and Electrolyte Balance  Outcome: Progressing  Goal: Improved Oral Intake  Outcome: Progressing  Goal: Effective Renal Function  Outcome: Progressing     Problem: Skin Injury Risk Increased  Goal: Skin Health and Integrity  Outcome: Progressing

## 2024-05-29 NOTE — PLAN OF CARE
[facility] - Cardiac Intensive Care  Initial Discharge Assessment       Primary Care Provider: Phil Andrade MD    Admission Diagnosis: Paresthesias [R20.2]  Weakness [R53.1]  Tachycardia [R00.0]  Acute focal neurological deficit [R29.818]    Admission Date: 5/28/2024  Expected Discharge Date: 6/4/2024    Transition of Care Barriers: None    Payor: BLUE CROSS BLUE SHIELD / Plan: [place]MO / Product Type: HMO /     Extended Emergency Contact Information  Primary Emergency Contact: Alexsandra Hooper  Address: [address] [place] [address] [facility]  Home Phone: [phone]  Mobile Phone: [phone]  Relation: Daughter   needed? No  Secondary Emergency Contact: Zoya Parmar  Mobile Phone: [phone]  Relation: Grandchild    Discharge Plan A: Home with family  Discharge Plan B: Home Health      [facility] [address] [place] [address]  Phone: [phone] Fax: [phone]      Initial Assessment (most recent)       Adult Discharge Assessment - 05/29/24 1235          Discharge Assessment    Assessment Type Discharge Planning Assessment     Confirmed/corrected address, phone number and insurance Yes     Confirmed Demographics Correct on Facesheet     Source of Information patient;family;health record     Communicated GERA with patient/caregiver Date not available/Unable to determine     Reason For Admission Cardiogenic shock     People in Home grandchild(elsie);child(elsie), adult     Facility Arrived From: Home     Do you expect to return to your current living situation? Yes     Do you have help at home or someone to help you manage your care at home? Yes     Who are your caregiver(s) and their phone number(s)? Alexsandra Hooper (daughter) [phone], Zoya Parmar (granddaughter) [phone]     Prior to hospitilization cognitive status: Alert/Oriented     Current cognitive status: Alert/Oriented     Walking or Climbing Stairs Difficulty  "no     Dressing/Bathing Difficulty no     Home Layout Able to live on 1st floor     Equipment Currently Used at Home oxygen     Readmission within 30 days? Yes     Patient currently being followed by outpatient case management? No     Do you currently have service(s) that help you manage your care at home? Yes     Name and Contact number of agency [facility]     Do you take prescription medications? Yes     Do you have prescription coverage? Yes     Do you have any problems affording any of your prescribed medications? No     Is the patient taking medications as prescribed? yes     Who is going to help you get home at discharge? Alexsandra Hooper (daughter) [phone]     How do you get to doctors appointments? car, drives self;family or friend will provide     Are you on dialysis? No     Do you take coumadin? No     Discharge Plan A Home with family     Discharge Plan B Home Health     DME Needed Upon Discharge  none     Discharge Plan discussed with: Patient;Adult children     Transition of Care Barriers None                     Readmission Assessment (most recent)       Readmission Assessment - 05/29/24 1242          Readmission    Was this a planned readmission? No     Why were you hospitalized in the last 30 days? Acute CHF     Why were you readmitted? Alarmed about signs/symptoms;Related to previous admission     When you left the hospital how did you feel? "My pressure was low, I was surprised they were sending me home"     When you left the hospital where did you go? Home with Home Health     Did patient/caregiver refused recommended DC plan? No     Tell me about what happened between when you left the hospital and the day you returned. Pt was doing well and went to all follow-up appts but woke up early 5/28 with leg numbness.     When did you start not feeling well? 3:00am Tuesday 5/28     Did you try to manage your symptoms your self? No     Did you call anyone? No     Did you try to see or did see a doctor " "or nurse before you came? No     Did you have  a follow-up appointment on discharge? Yes     Did you go? Yes                    BRIGHT met with pt and daughter Alexsandra to discuss discharge planning.  Pt lives with with Alexsandra and granddaughter Zoya in a one-story house and is independent with ambulation and ADLs.  Pt uses oxygen at home and was current with [facility] although pt stated that she "doesn't need HH."  No dialysis or coumadin.  Pt will have transportation and assistance from her daughter at discharge.  Discharge Plan A and Plan B have been determined by review of patient's clinical status, future medical and therapeutic needs, and coverage/benefits for post-acute care in coordination with multidisciplinary team members.  BRIGHT name and ext on whiteboard; discharge planning booklet provided.  Will continue to follow.      Juliana Roque LMSW  [facility]  [id number]            "

## 2024-05-29 NOTE — ASSESSMENT & PLAN NOTE
Pt presenting with RLE weakness and decreased sensation to the knee that started around 3AM the day of admit. Initial thought was TIA as pt had palpable pulses on admit, however pt developed worsening RLE pain during admission prompting LE Arterial US.    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 There is severe stenosis of the distal left superficial femoral artery.  -CPK: 727     -ASA, Plavix, Heparin

## 2024-05-29 NOTE — SUBJECTIVE & OBJECTIVE
Interval History:     Pt was seen and examined at bedside. Improvement in Hemos overnight with diuresis and Dobutamine 5. Lasix d/slava and Dobutamine weaned to 2.5. Pt is off of BiPaP and on 4L NC at this time.     Review of Systems   Constitutional: Negative for chills, diaphoresis, fever, malaise/fatigue and night sweats.   HENT:  Negative for congestion, hearing loss, hoarse voice, sore throat and stridor.    Eyes:  Negative for blurred vision.   Cardiovascular:  Positive for dyspnea on exertion and orthopnea. Negative for chest pain, claudication, cyanosis, leg swelling, near-syncope, palpitations, paroxysmal nocturnal dyspnea and syncope.   Respiratory:  Positive for shortness of breath. Negative for cough, sleep disturbances due to breathing, snoring, sputum production and wheezing.    Skin:  Negative for dry skin and rash.   Musculoskeletal:  Negative for arthritis, back pain, joint pain, muscle cramps, muscle weakness and myalgias.   Gastrointestinal:  Negative for bloating, abdominal pain, change in bowel habit, constipation, diarrhea, dysphagia, nausea and vomiting.   Genitourinary:  Negative for dysuria and urgency.   Neurological:  Positive for focal weakness and numbness. Negative for difficulty with concentration, excessive daytime sleepiness, dizziness, headaches, light-headedness, tremors and weakness.   Psychiatric/Behavioral:  Negative for altered mental status and depression. The patient does not have insomnia.      Objective:     Vital Signs (Most Recent):  Temp: 97.7 °F (36.5 °C) (05/29/24 1101)  Pulse: 92 (05/29/24 1301)  Resp: 12 (05/29/24 1301)  BP: 109/66 (05/29/24 1101)  SpO2: 97 % (05/29/24 1301) Vital Signs (24h Range):  Temp:  [96.9 °F (36.1 °C)-97.9 °F (36.6 °C)] 97.7 °F (36.5 °C)  Pulse:  [] 92  Resp:  [12-48] 12  SpO2:  [86 %-99 %] 97 %  BP: ()/(45-85) 109/66  Arterial Line BP: ()/(45-57) 105/50     Weight: 62 kg (136 lb 11 oz)  Body mass index is 25.83 kg/m².      SpO2: 97 %         Intake/Output Summary (Last 24 hours) at 5/29/2024 1346  Last data filed at 5/29/2024 1301  Gross per 24 hour   Intake 1309.72 ml   Output 1345 ml   Net -35.28 ml       Lines/Drains/Airways       Central Venous Catheter Line  Duration             Trialysis (Dialysis) Catheter 05/28/24 1613 right internal jugular <1 day              Drain  Duration                  Urethral Catheter 05/28/24 1200 1 day              Arterial Line  Duration             Arterial Line 05/28/24 1818 Left Radial <1 day              Peripheral Intravenous Line  Duration                  Peripheral IV - Single Lumen 05/28/24 1259 20 G Left Hand 1 day                       Physical Exam  Vitals and nursing note reviewed.   Constitutional:       Appearance: Normal appearance. She is ill-appearing.      Comments: L radial arterial line   Eyes:      Extraocular Movements: Extraocular movements intact.      Conjunctiva/sclera: Conjunctivae normal.   Neck:      Comments: RIJ CVC in place dressing C/D/I  Cardiovascular:      Rate and Rhythm: Regular rhythm. Tachycardia present.      Pulses: Decreased pulses (RLE).      Comments: JVD indeterminate 2/2 CVC  Pulmonary:      Effort: Pulmonary effort is normal.      Breath sounds: Normal breath sounds. No rales.   Abdominal:      General: Bowel sounds are normal. There is no distension.      Palpations: Abdomen is soft.   Musculoskeletal:      Cervical back: Normal range of motion.      Right lower leg: No edema.      Left lower leg: No edema.   Skin:     General: Skin is warm and dry.   Neurological:      General: No focal deficit present.      Mental Status: She is alert and oriented to person, place, and time.      Sensory: Sensory deficit (RLE) present.      Motor: Weakness (RLE) present.            Significant Labs/Significant Imaging:     Recent Labs   Lab 05/27/24  1302 05/28/24  0611 05/28/24  1304 05/28/24  1805 05/28/24 2005 05/29/24  0254   WBC 4.03 4.47  --  17.49*  14.97* 11.55   HGB 11.2* 10.8*  --  12.5 11.8* 11.5*   HCT 37.0 34.9* 39 40.8 38.5 37.9   MCV 81* 79*  --  80* 80* 81*   RBC 4.59 4.41  --  5.09 4.83 4.71   MCH 24.4* 24.5*  --  24.6* 24.4* 24.4*   MCHC 30.3* 30.9*  --  30.6* 30.6* 30.3*   RDW 14.6* 14.6*  --  14.2 14.6* 14.6*    197  --  250 196 210   MPV 10.9 10.1  --  10.6 10.6 10.6   GRAN 56.0  2.3 67.0  3.0  --  88.5*  15.5* 90.5*  13.6* 91.2*  10.5*   LYMPH 28.5  1.2 20.6  0.9*  --  3.3*  0.6* 3.5*  0.5* 6.0*  0.7*   MONO 12.9  0.5 9.8  0.4  --  6.9  1.2* 5.1  0.8 2.3*  0.3   EOSINOPHIL 1.7 1.3  --  0.1 0.0 0.0   BASOPHIL 0.7 0.9  --  0.2 0.2 0.1       Recent Labs   Lab 05/27/24  1302 05/28/24  0611 05/28/24  1402 05/28/24  1625 05/28/24  2157 05/29/24  0254 05/29/24  0919    142 139 144 143 142 139   K 4.2 3.6 4.4 3.6 3.3* 4.2 4.6    101 100 102 102 100 96   CO2 31* 29 25 28 27 27 30*   BUN 17 17 16 20 23 25* 30*   CREATININE 1.1 1.0 1.6* 1.8* 1.9* 2.0* 2.3*   GLU 85 101 472* 261* 189* 199* 194*   CALCIUM 9.9 9.3 9.3 9.5 9.6 9.7 9.7   PROT 6.9 6.3  --  6.9  --  6.7 6.6   ALBUMIN 3.7 3.4*  --  3.5  --  3.4* 3.4*   ALKPHOS 94 93  --  122  --  108 99   BILITOT 0.4 0.3  --  0.3  --  0.3 0.4   ALT 20 18  --  119*  --  102* 89*   AST 12 13  --  256*  --  191* 142*   ANIONGAP 11 12 14 14 14 15 13   MG 2.0  --   --  2.2  --  1.8  --    PHOS 3.1  --   --  3.2  --  3.5  --        Recent Labs   Lab 05/28/24  1817   COLORU [place]*   APPEARANCEUA Hazy*   PHUR 6.0   SPECGRAV 1.010   PROTEINUA 1+*   GLUCUA Negative   KETONESU Negative   BILIRUBINUA Negative   OCCULTUA 3+*   NITRITE Negative   LEUKOCYTESUR 2+*   RBCUA 69*   WBCUA 13*   BACTERIA Many*   SQUAMEPITHEL 0   HYALINECASTS 4*   MICROCMT SEE COMMENT       Recent Labs   Lab 05/29/24  0339 05/29/24  0838 05/29/24  1255   PH 7.330* 7.369 7.339*   PCO2 61.4* 58.6* 64.9*   PO2 40 39* 32*   HCO3 32.3* 33.8* 34.9*   POCSATURATED 70 71 55   BE 6* 8* 9*       Recent Labs   Lab 05/28/24  2321  05/29/24  0950   * 1059*       Recent Labs   Lab 05/28/24  0611 05/28/24 2005 05/28/24  2157 05/29/24  0254 05/29/24  0919   INR 1.0 1.2  --   --   --    APTT  --  109.1* 72.0* 60.4* 55.4*       Lab Results   Component Value Date    HGBA1C 5.3 05/14/2024       Recent Labs   Lab 05/28/24  0611   TSH 0.889       Microbiology Results (last 7 days)       Procedure Component Value Units Date/Time    Urine culture [[id number]] Collected: 05/28/24 1817    Order Status: No result Specimen: Urine Updated: 05/28/24 2214            Echo    Result Date: 5/29/2024    Left Ventricle: The left ventricle is severely dilated. Severely increased ventricular mass. Normal wall thickness. There is severe eccentric hypertrophy. Severe global hypokinesis present. There is severely reduced systolic function with a visually estimated ejection fraction of 5 - 10%. Ejection fraction by visual approximation is 10% or less. Grade I diastolic dysfunction. Normal left ventricular filling pressure.   Right Ventricle: Normal right ventricular cavity size. Wall thickness is normal. Systolic function is normal.   Left Atrium: Left atrium is moderately dilated.   Aortic Valve: The aortic valve is a trileaflet valve. There is mild annular calcification present. There is no stenosis. There is no significant regurgitation.   Mitral Valve: There is mild bileaflet sclerosis. There is mild to mild- moderate regurgitation.   Tricuspid Valve: There is trace regurgitation.   Aorta: Aortic root is normal in size measuring 2.78 cm. Ascending aorta is normal measuring 2.85 cm.   Pulmonary Artery: The estimated pulmonary artery systolic pressure is 36 mmHg.   IVC/SVC: Normal venous pressure at 3 mmHg.     US Lower Extremity Arteries Bilateral    Result Date: 5/29/2024  EXAMINATION: US LOWER EXTREMITY ARTERIES BILATERAL CLINICAL HISTORY: Right lower extremity concern for ischemia; TECHNIQUE: Bilateral lower extremity arterial duplex ultrasound examination  performed. Multiple gray scale and color doppler images were obtained in addition to waveform analysis. COMPARISON: None FINDINGS: The peak systolic velocities on the right are as follows, in centimeters/second: Common femoral artery: 36.1 Deep femoral artery: 59 Superficial femoral artery, proximal: 14.4 Superficial femoral artery, mid portion: Occluded Superficial femoral artery, distal: Occluded Popliteal artery: Occluded Posterior tibial artery: Occluded Anterior tibial artery: Occluded The peak systolic velocities on the left are as follows, in centimeters/second: Common femoral artery: 169 Deep femoral artery: 209 Superficial femoral artery, proximal: 151 Superficial femoral artery, mid portion: 159 Superficial femoral artery, distal: 410 Popliteal artery: 111 Posterior tibial artery: 36 Anterior tibial artery: 43 There are monophasic waveforms in patent vessels of the right lower extremity.  There are triphasic waveforms in the left common femoral artery and deep femoral artery.  The remaining waveforms in the left lower extremity are monophasic.     There is occlusion of the right mid and distal superficial femoral artery, popliteal artery, and anterior and posterior tibial arteries. There is severe stenosis of the distal left superficial femoral artery. This report was flagged in Epic as abnormal. This result was discovered at 03:04.  Findings were discussed via telephone by Edda Donnelly MD with Jose Jeff MD on 5/29/2024 at 03:34.  Patient name and medical record number were verified, read back was performed. Electronically signed by resident: Edda Donnelly Date:    05/29/2024 Time:    02:52 Electronically signed by: Xiang Rosa Date:    05/29/2024 Time:    04:12    X-Ray Chest AP Portable    Result Date: 5/28/2024  EXAMINATION: XR CHEST AP PORTABLE CLINICAL HISTORY: central line placement; TECHNIQUE: Single frontal view of the chest was performed. COMPARISON: 05/28/2024  FINDINGS: Interval placement of a right IJ catheter which terminates in the distal SVC.  The heart remains enlarged.  Calcified atheromatous disease affects the tortuous aorta.  Diffuse reticular opacities throughout the lungs, likely related to pulmonary edema however underlying inflammatory/infectious process cannot be excluded.  Skeletal structures are intact.     As above. Electronically signed by: Lakeshia Arambula MD Date:    05/28/2024 Time:    22:27    X-Ray Chest AP Portable    Result Date: 5/28/2024  EXAMINATION: XR CHEST AP PORTABLE CLINICAL HISTORY: shortness of breath; TECHNIQUE: Single frontal view of the chest was performed. COMPARISON: 05/14/2024 FINDINGS: The cardiac silhouette is enlarged. There is abnormal increased reticular lung markings seen throughout both lung fields, concerning for edema or an underlying inflammatory or infectious process.  No large pleural effusion.  No pneumothorax. The osseous structures appear normal.     Abnormal diffuse increased reticular lung markings, consider edema or underlying inflammation/infectious process Electronically signed by: Blessing Sanders MD Date:    05/28/2024 Time:    16:53    MRI Brain Without Contrast    Result Date: 5/28/2024  EXAMINATION: MRI BRAIN WITHOUT CONTRAST CLINICAL HISTORY: Stroke, follow up;Acute focal neurological deficit; TECHNIQUE: Multiplanar multisequence MR imaging of the brain was performed without contrast. COMPARISON: None. FINDINGS: Parenchyma: There is no restricted diffusion to suggest acute or subacute ischemic infarct.Generalized pattern age-related parenchymal volume loss noted.  Nonspecific areas of T2/FLAIR hyperintense signal in the frontoparietal white matter and lis may reflect sequela of chronic small vessel ischemic disease. Additionally, there is a nonspecific somewhat lobulated appearing T1 and T2/FLAIR hyperintense lesion in the mesial right temporal/hippocampal region (axial FLAIR series 8, image 11; coronal  T2 series 12, image 15), measuring on the order of 7 x 17 x 8 mm. Additional comments: There is no midline shift, abnormal extra-axial fluid collection, or acute intracranial hemorrhage. The basal cisterns are patent. Ventricles: Normal. Flow voids: The normal major intracranial arterial flow voids are visualized. Sinuses and mastoid air cells: Essentially clear Orbits: Normal Midline structures: The pituitary and craniocervical junction are normal. Marrow: Normal     1. No evidence of acute ischemia or recent infarction, as questioned. 2. Indeterminate T1 and T2-FLAIR hyperintense lesion in the mesial right temporal lobe/inferior basal ganglia region.  No definite restricted diffusion or significant susceptibility-related signal loss at this location.  Evolving subacute infarct would be considered.  Further characterization with contrast-enhanced sequences would be recommended as neoplastic etiology is not excluded.  Inflammatory/demyelinating or infectious processes would additionally be considered. Electronically signed by: Joel Acosta Date:    05/28/2024 Time:    14:10    Echo    Result Date: 5/14/2024    Left Ventricle: The left ventricle is severely dilated. Severely increased ventricular mass. Normal wall thickness. There is eccentric hypertrophy. Severe global hypokinesis present. There is severely reduced systolic function with a visually estimated ejection fraction of 5 - 10%. There is diastolic dysfunction but grade cannot be determined.   Right Ventricle: Normal right ventricular cavity size. Wall thickness is normal. Right ventricle wall motion  is normal. Systolic function is normal.   Left Atrium: Left atrium is moderately dilated.   Mitral Valve: There is mild regurgitation.   Pulmonary Artery: The estimated pulmonary artery systolic pressure is 32 mmHg.   IVC/SVC: Normal venous pressure at 3 mmHg.     X-Ray Chest AP Portable    Result Date: 5/14/2024  EXAMINATION: XR CHEST AP PORTABLE CLINICAL  HISTORY: CHF; TECHNIQUE: Single frontal view of the chest was performed. COMPARISON: 12/28/2023 FINDINGS: Cardiac monitoring leads overlie the chest.  There is unchanged prominence of the cardiomediastinal silhouette.  There is atherosclerosis of the thoracic aorta.  The lungs are symmetrically expanded with diffuse coarse/increased interstitial attenuation with bibasilar predominance.  Findings could reflect underlying interstitial edema or infectious process superimposed upon a background of chronic interstitial change.  No confluent airspace consolidation, substantial volume of pleural fluid or pneumothorax identified.  Visualized osseous structures appear intact with degenerative change.     Please see above. Electronically signed by: Sara Parsons MD Date:    05/14/2024 Time:    02:47

## 2024-05-29 NOTE — ASSESSMENT & PLAN NOTE
Patient is identified as having Combined Systolic and Diastolic heart failure that is Acute on chronic. CHF is currently . Latest ECHO performed and demonstrates- Results for orders placed during the hospital encounter of 05/14/24    Echo    Interpretation Summary    Left Ventricle: The left ventricle is severely dilated. Severely increased ventricular mass. Normal wall thickness. There is eccentric hypertrophy. Severe global hypokinesis present. There is severely reduced systolic function with a visually estimated ejection fraction of 5 - 10%. There is diastolic dysfunction but grade cannot be determined.    Right Ventricle: Normal right ventricular cavity size. Wall thickness is normal. Right ventricle wall motion  is normal. Systolic function is normal.    Left Atrium: Left atrium is moderately dilated.    Mitral Valve: There is mild regurgitation.    Pulmonary Artery: The estimated pulmonary artery systolic pressure is 32 mmHg.    IVC/SVC: Normal venous pressure at 3 mmHg.  . Monitor clinical status closely. Monitor on telemetry. Patient is off CHF pathway.  Monitor strict Is&Os and daily weights.  Place on fluid restriction of 1.5 L. Cardiology has been consulted. Continue to stress to patient importance of self efficacy and  on diet for CHF. Last BNP reviewed- and noted below   Recent Labs   Lab 05/28/24  1259   BNP 2,066*       -Net - 337ml overnight. Lasix d/slava this AM. CVP of 5 this AM.

## 2024-05-29 NOTE — ASSESSMENT & PLAN NOTE
Patient is identified as having Systolic (HFrEF) heart failure that is Acute on chronic. CHF is currently uncontrolled due to Dyspnea not returned to baseline after 3 doses of IV diuretic, Rales/crackles on pulmonary exam, and Pulmonary edema/pleural effusion on CXR. Latest ECHO performed and demonstrates- Results for orders placed during the hospital encounter of 05/14/24    Echo    Interpretation Summary    Left Ventricle: The left ventricle is severely dilated. Severely increased ventricular mass. Normal wall thickness. There is eccentric hypertrophy. Severe global hypokinesis present. There is severely reduced systolic function with a visually estimated ejection fraction of 5 - 10%. There is diastolic dysfunction but grade cannot be determined.    Right Ventricle: Normal right ventricular cavity size. Wall thickness is normal. Right ventricle wall motion  is normal. Systolic function is normal.    Left Atrium: Left atrium is moderately dilated.    Mitral Valve: There is mild regurgitation.    Pulmonary Artery: The estimated pulmonary artery systolic pressure is 32 mmHg.    IVC/SVC: Normal venous pressure at 3 mmHg.    Recent Labs   Lab 05/28/24  1259   BNP 2,066*       - management per primary team  - on dobutamine at 5 mcg/kg/min  - diuresis as detailed above

## 2024-05-29 NOTE — PROGRESS NOTES
[facility]  Nephrology  Progress Note    Patient Name: Selma Hooper  MRN: [id number]  Admission Date: 5/28/2024  Hospital Length of Stay: 1 days  Attending Provider: Tonio Botello MD   Primary Care Physician: Phil Andrade MD  Principal Problem:Cardiogenic shock    Subjective:     HPI: Ms Hooper is a 78-year-old woman with HFrEF (most recent TTE with EF of 5-10%), COPD with chronic hypoxic respiratory failure on supplemental oxygen (2 liters via nasal cannula as outpatient), prior tobacco abuse, CKD IIIa (baseline creatinine ~1)  and hypertension who was admitted to CCU on 5/28 with cardiogenic shock after presenting to the ED with complaints of right lower extremity numbness and associated weakness which had started roughly around 3 AM earlier that morning and upon going to [facility] she became dyspneic and was noted to be cold to the touch with tachycardia.  CBC was notable for microcytic anemia with hemoglobin 10.8. Metabolic panel was notable for albumin of 3.4 and creatinine 1.1 which would subsequently rise to 1.8. Lactate was elevated at 4.5 which would rise to 5.3. VBG on presentation showed pH of 7.132, pCO2 95.5 and bicarbonate 31.9. BNP was ~2K with chest x-ray showing enlarged cardiac silhouette and abnormal increased reticular lung markings seen throughout both lung fields concerning for diffuse pulmonary edema. She would receive a cumulative dose of 240 mg IV Lasix (40 mg IV x3 plus 120 mg IV) and Diuril 500 mg IV in addition to being started on Lasix infusion at 30 mg/hr and dobutamine at 5 mcg/kg/minute. Only 250 mL of documented UOP thus far. UA showed +1 protein, +3 occult blood (69 RBCs/hpf), +2 leukocytes (13 WBCs/hpf) and many bacteria. Urine sodium was 114. UPCR, CPK and retroperitoneal US still pending at this time. Nephrology has been consulted for assistance with management of ARF in the setting of cardiogenic shock.    Interval History; patient seen this morning on  nasal cannula. Denies chest pain or shortness of breath. Continues to have right leg pain. Creatinine up trending this morning.       Objective:     Vital Signs (Most Recent):  Temp: 97.7 °F (36.5 °C) (05/29/24 1101)  Pulse: 92 (05/29/24 1301)  Resp: 12 (05/29/24 1301)  BP: 109/66 (05/29/24 1101)  SpO2: 97 % (05/29/24 1301) Vital Signs (24h Range):  Temp:  [96.9 °F (36.1 °C)-97.9 °F (36.6 °C)] 97.7 °F (36.5 °C)  Pulse:  [] 92  Resp:  [12-48] 12  SpO2:  [86 %-99 %] 97 %  BP: ()/(45-85) 109/66  Arterial Line BP: ()/(45-57) 105/50     Weight: 62 kg (136 lb 11 oz) (05/29/24 0901)  Body mass index is 25.83 kg/m².  Body surface area is 1.63 meters squared.    I/O last 3 completed shifts:  In: 707.8 [P.O.:125; I.V.:407.1; IV Piggyback:175.7]  Out: 1045 [Urine:970; Emesis/NG output:75]     Physical Exam  Vitals and nursing note reviewed.   Constitutional:       General: She is awake. She is not in acute distress.     Appearance: She is normal weight. She is ill-appearing. She is not diaphoretic.      Interventions: Nasal cannula in place.   HENT:      Head: Normocephalic and atraumatic.      Right Ear: External ear normal.      Left Ear: External ear normal.      Mouth/Throat:      Mouth: Mucous membranes are moist.      Pharynx: Oropharynx is clear. No oropharyngeal exudate or posterior oropharyngeal erythema.   Eyes:      General: No scleral icterus.        Right eye: No discharge.         Left eye: No discharge.      Extraocular Movements: Extraocular movements intact.      Conjunctiva/sclera: Conjunctivae normal.   Neck:      Comments: Trialysis catheter to right internal jugular vein.  Cardiovascular:      Rate and Rhythm: Normal rate and regular rhythm.      Heart sounds: Murmur heard.   Pulmonary:      Effort: Tachypnea present. No respiratory distress.      Breath sounds: No wheezing, rhonchi or rales.   Abdominal:      General: Bowel sounds are normal. There is no distension.      Palpations:  Abdomen is soft.      Tenderness: There is no abdominal tenderness.   Genitourinary:     Comments: Keys catheter in place.  Musculoskeletal:      Cervical back: Neck supple.      Right lower leg: No edema.      Left lower leg: No edema.   Skin:     General: Skin is warm and dry.      Coloration: Skin is not jaundiced.   Neurological:      General: No focal deficit present.      Mental Status: She is alert. Mental status is at baseline.      Cranial Nerves: No cranial nerve deficit.      Motor: No weakness.   Psychiatric:         Mood and Affect: Mood normal.         Behavior: Behavior normal. Behavior is cooperative.          Significant Labs:  ABGs:   Recent Labs   Lab 05/29/24  1255   PH 7.339*   PCO2 64.9*   HCO3 34.9*   POCSATURATED 55   BE 9*     BMP:   Recent Labs   Lab 05/29/24  0254 05/29/24  0919   * 194*    139   K 4.2 4.6    96   CO2 27 30*   BUN 25* 30*   CREATININE 2.0* 2.3*   CALCIUM 9.7 9.7   MG 1.8  --      CBC:   Recent Labs   Lab 05/29/24 0254   WBC 11.55   RBC 4.71   HGB 11.5*   HCT 37.9      MCV 81*   MCH 24.4*   MCHC 30.3*     CMP:   Recent Labs   Lab 05/29/24  0919   *   CALCIUM 9.7   ALBUMIN 3.4*   PROT 6.6      K 4.6   CO2 30*   CL 96   BUN 30*   CREATININE 2.3*   ALKPHOS 99   ALT 89*   *   BILITOT 0.4     Coagulation:   Recent Labs   Lab 05/28/24 2005 05/28/24 2157 05/29/24 0919   INR 1.2  --   --    APTT 109.1*   < > 55.4*    < > = values in this interval not displayed.     LFTs:   Recent Labs   Lab 05/29/24 0919   ALT 89*   *   ALKPHOS 99   BILITOT 0.4   PROT 6.6   ALBUMIN 3.4*     Microbiology Results (last 7 days)       Procedure Component Value Units Date/Time    Urine culture [[id number]] Collected: 05/28/24 1817    Order Status: No result Specimen: Urine Updated: 05/28/24 2214          Specimen (24h ago, onward)      None          TSH:   Recent Labs   Lab 05/28/24  0611   TSH 0.889     Recent Labs   Lab 05/28/24 1817   PAOLOU  [place]ange*   SPECGRAV 1.010   PHUR 6.0   PROTEINUA 1+*   BACTERIA Many*   NITRITE Negative   LEUKOCYTESUR 2+*   HYALINECASTS 4*     Urinary sediment on 05/28/2024:                                              Significant Imaging:  I have reviewed all imagining in the last 24 hours.  Assessment/Plan:     Cardiac/Vascular  HFrEF (heart failure with reduced ejection fraction)  -    Acute on chronic combined systolic and diastolic congestive heart failure  Patient is identified as having Systolic (HFrEF) heart failure that is Acute on chronic. CHF is currently uncontrolled due to Dyspnea not returned to baseline after 3 doses of IV diuretic, Rales/crackles on pulmonary exam, and Pulmonary edema/pleural effusion on CXR. Latest ECHO performed and demonstrates- Results for orders placed during the hospital encounter of 05/14/24    Echo    Interpretation Summary    Left Ventricle: The left ventricle is severely dilated. Severely increased ventricular mass. Normal wall thickness. There is eccentric hypertrophy. Severe global hypokinesis present. There is severely reduced systolic function with a visually estimated ejection fraction of 5 - 10%. There is diastolic dysfunction but grade cannot be determined.    Right Ventricle: Normal right ventricular cavity size. Wall thickness is normal. Right ventricle wall motion  is normal. Systolic function is normal.    Left Atrium: Left atrium is moderately dilated.    Mitral Valve: There is mild regurgitation.    Pulmonary Artery: The estimated pulmonary artery systolic pressure is 32 mmHg.    IVC/SVC: Normal venous pressure at 3 mmHg.    Recent Labs   Lab 05/28/24  1259   BNP 2,066*       - management per primary team  - on dobutamine at 5 mcg/kg/min  - diuresis as detailed above    Renal/  Acute renal failure with acute tubular necrosis superimposed on stage 3a chronic kidney disease  - urinary sediment with granular, half muddy brown and waxy casts suggestive of acute tubular injury  in the setting of cardiogenic shock  - concern for critical limb ischemia continue to follow CPK.  - can increase Lasix to 40 mg/hr and schedule Diuril 500 mg IV BID if additional diuresis is needed.  - serial RFPs and magnesium levels in light of active diuresis   - please avoid hypotension/major fluctuations in BP (keep MAP > 65 mmHg)  - renal diet/tube feeds when not NPO with volume restriction per primary team  - strict I/O's and daily weights  - renally all dose medications to eGFR   - avoid nephrotoxic agents wean feasible (i.e. NSAIDs, intra-arterial contrast, supra-therapeutic vancomycin levels, etc.)  - no acute indications for RRT at this time however will continue to monitor closely and consent obtained should acute indications for RRT arise        Thank you for your consult. I will follow-up with patient. Please contact us if you have any additional questions.    Osiel Mcmullen MD  Nephrology  [facility] - [facility]

## 2024-05-29 NOTE — PROGRESS NOTES
Pharmacist Renal Dose Adjustment Note    Selma Hooper is a 78 y.o. female being treated with the medication Cefepime    Patient Data:    Vital Signs (Most Recent):  Temp: 97.9 °F (36.6 °C) (05/29/24 0301)  Pulse: 106 (05/29/24 0601)  Resp: (!) 22 (05/29/24 0601)  BP: (!) 119/50 (05/29/24 0301)  SpO2: 95 % (05/29/24 0601) Vital Signs (72h Range):  Temp:  [96.9 °F (36.1 °C)-98.1 °F (36.7 °C)]   Pulse:  []   Resp:  [16-48]   BP: ()/(45-85)   SpO2:  [86 %-100 %]   Arterial Line BP: ()/(45-57)      Recent Labs   Lab 05/28/24  1625 05/28/24  2157 05/29/24  0254   CREATININE 1.8* 1.9* 2.0*     Serum creatinine: 2 mg/dL (H) 05/29/24 0254  Estimated creatinine clearance: 20.2 mL/min (A)    Medication:Cefepime IV 1 g q8h will be changed to Cefepime IV 2 g q24h    Pharmacist's Name: Latisha Franklin  Pharmacist's Extension: [phone]

## 2024-05-29 NOTE — PLAN OF CARE
CICU DAILY GOALS       A: Awake    RASS: Goal - RASS Goal: 0-->alert and calm  Actual - RASS (Pearl Agitation-Sedation Scale): alert and calm   Restraint necessity:    B: Breath   SBT: Not intubated   C: Coordinate A & B, analgesics/sedatives   Pain: managed    SAT: Not intubated  D: Delirium   CAM-ICU: Overall CAM-ICU: Negative  E: Early(intubated/ Progressive (non-intubated) Mobility   MOVE Screen: Pass   Activity: Activity Management: Arm raise - L1  FAS: Feeding/Nutrition   Diet order: Diet/Nutrition Received: NPO,   Fluid restriction:     Nutritional Supplement Intake: Quantity 0, Type: Boost  T: Thrombus   DVT prophylaxis: VTE Required Core Measure: Pharmacological prophylaxis initiated/maintained  H: HOB Elevation   Head of Bed (HOB) Positioning: HOB elevated  U: Ulcer Prophylaxis   GI: yes  G: Glucose control   managed Glycemic Management: blood glucose monitored  S: Skin   Bundle compliance: yes   Bathing/Skin Care: bath, complete, dressed/undressed, electrode patches/site rotation, incontinence care, linen changed Date: 5/28/24  B: Bowel Function   no issues   I: Indwelling Catheters   Keys necessity:      Urethral Catheter 05/28/24 1200-Reason for Continuing Urinary Catheterization: Critically ill in ICU and requiring hourly monitoring of intake/output   CVC necessity: Yes   IPAD offered: No  D: De-escalation Antibx   No  Plan for the day   Diuresis and MAP 65-75  Family/Goals of care/Code Status   Code Status: DNR     No acute events throughout day, VS and assessment per flow sheet, patient progressing towards goals as tolerated, plan of care reviewed with Selma Hooper and family, all concerns addressed, will continue to monitor.

## 2024-05-29 NOTE — ASSESSMENT & PLAN NOTE
- urinary sediment with granular, half muddy brown and waxy casts suggestive of acute tubular injury in the setting of cardiogenic shock  - concern for critical limb ischemia continue to follow CPK.  - can increase Lasix to 40 mg/hr and schedule Diuril 500 mg IV BID if additional diuresis is needed.  - serial RFPs and magnesium levels in light of active diuresis   - please avoid hypotension/major fluctuations in BP (keep MAP > 65 mmHg)  - renal diet/tube feeds when not NPO with volume restriction per primary team  - strict I/O's and daily weights  - renally all dose medications to eGFR   - avoid nephrotoxic agents wean feasible (i.e. NSAIDs, intra-arterial contrast, supra-therapeutic vancomycin levels, etc.)  - no acute indications for RRT at this time however will continue to monitor closely and consent obtained should acute indications for RRT arise

## 2024-05-29 NOTE — CONSULTS
[facility]  Nephrology  Consult Note    Patient Name: Selma Hooper  MRN: [id number]  Admission Date: 5/28/2024  Hospital Length of Stay: 0 days  Attending Provider: Tonio Botello MD   Primary Care Physician: Phil Andrade MD  Principal Problem:Cardiogenic shock    Inpatient consult to Nephrology  Consult performed by: Rogelio Mendez MD  Consult ordered by: Jose Yanez MD        Subjective:     HPI: Ms Hooper is a 78-year-old woman with HFrEF (most recent TTE with EF of 5-10%), COPD with chronic hypoxic respiratory failure on supplemental oxygen (2 liters via nasal cannula as outpatient), prior tobacco abuse, CKD IIIa (baseline creatinine ~1)  and hypertension who was admitted to CCU on 5/28 with cardiogenic shock after presenting to the ED with complaints of right lower extremity numbness and associated weakness which had started roughly around 3 AM earlier that morning and upon going to [facility] she became dyspneic and was noted to be cold to the touch with tachycardia.  CBC was notable for microcytic anemia with hemoglobin 10.8. Metabolic panel was notable for albumin of 3.4 and creatinine 1.1 which would subsequently rise to 1.8. Lactate was elevated at 4.5 which would rise to 5.3. VBG on presentation showed pH of 7.132, pCO2 95.5 and bicarbonate 31.9. BNP was ~2K with chest x-ray showing enlarged cardiac silhouette and abnormal increased reticular lung markings seen throughout both lung fields concerning for diffuse pulmonary edema. She would receive a cumulative dose of 240 mg IV Lasix (40 mg IV x3 plus 120 mg IV) and Diuril 500 mg IV in addition to being started on Lasix infusion at 30 mg/hr and dobutamine at 5 mcg/kg/minute. Only 250 mL of documented UOP thus far. UA showed +1 protein, +3 occult blood (69 RBCs/hpf), +2 leukocytes (13 WBCs/hpf) and many bacteria. Urine sodium was 114. UPCR, CPK and retroperitoneal US still pending at this time. Nephrology has been  consulted for assistance with management of ARF in the setting of cardiogenic shock.    Past Medical History:   Diagnosis Date    Abnormal liver enzymes 12/10/2018    Acute on chronic combined systolic and diastolic congestive heart failure 4/2/2021    Acute on chronic respiratory failure with hypoxia 4/2/2021    Acute on chronic respiratory failure with hypoxia and hypercapnia 12/10/2021    CHF (congestive heart failure)     COPD exacerbation 7/12/2022    Former smoker 10/24/2018    Hypertension     Smoker 7/27/2016       Past Surgical History:   Procedure Laterality Date    BREAST BIOPSY      left  side years ago in high school   1962    CHOLECYSTECTOMY      COLONOSCOPY      COLONOSCOPY N/A 08/23/2021    Procedure: COLONOSCOPY;  Surgeon: Shree Amaya MD;  Location: [facility] ([address] [place] [address]);  Service: Endoscopy;  Laterality: N/A;  Do not cancel this order  fully vaccinated-see immunization record  EF 18%  8/20-covid [facility] portal-tb    HYSTERECTOMY         Review of patient's allergies indicates:   Allergen Reactions    Atorvastatin      Leg cramps     Current Facility-Administered Medications   Medication Frequency    0.9%  NaCl infusion Continuous    calcium gluconate 1 g in dextrose 5 % in water (D5W) 50 mL IVPB (MB+) PRN    dextrose 10% bolus 125 mL 125 mL PRN    dextrose 10% bolus 250 mL 250 mL PRN    DOBUtamine 1000 mg in D5W 250 mL infusion Continuous    [START ON 5/29/2024] fluticasone furoate-vilanteroL 100-25 mcg/dose diskus inhaler 1 puff Daily    furosemide (Lasix) 500 mg in 50 mL infusion (conc: 10 mg/mL) Continuous    glucagon (human recombinant) injection 1 mg PRN    heparin 25,000 units in dextrose 5% (100 units/ml) IV bolus from bag LOW INTENSITY nomogram - [facility] PRN    heparin 25,000 units in dextrose 5% (100 units/ml) IV bolus from bag LOW INTENSITY nomogram - [facility] PRN    heparin 25,000 units in dextrose 5% 250 mL (100 units/mL) infusion LOW INTENSITY nomogram - [facility] Continuous    insulin aspart  U-100 pen 0-5 Units Q6H PRN    LIDOcaine (PF) 10 mg/ml (1%) 10 mg/mL (1 %) injection     magnesium sulfate 2g in water 50mL IVPB (premix) PRN    magnesium sulfate 2g in water 50mL IVPB (premix) PRN    nitroPRUSSide (NIPRIDE) 20 mg in dextrose 5 % (D5W) 100 mL (0.2 mg/mL) IV infusion - STANDARD Continuous    NORepinephrine bitartrate-D5W 4 mg/250 mL (16 mcg/mL) infusion Soln     ondansetron injection 4 mg Q8H PRN    potassium chloride 40 mEq in 100 mL IVPB (FOR CENTRAL LINE ADMINISTRATION ONLY) PRN    And    potassium chloride 20 mEq in 100 mL IVPB (FOR CENTRAL LINE ADMINISTRATION ONLY) PRN    And    potassium chloride 40 mEq in 100 mL IVPB (FOR CENTRAL LINE ADMINISTRATION ONLY) PRN    sodium chloride 0.9% flush 10 mL PRN    sodium phosphate 15 mmol in dextrose 5 % (D5W) 250 mL IVPB PRN    sodium phosphate 20.01 mmol in dextrose 5 % (D5W) 250 mL IVPB PRN    sodium phosphate 30 mmol in dextrose 5 % (D5W) 250 mL IVPB PRN     Family History       Problem Relation (Age of Onset)    Arthritis Mother    Cancer Father, Brother    Colon cancer Father, Brother (63)    Dementia Father    Diabetes Daughter    Heart attack Mother    Heart disease Mother, Brother    Hypertension Mother, Father, Brother          Tobacco Use    Smoking status: Former     Current packs/day: 0.00     Types: Cigarettes     Quit date: 2018     Years since quittin.9     Passive exposure: Past    Smokeless tobacco: Never   Substance and Sexual Activity    Alcohol use: Yes     Comment: occasional drinker, not a daily drinker    Drug use: No    Sexual activity: Not Currently     Partners: Male     Review of Systems   Constitutional:  Positive for activity change and fatigue. Negative for diaphoresis and unexpected weight change.   HENT:  Negative for sore throat and trouble swallowing.    Eyes:  Negative for redness and visual disturbance.   Respiratory:  Positive for shortness of breath. Negative for wheezing.    Cardiovascular:  Positive for leg  swelling. Negative for chest pain and palpitations.   Gastrointestinal:  Positive for nausea and vomiting. Negative for abdominal distention, abdominal pain, constipation and diarrhea.   Genitourinary:  Positive for decreased urine volume. Negative for difficulty urinating, dysuria and hematuria.   Musculoskeletal:  Positive for gait problem and myalgias. Negative for neck pain and neck stiffness.        Reports right lower extremity pain.   Skin:  Negative for rash and wound.   Neurological:  Positive for weakness (RLE) and numbness (RLE). Negative for dizziness, tremors, syncope, light-headedness and headaches.   Psychiatric/Behavioral:  Negative for confusion and sleep disturbance.      Objective:     Vital Signs (Most Recent):  Temp: 97.6 °F (36.4 °C) (05/28/24 1901)  Pulse: 108 (05/28/24 2101)  Resp: (!) 26 (05/28/24 2101)  BP: (!) 92/53 (05/28/24 1901)  SpO2: (!) 93 % (05/28/24 2101) Vital Signs (24h Range):  Temp:  [96.9 °F (36.1 °C)-98.1 °F (36.7 °C)] 97.6 °F (36.4 °C)  Pulse:  [] 108  Resp:  [16-48] 26  SpO2:  [86 %-100 %] 93 %  BP: ()/(53-85) 92/53  Arterial Line BP: ()/(48-57) 119/48     Weight: 62 kg (136 lb 11 oz) (05/28/24 1520)  Body mass index is 25.83 kg/m².  Body surface area is 1.63 meters squared.    I/O last 3 completed shifts:  In: 102.3 [I.V.:56.4; IV Piggyback:46]  Out: 25 [Urine:25]     Physical Exam  Vitals and nursing note reviewed.   Constitutional:       General: She is awake. She is not in acute distress.     Appearance: She is normal weight. She is ill-appearing. She is not diaphoretic.      Interventions: Nasal cannula in place.   HENT:      Head: Normocephalic and atraumatic.      Right Ear: External ear normal.      Left Ear: External ear normal.      Nose:      Comments: On high flow nasal cannula.     Mouth/Throat:      Mouth: Mucous membranes are moist.      Pharynx: Oropharynx is clear. No oropharyngeal exudate or posterior oropharyngeal erythema.   Eyes:       General: No scleral icterus.        Right eye: No discharge.         Left eye: No discharge.      Extraocular Movements: Extraocular movements intact.      Conjunctiva/sclera: Conjunctivae normal.      Comments: Eye glasses in place.   Neck:      Comments: Trialysis catheter to right internal jugular vein.  Cardiovascular:      Rate and Rhythm: Tachycardia present.      Heart sounds: Murmur heard.      Comments: Intermittently irregular rhythm.   Pulmonary:      Effort: Tachypnea present. No respiratory distress.      Breath sounds: Rales present. No wheezing or rhonchi.      Comments: Bibasilar crackles appreciated.  Abdominal:      General: Bowel sounds are normal. There is no distension.      Palpations: Abdomen is soft.      Tenderness: There is no abdominal tenderness.   Genitourinary:     Comments: Keys catheter in place.  Musculoskeletal:      Cervical back: Neck supple.      Right lower leg: No edema.      Left lower leg: No edema.   Skin:     General: Skin is warm and dry.      Coloration: Skin is not jaundiced.   Neurological:      General: No focal deficit present.      Mental Status: She is alert. Mental status is at baseline.      Cranial Nerves: No cranial nerve deficit.      Motor: No weakness.   Psychiatric:         Mood and Affect: Mood normal.         Behavior: Behavior normal. Behavior is cooperative.          Significant Labs:  ABGs:   Recent Labs   Lab 05/28/24 2012   PH 7.310*   PCO2 62.3*   HCO3 31.3*   POCSATURATED 46   BE 5*     BMP:   Recent Labs   Lab 05/28/24  1625   *      K 3.6      CO2 28   BUN 20   CREATININE 1.8*   CALCIUM 9.5   MG 2.2     CBC:   Recent Labs   Lab 05/28/24 2005   WBC 14.97*   RBC 4.83   HGB 11.8*   HCT 38.5      MCV 80*   MCH 24.4*   MCHC 30.6*     CMP:   Recent Labs   Lab 05/28/24  1625   *   CALCIUM 9.5   ALBUMIN 3.5   PROT 6.9      K 3.6   CO2 28      BUN 20   CREATININE 1.8*   ALKPHOS 122   *   *    BILITOT 0.3     Coagulation:   Recent Labs   Lab 05/28/24  0611   INR 1.0     LFTs:   Recent Labs   Lab 05/28/24  1625   *   *   ALKPHOS 122   BILITOT 0.3   PROT 6.9   ALBUMIN 3.5     Microbiology Results (last 7 days)       ** No results found for the last 168 hours. **          Specimen (24h ago, onward)      None          TSH:   Recent Labs   Lab 05/28/24  0611   TSH 0.889     Recent Labs   Lab 05/28/24  1817   COLORU [place]*   SPECGRAV 1.010   PHUR 6.0   PROTEINUA 1+*   BACTERIA Many*   NITRITE Negative   LEUKOCYTESUR 2+*   HYALINECASTS 4*     Urinary sediment on 05/28/2024:                                              Significant Imaging:  I have reviewed all imagining in the last 24 hours.  Assessment/Plan:     Cardiac/Vascular  HFrEF (heart failure with reduced ejection fraction)  Acute on chronic combined systolic and diastolic congestive heart failure  Patient is identified as having Systolic (HFrEF) heart failure that is Acute on chronic. CHF is currently uncontrolled due to Dyspnea not returned to baseline after 3 doses of IV diuretic, Rales/crackles on pulmonary exam, and Pulmonary edema/pleural effusion on CXR. Latest ECHO performed and demonstrates- Results for orders placed during the hospital encounter of 05/14/24    Echo    Interpretation Summary    Left Ventricle: The left ventricle is severely dilated. Severely increased ventricular mass. Normal wall thickness. There is eccentric hypertrophy. Severe global hypokinesis present. There is severely reduced systolic function with a visually estimated ejection fraction of 5 - 10%. There is diastolic dysfunction but grade cannot be determined.    Right Ventricle: Normal right ventricular cavity size. Wall thickness is normal. Right ventricle wall motion  is normal. Systolic function is normal.    Left Atrium: Left atrium is moderately dilated.    Mitral Valve: There is mild regurgitation.    Pulmonary Artery: The estimated pulmonary  artery systolic pressure is 32 mmHg.    IVC/SVC: Normal venous pressure at 3 mmHg.    Recent Labs   Lab 05/28/24  1259   BNP 2,066*     - management per primary team  - on dobutamine at 5 mcg/kg/min  - diuresis as detailed above    Renal/  Acute renal failure with acute tubular necrosis superimposed on stage 3a chronic kidney disease  - urinary sediment with granular, half muddy brown and waxy casts suggestive of acute tubular injury in the setting of cardiogenic shock  - concern for critical limb ischemia so will follow-up CPK in addition have added on UPCR and ordered retroperitoneal US  - can continue Lasix 30 mg/hr and schedule Diuril 500 mg IV BID as serum potassium improved and CVP down to 5 from 10 mmHg  - serial RFPs and magnesium levels in light of active diuresis   - please avoid hypotension/major fluctuations in BP (keep MAP > 65 mmHg)  - renal diet/tube feeds when not NPO with volume restriction per primary team  - strict I/O's and daily weights  - renally all dose medications to eGFR   - avoid nephrotoxic agents wean feasible (i.e. NSAIDs, intra-arterial contrast, supra-therapeutic vancomycin levels, etc.)  - no acute indications for RRT at this time however will continue to monitor closely and consent obtained should acute indications for RRT arise    Thank you for your consult. I will follow-up with patient. Please contact us if you have any additional questions.    Rogelio Mendez MD  Nephrology  [facility]

## 2024-05-29 NOTE — SUBJECTIVE & OBJECTIVE
Past Medical History:   Diagnosis Date    Abnormal liver enzymes 12/10/2018    Acute on chronic combined systolic and diastolic congestive heart failure 4/2/2021    Acute on chronic respiratory failure with hypoxia 4/2/2021    Acute on chronic respiratory failure with hypoxia and hypercapnia 12/10/2021    CHF (congestive heart failure)     COPD exacerbation 7/12/2022    Former smoker 10/24/2018    Hypertension     Smoker 7/27/2016       Past Surgical History:   Procedure Laterality Date    BREAST BIOPSY      left  side years ago in high school   1962    CHOLECYSTECTOMY      COLONOSCOPY      COLONOSCOPY N/A 08/23/2021    Procedure: COLONOSCOPY;  Surgeon: Shree Amaya MD;  Location: [facility] ENDO ([address] [place] [address]);  Service: Endoscopy;  Laterality: N/A;  Do not cancel this order  fully vaccinated-see immunization record  EF 18%  8/20-covid [facility] portal-tb    HYSTERECTOMY         Review of patient's allergies indicates:   Allergen Reactions    Atorvastatin      Leg cramps     Current Facility-Administered Medications   Medication Frequency    0.9%  NaCl infusion Continuous    calcium gluconate 1 g in dextrose 5 % in water (D5W) 50 mL IVPB (MB+) PRN    dextrose 10% bolus 125 mL 125 mL PRN    dextrose 10% bolus 250 mL 250 mL PRN    DOBUtamine 1000 mg in D5W 250 mL infusion Continuous    [START ON 5/29/2024] fluticasone furoate-vilanteroL 100-25 mcg/dose diskus inhaler 1 puff Daily    furosemide (Lasix) 500 mg in 50 mL infusion (conc: 10 mg/mL) Continuous    glucagon (human recombinant) injection 1 mg PRN    heparin 25,000 units in dextrose 5% (100 units/ml) IV bolus from bag LOW INTENSITY nomogram - [facility] PRN    heparin 25,000 units in dextrose 5% (100 units/ml) IV bolus from bag LOW INTENSITY nomogram - [facility] PRN    heparin 25,000 units in dextrose 5% 250 mL (100 units/mL) infusion LOW INTENSITY nomogram - [facility] Continuous    insulin aspart U-100 pen 0-5 Units Q6H PRN    LIDOcaine (PF) 10 mg/ml (1%) 10 mg/mL (1 %) injection      magnesium sulfate 2g in water 50mL IVPB (premix) PRN    magnesium sulfate 2g in water 50mL IVPB (premix) PRN    nitroPRUSSide (NIPRIDE) 20 mg in dextrose 5 % (D5W) 100 mL (0.2 mg/mL) IV infusion - STANDARD Continuous    NORepinephrine bitartrate-D5W 4 mg/250 mL (16 mcg/mL) infusion Soln     ondansetron injection 4 mg Q8H PRN    potassium chloride 40 mEq in 100 mL IVPB (FOR CENTRAL LINE ADMINISTRATION ONLY) PRN    And    potassium chloride 20 mEq in 100 mL IVPB (FOR CENTRAL LINE ADMINISTRATION ONLY) PRN    And    potassium chloride 40 mEq in 100 mL IVPB (FOR CENTRAL LINE ADMINISTRATION ONLY) PRN    sodium chloride 0.9% flush 10 mL PRN    sodium phosphate 15 mmol in dextrose 5 % (D5W) 250 mL IVPB PRN    sodium phosphate 20.01 mmol in dextrose 5 % (D5W) 250 mL IVPB PRN    sodium phosphate 30 mmol in dextrose 5 % (D5W) 250 mL IVPB PRN     Family History       Problem Relation (Age of Onset)    Arthritis Mother    Cancer Father, Brother    Colon cancer Father, Brother (63)    Dementia Father    Diabetes Daughter    Heart attack Mother    Heart disease Mother, Brother    Hypertension Mother, Father, Brother          Tobacco Use    Smoking status: Former     Current packs/day: 0.00     Types: Cigarettes     Quit date: 2018     Years since quittin.9     Passive exposure: Past    Smokeless tobacco: Never   Substance and Sexual Activity    Alcohol use: Yes     Comment: occasional drinker, not a daily drinker    Drug use: No    Sexual activity: Not Currently     Partners: Male     Review of Systems   Constitutional:  Positive for activity change and fatigue. Negative for diaphoresis and unexpected weight change.   HENT:  Negative for sore throat and trouble swallowing.    Eyes:  Negative for redness and visual disturbance.   Respiratory:  Positive for shortness of breath. Negative for wheezing.    Cardiovascular:  Positive for leg swelling. Negative for chest pain and palpitations.   Gastrointestinal:  Positive for  nausea and vomiting. Negative for abdominal distention, abdominal pain, constipation and diarrhea.   Genitourinary:  Positive for decreased urine volume. Negative for difficulty urinating, dysuria and hematuria.   Musculoskeletal:  Positive for gait problem and myalgias. Negative for neck pain and neck stiffness.        Reports right lower extremity pain.   Skin:  Negative for rash and wound.   Neurological:  Positive for weakness (RLE) and numbness (RLE). Negative for dizziness, tremors, syncope, light-headedness and headaches.   Psychiatric/Behavioral:  Negative for confusion and sleep disturbance.      Objective:     Vital Signs (Most Recent):  Temp: 97.6 °F (36.4 °C) (05/28/24 1901)  Pulse: 108 (05/28/24 2101)  Resp: (!) 26 (05/28/24 2101)  BP: (!) 92/53 (05/28/24 1901)  SpO2: (!) 93 % (05/28/24 2101) Vital Signs (24h Range):  Temp:  [96.9 °F (36.1 °C)-98.1 °F (36.7 °C)] 97.6 °F (36.4 °C)  Pulse:  [] 108  Resp:  [16-48] 26  SpO2:  [86 %-100 %] 93 %  BP: ()/(53-85) 92/53  Arterial Line BP: ()/(48-57) 119/48     Weight: 62 kg (136 lb 11 oz) (05/28/24 1520)  Body mass index is 25.83 kg/m².  Body surface area is 1.63 meters squared.    I/O last 3 completed shifts:  In: 102.3 [I.V.:56.4; IV Piggyback:46]  Out: 25 [Urine:25]     Physical Exam  Vitals and nursing note reviewed.   Constitutional:       General: She is awake. She is not in acute distress.     Appearance: She is normal weight. She is ill-appearing. She is not diaphoretic.      Interventions: Nasal cannula in place.   HENT:      Head: Normocephalic and atraumatic.      Right Ear: External ear normal.      Left Ear: External ear normal.      Nose:      Comments: On high flow nasal cannula.     Mouth/Throat:      Mouth: Mucous membranes are moist.      Pharynx: Oropharynx is clear. No oropharyngeal exudate or posterior oropharyngeal erythema.   Eyes:      General: No scleral icterus.        Right eye: No discharge.         Left eye: No  discharge.      Extraocular Movements: Extraocular movements intact.      Conjunctiva/sclera: Conjunctivae normal.      Comments: Eye glasses in place.   Neck:      Comments: Trialysis catheter to right internal jugular vein.  Cardiovascular:      Rate and Rhythm: Tachycardia present.      Heart sounds: Murmur heard.      Comments: Intermittently irregular rhythm.   Pulmonary:      Effort: Tachypnea present. No respiratory distress.      Breath sounds: Rales present. No wheezing or rhonchi.      Comments: Bibasilar crackles appreciated.  Abdominal:      General: Bowel sounds are normal. There is no distension.      Palpations: Abdomen is soft.      Tenderness: There is no abdominal tenderness.   Genitourinary:     Comments: Keys catheter in place.  Musculoskeletal:      Cervical back: Neck supple.      Right lower leg: No edema.      Left lower leg: No edema.   Skin:     General: Skin is warm and dry.      Coloration: Skin is not jaundiced.   Neurological:      General: No focal deficit present.      Mental Status: She is alert. Mental status is at baseline.      Cranial Nerves: No cranial nerve deficit.      Motor: No weakness.   Psychiatric:         Mood and Affect: Mood normal.         Behavior: Behavior normal. Behavior is cooperative.          Significant Labs:  ABGs:   Recent Labs   Lab 05/28/24 2012   PH 7.310*   PCO2 62.3*   HCO3 31.3*   POCSATURATED 46   BE 5*     BMP:   Recent Labs   Lab 05/28/24  1625   *      K 3.6      CO2 28   BUN 20   CREATININE 1.8*   CALCIUM 9.5   MG 2.2     CBC:   Recent Labs   Lab 05/28/24 2005   WBC 14.97*   RBC 4.83   HGB 11.8*   HCT 38.5      MCV 80*   MCH 24.4*   MCHC 30.6*     CMP:   Recent Labs   Lab 05/28/24  1625   *   CALCIUM 9.5   ALBUMIN 3.5   PROT 6.9      K 3.6   CO2 28      BUN 20   CREATININE 1.8*   ALKPHOS 122   *   *   BILITOT 0.3     Coagulation:   Recent Labs   Lab 05/28/24  0611   INR 1.0     LFTs:    Recent Labs   Lab 05/28/24  1625   *   *   ALKPHOS 122   BILITOT 0.3   PROT 6.9   ALBUMIN 3.5     Microbiology Results (last 7 days)       ** No results found for the last 168 hours. **          Specimen (24h ago, onward)      None          TSH:   Recent Labs   Lab 05/28/24  0611   TSH 0.889     Recent Labs   Lab 05/28/24  1817   COLORU [place]*   SPECGRAV 1.010   PHUR 6.0   PROTEINUA 1+*   BACTERIA Many*   NITRITE Negative   LEUKOCYTESUR 2+*   HYALINECASTS 4*     Urinary sediment on 05/28/2024:                                              Significant Imaging:  I have reviewed all imagining in the last 24 hours.

## 2024-05-29 NOTE — PROGRESS NOTES
[facility]  Cardiac Electrophysiology  Progress Note    Admission Date: 5/28/2024  Code Status: DNR   Attending Physician: Tonio Botello MD   Expected Discharge Date:   Principal Problem:Cardiogenic shock    Subjective:     Interval History:   Telemetry reviewed and appears more consistent with Sinus tachycardia with improved rates 110s. LE U/S with occlusion of the mid and \distal superficial femoral artery, popliteal artery and anterior and posterior tibial arteries.     Objective:     Vital Signs (Most Recent):  Temp: 97.7 °F (36.5 °C) (05/29/24 0701)  Pulse: 107 (05/29/24 0837)  Resp: (!) 22 (05/29/24 0837)  BP: (!) 119/50 (05/29/24 0301)  SpO2: 96 % (05/29/24 0801) Vital Signs (24h Range):  Temp:  [96.9 °F (36.1 °C)-97.9 °F (36.6 °C)] 97.7 °F (36.5 °C)  Pulse:  [] 107  Resp:  [19-48] 22  SpO2:  [86 %-99 %] 96 %  BP: ()/(45-85) 119/50  Arterial Line BP: ()/(45-57) 138/54     Weight: 62 kg (136 lb 11 oz)  Body mass index is 25.83 kg/m².     SpO2: 96 %        Physical Exam  Constitutional:       Appearance: Normal appearance.   Eyes:      Conjunctiva/sclera: Conjunctivae normal.   Cardiovascular:      Rate and Rhythm: Tachycardia present.      Heart sounds: No murmur heard.  Pulmonary:      Effort: Pulmonary effort is normal.      Breath sounds: Normal breath sounds.   Neurological:      Mental Status: She is alert.   Psychiatric:         Mood and Affect: Mood normal.            Significant Labs: All pertinent lab results from the last 24 hours have been reviewed.    Assessment and Plan:     Arrhythmia  - Patient admitted with concern for stroke and ADHF/Cardiogenic shock  - Repeat ECG 5/29: Sinus tachycardia, LBBB  - No need for adenosine challenge or TATA/DCCV  - Recommend weaning off inotropes if able to improve rates and treating other underlying causes  - Event monitor on Discharge      Thank you for this consult. Will sign off. Please follow up Attending  Attestation for further recommendations. Please call if any questions.       Nevin Vital MD  Cardiac Electrophysiology  [facility] - Cardiac Intensive Care

## 2024-05-29 NOTE — PROGRESS NOTES
Pharmacokinetic Initial Assessment: IV Vancomycin    Assessment/Plan:    Initiate intravenous vancomycin with loading dose of 1000 mg once with subsequent doses when random concentrations are less than 20 mcg/mL  Desired empiric serum trough concentration is 10 to 20 mcg/mL  Scr1>1.8>2 (JAMIR)  Draw vancomycin random level on 3/30/24 at 0300.  Pharmacy will continue to follow and monitor vancomycin.      Please contact pharmacy at extension [phone] with any questions regarding this assessment.     Thank you for the consult,   Latsiha Franklin       Patient brief summary:  Selma Hooper is a 78 y.o. female initiated on antimicrobial therapy with IV Vancomycin for treatment of suspected bacteremia    Drug Allergies:   Review of patient's allergies indicates:   Allergen Reactions    Atorvastatin      Leg cramps       Actual Body Weight:   62 kg    Renal Function:   Estimated Creatinine Clearance: 20.2 mL/min (A) (based on SCr of 2 mg/dL (H)).,     Dialysis Method (if applicable):  N/A    CBC (last 72 hours):  Recent Labs   Lab Result Units 05/27/24  1302 05/28/24  0611 05/28/24  1805 05/28/24 2005 05/29/24  0254   WBC K/uL 4.03 4.47 17.49* 14.97* 11.55   Hemoglobin g/dL 11.2* 10.8* 12.5 11.8* 11.5*   Hematocrit % 37.0 34.9* 40.8 38.5 37.9   Platelets K/uL 225 197 250 196 210   Gran % % 56.0 67.0 88.5* 90.5* 91.2*   Lymph % % 28.5 20.6 3.3* 3.5* 6.0*   Mono % % 12.9 9.8 6.9 5.1 2.3*   Eosinophil % % 1.7 1.3 0.1 0.0 0.0   Basophil % % 0.7 0.9 0.2 0.2 0.1   Differential Method  Automated Automated Automated Automated Automated       Metabolic Panel (last 72 hours):  Recent Labs   Lab Result Units 05/27/24  1302 05/28/24  0611 05/28/24  1402 05/28/24  1625 05/28/24  1817 05/28/24 2157 05/29/24  0254   Sodium mmol/L 142 142 139 144  --  143 142   Sodium, Urine mmol/L  --   --   --   --  114  --   --    Potassium mmol/L 4.2 3.6 4.4 3.6  --  3.3* 4.2   Chloride mmol/L 100 101 100 102  --  102 100   CO2 mmol/L 31* 29 25 28  --   "27 27   Glucose mg/dL 85 101 472* 261*  --  189* 199*   Glucose, UA   --   --   --   --  Negative  --   --    BUN mg/dL 17 17 16 20  --  23 25*   Creatinine mg/dL 1.1 1.0 1.6* 1.8*  --  1.9* 2.0*   Creatinine, Urine mg/dL  --   --   --   --  34.0  34.0  --   --    Albumin g/dL 3.7 3.4*  --  3.5  --   --  3.4*   Total Bilirubin mg/dL 0.4 0.3  --  0.3  --   --  0.3   Alkaline Phosphatase U/L 94 93  --  122  --   --  108   AST U/L 12 13  --  256*  --   --  191*   ALT U/L 20 18  --  119*  --   --  102*   Magnesium mg/dL 2.0  --   --  2.2  --   --  1.8   Phosphorus mg/dL 3.1  --   --  3.2  --   --  3.5       Drug levels (last 3 results):  No results for input(s): "VANCOMYCINRA", "VANCORANDOM", "VANCOMYCINPE", "VANCOPEAK", "VANCOMYCINTR", "VANCOTROUGH" in the last 72 hours.    Microbiologic Results:  Microbiology Results (last 7 days)       Procedure Component Value Units Date/Time    Urine culture [[id number]] Collected: 05/28/24 1817    Order Status: No result Specimen: Urine Updated: 05/28/24 2214            "

## 2024-05-29 NOTE — SUBJECTIVE & OBJECTIVE
Interval History:   Telemetry reviewed and appears more consistent with Sinus tachycardia with improved rates 110s. LE U/S with occlusion of the mid and \distal superficial femoral artery, popliteal artery and anterior and posterior tibial arteries.     Objective:     Vital Signs (Most Recent):  Temp: 97.7 °F (36.5 °C) (05/29/24 0701)  Pulse: 107 (05/29/24 0837)  Resp: (!) 22 (05/29/24 0837)  BP: (!) 119/50 (05/29/24 0301)  SpO2: 96 % (05/29/24 0801) Vital Signs (24h Range):  Temp:  [96.9 °F (36.1 °C)-97.9 °F (36.6 °C)] 97.7 °F (36.5 °C)  Pulse:  [] 107  Resp:  [19-48] 22  SpO2:  [86 %-99 %] 96 %  BP: ()/(45-85) 119/50  Arterial Line BP: ()/(45-57) 138/54     Weight: 62 kg (136 lb 11 oz)  Body mass index is 25.83 kg/m².     SpO2: 96 %        Physical Exam  Constitutional:       Appearance: Normal appearance.   Eyes:      Conjunctiva/sclera: Conjunctivae normal.   Cardiovascular:      Rate and Rhythm: Tachycardia present.      Heart sounds: No murmur heard.  Pulmonary:      Effort: Pulmonary effort is normal.      Breath sounds: Normal breath sounds.   Neurological:      Mental Status: She is alert.   Psychiatric:         Mood and Affect: Mood normal.            Significant Labs: All pertinent lab results from the last 24 hours have been reviewed.

## 2024-05-30 NOTE — PROGRESS NOTES
Pharmacokinetic Assessment Follow Up: IV Vancomycin    Vancomycin serum concentration assessment(s):    Vanc lvl = 16; JAMIR - likely ATN from shock    Vancomycin Regimen Plan:  Hold vancomycin today  Obtain concentration in AM 5/31  Goal trough = 15-20 mcg/mL    Thank you for the consult, will continue to follow  Stevenson Marinelli Pharm.D., BCPS  [id number]         Patient brief summary:  Selma Hooper is a 78 y.o. female initiated on antimicrobial therapy with IV Vancomycin for treatment of sepsis    Drug levels (last 3 results):  Recent Labs   Lab Result Units 05/30/24  0830   Vancomycin, Random ug/mL 16.0       Drug Allergies:   Review of patient's allergies indicates:   Allergen Reactions    Atorvastatin      Leg cramps       Actual Body Weight:   62 kg    Renal Function:   Estimated Creatinine Clearance: 15.5 mL/min (A) (based on SCr of 2.6 mg/dL (H)).,     CBC (last 72 hours):  Recent Labs   Lab Result Units 05/28/24  0611 05/28/24  1805 05/28/24 2005 05/29/24  0254 05/30/24  0311   WBC K/uL 4.47 17.49* 14.97* 11.55 10.59   Hemoglobin g/dL 10.8* 12.5 11.8* 11.5* 11.0*   Hematocrit % 34.9* 40.8 38.5 37.9 34.5*   Platelets K/uL 197 250 196 210 163   Gran % % 67.0 88.5* 90.5* 91.2* 81.6*   Lymph % % 20.6 3.3* 3.5* 6.0* 8.0*   Mono % % 9.8 6.9 5.1 2.3* 8.0   Eosinophil % % 1.3 0.1 0.0 0.0 1.3   Basophil % % 0.9 0.2 0.2 0.1 0.7   Differential Method  Automated Automated Automated Automated Automated       Metabolic Panel (last 72 hours):  Recent Labs   Lab Result Units 05/28/24  0611 05/28/24  1402 05/28/24  1625 05/28/24  1817 05/28/24 2157 05/29/24  0254 05/29/24  0919 05/30/24  0311   Sodium mmol/L 142 139 144  --  143 142 139 137   Sodium, Urine mmol/L  --   --   --  114  --   --   --   --    Potassium mmol/L 3.6 4.4 3.6  --  3.3* 4.2 4.6 4.8   Chloride mmol/L 101 100 102  --  102 100 96 92*   CO2 mmol/L 29 25 28  --  27 27 30* 30*   Glucose mg/dL 101 472* 261*  --  189* 199* 194* 109   Glucose, UA   --   --   --   Negative  --   --   --   --    BUN mg/dL 17 16 20  --  23 25* 30* 32*   Creatinine mg/dL 1.0 1.6* 1.8*  --  1.9* 2.0* 2.3* 2.6*   Creatinine, Urine mg/dL  --   --   --  34.0  34.0  --   --   --   --    Albumin g/dL 3.4*  --  3.5  --   --  3.4* 3.4* 3.4*   Total Bilirubin mg/dL 0.3  --  0.3  --   --  0.3 0.4 0.5   Alkaline Phosphatase U/L 93  --  122  --   --  108 99 94   AST U/L 13  --  256*  --   --  191* 142* 85*   ALT U/L 18  --  119*  --   --  102* 89* 71*   Magnesium mg/dL  --   --  2.2  --   --  1.8  --  2.6   Phosphorus mg/dL  --   --  3.2  --   --  3.5  --  4.8*       Vancomycin Administrations:  vancomycin given in the last 96 hours                     vancomycin (VANCOCIN) 1,000 mg in dextrose 5 % (D5W) 250 mL IVPB (Vial-Mate) (mg) 1,000 mg New Bag 05/29/24 0919                    Microbiologic Results:  Microbiology Results (last 7 days)       Procedure Component Value Units Date/Time    Urine culture [[id number]]  (Abnormal) Collected: 05/28/24 2597    Order Status: Completed Specimen: Urine Updated: 05/29/24 2310     Urine Culture, Routine GRAM NEGATIVE HUMA  >100,000 cfu/ml  Identification and susceptibility pending      Narrative:      ADD ON URINE PROTEIN/CREATININE RATIO PER DR BRITT GUILLEN/ORDER#   [id number] @ 22:28    ADD ON URINE SODIUM PER DR ELAINA CASH/ORDER# [id number] @ 21:53    Specimen Source->Urine

## 2024-05-30 NOTE — ASSESSMENT & PLAN NOTE
- urinary sediment with granular, half muddy brown and waxy casts suggestive of acute tubular injury in the setting of cardiogenic shock  - concern for critical limb ischemia continue to follow CPK daily.  - serial RFPs and magnesium levels in light of active diuresis   - please avoid hypotension/major fluctuations in BP (keep MAP > 65 mmHg)  - renal diet/tube feeds when not NPO with volume restriction per primary team  - strict I/O's and daily weights  - renally all dose medications to eGFR   - avoid nephrotoxic agents wean feasible (i.e. NSAIDs, intra-arterial contrast, supra-therapeutic vancomycin levels, etc.)  - no acute indications for RRT at this time however will continue to monitor closely and consent obtained should acute indications for RRT arise

## 2024-05-30 NOTE — ASSESSMENT & PLAN NOTE
Pt presenting with RLE weakness and decreased sensation to the knee that started around 3AM the day of admit. Initial thought was TIA as pt had palpable pulses on admit, however pt developed worsening RLE pain during admission prompting LE Arterial US.    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 There is severe stenosis of the distal left superficial femoral artery.  -CPK uptrended from 1619 to 2673.     -RLE pulses unable to be obtained on doppler. Color change noted on RLE. Pt endorsing numbness.      -ASA, Plavix, Heparin

## 2024-05-30 NOTE — PLAN OF CARE
CICU DAILY GOALS       A: Awake    RASS: Goal - RASS Goal: 0-->alert and calm  Actual - RASS (Pearl Agitation-Sedation Scale): alert and calm   Restraint necessity:    B: Breath   SBT: Not intubated   C: Coordinate A & B, analgesics/sedatives   Pain: managed    SAT: Not intubated  D: Delirium   CAM-ICU: Overall CAM-ICU: Negative  E: Early(intubated/ Progressive (non-intubated) Mobility   MOVE Screen: Pass   Activity: Activity Management: Arm raise - L1  FAS: Feeding/Nutrition   Diet order: Diet/Nutrition Received: low saturated fat/low cholesterol, 2 gram sodium,   Fluid restriction:     Nutritional Supplement Intake: Quantity 0, Type: Boost  T: Thrombus   DVT prophylaxis: VTE Required Core Measure: Pharmacological prophylaxis initiated/maintained  H: HOB Elevation   Head of Bed (HOB) Positioning: HOB elevated  U: Ulcer Prophylaxis   GI: yes  G: Glucose control   managed Glycemic Management: blood glucose monitored  S: Skin   Bundle compliance: yes   Bathing/Skin Care: bath, complete, dressed/undressed, electrode patches/site rotation, incontinence care, linen changed Date: 5/29/24  B: Bowel Function   no issues   I: Indwelling Catheters   Keys necessity:      Urethral Catheter 05/28/24 1200-Reason for Continuing Urinary Catheterization: Critically ill in ICU and requiring hourly monitoring of intake/output   CVC necessity: Yes   IPAD offered: No  D: De-escalation Antibx   Yes  Plan for the day   Hemodynamic stability  Family/Goals of care/Code Status   Code Status: DNR     No acute events throughout day, VS and assessment per flow sheet, patient progressing towards goals as tolerated, plan of care reviewed with Selma Hooper and family, all concerns addressed, will continue to monitor.

## 2024-05-30 NOTE — ASSESSMENT & PLAN NOTE
Chronic, controlled. Latest blood pressure and vitals reviewed-     Temp:  [96.9 °F (36.1 °C)-98.2 °F (36.8 °C)]   Pulse:  []   Resp:  [10-31]   BP: (104-133)/(48-86)   SpO2:  [94 %-99 %]   Arterial Line BP: ()/(43-66) .   Home meds for hypertension were reviewed and noted below.   Hypertension Medications               furosemide (LASIX) 40 MG tablet Take 1 tablet (40 mg total) by mouth once daily.    metoprolol succinate (TOPROL-XL) 25 MG 24 hr tablet Take 1 tablet (25 mg total) by mouth once daily.    spironolactone (ALDACTONE) 25 MG tablet Take 1 tablet (25 mg total) by mouth once daily.            While in the hospital, will manage blood pressure as follows; Adjust home antihypertensive regimen as follows- Procardia-XL 30mg QD initiated today for afterload reduction.     Will utilize p.r.n. blood pressure medication only if patient's blood pressure greater than 180/110 and she develops symptoms such as worsening chest pain or shortness of breath.

## 2024-05-30 NOTE — ASSESSMENT & PLAN NOTE
Pt with an EF of 5-10% that had suspected flash pulmonary edema while getting a MRI. She required BiPAP at that time and received IV Lasix 40mg x3 in the ED with no response. Central line and A-line were placed; pt was started on Dobutamine 5mcg and Lasix 30mg/hr gtt.     Hemos during the initiation of Dobutamine and Lasix gtt:     CVP 10, SvO2 51, CO 3.3, CI 2, and SVR 1945.     Hemos this AM:    CVP 7, SvO2 66, CO 4.7, CI 2.9, and SVR 1260.     -On Dobutamine 2.5mcg this AM.     - Dobutamine 2.5 gtt  - Lasix 80mg BID.

## 2024-05-30 NOTE — ASSESSMENT & PLAN NOTE
Nephrology consulted. Urinary sediment with granular, halfy muddy brown and waxy casts suggestive of ATN in the setting of cardiogenic shock.    Cr of 2.6 this AM. Baseline Cr of 1.0.     Plan:    - CMP qd, trend Cr  - strict I/O  - daily weights  - Ucr, Uurea  - renally dose all medications  - avoid nephrotoxic agents, NSAIDs, IV contrast, ACE/ARB  - If JAMIR doesn't improve consider US retroperitoneal

## 2024-05-30 NOTE — ASSESSMENT & PLAN NOTE
Patient is identified as having Combined Systolic and Diastolic heart failure that is Acute on chronic. CHF is currently . Latest ECHO performed and demonstrates- Results for orders placed during the hospital encounter of 05/14/24    Echo    Interpretation Summary    Left Ventricle: The left ventricle is severely dilated. Severely increased ventricular mass. Normal wall thickness. There is eccentric hypertrophy. Severe global hypokinesis present. There is severely reduced systolic function with a visually estimated ejection fraction of 5 - 10%. There is diastolic dysfunction but grade cannot be determined.    Right Ventricle: Normal right ventricular cavity size. Wall thickness is normal. Right ventricle wall motion  is normal. Systolic function is normal.    Left Atrium: Left atrium is moderately dilated.    Mitral Valve: There is mild regurgitation.    Pulmonary Artery: The estimated pulmonary artery systolic pressure is 32 mmHg.    IVC/SVC: Normal venous pressure at 3 mmHg.  . Monitor clinical status closely. Monitor on telemetry. Patient is off CHF pathway.  Monitor strict Is&Os and daily weights.  Place on fluid restriction of 1.5 L. Cardiology has been consulted. Continue to stress to patient importance of self efficacy and  on diet for CHF. Last BNP reviewed- and noted below   Recent Labs   Lab 05/28/24  1259   BNP 2,066*       -Net +99.5ml overnight. Lasix 80mg BID initiated today. CVP of 7 this AM.

## 2024-05-30 NOTE — PLAN OF CARE
POC reviewed with Selma Hooper and family. Questions and concerns addressed. CVP: 7, 8 and 11.  Svo2: 66%. , heparin gtt continued. Lasix gtt started. R lower extremity cold, pulses absent. No  any acute events throughout the shift. See below and flowsheets for full assessment and VS info.       Neuro:  Mascot Coma Scale  Best Eye Response: 4-->(E4) spontaneous  Best Motor Response: 6-->(M6) obeys commands  Best Verbal Response: 5-->(V5) oriented  Padmini Coma Scale Score: 15  Assessment Qualifiers: patient not sedated/intubated  Pupil PERRLA: yes    24 hr Temp:  [97.5 °F (36.4 °C)-98.2 °F (36.8 °C)]     CV:  Rhythm: normal sinus rhythm   DVT prophylaxis: VTE Required Core Measure: Pharmacological prophylaxis initiated/maintained    CVP (mean): 11 mmHg (05/30/24 1501)                       Pulses  Right Radial Pulse: 2+ (normal)  Left Radial Pulse: 2+ (normal)  Right Dorsalis Pedis Pulse: 0 (absent)  Left Dorsalis Pedis Pulse: 1+ (weak)  Right Posterior Tibial Pulse: 0 (absent)  Left Posterior Tibial Pulse: 1+ (weak)    Resp:  Flow (L/min) (Oxygen Therapy): 2  Oxygen Concentration (%): 36    GI/:  GI prophylaxis: no  Diet/Nutrition Received: low saturated fat/low cholesterol, 2 gram sodium  Last Bowel Movement: 05/28/24  Voiding Characteristics: urethral catheter (bladder)       Urethral Catheter 05/28/24 1200-Reason for Continuing Urinary Catheterization: Critically ill in ICU and requiring hourly monitoring of intake/output   Intake/Output Summary (Last 24 hours) at 5/30/2024 1834  Last data filed at 5/30/2024 1501  Gross per 24 hour   Intake 634.56 ml   Output 940 ml   Net -305.44 ml            Labs/Accuchecks:  Recent Labs   Lab 05/28/24 2005 05/29/24  0254 05/30/24  0311   WBC 14.97* 11.55 10.59   RBC 4.83 4.71 4.42   HGB 11.8* 11.5* 11.0*   HCT 38.5 37.9 34.5*    210 163      Recent Labs   Lab 05/28/24  0611 05/28/24 2005 05/28/24  2157 05/29/24  0919 05/29/24  1541 05/30/24  0311   INR 1.0 1.2   --   --   --   --    APTT  --  109.1*   < > 55.4* 56.6* 51.5*    < > = values in this interval not displayed.      Recent Labs     05/30/24  0311 05/30/24  1424    135*   K 4.8 4.8   CO2 30* 30*   CL 92* 93*   BUN 32* 41*   CREATININE 2.6* 2.4*   ALKPHOS 94  --    ALT 71*  --    AST 85*  --    BILITOT 0.5  --        Recent Labs   Lab 05/29/24  0950 05/29/24  1753 05/30/24  0830   CPK 1059* 1619* 2673*      Recent Labs     05/29/24  1255 05/29/24  2019 05/30/24  0831   PH 7.339* 7.385 7.366   PCO2 64.9* 60.2* 64.1*   PO2 32* 32* 37*   HCO3 34.9* 36.0* 36.7*   POCSATURATED 55 58 66   BE 9* 11* 11*       Electrolytes: N/A - electrolytes WDL  Accuchecks: ACHS    Gtts/LDAs:   sodium chloride 0.9%   Intravenous Continuous 5 mL/hr at 05/30/24 1501 Rate Verify at 05/30/24 1501    DOBUTamine IV infusion (non-titrating)  2.5 mcg/kg/min Intravenous Continuous 2.3 mL/hr at 05/30/24 1501 2.5 mcg/kg/min at 05/30/24 1501    furosemide (Lasix) 500 mg in 50 mL infusion (conc: 10 mg/mL)  20 mg/hr Intravenous Continuous 2 mL/hr at 05/30/24 1755 20 mg/hr at 05/30/24 1755    heparin (porcine) in D5W  0-40 Units/kg/hr (Adjusted) Intravenous Continuous 6.4 mL/hr at 05/30/24 1501 12 Units/kg/hr at 05/30/24 1501       Lines/Drains/Airways       Central Venous Catheter Line  Duration             Trialysis (Dialysis) Catheter 05/28/24 1613 right internal jugular 2 days              Drain  Duration                  Urethral Catheter 05/28/24 1200 2 days              Arterial Line  Duration             Arterial Line 05/28/24 1818 Left Radial 2 days              Peripheral Intravenous Line  Duration                  Peripheral IV - Single Lumen 05/28/24 1259 20 G Left Hand 2 days                    Skin/Wounds  Bathing/Skin Care: back care;bath, complete;incontinence care;linen changed;dressed/undressed (05/30/24 1707)  Wounds: No  Wound care consulted: No

## 2024-05-30 NOTE — CARE UPDATE
HTS Care Update Note    8PM     Hemodynamics    CVP:  7   SCVO2:  58   Cardiac Output:  3.52   Cardiac Index:  2.16   SVR:  1636     I/Os    In 0.788   Out 0.48   Net 0.308       Net IO Since Admission: -201.34 mL [05/29/24 2151]    Diuretics: None    Continuous Infusions:      sodium chloride 0.9%   Intravenous Continuous 5 mL/hr at 05/29/24 2100 Rate Verify at 05/29/24 2100    DOBUTamine IV infusion (non-titrating)  2.5 mcg/kg/min Intravenous Continuous 2.3 mL/hr at 05/29/24 2100 2.5 mcg/kg/min at 05/29/24 2100    heparin (porcine) in D5W  0-40 Units/kg/hr (Adjusted) Intravenous Continuous 6.4 mL/hr at 05/29/24 2100 12 Units/kg/hr at 05/29/24 2100    NORepinephrine bitartrate-D5W  0-3 mcg/kg/min Intravenous Continuous   Stopped at 05/29/24 1558       Mechanical support: None    Plan:  - No changes made, continue current plan of care    Jose Jeff MD  Cardiovascular Disease PGY-IV  [facility]     details.  Consider Spiritual Care Referral for support and/or completion of advance directives:   [facility]: [phone]  Consider: having the facility MD complete required 7 day follow up.      Patient's personal belongings (please select all that are sent with patient):  Glasses, Hearing Aides bilateral    RN SIGNATURE:  Electronically signed by Ortiz Berkowitz RN on 1/16/24 at 10:16 AM EST    CASE MANAGEMENT/SOCIAL WORK SECTION    Inpatient Status Date: 1/13/2024    Readmission Risk Assessment Score:  Readmission Risk              Risk of Unplanned Readmission:  17           Discharging to Facility/ Agency   Name: [facility]  Address:  Phone: [phone]    Dialysis Facility (if applicable)   Name:  Address:  Dialysis Schedule:  Phone:  Fax:    / signature: Electronically signed by JOS Fritz on 1/16/24 at 9:06 AM EST    PHYSICIAN SECTION    Prognosis: Fair    Condition at Discharge: Stable    Rehab Potential (if transferring to Rehab): Good    Recommended Labs or Other Treatments After Discharge: none    Physician Certification: I certify the above information and transfer of Marya Louis  is necessary for the continuing treatment of the diagnosis listed and that she requires Skilled Nursing Facility for less 30 days.     Update Admission H&P: No change in H&P    PHYSICIAN SIGNATURE:  Electronically signed by CAT VALLEJO MD on 1/16/24 at 8:21 AM EST

## 2024-05-30 NOTE — SUBJECTIVE & OBJECTIVE
Interval History; patient seen this morning on nasal cannula. Denies chest pain or shortness of breath. Continues to have right leg pain. Creatinine up trending this morning.       Objective:     Vital Signs (Most Recent):  Temp: 98.2 °F (36.8 °C) (05/30/24 0701)  Pulse: 102 (05/30/24 0901)  Resp: (!) 22 (05/30/24 0906)  BP: 131/82 (05/30/24 0801)  SpO2: 96 % (05/30/24 0901) Vital Signs (24h Range):  Temp:  [96.9 °F (36.1 °C)-98.2 °F (36.8 °C)] 98.2 °F (36.8 °C)  Pulse:  [] 102  Resp:  [11-31] 22  SpO2:  [94 %-99 %] 96 %  BP: (104-133)/(48-86) 131/82  Arterial Line BP: ()/(43-66) 128/61     Weight: 62 kg (136 lb 11 oz) (05/29/24 0901)  Body mass index is 25.83 kg/m².  Body surface area is 1.63 meters squared.    I/O last 3 completed shifts:  In: 1585 [P.O.:295; I.V.:810.8; IV Piggyback:479.2]  Out: 1910 [Urine:1835; Emesis/NG output:75]     Physical Exam  Vitals and nursing note reviewed.   Constitutional:       General: She is awake. She is not in acute distress.     Appearance: She is normal weight. She is ill-appearing. She is not diaphoretic.      Interventions: Nasal cannula in place.   HENT:      Head: Normocephalic and atraumatic.      Right Ear: External ear normal.      Left Ear: External ear normal.      Mouth/Throat:      Mouth: Mucous membranes are moist.      Pharynx: Oropharynx is clear. No oropharyngeal exudate or posterior oropharyngeal erythema.   Eyes:      General: No scleral icterus.        Right eye: No discharge.         Left eye: No discharge.      Extraocular Movements: Extraocular movements intact.      Conjunctiva/sclera: Conjunctivae normal.   Neck:      Comments: Trialysis catheter to right internal jugular vein.  Cardiovascular:      Rate and Rhythm: Normal rate and regular rhythm.      Heart sounds: Murmur heard.   Pulmonary:      Effort: Tachypnea present. No respiratory distress.      Breath sounds: No wheezing, rhonchi or rales.   Abdominal:      General: Bowel sounds are  normal. There is no distension.      Palpations: Abdomen is soft.      Tenderness: There is no abdominal tenderness.   Genitourinary:     Comments: Keys catheter in place.  Musculoskeletal:      Cervical back: Neck supple.      Right lower leg: No edema.      Left lower leg: No edema.   Skin:     General: Skin is warm and dry.      Coloration: Skin is not jaundiced.   Neurological:      General: No focal deficit present.      Mental Status: She is alert. Mental status is at baseline.      Cranial Nerves: No cranial nerve deficit.      Motor: No weakness.   Psychiatric:         Mood and Affect: Mood normal.         Behavior: Behavior normal. Behavior is cooperative.          Significant Labs:  ABGs:   Recent Labs   Lab 05/30/24  0831   PH 7.366   PCO2 64.1*   HCO3 36.7*   POCSATURATED 66   BE 11*     BMP:   Recent Labs   Lab 05/30/24 0311         K 4.8   CL 92*   CO2 30*   BUN 32*   CREATININE 2.6*   CALCIUM 9.6   MG 2.6     CBC:   Recent Labs   Lab 05/30/24 0311   WBC 10.59   RBC 4.42   HGB 11.0*   HCT 34.5*      MCV 78*   MCH 24.9*   MCHC 31.9*     CMP:   Recent Labs   Lab 05/30/24 0311      CALCIUM 9.6   ALBUMIN 3.4*   PROT 6.5      K 4.8   CO2 30*   CL 92*   BUN 32*   CREATININE 2.6*   ALKPHOS 94   ALT 71*   AST 85*   BILITOT 0.5     Coagulation:   Recent Labs   Lab 05/28/24 2005 05/28/24 2157 05/30/24 0311   INR 1.2  --   --    APTT 109.1*   < > 51.5*    < > = values in this interval not displayed.     LFTs:   Recent Labs   Lab 05/30/24 0311   ALT 71*   AST 85*   ALKPHOS 94   BILITOT 0.5   PROT 6.5   ALBUMIN 3.4*     Microbiology Results (last 7 days)       Procedure Component Value Units Date/Time    Urine culture [[id number]]  (Abnormal) Collected: 05/28/24 1817    Order Status: Completed Specimen: Urine Updated: 05/29/24 2310     Urine Culture, Routine GRAM NEGATIVE HUMA  >100,000 cfu/ml  Identification and susceptibility pending      Narrative:      ADD ON URINE  PROTEIN/CREATININE RATIO PER DR BRITT GUILLEN/ORDER#   [id number] @ 22:28    ADD ON URINE SODIUM PER DR ELAINA CASH/ORDER# [id number] @ 21:53    Specimen Source->Urine          Specimen (24h ago, onward)      None          TSH:   Recent Labs   Lab 05/28/24  0611   TSH 0.889     Recent Labs   Lab 05/28/24  1817   COLORU [place]*   SPECGRAV 1.010   PHUR 6.0   PROTEINUA 1+*   BACTERIA [place]*   NITRITE Negative   LEUKOCYTESUR 2+*   HYALINECASTS 4*     Urinary sediment on 05/28/2024:                                              Significant Imaging:  I have reviewed all imagining in the last 24 hours.

## 2024-05-30 NOTE — PROGRESS NOTES
[facility] - [facility]  Cardiology  Progress Note    Patient Name: Selma Hooper  MRN: [id number]  Admission Date: 5/28/2024  Hospital Length of Stay: 2 days  Code Status: DNR   Attending Physician: Tonio Botello MD   Primary Care Physician: Phil Andrade MD  Expected Discharge Date: 6/4/2024  Principal Problem:Cardiogenic shock    Subjective:     Interval History:     Pt was seen and examined at bedside. NAEON. Procardia-XL 30mg added for further afterload reduction. Pt remains on Dobutamine 2.5. Lasix 80mg BID to aid with diuresis.     Review of Systems   Constitutional: Negative for chills, diaphoresis, fever, malaise/fatigue and night sweats.   HENT:  Negative for congestion, hearing loss, hoarse voice, sore throat and stridor.    Eyes:  Negative for blurred vision.   Cardiovascular:  Positive for dyspnea on exertion and orthopnea. Negative for chest pain, claudication, cyanosis, leg swelling, near-syncope, palpitations, paroxysmal nocturnal dyspnea and syncope.   Respiratory:  Positive for shortness of breath. Negative for cough, sleep disturbances due to breathing, snoring, sputum production and wheezing.    Skin:  Negative for dry skin and rash.   Musculoskeletal:  Negative for arthritis, back pain, joint pain, muscle cramps, muscle weakness and myalgias.   Gastrointestinal:  Negative for bloating, abdominal pain, change in bowel habit, constipation, diarrhea, dysphagia, nausea and vomiting.   Genitourinary:  Negative for dysuria and urgency.   Neurological:  Positive for focal weakness and numbness. Negative for difficulty with concentration, excessive daytime sleepiness, dizziness, headaches, light-headedness, tremors and weakness.   Psychiatric/Behavioral:  Negative for altered mental status and depression. The patient does not have insomnia.      Objective:     Vital Signs (Most Recent):  Temp: 98 °F (36.7 °C) (05/30/24 1101)  Pulse: 94 (05/30/24 1301)  Resp: 20 (05/30/24 1301)  BP:  120/70 (05/30/24 1201)  SpO2: 96 % (05/30/24 1301) Vital Signs (24h Range):  Temp:  [96.9 °F (36.1 °C)-98.2 °F (36.8 °C)] 98 °F (36.7 °C)  Pulse:  [] 94  Resp:  [10-31] 20  SpO2:  [94 %-99 %] 96 %  BP: (104-133)/(48-86) 120/70  Arterial Line BP: ()/(43-66) 110/55     Weight: 62 kg (136 lb 11 oz)  Body mass index is 25.83 kg/m².     SpO2: 96 %         Intake/Output Summary (Last 24 hours) at 5/30/2024 1356  Last data filed at 5/30/2024 1201  Gross per 24 hour   Intake 616.03 ml   Output 895 ml   Net -278.97 ml       Lines/Drains/Airways       Central Venous Catheter Line  Duration             Trialysis (Dialysis) Catheter 05/28/24 1613 right internal jugular 1 day              Drain  Duration                  Urethral Catheter 05/28/24 1200 2 days              Arterial Line  Duration             Arterial Line 05/28/24 1818 Left Radial 1 day              Peripheral Intravenous Line  Duration                  Peripheral IV - Single Lumen 05/28/24 1259 20 G Left Hand 2 days                       Physical Exam  Vitals and nursing note reviewed.   Constitutional:       Appearance: Normal appearance. She is ill-appearing.      Comments: L radial arterial line   Eyes:      Extraocular Movements: Extraocular movements intact.      Conjunctiva/sclera: Conjunctivae normal.   Neck:      Comments: RIJ CVC in place dressing C/D/I  Cardiovascular:      Rate and Rhythm: Regular rhythm. Tachycardia present.      Pulses: Decreased pulses (RLE).      Comments: JVD indeterminate 2/2 CVC  Pulmonary:      Effort: Pulmonary effort is normal.      Breath sounds: Normal breath sounds. No rales.   Abdominal:      General: Bowel sounds are normal. There is no distension.      Palpations: Abdomen is soft.   Musculoskeletal:      Cervical back: Normal range of motion.      Right lower leg: No edema.      Left lower leg: No edema.   Skin:     General: Skin is warm and dry.   Neurological:      General: No focal deficit present.       Mental Status: She is alert and oriented to person, place, and time.      Sensory: Sensory deficit (RLE) present.      Motor: Weakness (RLE) present.            Significant Labs/Significant Imaging:     Recent Labs   Lab 05/28/24  0611 05/28/24  1304 05/28/24  1805 05/28/24 2005 05/29/24 0254 05/30/24  0311   WBC 4.47  --  17.49* 14.97* 11.55 10.59   HGB 10.8*  --  12.5 11.8* 11.5* 11.0*   HCT 34.9* 39 40.8 38.5 37.9 34.5*   MCV 79*  --  80* 80* 81* 78*   RBC 4.41  --  5.09 4.83 4.71 4.42   MCH 24.5*  --  24.6* 24.4* 24.4* 24.9*   MCHC 30.9*  --  30.6* 30.6* 30.3* 31.9*   RDW 14.6*  --  14.2 14.6* 14.6* 14.9*     --  250 196 210 163   MPV 10.1  --  10.6 10.6 10.6 10.4   GRAN 67.0  3.0  --  88.5*  15.5* 90.5*  13.6* 91.2*  10.5* 81.6*  8.6*   LYMPH 20.6  0.9*  --  3.3*  0.6* 3.5*  0.5* 6.0*  0.7* 8.0*  0.9*   MONO 9.8  0.4  --  6.9  1.2* 5.1  0.8 2.3*  0.3 8.0  0.9   EOSINOPHIL 1.3  --  0.1 0.0 0.0 1.3   BASOPHIL 0.9  --  0.2 0.2 0.1 0.7       Recent Labs   Lab 05/27/24  1302 05/28/24  0611 05/28/24  1402 05/28/24  1625 05/28/24 2157 05/29/24 0254 05/29/24  0919 05/30/24  0311    142   < > 144 143 142 139 137   K 4.2 3.6   < > 3.6 3.3* 4.2 4.6 4.8    101   < > 102 102 100 96 92*   CO2 31* 29   < > 28 27 27 30* 30*   BUN 17 17   < > 20 23 25* 30* 32*   CREATININE 1.1 1.0   < > 1.8* 1.9* 2.0* 2.3* 2.6*   GLU 85 101   < > 261* 189* 199* 194* 109   CALCIUM 9.9 9.3   < > 9.5 9.6 9.7 9.7 9.6   PROT 6.9 6.3  --  6.9  --  6.7 6.6 6.5   ALBUMIN 3.7 3.4*  --  3.5  --  3.4* 3.4* 3.4*   ALKPHOS 94 93  --  122  --  108 99 94   BILITOT 0.4 0.3  --  0.3  --  0.3 0.4 0.5   ALT 20 18  --  119*  --  102* 89* 71*   AST 12 13  --  256*  --  191* 142* 85*   ANIONGAP 11 12   < > 14 14 15 13 15   MG 2.0  --   --  2.2  --  1.8  --  2.6   PHOS 3.1  --   --  3.2  --  3.5  --  4.8*    < > = values in this interval not displayed.       Recent Labs   Lab 05/28/24  5380   COLORU [place]*   APPEARANCEUA [place]*    PHUR 6.0   SPECGRAV 1.010   PROTEINUA 1+*   GLUCUA Negative   KETONESU Negative   BILIRUBINUA Negative   OCCULTUA 3+*   NITRITE Negative   LEUKOCYTESUR 2+*   RBCUA 69*   WBCUA 13*   BACTERIA Many*   SQUAMEPITHEL 0   HYALINECASTS 4*   MICROCMT SEE COMMENT       Recent Labs   Lab 05/29/24  1255 05/29/24  2019 05/30/24  0831   PH 7.339* 7.385 7.366   PCO2 64.9* 60.2* 64.1*   PO2 32* 32* 37*   HCO3 34.9* 36.0* 36.7*   POCSATURATED 55 58 66   BE 9* 11* 11*       Recent Labs   Lab 05/28/24 2157 05/29/24  0950 05/29/24  1753 05/30/24  0830   * 1059* 1619* 2673*       Recent Labs   Lab 05/28/24  0611 05/28/24 2005 05/28/24 2005 05/28/24 2157 05/29/24  0254 05/29/24  0919 05/29/24  1541 05/30/24  0311   INR 1.0 1.2  --   --   --   --   --   --    APTT  --  109.1*   < > 72.0* 60.4* 55.4* 56.6* 51.5*    < > = values in this interval not displayed.       Lab Results   Component Value Date    HGBA1C 5.3 05/14/2024       Recent Labs   Lab 05/28/24  0611   TSH 0.889       Microbiology Results (last 7 days)       Procedure Component Value Units Date/Time    Urine culture [[id number]]  (Abnormal) Collected: 05/28/24 1817    Order Status: Completed Specimen: Urine Updated: 05/29/24 2310     Urine Culture, Routine GRAM NEGATIVE HUMA  >100,000 cfu/ml  Identification and susceptibility pending      Narrative:      ADD ON URINE PROTEIN/CREATININE RATIO PER DR BRITT GUILLEN/ORDER#   [id number] @ 22:28    ADD ON URINE SODIUM PER DR ELAINA CASH/ORDER# [id number] @ 21:53    Specimen Source->Urine            US Retroperitoneal Complete    Result Date: 5/29/2024  EXAMINATION: US RETROPERITONEAL COMPLETE CLINICAL HISTORY: ARF; TECHNIQUE: Ultrasound of the kidneys and urinary bladder was performed including color flow and Doppler evaluation of the kidneys. COMPARISON: Complete retroperitoneal ultrasound 12/11/2021. FINDINGS: Right kidney: The right kidney measures 9.0 cm.  No cortical thinning. No loss of corticomedullary  distinction.  Resistive index measures 1.0.  1.2 x 1.0 x 0.8 cm anechoic cyst in the midpole.  No renal stone. No hydronephrosis. Left kidney: The left kidney measures 8.8 cm. No cortical thinning. No loss of corticomedullary distinction.  Resistive index measures 1.0.  1.3 x 1.2 x 0.9 cm anechoic cyst in the upper pole.  0.3 cm nonobstructing stone in the lower pole.  No hydronephrosis. Bladder is decompressed around Keys catheter.     Medical renal disease.  No hydronephrosis. Bilateral renal cysts. Nonobstructing left renal stone. Electronically signed by resident: Orly Sanders Date:    05/29/2024 Time:    13:25 Electronically signed by: Kj Damon MD Date:    05/29/2024 Time:    14:22    Echo    Result Date: 5/29/2024    Left Ventricle: The left ventricle is severely dilated. Severely increased ventricular mass. Normal wall thickness. There is severe eccentric hypertrophy. Severe global hypokinesis present. There is severely reduced systolic function with a visually estimated ejection fraction of 5 - 10%. Ejection fraction by visual approximation is 10% or less. Grade I diastolic dysfunction. Normal left ventricular filling pressure.   Right Ventricle: Normal right ventricular cavity size. Wall thickness is normal. Systolic function is normal.   Left Atrium: Left atrium is moderately dilated.   Aortic Valve: The aortic valve is a trileaflet valve. There is mild annular calcification present. There is no stenosis. There is no significant regurgitation.   Mitral Valve: There is mild bileaflet sclerosis. There is mild to mild- moderate regurgitation.   Tricuspid Valve: There is trace regurgitation.   Aorta: Aortic root is normal in size measuring 2.78 cm. Ascending aorta is normal measuring 2.85 cm.   Pulmonary Artery: The estimated pulmonary artery systolic pressure is 36 mmHg.   IVC/SVC: Normal venous pressure at 3 mmHg.     US Lower Extremity Arteries Bilateral    Result Date: 5/29/2024  EXAMINATION: US  LOWER EXTREMITY ARTERIES BILATERAL CLINICAL HISTORY: Right lower extremity concern for ischemia; TECHNIQUE: Bilateral lower extremity arterial duplex ultrasound examination performed. Multiple gray scale and color doppler images were obtained in addition to waveform analysis. COMPARISON: None FINDINGS: The peak systolic velocities on the right are as follows, in centimeters/second: Common femoral artery: 36.1 Deep femoral artery: 59 Superficial femoral artery, proximal: 14.4 Superficial femoral artery, mid portion: Occluded Superficial femoral artery, distal: Occluded Popliteal artery: Occluded Posterior tibial artery: Occluded Anterior tibial artery: Occluded The peak systolic velocities on the left are as follows, in centimeters/second: Common femoral artery: 169 Deep femoral artery: 209 Superficial femoral artery, proximal: 151 Superficial femoral artery, mid portion: 159 Superficial femoral artery, distal: 410 Popliteal artery: 111 Posterior tibial artery: 36 Anterior tibial artery: 43 There are monophasic waveforms in patent vessels of the right lower extremity.  There are triphasic waveforms in the left common femoral artery and deep femoral artery.  The remaining waveforms in the left lower extremity are monophasic.     There is occlusion of the right mid and distal superficial femoral artery, popliteal artery, and anterior and posterior tibial arteries. There is severe stenosis of the distal left superficial femoral artery. This report was flagged in Epic as abnormal. This result was discovered at 03:04.  Findings were discussed via telephone by Edda Donnelly MD with Jose Jeff MD on 5/29/2024 at 03:34.  Patient name and medical record number were verified, read back was performed. Electronically signed by resident: Edda Donnelly Date:    05/29/2024 Time:    02:52 Electronically signed by: Xiang Rosa Date:    05/29/2024 Time:    04:12    X-Ray Chest AP Portable    Result  Date: 5/28/2024  EXAMINATION: XR CHEST AP PORTABLE CLINICAL HISTORY: central line placement; TECHNIQUE: Single frontal view of the chest was performed. COMPARISON: 05/28/2024 FINDINGS: Interval placement of a right IJ catheter which terminates in the distal SVC.  The heart remains enlarged.  Calcified atheromatous disease affects the tortuous aorta.  Diffuse reticular opacities throughout the lungs, likely related to pulmonary edema however underlying inflammatory/infectious process cannot be excluded.  Skeletal structures are intact.     As above. Electronically signed by: Lakeshia Arambula MD Date:    05/28/2024 Time:    22:27    X-Ray Chest AP Portable    Result Date: 5/28/2024  EXAMINATION: XR CHEST AP PORTABLE CLINICAL HISTORY: shortness of breath; TECHNIQUE: Single frontal view of the chest was performed. COMPARISON: 05/14/2024 FINDINGS: The cardiac silhouette is enlarged. There is abnormal increased reticular lung markings seen throughout both lung fields, concerning for edema or an underlying inflammatory or infectious process.  No large pleural effusion.  No pneumothorax. The osseous structures appear normal.     Abnormal diffuse increased reticular lung markings, consider edema or underlying inflammation/infectious process Electronically signed by: Blessing Sanders MD Date:    05/28/2024 Time:    16:53    MRI Brain Without Contrast    Result Date: 5/28/2024  EXAMINATION: MRI BRAIN WITHOUT CONTRAST CLINICAL HISTORY: Stroke, follow up;Acute focal neurological deficit; TECHNIQUE: Multiplanar multisequence MR imaging of the brain was performed without contrast. COMPARISON: None. FINDINGS: Parenchyma: There is no restricted diffusion to suggest acute or subacute ischemic infarct.Generalized pattern age-related parenchymal volume loss noted.  Nonspecific areas of T2/FLAIR hyperintense signal in the frontoparietal white matter and lis may reflect sequela of chronic small vessel ischemic disease. Additionally,  there is a nonspecific somewhat lobulated appearing T1 and T2/FLAIR hyperintense lesion in the mesial right temporal/hippocampal region (axial FLAIR series 8, image 11; coronal T2 series 12, image 15), measuring on the order of 7 x 17 x 8 mm. Additional comments: There is no midline shift, abnormal extra-axial fluid collection, or acute intracranial hemorrhage. The basal cisterns are patent. Ventricles: Normal. Flow voids: The normal major intracranial arterial flow voids are visualized. Sinuses and mastoid air cells: Essentially clear Orbits: Normal Midline structures: The pituitary and craniocervical junction are normal. Marrow: Normal     1. No evidence of acute ischemia or recent infarction, as questioned. 2. Indeterminate T1 and T2-FLAIR hyperintense lesion in the mesial right temporal lobe/inferior basal ganglia region.  No definite restricted diffusion or significant susceptibility-related signal loss at this location.  Evolving subacute infarct would be considered.  Further characterization with contrast-enhanced sequences would be recommended as neoplastic etiology is not excluded.  Inflammatory/demyelinating or infectious processes would additionally be considered. Electronically signed by: Joel Acosta Date:    05/28/2024 Time:    14:10    Echo    Result Date: 5/14/2024    Left Ventricle: The left ventricle is severely dilated. Severely increased ventricular mass. Normal wall thickness. There is eccentric hypertrophy. Severe global hypokinesis present. There is severely reduced systolic function with a visually estimated ejection fraction of 5 - 10%. There is diastolic dysfunction but grade cannot be determined.   Right Ventricle: Normal right ventricular cavity size. Wall thickness is normal. Right ventricle wall motion  is normal. Systolic function is normal.   Left Atrium: Left atrium is moderately dilated.   Mitral Valve: There is mild regurgitation.   Pulmonary Artery: The estimated pulmonary  artery systolic pressure is 32 mmHg.   IVC/SVC: Normal venous pressure at 3 mmHg.     X-Ray Chest AP Portable    Result Date: 5/14/2024  EXAMINATION: XR CHEST AP PORTABLE CLINICAL HISTORY: CHF; TECHNIQUE: Single frontal view of the chest was performed. COMPARISON: 12/28/2023 FINDINGS: Cardiac monitoring leads overlie the chest.  There is unchanged prominence of the cardiomediastinal silhouette.  There is atherosclerosis of the thoracic aorta.  The lungs are symmetrically expanded with diffuse coarse/increased interstitial attenuation with bibasilar predominance.  Findings could reflect underlying interstitial edema or infectious process superimposed upon a background of chronic interstitial change.  No confluent airspace consolidation, substantial volume of pleural fluid or pneumothorax identified.  Visualized osseous structures appear intact with degenerative change.     Please see above. Electronically signed by: Sara Parsons MD Date:    05/14/2024 Time:    02:47    Assessment and Plan:       * Cardiogenic shock  Pt with an EF of 5-10% that had suspected flash pulmonary edema while getting a MRI. She required BiPAP at that time and received IV Lasix 40mg x3 in the ED with no response. Central line and A-line were placed; pt was started on Dobutamine 5mcg and Lasix 30mg/hr gtt.     Hemos during the initiation of Dobutamine and Lasix gtt:     CVP 10, SvO2 51, CO 3.3, CI 2, and SVR 1945.     Hemos this AM:    CVP 7, SvO2 66, CO 4.7, CI 2.9, and SVR 1260.     -On Dobutamine 2.5mcg this AM.     - Dobutamine 2.5 gtt  - Lasix 80mg BID.     Acute lower limb ischemia  Pt presenting with RLE weakness and decreased sensation to the knee that started around 3AM the day of admit. Initial thought was TIA as pt had palpable pulses on admit, however pt developed worsening RLE pain during admission prompting LE Arterial US.    LE Arterial US: Occlusion of the right mid and distal superficial femoral artery, popliteal artery, and  anterior and posterior tibial arteries. There is severe stenosis of the distal left superficial femoral artery.  -CPK uptrended from 1619 to 2673.     -RLE pulses unable to be obtained on doppler. Color change noted on RLE. Pt endorsing numbness.      -ASA, Plavix, Heparin    Long QT interval  With wide QRS tachycardia on admission. Qtc 568 on admission.     - K goal 4, Mg 2    COPD (chronic obstructive pulmonary disease)  Patient's COPD is with exacerbation noted by continued dyspnea and worsening of baseline hypoxia currently.  Patient is currently off COPD Pathway. Continue scheduled inhalers Supplemental oxygen and monitor respiratory status closely.     Prior smoking history of 0.5 ppd, 25 yo start, quit 2018.    - Continue maintenance inhaler  - Supplemental O2 PRN    HFrEF (heart failure with reduced ejection fraction)  See NICM    DNR (do not resuscitate)  Pt DNR per chart review and reconfirmed in the ED on this admission. Patient does not want intubation either.    Type 2 diabetes mellitus with hyperglycemia, without long-term current use of insulin  Last A1C 5 during admission two weeks prior to current ICU admission. Not on home DM regimen. Hyperglycemic during this admission.    - LDSSI    Acute on chronic respiratory failure with hypoxia and hypercapnia  Patient with Hypercapnic and Hypoxic Respiratory failure which is Acute on chronic.  she is on home oxygen at 1.5 LPM. Supplemental oxygen was provided and noted.    Respiratory status has improved, pt on 3L NC this AM.       Signs/symptoms of respiratory failure include- tachypnea, increased work of breathing, and respiratory distress. Contributing diagnoses includes - CHF and COPD Labs and images were reviewed. Patient Has recent ABG, which has been reviewed. Will treat underlying causes and adjust management of respiratory failure as follows-     - Do not intubate, does not align with patient goals    Acute on chronic combined systolic and diastolic  congestive heart failure  Patient is identified as having Combined Systolic and Diastolic heart failure that is Acute on chronic. CHF is currently . Latest ECHO performed and demonstrates- Results for orders placed during the hospital encounter of 05/14/24    Echo    Interpretation Summary    Left Ventricle: The left ventricle is severely dilated. Severely increased ventricular mass. Normal wall thickness. There is eccentric hypertrophy. Severe global hypokinesis present. There is severely reduced systolic function with a visually estimated ejection fraction of 5 - 10%. There is diastolic dysfunction but grade cannot be determined.    Right Ventricle: Normal right ventricular cavity size. Wall thickness is normal. Right ventricle wall motion  is normal. Systolic function is normal.    Left Atrium: Left atrium is moderately dilated.    Mitral Valve: There is mild regurgitation.    Pulmonary Artery: The estimated pulmonary artery systolic pressure is 32 mmHg.    IVC/SVC: Normal venous pressure at 3 mmHg.  . Monitor clinical status closely. Monitor on telemetry. Patient is off CHF pathway.  Monitor strict Is&Os and daily weights.  Place on fluid restriction of 1.5 L. Cardiology has been consulted. Continue to stress to patient importance of self efficacy and  on diet for CHF. Last BNP reviewed- and noted below   Recent Labs   Lab 05/28/24  1259   BNP 2,066*       -Net +99.5ml overnight. Lasix 80mg BID initiated today. CVP of 7 this AM.     LBBB (left bundle branch block)  Noted on EKG dating as far back as 03/2019. Reconfirmed on admission EKG.     NICM (nonischemic cardiomyopathy)  Echo from 05/24/24 with EF 5-10% with global hypokinesis. GDMT includes Toprol 25mg qd, spironolactone 25mg qd.  Unable to afford Jardiance even with coupon. Entresto held on prior admission and DC'd at clinic visit on day prior to admission given continued soft BP. Clinic plan was to introduce low dose ACE/ARB for additional GDMT.  Diuresis with Lasix 40mg qd. Patient declined establishing with Palliative Care during recent clinic visit.      - Add GDMT back once patient stabilizes    Acute renal failure with acute tubular necrosis superimposed on stage 3a chronic kidney disease  Nephrology consulted. Urinary sediment with granular, halfy muddy brown and waxy casts suggestive of ATN in the setting of cardiogenic shock.    Cr of 2.6 this AM. Baseline Cr of 1.0.     Plan:    - CMP qd, trend Cr  - strict I/O  - daily weights  - Ucr, Uurea  - renally dose all medications  - avoid nephrotoxic agents, NSAIDs, IV contrast, ACE/ARB  - If JAMIR doesn't improve consider US retroperitoneal      Hypertension, essential  Chronic, controlled. Latest blood pressure and vitals reviewed-     Temp:  [96.9 °F (36.1 °C)-98.2 °F (36.8 °C)]   Pulse:  []   Resp:  [10-31]   BP: (104-133)/(48-86)   SpO2:  [94 %-99 %]   Arterial Line BP: ()/(43-66) .   Home meds for hypertension were reviewed and noted below.   Hypertension Medications               furosemide (LASIX) 40 MG tablet Take 1 tablet (40 mg total) by mouth once daily.    metoprolol succinate (TOPROL-XL) 25 MG 24 hr tablet Take 1 tablet (25 mg total) by mouth once daily.    spironolactone (ALDACTONE) 25 MG tablet Take 1 tablet (25 mg total) by mouth once daily.            While in the hospital, will manage blood pressure as follows; Adjust home antihypertensive regimen as follows- Procardia-XL 30mg QD initiated today for afterload reduction.     Will utilize p.r.n. blood pressure medication only if patient's blood pressure greater than 180/110 and she develops symptoms such as worsening chest pain or shortness of breath.        VTE Risk Mitigation (From admission, onward)           Ordered     IP VTE HIGH RISK PATIENT  Once         05/28/24 2104     Place sequential compression device  Until discontinued         05/28/24 2104     heparin 25,000 units in dextrose 5% (100 units/ml) IV bolus from bag  LOW INTENSITY nomogram - [facility]  As needed (PRN)        Question:  Heparin Infusion Adjustment (DO NOT MODIFY ANSWER)  Answer:  [email or web address]\epic\Images\Pharmacy\HeparinInfusions\heparin LOW INTENSITY nomogram for [facility] [id number]    05/28/24 1948     heparin 25,000 units in dextrose 5% (100 units/ml) IV bolus from bag LOW INTENSITY nomogram - [facility]  As needed (PRN)        Question:  Heparin Infusion Adjustment (DO NOT MODIFY ANSWER)  Answer:  [email or web address]\epic\Images\Pharmacy\HeparinInfusions\heparin LOW INTENSITY nomogram for [facility] [id number]    05/28/24 1948     heparin 25,000 units in dextrose 5% 250 mL (100 units/mL) infusion LOW INTENSITY nomogram - [facility]  Continuous        Question:  Begin at (units/kg/hr)  Answer:  12    05/28/24 1948     Place sequential compression device  Until discontinued         05/28/24 1890                    Natalio Angel MD, PGY-II  Cardiology  [facility] - Cardiac Intensive Care

## 2024-05-30 NOTE — ASSESSMENT & PLAN NOTE
Patient with Hypercapnic and Hypoxic Respiratory failure which is Acute on chronic.  she is on home oxygen at 1.5 LPM. Supplemental oxygen was provided and noted.    Respiratory status has improved, pt on 3L NC this AM.       Signs/symptoms of respiratory failure include- tachypnea, increased work of breathing, and respiratory distress. Contributing diagnoses includes - CHF and COPD Labs and images were reviewed. Patient Has recent ABG, which has been reviewed. Will treat underlying causes and adjust management of respiratory failure as follows-     - Do not intubate, does not align with patient goals

## 2024-05-30 NOTE — PROGRESS NOTES
[facility] - Cardiac Intensive Care  Nephrology  Progress Note    Patient Name: Selma Hooper  MRN: [id number]  Admission Date: 5/28/2024  Hospital Length of Stay: 2 days  Attending Provider: Tonio Botello MD   Primary Care Physician: Phil Andrade MD  Principal Problem:Cardiogenic shock    Subjective:     HPI: Ms Hooper is a 78-year-old woman with HFrEF (most recent TTE with EF of 5-10%), COPD with chronic hypoxic respiratory failure on supplemental oxygen (2 liters via nasal cannula as outpatient), prior tobacco abuse, CKD IIIa (baseline creatinine ~1)  and hypertension who was admitted to CCU on 5/28 with cardiogenic shock after presenting to the ED with complaints of right lower extremity numbness and associated weakness which had started roughly around 3 AM earlier that morning and upon going to [facility] she became dyspneic and was noted to be cold to the touch with tachycardia.  CBC was notable for microcytic anemia with hemoglobin 10.8. Metabolic panel was notable for albumin of 3.4 and creatinine 1.1 which would subsequently rise to 1.8. Lactate was elevated at 4.5 which would rise to 5.3. VBG on presentation showed pH of 7.132, pCO2 95.5 and bicarbonate 31.9. BNP was ~2K with chest x-ray showing enlarged cardiac silhouette and abnormal increased reticular lung markings seen throughout both lung fields concerning for diffuse pulmonary edema. She would receive a cumulative dose of 240 mg IV Lasix (40 mg IV x3 plus 120 mg IV) and Diuril 500 mg IV in addition to being started on Lasix infusion at 30 mg/hr and dobutamine at 5 mcg/kg/minute. Only 250 mL of documented UOP thus far. UA showed +1 protein, +3 occult blood (69 RBCs/hpf), +2 leukocytes (13 WBCs/hpf) and many bacteria. Urine sodium was 114. UPCR, CPK and retroperitoneal US still pending at this time. Nephrology has been consulted for assistance with management of ARF in the setting of cardiogenic shock.    Interval History; patient seen this morning on  nasal cannula. Denies chest pain or shortness of breath. Continues to have right leg pain. Creatinine up trending this morning.       Objective:     Vital Signs (Most Recent):  Temp: 98.2 °F (36.8 °C) (05/30/24 0701)  Pulse: 102 (05/30/24 0901)  Resp: (!) 22 (05/30/24 0906)  BP: 131/82 (05/30/24 0801)  SpO2: 96 % (05/30/24 0901) Vital Signs (24h Range):  Temp:  [96.9 °F (36.1 °C)-98.2 °F (36.8 °C)] 98.2 °F (36.8 °C)  Pulse:  [] 102  Resp:  [11-31] 22  SpO2:  [94 %-99 %] 96 %  BP: (104-133)/(48-86) 131/82  Arterial Line BP: ()/(43-66) 128/61     Weight: 62 kg (136 lb 11 oz) (05/29/24 0901)  Body mass index is 25.83 kg/m².  Body surface area is 1.63 meters squared.    I/O last 3 completed shifts:  In: 1585 [P.O.:295; I.V.:810.8; IV Piggyback:479.2]  Out: 1910 [Urine:1835; Emesis/NG output:75]     Physical Exam  Vitals and nursing note reviewed.   Constitutional:       General: She is awake. She is not in acute distress.     Appearance: She is normal weight. She is ill-appearing. She is not diaphoretic.      Interventions: Nasal cannula in place.   HENT:      Head: Normocephalic and atraumatic.      Right Ear: External ear normal.      Left Ear: External ear normal.      Mouth/Throat:      Mouth: Mucous membranes are moist.      Pharynx: Oropharynx is clear. No oropharyngeal exudate or posterior oropharyngeal erythema.   Eyes:      General: No scleral icterus.        Right eye: No discharge.         Left eye: No discharge.      Extraocular Movements: Extraocular movements intact.      Conjunctiva/sclera: Conjunctivae normal.   Neck:      Comments: Trialysis catheter to right internal jugular vein.  Cardiovascular:      Rate and Rhythm: Normal rate and regular rhythm.      Heart sounds: Murmur heard.   Pulmonary:      Effort: Tachypnea present. No respiratory distress.      Breath sounds: No wheezing, rhonchi or rales.   Abdominal:      General: Bowel sounds are normal. There is no distension.       Palpations: Abdomen is soft.      Tenderness: There is no abdominal tenderness.   Genitourinary:     Comments: Keys catheter in place.  Musculoskeletal:      Cervical back: Neck supple.      Right lower leg: No edema.      Left lower leg: No edema.   Skin:     General: Skin is warm and dry.      Coloration: Skin is not jaundiced.   Neurological:      General: No focal deficit present.      Mental Status: She is alert. Mental status is at baseline.      Cranial Nerves: No cranial nerve deficit.      Motor: No weakness.   Psychiatric:         Mood and Affect: Mood normal.         Behavior: Behavior normal. Behavior is cooperative.          Significant Labs:  ABGs:   Recent Labs   Lab 05/30/24  0831   PH 7.366   PCO2 64.1*   HCO3 36.7*   POCSATURATED 66   BE 11*     BMP:   Recent Labs   Lab 05/30/24 0311         K 4.8   CL 92*   CO2 30*   BUN 32*   CREATININE 2.6*   CALCIUM 9.6   MG 2.6     CBC:   Recent Labs   Lab 05/30/24 0311   WBC 10.59   RBC 4.42   HGB 11.0*   HCT 34.5*      MCV 78*   MCH 24.9*   MCHC 31.9*     CMP:   Recent Labs   Lab 05/30/24 0311      CALCIUM 9.6   ALBUMIN 3.4*   PROT 6.5      K 4.8   CO2 30*   CL 92*   BUN 32*   CREATININE 2.6*   ALKPHOS 94   ALT 71*   AST 85*   BILITOT 0.5     Coagulation:   Recent Labs   Lab 05/28/24 2005 05/28/24 2157 05/30/24 0311   INR 1.2  --   --    APTT 109.1*   < > 51.5*    < > = values in this interval not displayed.     LFTs:   Recent Labs   Lab 05/30/24 0311   ALT 71*   AST 85*   ALKPHOS 94   BILITOT 0.5   PROT 6.5   ALBUMIN 3.4*     Microbiology Results (last 7 days)       Procedure Component Value Units Date/Time    Urine culture [[id number]]  (Abnormal) Collected: 05/28/24 1817    Order Status: Completed Specimen: Urine Updated: 05/29/24 2310     Urine Culture, Routine GRAM NEGATIVE HUMA  >100,000 cfu/ml  Identification and susceptibility pending      Narrative:      ADD ON URINE PROTEIN/CREATININE RATIO PER DR DE LA TORRE  MINDY/ORDER#   [id number] @ 22:28    ADD ON URINE SODIUM PER DR ELAINA CASH/ORDER# [id number] @ 21:53    Specimen Source->Urine          Specimen (24h ago, onward)      None          TSH:   Recent Labs   Lab 05/28/24  0611   TSH 0.889     Recent Labs   Lab 05/28/24  1817   COLORU [place]*   SPECGRAV 1.010   PHUR 6.0   PROTEINUA 1+*   BACTERIA [place]*   NITRITE Negative   LEUKOCYTESUR 2+*   HYALINECASTS 4*     Urinary sediment on 05/28/2024:                                              Significant Imaging:  I have reviewed all imagining in the last 24 hours.  Assessment/Plan:     Cardiac/Vascular  Acute lower limb ischemia  Per primary team      HFrEF (heart failure with reduced ejection fraction)  -    Acute on chronic combined systolic and diastolic congestive heart failure  Patient is identified as having Systolic (HFrEF) heart failure that is Acute on chronic. CHF is currently uncontrolled due to Dyspnea not returned to baseline after 3 doses of IV diuretic, Rales/crackles on pulmonary exam, and Pulmonary edema/pleural effusion on CXR. Latest ECHO performed and demonstrates- Results for orders placed during the hospital encounter of 05/14/24    Echo    Interpretation Summary    Left Ventricle: The left ventricle is severely dilated. Severely increased ventricular mass. Normal wall thickness. There is eccentric hypertrophy. Severe global hypokinesis present. There is severely reduced systolic function with a visually estimated ejection fraction of 5 - 10%. There is diastolic dysfunction but grade cannot be determined.    Right Ventricle: Normal right ventricular cavity size. Wall thickness is normal. Right ventricle wall motion  is normal. Systolic function is normal.    Left Atrium: Left atrium is moderately dilated.    Mitral Valve: There is mild regurgitation.    Pulmonary Artery: The estimated pulmonary artery systolic pressure is 32 mmHg.    IVC/SVC: Normal venous pressure at 3 mmHg.    Recent Labs   Lab  05/28/24  1259   BNP 2,066*       - management per primary team  - on dobutamine at 5 mcg/kg/min  - diuresis as detailed above    Renal/  Acute renal failure with acute tubular necrosis superimposed on stage 3a chronic kidney disease  - urinary sediment with granular, half muddy brown and waxy casts suggestive of acute tubular injury in the setting of cardiogenic shock  - concern for critical limb ischemia continue to follow CPK daily.  - serial RFPs and magnesium levels in light of active diuresis   - please avoid hypotension/major fluctuations in BP (keep MAP > 65 mmHg)  - renal diet/tube feeds when not NPO with volume restriction per primary team  - strict I/O's and daily weights  - renally all dose medications to eGFR   - avoid nephrotoxic agents wean feasible (i.e. NSAIDs, intra-arterial contrast, supra-therapeutic vancomycin levels, etc.)  - no acute indications for RRT at this time however will continue to monitor closely and consent obtained should acute indications for RRT arise        Thank you for your consult. I will follow-up with patient. Please contact us if you have any additional questions.    Osiel Mcmullen MD  Nephrology  [facility] - Cardiac Intensive Care

## 2024-05-30 NOTE — SUBJECTIVE & OBJECTIVE
Interval History:     Pt was seen and examined at bedside. JAVON. Procardia-XL 30mg added for further afterload reduction. Pt remains on Dobutamine 2.5. Lasix 80mg BID to aid with diuresis.     Review of Systems   Constitutional: Negative for chills, diaphoresis, fever, malaise/fatigue and night sweats.   HENT:  Negative for congestion, hearing loss, hoarse voice, sore throat and stridor.    Eyes:  Negative for blurred vision.   Cardiovascular:  Positive for dyspnea on exertion and orthopnea. Negative for chest pain, claudication, cyanosis, leg swelling, near-syncope, palpitations, paroxysmal nocturnal dyspnea and syncope.   Respiratory:  Positive for shortness of breath. Negative for cough, sleep disturbances due to breathing, snoring, sputum production and wheezing.    Skin:  Negative for dry skin and rash.   Musculoskeletal:  Negative for arthritis, back pain, joint pain, muscle cramps, muscle weakness and myalgias.   Gastrointestinal:  Negative for bloating, abdominal pain, change in bowel habit, constipation, diarrhea, dysphagia, nausea and vomiting.   Genitourinary:  Negative for dysuria and urgency.   Neurological:  Positive for focal weakness and numbness. Negative for difficulty with concentration, excessive daytime sleepiness, dizziness, headaches, light-headedness, tremors and weakness.   Psychiatric/Behavioral:  Negative for altered mental status and depression. The patient does not have insomnia.      Objective:     Vital Signs (Most Recent):  Temp: 98 °F (36.7 °C) (05/30/24 1101)  Pulse: 94 (05/30/24 1301)  Resp: 20 (05/30/24 1301)  BP: 120/70 (05/30/24 1201)  SpO2: 96 % (05/30/24 1301) Vital Signs (24h Range):  Temp:  [96.9 °F (36.1 °C)-98.2 °F (36.8 °C)] 98 °F (36.7 °C)  Pulse:  [] 94  Resp:  [10-31] 20  SpO2:  [94 %-99 %] 96 %  BP: (104-133)/(48-86) 120/70  Arterial Line BP: ()/(43-66) 110/55     Weight: 62 kg (136 lb 11 oz)  Body mass index is 25.83 kg/m².     SpO2: 96 %          Intake/Output Summary (Last 24 hours) at 5/30/2024 1356  Last data filed at 5/30/2024 1201  Gross per 24 hour   Intake 616.03 ml   Output 895 ml   Net -278.97 ml       Lines/Drains/Airways       Central Venous Catheter Line  Duration             Trialysis (Dialysis) Catheter 05/28/24 1613 right internal jugular 1 day              Drain  Duration                  Urethral Catheter 05/28/24 1200 2 days              Arterial Line  Duration             Arterial Line 05/28/24 1818 Left Radial 1 day              Peripheral Intravenous Line  Duration                  Peripheral IV - Single Lumen 05/28/24 1259 20 G Left Hand 2 days                       Physical Exam  Vitals and nursing note reviewed.   Constitutional:       Appearance: Normal appearance. She is ill-appearing.      Comments: L radial arterial line   Eyes:      Extraocular Movements: Extraocular movements intact.      Conjunctiva/sclera: Conjunctivae normal.   Neck:      Comments: RIJ CVC in place dressing C/D/I  Cardiovascular:      Rate and Rhythm: Regular rhythm. Tachycardia present.      Pulses: Decreased pulses (RLE).      Comments: JVD indeterminate 2/2 CVC  Pulmonary:      Effort: Pulmonary effort is normal.      Breath sounds: Normal breath sounds. No rales.   Abdominal:      General: Bowel sounds are normal. There is no distension.      Palpations: Abdomen is soft.   Musculoskeletal:      Cervical back: Normal range of motion.      Right lower leg: No edema.      Left lower leg: No edema.   Skin:     General: Skin is warm and dry.   Neurological:      General: No focal deficit present.      Mental Status: She is alert and oriented to person, place, and time.      Sensory: Sensory deficit (RLE) present.      Motor: Weakness (RLE) present.            Significant Labs/Significant Imaging:     Recent Labs   Lab 05/28/24  0611 05/28/24  1304 05/28/24  1805 05/28/24 2005 05/29/24  0254 05/30/24  0311   WBC 4.47  --  17.49* 14.97* 11.55 10.59   HGB  10.8*  --  12.5 11.8* 11.5* 11.0*   HCT 34.9* 39 40.8 38.5 37.9 34.5*   MCV 79*  --  80* 80* 81* 78*   RBC 4.41  --  5.09 4.83 4.71 4.42   MCH 24.5*  --  24.6* 24.4* 24.4* 24.9*   MCHC 30.9*  --  30.6* 30.6* 30.3* 31.9*   RDW 14.6*  --  14.2 14.6* 14.6* 14.9*     --  250 196 210 163   MPV 10.1  --  10.6 10.6 10.6 10.4   GRAN 67.0  3.0  --  88.5*  15.5* 90.5*  13.6* 91.2*  10.5* 81.6*  8.6*   LYMPH 20.6  0.9*  --  3.3*  0.6* 3.5*  0.5* 6.0*  0.7* 8.0*  0.9*   MONO 9.8  0.4  --  6.9  1.2* 5.1  0.8 2.3*  0.3 8.0  0.9   EOSINOPHIL 1.3  --  0.1 0.0 0.0 1.3   BASOPHIL 0.9  --  0.2 0.2 0.1 0.7       Recent Labs   Lab 05/27/24  1302 05/28/24  0611 05/28/24  1402 05/28/24  1625 05/28/24  2157 05/29/24  0254 05/29/24  0919 05/30/24  0311    142   < > 144 143 142 139 137   K 4.2 3.6   < > 3.6 3.3* 4.2 4.6 4.8    101   < > 102 102 100 96 92*   CO2 31* 29   < > 28 27 27 30* 30*   BUN 17 17   < > 20 23 25* 30* 32*   CREATININE 1.1 1.0   < > 1.8* 1.9* 2.0* 2.3* 2.6*   GLU 85 101   < > 261* 189* 199* 194* 109   CALCIUM 9.9 9.3   < > 9.5 9.6 9.7 9.7 9.6   PROT 6.9 6.3  --  6.9  --  6.7 6.6 6.5   ALBUMIN 3.7 3.4*  --  3.5  --  3.4* 3.4* 3.4*   ALKPHOS 94 93  --  122  --  108 99 94   BILITOT 0.4 0.3  --  0.3  --  0.3 0.4 0.5   ALT 20 18  --  119*  --  102* 89* 71*   AST 12 13  --  256*  --  191* 142* 85*   ANIONGAP 11 12   < > 14 14 15 13 15   MG 2.0  --   --  2.2  --  1.8  --  2.6   PHOS 3.1  --   --  3.2  --  3.5  --  4.8*    < > = values in this interval not displayed.       Recent Labs   Lab 05/28/24  1817   COLORU [place]*   APPEARANCEUA Hazy*   PHUR 6.0   SPECGRAV 1.010   PROTEINUA 1+*   GLUCUA Negative   KETONESU Negative   BILIRUBINUA Negative   OCCULTUA 3+*   NITRITE Negative   LEUKOCYTESUR 2+*   RBCUA 69*   WBCUA 13*   BACTERIA Many*   SQUAMEPITHEL 0   HYALINECASTS 4*   MICROCMT SEE COMMENT       Recent Labs   Lab 05/29/24  1255 05/29/24  2019 05/30/24  0831   PH 7.339* 7.385 7.366   PCO2  64.9* 60.2* 64.1*   PO2 32* 32* 37*   HCO3 34.9* 36.0* 36.7*   POCSATURATED 55 58 66   BE 9* 11* 11*       Recent Labs   Lab 05/28/24 2157 05/29/24  0950 05/29/24  1753 05/30/24  0830   * 1059* 1619* 2673*       Recent Labs   Lab 05/28/24  0611 05/28/24 2005 05/28/24 2005 05/28/24 2157 05/29/24  0254 05/29/24  0919 05/29/24  1541 05/30/24  0311   INR 1.0 1.2  --   --   --   --   --   --    APTT  --  109.1*   < > 72.0* 60.4* 55.4* 56.6* 51.5*    < > = values in this interval not displayed.       Lab Results   Component Value Date    HGBA1C 5.3 05/14/2024       Recent Labs   Lab 05/28/24  0611   TSH 0.889       Microbiology Results (last 7 days)       Procedure Component Value Units Date/Time    Urine culture [[id number]]  (Abnormal) Collected: 05/28/24 1817    Order Status: Completed Specimen: Urine Updated: 05/29/24 2310     Urine Culture, Routine GRAM NEGATIVE HUMA  >100,000 cfu/ml  Identification and susceptibility pending      Narrative:      ADD ON URINE PROTEIN/CREATININE RATIO PER DR BRITT GUILLEN/ORDER#   [id number] @ 22:28    ADD ON URINE SODIUM PER DR ELAINA CASH/ORDER# [id number] @ 21:53    Specimen Source->Urine            US Retroperitoneal Complete    Result Date: 5/29/2024  EXAMINATION: US RETROPERITONEAL COMPLETE CLINICAL HISTORY: ARF; TECHNIQUE: Ultrasound of the kidneys and urinary bladder was performed including color flow and Doppler evaluation of the kidneys. COMPARISON: Complete retroperitoneal ultrasound 12/11/2021. FINDINGS: Right kidney: The right kidney measures 9.0 cm.  No cortical thinning. No loss of corticomedullary distinction.  Resistive index measures 1.0.  1.2 x 1.0 x 0.8 cm anechoic cyst in the midpole.  No renal stone. No hydronephrosis. Left kidney: The left kidney measures 8.8 cm. No cortical thinning. No loss of corticomedullary distinction.  Resistive index measures 1.0.  1.3 x 1.2 x 0.9 cm anechoic cyst in the upper pole.  0.3 cm nonobstructing stone in the lower  pole.  No hydronephrosis. Bladder is decompressed around Keys catheter.     Medical renal disease.  No hydronephrosis. Bilateral renal cysts. Nonobstructing left renal stone. Electronically signed by resident: Orly Sanders Date:    05/29/2024 Time:    13:25 Electronically signed by: Kj Damon MD Date:    05/29/2024 Time:    14:22    Echo    Result Date: 5/29/2024    Left Ventricle: The left ventricle is severely dilated. Severely increased ventricular mass. Normal wall thickness. There is severe eccentric hypertrophy. Severe global hypokinesis present. There is severely reduced systolic function with a visually estimated ejection fraction of 5 - 10%. Ejection fraction by visual approximation is 10% or less. Grade I diastolic dysfunction. Normal left ventricular filling pressure.   Right Ventricle: Normal right ventricular cavity size. Wall thickness is normal. Systolic function is normal.   Left Atrium: Left atrium is moderately dilated.   Aortic Valve: The aortic valve is a trileaflet valve. There is mild annular calcification present. There is no stenosis. There is no significant regurgitation.   Mitral Valve: There is mild bileaflet sclerosis. There is mild to mild- moderate regurgitation.   Tricuspid Valve: There is trace regurgitation.   Aorta: Aortic root is normal in size measuring 2.78 cm. Ascending aorta is normal measuring 2.85 cm.   Pulmonary Artery: The estimated pulmonary artery systolic pressure is 36 mmHg.   IVC/SVC: Normal venous pressure at 3 mmHg.     US Lower Extremity Arteries Bilateral    Result Date: 5/29/2024  EXAMINATION: US LOWER EXTREMITY ARTERIES BILATERAL CLINICAL HISTORY: Right lower extremity concern for ischemia; TECHNIQUE: Bilateral lower extremity arterial duplex ultrasound examination performed. Multiple gray scale and color doppler images were obtained in addition to waveform analysis. COMPARISON: None FINDINGS: The peak systolic velocities on the right are as follows, in  centimeters/second: Common femoral artery: 36.1 Deep femoral artery: 59 Superficial femoral artery, proximal: 14.4 Superficial femoral artery, mid portion: Occluded Superficial femoral artery, distal: Occluded Popliteal artery: Occluded Posterior tibial artery: Occluded Anterior tibial artery: Occluded The peak systolic velocities on the left are as follows, in centimeters/second: Common femoral artery: 169 Deep femoral artery: 209 Superficial femoral artery, proximal: 151 Superficial femoral artery, mid portion: 159 Superficial femoral artery, distal: 410 Popliteal artery: 111 Posterior tibial artery: 36 Anterior tibial artery: 43 There are monophasic waveforms in patent vessels of the right lower extremity.  There are triphasic waveforms in the left common femoral artery and deep femoral artery.  The remaining waveforms in the left lower extremity are monophasic.     There is occlusion of the right mid and distal superficial femoral artery, popliteal artery, and anterior and posterior tibial arteries. There is severe stenosis of the distal left superficial femoral artery. This report was flagged in [employer] as abnormal. This result was discovered at 03:04.  Findings were discussed via telephone by Edda Donnelly MD with Jose Jeff MD on 5/29/2024 at 03:34.  Patient name and medical record number were verified, read back was performed. Electronically signed by resident: Edda Donnelly Date:    05/29/2024 Time:    02:52 Electronically signed by: Xiang Rosa Date:    05/29/2024 Time:    04:12    X-Ray Chest AP Portable    Result Date: 5/28/2024  EXAMINATION: XR CHEST AP PORTABLE CLINICAL HISTORY: central line placement; TECHNIQUE: Single frontal view of the chest was performed. COMPARISON: 05/28/2024 FINDINGS: Interval placement of a right IJ catheter which terminates in the distal SVC.  The heart remains enlarged.  Calcified atheromatous disease affects the tortuous aorta.  Diffuse  reticular opacities throughout the lungs, likely related to pulmonary edema however underlying inflammatory/infectious process cannot be excluded.  Skeletal structures are intact.     As above. Electronically signed by: Lakeshia Arambula MD Date:    05/28/2024 Time:    22:27    X-Ray Chest AP Portable    Result Date: 5/28/2024  EXAMINATION: XR CHEST AP PORTABLE CLINICAL HISTORY: shortness of breath; TECHNIQUE: Single frontal view of the chest was performed. COMPARISON: 05/14/2024 FINDINGS: The cardiac silhouette is enlarged. There is abnormal increased reticular lung markings seen throughout both lung fields, concerning for edema or an underlying inflammatory or infectious process.  No large pleural effusion.  No pneumothorax. The osseous structures appear normal.     Abnormal diffuse increased reticular lung markings, consider edema or underlying inflammation/infectious process Electronically signed by: Blessing Sanders MD Date:    05/28/2024 Time:    16:53    MRI Brain Without Contrast    Result Date: 5/28/2024  EXAMINATION: MRI BRAIN WITHOUT CONTRAST CLINICAL HISTORY: Stroke, follow up;Acute focal neurological deficit; TECHNIQUE: Multiplanar multisequence MR imaging of the brain was performed without contrast. COMPARISON: None. FINDINGS: Parenchyma: There is no restricted diffusion to suggest acute or subacute ischemic infarct.Generalized pattern age-related parenchymal volume loss noted.  Nonspecific areas of T2/FLAIR hyperintense signal in the frontoparietal white matter and lis may reflect sequela of chronic small vessel ischemic disease. Additionally, there is a nonspecific somewhat lobulated appearing T1 and T2/FLAIR hyperintense lesion in the mesial right temporal/hippocampal region (axial FLAIR series 8, image 11; coronal T2 series 12, image 15), measuring on the order of 7 x 17 x 8 mm. Additional comments: There is no midline shift, abnormal extra-axial fluid collection, or acute intracranial  hemorrhage. The basal cisterns are patent. Ventricles: Normal. Flow voids: The normal major intracranial arterial flow voids are visualized. Sinuses and mastoid air cells: Essentially clear Orbits: Normal Midline structures: The pituitary and craniocervical junction are normal. Marrow: Normal     1. No evidence of acute ischemia or recent infarction, as questioned. 2. Indeterminate T1 and T2-FLAIR hyperintense lesion in the mesial right temporal lobe/inferior basal ganglia region.  No definite restricted diffusion or significant susceptibility-related signal loss at this location.  Evolving subacute infarct would be considered.  Further characterization with contrast-enhanced sequences would be recommended as neoplastic etiology is not excluded.  Inflammatory/demyelinating or infectious processes would additionally be considered. Electronically signed by: Joel Acosta Date:    05/28/2024 Time:    14:10    Echo    Result Date: 5/14/2024    Left Ventricle: The left ventricle is severely dilated. Severely increased ventricular mass. Normal wall thickness. There is eccentric hypertrophy. Severe global hypokinesis present. There is severely reduced systolic function with a visually estimated ejection fraction of 5 - 10%. There is diastolic dysfunction but grade cannot be determined.   Right Ventricle: Normal right ventricular cavity size. Wall thickness is normal. Right ventricle wall motion  is normal. Systolic function is normal.   Left Atrium: Left atrium is moderately dilated.   Mitral Valve: There is mild regurgitation.   Pulmonary Artery: The estimated pulmonary artery systolic pressure is 32 mmHg.   IVC/SVC: Normal venous pressure at 3 mmHg.     X-Ray Chest AP Portable    Result Date: 5/14/2024  EXAMINATION: XR CHEST AP PORTABLE CLINICAL HISTORY: CHF; TECHNIQUE: Single frontal view of the chest was performed. COMPARISON: 12/28/2023 FINDINGS: Cardiac monitoring leads overlie the chest.  There is unchanged  prominence of the cardiomediastinal silhouette.  There is atherosclerosis of the thoracic aorta.  The lungs are symmetrically expanded with diffuse coarse/increased interstitial attenuation with bibasilar predominance.  Findings could reflect underlying interstitial edema or infectious process superimposed upon a background of chronic interstitial change.  No confluent airspace consolidation, substantial volume of pleural fluid or pneumothorax identified.  Visualized osseous structures appear intact with degenerative change.     Please see above. Electronically signed by: Sara Parsons MD Date:    05/14/2024 Time:    02:47

## 2024-05-31 NOTE — ASSESSMENT & PLAN NOTE
Chronic, controlled. Latest blood pressure and vitals reviewed-     Temp:  [97.9 °F (36.6 °C)-98.2 °F (36.8 °C)]   Pulse:  []   Resp:  [9-32]   BP: (100-138)/(46-77)   SpO2:  [90 %-98 %]   Arterial Line BP: (101-144)/(44-64) .   Home meds for hypertension were reviewed and noted below.   Hypertension Medications               furosemide (LASIX) 40 MG tablet Take 1 tablet (40 mg total) by mouth once daily.    metoprolol succinate (TOPROL-XL) 25 MG 24 hr tablet Take 1 tablet (25 mg total) by mouth once daily.    spironolactone (ALDACTONE) 25 MG tablet Take 1 tablet (25 mg total) by mouth once daily.            While in the hospital, will manage blood pressure as follows; Adjust home antihypertensive regimen as follows- Procardia-XL 30mg QD for afterload reduction.     Will utilize p.r.n. blood pressure medication only if patient's blood pressure greater than 180/110 and she develops symptoms such as worsening chest pain or shortness of breath.

## 2024-05-31 NOTE — PROGRESS NOTES
[facility] - [facility]  Cardiology  Progress Note    Patient Name: Selma Hooper  MRN: [id number]  Admission Date: 5/28/2024  Hospital Length of Stay: 3 days  Code Status: DNR   Attending Physician: Tonio Botello MD   Primary Care Physician: Phil Andrade MD  Expected Discharge Date: 6/5/2024  Principal Problem:Cardiogenic shock    Subjective:     Interval History:     Pt was seen and examined at bedside. NAEON. VSS. Pt remains on Dobutamine 2.5 and Lasix gtt @ 20mg/hr. Net - 1L overnight. Vascular Surgery consulted today with recommendations for above the knee amputation for RLE.     Review of Systems   Constitutional: Negative for chills, diaphoresis, fever, malaise/fatigue and night sweats.   HENT:  Negative for congestion, hearing loss, hoarse voice, sore throat and stridor.    Eyes:  Negative for blurred vision.   Cardiovascular:  Positive for dyspnea on exertion and orthopnea. Negative for chest pain, claudication, cyanosis, leg swelling, near-syncope, palpitations, paroxysmal nocturnal dyspnea and syncope.   Respiratory:  Positive for shortness of breath. Negative for cough, sleep disturbances due to breathing, snoring, sputum production and wheezing.    Skin:  Negative for dry skin and rash.   Musculoskeletal:  Negative for arthritis, back pain, joint pain, muscle cramps, muscle weakness and myalgias.   Gastrointestinal:  Negative for bloating, abdominal pain, change in bowel habit, constipation, diarrhea, dysphagia, nausea and vomiting.   Genitourinary:  Negative for dysuria and urgency.   Neurological:  Positive for focal weakness and numbness. Negative for difficulty with concentration, excessive daytime sleepiness, dizziness, headaches, light-headedness, tremors and weakness.   Psychiatric/Behavioral:  Negative for altered mental status and depression. The patient does not have insomnia.      Objective:     Vital Signs (Most Recent):  Temp: 97.9 °F (36.6 °C) (05/31/24 1101)  Pulse: 90  (05/31/24 1429)  Resp: (!) 34 (05/31/24 1429)  BP: 112/62 (05/31/24 1200)  SpO2: 96 % (05/31/24 1429) Vital Signs (24h Range):  Temp:  [97.9 °F (36.6 °C)-98.2 °F (36.8 °C)] 97.9 °F (36.6 °C)  Pulse:  [] 90  Resp:  [9-34] 34  SpO2:  [90 %-98 %] 96 %  BP: (100-138)/(46-77) 112/62  Arterial Line BP: (101-144)/(44-64) 134/54     Weight: 62 kg (136 lb 11 oz)  Body mass index is 25.83 kg/m².     SpO2: 96 %         Intake/Output Summary (Last 24 hours) at 5/31/2024 1436  Last data filed at 5/31/2024 1200  Gross per 24 hour   Intake 811.35 ml   Output 1700 ml   Net -888.65 ml       Lines/Drains/Airways       Central Venous Catheter Line  Duration             Trialysis (Dialysis) Catheter 05/28/24 1613 right internal jugular 2 days              Drain  Duration                  Urethral Catheter 05/28/24 1200 3 days              Arterial Line  Duration             Arterial Line 05/28/24 1818 Left Radial 2 days              Peripheral Intravenous Line  Duration                  Peripheral IV - Single Lumen 05/28/24 1259 20 G Left Hand 3 days                       Physical Exam  Vitals and nursing note reviewed.   Constitutional:       Appearance: Normal appearance. She is ill-appearing.      Comments: L radial arterial line   Eyes:      Extraocular Movements: Extraocular movements intact.      Conjunctiva/sclera: Conjunctivae normal.   Neck:      Comments: RIJ CVC in place dressing C/D/I  Cardiovascular:      Rate and Rhythm: Regular rhythm. Tachycardia present.      Pulses: Decreased pulses (RLE).      Comments: JVD indeterminate 2/2 CVC  Pulmonary:      Effort: Pulmonary effort is normal.      Breath sounds: Normal breath sounds. No rales.   Abdominal:      General: Bowel sounds are normal. There is no distension.      Palpations: Abdomen is soft.   Musculoskeletal:      Cervical back: Normal range of motion.      Right lower leg: No edema.      Left lower leg: No edema.   Skin:     General: Skin is warm and dry.    Neurological:      General: No focal deficit present.      Mental Status: She is alert and oriented to person, place, and time.      Sensory: Sensory deficit (RLE) present.      Motor: Weakness (RLE) present.            Significant Labs/Significant Imaging:     Recent Labs   Lab 05/28/24  1805 05/28/24 2005 05/29/24 0254 05/30/24  0311 05/31/24  0323   WBC 17.49* 14.97* 11.55 10.59 7.58   HGB 12.5 11.8* 11.5* 11.0* 10.4*   HCT 40.8 38.5 37.9 34.5* 34.1*   MCV 80* 80* 81* 78* 79*   RBC 5.09 4.83 4.71 4.42 4.34   MCH 24.6* 24.4* 24.4* 24.9* 24.0*   MCHC 30.6* 30.6* 30.3* 31.9* 30.5*   RDW 14.2 14.6* 14.6* 14.9* 14.6*    196 210 163 180   MPV 10.6 10.6 10.6 10.4 10.6   GRAN 88.5*  15.5* 90.5*  13.6* 91.2*  10.5* 81.6*  8.6* 81.2*  6.2   LYMPH 3.3*  0.6* 3.5*  0.5* 6.0*  0.7* 8.0*  0.9* 8.2*  0.6*   MONO 6.9  1.2* 5.1  0.8 2.3*  0.3 8.0  0.9 9.0  0.7   EOSINOPHIL 0.1 0.0 0.0 1.3 0.9   BASOPHIL 0.2 0.2 0.1 0.7 0.3       Recent Labs   Lab 05/27/24  1302 05/28/24  0611 05/28/24  1625 05/28/24 2157 05/29/24 0254 05/29/24  0919 05/30/24  0311 05/30/24  1424 05/31/24  0323      < > 144   < > 142 139 137 135* 136   K 4.2   < > 3.6   < > 4.2 4.6 4.8 4.8 4.4      < > 102   < > 100 96 92* 93* 89*   CO2 31*   < > 28   < > 27 30* 30* 30* 36*   BUN 17   < > 20   < > 25* 30* 32* 41* 42*   CREATININE 1.1   < > 1.8*   < > 2.0* 2.3* 2.6* 2.4* 2.4*   GLU 85   < > 261*   < > 199* 194* 109 104 109   CALCIUM 9.9   < > 9.5   < > 9.7 9.7 9.6 9.5 9.6   PROT 6.9   < > 6.9  --  6.7 6.6 6.5  --  6.5   ALBUMIN 3.7   < > 3.5  --  3.4* 3.4* 3.4*  --  3.4*   ALKPHOS 94   < > 122  --  108 99 94  --  90   BILITOT 0.4   < > 0.3  --  0.3 0.4 0.5  --  0.5   ALT 20   < > 119*  --  102* 89* 71*  --  59*   AST 12   < > 256*  --  191* 142* 85*  --  61*   ANIONGAP 11   < > 14   < > 15 13 15 12 11   MG 2.0  --  2.2  --  1.8  --  2.6  --  2.4   PHOS 3.1  --  3.2  --  3.5  --  4.8*  --  4.8*    < > = values in this interval  not displayed.       Recent Labs   Lab 05/28/24 1817   COLORU Crenshaw*   APPEARANCEUA Hazy*   PHUR 6.0   SPECGRAV 1.010   PROTEINUA 1+*   GLUCUA Negative   KETONESU Negative   BILIRUBINUA Negative   OCCULTUA 3+*   NITRITE Negative   LEUKOCYTESUR 2+*   RBCUA 69*   WBCUA 13*   BACTERIA Many*   SQUAMEPITHEL 0   HYALINECASTS 4*   MICROCMT SEE COMMENT       Recent Labs   Lab 05/30/24  0831 05/30/24 2027 05/31/24  0833 05/31/24  1428   PH 7.366 7.377  --  7.401   PCO2 64.1* 66.3*  --  65.7*   PO2 37* 32* 33* 29*   HCO3 36.7* 39.0*  --  40.8*   POCSATURATED 66 57 59 53   BE 11* 14*  --  16*       Recent Labs   Lab 05/29/24  0950 05/29/24  1753 05/30/24  0830 05/30/24  1757 05/31/24  0832   CPK 1059* 1619* 2673* 3658* 4628*       Recent Labs   Lab 05/28/24  0611 05/28/24 2005 05/28/24  2157 05/29/24  0919 05/29/24  1541 05/30/24  0311 05/31/24  0323 05/31/24  0832   INR 1.0 1.2  --   --   --   --   --   --    APTT  --  109.1*   < > 55.4* 56.6* 51.5* 36.7* 55.8*    < > = values in this interval not displayed.       Lab Results   Component Value Date    HGBA1C 5.3 05/14/2024       Recent Labs   Lab 05/28/24  0611   TSH 0.889       Microbiology Results (last 7 days)       Procedure Component Value Units Date/Time    Urine culture [[id number]]  (Abnormal)  (Susceptibility) Collected: 05/28/24 1817    Order Status: Completed Specimen: Urine Updated: 05/31/24 0442     Urine Culture, Routine PROTEUS MIRABILIS  >100,000 cfu/ml      Narrative:      ADD ON URINE PROTEIN/CREATININE RATIO PER DR BRITT GUILLEN/ORDER#   [id number] @ 22:28    ADD ON URINE SODIUM PER DR ELAINA CASH/ORDER# [id number] @ 21:53    Specimen Source->Urine            US Retroperitoneal Complete    Result Date: 5/29/2024  EXAMINATION: US RETROPERITONEAL COMPLETE CLINICAL HISTORY: ARF; TECHNIQUE: Ultrasound of the kidneys and urinary bladder was performed including color flow and Doppler evaluation of the kidneys. COMPARISON: Complete retroperitoneal  ultrasound 12/11/2021. FINDINGS: Right kidney: The right kidney measures 9.0 cm.  No cortical thinning. No loss of corticomedullary distinction.  Resistive index measures 1.0.  1.2 x 1.0 x 0.8 cm anechoic cyst in the midpole.  No renal stone. No hydronephrosis. Left kidney: The left kidney measures 8.8 cm. No cortical thinning. No loss of corticomedullary distinction.  Resistive index measures 1.0.  1.3 x 1.2 x 0.9 cm anechoic cyst in the upper pole.  0.3 cm nonobstructing stone in the lower pole.  No hydronephrosis. Bladder is decompressed around Keys catheter.     Medical renal disease.  No hydronephrosis. Bilateral renal cysts. Nonobstructing left renal stone. Electronically signed by resident: Orly Sanders Date:    05/29/2024 Time:    13:25 Electronically signed by: Kj Damon MD Date:    05/29/2024 Time:    14:22    Echo    Result Date: 5/29/2024    Left Ventricle: The left ventricle is severely dilated. Severely increased ventricular mass. Normal wall thickness. There is severe eccentric hypertrophy. Severe global hypokinesis present. There is severely reduced systolic function with a visually estimated ejection fraction of 5 - 10%. Ejection fraction by visual approximation is 10% or less. Grade I diastolic dysfunction. Normal left ventricular filling pressure.   Right Ventricle: Normal right ventricular cavity size. Wall thickness is normal. Systolic function is normal.   Left Atrium: Left atrium is moderately dilated.   Aortic Valve: The aortic valve is a trileaflet valve. There is mild annular calcification present. There is no stenosis. There is no significant regurgitation.   Mitral Valve: There is mild bileaflet sclerosis. There is mild to mild- moderate regurgitation.   Tricuspid Valve: There is trace regurgitation.   Aorta: Aortic root is normal in size measuring 2.78 cm. Ascending aorta is normal measuring 2.85 cm.   Pulmonary Artery: The estimated pulmonary artery systolic pressure is 36  mmHg.   IVC/SVC: Normal venous pressure at 3 mmHg.     US Lower Extremity Arteries Bilateral    Result Date: 5/29/2024  EXAMINATION: US LOWER EXTREMITY ARTERIES BILATERAL CLINICAL HISTORY: Right lower extremity concern for ischemia; TECHNIQUE: Bilateral lower extremity arterial duplex ultrasound examination performed. Multiple gray scale and color doppler images were obtained in addition to waveform analysis. COMPARISON: None FINDINGS: The peak systolic velocities on the right are as follows, in centimeters/second: Common femoral artery: 36.1 Deep femoral artery: 59 Superficial femoral artery, proximal: 14.4 Superficial femoral artery, mid portion: Occluded Superficial femoral artery, distal: Occluded Popliteal artery: Occluded Posterior tibial artery: Occluded Anterior tibial artery: Occluded The peak systolic velocities on the left are as follows, in centimeters/second: Common femoral artery: 169 Deep femoral artery: 209 Superficial femoral artery, proximal: 151 Superficial femoral artery, mid portion: 159 Superficial femoral artery, distal: 410 Popliteal artery: 111 Posterior tibial artery: 36 Anterior tibial artery: 43 There are monophasic waveforms in patent vessels of the right lower extremity.  There are triphasic waveforms in the left common femoral artery and deep femoral artery.  The remaining waveforms in the left lower extremity are monophasic.     There is occlusion of the right mid and distal superficial femoral artery, popliteal artery, and anterior and posterior tibial arteries. There is severe stenosis of the distal left superficial femoral artery. This report was flagged in Epic as abnormal. This result was discovered at 03:04.  Findings were discussed via telephone by Edda Donnelly MD with Jose Jeff MD on 5/29/2024 at 03:34.  Patient name and medical record number were verified, read back was performed. Electronically signed by resident: Edda Donnelly  Date:    05/29/2024 Time:    02:52 Electronically signed by: Xiang Rosa Date:    05/29/2024 Time:    04:12    X-Ray Chest AP Portable    Result Date: 5/28/2024  EXAMINATION: XR CHEST AP PORTABLE CLINICAL HISTORY: central line placement; TECHNIQUE: Single frontal view of the chest was performed. COMPARISON: 05/28/2024 FINDINGS: Interval placement of a right IJ catheter which terminates in the distal SVC.  The heart remains enlarged.  Calcified atheromatous disease affects the tortuous aorta.  Diffuse reticular opacities throughout the lungs, likely related to pulmonary edema however underlying inflammatory/infectious process cannot be excluded.  Skeletal structures are intact.     As above. Electronically signed by: Lakeshia Arambula MD Date:    05/28/2024 Time:    22:27    X-Ray Chest AP Portable    Result Date: 5/28/2024  EXAMINATION: XR CHEST AP PORTABLE CLINICAL HISTORY: shortness of breath; TECHNIQUE: Single frontal view of the chest was performed. COMPARISON: 05/14/2024 FINDINGS: The cardiac silhouette is enlarged. There is abnormal increased reticular lung markings seen throughout both lung fields, concerning for edema or an underlying inflammatory or infectious process.  No large pleural effusion.  No pneumothorax. The osseous structures appear normal.     Abnormal diffuse increased reticular lung markings, consider edema or underlying inflammation/infectious process Electronically signed by: Blessing Sanders MD Date:    05/28/2024 Time:    16:53    MRI Brain Without Contrast    Result Date: 5/28/2024  EXAMINATION: MRI BRAIN WITHOUT CONTRAST CLINICAL HISTORY: Stroke, follow up;Acute focal neurological deficit; TECHNIQUE: Multiplanar multisequence MR imaging of the brain was performed without contrast. COMPARISON: None. FINDINGS: Parenchyma: There is no restricted diffusion to suggest acute or subacute ischemic infarct.Generalized pattern age-related parenchymal volume loss noted.  Nonspecific areas of  T2/FLAIR hyperintense signal in the frontoparietal white matter and lis may reflect sequela of chronic small vessel ischemic disease. Additionally, there is a nonspecific somewhat lobulated appearing T1 and T2/FLAIR hyperintense lesion in the mesial right temporal/hippocampal region (axial FLAIR series 8, image 11; coronal T2 series 12, image 15), measuring on the order of 7 x 17 x 8 mm. Additional comments: There is no midline shift, abnormal extra-axial fluid collection, or acute intracranial hemorrhage. The basal cisterns are patent. Ventricles: Normal. Flow voids: The normal major intracranial arterial flow voids are visualized. Sinuses and mastoid air cells: Essentially clear Orbits: Normal Midline structures: The pituitary and craniocervical junction are normal. Marrow: Normal     1. No evidence of acute ischemia or recent infarction, as questioned. 2. Indeterminate T1 and T2-FLAIR hyperintense lesion in the mesial right temporal lobe/inferior basal ganglia region.  No definite restricted diffusion or significant susceptibility-related signal loss at this location.  Evolving subacute infarct would be considered.  Further characterization with contrast-enhanced sequences would be recommended as neoplastic etiology is not excluded.  Inflammatory/demyelinating or infectious processes would additionally be considered. Electronically signed by: Joel Acosta Date:    05/28/2024 Time:    14:10    Echo    Result Date: 5/14/2024    Left Ventricle: The left ventricle is severely dilated. Severely increased ventricular mass. Normal wall thickness. There is eccentric hypertrophy. Severe global hypokinesis present. There is severely reduced systolic function with a visually estimated ejection fraction of 5 - 10%. There is diastolic dysfunction but grade cannot be determined.   Right Ventricle: Normal right ventricular cavity size. Wall thickness is normal. Right ventricle wall motion  is normal. Systolic function is  normal.   Left Atrium: Left atrium is moderately dilated.   Mitral Valve: There is mild regurgitation.   Pulmonary Artery: The estimated pulmonary artery systolic pressure is 32 mmHg.   IVC/SVC: Normal venous pressure at 3 mmHg.     X-Ray Chest AP Portable    Result Date: 5/14/2024  EXAMINATION: XR CHEST AP PORTABLE CLINICAL HISTORY: CHF; TECHNIQUE: Single frontal view of the chest was performed. COMPARISON: 12/28/2023 FINDINGS: Cardiac monitoring leads overlie the chest.  There is unchanged prominence of the cardiomediastinal silhouette.  There is atherosclerosis of the thoracic aorta.  The lungs are symmetrically expanded with diffuse coarse/increased interstitial attenuation with bibasilar predominance.  Findings could reflect underlying interstitial edema or infectious process superimposed upon a background of chronic interstitial change.  No confluent airspace consolidation, substantial volume of pleural fluid or pneumothorax identified.  Visualized osseous structures appear intact with degenerative change.     Please see above. Electronically signed by: Sara Parsons MD Date:    05/14/2024 Time:    02:47    Assessment and Plan:       * Cardiogenic shock  Pt with an EF of 5-10% that had suspected flash pulmonary edema while getting a MRI. She required BiPAP at that time and received IV Lasix 40mg x3 in the ED with no response. Central line and A-line were placed; pt was started on Dobutamine 5mcg and Lasix 30mg/hr gtt.     Hemos during the initiation of Dobutamine and Lasix gtt:     CVP 10, SvO2 51, CO 3.3, CI 2, and SVR 1945.     Hemos this AM:    CVP 7, SvO2 59, CO 4.4, CI 2.7, and SVR 1164.     -On Dobutamine 2.5mcg this AM.     - Dobutamine 2.5 gtt  - Lasix 20mg/hr    Acute lower limb ischemia  Pt presenting with RLE weakness and decreased sensation to the knee that started around 3AM the day of admit. Initial thought was TIA as pt had palpable pulses on admit, however pt developed worsening RLE pain during  admission prompting LE Arterial US.    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 There is severe stenosis of the distal left superficial femoral artery.  -CPK uptrended from 2673 to 4628.     -RLE pulses unable to be obtained on doppler. Color change noted on RLE. Pt endorsing numbness.    -Vascular surgery consulted today. Spoke to pt and family and discussed that she would require above-the-knee amputation, pt unsure about above the knee amputation at this time as it may require here to be intubated. We will inform vascular surgery if pt decides to proceed with above the knee amputation.      -ASA, Plavix, Heparin   -Vascular Surgery following    Long QT interval  With wide QRS tachycardia on admission. Qtc 568 on admission.     - K goal 4, Mg 2    COPD (chronic obstructive pulmonary disease)  Patient's COPD is with exacerbation noted by continued dyspnea and worsening of baseline hypoxia currently.  Patient is currently off COPD Pathway. Continue scheduled inhalers Supplemental oxygen and monitor respiratory status closely.     Prior smoking history of 0.5 ppd, 25 yo start, quit 2018.    - Continue maintenance inhaler  - Supplemental O2 PRN    HFrEF (heart failure with reduced ejection fraction)  See NICM    DNR (do not resuscitate)  Pt DNR per chart review and reconfirmed in the ED on this admission. Patient does not want intubation either.    Type 2 diabetes mellitus with hyperglycemia, without long-term current use of insulin  Last A1C 5 during admission two weeks prior to current ICU admission. Not on home DM regimen. Hyperglycemic during this admission.    - LDSSI    Acute on chronic respiratory failure with hypoxia and hypercapnia  Patient with Hypercapnic and Hypoxic Respiratory failure which is Acute on chronic.  she is on home oxygen at 1.5 LPM. Supplemental oxygen was provided and noted.    Respiratory  status has improved, pt on 2L NC this AM.       Signs/symptoms of respiratory failure include- tachypnea, increased work of breathing, and respiratory distress. Contributing diagnoses includes - CHF and COPD Labs and images were reviewed. Patient Has recent ABG, which has been reviewed. Will treat underlying causes and adjust management of respiratory failure as follows-     - Do not intubate, does not align with patient goals    Acute on chronic combined systolic and diastolic congestive heart failure  Patient is identified as having Combined Systolic and Diastolic heart failure that is Acute on chronic. CHF is currently . Latest ECHO performed and demonstrates- Results for orders placed during the hospital encounter of 05/14/24    Echo    Interpretation Summary    Left Ventricle: The left ventricle is severely dilated. Severely increased ventricular mass. Normal wall thickness. There is eccentric hypertrophy. Severe global hypokinesis present. There is severely reduced systolic function with a visually estimated ejection fraction of 5 - 10%. There is diastolic dysfunction but grade cannot be determined.    Right Ventricle: Normal right ventricular cavity size. Wall thickness is normal. Right ventricle wall motion  is normal. Systolic function is normal.    Left Atrium: Left atrium is moderately dilated.    Mitral Valve: There is mild regurgitation.    Pulmonary Artery: The estimated pulmonary artery systolic pressure is 32 mmHg.    IVC/SVC: Normal venous pressure at 3 mmHg.  . Monitor clinical status closely. Monitor on telemetry. Patient is off CHF pathway.  Monitor strict Is&Os and daily weights.  Place on fluid restriction of 1.5 L. Cardiology has been consulted. Continue to stress to patient importance of self efficacy and  on diet for CHF. Last BNP reviewed- and noted below   Recent Labs   Lab 05/28/24  1259   BNP 2,066*       -Net -1L overnight. Lasix gtt 20mg/hr CVP of 7 this AM.     LBBB (left  bundle branch block)  Noted on EKG dating as far back as 03/2019. Reconfirmed on admission EKG.     NICM (nonischemic cardiomyopathy)  Echo from 05/24/24 with EF 5-10% with global hypokinesis. GDMT includes Toprol 25mg qd, spironolactone 25mg qd.  Unable to afford Jardiance even with coupon. Entresto held on prior admission and DC'd at clinic visit on day prior to admission given continued soft BP. Clinic plan was to introduce low dose ACE/ARB for additional GDMT. Diuresis with Lasix 40mg qd. Patient declined establishing with Palliative Care during recent clinic visit.      - Add GDMT back once patient stabilizes    Acute renal failure with acute tubular necrosis superimposed on stage 3a chronic kidney disease  Nephrology consulted. Urinary sediment with granular, halfy muddy brown and waxy casts suggestive of ATN in the setting of cardiogenic shock.    Cr of 2.4 this AM. Baseline Cr of 1.0.     Plan:    - CMP qd, trend Cr  - strict I/O  - daily weights  - renally dose all medications  - avoid nephrotoxic agents, NSAIDs, IV contrast, ACE/ARB  - If JAMRI doesn't improve consider US retroperitoneal      Hypertension, essential  Chronic, controlled. Latest blood pressure and vitals reviewed-     Temp:  [97.9 °F (36.6 °C)-98.2 °F (36.8 °C)]   Pulse:  []   Resp:  [9-32]   BP: (100-138)/(46-77)   SpO2:  [90 %-98 %]   Arterial Line BP: (101-144)/(44-64) .   Home meds for hypertension were reviewed and noted below.   Hypertension Medications               furosemide (LASIX) 40 MG tablet Take 1 tablet (40 mg total) by mouth once daily.    metoprolol succinate (TOPROL-XL) 25 MG 24 hr tablet Take 1 tablet (25 mg total) by mouth once daily.    spironolactone (ALDACTONE) 25 MG tablet Take 1 tablet (25 mg total) by mouth once daily.            While in the hospital, will manage blood pressure as follows; Adjust home antihypertensive regimen as follows- Procardia-XL 30mg QD for afterload reduction.     Will utilize p.r.n.  blood pressure medication only if patient's blood pressure greater than 180/110 and she develops symptoms such as worsening chest pain or shortness of breath.        VTE Risk Mitigation (From admission, onward)           Ordered     IP VTE HIGH RISK PATIENT  Once         05/28/24 2104     Place sequential compression device  Until discontinued         05/28/24 2104     heparin 25,000 units in dextrose 5% (100 units/ml) IV bolus from bag LOW INTENSITY nomogram - [facility]  As needed (PRN)        Question:  Heparin Infusion Adjustment (DO NOT MODIFY ANSWER)  Answer:  [email or web address]\[email or web address]\heparin LOW INTENSITY nomogram for [facility] [id number]    05/28/24 1948     heparin 25,000 units in dextrose 5% (100 units/ml) IV bolus from bag LOW INTENSITY nomogram - [facility]  As needed (PRN)        Question:  Heparin Infusion Adjustment (DO NOT MODIFY ANSWER)  Answer:  \\[email or web address]\[email or web address]\heparin LOW INTENSITY nomogram for [facility] [id number]    05/28/24 1948     heparin 25,000 units in dextrose 5% 250 mL (100 units/mL) infusion LOW INTENSITY nomogram - [facility]  Continuous        Question:  Begin at (units/kg/hr)  Answer:  12    05/28/24 1948     Place sequential compression device  Until discontinued         05/28/24 1624                    Natalio Angel MD, PGY-II  Cardiology  [facility] - Cardiac [facility]

## 2024-05-31 NOTE — ASSESSMENT & PLAN NOTE
- urinary sediment with granular, half muddy brown and waxy casts suggestive of acute tubular injury in the setting of cardiogenic shock    Recommendations  - concern for critical limb ischemia continue to follow CPK daily.  - Daily RFP and magnesium levels.   - please avoid hypotension/major fluctuations in BP (keep MAP > 65 mmHg)  - renal diet/tube feeds when not NPO with volume restriction per primary team  - strict I/O's and daily weights  - renally all dose medications to eGFR   - avoid nephrotoxic agents wean feasible (i.e. NSAIDs, intra-arterial contrast, supra-therapeutic vancomycin levels, etc.)  - no acute indications for RRT at this time however will continue to monitor closely and consent obtained should acute indications for RRT arise

## 2024-05-31 NOTE — NURSING
Family asking about update on rounds, called fellow Grzegorz, he is in ED with a pt, will be up as soon as he can. Notified family, daughter will stay a while longer, but stated he can call her if she has to leave as well.

## 2024-05-31 NOTE — HPI
78-year-old woman with HFrEF (most recent TTE with EF of 5-10%), currently on Dobutamine gtt, COPD with chronic hypoxic respiratory failure on supplemental oxygen (2 liters via nasal cannula as outpatient), prior tobacco abuse, CKD IIIa (baseline creatinine ~1) and hypertension who was admitted to CCU on 5/28 with cardiogenic shock after presenting to the ED with complaints of right lower extremity numbness and associated weakness which had started roughly around 3 AM earlier that morning. This presentation of acute R sided leg weakness was suspected to be a stroke and upon transfer to [facility] she had flash pulmonary edema, stepped up to the ICU for dobutamine gtt and BIPAP. Since early am on 5/29 her RLE had become increasingly cold and painful leading to a vascular US that showed occlusion of the R mid and distal SFA, popliteal artery, AT, and PT. This was also noted severe stenosis of the distal left superficial femoral artery. No CTA done of RLE. Was started on a hep gtt along with her ASA and plavix.    Since 5/29, her leg has become more mottled and insensate below the knee associated with a rise in CK to 4600 on 5/31. Vascular surgery was consulted for acute limb ischemia on 5/31 at 1200 PM. The CICU team stated that they had spoken to interventional cardiology previously at admission and did not recommend intervention. As the leg became more moddled,  primary team asked a  second opinion from vascular surgery.     On interviewing the patient she states that her RLE is currently not in any pain, but feels a tingling numbness sensation below her knee. Is unable to move RLE below knee. She has discoloration of her RLE up to her knee. We Had a discussion with the patient about her advanced directive wishes of DNR and DNI. She is apprehensive of undergoing surgery as she believes she will have to remain intubated for an extended period of time which does not align with her wishes. She asked that we speak to her  daughter prior to her making any decision.

## 2024-05-31 NOTE — ASSESSMENT & PLAN NOTE
Patient with Hypercapnic and Hypoxic Respiratory failure which is Acute on chronic.  she is on home oxygen at 1.5 LPM. Supplemental oxygen was provided and noted.    Respiratory status has improved, pt on 2L NC this AM.       Signs/symptoms of respiratory failure include- tachypnea, increased work of breathing, and respiratory distress. Contributing diagnoses includes - CHF and COPD Labs and images were reviewed. Patient Has recent ABG, which has been reviewed. Will treat underlying causes and adjust management of respiratory failure as follows-     - Do not intubate, does not align with patient goals

## 2024-05-31 NOTE — PROGRESS NOTES
[facility] - Cardiac Intensive [facility]  Nephrology  Progress Note    Patient Name: Selma Hooper  MRN: [id number]  Admission Date: 5/28/2024  Hospital Length of Stay: 3 days  Attending Provider: Tonio Botello MD   Primary Care Physician: Phil Andrade MD  Principal Problem:Cardiogenic shock    Subjective:     HPI: Ms Hooper is a 78-year-old woman with HFrEF (most recent TTE with EF of 5-10%), COPD with chronic hypoxic respiratory failure on supplemental oxygen (2 liters via nasal cannula as outpatient), prior tobacco abuse, CKD IIIa (baseline creatinine ~1)  and hypertension who was admitted to CCU on 5/28 with cardiogenic shock after presenting to the ED with complaints of right lower extremity numbness and associated weakness which had started roughly around 3 AM earlier that morning and upon going to [facility] she became dyspneic and was noted to be cold to the touch with tachycardia.  CBC was notable for microcytic anemia with hemoglobin 10.8. Metabolic panel was notable for albumin of 3.4 and creatinine 1.1 which would subsequently rise to 1.8. Lactate was elevated at 4.5 which would rise to 5.3. VBG on presentation showed pH of 7.132, pCO2 95.5 and bicarbonate 31.9. BNP was ~2K with chest x-ray showing enlarged cardiac silhouette and abnormal increased reticular lung markings seen throughout both lung fields concerning for diffuse pulmonary edema. She would receive a cumulative dose of 240 mg IV Lasix (40 mg IV x3 plus 120 mg IV) and Diuril 500 mg IV in addition to being started on Lasix infusion at 30 mg/hr and dobutamine at 5 mcg/kg/minute. Only 250 mL of documented UOP thus far. UA showed +1 protein, +3 occult blood (69 RBCs/hpf), +2 leukocytes (13 WBCs/hpf) and many bacteria. Urine sodium was 114. UPCR, CPK and retroperitoneal US still pending at this time. Nephrology has been consulted for assistance with management of ARF in the setting of cardiogenic shock.    Interval History; patient seen this morning on  nasal cannula. Denies chest pain or shortness of breath. Continues to have right leg pain. Creatinine improved this morning.       Objective:     Vital Signs (Most Recent):  Temp: 98 °F (36.7 °C) (05/31/24 0701)  Pulse: 95 (05/31/24 1000)  Resp: 19 (05/31/24 1000)  BP: (!) 123/46 (05/31/24 0846)  SpO2: 95 % (05/31/24 1000) Vital Signs (24h Range):  Temp:  [98 °F (36.7 °C)-98.2 °F (36.8 °C)] 98 °F (36.7 °C)  Pulse:  [] 95  Resp:  [9-32] 19  SpO2:  [90 %-98 %] 95 %  BP: (100-126)/(46-77) 123/46  Arterial Line BP: (101-144)/(44-64) 144/62     Weight: 62 kg (136 lb 11 oz) (05/29/24 0901)  Body mass index is 25.83 kg/m².  Body surface area is 1.63 meters squared.    I/O last 3 completed shifts:  In: 971 [P.O.:350; I.V.:506.8; IV Piggyback:114.2]  Out: 2215 [Urine:2215]     Physical Exam  Vitals and nursing note reviewed.   Constitutional:       General: She is awake. She is not in acute distress.     Appearance: She is normal weight. She is not ill-appearing or diaphoretic.      Interventions: Nasal cannula in place.   HENT:      Head: Normocephalic and atraumatic.      Right Ear: External ear normal.      Left Ear: External ear normal.      Mouth/Throat:      Mouth: Mucous membranes are moist.      Pharynx: Oropharynx is clear. No oropharyngeal exudate or posterior oropharyngeal erythema.   Eyes:      General: No scleral icterus.        Right eye: No discharge.         Left eye: No discharge.      Extraocular Movements: Extraocular movements intact.      Conjunctiva/sclera: Conjunctivae normal.   Neck:      Comments: Trialysis catheter to right internal jugular vein.  Cardiovascular:      Rate and Rhythm: Normal rate and regular rhythm.      Heart sounds: Murmur heard.   Pulmonary:      Effort: Tachypnea present. No respiratory distress.      Breath sounds: No wheezing, rhonchi or rales.   Abdominal:      General: Bowel sounds are normal. There is no distension.      Palpations: Abdomen is soft.      Tenderness:  There is no abdominal tenderness.   Genitourinary:     Comments: Keys catheter in place.  Musculoskeletal:      Cervical back: Neck supple.      Right lower leg: No edema.      Left lower leg: No edema.   Skin:     General: Skin is warm and dry.      Coloration: Skin is not jaundiced.   Neurological:      General: No focal deficit present.      Mental Status: She is alert. Mental status is at baseline.      Cranial Nerves: No cranial nerve deficit.      Motor: No weakness.   Psychiatric:         Mood and Affect: Mood normal.         Behavior: Behavior normal. Behavior is cooperative.          Significant Labs:  ABGs:   Recent Labs   Lab 05/30/24 2027 05/31/24 0833   PH 7.377  --    PCO2 66.3*  --    HCO3 39.0*  --    POCSATURATED 57 59   BE 14*  --      BMP:   Recent Labs   Lab 05/31/24 0323         K 4.4   CL 89*   CO2 36*   BUN 42*   CREATININE 2.4*   CALCIUM 9.6   MG 2.4     CBC:   Recent Labs   Lab 05/31/24 0323   WBC 7.58   RBC 4.34   HGB 10.4*   HCT 34.1*      MCV 79*   MCH 24.0*   MCHC 30.5*     CMP:   Recent Labs   Lab 05/31/24 0323      CALCIUM 9.6   ALBUMIN 3.4*   PROT 6.5      K 4.4   CO2 36*   CL 89*   BUN 42*   CREATININE 2.4*   ALKPHOS 90   ALT 59*   AST 61*   BILITOT 0.5     Coagulation:   Recent Labs   Lab 05/28/24 2005 05/28/24 2157 05/31/24 0832   INR 1.2  --   --    APTT 109.1*   < > 55.8*    < > = values in this interval not displayed.     LFTs:   Recent Labs   Lab 05/31/24 0323   ALT 59*   AST 61*   ALKPHOS 90   BILITOT 0.5   PROT 6.5   ALBUMIN 3.4*     Microbiology Results (last 7 days)       Procedure Component Value Units Date/Time    Urine culture [[id number]]  (Abnormal)  (Susceptibility) Collected: 05/28/24 1817    Order Status: Completed Specimen: Urine Updated: 05/31/24 0442     Urine Culture, Routine PROTEUS MIRABILIS  >100,000 cfu/ml      Narrative:      ADD ON URINE PROTEIN/CREATININE RATIO PER DR BRITT GUILLEN/ORDER#   [id number] @  22:28    ADD ON URINE SODIUM PER DR ELAINA CASH/ORDER# [id number] @ 21:53    Specimen Source->Urine          Specimen (24h ago, onward)      None          TSH:   Recent Labs   Lab 05/28/24  0611   TSH 0.889     Recent Labs   Lab 05/28/24  1817   COLORU [place]*   SPECGRAV 1.010   PHUR 6.0   PROTEINUA 1+*   BACTERIA Many*   NITRITE Negative   LEUKOCYTESUR 2+*   HYALINECASTS 4*     Urinary sediment on 05/28/2024:                                              Significant Imaging:  I have reviewed all imagining in the last 24 hours.  Assessment/Plan:     Cardiac/Vascular  Acute lower limb ischemia  Per primary team      HFrEF (heart failure with reduced ejection fraction)  -    Renal/  Acute renal failure with acute tubular necrosis superimposed on stage 3a chronic kidney disease  - urinary sediment with granular, half muddy brown and waxy casts suggestive of acute tubular injury in the setting of cardiogenic shock    Recommendations  - concern for critical limb ischemia continue to follow CPK daily.  - Daily RFP and magnesium levels.   - please avoid hypotension/major fluctuations in BP (keep MAP > 65 mmHg)  - renal diet/tube feeds when not NPO with volume restriction per primary team  - strict I/O's and daily weights  - renally all dose medications to eGFR   - avoid nephrotoxic agents wean feasible (i.e. NSAIDs, intra-arterial contrast, supra-therapeutic vancomycin levels, etc.)  - no acute indications for RRT at this time however will continue to monitor closely and consent obtained should acute indications for RRT arise        Thank you for your consult. I will follow-up with patient. Please contact us if you have any additional questions.    Osiel Mcmullen MD  Nephrology  [facility] - [facility]

## 2024-05-31 NOTE — ASSESSMENT & PLAN NOTE
Pt with an EF of 5-10% that had suspected flash pulmonary edema while getting a MRI. She required BiPAP at that time and received IV Lasix 40mg x3 in the ED with no response. Central line and A-line were placed; pt was started on Dobutamine 5mcg and Lasix 30mg/hr gtt.     Hemos during the initiation of Dobutamine and Lasix gtt:     CVP 10, SvO2 51, CO 3.3, CI 2, and SVR 1945.     Hemos this AM:    CVP 7, SvO2 59, CO 4.4, CI 2.7, and SVR 1164.     -On Dobutamine 2.5mcg this AM.     - Dobutamine 2.5 gtt  - Lasix 20mg/hr

## 2024-05-31 NOTE — SUBJECTIVE & OBJECTIVE
Interval History; patient seen this morning on nasal cannula. Denies chest pain or shortness of breath. Continues to have right leg pain. Creatinine improved this morning.       Objective:     Vital Signs (Most Recent):  Temp: 98 °F (36.7 °C) (05/31/24 0701)  Pulse: 95 (05/31/24 1000)  Resp: 19 (05/31/24 1000)  BP: (!) 123/46 (05/31/24 0846)  SpO2: 95 % (05/31/24 1000) Vital Signs (24h Range):  Temp:  [98 °F (36.7 °C)-98.2 °F (36.8 °C)] 98 °F (36.7 °C)  Pulse:  [] 95  Resp:  [9-32] 19  SpO2:  [90 %-98 %] 95 %  BP: (100-126)/(46-77) 123/46  Arterial Line BP: (101-144)/(44-64) 144/62     Weight: 62 kg (136 lb 11 oz) (05/29/24 0901)  Body mass index is 25.83 kg/m².  Body surface area is 1.63 meters squared.    I/O last 3 completed shifts:  In: 971 [P.O.:350; I.V.:506.8; IV Piggyback:114.2]  Out: 2215 [Urine:2215]     Physical Exam  Vitals and nursing note reviewed.   Constitutional:       General: She is awake. She is not in acute distress.     Appearance: She is normal weight. She is not ill-appearing or diaphoretic.      Interventions: Nasal cannula in place.   HENT:      Head: Normocephalic and atraumatic.      Right Ear: External ear normal.      Left Ear: External ear normal.      Mouth/Throat:      Mouth: Mucous membranes are moist.      Pharynx: Oropharynx is clear. No oropharyngeal exudate or posterior oropharyngeal erythema.   Eyes:      General: No scleral icterus.        Right eye: No discharge.         Left eye: No discharge.      Extraocular Movements: Extraocular movements intact.      Conjunctiva/sclera: Conjunctivae normal.   Neck:      Comments: Trialysis catheter to right internal jugular vein.  Cardiovascular:      Rate and Rhythm: Normal rate and regular rhythm.      Heart sounds: Murmur heard.   Pulmonary:      Effort: Tachypnea present. No respiratory distress.      Breath sounds: No wheezing, rhonchi or rales.   Abdominal:      General: Bowel sounds are normal. There is no distension.       Palpations: Abdomen is soft.      Tenderness: There is no abdominal tenderness.   Genitourinary:     Comments: Keys catheter in place.  Musculoskeletal:      Cervical back: Neck supple.      Right lower leg: No edema.      Left lower leg: No edema.   Skin:     General: Skin is warm and dry.      Coloration: Skin is not jaundiced.   Neurological:      General: No focal deficit present.      Mental Status: She is alert. Mental status is at baseline.      Cranial Nerves: No cranial nerve deficit.      Motor: No weakness.   Psychiatric:         Mood and Affect: Mood normal.         Behavior: Behavior normal. Behavior is cooperative.          Significant Labs:  ABGs:   Recent Labs   Lab 05/30/24 2027 05/31/24 0833   PH 7.377  --    PCO2 66.3*  --    HCO3 39.0*  --    POCSATURATED 57 59   BE 14*  --      BMP:   Recent Labs   Lab 05/31/24 0323         K 4.4   CL 89*   CO2 36*   BUN 42*   CREATININE 2.4*   CALCIUM 9.6   MG 2.4     CBC:   Recent Labs   Lab 05/31/24 0323   WBC 7.58   RBC 4.34   HGB 10.4*   HCT 34.1*      MCV 79*   MCH 24.0*   MCHC 30.5*     CMP:   Recent Labs   Lab 05/31/24 0323      CALCIUM 9.6   ALBUMIN 3.4*   PROT 6.5      K 4.4   CO2 36*   CL 89*   BUN 42*   CREATININE 2.4*   ALKPHOS 90   ALT 59*   AST 61*   BILITOT 0.5     Coagulation:   Recent Labs   Lab 05/28/24 2005 05/28/24 2157 05/31/24  0832   INR 1.2  --   --    APTT 109.1*   < > 55.8*    < > = values in this interval not displayed.     LFTs:   Recent Labs   Lab 05/31/24 0323   ALT 59*   AST 61*   ALKPHOS 90   BILITOT 0.5   PROT 6.5   ALBUMIN 3.4*     Microbiology Results (last 7 days)       Procedure Component Value Units Date/Time    Urine culture [[id number]]  (Abnormal)  (Susceptibility) Collected: 05/28/24 1817    Order Status: Completed Specimen: Urine Updated: 05/31/24 0442     Urine Culture, Routine PROTEUS MIRABILIS  >100,000 cfu/ml      Narrative:      ADD ON URINE PROTEIN/CREATININE RATIO PER   BRITT GUILLEN/ORDER#   [id number] @ 22:28    ADD ON URINE SODIUM PER DR ELAINA CASH/ORDER# [id number] @ 21:53    Specimen Source->Urine          Specimen (24h ago, onward)      None          TSH:   Recent Labs   Lab 05/28/24  0611   TSH 0.889     Recent Labs   Lab 05/28/24  1817   COLORU [place]*   SPECGRAV 1.010   PHUR 6.0   PROTEINUA 1+*   BACTERIA Many*   NITRITE Negative   LEUKOCYTESUR 2+*   HYALINECASTS 4*     Urinary sediment on 05/28/2024:                                              Significant Imaging:  I have reviewed all imagining in the last 24 hours.

## 2024-05-31 NOTE — ASSESSMENT & PLAN NOTE
This is a 78-year-old female with history of half for F (EF 5-10% currently on dobutamine), COPD on home oxygen, diabetes, who presented to the ED on 5/28/24 with right lower extremity weakness which was originally concerning for a TIA.  Ultimately given her decreased signals in her a cool leg a arterial ultrasound of the right lower extremity was performed which showed occlusion of the right mid and distal superficial femoral artery, popliteal artery, and anterior and posterior tibial arteries.  The patient was discussed with Interventional cardiology who stated there was no intervention for this patient at that time.  As the leg became more mottled and her CK was elevated over 4000 today, primary team engaged vascular surgery around noon to discuss a 2nd opinion.    -patient seen and examined  -labs and imaging reviewed   -unfortunately, given the patient's presentation at the time of our consultation, there is no surgical option for revascularization.  -we discussed with the patient as well as her daughter upon request that the patient has a nonviable right lower extremity that ultimately will require above-the-knee amputation.  The patient is wary of the amputation, given the fact that she may require intubation during the procedure or afterwards which does not align with her wishes.  We also discussed with the patient that if she does not pursue amputation, the most likely course will be that she will become very sick from this disease process which will ultimately lead to her death.  I spoke with the daughter at length who discussed that she believes her mother is growing tired of her illnesses and that she we will speak with her mother as well so that they can make a mucus or decision that aligns with her mother's advanced directives.  -discussed with the family that we would be available to discuss her care at any time, however the only surgical option for her at this time is a right above-the-knee  amputation.  The family stated that if they choose to pursue amputation they would let the primary team knows that that this could be communicated to the vascular surgery team.  -if patient chooses amputation, please make her NPO

## 2024-05-31 NOTE — CARE UPDATE
Care Update  Cvp10, scvo2 57, making urine overnight, no complaints. No changes were made    Grzegorz Pitts MD  Cardiovascular Medicine Fellow  [facility]

## 2024-05-31 NOTE — SUBJECTIVE & OBJECTIVE
Medications Prior to Admission   Medication Sig Dispense Refill Last Dose    fluticasone-salmeterol diskus inhaler 250-50 mcg Inhale 1 puff into the lungs 2 (two) times daily. Controller 60 each 11     furosemide (LASIX) 40 MG tablet Take 1 tablet (40 mg total) by mouth once daily. 90 tablet 3     metoprolol succinate (TOPROL-XL) 25 MG 24 hr tablet Take 1 tablet (25 mg total) by mouth once daily. 90 tablet 3     spironolactone (ALDACTONE) 25 MG tablet Take 1 tablet (25 mg total) by mouth once daily. 30 tablet 0     albuterol-ipratropium (DUO-NEB) 2.5 mg-0.5 mg/3 mL nebulizer solution Take 3 mLs by nebulization every 6 (six) hours as needed for Wheezing. Rescue 90 mL 3     empagliflozin (JARDIANCE) 10 mg tablet Take 1 tablet (10 mg total) by mouth once daily. 30 tablet 2        Review of patient's allergies indicates:   Allergen Reactions    Atorvastatin      Leg cramps       Past Medical History:   Diagnosis Date    Abnormal liver enzymes 12/10/2018    Acute on chronic combined systolic and diastolic congestive heart failure 4/2/2021    Acute on chronic respiratory failure with hypoxia 4/2/2021    Acute on chronic respiratory failure with hypoxia and hypercapnia 12/10/2021    CHF (congestive heart failure)     COPD exacerbation 7/12/2022    Former smoker 10/24/2018    Hypertension     Smoker 7/27/2016     Past Surgical History:   Procedure Laterality Date    BREAST BIOPSY      left  side years ago in high school   1962    CHOLECYSTECTOMY      COLONOSCOPY      COLONOSCOPY N/A 08/23/2021    Procedure: COLONOSCOPY;  Surgeon: Shree Amaya MD;  Location: [address];  Service: Endoscopy;  Laterality: N/A;  Do not cancel this order  fully vaccinated-see immunization record  EF 18%  8/20-covid [facility]    HYSTERECTOMY       Family History       Problem Relation (Age of Onset)    Arthritis Mother    Cancer Father, Brother    Colon cancer Father, Brother (63)    Dementia Father    Diabetes Daughter     Heart attack Mother    Heart disease Mother, Brother    Hypertension Mother, Father, Brother          Tobacco Use    Smoking status: Former     Current packs/day: 0.00     Types: Cigarettes     Quit date: 2018     Years since quittin.9     Passive exposure: Past    Smokeless tobacco: Never   Substance and Sexual Activity    Alcohol use: Yes     Comment: occasional drinker, not a daily drinker    Drug use: No    Sexual activity: Not Currently     Partners: Male     Review of Systems   Constitutional:  Positive for fatigue. Negative for chills and fever.   Respiratory:  Positive for shortness of breath.    Cardiovascular:  Positive for leg swelling.   Gastrointestinal:  Negative for abdominal pain.   Skin:  Positive for color change and pallor.   Neurological:  Positive for weakness and numbness.   Hematological:  Bruises/bleeds easily.     Objective:     Vital Signs (Most Recent):  Temp: 97.9 °F (36.6 °C) (24 1101)  Pulse: 84 (24 1200)  Resp: 12 (24 1200)  BP: 112/62 (24 1200)  SpO2: 97 % (24 1200) Vital Signs (24h Range):  Temp:  [97.9 °F (36.6 °C)-98.2 °F (36.8 °C)] 97.9 °F (36.6 °C)  Pulse:  [] 84  Resp:  [9-32] 12  SpO2:  [90 %-98 %] 97 %  BP: (100-138)/(46-77) 112/62  Arterial Line BP: (101-144)/(44-64) 134/54     Weight: 62 kg (136 lb 11 oz)  Body mass index is 25.83 kg/m².      Physical Exam  Vitals and nursing note reviewed.   Constitutional:       General: She is not in acute distress.     Appearance: She is ill-appearing.   Cardiovascular:      Rate and Rhythm: Regular rhythm. Tachycardia present.      Comments: 1+ Right femoral pulse  Absent right pop, dp/pt signals  Pulmonary:      Comments: 2 L NC satting 94%  Skin:     Comments: RLE with swelling and mottled appearance extending to the right knee; Cold to the touch    Neurological:      Mental Status: She is alert and oriented to person, place, and time.      Cranial Nerves: Cranial nerves 2-12 are intact.       Motor: Weakness (0/5 stregth below right knee; 3/5 strength above right knee) present.   Psychiatric:         Mood and Affect: Mood normal.         Behavior: Behavior normal.          Significant Labs:  CBC:   Recent Labs   Lab 05/31/24 0323   WBC 7.58   RBC 4.34   HGB 10.4*   HCT 34.1*      MCV 79*   MCH 24.0*   MCHC 30.5*     CMP:   Recent Labs   Lab 05/31/24 0323      CALCIUM 9.6   ALBUMIN 3.4*   PROT 6.5      K 4.4   CO2 36*   CL 89*   BUN 42*   CREATININE 2.4*   ALKPHOS 90   ALT 59*   AST 61*   BILITOT 0.5     All pertinent labs from the last 24 hours have been reviewed.    Significant Diagnostics:  I have reviewed all pertinent imaging results/findings within the past 24 hours.

## 2024-05-31 NOTE — ASSESSMENT & PLAN NOTE
Nephrology consulted. Urinary sediment with granular, halfy muddy brown and waxy casts suggestive of ATN in the setting of cardiogenic shock.    Cr of 2.4 this AM. Baseline Cr of 1.0.     Plan:    - CMP qd, trend Cr  - strict I/O  - daily weights  - renally dose all medications  - avoid nephrotoxic agents, NSAIDs, IV contrast, ACE/ARB  - If JAMIR doesn't improve consider US retroperitoneal

## 2024-05-31 NOTE — PLAN OF CARE
Problem: Infection  Goal: Absence of Infection Signs and Symptoms  Outcome: Progressing     Problem: Adult Inpatient Plan of Care  Goal: Plan of Care Review  Outcome: Progressing     Problem: Diabetes Comorbidity  Goal: Blood Glucose Level Within Targeted Range  Outcome: Progressing     Problem: Acute Kidney Injury/Impairment  Goal: Fluid and Electrolyte Balance  Outcome: Progressing     Problem: Skin Injury Risk Increased  Goal: Skin Health and Integrity  Outcome: Progressing     Problem: Fall Injury Risk  Goal: Absence of Fall and Fall-Related Injury  Outcome: Progressing     Problem: Heart Failure  Goal: Optimal Coping  Outcome: Progressing  Goal: Stable Heart Rate and Rhythm  Outcome: Progressing  Goal: Fluid and Electrolyte Balance  Outcome: Progressing  Goal: Effective Breathing Pattern During Sleep  Outcome: Progressing

## 2024-05-31 NOTE — NURSING
"Pt family at bedside, have questions regarding plan for RLE. When looking at note on pt regarding IR procedure, unclear on weather or not she is or is not a candidate, as the note says that she IS, but family thought they said that she WAS NOT. In report, this RN was told that pt did not want to proceed as she did not want to change from DNR to FULL code status for the procedure, when asked about this she stated to me that they told her that she would need to be "intubated and in a coma, for 4 days" and she didn't want this. I explained to her that I was not sure about that length of time, but they should talk to someone to get more clear on what exactly needs to be done, and I explained that the change in status is usually only for the procedure itself and she can go back to her DNR status after it is complete. She also mentioned amputation, which I dot see any refernce to, but explained that without any intervention, if the heparin doesn't dissolve the clot then, this could be a reality but she would need to be intubated and sedated for that as well. I explained that they should really speak to the Dr to get clarification, and they would be rounding soon. Daughter has agreed to stay for a while to speak with them on rounds.    "

## 2024-05-31 NOTE — SUBJECTIVE & OBJECTIVE
Interval History:     Pt was seen and examined at bedside. FERCHO JIMENEZ. Pt remains on Dobutamine 2.5 and Lasix gtt @ 20mg/hr. Net - 1L overnight.     Review of Systems   Constitutional: Negative for chills, diaphoresis, fever, malaise/fatigue and night sweats.   HENT:  Negative for congestion, hearing loss, hoarse voice, sore throat and stridor.    Eyes:  Negative for blurred vision.   Cardiovascular:  Positive for dyspnea on exertion and orthopnea. Negative for chest pain, claudication, cyanosis, leg swelling, near-syncope, palpitations, paroxysmal nocturnal dyspnea and syncope.   Respiratory:  Positive for shortness of breath. Negative for cough, sleep disturbances due to breathing, snoring, sputum production and wheezing.    Skin:  Negative for dry skin and rash.   Musculoskeletal:  Negative for arthritis, back pain, joint pain, muscle cramps, muscle weakness and myalgias.   Gastrointestinal:  Negative for bloating, abdominal pain, change in bowel habit, constipation, diarrhea, dysphagia, nausea and vomiting.   Genitourinary:  Negative for dysuria and urgency.   Neurological:  Positive for focal weakness and numbness. Negative for difficulty with concentration, excessive daytime sleepiness, dizziness, headaches, light-headedness, tremors and weakness.   Psychiatric/Behavioral:  Negative for altered mental status and depression. The patient does not have insomnia.      Objective:     Vital Signs (Most Recent):  Temp: 97.9 °F (36.6 °C) (05/31/24 1101)  Pulse: 90 (05/31/24 1429)  Resp: (!) 34 (05/31/24 1429)  BP: 112/62 (05/31/24 1200)  SpO2: 96 % (05/31/24 1429) Vital Signs (24h Range):  Temp:  [97.9 °F (36.6 °C)-98.2 °F (36.8 °C)] 97.9 °F (36.6 °C)  Pulse:  [] 90  Resp:  [9-34] 34  SpO2:  [90 %-98 %] 96 %  BP: (100-138)/(46-77) 112/62  Arterial Line BP: (101-144)/(44-64) 134/54     Weight: 62 kg (136 lb 11 oz)  Body mass index is 25.83 kg/m².     SpO2: 96 %         Intake/Output Summary (Last 24 hours) at  5/31/2024 1436  Last data filed at 5/31/2024 1200  Gross per 24 hour   Intake 811.35 ml   Output 1700 ml   Net -888.65 ml       Lines/Drains/Airways       Central Venous Catheter Line  Duration             Trialysis (Dialysis) Catheter 05/28/24 1613 right internal jugular 2 days              Drain  Duration                  Urethral Catheter 05/28/24 1200 3 days              Arterial Line  Duration             Arterial Line 05/28/24 1818 Left Radial 2 days              Peripheral Intravenous Line  Duration                  Peripheral IV - Single Lumen 05/28/24 1259 20 G Left Hand 3 days                       Physical Exam  Vitals and nursing note reviewed.   Constitutional:       Appearance: Normal appearance. She is ill-appearing.      Comments: L radial arterial line   Eyes:      Extraocular Movements: Extraocular movements intact.      Conjunctiva/sclera: Conjunctivae normal.   Neck:      Comments: RIJ CVC in place dressing C/D/I  Cardiovascular:      Rate and Rhythm: Regular rhythm. Tachycardia present.      Pulses: Decreased pulses (RLE).      Comments: JVD indeterminate 2/2 CVC  Pulmonary:      Effort: Pulmonary effort is normal.      Breath sounds: Normal breath sounds. No rales.   Abdominal:      General: Bowel sounds are normal. There is no distension.      Palpations: Abdomen is soft.   Musculoskeletal:      Cervical back: Normal range of motion.      Right lower leg: No edema.      Left lower leg: No edema.   Skin:     General: Skin is warm and dry.   Neurological:      General: No focal deficit present.      Mental Status: She is alert and oriented to person, place, and time.      Sensory: Sensory deficit (RLE) present.      Motor: Weakness (RLE) present.            Significant Labs/Significant Imaging:     Recent Labs   Lab 05/28/24  1805 05/28/24 2005 05/29/24 0254 05/30/24  0311 05/31/24  0323   WBC 17.49* 14.97* 11.55 10.59 7.58   HGB 12.5 11.8* 11.5* 11.0* 10.4*   HCT 40.8 38.5 37.9 34.5* 34.1*    MCV 80* 80* 81* 78* 79*   RBC 5.09 4.83 4.71 4.42 4.34   MCH 24.6* 24.4* 24.4* 24.9* 24.0*   MCHC 30.6* 30.6* 30.3* 31.9* 30.5*   RDW 14.2 14.6* 14.6* 14.9* 14.6*    196 210 163 180   MPV 10.6 10.6 10.6 10.4 10.6   GRAN 88.5*  15.5* 90.5*  13.6* 91.2*  10.5* 81.6*  8.6* 81.2*  6.2   LYMPH 3.3*  0.6* 3.5*  0.5* 6.0*  0.7* 8.0*  0.9* 8.2*  0.6*   MONO 6.9  1.2* 5.1  0.8 2.3*  0.3 8.0  0.9 9.0  0.7   EOSINOPHIL 0.1 0.0 0.0 1.3 0.9   BASOPHIL 0.2 0.2 0.1 0.7 0.3       Recent Labs   Lab 05/27/24  1302 05/28/24  0611 05/28/24  1625 05/28/24  2157 05/29/24  0254 05/29/24  0919 05/30/24  0311 05/30/24  1424 05/31/24  0323      < > 144   < > 142 139 137 135* 136   K 4.2   < > 3.6   < > 4.2 4.6 4.8 4.8 4.4      < > 102   < > 100 96 92* 93* 89*   CO2 31*   < > 28   < > 27 30* 30* 30* 36*   BUN 17   < > 20   < > 25* 30* 32* 41* 42*   CREATININE 1.1   < > 1.8*   < > 2.0* 2.3* 2.6* 2.4* 2.4*   GLU 85   < > 261*   < > 199* 194* 109 104 109   CALCIUM 9.9   < > 9.5   < > 9.7 9.7 9.6 9.5 9.6   PROT 6.9   < > 6.9  --  6.7 6.6 6.5  --  6.5   ALBUMIN 3.7   < > 3.5  --  3.4* 3.4* 3.4*  --  3.4*   ALKPHOS 94   < > 122  --  108 99 94  --  90   BILITOT 0.4   < > 0.3  --  0.3 0.4 0.5  --  0.5   ALT 20   < > 119*  --  102* 89* 71*  --  59*   AST 12   < > 256*  --  191* 142* 85*  --  61*   ANIONGAP 11   < > 14   < > 15 13 15 12 11   MG 2.0  --  2.2  --  1.8  --  2.6  --  2.4   PHOS 3.1  --  3.2  --  3.5  --  4.8*  --  4.8*    < > = values in this interval not displayed.       Recent Labs   Lab 05/28/24  1817   COLORU [place]*   APPEARANCEUA Hazy*   PHUR 6.0   SPECGRAV 1.010   PROTEINUA 1+*   GLUCUA Negative   KETONESU Negative   BILIRUBINUA Negative   OCCULTUA 3+*   NITRITE Negative   LEUKOCYTESUR 2+*   RBCUA 69*   WBCUA 13*   BACTERIA Many*   SQUAMEPITHEL 0   HYALINECASTS 4*   MICROCMT SEE COMMENT       Recent Labs   Lab 05/30/24  0831 05/30/24 2027 05/31/24  0833 05/31/24  1428   PH 7.366 7.377  --  7.401    PCO2 64.1* 66.3*  --  65.7*   PO2 37* 32* 33* 29*   HCO3 36.7* 39.0*  --  40.8*   POCSATURATED 66 57 59 53   BE 11* 14*  --  16*       Recent Labs   Lab 05/29/24  0950 05/29/24  1753 05/30/24  0830 05/30/24  1757 05/31/24  0832   CPK 1059* 1619* 2673* 3658* 4628*       Recent Labs   Lab 05/28/24  0611 05/28/24 2005 05/28/24  2157 05/29/24  0919 05/29/24  1541 05/30/24  0311 05/31/24  0323 05/31/24  0832   INR 1.0 1.2  --   --   --   --   --   --    APTT  --  109.1*   < > 55.4* 56.6* 51.5* 36.7* 55.8*    < > = values in this interval not displayed.       Lab Results   Component Value Date    HGBA1C 5.3 05/14/2024       Recent Labs   Lab 05/28/24  0611   TSH 0.889       Microbiology Results (last 7 days)       Procedure Component Value Units Date/Time    Urine culture [[id number]]  (Abnormal)  (Susceptibility) Collected: 05/28/24 1817    Order Status: Completed Specimen: Urine Updated: 05/31/24 0442     Urine Culture, Routine PROTEUS MIRABILIS  >100,000 cfu/ml      Narrative:      ADD ON URINE PROTEIN/CREATININE RATIO PER DR BRITT GUILLEN/ORDER#   [id number] @ 22:28    ADD ON URINE SODIUM PER DR ELAINA CASH/ORDER# [id number] @ 21:53    Specimen Source->Urine            US Retroperitoneal Complete    Result Date: 5/29/2024  EXAMINATION: US RETROPERITONEAL COMPLETE CLINICAL HISTORY: ARF; TECHNIQUE: Ultrasound of the kidneys and urinary bladder was performed including color flow and Doppler evaluation of the kidneys. COMPARISON: Complete retroperitoneal ultrasound 12/11/2021. FINDINGS: Right kidney: The right kidney measures 9.0 cm.  No cortical thinning. No loss of corticomedullary distinction.  Resistive index measures 1.0.  1.2 x 1.0 x 0.8 cm anechoic cyst in the midpole.  No renal stone. No hydronephrosis. Left kidney: The left kidney measures 8.8 cm. No cortical thinning. No loss of corticomedullary distinction.  Resistive index measures 1.0.  1.3 x 1.2 x 0.9 cm anechoic cyst in the upper pole.  0.3 cm  nonobstructing stone in the lower pole.  No hydronephrosis. Bladder is decompressed around Keys catheter.     Medical renal disease.  No hydronephrosis. Bilateral renal cysts. Nonobstructing left renal stone. Electronically signed by resident: Orly Sanders Date:    05/29/2024 Time:    13:25 Electronically signed by: Kj Damon MD Date:    05/29/2024 Time:    14:22    Echo    Result Date: 5/29/2024    Left Ventricle: The left ventricle is severely dilated. Severely increased ventricular mass. Normal wall thickness. There is severe eccentric hypertrophy. Severe global hypokinesis present. There is severely reduced systolic function with a visually estimated ejection fraction of 5 - 10%. Ejection fraction by visual approximation is 10% or less. Grade I diastolic dysfunction. Normal left ventricular filling pressure.   Right Ventricle: Normal right ventricular cavity size. Wall thickness is normal. Systolic function is normal.   Left Atrium: Left atrium is moderately dilated.   Aortic Valve: The aortic valve is a trileaflet valve. There is mild annular calcification present. There is no stenosis. There is no significant regurgitation.   Mitral Valve: There is mild bileaflet sclerosis. There is mild to mild- moderate regurgitation.   Tricuspid Valve: There is trace regurgitation.   Aorta: Aortic root is normal in size measuring 2.78 cm. Ascending aorta is normal measuring 2.85 cm.   Pulmonary Artery: The estimated pulmonary artery systolic pressure is 36 mmHg.   IVC/SVC: Normal venous pressure at 3 mmHg.     US Lower Extremity Arteries Bilateral    Result Date: 5/29/2024  EXAMINATION: US LOWER EXTREMITY ARTERIES BILATERAL CLINICAL HISTORY: Right lower extremity concern for ischemia; TECHNIQUE: Bilateral lower extremity arterial duplex ultrasound examination performed. Multiple gray scale and color doppler images were obtained in addition to waveform analysis. COMPARISON: None FINDINGS: The peak systolic  velocities on the right are as follows, in centimeters/second: Common femoral artery: 36.1 Deep femoral artery: 59 Superficial femoral artery, proximal: 14.4 Superficial femoral artery, mid portion: Occluded Superficial femoral artery, distal: Occluded Popliteal artery: Occluded Posterior tibial artery: Occluded Anterior tibial artery: Occluded The peak systolic velocities on the left are as follows, in centimeters/second: Common femoral artery: 169 Deep femoral artery: 209 Superficial femoral artery, proximal: 151 Superficial femoral artery, mid portion: 159 Superficial femoral artery, distal: 410 Popliteal artery: 111 Posterior tibial artery: 36 Anterior tibial artery: 43 There are monophasic waveforms in patent vessels of the right lower extremity.  There are triphasic waveforms in the left common femoral artery and deep femoral artery.  The remaining waveforms in the left lower extremity are monophasic.     There is occlusion of the right mid and distal superficial femoral artery, popliteal artery, and anterior and posterior tibial arteries. There is severe stenosis of the distal left superficial femoral artery. This report was flagged in Epic as abnormal. This result was discovered at 03:04.  Findings were discussed via telephone by Edda Donnelly MD with Jose Jeff MD on 5/29/2024 at 03:34.  Patient name and medical record number were verified, read back was performed. Electronically signed by resident: Edda Donnelly Date:    05/29/2024 Time:    02:52 Electronically signed by: Xiang Rosa Date:    05/29/2024 Time:    04:12    X-Ray Chest AP Portable    Result Date: 5/28/2024  EXAMINATION: XR CHEST AP PORTABLE CLINICAL HISTORY: central line placement; TECHNIQUE: Single frontal view of the chest was performed. COMPARISON: 05/28/2024 FINDINGS: Interval placement of a right IJ catheter which terminates in the distal SVC.  The heart remains enlarged.  Calcified atheromatous disease  affects the tortuous aorta.  Diffuse reticular opacities throughout the lungs, likely related to pulmonary edema however underlying inflammatory/infectious process cannot be excluded.  Skeletal structures are intact.     As above. Electronically signed by: Lakeshia Arambula MD Date:    05/28/2024 Time:    22:27    X-Ray Chest AP Portable    Result Date: 5/28/2024  EXAMINATION: XR CHEST AP PORTABLE CLINICAL HISTORY: shortness of breath; TECHNIQUE: Single frontal view of the chest was performed. COMPARISON: 05/14/2024 FINDINGS: The cardiac silhouette is enlarged. There is abnormal increased reticular lung markings seen throughout both lung fields, concerning for edema or an underlying inflammatory or infectious process.  No large pleural effusion.  No pneumothorax. The osseous structures appear normal.     Abnormal diffuse increased reticular lung markings, consider edema or underlying inflammation/infectious process Electronically signed by: Blessing Sanders MD Date:    05/28/2024 Time:    16:53    MRI Brain Without Contrast    Result Date: 5/28/2024  EXAMINATION: MRI BRAIN WITHOUT CONTRAST CLINICAL HISTORY: Stroke, follow up;Acute focal neurological deficit; TECHNIQUE: Multiplanar multisequence MR imaging of the brain was performed without contrast. COMPARISON: None. FINDINGS: Parenchyma: There is no restricted diffusion to suggest acute or subacute ischemic infarct.Generalized pattern age-related parenchymal volume loss noted.  Nonspecific areas of T2/FLAIR hyperintense signal in the frontoparietal white matter and lis may reflect sequela of chronic small vessel ischemic disease. Additionally, there is a nonspecific somewhat lobulated appearing T1 and T2/FLAIR hyperintense lesion in the mesial right temporal/hippocampal region (axial FLAIR series 8, image 11; coronal T2 series 12, image 15), measuring on the order of 7 x 17 x 8 mm. Additional comments: There is no midline shift, abnormal extra-axial fluid  collection, or acute intracranial hemorrhage. The basal cisterns are patent. Ventricles: Normal. Flow voids: The normal major intracranial arterial flow voids are visualized. Sinuses and mastoid air cells: Essentially clear Orbits: Normal Midline structures: The pituitary and craniocervical junction are normal. Marrow: Normal     1. No evidence of acute ischemia or recent infarction, as questioned. 2. Indeterminate T1 and T2-FLAIR hyperintense lesion in the mesial right temporal lobe/inferior basal ganglia region.  No definite restricted diffusion or significant susceptibility-related signal loss at this location.  Evolving subacute infarct would be considered.  Further characterization with contrast-enhanced sequences would be recommended as neoplastic etiology is not excluded.  Inflammatory/demyelinating or infectious processes would additionally be considered. Electronically signed by: Joel Acosta Date:    05/28/2024 Time:    14:10    Echo    Result Date: 5/14/2024    Left Ventricle: The left ventricle is severely dilated. Severely increased ventricular mass. Normal wall thickness. There is eccentric hypertrophy. Severe global hypokinesis present. There is severely reduced systolic function with a visually estimated ejection fraction of 5 - 10%. There is diastolic dysfunction but grade cannot be determined.   Right Ventricle: Normal right ventricular cavity size. Wall thickness is normal. Right ventricle wall motion  is normal. Systolic function is normal.   Left Atrium: Left atrium is moderately dilated.   Mitral Valve: There is mild regurgitation.   Pulmonary Artery: The estimated pulmonary artery systolic pressure is 32 mmHg.   IVC/SVC: Normal venous pressure at 3 mmHg.     X-Ray Chest AP Portable    Result Date: 5/14/2024  EXAMINATION: XR CHEST AP PORTABLE CLINICAL HISTORY: CHF; TECHNIQUE: Single frontal view of the chest was performed. COMPARISON: 12/28/2023 FINDINGS: Cardiac monitoring leads overlie the  chest.  There is unchanged prominence of the cardiomediastinal silhouette.  There is atherosclerosis of the thoracic aorta.  The lungs are symmetrically expanded with diffuse coarse/increased interstitial attenuation with bibasilar predominance.  Findings could reflect underlying interstitial edema or infectious process superimposed upon a background of chronic interstitial change.  No confluent airspace consolidation, substantial volume of pleural fluid or pneumothorax identified.  Visualized osseous structures appear intact with degenerative change.     Please see above. Electronically signed by: Sara Parsons MD Date:    05/14/2024 Time:    02:47

## 2024-05-31 NOTE — NURSING
MD at bedside, unable to give pt too much info, however did confirm that 4 days timeframe did not make much sense, showed leg, marked discoloration with MD at bedside. He states he will call daughter whenever he is able

## 2024-05-31 NOTE — ASSESSMENT & PLAN NOTE
Patient is identified as having Combined Systolic and Diastolic heart failure that is Acute on chronic. CHF is currently . Latest ECHO performed and demonstrates- Results for orders placed during the hospital encounter of 05/14/24    Echo    Interpretation Summary    Left Ventricle: The left ventricle is severely dilated. Severely increased ventricular mass. Normal wall thickness. There is eccentric hypertrophy. Severe global hypokinesis present. There is severely reduced systolic function with a visually estimated ejection fraction of 5 - 10%. There is diastolic dysfunction but grade cannot be determined.    Right Ventricle: Normal right ventricular cavity size. Wall thickness is normal. Right ventricle wall motion  is normal. Systolic function is normal.    Left Atrium: Left atrium is moderately dilated.    Mitral Valve: There is mild regurgitation.    Pulmonary Artery: The estimated pulmonary artery systolic pressure is 32 mmHg.    IVC/SVC: Normal venous pressure at 3 mmHg.  . Monitor clinical status closely. Monitor on telemetry. Patient is off CHF pathway.  Monitor strict Is&Os and daily weights.  Place on fluid restriction of 1.5 L. Cardiology has been consulted. Continue to stress to patient importance of self efficacy and  on diet for CHF. Last BNP reviewed- and noted below   Recent Labs   Lab 05/28/24  1259   BNP 2,066*       -Net -1L overnight. Lasix gtt 20mg/hr CVP of 7 this AM.

## 2024-05-31 NOTE — CONSULTS
[facility] - Cardiac Intensive Care  Vascular Surgery  Consult Note    Inpatient consult to Vascular Surgery  Consult performed by: Genaro No MD  Consult ordered by: Natalio Angel MD        Subjective:     Chief Complaint/Reason for Admission: Acute Limb Ischemia    History of Present Illness: 78-year-old woman with HFrEF (most recent TTE with EF of 5-10%), currently on Dobutamine gtt, COPD with chronic hypoxic respiratory failure on supplemental oxygen (2 liters via nasal cannula as outpatient), prior tobacco abuse, CKD IIIa (baseline creatinine ~1) and hypertension who was admitted to CCU on 5/28 with cardiogenic shock after presenting to the ED with complaints of right lower extremity numbness and associated weakness which had started roughly around 3 AM earlier that morning. This presentation of acute R sided leg weakness was suspected to be a stroke and upon transfer to [facility] she had flash pulmonary edema, stepped up to the ICU for dobutamine gtt and BIPAP. Since early am on 5/29 her RLE had become increasingly cold and painful leading to a vascular US that showed occlusion of the R mid and distal SFA, popliteal artery, AT, and PT. This was also noted severe stenosis of the distal left superficial femoral artery. No CTA done of RLE. Was started on a hep gtt along with her ASA and plavix.    Since 5/29, her leg has become more mottled and insensate below the knee associated with a rise in CK to 4600 on 5/31. Vascular surgery was consulted for acute limb ischemia on 5/31 at 1200 PM. The CICU team stated that they had spoken to interventional cardiology previously at admission and did not recommend intervention. As the leg became more moddled,  primary team asked a  second opinion from vascular surgery.     On interviewing the patient she states that her RLE is currently not in any pain, but feels a tingling numbness sensation below her knee. Is unable to move RLE below knee. She has discoloration of her  RLE up to her knee. We Had a discussion with the patient about her advanced directive wishes of DNR and DNI. She is apprehensive of undergoing surgery as she believes she will have to remain intubated for an extended period of time which does not align with her wishes. She asked that we speak to her daughter prior to her making any decision.     Medications Prior to Admission   Medication Sig Dispense Refill Last Dose    fluticasone-salmeterol diskus inhaler 250-50 mcg Inhale 1 puff into the lungs 2 (two) times daily. Controller 60 each 11     furosemide (LASIX) 40 MG tablet Take 1 tablet (40 mg total) by mouth once daily. 90 tablet 3     metoprolol succinate (TOPROL-XL) 25 MG 24 hr tablet Take 1 tablet (25 mg total) by mouth once daily. 90 tablet 3     spironolactone (ALDACTONE) 25 MG tablet Take 1 tablet (25 mg total) by mouth once daily. 30 tablet 0     albuterol-ipratropium (DUO-NEB) 2.5 mg-0.5 mg/3 mL nebulizer solution Take 3 mLs by nebulization every 6 (six) hours as needed for Wheezing. Rescue 90 mL 3     empagliflozin (JARDIANCE) 10 mg tablet Take 1 tablet (10 mg total) by mouth once daily. 30 tablet 2        Review of patient's allergies indicates:   Allergen Reactions    Atorvastatin      Leg cramps       Past Medical History:   Diagnosis Date    Abnormal liver enzymes 12/10/2018    Acute on chronic combined systolic and diastolic congestive heart failure 4/2/2021    Acute on chronic respiratory failure with hypoxia 4/2/2021    Acute on chronic respiratory failure with hypoxia and hypercapnia 12/10/2021    CHF (congestive heart failure)     COPD exacerbation 7/12/2022    Former smoker 10/24/2018    Hypertension     Smoker 7/27/2016     Past Surgical History:   Procedure Laterality Date    BREAST BIOPSY      left  side years ago in high school   1962    CHOLECYSTECTOMY      COLONOSCOPY      COLONOSCOPY N/A 08/23/2021    Procedure: COLONOSCOPY;  Surgeon: Shree Amaya MD;  Location: [facility] ([address] [place] [address]);   Service: Endoscopy;  Laterality: N/A;  Do not cancel this order  fully vaccinated-see immunization record  EF 18%  -covid [facility]    HYSTERECTOMY       Family History       Problem Relation (Age of Onset)    Arthritis Mother    Cancer Father, Brother    Colon cancer Father, Brother (63)    Dementia Father    Diabetes Daughter    Heart attack Mother    Heart disease Mother, Brother    Hypertension Mother, Father, Brother          Tobacco Use    Smoking status: Former     Current packs/day: 0.00     Types: Cigarettes     Quit date: 2018     Years since quittin.9     Passive exposure: Past    Smokeless tobacco: Never   Substance and Sexual Activity    Alcohol use: Yes     Comment: occasional drinker, not a daily drinker    Drug use: No    Sexual activity: Not Currently     Partners: Male     Review of Systems   Constitutional:  Positive for fatigue. Negative for chills and fever.   Respiratory:  Positive for shortness of breath.    Cardiovascular:  Positive for leg swelling.   Gastrointestinal:  Negative for abdominal pain.   Skin:  Positive for color change and pallor.   Neurological:  Positive for weakness and numbness.   Hematological:  Bruises/bleeds easily.     Objective:     Vital Signs (Most Recent):  Temp: 97.9 °F (36.6 °C) (24 1101)  Pulse: 84 (24 1200)  Resp: 12 (24 1200)  BP: 112/62 (24 1200)  SpO2: 97 % (24 1200) Vital Signs (24h Range):  Temp:  [97.9 °F (36.6 °C)-98.2 °F (36.8 °C)] 97.9 °F (36.6 °C)  Pulse:  [] 84  Resp:  [9-32] 12  SpO2:  [90 %-98 %] 97 %  BP: (100-138)/(46-77) 112/62  Arterial Line BP: (101-144)/(44-64) 134/54     Weight: 62 kg (136 lb 11 oz)  Body mass index is 25.83 kg/m².      Physical Exam  Vitals and nursing note reviewed.   Constitutional:       General: She is not in acute distress.     Appearance: She is ill-appearing.   Cardiovascular:      Rate and Rhythm: Regular rhythm. Tachycardia present.      Comments: 1+  Right femoral pulse  Absent right pop, dp/pt signals  Pulmonary:      Comments: 2 L NC satting 94%  Skin:     Comments: RLE with swelling and mottled appearance extending to the right knee; Cold to the touch    Neurological:      Mental Status: She is alert and oriented to person, place, and time.      Cranial Nerves: Cranial nerves 2-12 are intact.      Motor: Weakness (0/5 stregth below right knee; 3/5 strength above right knee) present.   Psychiatric:         Mood and Affect: Mood normal.         Behavior: Behavior normal.          Significant Labs:  CBC:   Recent Labs   Lab 05/31/24  0323   WBC 7.58   RBC 4.34   HGB 10.4*   HCT 34.1*      MCV 79*   MCH 24.0*   MCHC 30.5*     CMP:   Recent Labs   Lab 05/31/24 0323      CALCIUM 9.6   ALBUMIN 3.4*   PROT 6.5      K 4.4   CO2 36*   CL 89*   BUN 42*   CREATININE 2.4*   ALKPHOS 90   ALT 59*   AST 61*   BILITOT 0.5     All pertinent labs from the last 24 hours have been reviewed.    Significant Diagnostics:  I have reviewed all pertinent imaging results/findings within the past 24 hours.  Assessment/Plan:     Acute lower limb ischemia  This is a 78-year-old female with history of half for F (EF 5-10% currently on dobutamine), COPD on home oxygen, diabetes, who presented to the ED on 5/28/24 with right lower extremity weakness which was originally concerning for a TIA.  Ultimately given her decreased signals in her a cool leg a arterial ultrasound of the right lower extremity was performed which showed occlusion of the right mid and distal superficial femoral artery, popliteal artery, and anterior and posterior tibial arteries.  The patient was discussed with Interventional cardiology who stated there was no intervention for this patient at that time.  As the leg became more mottled and her CK was elevated over 4000 today, primary team engaged vascular surgery around noon to discuss a 2nd opinion.    -patient seen and examined  -labs and imaging  reviewed   -unfortunately, given the patient's presentation at the time of our consultation, there is no surgical option for revascularization.  -we discussed with the patient as well as her daughter upon request that the patient has a nonviable right lower extremity that ultimately will require above-the-knee amputation.  The patient is wary of the amputation, given the fact that she may require intubation during the procedure or afterwards which does not align with her wishes.  We also discussed with the patient that if she does not pursue amputation, the most likely course will be that she will become very sick from this disease process which will ultimately lead to her death.  I spoke with the daughter at length who discussed that she believes her mother is growing tired of her illnesses and that she we will speak with her mother as well so that they can make a mucus or decision that aligns with her mother's advanced directives.  -discussed with the family that we would be available to discuss her care at any time, however the only surgical option for her at this time is a right above-the-knee amputation.  The family stated that if they choose to pursue amputation they would let the primary team knows that that this could be communicated to the vascular surgery team.  -if patient chooses amputation, please make her NPO        Thank you for your consult. I will follow-up with patient. Please contact us if you have any additional questions.    Genaro No MD  Vascular Surgery  [facility] - Cardiac Intensive [facility]

## 2024-05-31 NOTE — ASSESSMENT & PLAN NOTE
Pt presenting with RLE weakness and decreased sensation to the knee that started around 3AM the day of admit. Initial thought was TIA as pt had palpable pulses on admit, however pt developed worsening RLE pain during admission prompting LE Arterial US.    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 There is severe stenosis of the distal left superficial femoral artery.  -CPK uptrended from 2673 to 4628.     -RLE pulses unable to be obtained on doppler. Color change noted on RLE. Pt endorsing numbness.    -Vascular surgery consulted today. Spoke to pt and family and discussed that she would require above-the-knee amputation, pt unsure about above the knee amputation at this time as it may require here to be intubated. We will inform vascular surgery if pt decides to proceed with above the knee amputation.      -ASA, Plavix, Heparin   -Vascular Surgery following

## 2024-05-31 NOTE — ASSESSMENT & PLAN NOTE
Patient's COPD is with exacerbation noted by continued dyspnea and worsening of baseline hypoxia currently.  Patient is currently off COPD Pathway. Continue scheduled inhalers Supplemental oxygen and monitor respiratory status closely.     Prior smoking history of 0.5 ppd, 25 yo start, quit 2018.    - Continue maintenance inhaler  - Supplemental O2 PRN

## 2024-06-01 NOTE — CARE UPDATE
Hemodynamics:   Parameter   at 2000   MAP 80   CVP 7   SvO2 55 <- 53   CO  3.7   CI 2.3   SVR 1575      sodium chloride 0.9%   Intravenous Continuous 5 mL/hr at 05/31/24 1800 Rate Verify at 05/31/24 1800    DOBUTamine IV infusion (non-titrating)  2.5 mcg/kg/min Intravenous Continuous 2.3 mL/hr at 05/31/24 1800 2.5 mcg/kg/min at 05/31/24 1800    furosemide (Lasix) 500 mg in 50 mL infusion (conc: 10 mg/mL)  20 mg/hr Intravenous Continuous 2 mL/hr at 05/31/24 1800 20 mg/hr at 05/31/24 1800    heparin (porcine) in D5W  0-40 Units/kg/hr (Adjusted) Intravenous Continuous 7.5 mL/hr at 05/31/24 1800 14 Units/kg/hr at 05/31/24 1800         Recent Labs   Lab 05/31/24  1542   *   K 4.4   CL 86*   CO2 35*   BUN 45*   CREATININE 2.4*   CALCIUM 9.6   MG 2.3     Recent Labs   Lab 05/31/24  0323   WBC 7.58   RBC 4.34   HGB 10.4*   HCT 34.1*      MCV 79*   MCH 24.0*   MCHC 30.5*     Recent Labs   Lab 05/31/24  1426   APTT 45.7*       UOP Day Shift: 1000/700  UOP Night Shift: 0/100  UOP 24Hours:   Intake/Output Summary (Last 24 hours) at 5/31/2024 2039  Last data filed at 5/31/2024 2001  Gross per 24 hour   Intake 1237.75 ml   Output 1850 ml   Net -612.25 ml       Assessment/Plan:  - staying net even, and stable hemos. No changes  - Plan was discussed with on-call attending    Grzegorz Pitts MD  Cardiovascular Disease PGY-4  [facility]

## 2024-06-01 NOTE — ASSESSMENT & PLAN NOTE
Nephrology consulted. Urinary sediment with granular, halfy muddy brown and waxy casts suggestive of ATN in the setting of cardiogenic shock.    Cr of 2.4 this AM. Baseline Cr of 1.0.     Plan:    - CMP qd, trend Cr  - strict I/O  - daily weights  - renally dose all medications  - avoid nephrotoxic agents, NSAIDs, IV contrast, ACE/ARB  - Improving with diuresis

## 2024-06-01 NOTE — PROGRESS NOTES
[facility] - [facility]  Cardiology  Progress Note    Patient Name: Selma Hooper  MRN: [id number]  Admission Date: 5/28/2024  Hospital Length of Stay: 4 days  Code Status: DNR   Attending Physician: Anthony Cardoso MD   Primary Care Physician: Phil Andrade MD  Expected Discharge Date: 6/5/2024  Principal Problem:Cardiogenic shock    Subjective:     Hospital Course:   Weaned off BiPAP to 2L NC (home O2 1.5L). Continued weaning diuresis (lasix ggt to IV pushes) and weaning dobutamine. RLE with continued poor, intermittent pulses with associated intermittent pain/numbness, decreasing sensation to palpation. US arterial BLE with occlusion of the right mid and distal superficial femoral artery, popliteal artery, and anterior and posterior tibial arteries & severe stenosis of the distal left superficial femoral artery. Per discussions with Interventional Cardiology, no acute intervention given acuity of overall presentation. Vascular Surgery offered L AKA, but patient declined citing growing medical complexity in her life and no desire for intubation in any form. She believes that her life is limited primarily by her HFrEF and that the ischemic limb is an additional setback which, even if amputated, would not necessarily improve her quality of life.     Patient accepting of idea that ischemic limb will likely cause her demise. Palliative consulted to assist with ongoing GOC and hospice discussions.     Interval History: NAEON, dobutamine continued, Lasix down titrated from ggt to IV pushes.    Review of Systems   Constitutional: Negative for chills, diaphoresis, fever, malaise/fatigue and night sweats.   HENT:  Negative for congestion, hearing loss, hoarse voice, sore throat and stridor.    Eyes:  Negative for blurred vision.   Cardiovascular:  Negative for chest pain, claudication, cyanosis, dyspnea on exertion, leg swelling, near-syncope, orthopnea, palpitations, paroxysmal nocturnal dyspnea and  syncope.   Respiratory:  Negative for cough, shortness of breath, sleep disturbances due to breathing, snoring, sputum production and wheezing.    Skin:  Negative for dry skin and rash.   Musculoskeletal:  Positive for myalgias. Negative for arthritis, back pain, joint pain, muscle cramps and muscle weakness.   Gastrointestinal:  Negative for bloating, abdominal pain, change in bowel habit, constipation, diarrhea, dysphagia, nausea and vomiting.   Genitourinary:  Negative for dysuria and urgency.   Neurological:  Positive for focal weakness and numbness. Negative for difficulty with concentration, excessive daytime sleepiness, dizziness, headaches, light-headedness, tremors and weakness.   Psychiatric/Behavioral:  Negative for altered mental status and depression. The patient does not have insomnia.      Objective:     Vital Signs (Most Recent):  Temp: 98.2 °F (36.8 °C) (06/01/24 1101)  Pulse: 80 (06/01/24 1334)  Resp: (!) 25 (06/01/24 1334)  BP: (!) 112/58 (06/01/24 1334)  SpO2: 96 % (06/01/24 1334) Vital Signs (24h Range):  Temp:  [98 °F (36.7 °C)-98.7 °F (37.1 °C)] 98.2 °F (36.8 °C)  Pulse:  [78-96] 80  Resp:  [13-43] 25  SpO2:  [91 %-97 %] 96 %  BP: (103-137)/(52-79) 112/58  Arterial Line BP: (109-140)/(44-55) 109/44     Weight: 62 kg (136 lb 11 oz)  Body mass index is 25.83 kg/m².     SpO2: 96 %         Intake/Output Summary (Last 24 hours) at 6/1/2024 1428  Last data filed at 6/1/2024 1301  Gross per 24 hour   Intake 712.66 ml   Output 790 ml   Net -77.34 ml       Lines/Drains/Airways       Central Venous Catheter Line  Duration             Trialysis (Dialysis) Catheter 05/28/24 1613 right internal jugular 3 days              Drain  Duration                  Urethral Catheter 05/28/24 1200 4 days              Arterial Line  Duration             Arterial Line 05/28/24 1818 Left Radial 3 days              Peripheral Intravenous Line  Duration                  Peripheral IV - Single Lumen 05/28/24 1259 20 G Left  Hand 4 days                       Physical Exam  Vitals and nursing note reviewed.   Constitutional:       Appearance: Normal appearance. She is not ill-appearing.      Comments: L radial arterial line   Eyes:      Extraocular Movements: Extraocular movements intact.      Conjunctiva/sclera: Conjunctivae normal.   Neck:      Comments: RIJ CVC in place dressing C/D/I  Cardiovascular:      Rate and Rhythm: Regular rhythm. Tachycardia present.      Pulses: Decreased pulses (RLE).      Comments: JVD indeterminate 2/2 CVC  Pulmonary:      Effort: Pulmonary effort is normal.      Breath sounds: Normal breath sounds. No rales.   Abdominal:      General: Bowel sounds are normal. There is no distension.      Palpations: Abdomen is soft.   Musculoskeletal:      Cervical back: Normal range of motion.      Right lower leg: No edema.      Left lower leg: No edema.   Skin:     General: Skin is warm and dry.   Neurological:      General: No focal deficit present.      Mental Status: She is alert and oriented to person, place, and time.      Sensory: Sensory deficit (RLE) present.      Motor: Weakness (RLE) present.            Significant Labs: All pertinent lab results from the last 24 hours have been reviewed.    Significant Imaging:  Reviewed  Assessment and Plan:     * Cardiogenic shock  Pt with an EF of 5-10% that had suspected flash pulmonary edema while getting a MRI. She required BiPAP at that time and received IV Lasix 40mg x3 in the ED with no response. Central line and A-line were placed; pt was started on Dobutamine 5mcg and Lasix 30mg/hr gtt.     Hemos during the initiation of Dobutamine and Lasix gtt:     CVP 10, SvO2 51, CO 3.3, CI 2, and SVR 1945.     Hemos this AM:    CVP 7, SvO2 59, CO 4.4, CI 2.7, and SVR 1164.     -On Dobutamine 2.5mcg this AM.     - Dobutamine 2.5 gtt  - Lasix 20mg/hr transitioned to  mg TID    Acute lower limb ischemia  Pt presenting with RLE weakness and decreased sensation to the knee  that started around 3AM the day of admit. Initial thought was TIA as pt had palpable pulses on admit, however pt developed worsening RLE pain during admission prompting LE Arterial US.    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 There is severe stenosis of the distal left superficial femoral artery.  -CPK uptrended from 2673 to 4628.     -RLE pulses unable to be obtained on doppler. Color change noted on RLE. Pt endorsing numbness.    -Vascular surgery consulted today. Spoke to pt and family and discussed that she would require above-the-knee amputation, pt unsure about above the knee amputation at this time as it may require here to be intubated. We will inform vascular surgery if pt decides to proceed with above the knee amputation.     - ASA, Plavix, Heparin  - Rising CPK  - Vascular Surgery offered AKA, but patient refused as it doesn't align with her goals. Vasc Surg signed off.  - Palliative Care consulted for ongoing GOC discussions including inpatient vs. outpatient hospice.    Arrhythmia  Sinus tachycardia    Long QT interval  With wide QRS tachycardia on admission. Qtc 568 on admission.     - K goal 4, Mg 2    COPD (chronic obstructive pulmonary disease)  Patient's COPD is with exacerbation noted by continued dyspnea and worsening of baseline hypoxia currently.  Patient is currently off COPD Pathway. Continue scheduled inhalers Supplemental oxygen and monitor respiratory status closely.     Prior smoking history of 0.5 ppd, 25 yo start, quit 2018.    - Continue maintenance inhaler  - Supplemental O2 PRN, home use 1.5L    HFrEF (heart failure with reduced ejection fraction)  See NICM    DNR (do not resuscitate)  Pt DNR per chart review and reconfirmed in the ED on this admission. Do not intubate.    Type 2 diabetes mellitus with hyperglycemia, without long-term current use of insulin  Last A1C 5 during  admission two weeks prior to current ICU admission. Not on home DM regimen. Hyperglycemic during this admission.    - LDSSI    Acute on chronic respiratory failure with hypoxia and hypercapnia  Patient with Hypercapnic and Hypoxic Respiratory failure which is Acute on chronic.  she is on home oxygen at 1.5 LPM. Supplemental oxygen was provided and noted.    Respiratory status has improved, pt on 2L NC this AM (baseline 1.5L at home)       Signs/symptoms of respiratory failure include- tachypnea, increased work of breathing, and respiratory distress. Contributing diagnoses includes - CHF and COPD Labs and images were reviewed. Patient Has recent ABG, which has been reviewed. Will treat underlying causes and adjust management of respiratory failure as follows-     - Do not intubate, does not align with patient goals    Acute on chronic combined systolic and diastolic congestive heart failure  Patient is identified as having Combined Systolic and Diastolic heart failure that is Acute on chronic. CHF is currently . Latest ECHO performed and demonstrates- Results for orders placed during the hospital encounter of 05/14/24    Echo    Interpretation Summary    Left Ventricle: The left ventricle is severely dilated. Severely increased ventricular mass. Normal wall thickness. There is eccentric hypertrophy. Severe global hypokinesis present. There is severely reduced systolic function with a visually estimated ejection fraction of 5 - 10%. There is diastolic dysfunction but grade cannot be determined.    Right Ventricle: Normal right ventricular cavity size. Wall thickness is normal. Right ventricle wall motion  is normal. Systolic function is normal.    Left Atrium: Left atrium is moderately dilated.    Mitral Valve: There is mild regurgitation.    Pulmonary Artery: The estimated pulmonary artery systolic pressure is 32 mmHg.    IVC/SVC: Normal venous pressure at 3 mmHg.  . Monitor clinical status closely. Monitor on  telemetry. Patient is off CHF pathway.  Monitor strict Is&Os and daily weights.  Place on fluid restriction of 1.5 L. Cardiology has been consulted. Continue to stress to patient importance of self efficacy and  on diet for CHF. Last BNP reviewed- and noted below   Recent Labs   Lab 05/28/24  1259   BNP 2,066*       - Lasix 20IV ggt transitioned to  TID     LBBB (left bundle branch block)  Noted on EKG dating as far back as 03/2019. Reconfirmed on admission EKG.     NICM (nonischemic cardiomyopathy)  Echo from 05/24/24 with EF 5-10% with global hypokinesis. GDMT includes Toprol 25mg qd, spironolactone 25mg qd.  Unable to afford Jardiance even with coupon. Entresto held on prior admission and DC'd at clinic visit on day prior to admission given continued soft BP. Clinic plan was to introduce low dose ACE/ARB for additional GDMT. Diuresis with Lasix 40mg qd. Patient declined establishing with Palliative Care during recent clinic visit.      - Add GDMT back once patient stabilizes    Acute renal failure with acute tubular necrosis superimposed on stage 3a chronic kidney disease  Nephrology consulted. Urinary sediment with granular, halfy muddy brown and waxy casts suggestive of ATN in the setting of cardiogenic shock.    Cr of 2.4 this AM. Baseline Cr of 1.0.     Plan:    - CMP qd, trend Cr  - strict I/O  - daily weights  - renally dose all medications  - avoid nephrotoxic agents, NSAIDs, IV contrast, ACE/ARB  - Improving with diuresis      Hypertension, essential  Chronic, controlled. Latest blood pressure and vitals reviewed-     Temp:  [98 °F (36.7 °C)-98.7 °F (37.1 °C)]   Pulse:  [78-96]   Resp:  [13-43]   BP: (103-137)/(52-79)   SpO2:  [91 %-97 %]   Arterial Line BP: (109-140)/(44-55) .   Home meds for hypertension were reviewed and noted below.   Hypertension Medications               furosemide (LASIX) 40 MG tablet Take 1 tablet (40 mg total) by mouth once daily.    metoprolol succinate (TOPROL-XL)  25 MG 24 hr tablet Take 1 tablet (25 mg total) by mouth once daily.    spironolactone (ALDACTONE) 25 MG tablet Take 1 tablet (25 mg total) by mouth once daily.            While in the hospital, will manage blood pressure as follows; Adjust home antihypertensive regimen as follows- Procardia-XL 30mg QD for afterload reduction.     Will utilize p.r.n. blood pressure medication only if patient's blood pressure greater than 180/110 and she develops symptoms such as worsening chest pain or shortness of breath.        VTE Risk Mitigation (From admission, onward)           Ordered     IP VTE HIGH RISK PATIENT  Once         05/28/24 2104     Place sequential compression device  Until discontinued         05/28/24 2104     heparin 25,000 units in dextrose 5% (100 units/ml) IV bolus from bag LOW INTENSITY nomogram - [facility]  As needed (PRN)        Question:  Heparin Infusion Adjustment (DO NOT MODIFY ANSWER)  Answer:  [email or web address]\epic\Images\Pharmacy\HeparinInfusions\heparin LOW INTENSITY nomogram for [facility] [id number]    05/28/24 1948     heparin 25,000 units in dextrose 5% (100 units/ml) IV bolus from bag LOW INTENSITY nomogram - [facility]  As needed (PRN)        Question:  Heparin Infusion Adjustment (DO NOT MODIFY ANSWER)  Answer:  [email or web address]\epic\Images\Pharmacy\HeparinInfusions\heparin LOW INTENSITY nomogram for [facility] [id number]    05/28/24 1948     heparin 25,000 units in dextrose 5% 250 mL (100 units/mL) infusion LOW INTENSITY nomogram - [facility]  Continuous        Question:  Begin at (units/kg/hr)  Answer:  12    05/28/24 1948     Place sequential compression device  Until discontinued         05/28/24 1624                    Je Hawkins MD  Cardiology  [facility] - [facility]

## 2024-06-01 NOTE — PLAN OF CARE
Problem: Occupational Therapy  Goal: Occupational Therapy Goal  Description: Goals to be met by: 7/1/24     Patient will increase functional independence with ADLs by performing:    UE Dressing with [place].  LE Dressing with Modified [place].  Grooming while standing at sink with Contact Guard Assistance and Assistive Devices as needed.  Supine to sit with [place].  Stand pivot transfers with Stand-by Assistance.  Toilet transfer to bedside commode with Stand-by Assistance.    Outcome: Progressing

## 2024-06-01 NOTE — ASSESSMENT & PLAN NOTE
Pt with an EF of 5-10% that had suspected flash pulmonary edema while getting a MRI. She required BiPAP at that time and received IV Lasix 40mg x3 in the ED with no response. Central line and A-line were placed; pt was started on Dobutamine 5mcg and Lasix 30mg/hr gtt.     Hemos during the initiation of Dobutamine and Lasix gtt:     CVP 10, SvO2 51, CO 3.3, CI 2, and SVR 1945.     Hemos this AM:    CVP 7, SvO2 59, CO 4.4, CI 2.7, and SVR 1164.     -On Dobutamine 2.5mcg this AM.     - Dobutamine 2.5 gtt  - Lasix 20mg/hr transitioned to  mg TID

## 2024-06-01 NOTE — PT/OT/SLP EVAL
Physical Therapy Evaluation    Patient Name:  Selma Hooper   MRN:  [id number]  Admit Date: 5/28/2024  Admitting Diagnosis:  Cardiogenic shock  Length of Stay: 4 days  Recent Surgery: * No surgery found *      Recommendations:     Discharge Recommendations:  Low intensity therapy at discharge  Discharge Equipment Recommendations: none   Barriers to discharge: none    Highest Level of Mobility: sit to stand  Assistance Needed: minimal assistance    Assessment:     Selma Hooper is a 78 y.o. female admitted with a medical diagnosis of Cardiogenic shock. Medical history includes CHF, COPD, and CKD. She has severe R LE ischemia. She is most limited today by pain and weakness. Today, she was able to perform bed mobility and standing. Based on clinical presentation, co-morbidities, and today's performance, patient would benefit from acute skilled physical therapy services to improve functional mobility and return to max capacity prior level of function. Patient currently demonstrates a need for low intensity therapy services after discharging from the hospital.  See detailed evaluation below:    Problem List: weakness, impaired endurance, impaired sensation, impaired self care skills, impaired functional mobility, gait instability, impaired balance, decreased lower extremity function, decreased safety awareness, pain, decreased ROM, impaired skin, edema, impaired cardiopulmonary response to activity  Rehab Prognosis: Good    Plan:     During this hospitalization, patient to be seen 3 x/week to address the identified impairments via gait training, therapeutic activities, therapeutic exercises, neuromuscular re-education, wheelchair management/training and progress towards the established goals.    Plan of Care Expires:  07/01/24    Subjective   Communicated with RN prior to session.  Patient found HOB elevated upon PT entry to room, agreeable to evaluation.     Chief Complaint: Numbness (Arrives via EMS for right leg numbness  "that started this morning x2 hours, states that the numbness went away when EMS arrived, VAN -, denies any weakness currently. On 2 L NC at home,)    Patient/Family Comments/goals: to feel better  Pain/Comfort:  Pain Rating 1: 5/10  Location - Side 1: Right  Location - Orientation 1: generalized  Location 1: knee  Pain Addressed 1: Reposition, Distraction  Pain Rating Post-Intervention 1: 5/10    Living Environment:  Patient lives with her daughter and granddaughters in a single story home with 3 steps to enter (R handrail).     Prior Level of Function:   Patient reports being independent with mobility & with ADLs. Patient owns DME as follows: wheelchair, walker, rolling, bedside commode, bath bench. She denies any use of DME prior to admission.    Roles/Repsonsibilities:   Work: Retired. Drive: yes.     Patient reports they will have assistance from family upon discharge.    Objective:   Patient found with: telemetry, pulse ox (continuous), peripheral IV, pressure relief boots, oxygen, hernandez catheter, central line, arterial line, blood pressure cuff     General Precautions: Standard, fall   Orthopedic Precautions:N/A   Braces: N/A   Oxygen Device: Nasal Cannula 2L  Vitals: /60 (BP Location: Right forearm, Patient Position: Lying)   Pulse 84   Temp 98 °F (36.7 °C) (Oral)   Resp 15   Ht 5' 1" (1.549 m)   Wt 62 kg (136 lb 11 oz)   LMP  (LMP Unknown)   SpO2 95%   Breastfeeding No   BMI 25.83 kg/m²     Exams:  Cognition:   Alert and Cooperative  AxOx4  Command following: Follows multistep  commands  Fluency: clear  Hearing: Intact  Vision:  Intact visual fields  Skin Integrity: R LE ischemic  Sensation:  impaired to R lower leg  Coordination: intact  LE Strength:  L Lower Extremity: WFL  R Lower Extremity: WFL, except for dorsiflexion 0/5 and plantarflexion 0/5  - reports foot drop began a few days ago  LE ROM:  L Lower Extremity: WFL  R Lower Extremity: WFL      Outcome Measures:  AM-PAC 6 CLICK " MOBILITY  Turning over in bed (including adjusting bedclothes, sheets and blankets)?: 3  Sitting down on and standing up from a chair with arms (e.g., wheelchair, bedside commode, etc.): 3  Moving from lying on back to sitting on the side of the bed?: 3  Moving to and from a bed to a chair (including a wheelchair)?: 3  Need to walk in hospital room?: 2  Climbing 3-5 steps with a railing?: 2  Basic Mobility Total Score: 16     Functional Mobility:    Bed Mobility:  Supine to Sit: minimum assistance  Scooting anteriorly to EOB to have both feet planted on floor: minimum assistance  Sit to Supine: stand by assistance    Sitting Balance at Edge of Bed:  Assistance Level Required: Stand-by Assistance  Postural deviations noted: rounded shoulders  Cueing provided for: upright and midline sitting posture    Transfers:   Sit <> Stand Transfer: minimum assistance with hand-held assist   Standing Tolerance: minimum assistance with hand-held assist   Deviations: knee buckling and posterior lean onto mattress    Neuro Re-Education:   Patient provided with the following neuro based interventions: verbal cueing and education for optimal posture and manual cueing and education for optimal posture    Therapeutic Activities:   Patient participated in the following therapeutic activities: bed mobility and standing transitions    Education:   Patient/family was educated on the following: role of PT, plan of care, and goals, in-room safety and use of call button, importance of upright mobility and exercise, and balancing rest and activity      Patient left HOB elevated with all lines intact, call button in reach, and RN notified.    GOALS:   Multidisciplinary Problems       Physical Therapy Goals          Problem: Physical Therapy    Goal Priority Disciplines Outcome Goal Variances Interventions   Physical Therapy Goal     PT, PT/OT Progressing     Description: PT goals to be met by: 7/1/24    Patient will perform rolling each way with  supervision.   Patient will perform supine <> sitting with supervision.  Patient will perform sit <> stand transitions with supervision using LRAD.  Patient will perform transfers from bed <> chair or BSC with supervision using LRAD.  Patient will ambulate 50 feet with supervision using LRAD.  Patient will ascend/descend 3 steps using R handrails with supervision.                       History:     Past Medical History:   Diagnosis Date    Abnormal liver enzymes 12/10/2018    Acute on chronic combined systolic and diastolic congestive heart failure 4/2/2021    Acute on chronic respiratory failure with hypoxia 4/2/2021    Acute on chronic respiratory failure with hypoxia and hypercapnia 12/10/2021    CHF (congestive heart failure)     COPD exacerbation 7/12/2022    Former smoker 10/24/2018    Hypertension     Smoker 7/27/2016       Past Surgical History:   Procedure Laterality Date    BREAST BIOPSY      left  side years ago in high school   1962    CHOLECYSTECTOMY      COLONOSCOPY      COLONOSCOPY N/A 08/23/2021    Procedure: COLONOSCOPY;  Surgeon: Shree Amaya MD;  Location: [address];  Service: Endoscopy;  Laterality: N/A;  Do not cancel this order  fully vaccinated-see immunization record  EF 18%  8/20-covid [facility]    HYSTERECTOMY         Time Tracking:     PT Received On: 06/01/24  PT Start Time: 1025     PT Stop Time: 1050  PT Total Time (min): 25 min     Additional staff present: OT - Co-evaluation with OT due to patient complexity. Patient required two skilled therapists to ensure safe mobilization.    Billable Minutes: Evaluation 10 and Therapeutic Activity 15    Nicole Domingo, PT, DPT  6/1/2024

## 2024-06-01 NOTE — PROGRESS NOTES
[facility]  Vascular Surgery  Progress Note    Patient Name: Selma Hooper  MRN: [id number]  Admission Date: 5/28/2024  Primary Care Provider: Phil Andrade MD    Subjective:     Interval History:  Worsening mottling of the leg extending proximally to the knee overnight.  Patient had a long conversation with her daughter as ultimately refusing amputation.    Post-Op Info:  * No surgery found *         Medications:  Continuous Infusions:   sodium chloride 0.9%   Intravenous Continuous 5 mL/hr at 06/01/24 0701 Rate Verify at 06/01/24 0701    DOBUTamine IV infusion (non-titrating)  2.5 mcg/kg/min Intravenous Continuous 2.3 mL/hr at 06/01/24 0701 2.5 mcg/kg/min at 06/01/24 0701    furosemide (Lasix) 500 mg in 50 mL infusion (conc: 10 mg/mL)  20 mg/hr Intravenous Continuous 2 mL/hr at 06/01/24 0701 20 mg/hr at 06/01/24 0701    heparin (porcine) in D5W  0-40 Units/kg/hr (Adjusted) Intravenous Continuous 7.5 mL/hr at 06/01/24 0701 14 Units/kg/hr at 06/01/24 0701     Scheduled Meds:   aspirin  81 mg Oral Daily    cefTRIAXone (Rocephin) IV (PEDS and ADULTS)  2 g Intravenous Q24H    clopidogreL  75 mg Oral Daily    famotidine  20 mg Oral Daily    fluticasone furoate-vilanteroL  1 puff Inhalation Daily    NIFEdipine  30 mg Oral Daily    nitroGLYCERIN 2% TD oint  1 inch Topical (Top) Q6H    senna-docusate 8.6-50 mg  2 tablet Oral Daily     PRN Meds:  Current Facility-Administered Medications:     dextrose 10%, 12.5 g, Intravenous, PRN    dextrose 10%, 25 g, Intravenous, PRN    glucagon (human recombinant), 1 mg, Intramuscular, PRN    heparin (PORCINE), 60 Units/kg (Adjusted), Intravenous, PRN    heparin (PORCINE), 30 Units/kg (Adjusted), Intravenous, PRN    HYDROcodone-acetaminophen, 1 tablet, Oral, Q4H PRN    insulin aspart U-100, 0-5 Units, Subcutaneous, Q6H PRN    morphine, 1 mg, Intravenous, Q4H PRN    ondansetron, 4 mg, Intravenous, Q8H PRN    senna-docusate 8.6-50 mg, 1 tablet, Oral, Daily PRN     sodium chloride 0.9%, 10 mL, Intravenous, PRN    sodium chloride 0.9%, 10 mL, Intravenous, PRN     Objective:     Vital Signs (Most Recent):  Temp: 98.5 °F (36.9 °C) (06/01/24 0301)  Pulse: 86 (06/01/24 0701)  Resp: 17 (06/01/24 0701)  BP: 117/65 (06/01/24 0404)  SpO2: 95 % (06/01/24 0701) Vital Signs (24h Range):  Temp:  [97.9 °F (36.6 °C)-98.7 °F (37.1 °C)] 98.5 °F (36.9 °C)  Pulse:  [] 86  Resp:  [12-43] 17  SpO2:  [91 %-97 %] 95 %  BP: (103-138)/(46-68) 117/65  Arterial Line BP: (109-144)/(46-62) 122/53     Date 06/01/24 0700 - 06/02/24 0659   Shift 1632-2769 4913-0503 0458-9125 24 Hour Total   INTAKE   I.V.(mL/kg) 67.1(1.1)   67.1(1.1)   Shift Total(mL/kg) 67.1(1.1)   67.1(1.1)   OUTPUT   Shift Total(mL/kg)       Weight (kg) 62 62 62 62        Physical Exam  Vitals and nursing note reviewed.   Constitutional:       General: She is not in acute distress.     Appearance: She is ill-appearing.   Cardiovascular:      Rate and Rhythm: Regular rhythm. Tachycardia present.      Comments: 1+ Right femoral pulse  Absent right pop, dp/pt signals  Pulmonary:      Comments: 2 L NC satting 94%  Skin:     Comments: RLE with swelling and mottled appearance extending to the right knee, this has extended further proximally today; Cold to the touch    Neurological:      Mental Status: She is alert and oriented to person, place, and time.      Cranial Nerves: Cranial nerves 2-12 are intact.      Motor: Weakness (0/5 stregth below right knee; 3/5 strength above right knee) present.   Psychiatric:         Mood and Affect: Mood normal.         Behavior: Behavior normal.          Significant Labs:  CBC:   Recent Labs   Lab 06/01/24  0302   WBC 6.84   RBC 4.24   HGB 10.5*   HCT 33.4*      MCV 79*   MCH 24.8*   MCHC 31.4*     CMP:   Recent Labs   Lab 06/01/24  0302   *   CALCIUM 9.7   ALBUMIN 3.4*   PROT 6.6      K 4.2   CO2 34*   CL 88*   BUN 47*   CREATININE 2.3*   ALKPHOS 92   ALT 54*   AST 68*   BILITOT  0.4     All pertinent labs from the last 24 hours have been reviewed.    Significant Diagnostics:  I have reviewed all pertinent imaging results/findings within the past 24 hours.  Assessment/Plan:     Acute lower limb ischemia  This is a 78-year-old female with history of half for F (EF 5-10% currently on dobutamine), COPD on home oxygen, diabetes, who presented to the ED on 5/28/24 with right lower extremity weakness which was originally concerning for a TIA.  Ultimately given her decreased signals in her a cool leg a arterial ultrasound of the right lower extremity was performed which showed occlusion of the right mid and distal superficial femoral artery, popliteal artery, and anterior and posterior tibial arteries.  The patient was discussed with Interventional cardiology who stated there was no intervention for this patient at that time.  As the leg became more mottled and her CK was elevated over 4000 today, primary team engaged vascular surgery around noon to discuss a 2nd opinion.    -patient seen and examined  -labs and imaging reviewed   -unfortunately, given the patient's presentation at the time of our consultation, there is no surgical option for revascularization.  -we have Re-engaged the patient this morning on morning rounds, she continues to adamantly refuse amputation.  We rediscussed risks of no amputation which includes complications of tissue ischemia ultimately leading to her death.  After that discussion, she still does not want an amputation and states that she is at peace with this decision.  -we will sign off at this time, please contact us at any time if the patient changes her decision and we will be happy to re-engage with this patient        Genaro No MD  Vascular Surgery  [facility]

## 2024-06-01 NOTE — PLAN OF CARE
Nephrology Chart Review    Intake/Output Summary (Last 24 hours) at 6/1/2024 0744  Last data filed at 6/1/2024 0701  Gross per 24 hour   Intake 1217.33 ml   Output 1200 ml   Net 17.33 ml     Vitals:    06/01/24 0404 06/01/24 0501 06/01/24 0601 06/01/24 0701   BP: 117/65      BP Location:       Patient Position:       Pulse: 84 83 86 86   Resp: 16 14 14 17   Temp:       TempSrc:       SpO2: 95% (!) 94% (!) 94% 95%   Weight:       Height:         Recent Labs   Lab 05/31/24  0323 05/31/24  1542 06/01/24  0302    132* 137   K 4.4 4.4 4.2   CL 89* 86* 88*   CO2 36* 35* 34*   BUN 42* 45* 47*   CREATININE 2.4* 2.4* 2.3*   CALCIUM 9.6 9.6 9.7   PHOS 4.8* 4.5 4.9*     A1c - 05/14/2024 5.3     Patients' chart and laboratory studies reviewed. Renal function remains stable. ~1.26 liters documented UOP on Lasix infusion at 20 mg/hr. CVP 6 mmHg this morning. She is maintaining saturations of +91% on 2 liters via nasal cannula. No other related issues identified. Please call nephrology as needed. We will continue to follow.    Rogelio Mendez MD  Nephrology

## 2024-06-01 NOTE — ASSESSMENT & PLAN NOTE
Pt presenting with RLE weakness and decreased sensation to the knee that started around 3AM the day of admit. Initial thought was TIA as pt had palpable pulses on admit, however pt developed worsening RLE pain during admission prompting LE Arterial US.    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 There is severe stenosis of the distal left superficial femoral artery.  -CPK uptrended from 2673 to 4628.     -RLE pulses unable to be obtained on doppler. Color change noted on RLE. Pt endorsing numbness.    -Vascular surgery consulted today. Spoke to pt and family and discussed that she would require above-the-knee amputation, pt unsure about above the knee amputation at this time as it may require here to be intubated. We will inform vascular surgery if pt decides to proceed with above the knee amputation.     - ASA, Plavix, Heparin  - Rising CPK  - Vascular Surgery offered AKA, but patient refused as it doesn't align with her goals. Vasc Surg signed off.  - Palliative Care consulted for ongoing GOC discussions including inpatient vs. outpatient hospice.

## 2024-06-01 NOTE — PLAN OF CARE
Problem: Adult Inpatient Plan of Care  Goal: Plan of Care Review  Outcome: Progressing  Goal: Optimal Comfort and Wellbeing  Outcome: Progressing     Problem: Acute Kidney Injury/Impairment  Goal: Fluid and Electrolyte Balance  Outcome: Progressing     Problem: Heart Failure  Goal: Optimal Cardiac Output  Outcome: Progressing  Goal: Stable Heart Rate and Rhythm  Outcome: Progressing

## 2024-06-01 NOTE — SUBJECTIVE & OBJECTIVE
Medications:  Continuous Infusions:   sodium chloride 0.9%   Intravenous Continuous 5 mL/hr at 06/01/24 0701 Rate Verify at 06/01/24 0701    DOBUTamine IV infusion (non-titrating)  2.5 mcg/kg/min Intravenous Continuous 2.3 mL/hr at 06/01/24 0701 2.5 mcg/kg/min at 06/01/24 0701    furosemide (Lasix) 500 mg in 50 mL infusion (conc: 10 mg/mL)  20 mg/hr Intravenous Continuous 2 mL/hr at 06/01/24 0701 20 mg/hr at 06/01/24 0701    heparin (porcine) in D5W  0-40 Units/kg/hr (Adjusted) Intravenous Continuous 7.5 mL/hr at 06/01/24 0701 14 Units/kg/hr at 06/01/24 0701     Scheduled Meds:   aspirin  81 mg Oral Daily    cefTRIAXone (Rocephin) IV (PEDS and ADULTS)  2 g Intravenous Q24H    clopidogreL  75 mg Oral Daily    famotidine  20 mg Oral Daily    fluticasone furoate-vilanteroL  1 puff Inhalation Daily    NIFEdipine  30 mg Oral Daily    nitroGLYCERIN 2% TD oint  1 inch Topical (Top) Q6H    senna-docusate 8.6-50 mg  2 tablet Oral Daily     PRN Meds:  Current Facility-Administered Medications:     dextrose 10%, 12.5 g, Intravenous, PRN    dextrose 10%, 25 g, Intravenous, PRN    glucagon (human recombinant), 1 mg, Intramuscular, PRN    heparin (PORCINE), 60 Units/kg (Adjusted), Intravenous, PRN    heparin (PORCINE), 30 Units/kg (Adjusted), Intravenous, PRN    HYDROcodone-acetaminophen, 1 tablet, Oral, Q4H PRN    insulin aspart U-100, 0-5 Units, Subcutaneous, Q6H PRN    morphine, 1 mg, Intravenous, Q4H PRN    ondansetron, 4 mg, Intravenous, Q8H PRN    senna-docusate 8.6-50 mg, 1 tablet, Oral, Daily PRN    sodium chloride 0.9%, 10 mL, Intravenous, PRN    sodium chloride 0.9%, 10 mL, Intravenous, PRN     Objective:     Vital Signs (Most Recent):  Temp: 98.5 °F (36.9 °C) (06/01/24 0301)  Pulse: 86 (06/01/24 0701)  Resp: 17 (06/01/24 0701)  BP: 117/65 (06/01/24 0404)  SpO2: 95 % (06/01/24 0701) Vital Signs (24h Range):  Temp:  [97.9 °F (36.6 °C)-98.7 °F (37.1 °C)] 98.5 °F (36.9 °C)  Pulse:  [] 86  Resp:  [12-43] 17  SpO2:   [91 %-97 %] 95 %  BP: (103-138)/(46-68) 117/65  Arterial Line BP: (109-144)/(46-62) 122/53     Date 06/01/24 0700 - 06/02/24 0659   Shift 2528-4368 9282-8908 3164-5035 24 Hour Total   INTAKE   I.V.(mL/kg) 67.1(1.1)   67.1(1.1)   Shift Total(mL/kg) 67.1(1.1)   67.1(1.1)   OUTPUT   Shift Total(mL/kg)       Weight (kg) 62 62 62 62        Physical Exam  Vitals and nursing note reviewed.   Constitutional:       General: She is not in acute distress.     Appearance: She is ill-appearing.   Cardiovascular:      Rate and Rhythm: Regular rhythm. Tachycardia present.      Comments: 1+ Right femoral pulse  Absent right pop, dp/pt signals  Pulmonary:      Comments: 2 L NC satting 94%  Skin:     Comments: RLE with swelling and mottled appearance extending to the right knee, this has extended further proximally today; Cold to the touch    Neurological:      Mental Status: She is alert and oriented to person, place, and time.      Cranial Nerves: Cranial nerves 2-12 are intact.      Motor: Weakness (0/5 stregth below right knee; 3/5 strength above right knee) present.   Psychiatric:         Mood and Affect: Mood normal.         Behavior: Behavior normal.          Significant Labs:  CBC:   Recent Labs   Lab 06/01/24  0302   WBC 6.84   RBC 4.24   HGB 10.5*   HCT 33.4*      MCV 79*   MCH 24.8*   MCHC 31.4*     CMP:   Recent Labs   Lab 06/01/24  0302   *   CALCIUM 9.7   ALBUMIN 3.4*   PROT 6.6      K 4.2   CO2 34*   CL 88*   BUN 47*   CREATININE 2.3*   ALKPHOS 92   ALT 54*   AST 68*   BILITOT 0.4     All pertinent labs from the last 24 hours have been reviewed.    Significant Diagnostics:  I have reviewed all pertinent imaging results/findings within the past 24 hours.

## 2024-06-01 NOTE — TREATMENT PLAN
Hemodynamics, 0700    MAP 75  CVP 6  SVO2 48  CO 3.3  CI 2  SVR 1682      1400  MAP 69  CVP 5  SVO2 58  CO 4.1  CI 2.5  SVR 1262

## 2024-06-01 NOTE — HOSPITAL COURSE
Weaned off BiPAP to 2L NC (home O2 1.5L). RLE with continued poor, intermittent pulses with associated intermittent pain/numbness, decreasing sensation to palpation. US arterial BLE with occlusion of the right mid and distal superficial femoral artery, popliteal artery, and anterior and posterior tibial arteries & severe stenosis of the distal left superficial femoral artery. Per discussions with Interventional Cardiology, no acute intervention given acuity of overall presentation. Vascular Surgery offered L AKA, but patient declined citing growing medical complexity in her life and no desire for intubation in any form. She believes that her life is limited primarily by her HFrEF and that the ischemic limb is an additional setback which, even if amputated, would not necessarily improve her quality of life. Palliative consulted to assist with next steps. Decision made to transition to comfort-care. Pt will be admitted to inpatient hospice unit.

## 2024-06-01 NOTE — ASSESSMENT & PLAN NOTE
Chronic, controlled. Latest blood pressure and vitals reviewed-     Temp:  [98 °F (36.7 °C)-98.7 °F (37.1 °C)]   Pulse:  [78-96]   Resp:  [13-43]   BP: (103-137)/(52-79)   SpO2:  [91 %-97 %]   Arterial Line BP: (109-140)/(44-55) .   Home meds for hypertension were reviewed and noted below.   Hypertension Medications               furosemide (LASIX) 40 MG tablet Take 1 tablet (40 mg total) by mouth once daily.    metoprolol succinate (TOPROL-XL) 25 MG 24 hr tablet Take 1 tablet (25 mg total) by mouth once daily.    spironolactone (ALDACTONE) 25 MG tablet Take 1 tablet (25 mg total) by mouth once daily.            While in the hospital, will manage blood pressure as follows; Adjust home antihypertensive regimen as follows- Procardia-XL 30mg QD for afterload reduction.     Will utilize p.r.n. blood pressure medication only if patient's blood pressure greater than 180/110 and she develops symptoms such as worsening chest pain or shortness of breath.

## 2024-06-01 NOTE — SIGNIFICANT EVENT
Called patient's daughter, Ms. Alexsandra Hooper, discussed current situation with leg, now with signs of increasing tissue damage, and anoxic injury/ischemia.  No benefit to revascularization, and to avoid rhabdomyolysis, and sepsis, amputation would be required.  Patient has verbalized multiple times that she does not wish for amputation, and understands consequences of these choices.    Will ask our palliative care team to discuss with patient, as to patient's wishes, and the best care setting for her at this time.  Discussed with daughter, and it sounds like inpatient hospice maybe the best choice, but will discuss with patient as well.    -Anthony Cardoso

## 2024-06-01 NOTE — PLAN OF CARE
Problem: Physical Therapy  Goal: Physical Therapy Goal  Description: PT goals to be met by: 7/1/24    Patient will perform rolling each way with supervision.   Patient will perform supine <> sitting with supervision.  Patient will perform sit <> stand transitions with supervision using LRAD.  Patient will perform transfers from bed <> chair or BSC with supervision using LRAD.  Patient will ambulate 50 feet with supervision using LRAD.  Patient will ascend/descend 3 steps using R handrails with supervision.  Outcome: Progressing

## 2024-06-01 NOTE — ASSESSMENT & PLAN NOTE
This is a 78-year-old female with history of half for F (EF 5-10% currently on dobutamine), COPD on home oxygen, diabetes, who presented to the ED on 5/28/24 with right lower extremity weakness which was originally concerning for a TIA.  Ultimately given her decreased signals in her a cool leg a arterial ultrasound of the right lower extremity was performed which showed occlusion of the right mid and distal superficial femoral artery, popliteal artery, and anterior and posterior tibial arteries.  The patient was discussed with Interventional cardiology who stated there was no intervention for this patient at that time.  As the leg became more mottled and her CK was elevated over 4000 today, primary team engaged vascular surgery around noon to discuss a 2nd opinion.    -patient seen and examined  -labs and imaging reviewed   -unfortunately, given the patient's presentation at the time of our consultation, there is no surgical option for revascularization.  -we have Re-engaged the patient this morning on morning rounds, she continues to adamantly refuse amputation.  We rediscussed risks of no amputation which includes complications of tissue ischemia ultimately leading to her death.  After that discussion, she still does not want an amputation and states that she is at peace with this decision.  -we will sign off at this time, please contact us at any time if the patient changes her decision and we will be happy to re-engage with this patient

## 2024-06-01 NOTE — ASSESSMENT & PLAN NOTE
Patient is identified as having Combined Systolic and Diastolic heart failure that is Acute on chronic. CHF is currently . Latest ECHO performed and demonstrates- Results for orders placed during the hospital encounter of 05/14/24    Echo    Interpretation Summary    Left Ventricle: The left ventricle is severely dilated. Severely increased ventricular mass. Normal wall thickness. There is eccentric hypertrophy. Severe global hypokinesis present. There is severely reduced systolic function with a visually estimated ejection fraction of 5 - 10%. There is diastolic dysfunction but grade cannot be determined.    Right Ventricle: Normal right ventricular cavity size. Wall thickness is normal. Right ventricle wall motion  is normal. Systolic function is normal.    Left Atrium: Left atrium is moderately dilated.    Mitral Valve: There is mild regurgitation.    Pulmonary Artery: The estimated pulmonary artery systolic pressure is 32 mmHg.    IVC/SVC: Normal venous pressure at 3 mmHg.  . Monitor clinical status closely. Monitor on telemetry. Patient is off CHF pathway.  Monitor strict Is&Os and daily weights.  Place on fluid restriction of 1.5 L. Cardiology has been consulted. Continue to stress to patient importance of self efficacy and  on diet for CHF. Last BNP reviewed- and noted below   Recent Labs   Lab 05/28/24  1259   BNP 2,066*       - Lasix 20IV ggt transitioned to  TID

## 2024-06-01 NOTE — ASSESSMENT & PLAN NOTE
Patient's COPD is with exacerbation noted by continued dyspnea and worsening of baseline hypoxia currently.  Patient is currently off COPD Pathway. Continue scheduled inhalers Supplemental oxygen and monitor respiratory status closely.     Prior smoking history of 0.5 ppd, 23 yo start, quit 2018.    - Continue maintenance inhaler  - Supplemental O2 PRN, home use 1.5L

## 2024-06-01 NOTE — PLAN OF CARE
POC reviewed with Selma Hooper and family. Questions and concerns addressed. CVP: 7,8,8 Svo2: 59,53. No acute events. Patient RLE with no pulse, cap refills, and mottled. Right Fem pulse intact. Patient refused bath today and will take one tomorrow.  See below and flowsheets for full assessment and VS info.       Neuro:  [place]ink Coma Scale  Best Eye Response: 4-->(E4) spontaneous  Best Motor Response: 6-->(M6) obeys commands  Best Verbal Response: 5-->(V5) oriented  [place]ink Coma Scale Score: 15  Assessment Qualifiers: patient not sedated/intubated  Pupil PERRLA: yes    24 hr Temp:  [97.9 °F (36.6 °C)-98.2 °F (36.8 °C)]     CV:  Rhythm: normal sinus rhythm   DVT prophylaxis: VTE Required Core Measure: Pharmacological prophylaxis initiated/maintained    CVP (mean): 8 mmHg (05/31/24 1501)       SVO2 (%): 59 % (05/31/24 0701)               Pulses  Right Radial Pulse: 2+ (normal)  Left Radial Pulse: 2+ (normal)  Right Dorsalis Pedis Pulse: 0 (absent)  Left Dorsalis Pedis Pulse: 1+ (weak)  Right Posterior Tibial Pulse: 0 (absent)  Left Posterior Tibial Pulse: 1+ (weak)    Resp:  Flow (L/min) (Oxygen Therapy): 2  Oxygen Concentration (%): 28    GI/:  GI prophylaxis: yes  Diet/Nutrition Received: low saturated fat/low cholesterol, 2 gram sodium  Last Bowel Movement: 05/28/24  Voiding Characteristics: urethral catheter (bladder)       Urethral Catheter 05/28/24 1200-Reason for Continuing Urinary Catheterization: Critically ill in ICU and requiring hourly monitoring of intake/output   Intake/Output Summary (Last 24 hours) at 5/31/2024 1941  Last data filed at 5/31/2024 1800  Gross per 24 hour   Intake 1237.75 ml   Output 1765 ml   Net -527.25 ml          Labs/Accuchecks:  Recent Labs   Lab 05/29/24  0254 05/30/24  0311 05/31/24  0323   WBC 11.55 10.59 7.58   RBC 4.71 4.42 4.34   HGB 11.5* 11.0* 10.4*   HCT 37.9 34.5* 34.1*    163 180      Recent Labs   Lab 05/28/24  0611 05/28/24  2005 05/28/24  2157 05/31/24  0323  05/31/24  0832 05/31/24  1426   INR 1.0 1.2  --   --   --   --    APTT  --  109.1*   < > 36.7* 55.8* 45.7*    < > = values in this interval not displayed.      Recent Labs     05/31/24  0323 05/31/24  1542    132*   K 4.4 4.4   CO2 36* 35*   CL 89* 86*   BUN 42* 45*   CREATININE 2.4* 2.4*   ALKPHOS 90  --    ALT 59*  --    AST 61*  --    BILITOT 0.5  --        Recent Labs   Lab 05/30/24  1757 05/31/24  0832 05/31/24  1709   CPK 3658* 4628* 5329*      Recent Labs     05/30/24  0831 05/30/24  2027 05/31/24  0833 05/31/24  1428   PH 7.366 7.377  --  7.401   PCO2 64.1* 66.3*  --  65.7*   PO2 37* 32* 33* 29*   HCO3 36.7* 39.0*  --  40.8*   POCSATURATED 66 57 59 53   BE 11* 14*  --  16*       Electrolytes: Electrolytes replaced  Accuchecks: Q6H    Gtts/LDAs:   sodium chloride 0.9%   Intravenous Continuous 5 mL/hr at 05/31/24 1800 Rate Verify at 05/31/24 1800    DOBUTamine IV infusion (non-titrating)  2.5 mcg/kg/min Intravenous Continuous 2.3 mL/hr at 05/31/24 1800 2.5 mcg/kg/min at 05/31/24 1800    furosemide (Lasix) 500 mg in 50 mL infusion (conc: 10 mg/mL)  20 mg/hr Intravenous Continuous 2 mL/hr at 05/31/24 1800 20 mg/hr at 05/31/24 1800    heparin (porcine) in D5W  0-40 Units/kg/hr (Adjusted) Intravenous Continuous 7.5 mL/hr at 05/31/24 1800 14 Units/kg/hr at 05/31/24 1800       Lines/Drains/Airways       Central Venous Catheter Line  Duration             Trialysis (Dialysis) Catheter 05/28/24 1613 right internal jugular 3 days              Drain  Duration                  Urethral Catheter 05/28/24 1200 3 days              Arterial Line  Duration             Arterial Line 05/28/24 1818 Left Radial 3 days              Peripheral Intravenous Line  Duration                  Peripheral IV - Single Lumen 05/28/24 1259 20 G Left Hand 3 days                    Skin/Wounds  Bathing/Skin Care: (S) patient refused (05/31/24 1701)  Wounds: No  Wound care consulted: No

## 2024-06-01 NOTE — ASSESSMENT & PLAN NOTE
Patient with Hypercapnic and Hypoxic Respiratory failure which is Acute on chronic.  she is on home oxygen at 1.5 LPM. Supplemental oxygen was provided and noted.    Respiratory status has improved, pt on 2L NC this AM (baseline 1.5L at home)       Signs/symptoms of respiratory failure include- tachypnea, increased work of breathing, and respiratory distress. Contributing diagnoses includes - CHF and COPD Labs and images were reviewed. Patient Has recent ABG, which has been reviewed. Will treat underlying causes and adjust management of respiratory failure as follows-     - Do not intubate, does not align with patient goals

## 2024-06-01 NOTE — PT/OT/SLP EVAL
Occupational Therapy   Co-Evaluation & Treatment    Name: Selma Hooper  MRN: [id number]  Admitting Diagnosis: Cardiogenic shock  Recent Surgery: * No surgery found *      Recommendations:     Discharge Recommendations: Low Intensity Therapy  Discharge Equipment Recommendations:  none  Barriers to discharge:  None    Assessment:     Selma Hooper is a 78 y.o. female with a medical diagnosis of Cardiogenic shock.  She presents with performance deficits affecting function: weakness, impaired balance, decreased safety awareness, impaired endurance, impaired self care skills, impaired functional mobility, gait instability, decreased lower extremity function, pain, impaired cardiopulmonary response to activity.  Pt tolerated tx well, with good effort throughout. She was able to sit EOB to perform ADLs and stand x1 with Min A and HHA. Pt will continue to benefit from skilled OT services to address impairments listed above to maximize independence with ADLs and functional mobility to ensure safe return to OF.     Rehab Prognosis: Good; patient would benefit from acute skilled OT services to address these deficits and reach maximum level of function.       Plan:     Patient to be seen 3 x/week to address the above listed problems via self-care/home management, therapeutic activities, therapeutic exercises  Plan of Care Expires: 07/01/24  Plan of Care Reviewed with: patient    Subjective     Chief Complaint: R knee pain  Patient/Family Comments/goals: go home    Occupational Profile:  Living Environment: Lives with adult children and grandchildren in a [facility], 3STE, R  BR- t/s combo c/ TTB  Previous level of function: Ind with ADLs and mobility  Roles and Routines: was driving, retired  Equipment Used at Home: wheelchair, other (see comments), walker, rolling, bedside commode, bath bench (owns but doesn't use)  Assistance upon Discharge: family but limited because they work during the day    Pain/Comfort:  Pain Rating 1:  5/10  Location - Side 1: Right  Location - Orientation 1: generalized  Location 1: knee  Pain Addressed 1: Reposition, Distraction  Pain Rating Post-Intervention 1: 5/10    Patients cultural, spiritual, Uatsdin conflicts given the current situation: no    Objective:     Communicated with: RN prior to session.  Patient found HOB elevated with telemetry, pulse ox (continuous), peripheral IV, pressure relief boots, arterial line, hernandez catheter, central line, blood pressure cuff, oxygen upon OT entry to room.    General Precautions: Standard, fall  Orthopedic Precautions: N/A  Braces: N/A  Respiratory Status: Nasal cannula, flow 2 L/min    Occupational Performance:    Bed Mobility:    Patient completed Rolling/Turning to Right with stand by assistance  Patient completed Scooting/Bridging with minimum assistance  Patient completed Supine to Sit with minimum assistance  Patient completed Sit to Supine with stand by assistance    Functional Mobility/Transfers:  Patient completed Sit <> Stand Transfer with minimum assistance  with  hand-held assist , RLE buckling    Activities of Daily Living:  Grooming: stand by assistance oral and facial hygiene sitting EOB  Upper Body Dressing: minimum assistance adjusting gown  Lower Body Dressing: moderate assistance donning socks    Cognitive/Visual Perceptual:  Cognitive/Psychosocial Skills:     -       Oriented to: Person, Place, Time, and Situation   -       Follows Commands/attention:Follows multistep  commands  -       Safety awareness/insight to disability: intact   -       Mood/Affect/Coping skills/emotional control: Pleasant    Physical Exam:  Upper Extremity Range of Motion:     -       Right Upper Extremity: WFL  -       Left Upper Extremity: WFL  Upper Extremity Strength:    -       Right Upper Extremity: WNL  -       Left Upper Extremity: WNL   Strength:    -       Right Upper Extremity: WFL  -       Left Upper Extremity: WFL  Fine Motor Coordination:    -        Intact    AMPAC 6 Click ADL:  AMPAC Total Score: 19    Treatment & Education:  Pt educated on   Role of OT and OT POC  Importance of OOB/EOB activities to increase endurance and tolerance for increased participation in daily ADLs    Utilizing the call bell to request for assistance with all functional mobility to ensure safety during hospital stay.    Pt verbalized understanding and all questions were addressed within the scope of OT.     Patient left HOB elevated with all lines intact and call button in reach    GOALS:   Multidisciplinary Problems       Occupational Therapy Goals          Problem: Occupational Therapy    Goal Priority Disciplines Outcome Interventions   Occupational Therapy Goal     OT, PT/OT Progressing    Description: Goals to be met by: 7/1/24     Patient will increase functional independence with ADLs by performing:    UE Dressing with [place].  LE Dressing with Modified [place].  Grooming while standing at sink with Contact Guard Assistance and Assistive Devices as needed.  Supine to sit with [place].  Stand pivot transfers with Stand-by Assistance.  Toilet transfer to bedside commode with Stand-by Assistance.                         History:     Past Medical History:   Diagnosis Date    Abnormal liver enzymes 12/10/2018    Acute on chronic combined systolic and diastolic congestive heart failure 4/2/2021    Acute on chronic respiratory failure with hypoxia 4/2/2021    Acute on chronic respiratory failure with hypoxia and hypercapnia 12/10/2021    CHF (congestive heart failure)     COPD exacerbation 7/12/2022    Former smoker 10/24/2018    Hypertension     Smoker 7/27/2016         Past Surgical History:   Procedure Laterality Date    BREAST BIOPSY      left  side years ago in high school   1962    CHOLECYSTECTOMY      COLONOSCOPY      COLONOSCOPY N/A 08/23/2021    Procedure: COLONOSCOPY;  Surgeon: Shree Amaya MD;  Location: [address];  Service: Endoscopy;  Laterality:  N/A;  Do not cancel this order  fully vaccinated-see immunization record  EF 18%  8/20-covid [facility]    HYSTERECTOMY         Time Tracking:     OT Date of Treatment: 06/01/24  OT Start Time: 1026  OT Stop Time: 1050  OT Total Time (min): 24 min    Billable Minutes:Evaluation 10  Self Care/Home Management 14    6/1/2024

## 2024-06-02 PROBLEM — Z51.5 PALLIATIVE CARE ENCOUNTER: Status: ACTIVE | Noted: 2024-01-01

## 2024-06-02 NOTE — PROGRESS NOTES
[facility] - Cardiac Intensive Care  Cardiology  Progress Note    Patient Name: Selma Hooper  MRN: [id number]  Admission Date: 5/28/2024  Hospital Length of Stay: 5 days  Code Status: DNR   Attending Physician: Anthony Cardoso MD   Primary Care Physician: Phil Andrade MD  Expected Discharge Date: 6/5/2024  Principal Problem:Cardiogenic shock    Subjective:     Hospital Course:   Weaned off BiPAP to 2L NC (home O2 1.5L). Continued weaning diuresis (lasix ggt to IV pushes) and weaning dobutamine. RLE with continued poor, intermittent pulses with associated intermittent pain/numbness, decreasing sensation to palpation. US arterial BLE with occlusion of the right mid and distal superficial femoral artery, popliteal artery, and anterior and posterior tibial arteries & severe stenosis of the distal left superficial femoral artery. Per discussions with Interventional Cardiology, no acute intervention given acuity of overall presentation. Vascular Surgery offered L AKA, but patient declined citing growing medical complexity in her life and no desire for intubation in any form. She believes that her life is limited primarily by her HFrEF and that the ischemic limb is an additional setback which, even if amputated, would not necessarily improve her quality of life.     Patient accepting of idea that ischemic limb will likely cause her demise. Palliative consulted to assist with ongoing GOC and hospice discussions.     Interval History: Continued leg pain, not controlled with current regimen. Transitioning from morphine to Dilaudid (renal function) and adding on other multimodal agents. Ongoing GOC and family meetings with Palliative assistance.     Review of Systems   Constitutional: Negative for chills, diaphoresis, fever, malaise/fatigue and night sweats.   HENT:  Negative for congestion, hearing loss, hoarse voice, sore throat and stridor.    Eyes:  Negative for blurred vision.   Cardiovascular:  Negative for chest  pain, claudication, cyanosis, dyspnea on exertion, leg swelling, near-syncope, orthopnea, palpitations, paroxysmal nocturnal dyspnea and syncope.   Respiratory:  Negative for cough, shortness of breath, sleep disturbances due to breathing, snoring, sputum production and wheezing.    Skin:  Negative for dry skin and rash.   Musculoskeletal:  Positive for myalgias. Negative for arthritis, back pain, joint pain, muscle cramps and muscle weakness.   Gastrointestinal:  Negative for bloating, abdominal pain, change in bowel habit, constipation, diarrhea, dysphagia, nausea and vomiting.   Genitourinary:  Negative for dysuria and urgency.   Neurological:  Positive for focal weakness and numbness. Negative for difficulty with concentration, excessive daytime sleepiness, dizziness, headaches, light-headedness, tremors and weakness.   Psychiatric/Behavioral:  Negative for altered mental status and depression. The patient does not have insomnia.      Objective:     Vital Signs (Most Recent):  Temp: 98.1 °F (36.7 °C) (06/02/24 0301)  Pulse: 90 (06/02/24 1101)  Resp: 17 (06/02/24 1229)  BP: (!) 112/57 (06/02/24 1101)  SpO2: (!) 93 % (06/02/24 1101) Vital Signs (24h Range):  Temp:  [98 °F (36.7 °C)-98.2 °F (36.8 °C)] 98.1 °F (36.7 °C)  Pulse:  [72-94] 90  Resp:  [14-28] 17  SpO2:  [90 %-97 %] 93 %  BP: (106-156)/(52-77) 112/57  Arterial Line BP: (109-144)/(44-64) 132/62     Weight: 62 kg (136 lb 11 oz)  Body mass index is 25.83 kg/m².     SpO2: (!) 93 %         Intake/Output Summary (Last 24 hours) at 6/2/2024 1256  Last data filed at 6/2/2024 0601  Gross per 24 hour   Intake 265.29 ml   Output 820 ml   Net -554.71 ml       Lines/Drains/Airways       Central Venous Catheter Line  Duration             Trialysis (Dialysis) Catheter 05/28/24 1613 right internal jugular 4 days              Drain  Duration                  Urethral Catheter 05/28/24 1200 5 days              Arterial Line  Duration             Arterial Line 05/28/24  1818 Left Radial 4 days              Peripheral Intravenous Line  Duration                  Peripheral IV - Single Lumen 05/28/24 1259 20 G Left Hand 4 days                       Physical Exam  Vitals and nursing note reviewed.   Constitutional:       Appearance: Normal appearance. She is not ill-appearing.      Comments: L radial arterial line   Eyes:      Extraocular Movements: Extraocular movements intact.      Conjunctiva/sclera: Conjunctivae normal.   Neck:      Comments: RIJ CVC in place dressing C/D/I  Cardiovascular:      Rate and Rhythm: Normal rate and regular rhythm.      Pulses: Decreased pulses (RLE).   Pulmonary:      Effort: Pulmonary effort is normal.      Breath sounds: Normal breath sounds. No rales.   Abdominal:      General: Bowel sounds are normal. There is no distension.      Palpations: Abdomen is soft.   Musculoskeletal:      Cervical back: Normal range of motion.      Right lower leg: No edema.      Left lower leg: No edema.   Skin:     General: Skin is warm and dry.   Neurological:      General: No focal deficit present.      Mental Status: She is alert and oriented to person, place, and time.      Sensory: Sensory deficit (RLE) present.      Motor: Weakness (RLE) present.            Significant Labs: All pertinent lab results from the last 24 hours have been reviewed.    Significant Imaging:  Reviewed  Assessment and Plan:     * Cardiogenic shock  Pt with an EF of 5-10% that had suspected flash pulmonary edema while getting a MRI. She required BiPAP at that time and received IV Lasix 40mg x3 in the ED with no response. Central line and A-line were placed; pt was started on Dobutamine 5mcg and Lasix 30mg/hr gtt.     [place] during the initiation of Dobutamine and Lasix gtt:     CVP 10, SvO2 51, CO 3.3, CI 2, and SVR 1945.     [place] this AM:    CVP 7, SvO2 59, CO 4.4, CI 2.7, and SVR 1164.     -On Dobutamine 2.5mcg this AM.     - Dobutamine 2.5 gtt  - Lasix  mg TID transitioned to po  Lasix 120mg TID.     Palliative care encounter  - Palliative consulted with assistance for GOC and hospice discussions.  - Family meeting with daughter planned for Mon 06/03 at 1200.     Acute lower limb ischemia  Pt presenting with RLE weakness and decreased sensation to the knee that started around 3AM the day of admit. Initial thought was TIA as pt had palpable pulses on admit, however pt developed worsening RLE pain during admission prompting LE Arterial US.    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 There is severe stenosis of the distal left superficial femoral artery.  -CPK uptrended from 2673 to 4628.     -RLE pulses unable to be obtained on doppler. Color change noted on RLE. Pt endorsing numbness.    -Vascular surgery consulted today. Spoke to pt and family and discussed that she would require above-the-knee amputation, pt unsure about above the knee amputation at this time as it may require here to be intubated. We will inform vascular surgery if pt decides to proceed with above the knee amputation.     - ASA, Plavix, Heparin  - Rising CPK  - Vascular Surgery offered AKA, but patient refused as it doesn't align with her goals. Vasc Surg signed off.    Arrhythmia  Sinus tachycardia    Long QT interval  With wide QRS tachycardia on admission. Qtc 568 on admission.     - K goal 4, Mg 2    COPD (chronic obstructive pulmonary disease)  Patient's COPD is with exacerbation noted by continued dyspnea and worsening of baseline hypoxia currently.  Patient is currently off COPD Pathway. Continue scheduled inhalers Supplemental oxygen and monitor respiratory status closely.     Prior smoking history of 0.5 ppd, 25 yo start, quit 2018.    - Continue maintenance inhaler  - Supplemental O2 PRN, home use 1.5L    HFrEF (heart failure with reduced ejection fraction)  See NICM    DNR (do not resuscitate)  Pt DNR per chart  review and reconfirmed in the ED on this admission. Do not intubate.    Type 2 diabetes mellitus with hyperglycemia, without long-term current use of insulin  Last A1C 5 during admission two weeks prior to current ICU admission. Not on home DM regimen. Hyperglycemic during this admission.    - LDSSI    Acute on chronic respiratory failure with hypoxia and hypercapnia  Patient with Hypercapnic and Hypoxic Respiratory failure which is Acute on chronic.  she is on home oxygen at 1.5 LPM. Supplemental oxygen was provided and noted.    Respiratory status has improved, pt on 2L NC this AM (baseline 1.5L at home)       Signs/symptoms of respiratory failure include- tachypnea, increased work of breathing, and respiratory distress. Contributing diagnoses includes - CHF and COPD Labs and images were reviewed. Patient Has recent ABG, which has been reviewed. Will treat underlying causes and adjust management of respiratory failure as follows-     - Do not intubate, does not align with patient goals    Acute on chronic combined systolic and diastolic congestive heart failure  Patient is identified as having Combined Systolic and Diastolic heart failure that is Acute on chronic. CHF is currently . Latest ECHO performed and demonstrates- Results for orders placed during the hospital encounter of 05/14/24    Echo    Interpretation Summary    Left Ventricle: The left ventricle is severely dilated. Severely increased ventricular mass. Normal wall thickness. There is eccentric hypertrophy. Severe global hypokinesis present. There is severely reduced systolic function with a visually estimated ejection fraction of 5 - 10%. There is diastolic dysfunction but grade cannot be determined.    Right Ventricle: Normal right ventricular cavity size. Wall thickness is normal. Right ventricle wall motion  is normal. Systolic function is normal.    Left Atrium: Left atrium is moderately dilated.    Mitral Valve: There is mild regurgitation.     Pulmonary Artery: The estimated pulmonary artery systolic pressure is 32 mmHg.    IVC/SVC: Normal venous pressure at 3 mmHg.  . Monitor clinical status closely. Monitor on telemetry. Patient is off CHF pathway.  Monitor strict Is&Os and daily weights.  Place on fluid restriction of 1.5 L. Cardiology has been consulted. Continue to stress to patient importance of self efficacy and  on diet for CHF. Last BNP reviewed- and noted below   Recent Labs   Lab 05/28/24  1259   BNP 2,066*       - Lasix 20IV ggt transitioned to  TID with appropriate response. Transitioned to PO lasix 120mg TID on 06/02.    LBBB (left bundle branch block)  Noted on EKG dating as far back as 03/2019. Reconfirmed on admission EKG.     NICM (nonischemic cardiomyopathy)  Echo from 05/24/24 with EF 5-10% with global hypokinesis. GDMT includes Toprol 25mg qd, spironolactone 25mg qd.  Unable to afford Jardiance even with coupon. Entresto held on prior admission and DC'd at clinic visit on day prior to admission given continued soft BP. Clinic plan was to introduce low dose ACE/ARB for additional GDMT. Diuresis with Lasix 40mg qd. Patient declined establishing with Palliative Care during recent clinic visit.      - Add GDMT back once patient stabilizes    Acute renal failure with acute tubular necrosis superimposed on stage 3a chronic kidney disease  Nephrology consulted. Urinary sediment with granular, halfy muddy brown and waxy casts suggestive of ATN in the setting of cardiogenic shock.    Baseline Cr of 1.0.     Plan:    - CMP qd, trend Cr  - strict I/O  - daily weights  - renally dose all medications  - avoid nephrotoxic agents, NSAIDs, IV contrast, ACE/ARB  - Improving with diuresis      Hypertension, essential  Chronic, controlled. Latest blood pressure and vitals reviewed-     Temp:  [98 °F (36.7 °C)-98.2 °F (36.8 °C)]   Pulse:  [72-94]   Resp:  [14-28]   BP: (106-156)/(52-77)   SpO2:  [90 %-97 %]   Arterial Line BP:  (109-144)/(44-64) .   Home meds for hypertension were reviewed and noted below.   Hypertension Medications               furosemide (LASIX) 40 MG tablet Take 1 tablet (40 mg total) by mouth once daily.    metoprolol succinate (TOPROL-XL) 25 MG 24 hr tablet Take 1 tablet (25 mg total) by mouth once daily.    spironolactone (ALDACTONE) 25 MG tablet Take 1 tablet (25 mg total) by mouth once daily.            While in the hospital, will manage blood pressure as follows; Adjust home antihypertensive regimen as follows- Procardia-XL 30mg QD for afterload reduction.     Will utilize p.r.n. blood pressure medication only if patient's blood pressure greater than 180/110 and she develops symptoms such as worsening chest pain or shortness of breath.        VTE Risk Mitigation (From admission, onward)           Ordered     IP VTE HIGH RISK PATIENT  Once         05/28/24 2104     Place sequential compression device  Until discontinued         05/28/24 2104     heparin 25,000 units in dextrose 5% (100 units/ml) IV bolus from bag LOW INTENSITY nomogram - [facility]  As needed (PRN)        Question:  Heparin Infusion Adjustment (DO NOT MODIFY ANSWER)  Answer:  [email or web address]\epic\Images\Pharmacy\HeparinInfusions\heparin LOW INTENSITY nomogram for [facility] [id number]    05/28/24 1948     heparin 25,000 units in dextrose 5% (100 units/ml) IV bolus from bag LOW INTENSITY nomogram - [facility]  As needed (PRN)        Question:  Heparin Infusion Adjustment (DO NOT MODIFY ANSWER)  Answer:  \\[email or web address]\epic\Images\Pharmacy\HeparinInfusions\heparin LOW INTENSITY nomogram for [facility] [id number]    05/28/24 1948     heparin 25,000 units in dextrose 5% 250 mL (100 units/mL) infusion LOW INTENSITY nomogram - [facility]  Continuous        Question:  Begin at (units/kg/hr)  Answer:  12    05/28/24 1948     Place sequential compression device  Until discontinued         05/28/24 1624                    Je Hawkins MD  Cardiology  [facility]

## 2024-06-02 NOTE — ASSESSMENT & PLAN NOTE
Nephrology consulted. Urinary sediment with granular, halfy muddy brown and waxy casts suggestive of ATN in the setting of cardiogenic shock.    Baseline Cr of 1.0.     Plan:    - CMP qd, trend Cr  - strict I/O  - daily weights  - renally dose all medications  - avoid nephrotoxic agents, NSAIDs, IV contrast, ACE/ARB  - Improving with diuresis

## 2024-06-02 NOTE — ASSESSMENT & PLAN NOTE
78f with end stage HFrEF (EF 5-10%) with no advanced options, admitted with cardiogenic shock and limb ischemia, on dobutamine.     -care per CCU team  -recommend continued clear and direct communication regarding prognosis and treatment options as a prerequisite for quality goals of care discussions

## 2024-06-02 NOTE — NURSING
Patient arrived to the unit. Telemetry box applied and vital signs documented in flow sheet. Oriented to room, call bell with in reach, bed is in low lock position. Plan of care initiated.    Nurses Note -- 4 Eyes      6/2/2024   4:52 PM      Skin assessed during: Admit      [x] No Altered Skin Integrity Present    [x]Prevention Measures Documented      [] Yes- Altered Skin Integrity Present or Discovered   [] LDA Added if Not in Epic (Describe Wound)   [] New Altered Skin Integrity was Present on Admit and Documented in LDA   [] Wound Image Taken    Wound Care Consulted? No    Attending Nurse:  Talisha Desir RN/Staff Member:  NATTY Haines

## 2024-06-02 NOTE — ASSESSMENT & PLAN NOTE
Patient is identified as having Combined Systolic and Diastolic heart failure that is Acute on chronic. CHF is currently . Latest ECHO performed and demonstrates- Results for orders placed during the hospital encounter of 05/14/24    Echo    Interpretation Summary    Left Ventricle: The left ventricle is severely dilated. Severely increased ventricular mass. Normal wall thickness. There is eccentric hypertrophy. Severe global hypokinesis present. There is severely reduced systolic function with a visually estimated ejection fraction of 5 - 10%. There is diastolic dysfunction but grade cannot be determined.    Right Ventricle: Normal right ventricular cavity size. Wall thickness is normal. Right ventricle wall motion  is normal. Systolic function is normal.    Left Atrium: Left atrium is moderately dilated.    Mitral Valve: There is mild regurgitation.    Pulmonary Artery: The estimated pulmonary artery systolic pressure is 32 mmHg.    IVC/SVC: Normal venous pressure at 3 mmHg.  . Monitor clinical status closely. Monitor on telemetry. Patient is off CHF pathway.  Monitor strict Is&Os and daily weights.  Place on fluid restriction of 1.5 L. Cardiology has been consulted. Continue to stress to patient importance of self efficacy and  on diet for CHF. Last BNP reviewed- and noted below   Recent Labs   Lab 05/28/24  1259   BNP 2,066*       - Lasix 20IV ggt transitioned to  TID with appropriate response. Transitioned to PO lasix 120mg TID on 06/02.

## 2024-06-02 NOTE — PLAN OF CARE
Problem: Adult Inpatient Plan of Care  Goal: Plan of Care Review  Outcome: Progressing  Goal: Patient-Specific Goal (Individualized)  Outcome: Progressing  Goal: Optimal Comfort and Wellbeing  Outcome: Progressing     Problem: Acute Kidney Injury/Impairment  Goal: Fluid and Electrolyte Balance  Outcome: Progressing     Problem: Heart Failure  Goal: Optimal Coping  Outcome: Progressing  Goal: Stable Heart Rate and Rhythm  Outcome: Progressing  Goal: Effective Oxygenation and Ventilation  Outcome: Progressing

## 2024-06-02 NOTE — CONSULTS
[facility] - Cardiac Intensive Care  Palliative Medicine  Consult Note    Patient Name: Selma Hooper  MRN: [id number]  Admission Date: 5/28/2024  Hospital Length of Stay: 5 days  Code Status: DNR   Attending Provider: Anthony Cardoso MD  Consulting Provider: Rogelio Lin MD  Primary Care Physician: Phil Andrade MD  Principal Problem:Cardiogenic shock    Patient information was obtained from patient, relative(s), past medical records, and primary team.      Inpatient consult to Palliative Care  Consult performed by: Rogelio Lin MD  Consult ordered by: Je Hawkins MD        Assessment/Plan:     Cardiac/Vascular  * Cardiogenic shock  78f with end stage HFrEF (EF 5-10%) with no advanced options, admitted with cardiogenic shock and limb ischemia, on dobutamine.     -care per CCU team  -recommend continued clear and direct communication regarding prognosis and treatment options as a prerequisite for quality goals of care discussions      Palliative Care  Palliative care encounter  See ACP 6/2    Insight/goals of care- good insight and fair prognostic awareness. Clear goals of comfort, limiting life-prolonging care, and dignity (fear of being a burden). Patient and family considering transition to comfort-focused care in the hospital, allowing patient's condition to guide dispo planning (hospice in a facility vs in hospital)    Social support- fair, lives with daughter's family. Also has a son from [place], but reportedly he is struggling with mental health issues. Daughter worries she could not meet her mother's needs, and son would not cope well with her at home.     Psychological- good coping mechanisms by patient and daughter    Spiritual- Shinto, has had anointing of the sick, appreciates continued  support    Symptom management- hiccups, nausea, ischemic leg pain    Recommendations  -DNR, full support for now  -considering transition to comfort-focused care, plan to meet to 6/3 ~1300  to further discuss  -would limit escalating uncomfortable interventions or dialysis prior to further discussion    Symptom management  -can continue norco 10mg (10 OME/dose) q4hr PRN   -may benefit from rotating to oxycodone 10mg (15 OME/dose)  -hydromorphone 0.5mg (10 OME/dose) every hour PRN for pain, dyspnea, or pain behaviors  -if hiccups return and are uncomfortable, would trial chlorpromazine 25mg q6hr PRN  -continue PRN anti-emetics for nausea        Thank you for your consult. I will follow-up with patient. Please contact us if you have any additional questions.    Subjective:     HPI:   Ms Selma Hooper is a 78 year old woman with HFrEF (EF of 5-10%), COPD, CKD III, and hypertension who was admitted to CCU on 5/28 with cardiogenic shock. Patient started on dobutamine and lasix gtt. Course complicated by persistent JAMIR, likely cardiorenal, and RLE ischemia. Vascular surgery consulted, recommending amputation which patient has declined due to risks of anesthesia and intubation. Palliative care consulted for goals of care.     Hospital Course:  No notes on file    Interval History: Chart reviewed including 24h medication use. Patient resting in bed, awake and conversant, no family present at bedside during visit.     Palliative ROS:  PRNs: hydrocodone-APAP 10mg x4 (40 OME), morphine 1mg x1 (3 OME)  Pain + (numb, aching, stabbing pain in RLE, somewhat relieved with norco, unresponsive to low dose IV morphine)  Dyspnea -  Nausea +  Hiccups +  Vomiting -  Constipation -  Anxiety -  Agitation -  Anorexia +          Past Medical History:   Diagnosis Date    Abnormal liver enzymes 12/10/2018    Acute on chronic combined systolic and diastolic congestive heart failure 4/2/2021    Acute on chronic respiratory failure with hypoxia 4/2/2021    Acute on chronic respiratory failure with hypoxia and hypercapnia 12/10/2021    CHF (congestive heart failure)     COPD exacerbation 7/12/2022    Former smoker 10/24/2018     Hypertension     Smoker 7/27/2016       Past Surgical History:   Procedure Laterality Date    BREAST BIOPSY      left  side years ago in high school   1962    CHOLECYSTECTOMY      COLONOSCOPY      COLONOSCOPY N/A 08/23/2021    Procedure: COLONOSCOPY;  Surgeon: Shree Amaya MD;  Location: [facility] ([address] [place] [address]);  Service: Endoscopy;  Laterality: N/A;  Do not cancel this order  fully vaccinated-see immunization record  EF 18%  8/20-covid [facility]-new inst portal-tb    HYSTERECTOMY         Review of patient's allergies indicates:   Allergen Reactions    Atorvastatin      Leg cramps       Medications:  Continuous Infusions:   sodium chloride 0.9%   Intravenous Continuous 5 mL/hr at 06/02/24 0601 Rate Verify at 06/02/24 0601    DOBUTamine IV infusion (non-titrating)  2.5 mcg/kg/min Intravenous Continuous 2.3 mL/hr at 06/02/24 0601 2.5 mcg/kg/min at 06/02/24 0601    heparin (porcine) in D5W  0-40 Units/kg/hr (Adjusted) Intravenous Continuous 7.5 mL/hr at 06/02/24 0601 14 Units/kg/hr at 06/02/24 0601     Scheduled Meds:   acetaminophen  650 mg Oral Q6H    amLODIPine  2.5 mg Oral Daily    aspirin  81 mg Oral Daily    baclofen  5 mg Oral TID    clopidogreL  75 mg Oral Daily    famotidine  20 mg Oral Daily    fluticasone furoate-vilanteroL  1 puff Inhalation Daily    furosemide (LASIX) injection  120 mg Intravenous TID    senna-docusate 8.6-50 mg  2 tablet Oral Daily     PRN Meds:  Current Facility-Administered Medications:     dextrose 10%, 12.5 g, Intravenous, PRN    dextrose 10%, 25 g, Intravenous, PRN    glucagon (human recombinant), 1 mg, Intramuscular, PRN    heparin (PORCINE), 60 Units/kg (Adjusted), Intravenous, PRN    heparin (PORCINE), 30 Units/kg (Adjusted), Intravenous, PRN    HYDROcodone-acetaminophen, 1 tablet, Oral, Q4H PRN    HYDROmorphone, 0.5 mg, Intravenous, Q2H PRN    insulin aspart U-100, 0-5 Units, Subcutaneous, Q6H PRN    ondansetron, 4 mg, Intravenous, Q8H PRN    senna-docusate 8.6-50 mg, 1 tablet, Oral,  Daily PRN    sodium chloride 0.9%, 10 mL, Intravenous, PRN    sodium chloride 0.9%, 10 mL, Intravenous, PRN    Family History       Problem Relation (Age of Onset)    Arthritis Mother    Cancer Father, Brother    Colon cancer Father, Brother (63)    Dementia Father    Diabetes Daughter    Heart attack Mother    Heart disease Mother, Brother    Hypertension Mother, Father, Brother          Tobacco Use    Smoking status: Former     Current packs/day: 0.00     Types: Cigarettes     Quit date: 2018     Years since quittin.9     Passive exposure: Past    Smokeless tobacco: Never   Substance and Sexual Activity    Alcohol use: Yes     Comment: occasional drinker, not a daily drinker    Drug use: No    Sexual activity: Not Currently     Partners: Male         Objective:     Vital Signs (Most Recent):  Temp: 98.1 °F (36.7 °C) (24 0301)  Pulse: 84 (24 0829)  Resp: 20 (24 0901)  BP: 126/63 (24 0601)  SpO2: (!) 92 % (24 0829) Vital Signs (24h Range):  Temp:  [98 °F (36.7 °C)-98.2 °F (36.8 °C)] 98.1 °F (36.7 °C)  Pulse:  [72-91] 84  Resp:  [14-28] 20  SpO2:  [90 %-97 %] 92 %  BP: (106-156)/(52-77) 126/63  Arterial Line BP: (109-139)/(44-62) 139/60     Weight: 62 kg (136 lb 11 oz)  Body mass index is 25.83 kg/m².       Physical Exam  Vitals and nursing note reviewed.   Constitutional:       General: She is not in acute distress.     Appearance: She is ill-appearing. She is not toxic-appearing.      Comments: Older, ill-appearing female lying in bed, awake and conversant, occasional grimaces   HENT:      Nose:      Comments: NC in place     Mouth/Throat:      Mouth: Mucous membranes are moist.   Eyes:      Extraocular Movements: Extraocular movements intact.   Pulmonary:      Effort: Pulmonary effort is normal. No respiratory distress.   Abdominal:      General: Abdomen is flat.      Palpations: Abdomen is soft.   Skin:     General: Skin is warm and dry.   Neurological:      General: No  "focal deficit present.      Mental Status: She is alert and oriented to person, place, and time. Mental status is at baseline.   Psychiatric:         Mood and Affect: Mood normal.         Behavior: Behavior normal.         Thought Content: Thought content normal.         Judgment: Judgment normal.              Advance Care Planning  Advance Directives:   Do Not Resuscitate Status: Yes      Decision Making:  Patient answered questions and Family answered questions  Goals of Care: I engaged patient at bedside and daughter, Alexsandra via telephone in a voluntary regarding patient values and rules of care setting severe life-limiting illness. Patient with very good insight and prognostic awareness, understanding that without amputation, her leg will continue to worsen and can make her very sick. At her request, I shared likely prognosis, noting that her worsening leg in conjunction with her heart failure will dramatically limit her life, likely on the order of weeks. I also added that time could be much shorter without some of the current interventions that are sustaining her heart. Patient is clear that she has "made peace" with her diagnosis and prognosis, and even adds that she "does not want to prolong things". Given these values and wishes, I asked what else was most important to her when it comes to her care and this last part of her life. Miss Hahn states that being at home for end of life is not a high priority, and she worries that she would be too much of a burden on her daughter. She also expresses the importance of minimizing suffering in the coming days to weeks. I outlined couple of possible care plans that align with these values, which include hospice care at home, at a nursing home, or transitioning to complete comfort focus care while she is still in the hospital. She is unsure of what she would like to do next and would like some time to think about these options and discuss with her daughter. Spoke " with patient's daughter over the phone, reviewing the above conversation. She overall agrees with the above values and care options, but shares her worry that her mother would find care in a nursing home unacceptable and also agrees that home is not a good option. I explained that there is a very real possibility that if we transitioned to comfort focus care in the hospital, her mother could decline more rapidly, in which case she would qualify for inpatient hospice care either in the hospital or at a nearby inpatient hospice facility. Both patient and daughter seem to be leaning towards this option, but would like some time to think and formally discuss in person. All other questions and concerns addressed.            CBC:   Recent Labs   Lab 06/02/24  0325   WBC 6.47   HGB 10.0*   HCT 31.8*   MCV 77*        BMP:  Recent Labs   Lab 06/02/24 0325   GLU 99   *   K 3.9   CL 84*   CO2 38*   BUN 53*   CREATININE 2.4*   CALCIUM 9.9   MG 2.1     LFT:  Lab Results   Component Value Date    AST 72 (H) 06/02/2024    ALKPHOS 91 06/02/2024    BILITOT 0.3 06/02/2024     Albumin:   Albumin   Date Value Ref Range Status   06/02/2024 3.4 (L) 3.5 - 5.2 g/dL Final     Protein:   Total Protein   Date Value Ref Range Status   06/02/2024 6.5 6.0 - 8.4 g/dL Final     Lactic acid:   Lab Results   Component Value Date    LACTATE 4.7 (HH) 05/28/2024    LACTATE 4.5 (HH) 05/28/2024     Reviewed CBC with persistent microcytic anemia, CMP with persistent JAMIR on CKD likely cardiorenal/ATN, RLE arterial U/S with occlusions      In my care of this patient with acute on chronic severe illness with threat to life and/or bodily function, I am recommending goal-concordant care as noted above. I spent a significant amount of time reviewing external records/ recommendations of other providers (CCU, neph, vascular), reviewing recent test results (CBC, CMP, U/S), and discussed care with other subspecialists involved (CCU)    In addition to  above, I spent 52 minutes specifically discussing advance care planning and goals of care with patient at bedside and daughter via telephone      The above recommendations communicated directly to primary team on 6/2      Rogelio Lin MD  Palliative Medicine  [facility] - Cardiac Intensive Care

## 2024-06-02 NOTE — ASSESSMENT & PLAN NOTE
Pt presenting with RLE weakness and decreased sensation to the knee that started around 3AM the day of admit. Initial thought was TIA as pt had palpable pulses on admit, however pt developed worsening RLE pain during admission prompting LE Arterial US.    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 There is severe stenosis of the distal left superficial femoral artery.  -CPK uptrended from 2673 to 4628.     -RLE pulses unable to be obtained on doppler. Color change noted on RLE. Pt endorsing numbness.    -Vascular surgery consulted today. Spoke to pt and family and discussed that she would require above-the-knee amputation, pt unsure about above the knee amputation at this time as it may require here to be intubated. We will inform vascular surgery if pt decides to proceed with above the knee amputation.     - ASA, Plavix, Heparin  - Rising CPK  - Vascular Surgery offered AKA, but patient refused as it doesn't align with her goals. Vasc Surg signed off.

## 2024-06-02 NOTE — PLAN OF CARE
Nephrology Chart Review    Intake/Output Summary (Last 24 hours) at 6/2/2024 0614  Last data filed at 6/2/2024 0401  Gross per 24 hour   Intake 714.66 ml   Output 1370 ml   Net -655.34 ml     Vitals:    06/02/24 0301 06/02/24 0307 06/02/24 0401 06/02/24 0501   BP: 120/64  123/64 124/67   BP Location: Right forearm  Right forearm Right forearm   Patient Position: Lying  Lying Lying   Pulse: 73 72 81 80   Resp: 20 20 14 18   Temp: 98.1 °F (36.7 °C)      TempSrc: Oral      SpO2: 97% 97% (!) 94% 95%   Weight:       Height:         Recent Labs   Lab 05/31/24  1542 06/01/24  0302 06/02/24  0325   * 137 135*   K 4.4 4.2 3.9   CL 86* 88* 84*   CO2 35* 34* 38*   BUN 45* 47* 53*   CREATININE 2.4* 2.3* 2.4*   CALCIUM 9.6 9.7 9.9   PHOS 4.5 4.9* 5.4*     A1c - 05/14/2024 5.3     Patients' chart and laboratory studies reviewed. Renal function remains largely stable although serum bicarbonate rising. ~1.37 liters documented UOP on Lasix 120 mg IV TID from infusion yesterday. CVP 7 mmHg this morning. She is maintaining saturations of +92% on 2 liters via nasal cannula. No other related issues identified. Please call nephrology as needed. We will continue to follow.     Rogelio Mendez MD  Nephrology

## 2024-06-02 NOTE — SUBJECTIVE & OBJECTIVE
Interval History: Chart reviewed including 24h medication use. Patient resting in bed, awake and conversant, no family present at bedside during visit.     Palliative ROS:  PRNs: hydrocodone-APAP 10mg x4 (40 OME), morphine 1mg x1 (3 OME)  Pain + (numb, aching, stabbing pain in RLE, somewhat relieved with norco, unresponsive to low dose IV morphine)  Dyspnea -  Nausea +  Hiccups +  Vomiting -  Constipation -  Anxiety -  Agitation -  Anorexia +          Past Medical History:   Diagnosis Date    Abnormal liver enzymes 12/10/2018    Acute on chronic combined systolic and diastolic congestive heart failure 4/2/2021    Acute on chronic respiratory failure with hypoxia 4/2/2021    Acute on chronic respiratory failure with hypoxia and hypercapnia 12/10/2021    CHF (congestive heart failure)     COPD exacerbation 7/12/2022    Former smoker 10/24/2018    Hypertension     Smoker 7/27/2016       Past Surgical History:   Procedure Laterality Date    BREAST BIOPSY      left  side years ago in high school   1962    CHOLECYSTECTOMY      COLONOSCOPY      COLONOSCOPY N/A 08/23/2021    Procedure: COLONOSCOPY;  Surgeon: Shree Amaya MD;  Location: [facility] ([address] [place] [address]);  Service: Endoscopy;  Laterality: N/A;  Do not cancel this order  fully vaccinated-see immunization record  EF 18%  [facility]    HYSTERECTOMY         Review of patient's allergies indicates:   Allergen Reactions    Atorvastatin      Leg cramps       Medications:  Continuous Infusions:   sodium chloride 0.9%   Intravenous Continuous 5 mL/hr at 06/02/24 0601 Rate Verify at 06/02/24 0601    DOBUTamine IV infusion (non-titrating)  2.5 mcg/kg/min Intravenous Continuous 2.3 mL/hr at 06/02/24 0601 2.5 mcg/kg/min at 06/02/24 0601    heparin (porcine) in D5W  0-40 Units/kg/hr (Adjusted) Intravenous Continuous 7.5 mL/hr at 06/02/24 0601 14 Units/kg/hr at 06/02/24 0601     Scheduled Meds:   acetaminophen  650 mg Oral Q6H    amLODIPine  2.5 mg Oral Daily     aspirin  81 mg Oral Daily    baclofen  5 mg Oral TID    clopidogreL  75 mg Oral Daily    famotidine  20 mg Oral Daily    fluticasone furoate-vilanteroL  1 puff Inhalation Daily    furosemide (LASIX) injection  120 mg Intravenous TID    senna-docusate 8.6-50 mg  2 tablet Oral Daily     PRN Meds:  Current Facility-Administered Medications:     dextrose 10%, 12.5 g, Intravenous, PRN    dextrose 10%, 25 g, Intravenous, PRN    glucagon (human recombinant), 1 mg, Intramuscular, PRN    heparin (PORCINE), 60 Units/kg (Adjusted), Intravenous, PRN    heparin (PORCINE), 30 Units/kg (Adjusted), Intravenous, PRN    HYDROcodone-acetaminophen, 1 tablet, Oral, Q4H PRN    HYDROmorphone, 0.5 mg, Intravenous, Q2H PRN    insulin aspart U-100, 0-5 Units, Subcutaneous, Q6H PRN    ondansetron, 4 mg, Intravenous, Q8H PRN    senna-docusate 8.6-50 mg, 1 tablet, Oral, Daily PRN    sodium chloride 0.9%, 10 mL, Intravenous, PRN    sodium chloride 0.9%, 10 mL, Intravenous, PRN    Family History       Problem Relation (Age of Onset)    Arthritis Mother    Cancer Father, Brother    Colon cancer Father, Brother (63)    Dementia Father    Diabetes Daughter    Heart attack Mother    Heart disease Mother, Brother    Hypertension Mother, Father, Brother          Tobacco Use    Smoking status: Former     Current packs/day: 0.00     Types: Cigarettes     Quit date: 2018     Years since quittin.9     Passive exposure: Past    Smokeless tobacco: Never   Substance and Sexual Activity    Alcohol use: Yes     Comment: occasional drinker, not a daily drinker    Drug use: No    Sexual activity: Not Currently     Partners: Male         Objective:     Vital Signs (Most Recent):  Temp: 98.1 °F (36.7 °C) (24 030)  Pulse: 84 (24)  Resp: 20 (24)  BP: 126/63 (24)  SpO2: (!) 92 % (24) Vital Signs (24h Range):  Temp:  [98 °F (36.7 °C)-98.2 °F (36.8 °C)] 98.1 °F (36.7 °C)  Pulse:  [72-91] 84  Resp:  [14-28]  20  SpO2:  [90 %-97 %] 92 %  BP: (106-156)/(52-77) 126/63  Arterial Line BP: (109-139)/(44-62) 139/60     Weight: 62 kg (136 lb 11 oz)  Body mass index is 25.83 kg/m².       Physical Exam  Vitals and nursing note reviewed.   Constitutional:       General: She is not in acute distress.     Appearance: She is ill-appearing. She is not toxic-appearing.      Comments: Older, ill-appearing female lying in bed, awake and conversant, occasional grimaces   HENT:      Nose:      Comments: NC in place     Mouth/Throat:      Mouth: Mucous membranes are moist.   Eyes:      Extraocular Movements: Extraocular movements intact.   Pulmonary:      Effort: Pulmonary effort is normal. No respiratory distress.   Abdominal:      General: Abdomen is flat.      Palpations: Abdomen is soft.   Skin:     General: Skin is warm and dry.   Neurological:      General: No focal deficit present.      Mental Status: She is alert and oriented to person, place, and time. Mental status is at baseline.   Psychiatric:         Mood and Affect: Mood normal.         Behavior: Behavior normal.         Thought Content: Thought content normal.         Judgment: Judgment normal.              Advance Care Planning   Advance Directives:   Do Not Resuscitate Status: Yes      Decision Making:  Patient answered questions and Family answered questions  Goals of Care: I engaged patient at bedside and daughter, Alexsandra via telephone in a voluntary regarding patient values and rules of care setting severe life-limiting illness. Patient with very good insight and prognostic awareness, understanding that without amputation, her leg will continue to worsen and can make her very sick. At her request, I shared likely prognosis, noting that her worsening leg in conjunction with her heart failure will dramatically limit her life, likely on the order of weeks. I also added that time could be much shorter without some of the current interventions that are sustaining her  "heart. Patient is clear that she has "made peace" with her diagnosis and prognosis, and even adds that she "does not want to prolong things". Given these values and wishes, I asked what else was most important to her when it comes to her care and this last part of her life. Miss Hahn states that being at home for end of life is not a high priority, and she worries that she would be too much of a burden on her daughter. She also expresses the importance of minimizing suffering in the coming days to weeks. I outlined couple of possible care plans that align with these values, which include hospice care at home, at a nursing home, or transitioning to complete comfort focus care while she is still in the hospital. She is unsure of what she would like to do next and would like some time to think about these options and discuss with her daughter. Spoke with patient's daughter over the phone, reviewing the above conversation. She overall agrees with the above values and care options, but shares her worry that her mother would find care in a nursing home unacceptable and also agrees that home is not a good option. I explained that there is a very real possibility that if we transitioned to comfort focus care in the hospital, her mother could decline more rapidly, in which case she would qualify for inpatient hospice care either in the hospital or at a nearby inpatient hospice facility. Both patient and daughter seem to be leaning towards this option, but would like some time to think and formally discuss in person. All other questions and concerns addressed.            CBC:   Recent Labs   Lab 06/02/24  0325   WBC 6.47   HGB 10.0*   HCT 31.8*   MCV 77*        BMP:  Recent Labs   Lab 06/02/24  0325   GLU 99   *   K 3.9   CL 84*   CO2 38*   BUN 53*   CREATININE 2.4*   CALCIUM 9.9   MG 2.1     LFT:  Lab Results   Component Value Date    AST 72 (H) 06/02/2024    ALKPHOS 91 06/02/2024    BILITOT 0.3 06/02/2024 "     Albumin:   Albumin   Date Value Ref Range Status   06/02/2024 3.4 (L) 3.5 - 5.2 g/dL Final     Protein:   Total Protein   Date Value Ref Range Status   06/02/2024 6.5 6.0 - 8.4 g/dL Final     Lactic acid:   Lab Results   Component Value Date    LACTATE 4.7 () 05/28/2024    LACTATE 4.5 () 05/28/2024     Reviewed CBC with persistent microcytic anemia, CMP with persistent JAMIR on CKD likely cardiorenal/ATN, RLE arterial U/S with occlusions

## 2024-06-02 NOTE — PLAN OF CARE
AAOX4,VSS,O2 sats>90% on 3L NC.Plan of care discussed with patient. LE pain managed with Norco Q 4 hrs.  Discussed medications and care. Infusing  @ 2.5 mcg/kg/min and heparin gtts @ 14 units/kg/hr, aPTT therapeutic. Patient has no questions at this time.Pt visualised and stable.Call light within reach.Pt resting,bed at lowest position.Pt's family by the bedside.      Problem: Infection  Goal: Absence of Infection Signs and Symptoms  Outcome: Met     Problem: Adult Inpatient Plan of Care  Goal: Plan of Care Review  Outcome: Met  Goal: Patient-Specific Goal (Individualized)  Outcome: Met  Goal: Absence of Hospital-Acquired Illness or Injury  Outcome: Met  Goal: Optimal Comfort and Wellbeing  Outcome: Met  Goal: Readiness for Transition of Care  Outcome: Met     Problem: Diabetes Comorbidity  Goal: Blood Glucose Level Within Targeted Range  Outcome: Met     Problem: Acute Kidney Injury/Impairment  Goal: Fluid and Electrolyte Balance  Outcome: Met  Goal: Improved Oral Intake  Outcome: Met  Goal: Effective Renal Function  Outcome: Met     Problem: Skin Injury Risk Increased  Goal: Skin Health and Integrity  Outcome: Met     Problem: Fall Injury Risk  Goal: Absence of Fall and Fall-Related Injury  Outcome: Met     Problem: Heart Failure  Goal: Optimal Coping  Outcome: Met  Goal: Optimal Cardiac Output  Outcome: Met  Goal: Stable Heart Rate and Rhythm  Outcome: Met  Goal: Optimal Functional Ability  Outcome: Met  Goal: Fluid and Electrolyte Balance  Outcome: Met  Goal: Improved Oral Intake  Outcome: Met  Goal: Effective Oxygenation and Ventilation  Outcome: Met  Goal: Effective Breathing Pattern During Sleep  Outcome: Met     Problem: Coping Ineffective  Goal: Effective Coping  Outcome: Met

## 2024-06-02 NOTE — ASSESSMENT & PLAN NOTE
Pt with an EF of 5-10% that had suspected flash pulmonary edema while getting a MRI. She required BiPAP at that time and received IV Lasix 40mg x3 in the ED with no response. Central line and A-line were placed; pt was started on Dobutamine 5mcg and Lasix 30mg/hr gtt.     Hemos during the initiation of Dobutamine and Lasix gtt:     CVP 10, SvO2 51, CO 3.3, CI 2, and SVR 1945.     Hemos this AM:    CVP 7, SvO2 59, CO 4.4, CI 2.7, and SVR 1164.     -On Dobutamine 2.5mcg this AM.     - Dobutamine 2.5 gtt  - Lasix  mg TID transitioned to po Lasix 120mg TID.

## 2024-06-02 NOTE — ASSESSMENT & PLAN NOTE
- Palliative consulted with assistance for GOC and hospice discussions.  - Family meeting with daughter planned for Mon 06/03 at 1200.

## 2024-06-02 NOTE — ASSESSMENT & PLAN NOTE
See ACP 6/2    Insight/goals of care- good insight and fair prognostic awareness. Clear goals of comfort, limiting life-prolonging care, and dignity (fear of being a burden). Patient and family considering transition to comfort-focused care in the hospital, allowing patient's condition to guide dispo planning (hospice in a facility vs in hospital)    Social support- fair, lives with daughter's family. Also has a son from [place], but reportedly he is struggling with mental health issues. Daughter worries she could not meet her mother's needs, and son would not cope well with her at home.     Psychological- good coping mechanisms by patient and daughter    Spiritual- [employer], has had anointing of the sick, appreciates continued  support    Symptom management- hiccups, nausea, ischemic leg pain    Recommendations  -DNR, full support for now  -considering transition to comfort-focused care, plan to meet to 6/3 ~1300 to further discuss  -would limit escalating uncomfortable interventions or dialysis prior to further discussion    Symptom management  -can continue norco 10mg (10 OME/dose) q4hr PRN   -may benefit from rotating to oxycodone 10mg (15 OME/dose)  -hydromorphone 0.5mg (10 OME/dose) every hour PRN for pain, dyspnea, or pain behaviors  -if hiccups return and are uncomfortable, would trial chlorpromazine 25mg q6hr PRN  -continue PRN anti-emetics for nausea

## 2024-06-02 NOTE — ASSESSMENT & PLAN NOTE
Patient's COPD is with exacerbation noted by continued dyspnea and worsening of baseline hypoxia currently.  Patient is currently off COPD Pathway. Continue scheduled inhalers Supplemental oxygen and monitor respiratory status closely.     Prior smoking history of 0.5 ppd, 25 yo start, quit 2018.    - Continue maintenance inhaler  - Supplemental O2 PRN, home use 1.5L

## 2024-06-02 NOTE — ASSESSMENT & PLAN NOTE
Chronic, controlled. Latest blood pressure and vitals reviewed-     Temp:  [98 °F (36.7 °C)-98.2 °F (36.8 °C)]   Pulse:  [72-94]   Resp:  [14-28]   BP: (106-156)/(52-77)   SpO2:  [90 %-97 %]   Arterial Line BP: (109-144)/(44-64) .   Home meds for hypertension were reviewed and noted below.   Hypertension Medications               furosemide (LASIX) 40 MG tablet Take 1 tablet (40 mg total) by mouth once daily.    metoprolol succinate (TOPROL-XL) 25 MG 24 hr tablet Take 1 tablet (25 mg total) by mouth once daily.    spironolactone (ALDACTONE) 25 MG tablet Take 1 tablet (25 mg total) by mouth once daily.            While in the hospital, will manage blood pressure as follows; Adjust home antihypertensive regimen as follows- Procardia-XL 30mg QD for afterload reduction.     Will utilize p.r.n. blood pressure medication only if patient's blood pressure greater than 180/110 and she develops symptoms such as worsening chest pain or shortness of breath.

## 2024-06-02 NOTE — HPI
Ms Selma Hooper is a 78 year old woman with HFrEF (EF of 5-10%), COPD, CKD III, and hypertension who was admitted to CCU on 5/28 with cardiogenic shock. Patient started on dobutamine and lasix gtt. Course complicated by persistent JAMIR, likely cardiorenal, and RLE ischemia. Vascular surgery consulted, recommending amputation which patient has declined due to risks of anesthesia and intubation. Palliative care consulted for goals of care.

## 2024-06-03 PROBLEM — Z51.5 COMFORT MEASURES ONLY STATUS: Status: ACTIVE | Noted: 2024-01-01

## 2024-06-03 NOTE — ASSESSMENT & PLAN NOTE
- Palliative consulted with assistance for GOC and hospice discussions  - Family meeting planned for Mon 6/03

## 2024-06-03 NOTE — ASSESSMENT & PLAN NOTE
- urinary sediment with granular, half muddy brown and waxy casts suggestive of acute tubular injury in the setting of cardiogenic shock  -scr(Baseline 0.9-1.0) started trending up from 1.6--1.8--peaked 2.6-then down to -2.3-2.4-2.3  -Cpk trending up 727--5k, 6k   - urine output in last 12 hr 1 L    Recommendations  - Lasix as per primary team  - continue to monitor urine output  - Daily RFP and magnesium levels.   - please avoid hypotension/major fluctuations in BP   - no acute indications for RRT at this time however will continue to monitor closely and consent obtained should acute indications for RRT arise

## 2024-06-03 NOTE — SUBJECTIVE & OBJECTIVE
Interval History: VSS. Pt more lethargic overnight. Talking this morning but still slow to respond. Added mirilax as no BM since 5/28. KUB with no signs of obstruction. Adjusted lasix and pain regimen, d/c heparin. Keys removed. Family meeting planned for this afternoon to determine next steps.        Review of Systems   Constitutional: Negative for chills and fever.   Cardiovascular:  Negative for chest pain and leg swelling.   Respiratory:  Negative for cough, shortness of breath, and wheezing.    Musculoskeletal:  Positive for myalgias.   Gastrointestinal:  Negative for bloating, abdominal pain, diarrhea, nausea and vomiting.   Genitourinary:  Negative for dysuria and urgency.   Neurological:  Positive for focal weakness and numbness. Negative for dizziness, headaches, light-headedness, tremors and weakness.     Objective:     Vital Signs (Most Recent):  Temp: 97.4 °F (36.3 °C) (06/03/24 1106)  Pulse: 98 (06/03/24 1106)  Resp: 17 (06/03/24 1106)  BP: (!) 106/58 (06/03/24 1106)  SpO2: (!) 93 % (06/03/24 1106) Vital Signs (24h Range):  Temp:  [97.2 °F (36.2 °C)-98.1 °F (36.7 °C)] 97.4 °F (36.3 °C)  Pulse:  [] 98  Resp:  [12-22] 17  SpO2:  [90 %-97 %] 93 %  BP: ()/(53-67) 106/58  Arterial Line BP: (126-134)/(61-64) 130/61     Weight: 64 kg (141 lb 1.5 oz) (patient cannot stand)  Body mass index is 26.66 kg/m².     SpO2: (!) 93 %         Intake/Output Summary (Last 24 hours) at 6/3/2024 1241  Last data filed at 6/3/2024 0903  Gross per 24 hour   Intake 724.43 ml   Output 1560 ml   Net -835.57 ml       Lines/Drains/Airways       Drain  Duration             Female External Urinary Catheter w/ Suction 06/03/24 0903 <1 day              Peripheral Intravenous Line  Duration                  Peripheral IV - Single Lumen 06/02/24 1500 22 G Anterior;Distal;Left Forearm <1 day         Peripheral IV - Single Lumen 06/02/24 1500 22 G Anterior;Left Forearm <1 day                       Physical Exam  Constitutional:        Appearance: Normal appearance. She is not toxic-appearing.   HENT:      Head: Normocephalic and atraumatic.      Nose: Nose normal.      Mouth/Throat:      Pharynx: Oropharynx is clear.   Eyes:      Conjunctiva/sclera: Conjunctivae normal.   Cardiovascular:      Rate and Rhythm: Normal rate and regular rhythm.      Pulses:           Dorsalis pedis pulses are 0 on the right side.        Posterior tibial pulses are 0 on the right side.      Comments: Decreased pulses in RLE  Pulmonary:      Effort: Pulmonary effort is normal.      Breath sounds: Normal breath sounds. No wheezing or rales.   Abdominal:      General: Abdomen is flat. There is no distension.      Palpations: Abdomen is soft.      Tenderness: There is no abdominal tenderness.   Musculoskeletal:      Right lower leg: Swelling present. No edema.      Left lower leg: No edema.   Skin:     General: Skin is dry.      Comments: RLE cold and mottled    Neurological:      Mental Status: She is disoriented.      Comments: RLE weakness               Significant Labs: All pertinent lab results from the last 24 hours have been reviewed.    Significant Imaging:  Reviewed

## 2024-06-03 NOTE — PROGRESS NOTES
[facility]  Palliative Medicine  Progress Note    Patient Name: Selma Hooper  MRN: [id number]  Admission Date: 5/28/2024  Hospital Length of Stay: 6 days  Code Status: DNR   Attending Provider: Anthony Cardoso MD  Consulting Provider: Rogelio Lin MD  Primary Care Physician: Phil Andrade MD  Principal Problem:Cardiogenic shock    Patient information was obtained from patient, relative(s), past medical records, and primary team.      Assessment/Plan:     Palliative Care  Palliative care encounter  See ACP 6/2-6/3    Insight/goals of care- good insight and fair prognostic awareness. Clear goals of comfort, limiting life-prolonging care, and dignity (fear of being a burden). Patient and family electing to transition to comfort-focused care in the hospital, allowing patient's condition to guide dispo planning (hospice in a facility vs in hospital)    Social support- fair, lives with daughter's family. Also has a son from [place], but reportedly he is struggling with mental health issues. Daughter worries she could not meet her mother's needs, and son would not cope well with her at home.     Psychological- good coping mechanisms by patient and daughter as well as grandchildren    Spiritual- Judaism, has had anointing of the sick, appreciates continued  support    Symptom management- hiccups, nausea, ischemic leg pain    Recommendations  -DNR, comfort-focused care  -if patient acutely declines AND requires comfort-focused medicines via IV, reasonable to consider inpatient hospice GIP admit to unit   -otherwise, may need to explore other inpatient hospice placement  -discontinue dobutamine, routine lab draws, and medicines not contributing to patient's comfort  -would also decrease monitoring, frequency of vitals, and other non goal-concordant interventions    Symptom management  -hydromorphone 0.5mg (10 OME/dose) every hour PRN for pain, dyspnea, or pain behaviors  -lorazepam  0.5mg IV q4hr PRN for agitation, anxiety  -if hiccups return and are uncomfortable, would trial chlorpromazine 25mg q6hr PRN  -continue PRN anti-emetics for nausea        I will follow-up with patient. Please contact us if you have any additional questions.    Subjective:     Chief Complaint:   Chief Complaint   Patient presents with    Numbness     Arrives via EMS for right leg numbness that started this morning x2 hours, states that the numbness went away when EMS arrived, VAN -, denies any weakness currently. On 2 L NC at home,       HPI:   Ms Selma Hooper is a 78 year old woman with HFrEF (EF of 5-10%), COPD, CKD III, and hypertension who was admitted to CCU on 5/28 with cardiogenic shock. Patient started on dobutamine and lasix gtt. Course complicated by persistent JAMIR, likely cardiorenal, and RLE ischemia. Vascular surgery consulted, recommending amputation which patient has declined due to risks of anesthesia and intubation. Palliative care consulted for goals of care.     Hospital Course:  No notes on file    Interval History: Chart reviewed including 24h medication use. Patient more encephalopathic this AM, minimally arousable. Multiple family members are bedside. Pain meds held (last dose norco at 8pm night prior). Revisited later in the day, patient more awake, participating in discussion. Decision to transition to comfort-care      Palliative ROS:  PRNs: hydrocodone-APAP 10mg x2 (20 OME), hydromorphone 0.5mg IV x1 (10 OME); total OME 30  Pain + (numb, aching, stabbing pain in RLE, somewhat relieved with norco, unresponsive to low dose IV morphine)  Dyspnea -  Nausea +  Hiccups +  Vomiting -  Constipation -  Anxiety -  Agitation -  Anorexia +          Past Medical History:   Diagnosis Date    Abnormal liver enzymes 12/10/2018    Acute on chronic combined systolic and diastolic congestive heart failure 4/2/2021    Acute on chronic respiratory failure with hypoxia 4/2/2021    Acute on chronic respiratory  failure with hypoxia and hypercapnia 12/10/2021    CHF (congestive heart failure)     COPD exacerbation 2022    Former smoker 10/24/2018    Hypertension     Smoker 2016       Past Surgical History:   Procedure Laterality Date    BREAST BIOPSY      left  side years ago in high school       CHOLECYSTECTOMY      COLONOSCOPY      COLONOSCOPY N/A 2021    Procedure: COLONOSCOPY;  Surgeon: Shree Amaya MD;  Location: [address]);  Service: Endoscopy;  Laterality: N/A;  Do not cancel this order  fully vaccinated-see immunization record  EF 18%  -covid [facility]-tb    HYSTERECTOMY         Review of patient's allergies indicates:   Allergen Reactions    Atorvastatin      Leg cramps       Medications:  Continuous Infusions:      Scheduled Meds:   acetaminophen  650 mg Oral Q6H    amLODIPine  2.5 mg Oral Daily    baclofen  5 mg Oral BID    fluticasone furoate-vilanteroL  1 puff Inhalation Daily    polyethylene glycol  17 g Oral Daily    senna-docusate 8.6-50 mg  2 tablet Oral Daily     PRN Meds:  Current Facility-Administered Medications:     dextrose 10%, 12.5 g, Intravenous, PRN    dextrose 10%, 25 g, Intravenous, PRN    HYDROmorphone, 0.5 mg, Intravenous, Q2H PRN    ondansetron, 4 mg, Intravenous, Q8H PRN    oxyCODONE, 5 mg, Oral, Q8H PRN    prochlorperazine, 5 mg, Intravenous, Q6H PRN    senna-docusate 8.6-50 mg, 1 tablet, Oral, Daily PRN    sodium chloride 0.9%, 10 mL, Intravenous, PRN    sodium chloride 0.9%, 10 mL, Intravenous, PRN    Family History       Problem Relation (Age of Onset)    Arthritis Mother    Cancer Father, Brother    Colon cancer Father, Brother (63)    Dementia Father    Diabetes Daughter    Heart attack Mother    Heart disease Mother, Brother    Hypertension Mother, Father, Brother          Tobacco Use    Smoking status: Former     Current packs/day: 0.00     Types: Cigarettes     Quit date: 2018     Years since quittin.9     Passive exposure: Past     Smokeless tobacco: Never   Substance and Sexual Activity    Alcohol use: Yes     Comment: occasional drinker, not a daily drinker    Drug use: No    Sexual activity: Not Currently     Partners: Male         Objective:     Vital Signs (Most Recent):  Temp: 97.8 °F (36.6 °C) (06/03/24 1555)  Pulse: 101 (06/03/24 1555)  Resp: 17 (06/03/24 1555)  BP: 103/62 (06/03/24 1555)  SpO2: 95 % (06/03/24 1555) Vital Signs (24h Range):  Temp:  [97.4 °F (36.3 °C)-97.8 °F (36.6 °C)] 97.8 °F (36.6 °C)  Pulse:  [] 101  Resp:  [17-18] 17  SpO2:  [91 %-97 %] 95 %  BP: ()/(53-63) 103/62     Weight: 64 kg (141 lb 1.5 oz) (patient cannot stand)  Body mass index is 26.66 kg/m².       Physical Exam  Vitals and nursing note reviewed.   Constitutional:       General: She is not in acute distress.     Appearance: She is ill-appearing. She is not toxic-appearing.      Comments: Older, ill-appearing female lying in bed, awake and conversant, occasional grimaces   HENT:      Nose:      Comments: NC in place     Mouth/Throat:      Mouth: Mucous membranes are moist.   Eyes:      Extraocular Movements: Extraocular movements intact.   Pulmonary:      Effort: Pulmonary effort is normal. No respiratory distress.   Abdominal:      General: Abdomen is flat.      Palpations: Abdomen is soft.   Skin:     General: Skin is warm and dry.   Neurological:      General: No focal deficit present.      Mental Status: She is alert and oriented to person, place, and time. Mental status is at baseline.   Psychiatric:         Mood and Affect: Mood normal.         Behavior: Behavior normal.         Thought Content: Thought content normal.         Judgment: Judgment normal.              Advance Care Planning  Advance Directives:   Do Not Resuscitate Status: Yes      Decision Making:  Patient answered questions and Family answered questions  Goals of Care: I engaged patient's daughter, son, and grandchildren at bedside in a voluntary discussion regarding  "substituted values and goals of care and the setting of severe life limiting illness. Patient more confused this morning and unable to participate in this conversation. Family able to express very similar values that were put forth by patient the day prior, which include the belief that patient would not want to pursue further life prolonging care. Patient's daughter and granddaughter further add that she has been saying that she is "tired and ready to be at peace".  I discussed what transition to comfort focused-care might look like, including discontinuing inotrope and other interventions and medicines that may be prolonging patient dying process and adding medication for pain and anxiety that might improve her comfort. Family strongly believes that comfort-focused care aligns with patient's wishes however, they were very hopeful that patient could make this decision independently. I offered a plan to return later today and tomorrow to reassess patient's capacity to make these decisions and hopefully engage her in this discussion. I returned to the bedside later in the day, at which time, patient more awake and interactive. Patient agrees with planned to transition to comfort focus care, reiterating her wishes to not prolong her life anymore. Family in agreement all other questions and concerns addressed.            CBC:   Recent Labs   Lab 06/03/24 0315   WBC 8.61   HGB 10.4*   HCT 32.9*   MCV 77*        BMP:  Recent Labs   Lab 06/03/24 0315 06/03/24  1419   GLU 89 88   * 135*   K 4.0 4.0   CL 83* 85*   CO2 32* 34*   BUN 58* 57*   CREATININE 2.3* 2.3*   CALCIUM 9.9 10.1   MG 1.9  --      LFT:  Lab Results   Component Value Date    AST 65 (H) 06/03/2024    ALKPHOS 86 06/03/2024    BILITOT 0.4 06/03/2024     Albumin:   Albumin   Date Value Ref Range Status   06/03/2024 3.3 (L) 3.5 - 5.2 g/dL Final     Protein:   Total Protein   Date Value Ref Range Status   06/03/2024 6.6 6.0 - 8.4 g/dL Final "     Lactic acid:   Lab Results   Component Value Date    LACTATE 0.7 06/03/2024    LACTATE 4.7 (HH) 05/28/2024     Reviewed CBC with persistent microcytic anemia, CMP with persistent JAMIR on CKD likely cardiorenal/ATN    In my care of this patient with acute on chronic severe illness with threat to life and/or bodily function, I am recommending goal-concordant care as noted above. I spent a significant amount of time reviewing external records/ recommendations of other providers (CCU), reviewing recent test results (CBC, CMP), and discussed care with other subspecialists involved (CCU)    In addition to above, I spent a total of 48 minutes specifically discussing advance care planning and goals of care with patient's family at bedside.       The above recommendations communicated directly to primary team on 6/3      Rogelio Lin MD  Palliative Medicine  [facility] [place]

## 2024-06-03 NOTE — ASSESSMENT & PLAN NOTE
Patient is identified as having Combined Systolic and Diastolic heart failure that is Acute on chronic. CHF is currently . Latest ECHO performed and demonstrates- Results for orders placed during the hospital encounter of 05/14/24    Echo    Interpretation Summary    Left Ventricle: The left ventricle is severely dilated. Severely increased ventricular mass. Normal wall thickness. There is eccentric hypertrophy. Severe global hypokinesis present. There is severely reduced systolic function with a visually estimated ejection fraction of 5 - 10%. There is diastolic dysfunction but grade cannot be determined.    Right Ventricle: Normal right ventricular cavity size. Wall thickness is normal. Right ventricle wall motion  is normal. Systolic function is normal.    Left Atrium: Left atrium is moderately dilated.    Mitral Valve: There is mild regurgitation.    Pulmonary Artery: The estimated pulmonary artery systolic pressure is 32 mmHg.    IVC/SVC: Normal venous pressure at 3 mmHg.  . Monitor clinical status closely. Monitor on telemetry. Patient is off CHF pathway.  Monitor strict Is&Os and daily weights.  Place on fluid restriction of 1.5 L. Cardiology has been consulted. Continue to stress to patient importance of self efficacy and  on diet for CHF. Last BNP reviewed- and noted below   Recent Labs   Lab 05/28/24  1259   BNP 2,066*       - Lasix 20IV ggt transitioned to  TID with appropriate response. Transitioned to PO lasix 120mg TID on 06/02. Transitioned to lasix 80mg PO daily.

## 2024-06-03 NOTE — SUBJECTIVE & OBJECTIVE
Interval History: Chart reviewed including 24h medication use. Patient more encephalopathic this AM, minimally arousable. Multiple family members are bedside. Pain meds held (last dose norco at 8pm night prior). Revisited later in the day, patient more awake, participating in discussion. Decision to transition to comfort-care      Palliative ROS:  PRNs: hydrocodone-APAP 10mg x2 (20 OME), hydromorphone 0.5mg IV x1 (10 OME); total OME 30  Pain + (numb, aching, stabbing pain in RLE, somewhat relieved with norco, unresponsive to low dose IV morphine)  Dyspnea -  Nausea +  Hiccups +  Vomiting -  Constipation -  Anxiety -  Agitation -  Anorexia +          Past Medical History:   Diagnosis Date    Abnormal liver enzymes 12/10/2018    Acute on chronic combined systolic and diastolic congestive heart failure 4/2/2021    Acute on chronic respiratory failure with hypoxia 4/2/2021    Acute on chronic respiratory failure with hypoxia and hypercapnia 12/10/2021    CHF (congestive heart failure)     COPD exacerbation 7/12/2022    Former smoker 10/24/2018    Hypertension     Smoker 7/27/2016       Past Surgical History:   Procedure Laterality Date    BREAST BIOPSY      left  side years ago in high school   1962    CHOLECYSTECTOMY      COLONOSCOPY      COLONOSCOPY N/A 08/23/2021    Procedure: COLONOSCOPY;  Surgeon: Shree Amaya MD;  Location: [facility] ([address] [place] [address]);  Service: Endoscopy;  Laterality: N/A;  Do not cancel this order  fully vaccinated-see immunization record  EF 18%  8/20-covid [facility]    HYSTERECTOMY         Review of patient's allergies indicates:   Allergen Reactions    Atorvastatin      Leg cramps       Medications:  Continuous Infusions:      Scheduled Meds:   acetaminophen  650 mg Oral Q6H    amLODIPine  2.5 mg Oral Daily    baclofen  5 mg Oral BID    fluticasone furoate-vilanteroL  1 puff Inhalation Daily    polyethylene glycol  17 g Oral Daily    senna-docusate 8.6-50 mg  2 tablet Oral Daily      PRN Meds:  Current Facility-Administered Medications:     dextrose 10%, 12.5 g, Intravenous, PRN    dextrose 10%, 25 g, Intravenous, PRN    HYDROmorphone, 0.5 mg, Intravenous, Q2H PRN    ondansetron, 4 mg, Intravenous, Q8H PRN    oxyCODONE, 5 mg, Oral, Q8H PRN    prochlorperazine, 5 mg, Intravenous, Q6H PRN    senna-docusate 8.6-50 mg, 1 tablet, Oral, Daily PRN    sodium chloride 0.9%, 10 mL, Intravenous, PRN    sodium chloride 0.9%, 10 mL, Intravenous, PRN    Family History       Problem Relation (Age of Onset)    Arthritis Mother    Cancer Father, Brother    Colon cancer Father, Brother (63)    Dementia Father    Diabetes Daughter    Heart attack Mother    Heart disease Mother, Brother    Hypertension Mother, Father, Brother          Tobacco Use    Smoking status: Former     Current packs/day: 0.00     Types: Cigarettes     Quit date: 2018     Years since quittin.9     Passive exposure: Past    Smokeless tobacco: Never   Substance and Sexual Activity    Alcohol use: Yes     Comment: occasional drinker, not a daily drinker    Drug use: No    Sexual activity: Not Currently     Partners: Male         Objective:     Vital Signs (Most Recent):  Temp: 97.8 °F (36.6 °C) (24 1555)  Pulse: 101 (24 1555)  Resp: 17 (24 1555)  BP: 103/62 (24 1555)  SpO2: 95 % (24 1555) Vital Signs (24h Range):  Temp:  [97.4 °F (36.3 °C)-97.8 °F (36.6 °C)] 97.8 °F (36.6 °C)  Pulse:  [] 101  Resp:  [17-18] 17  SpO2:  [91 %-97 %] 95 %  BP: ()/(53-63) 103/62     Weight: 64 kg (141 lb 1.5 oz) (patient cannot stand)  Body mass index is 26.66 kg/m².       Physical Exam  Vitals and nursing note reviewed.   Constitutional:       General: She is not in acute distress.     Appearance: She is ill-appearing. She is not toxic-appearing.      Comments: Older, ill-appearing female lying in bed, awake and conversant, occasional grimaces   HENT:      Nose:      Comments: NC in place     Mouth/Throat:       "Mouth: Mucous membranes are moist.   Eyes:      Extraocular Movements: Extraocular movements intact.   Pulmonary:      Effort: Pulmonary effort is normal. No respiratory distress.   Abdominal:      General: Abdomen is flat.      Palpations: Abdomen is soft.   Skin:     General: Skin is warm and dry.   Neurological:      General: No focal deficit present.      Mental Status: She is alert and oriented to person, place, and time. Mental status is at baseline.   Psychiatric:         Mood and Affect: Mood normal.         Behavior: Behavior normal.         Thought Content: Thought content normal.         Judgment: Judgment normal.              Advance Care Planning   Advance Directives:   Do Not Resuscitate Status: Yes      Decision Making:  Patient answered questions and Family answered questions  Goals of Care: I engaged patient's daughter, son, and grandchildren at bedside in a voluntary discussion regarding substituted values and goals of care and the setting of severe life limiting illness. Patient more confused this morning and unable to participate in this conversation. Family able to express very similar values that were put forth by patient the day prior, which include the belief that patient would not want to pursue further life prolonging care. Patient's daughter and granddaughter further add that she has been saying that she is "tired and ready to be at peace".  I discussed what transition to comfort focused-care might look like, including discontinuing inotrope and other interventions and medicines that may be prolonging patient dying process and adding medication for pain and anxiety that might improve her comfort. Family strongly believes that comfort-focused care aligns with patient's wishes however, they were very hopeful that patient could make this decision independently. I offered a plan to return later today and tomorrow to reassess patient's capacity to make these decisions and hopefully engage her " in this discussion. I returned to the bedside later in the day, at which time, patient more awake and interactive. Patient agrees with planned to transition to comfort focus care, reiterating her wishes to not prolong her life anymore. Family in agreement all other questions and concerns addressed.            CBC:   Recent Labs   Lab 06/03/24 0315   WBC 8.61   HGB 10.4*   HCT 32.9*   MCV 77*        BMP:  Recent Labs   Lab 06/03/24  0315 06/03/24  1419   GLU 89 88   * 135*   K 4.0 4.0   CL 83* 85*   CO2 32* 34*   BUN 58* 57*   CREATININE 2.3* 2.3*   CALCIUM 9.9 10.1   MG 1.9  --      LFT:  Lab Results   Component Value Date    AST 65 (H) 06/03/2024    ALKPHOS 86 06/03/2024    BILITOT 0.4 06/03/2024     Albumin:   Albumin   Date Value Ref Range Status   06/03/2024 3.3 (L) 3.5 - 5.2 g/dL Final     Protein:   Total Protein   Date Value Ref Range Status   06/03/2024 6.6 6.0 - 8.4 g/dL Final     Lactic acid:   Lab Results   Component Value Date    LACTATE 0.7 06/03/2024    LACTATE 4.7 (HH) 05/28/2024     Reviewed CBC with persistent microcytic anemia, CMP with persistent JAMIR on CKD likely cardiorenal/ATN

## 2024-06-03 NOTE — SUBJECTIVE & OBJECTIVE
Interval History; patient seen this morning on nasal cannula.   Objective:     Vital Signs (Most Recent):  Temp: 97.4 °F (36.3 °C) (06/03/24 1106)  Pulse: 98 (06/03/24 1106)  Resp: 17 (06/03/24 1106)  BP: (!) 106/58 (06/03/24 1106)  SpO2: (!) 93 % (06/03/24 1106) Vital Signs (24h Range):  Temp:  [97.2 °F (36.2 °C)-98.1 °F (36.7 °C)] 97.4 °F (36.3 °C)  Pulse:  [] 98  Resp:  [12-22] 17  SpO2:  [91 %-97 %] 93 %  BP: ()/(53-67) 106/58  Arterial Line BP: (126-130)/(61-62) 130/61     Weight: 64 kg (141 lb 1.5 oz) (patient cannot stand) (06/03/24 0312)  Body mass index is 26.66 kg/m².  Body surface area is 1.66 meters squared.    I/O last 3 completed shifts:  In: 1109.3 [P.O.:800; I.V.:309.3]  Out: 1830 [Urine:1830]     Physical Exam  Vitals and nursing note reviewed.   Constitutional:       General: She is awake. She is not in acute distress.     Appearance: She is normal weight. She is not ill-appearing or diaphoretic.      Interventions: Nasal cannula in place.   HENT:      Head: Normocephalic and atraumatic.      Right Ear: External ear normal.      Left Ear: External ear normal.      Mouth/Throat:      Mouth: Mucous membranes are moist.      Pharynx: Oropharynx is clear. No oropharyngeal exudate or posterior oropharyngeal erythema.   Eyes:      General: No scleral icterus.        Right eye: No discharge.         Left eye: No discharge.      Extraocular Movements: Extraocular movements intact.      Conjunctiva/sclera: Conjunctivae normal.   Cardiovascular:      Rate and Rhythm: Normal rate and regular rhythm.      Heart sounds: No murmur heard.  Pulmonary:      Effort: Tachypnea present. No respiratory distress.      Breath sounds: No wheezing, rhonchi or rales.   Abdominal:      General: Bowel sounds are normal. There is no distension.      Palpations: Abdomen is soft.      Tenderness: There is no abdominal tenderness.   Musculoskeletal:      Cervical back: Neck supple.      Right lower leg: No edema.       Left lower leg: No edema.   Skin:     General: Skin is warm and dry.      Coloration: Skin is not jaundiced.   Neurological:      General: No focal deficit present.      Mental Status: She is alert. Mental status is at baseline.      Cranial Nerves: No cranial nerve deficit.      Motor: No weakness.   Psychiatric:         Mood and Affect: Mood normal.         Behavior: Behavior normal. Behavior is cooperative.          Significant Labs:  ABGs:   Recent Labs   Lab 06/02/24  0829   PH 7.417   PCO2 65.8*   HCO3 42.4*   POCSATURATED 56   BE 18*     BMP:   Recent Labs   Lab 06/03/24 0315   GLU 89   *   K 4.0   CL 83*   CO2 32*   BUN 58*   CREATININE 2.3*   CALCIUM 9.9   MG 1.9     CBC:   Recent Labs   Lab 06/03/24 0315   WBC 8.61   RBC 4.26   HGB 10.4*   HCT 32.9*      MCV 77*   MCH 24.4*   MCHC 31.6*     CMP:   Recent Labs   Lab 06/03/24 0315   GLU 89   CALCIUM 9.9   ALBUMIN 3.3*   PROT 6.6   *   K 4.0   CO2 32*   CL 83*   BUN 58*   CREATININE 2.3*   ALKPHOS 86   ALT 51*   AST 65*   BILITOT 0.4     Coagulation:   Recent Labs   Lab 05/28/24 2005 05/28/24 2157 06/03/24 0315   INR 1.2  --   --    APTT 109.1*   < > 49.4*    < > = values in this interval not displayed.     LFTs:   Recent Labs   Lab 06/03/24 0315   ALT 51*   AST 65*   ALKPHOS 86   BILITOT 0.4   PROT 6.6   ALBUMIN 3.3*     Microbiology Results (last 7 days)       Procedure Component Value Units Date/Time    Urine culture [[id number]]  (Abnormal)  (Susceptibility) Collected: 05/28/24 1817    Order Status: Completed Specimen: Urine Updated: 05/31/24 0442     Urine Culture, Routine PROTEUS MIRABILIS  >100,000 cfu/ml      Narrative:      ADD ON URINE PROTEIN/CREATININE RATIO PER DR BRITT GUILLEN/ORDER#   [id number] @ 22:28    ADD ON URINE SODIUM PER DR ELAINA CASH/ORDER# [id number] @ 21:53    Specimen Source->Urine          Specimen (24h ago, onward)      None          TSH:   Recent Labs   Lab 05/28/24  0611   TSH 0.889     Recent Labs    Lab 05/28/24  1817   COLORU [place]*   SPECGRAV 1.010   PHUR 6.0   PROTEINUA 1+*   BACTERIA [place]*   NITRITE Negative   LEUKOCYTESUR 2+*   HYALINECASTS 4*     Urinary sediment on 05/28/2024:                                              Significant Imaging:  I have reviewed all imagining in the last 24 hours.

## 2024-06-03 NOTE — NURSING
Patient lethargic , she's been sleeping well since pain meds given last night. Patient able to answer open ended question when prompted in bed. Blood Glucose 109 , O2 sAT 90-92 ON 2L. Latest / 60, MAP 81. She manage to take meds with water without coughing. MD Anderson made aware of above concern with no new order.

## 2024-06-03 NOTE — ASSESSMENT & PLAN NOTE
Chronic, controlled. Latest blood pressure and vitals reviewed-     Temp:  [97.2 °F (36.2 °C)-98.1 °F (36.7 °C)]   Pulse:  []   Resp:  [12-22]   BP: ()/(53-67)   SpO2:  [90 %-97 %]   Arterial Line BP: (126-134)/(61-64) .   Home meds for hypertension were reviewed and noted below.   Hypertension Medications               furosemide (LASIX) 40 MG tablet Take 1 tablet (40 mg total) by mouth once daily.    metoprolol succinate (TOPROL-XL) 25 MG 24 hr tablet Take 1 tablet (25 mg total) by mouth once daily.    spironolactone (ALDACTONE) 25 MG tablet Take 1 tablet (25 mg total) by mouth once daily.            While in the hospital, will manage blood pressure as follows; Adjust home antihypertensive regimen as follows- continue amlodipine    Will utilize p.r.n. blood pressure medication only if patient's blood pressure greater than 180/110 and she develops symptoms such as worsening chest pain or shortness of breath.

## 2024-06-03 NOTE — ASSESSMENT & PLAN NOTE
See ACP 6/2-6/3    Insight/goals of care- good insight and fair prognostic awareness. Clear goals of comfort, limiting life-prolonging care, and dignity (fear of being a burden). Patient and family electing to transition to comfort-focused care in the hospital, allowing patient's condition to guide dispo planning (hospice in a facility vs in hospital)    Social support- fair, lives with daughter's family. Also has a son from [place], but reportedly he is struggling with mental health issues. Daughter worries she could not meet her mother's needs, and son would not cope well with her at home.     Psychological- good coping mechanisms by patient and daughter as well as grandchildren    Spiritual- Protestant, has had anointing of the sick, appreciates continued  support    Symptom management- hiccups, nausea, ischemic leg pain    Recommendations  -DNR, comfort-focused care  -if patient acutely declines AND requires comfort-focused medicines via IV, reasonable to consider inpatient hospice GIP admit to unit   -otherwise, may need to explore other inpatient hospice placement  -discontinue dobutamine, routine lab draws, and medicines not contributing to patient's comfort  -would also decrease monitoring, frequency of vitals, and other non goal-concordant interventions    Symptom management  -hydromorphone 0.5mg (10 OME/dose) every hour PRN for pain, dyspnea, or pain behaviors  -lorazepam 0.5mg IV q4hr PRN for agitation, anxiety  -if hiccups return and are uncomfortable, would trial chlorpromazine 25mg q6hr PRN  -continue PRN anti-emetics for nausea

## 2024-06-03 NOTE — PLAN OF CARE
Problem: Skin Injury Risk Increased  Goal: Skin Health and Integrity  Outcome: Progressing  Intervention: Optimize Skin Protection  Flowsheets (Taken 6/3/2024 0040)  Pressure Reduction Techniques:   frequent weight shift encouraged   weight shift assistance provided  Pressure Reduction Devices: foam padding utilized  Skin Protection:   incontinence pads utilized   skin sealant/moisture barrier applied  Activity Management: Rolling - L1  Head of Bed (HOB) Positioning: HOB elevated     Problem: Pain Acute  Goal: Optimal Pain Control and Function  Outcome: Progressing  Intervention: Develop Pain Management Plan  Flowsheets (Taken 6/3/2024 0040)  Pain Management Interventions:   medication offered   heat applied   position adjusted  Intervention: Prevent or Manage Pain  Flowsheets (Taken 6/3/2024 0040)  Sleep/Rest Enhancement:   awakenings minimized   regular sleep/rest pattern promoted  Sensory Stimulation Regulation: visual stimulation minimized  Bowel Elimination Promotion: adequate fluid intake promoted  Medication Review/Management: medications reviewed  Intervention: Optimize Psychosocial Wellbeing  Flowsheets (Taken 6/3/2024 0040)  Supportive Measures:   active listening utilized   decision-making supported

## 2024-06-03 NOTE — NURSING
Received report from NATTY Dias night. Patient alert and oriented. No  pain. No sign of distress. Iv line in place- on Dobutamine gtts at 2.5 mcg/kg/min and Continuous Heparin infusion at 14units/kg/hr (7.5mls/hr). Tele monitor in place.On 3L of O2 - reduced to 2L - O2 sat 90% and above. Call bell given on his reach. Instructed to call for any concerns or assistance. Bed on lowest position. Non skid socks on. Plan of care discussed with patient, question answered.

## 2024-06-03 NOTE — ASSESSMENT & PLAN NOTE
Nephrology consulted. Urinary sediment with granular, halfy muddy brown and waxy casts suggestive of ATN in the setting of cardiogenic shock.    Baseline Cr of 1.0.     - CMP daily, trend Cr  - Strict I/O  - Daily weights  - Renally dose all medications  - Avoid nephrotoxic agents, NSAIDs, IV contrast, ACE/ARB

## 2024-06-03 NOTE — PT/OT/SLP PROGRESS
Physical Therapy      Patient Name:  Selma Hooper   MRN:  [id number]    Patient not seen today secondary to Patient appeared disoriented and then MD orders were discontinued. Will follow-up when new MD orders are received.

## 2024-06-03 NOTE — ASSESSMENT & PLAN NOTE
Pt with an EF of 5-10% that had suspected flash pulmonary edema while getting a MRI. She required BiPAP at that time and received IV Lasix 40mg x3 in the ED with no response. Central line and A-line were placed; pt was started on Dobutamine 5mcg and Lasix 30mg/hr gtt.     Hemos during the initiation of Dobutamine and Lasix gtt:     CVP 10, SvO2 51, CO 3.3, CI 2, and SVR 1945.     - Continue dobutamine 2.5 gtt  - Lasix 120 mg TID PO transitioned to Lasix 80mg daily.

## 2024-06-03 NOTE — PLAN OF CARE
[facility] - Cardiology Stepdown  Discharge Reassessment    Primary Care Provider: Phil Andrade MD    Expected Discharge Date: 6/5/2024    Reassessment (most recent)       Discharge Reassessment - 06/03/24 1340          Discharge Reassessment    Assessment Type Discharge Planning Reassessment     Did the patient's condition or plan change since previous assessment? Yes     Discharge Plan discussed with: Patient;Adult children     Communicated GERA with patient/caregiver Date not available/Unable to determine     Discharge Plan A Inpatient Hospice     Discharge Plan B Hospice/home     DME Needed Upon Discharge  none     Transition of Care Barriers None     Why the patient remains in the hospital Requires continued medical care                   Per Palliative Care MD Dr Lin after meeting with family they are leaning towards comfort care but want to see if pt will regain enough lucidity to make the decision herself.  Per Dr Lin and CCU team GOC discussions are ongoing.  SW went to bedside but pt was sleeping and family had left the bedside.  SW name and ext on whiteboard.  Will continue to follow.    Discharge Plan A and Plan B have been determined by review of patient's clinical status, future medical and therapeutic needs, and coverage/benefits for post-acute care in coordination with multidisciplinary team members.      UPDATE 4:14 PM  Per Palliative Care MD Dr Lin pt is now awake and agreed to comfort care.      Juliana Roque, SOILA  [facility]  [id number]

## 2024-06-03 NOTE — ASSESSMENT & PLAN NOTE
Echo from 05/24/24 with EF 5-10% with global hypokinesis. GDMT includes Toprol 25mg qd, spironolactone 25mg qd.  Unable to afford Jardiance even with coupon. Entresto held on prior admission and DC'd at clinic visit on day prior to admission given continued soft BP. Clinic plan was to introduce low dose ACE/ARB for additional GDMT. Diuresis with Lasix 40mg qd. Patient declined establishing with Palliative Care during recent clinic visit.      - Add GDMT back as tolerated

## 2024-06-03 NOTE — PLAN OF CARE
Problem: Adult Inpatient Plan of Care  Goal: Plan of Care Review  6/3/2024 1741 by Rickey Castro LPN  Outcome: Progressing  6/3/2024 1741 by Rickey Castro LPN  Goal: Patient-Specific Goal (Individualized)  6/3/2024 1741 by Rickey Castro LPN  Outcome: Progressing  6/3/2024 1741 by Rickey Castro LPN  Goal: Absence of Hospital-Acquired Illness or Injury  6/3/2024 1741 by Rickey Castro LPN  Outcome: Progressing  6/3/2024 1741 by Rickey Castro LPN  Goal: Optimal Comfort and Wellbeing  6/3/2024 1741 by Rickey Castro LPN  Outcome: Progressing  6/3/2024 1741 by Rickey Castro LPN  Goal: Readiness for Transition of Care  6/3/2024 1741 by Rickey Castro LPN  Outcome: Progressing  6/3/2024 1741 by Rickey Castro LPN  Problem: Pain Acute  Goal: Optimal Pain Control and Function  Outcome: Progressing         Plan of care discussed with patient's daughter. Patient is free of fall/trauma/injury. Denies CP, SOB, or pain/discomfort. All questions addressed. Will continue to monitor. Transitioned to comfort care. VSS. Pt resting comfortably in bed with family at bedside.

## 2024-06-03 NOTE — NURSING
0900: Upon assessment, pt is disoriented x4. She is unable to correctly answer any orientation questions. Patient's speech is also slightly delayed. Team made aware and stated they will assess pt during rounds.       Pt refusing to take PO medication. Pt states her stomach hurts and she is nauseous. Zofran given and MD notifed.    Pt tolerated 1 PO medication and refused any more. Rest of Medications crushed and given via apple sauce. Pt tolerated well.     1050: Pt able to state name and  but is delayed with answers. Pt disoriented to situation, time and place.

## 2024-06-03 NOTE — PROGRESS NOTES
[facility]  Cardiology  Progress Note    Patient Name: Selma Hooper  MRN: [id number]  Admission Date: 5/28/2024  Hospital Length of Stay: 6 days  Code Status: DNR   Attending Physician: Anthony Cardoso MD   Primary Care Physician: Phil Andrade MD  Expected Discharge Date: 6/5/2024  Principal Problem:Cardiogenic shock    Subjective:     Hospital Course:   Weaned off BiPAP to 2L NC (home O2 1.5L). Continued weaning diuresis. On dobutamine. RLE with continued poor, intermittent pulses with associated intermittent pain/numbness, decreasing sensation to palpation. US arterial BLE with occlusion of the right mid and distal superficial femoral artery, popliteal artery, and anterior and posterior tibial arteries & severe stenosis of the distal left superficial femoral artery. Per discussions with Interventional Cardiology, no acute intervention given acuity of overall presentation. Vascular Surgery offered L AKA, but patient declined citing growing medical complexity in her life and no desire for intubation in any form. She believes that her life is limited primarily by her HFrEF and that the ischemic limb is an additional setback which, even if amputated, would not necessarily improve her quality of life. Palliative consulted, family meeting planned for 6/3 to determine next steps.       Interval History: VSS. Pt more lethargic overnight. Talking this morning but still slow to respond. Added mirilax as no BM since 5/28. KUB with no signs of obstruction. Adjusted lasix and pain regimen, d/c heparin. Keys removed. Family meeting planned for this afternoon to determine next steps.        Review of Systems   Constitutional: Negative for chills and fever.   Cardiovascular:  Negative for chest pain and leg swelling.   Respiratory:  Negative for cough, shortness of breath, and wheezing.    Musculoskeletal:  Positive for myalgias.   Gastrointestinal:  Negative for bloating, abdominal pain, diarrhea,  nausea and vomiting.   Genitourinary:  Negative for dysuria and urgency.   Neurological:  Positive for focal weakness and numbness. Negative for dizziness, headaches, light-headedness, tremors and weakness.     Objective:     Vital Signs (Most Recent):  Temp: 97.4 °F (36.3 °C) (06/03/24 1106)  Pulse: 98 (06/03/24 1106)  Resp: 17 (06/03/24 1106)  BP: (!) 106/58 (06/03/24 1106)  SpO2: (!) 93 % (06/03/24 1106) Vital Signs (24h Range):  Temp:  [97.2 °F (36.2 °C)-98.1 °F (36.7 °C)] 97.4 °F (36.3 °C)  Pulse:  [] 98  Resp:  [12-22] 17  SpO2:  [90 %-97 %] 93 %  BP: ()/(53-67) 106/58  Arterial Line BP: (126-134)/(61-64) 130/61     Weight: 64 kg (141 lb 1.5 oz) (patient cannot stand)  Body mass index is 26.66 kg/m².     SpO2: (!) 93 %         Intake/Output Summary (Last 24 hours) at 6/3/2024 1241  Last data filed at 6/3/2024 0903  Gross per 24 hour   Intake 724.43 ml   Output 1560 ml   Net -835.57 ml       Lines/Drains/Airways       Drain  Duration             Female External Urinary Catheter w/ Suction 06/03/24 0903 <1 day              Peripheral Intravenous Line  Duration                  Peripheral IV - Single Lumen 06/02/24 1500 22 G Anterior;Distal;Left Forearm <1 day         Peripheral IV - Single Lumen 06/02/24 1500 22 G Anterior;Left Forearm <1 day                       Physical Exam  Constitutional:       Appearance: Normal appearance. She is not toxic-appearing.   HENT:      Head: Normocephalic and atraumatic.      Nose: Nose normal.      Mouth/Throat:      Pharynx: Oropharynx is clear.   Eyes:      Conjunctiva/sclera: Conjunctivae normal.   Cardiovascular:      Rate and Rhythm: Normal rate and regular rhythm.      Pulses:           Dorsalis pedis pulses are 0 on the right side.        Posterior tibial pulses are 0 on the right side.      Comments: Decreased pulses in RLE  Pulmonary:      Effort: Pulmonary effort is normal.      Breath sounds: Normal breath sounds. No wheezing or rales.   Abdominal:       General: Abdomen is flat. There is no distension.      Palpations: Abdomen is soft.      Tenderness: There is no abdominal tenderness.   Musculoskeletal:      Right lower leg: Swelling present. No edema.      Left lower leg: No edema.   Skin:     General: Skin is dry.      Comments: RLE cold and mottled    Neurological:      Mental Status: She is disoriented.      Comments: RLE weakness               Significant Labs: All pertinent lab results from the last 24 hours have been reviewed.    Significant Imaging:  Reviewed  Assessment and Plan:     * Cardiogenic shock  Pt with an EF of 5-10% that had suspected flash pulmonary edema while getting a MRI. She required BiPAP at that time and received IV Lasix 40mg x3 in the ED with no response. Central line and A-line were placed; pt was started on Dobutamine 5mcg and Lasix 30mg/hr gtt.     Hemos during the initiation of Dobutamine and Lasix gtt:     CVP 10, SvO2 51, CO 3.3, CI 2, and SVR 1945.     - Continue dobutamine 2.5 gtt  - Lasix 120 mg TID PO transitioned to Lasix 80mg daily.     Palliative care encounter  - Palliative consulted with assistance for GOC and hospice discussions  - Family meeting planned for Mon 6/3     Acute lower limb ischemia  Pt presenting with RLE weakness and decreased sensation to the knee that started around 3AM the day of admit. Initial thought was TIA as pt had palpable pulses on admit, however pt developed worsening RLE pain during admission prompting LE Arterial US.    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 There is severe stenosis of the distal left superficial femoral artery.  -CPK uptrended from 2673 to 4628.     -RLE pulses unable to be obtained on doppler. Color change noted on RLE. Pt endorsing numbness.    -Vascular surgery consulted today. Spoke to pt and family and discussed that she would require above-the-knee amputation, pt  unsure about above the knee amputation at this time as it may require here to be intubated. We will inform vascular surgery if pt decides to proceed with above the knee amputation.     - Continue Asa and Plavix  - D/c heparin  - Vascular Surgery offered AKA, but patient refused as it doesn't align with her goals.     Arrhythmia  Sinus tachycardia    Long QT interval  With wide QRS tachycardia on admission. Qtc 568 on admission.     - Keep K > 4, Mg > 2    COPD (chronic obstructive pulmonary disease)  Patient's COPD is with exacerbation noted by continued dyspnea and worsening of baseline hypoxia currently.  Patient is currently off COPD Pathway. Continue scheduled inhalers Supplemental oxygen and monitor respiratory status closely.     Prior smoking history of 0.5 ppd, 25 yo start, quit 2018.    - Continue maintenance inhaler  - Supplemental O2 PRN, home use 1.5L    HFrEF (heart failure with reduced ejection fraction)  See NICM    DNR (do not resuscitate)  Pt DNR per chart review and reconfirmed in the ED on this admission. Do not intubate.    Type 2 diabetes mellitus with hyperglycemia, without long-term current use of insulin  Last A1C 5 during admission two weeks prior to current ICU admission. Not on home DM regimen. Hyperglycemic during this admission.    - LDSSI    Acute on chronic respiratory failure with hypoxia and hypercapnia  Patient with Hypercapnic and Hypoxic Respiratory failure which is Acute on chronic.  she is on home oxygen at 1.5 LPM. Supplemental oxygen was provided and noted.    Respiratory status has improved, pt on 2L NC this AM (baseline 1.5L at home)       Signs/symptoms of respiratory failure include- tachypnea, increased work of breathing, and respiratory distress. Contributing diagnoses includes - CHF and COPD Labs and images were reviewed. Patient Has recent ABG, which has been reviewed. Will treat underlying causes and adjust management of respiratory failure as follows-     - Do not  intubate, does not align with patient goals    Acute on chronic combined systolic and diastolic congestive heart failure  Patient is identified as having Combined Systolic and Diastolic heart failure that is Acute on chronic. CHF is currently . Latest ECHO performed and demonstrates- Results for orders placed during the hospital encounter of 05/14/24    Echo    Interpretation Summary    Left Ventricle: The left ventricle is severely dilated. Severely increased ventricular mass. Normal wall thickness. There is eccentric hypertrophy. Severe global hypokinesis present. There is severely reduced systolic function with a visually estimated ejection fraction of 5 - 10%. There is diastolic dysfunction but grade cannot be determined.    Right Ventricle: Normal right ventricular cavity size. Wall thickness is normal. Right ventricle wall motion  is normal. Systolic function is normal.    Left Atrium: Left atrium is moderately dilated.    Mitral Valve: There is mild regurgitation.    Pulmonary Artery: The estimated pulmonary artery systolic pressure is 32 mmHg.    IVC/SVC: Normal venous pressure at 3 mmHg.  . Monitor clinical status closely. Monitor on telemetry. Patient is off CHF pathway.  Monitor strict Is&Os and daily weights.  Place on fluid restriction of 1.5 L. Cardiology has been consulted. Continue to stress to patient importance of self efficacy and  on diet for CHF. Last BNP reviewed- and noted below   Recent Labs   Lab 05/28/24  1259   BNP 2,066*       - Lasix 20IV ggt transitioned to  TID with appropriate response. Transitioned to PO lasix 120mg TID on 06/02. Transitioned to lasix 80mg PO daily.     LBBB (left bundle branch block)  Noted on EKG dating as far back as 03/2019. Reconfirmed on admission EKG.     NICM (nonischemic cardiomyopathy)  Echo from 05/24/24 with EF 5-10% with global hypokinesis. GDMT includes Toprol 25mg qd, spironolactone 25mg qd.  Unable to afford Jardiance even with coupon.  Entresto held on prior admission and DC'd at clinic visit on day prior to admission given continued soft BP. Clinic plan was to introduce low dose ACE/ARB for additional GDMT. Diuresis with Lasix 40mg qd. Patient declined establishing with Palliative Care during recent clinic visit.      - Add GDMT back as tolerated     Acute renal failure with acute tubular necrosis superimposed on stage 3a chronic kidney disease  Nephrology consulted. Urinary sediment with granular, halfy muddy brown and waxy casts suggestive of ATN in the setting of cardiogenic shock.    Baseline Cr of 1.0.     - CMP daily, trend Cr  - Strict I/O  - Daily weights  - Renally dose all medications  - Avoid nephrotoxic agents, NSAIDs, IV contrast, ACE/ARB    Hypertension, essential  Chronic, controlled. Latest blood pressure and vitals reviewed-     Temp:  [97.2 °F (36.2 °C)-98.1 °F (36.7 °C)]   Pulse:  []   Resp:  [12-22]   BP: ()/(53-67)   SpO2:  [90 %-97 %]   Arterial Line BP: (126-134)/(61-64) .   Home meds for hypertension were reviewed and noted below.   Hypertension Medications               furosemide (LASIX) 40 MG tablet Take 1 tablet (40 mg total) by mouth once daily.    metoprolol succinate (TOPROL-XL) 25 MG 24 hr tablet Take 1 tablet (25 mg total) by mouth once daily.    spironolactone (ALDACTONE) 25 MG tablet Take 1 tablet (25 mg total) by mouth once daily.            While in the hospital, will manage blood pressure as follows; Adjust home antihypertensive regimen as follows- continue amlodipine    Will utilize p.r.n. blood pressure medication only if patient's blood pressure greater than 180/110 and she develops symptoms such as worsening chest pain or shortness of breath.        VTE Risk Mitigation (From admission, onward)           Ordered     IP VTE HIGH RISK PATIENT  Once         05/28/24 2104     Place sequential compression device  Until discontinued         05/28/24 2104     Place sequential compression device  Until  discontinued         05/28/24 1624                    Leandra Ocasio MD  Cardiology  [facility]

## 2024-06-03 NOTE — PROGRESS NOTES
[facility]  Nephrology  Progress Note    Patient Name: Selma Hooper  MRN: [id number]  Admission Date: 5/28/2024  Hospital Length of Stay: 6 days  Attending Provider: Anthony Cardoso MD   Primary Care Physician: Phil Andrade MD  Principal Problem:Cardiogenic shock    Subjective:     HPI: Ms Hooper is a 78-year-old woman with HFrEF (most recent TTE with EF of 5-10%), COPD with chronic hypoxic respiratory failure on supplemental oxygen (2 liters via nasal cannula as outpatient), prior tobacco abuse, CKD IIIa (baseline creatinine ~1)  and hypertension who was admitted to CCU on 5/28 with cardiogenic shock after presenting to the ED with complaints of right lower extremity numbness and associated weakness which had started roughly around 3 AM earlier that morning and upon going to [facility] she became dyspneic and was noted to be cold to the touch with tachycardia.  CBC was notable for microcytic anemia with hemoglobin 10.8. Metabolic panel was notable for albumin of 3.4 and creatinine 1.1 which would subsequently rise to 1.8. Lactate was elevated at 4.5 which would rise to 5.3. VBG on presentation showed pH of 7.132, pCO2 95.5 and bicarbonate 31.9. BNP was ~2K with chest x-ray showing enlarged cardiac silhouette and abnormal increased reticular lung markings seen throughout both lung fields concerning for diffuse pulmonary edema. She would receive a cumulative dose of 240 mg IV Lasix (40 mg IV x3 plus 120 mg IV) and Diuril 500 mg IV in addition to being started on Lasix infusion at 30 mg/hr and dobutamine at 5 mcg/kg/minute. Only 250 mL of documented UOP thus far. UA showed +1 protein, +3 occult blood (69 RBCs/hpf), +2 leukocytes (13 WBCs/hpf) and many bacteria. Urine sodium was 114. UPCR, CPK and retroperitoneal US still pending at this time. Nephrology has been consulted for assistance with management of ARF in the setting of cardiogenic shock.    Interval History; patient seen this morning on  nasal cannula.   Objective:     Vital Signs (Most Recent):  Temp: 97.4 °F (36.3 °C) (06/03/24 1106)  Pulse: 98 (06/03/24 1106)  Resp: 17 (06/03/24 1106)  BP: (!) 106/58 (06/03/24 1106)  SpO2: (!) 93 % (06/03/24 1106) Vital Signs (24h Range):  Temp:  [97.2 °F (36.2 °C)-98.1 °F (36.7 °C)] 97.4 °F (36.3 °C)  Pulse:  [] 98  Resp:  [12-22] 17  SpO2:  [91 %-97 %] 93 %  BP: ()/(53-67) 106/58  Arterial Line BP: (126-130)/(61-62) 130/61     Weight: 64 kg (141 lb 1.5 oz) (patient cannot stand) (06/03/24 0312)  Body mass index is 26.66 kg/m².  Body surface area is 1.66 meters squared.    I/O last 3 completed shifts:  In: 1109.3 [P.O.:800; I.V.:309.3]  Out: 1830 [Urine:1830]     Physical Exam  Vitals and nursing note reviewed.   Constitutional:       General: She is awake. She is not in acute distress.     Appearance: She is normal weight. She is not ill-appearing or diaphoretic.      Interventions: Nasal cannula in place.   HENT:      Head: Normocephalic and atraumatic.      Right Ear: External ear normal.      Left Ear: External ear normal.      Mouth/Throat:      Mouth: Mucous membranes are moist.      Pharynx: Oropharynx is clear. No oropharyngeal exudate or posterior oropharyngeal erythema.   Eyes:      General: No scleral icterus.        Right eye: No discharge.         Left eye: No discharge.      Extraocular Movements: Extraocular movements intact.      Conjunctiva/sclera: Conjunctivae normal.   Cardiovascular:      Rate and Rhythm: Normal rate and regular rhythm.      Heart sounds: No murmur heard.  Pulmonary:      Effort: Tachypnea present. No respiratory distress.      Breath sounds: No wheezing, rhonchi or rales.   Abdominal:      General: Bowel sounds are normal. There is no distension.      Palpations: Abdomen is soft.      Tenderness: There is no abdominal tenderness.   Musculoskeletal:      Cervical back: Neck supple.      Right lower leg: No edema.      Left lower leg: No edema.   Skin:     General:  Skin is warm and dry.      Coloration: Skin is not jaundiced.   Neurological:      General: No focal deficit present.      Mental Status: She is alert. Mental status is at baseline.      Cranial Nerves: No cranial nerve deficit.      Motor: No weakness.   Psychiatric:         Mood and Affect: Mood normal.         Behavior: Behavior normal. Behavior is cooperative.          Significant Labs:  ABGs:   Recent Labs   Lab 06/02/24  0829   PH 7.417   PCO2 65.8*   HCO3 42.4*   POCSATURATED 56   BE 18*     BMP:   Recent Labs   Lab 06/03/24 0315   GLU 89   *   K 4.0   CL 83*   CO2 32*   BUN 58*   CREATININE 2.3*   CALCIUM 9.9   MG 1.9     CBC:   Recent Labs   Lab 06/03/24 0315   WBC 8.61   RBC 4.26   HGB 10.4*   HCT 32.9*      MCV 77*   MCH 24.4*   MCHC 31.6*     CMP:   Recent Labs   Lab 06/03/24 0315   GLU 89   CALCIUM 9.9   ALBUMIN 3.3*   PROT 6.6   *   K 4.0   CO2 32*   CL 83*   BUN 58*   CREATININE 2.3*   ALKPHOS 86   ALT 51*   AST 65*   BILITOT 0.4     Coagulation:   Recent Labs   Lab 05/28/24 2005 05/28/24 2157 06/03/24 0315   INR 1.2  --   --    APTT 109.1*   < > 49.4*    < > = values in this interval not displayed.     LFTs:   Recent Labs   Lab 06/03/24 0315   ALT 51*   AST 65*   ALKPHOS 86   BILITOT 0.4   PROT 6.6   ALBUMIN 3.3*     Microbiology Results (last 7 days)       Procedure Component Value Units Date/Time    Urine culture [[id number]]  (Abnormal)  (Susceptibility) Collected: 05/28/24 1817    Order Status: Completed Specimen: Urine Updated: 05/31/24 0442     Urine Culture, Routine PROTEUS MIRABILIS  >100,000 cfu/ml      Narrative:      ADD ON URINE PROTEIN/CREATININE RATIO PER DR BRITT GUILLEN/ORDER#   [id number] @ 22:28    ADD ON URINE SODIUM PER DR ELAINA CASH/ORDER# [id number] @ 21:53    Specimen Source->Urine          Specimen (24h ago, onward)      None          TSH:   Recent Labs   Lab 05/28/24  0611   TSH 0.889     Recent Labs   Lab 05/28/24 1817   ARVIN Song*    SPECGRAV 1.010   PHUR 6.0   PROTEINUA 1+*   BACTERIA Many*   NITRITE Negative   LEUKOCYTESUR 2+*   HYALINECASTS 4*     Urinary sediment on 05/28/2024:                                              Significant Imaging:  I have reviewed all imagining in the last 24 hours.  Assessment/Plan:     Renal/  Acute renal failure with acute tubular necrosis superimposed on stage 3a chronic kidney disease  - urinary sediment with granular, half muddy brown and waxy casts suggestive of acute tubular injury in the setting of cardiogenic shock  -scr(Baseline 0.9-1.0) started trending up from 1.6--1.8--peaked 2.6-then down to -2.3-2.4-2.3  -Cpk trending up 727--5k, 6k   - urine output in last 12 hr 1 L    Recommendations  - Lasix as per primary team  - continue to monitor urine output  - Daily RFP and magnesium levels.   - please avoid hypotension/major fluctuations in BP   - no acute indications for RRT at this time however will continue to monitor closely     Thank you for your consult. I will follow-up with patient. Please contact us if you have any additional questions.    Keshav Vaac MD  Nephrology  [facility]

## 2024-06-03 NOTE — ASSESSMENT & PLAN NOTE
Pt presenting with RLE weakness and decreased sensation to the knee that started around 3AM the day of admit. Initial thought was TIA as pt had palpable pulses on admit, however pt developed worsening RLE pain during admission prompting LE Arterial US.    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 There is severe stenosis of the distal left superficial femoral artery.  -CPK uptrended from 2673 to 4628.     -RLE pulses unable to be obtained on doppler. Color change noted on RLE. Pt endorsing numbness.    -Vascular surgery consulted today. Spoke to pt and family and discussed that she would require above-the-knee amputation, pt unsure about above the knee amputation at this time as it may require here to be intubated. We will inform vascular surgery if pt decides to proceed with above the knee amputation.     - Continue Asa and Plavix  - D/c heparin  - Vascular Surgery offered AKA, but patient refused as it doesn't align with her goals.

## 2024-06-04 NOTE — ASSESSMENT & PLAN NOTE
Echo from 05/24/24 with EF 5-10% with global hypokinesis. GDMT includes Toprol 25mg qd, spironolactone 25mg qd.  Unable to afford Jardiance even with coupon. Entresto held on prior admission and DC'd at clinic visit on day prior to admission given continued soft BP. Clinic plan was to introduce low dose ACE/ARB for additional GDMT. Diuresis with Lasix 40mg qd. Patient declined establishing with Palliative Care during recent clinic visit.

## 2024-06-04 NOTE — ASSESSMENT & PLAN NOTE
Pt presenting with RLE weakness and decreased sensation to the knee that started around 3AM the day of admit. Initial thought was TIA as pt had palpable pulses on admit, however pt developed worsening RLE pain during admission prompting LE Arterial US.    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 There is severe stenosis of the distal left superficial femoral artery.  -CPK uptrended from 2673 to 4628.     -RLE pulses unable to be obtained on doppler. Color change noted on RLE. Pt endorsing numbness.    -Vascular surgery consulted today. Spoke to pt and family and discussed that she would require above-the-knee amputation, pt unsure about above the knee amputation at this time as it may require here to be intubated. We will inform vascular surgery if pt decides to proceed with above the knee amputation.     - D/c Asa and Plavix and heparin  - Vascular Surgery offered AKA, but patient refused as it doesn't align with her goals.

## 2024-06-04 NOTE — PROGRESS NOTES
[facility] - Cardiology Stepdown  Nephrology  Progress Note    Patient Name: Selma Hooper  MRN: [id number]  Admission Date: 5/28/2024  Hospital Length of Stay: 7 days  Attending Provider: Anthony Cardoso MD   Primary Care Physician: Phil Andrade MD  Principal Problem:Cardiogenic shock    Subjective:     HPI: Ms Hooper is a 78-year-old woman with HFrEF (most recent TTE with EF of 5-10%), COPD with chronic hypoxic respiratory failure on supplemental oxygen (2 liters via nasal cannula as outpatient), prior tobacco abuse, CKD IIIa (baseline creatinine ~1)  and hypertension who was admitted to CCU on 5/28 with cardiogenic shock after presenting to the ED with complaints of right lower extremity numbness and associated weakness which had started roughly around 3 AM earlier that morning and upon going to [facility] she became dyspneic and was noted to be cold to the touch with tachycardia.  CBC was notable for microcytic anemia with hemoglobin 10.8. Metabolic panel was notable for albumin of 3.4 and creatinine 1.1 which would subsequently rise to 1.8. Lactate was elevated at 4.5 which would rise to 5.3. VBG on presentation showed pH of 7.132, pCO2 95.5 and bicarbonate 31.9. BNP was ~2K with chest x-ray showing enlarged cardiac silhouette and abnormal increased reticular lung markings seen throughout both lung fields concerning for diffuse pulmonary edema. She would receive a cumulative dose of 240 mg IV Lasix (40 mg IV x3 plus 120 mg IV) and Diuril 500 mg IV in addition to being started on Lasix infusion at 30 mg/hr and dobutamine at 5 mcg/kg/minute. Only 250 mL of documented UOP thus far. UA showed +1 protein, +3 occult blood (69 RBCs/hpf), +2 leukocytes (13 WBCs/hpf) and many bacteria. Urine sodium was 114. UPCR, CPK and retroperitoneal US still pending at this time. Nephrology has been consulted for assistance with management of ARF in the setting of cardiogenic shock.    Interval History; patient seen this morning on 2 L  nasal cannula. Look altered, was saying people are trying to intoxicate me.  Objective:     Vital Signs (Most Recent):  Temp: 98.4 °F (36.9 °C) (06/04/24 0924)  Pulse: (!) 115 (06/04/24 0957)  Resp: 16 (06/04/24 0957)  BP: (!) 108/59 (06/04/24 0924)  SpO2: (!) 93 % (06/04/24 0957) Vital Signs (24h Range):  Temp:  [97.4 °F (36.3 °C)-99 °F (37.2 °C)] 98.4 °F (36.9 °C)  Pulse:  [] 115  Resp:  [16-18] 16  SpO2:  [93 %-99 %] 93 %  BP: (101-114)/(55-62) 108/59     Weight: 62.9 kg (138 lb 10.7 oz) (06/04/24 0415)  Body mass index is 26.2 kg/m².  Body surface area is 1.65 meters squared.    I/O last 3 completed shifts:  In: 560 [P.O.:560]  Out: 1460 [Urine:1460]     Physical Exam  Vitals and nursing note reviewed.   Constitutional:       General: She is awake. She is not in acute distress.     Appearance: She is normal weight. She is not ill-appearing or diaphoretic.      Interventions: Nasal cannula in place.   HENT:      Head: Normocephalic and atraumatic.      Right Ear: External ear normal.      Left Ear: External ear normal.      Mouth/Throat:      Mouth: Mucous membranes are moist.      Pharynx: Oropharynx is clear. No oropharyngeal exudate or posterior oropharyngeal erythema.   Eyes:      General: No scleral icterus.        Right eye: No discharge.         Left eye: No discharge.      Extraocular Movements: Extraocular movements intact.      Conjunctiva/sclera: Conjunctivae normal.   Cardiovascular:      Rate and Rhythm: Normal rate and regular rhythm.      Heart sounds: No murmur heard.  Pulmonary:      Effort: No respiratory distress.      Breath sounds: No wheezing, rhonchi or rales.   Abdominal:      General: Bowel sounds are normal. There is no distension.      Palpations: Abdomen is soft.      Tenderness: There is no abdominal tenderness.   Musculoskeletal:      Cervical back: Neck supple.      Right lower leg: No edema.      Left lower leg: No edema.   Skin:     General: Skin is warm and dry.       Coloration: Skin is not jaundiced.   Neurological:      General: No focal deficit present.      Mental Status: She is alert. Mental status is at baseline.      Cranial Nerves: No cranial nerve deficit.      Motor: No weakness.   Psychiatric:         Mood and Affect: Mood normal.         Behavior: Behavior normal. Behavior is cooperative.          Significant Labs:  ABGs:   Recent Labs   Lab 06/02/24  0829   PH 7.417   PCO2 65.8*   HCO3 42.4*   POCSATURATED 56   BE 18*     BMP:   Recent Labs   Lab 06/03/24 0315 06/03/24  1419   GLU 89 88   * 135*   K 4.0 4.0   CL 83* 85*   CO2 32* 34*   BUN 58* 57*   CREATININE 2.3* 2.3*   CALCIUM 9.9 10.1   MG 1.9  --      CBC:   Recent Labs   Lab 06/03/24 0315   WBC 8.61   RBC 4.26   HGB 10.4*   HCT 32.9*      MCV 77*   MCH 24.4*   MCHC 31.6*     CMP:   Recent Labs   Lab 06/03/24 0315 06/03/24  1419   GLU 89 88   CALCIUM 9.9 10.1   ALBUMIN 3.3*  --    PROT 6.6  --    * 135*   K 4.0 4.0   CO2 32* 34*   CL 83* 85*   BUN 58* 57*   CREATININE 2.3* 2.3*   ALKPHOS 86  --    ALT 51*  --    AST 65*  --    BILITOT 0.4  --      Coagulation:   Recent Labs   Lab 05/28/24 2005 05/28/24 2157 06/03/24 0315   INR 1.2  --   --    APTT 109.1*   < > 49.4*    < > = values in this interval not displayed.     LFTs:   Recent Labs   Lab 06/03/24 0315   ALT 51*   AST 65*   ALKPHOS 86   BILITOT 0.4   PROT 6.6   ALBUMIN 3.3*     Microbiology Results (last 7 days)       Procedure Component Value Units Date/Time    Urine culture [[id number]]  (Abnormal)  (Susceptibility) Collected: 05/28/24 1817    Order Status: Completed Specimen: Urine Updated: 05/31/24 0442     Urine Culture, Routine PROTEUS MIRABILIS  >100,000 cfu/ml      Narrative:      ADD ON URINE PROTEIN/CREATININE RATIO PER DR BRITT GUILLEN/ORDER#   [id number] @ 22:28    ADD ON URINE SODIUM PER DR ELAINA CASH/ORDER# [id number] @ 21:53    Specimen Source->Urine          Specimen (24h ago, onward)      None          TSH:  "  No results for input(s): "TSH" in the last 168 hours.    Recent Labs   Lab 05/28/24  1817   COLORU [place]*   SPECGRAV 1.010   PHUR 6.0   PROTEINUA 1+*   BACTERIA [place]*   NITRITE Negative   LEUKOCYTESUR 2+*   HYALINECASTS 4*     Urinary sediment on 05/28/2024:                                              Significant Imaging:  I have reviewed all imagining in the last 24 hours.  Assessment/Plan:     Cardiac/Vascular  Acute lower limb ischemia  Per primary team      Hypertension, essential  Per primary    Renal/  Acute renal failure with acute tubular necrosis superimposed on stage 3a chronic kidney disease  - urinary sediment with granular, half muddy brown and waxy casts suggestive of acute tubular injury in the setting of cardiogenic shock  -scr(Baseline 0.9-1.0) started trending up from 1.6--1.8--peaked 2.6-then down to -2.3-2.4-2.3-2.3  - urine output in last 12 hr 600 ml , with lasix push    Recommendations  - Lasix as per primary team  - continue to monitor urine output  - Daily RFP and magnesium levels.   - please avoid hypotension/major fluctuations in BP   - no acute indications for RRT at this time however will continue to monitor closely and consent obtained should acute indications for RRT arise        Thank you for your consult. I will sign off. Please contact us if you have any additional questions.    Keshav Vaca MD  Nephrology  [facility] - Cardiology Stepdown  "

## 2024-06-04 NOTE — SUBJECTIVE & OBJECTIVE
Interval History: Chart reviewed including 24h medication use. Patient awake and interactive this AM, needing a few PRN opioids throughout the day for leg pain. Family at bedside later in the afternoon.    Palliative ROS:  PRNs: oxycodone 5mg x2 (15 OME) hydromorphone 0.5mg IV x1 (10 OME); total OME 25  Pain + (numb, aching, stabbing pain in RLE, relieved with hydromorphone)  Dyspnea -  Nausea +  Hiccups +  Vomiting -  Constipation -  Anxiety -  Agitation -  Anorexia +          Past Medical History:   Diagnosis Date    Abnormal liver enzymes 12/10/2018    Acute on chronic combined systolic and diastolic congestive heart failure 4/2/2021    Acute on chronic respiratory failure with hypoxia 4/2/2021    Acute on chronic respiratory failure with hypoxia and hypercapnia 12/10/2021    CHF (congestive heart failure)     COPD exacerbation 7/12/2022    Former smoker 10/24/2018    Hypertension     Smoker 7/27/2016       Past Surgical History:   Procedure Laterality Date    BREAST BIOPSY      left  side years ago in high school   1962    CHOLECYSTECTOMY      COLONOSCOPY      COLONOSCOPY N/A 08/23/2021    Procedure: COLONOSCOPY;  Surgeon: Shree Amaya MD;  Location: [address]);  Service: Endoscopy;  Laterality: N/A;  Do not cancel this order  fully vaccinated-see immunization record  EF 18%  8/20-covid [facility]    HYSTERECTOMY         Review of patient's allergies indicates:   Allergen Reactions    Atorvastatin      Leg cramps       Medications:  Continuous Infusions:      Scheduled Meds:   fluticasone furoate-vilanteroL  1 puff Inhalation Daily    senna-docusate 8.6-50 mg  2 tablet Oral Daily     PRN Meds:  Current Facility-Administered Medications:     bisacodyL, 10 mg, Rectal, Daily PRN    dextrose 10%, 12.5 g, Intravenous, PRN    dextrose 10%, 25 g, Intravenous, PRN    HYDROmorphone, 0.5 mg, Intravenous, Q1H PRN    lorazepam, 0.5 mg, Intravenous, Q4H PRN    ondansetron, 4 mg, Intravenous, Q6H PRN     oxyCODONE, 5 mg, Oral, Q4H PRN    prochlorperazine, 5 mg, Intravenous, Q6H PRN    senna-docusate 8.6-50 mg, 1 tablet, Oral, Daily PRN    sodium chloride 0.9%, 10 mL, Intravenous, PRN    sodium chloride 0.9%, 10 mL, Intravenous, PRN    Family History       Problem Relation (Age of Onset)    Arthritis Mother    Cancer Father, Brother    Colon cancer Father, Brother (63)    Dementia Father    Diabetes Daughter    Heart attack Mother    Heart disease Mother, Brother    Hypertension Mother, Father, Brother          Tobacco Use    Smoking status: Former     Current packs/day: 0.00     Types: Cigarettes     Quit date: 2018     Years since quittin.9     Passive exposure: Past    Smokeless tobacco: Never   Substance and Sexual Activity    Alcohol use: Yes     Comment: occasional drinker, not a daily drinker    Drug use: No    Sexual activity: Not Currently     Partners: Male         Objective:     Vital Signs (Most Recent):  Temp: 98.4 °F (36.9 °C) (24 0924)  Pulse: 99 (24 1453)  Resp: 18 (24 1252)  BP: (!) 108/59 (24 0924)  SpO2: (!) 93 % (24 0957) Vital Signs (24h Range):  Temp:  [98.1 °F (36.7 °C)-99 °F (37.2 °C)] 98.4 °F (36.9 °C)  Pulse:  [] 99  Resp:  [16-18] 18  SpO2:  [93 %-99 %] 93 %  BP: (101-114)/(55-61) 108/59     Weight: 62.9 kg (138 lb 10.7 oz)  Body mass index is 26.2 kg/m².       Physical Exam  Vitals and nursing note reviewed.   Constitutional:       General: She is not in acute distress.     Appearance: She is ill-appearing. She is not toxic-appearing.      Comments: Older, ill-appearing female lying in bed, awake and conversant, occasional grimaces   HENT:      Nose:      Comments: NC in place     Mouth/Throat:      Mouth: Mucous membranes are moist.   Eyes:      Extraocular Movements: Extraocular movements intact.   Pulmonary:      Effort: Pulmonary effort is normal. No respiratory distress.   Abdominal:      General: Abdomen is flat.      Palpations: Abdomen  "is soft.   Skin:     General: Skin is warm and dry.   Neurological:      General: No focal deficit present.      Mental Status: She is alert and oriented to person, place, and time. Mental status is at baseline.   Psychiatric:         Mood and Affect: Mood normal.         Behavior: Behavior normal.         Thought Content: Thought content normal.         Judgment: Judgment normal.              Advance Care Planning   Advance Directives:   Do Not Resuscitate Status: Yes      Decision Making:  Patient answered questions and Family answered questions  Goals of Care: Re-engaged patient and her family at bedside in continued concordant care discussions. Patient's daughter at bedside with some concerns about a statement that patient made earlier in the day. She shares that her mother stated "I don't know why you are just sitting here watching me die". After further discussion with patient and her family, it became clear to me that this was an expression of grief, reflecting patient's struggle seeing her children and grandchildren grieve her dying process. I attempted to normalize patient's feelings reframing grief as a form of love and a way for her family to honor her life. Patient was very receptive to this language, stating that she "needed to hear that". Patient and family continue to express values consistent with comfort-focused care and avoidance of any life prolonging interventions. All other concerns and questions addressed at this time.            CBC:   Recent Labs   Lab 06/03/24  0315   WBC 8.61   HGB 10.4*   HCT 32.9*   MCV 77*        BMP:  No results for input(s): "GLU", "NA", "K", "CL", "CO2", "BUN", "CREATININE", "CALCIUM", "MG" in the last 24 hours.    LFT:  Lab Results   Component Value Date    AST 65 (H) 06/03/2024    ALKPHOS 86 06/03/2024    BILITOT 0.4 06/03/2024     Albumin:   Albumin   Date Value Ref Range Status   06/03/2024 3.3 (L) 3.5 - 5.2 g/dL Final     Protein:   Total Protein   Date " Value Ref Range Status   06/03/2024 6.6 6.0 - 8.4 g/dL Final     Lactic acid:   Lab Results   Component Value Date    LACTATE 0.7 06/03/2024    LACTATE 4.7 () 05/28/2024

## 2024-06-04 NOTE — PROGRESS NOTES
[facility] - Cardiology Stepdown  Palliative Medicine  Progress Note    Patient Name: Selma Hooper  MRN: [id number]  Admission Date: 5/28/2024  Hospital Length of Stay: 7 days  Code Status: DNR   Attending Provider: Anthony Cardoso MD  Consulting Provider: Rogelio Lin MD  Primary Care Physician: Phil Andrade MD  Principal Problem:Comfort measures only status    Patient information was obtained from patient, relative(s), past medical records, and primary team.      Assessment/Plan:     Cardiac/Vascular  Cardiogenic shock  78f with end stage HFrEF (EF 5-10%) with no advanced options, admitted with cardiogenic shock and limb ischemia, on dobutamine.     -transitioned to comfort care only on 6/3  -educated patient and family on typical course of heart failure and expected symptoms and symptom management strategy      Palliative Care  Palliative care encounter  See ACP 6/2-6/4    Interval update 6/4- patient continues to require parenteral opioids for comfort, not imminently dying but likely to progress towards a peaceful death in the coming days to short weeks. Passages to eval for inpatient unit    Insight/goals of care- good insight and fair prognostic awareness. Clear goals of comfort, limiting life-prolonging care, and dignity (fear of being a burden). Patient and family electing to transition to comfort-focused care in the hospital, allowing patient's condition to guide dispo planning (hospice in a facility vs in hospital)    Social support- fair, lives with daughter's family. Also has a son from [place], but reportedly he is struggling with mental health issues. Daughter worries she could not meet her mother's needs, and son would not cope well with her at home.     Psychological- good coping mechanisms by patient and daughter as well as grandchildren. Patient clearly has accepted her dying but struggling to cope with her family's grief and support. Learning to accept grief and love    Spiritual-  [facility], has had anointing of the sick, appreciates continued  support    Symptom management- hiccups, nausea, ischemic leg pain    Recommendations  -DNR, comfort-focused care  -if patient acutely declines AND requires comfort-focused medicines via IV, reasonable to consider inpatient hospice GIP admit to unit   -otherwise, may need to explore other inpatient hospice placement  -discontinue dobutamine, routine lab draws, and medicines not contributing to patient's comfort  -would also decrease monitoring, frequency of vitals, and other non goal-concordant interventions    Symptom management  -hydromorphone 0.5mg (10 OME/dose) every hour PRN for pain, dyspnea, or pain behaviors  -lorazepam 0.5mg IV q4hr PRN for agitation, anxiety  -if hiccups return and are uncomfortable, would trial chlorpromazine 25mg q6hr PRN  -continue PRN anti-emetics for nausea        I will follow-up with patient. Please contact us if you have any additional questions.    Subjective:     Chief Complaint:   Chief Complaint   Patient presents with    Numbness     Arrives via EMS for right leg numbness that started this morning x2 hours, states that the numbness went away when EMS arrived, VAN -, denies any weakness currently. On 2 L NC at home,       HPI:   Ms Selma Hooper is a 78 year old woman with HFrEF (EF of 5-10%), COPD, CKD III, and hypertension who was admitted to CCU on 5/28 with cardiogenic shock. Patient started on dobutamine and lasix gtt. Course complicated by persistent JAMIR, likely cardiorenal, and RLE ischemia. Vascular surgery consulted, recommending amputation which patient has declined due to risks of anesthesia and intubation. Palliative care consulted for goals of care.     Hospital Course:  No notes on file    Interval History: Chart reviewed including 24h medication use. Patient awake and interactive this AM, needing a few PRN opioids throughout the day for leg pain. Family at bedside later in the  afternoon.    Palliative ROS:  PRNs: oxycodone 5mg x2 (15 OME) hydromorphone 0.5mg IV x1 (10 OME); total OME 25  Pain + (numb, aching, stabbing pain in RLE, relieved with hydromorphone)  Dyspnea -  Nausea +  Hiccups +  Vomiting -  Constipation -  Anxiety -  Agitation -  Anorexia +          Past Medical History:   Diagnosis Date    Abnormal liver enzymes 12/10/2018    Acute on chronic combined systolic and diastolic congestive heart failure 4/2/2021    Acute on chronic respiratory failure with hypoxia 4/2/2021    Acute on chronic respiratory failure with hypoxia and hypercapnia 12/10/2021    CHF (congestive heart failure)     COPD exacerbation 7/12/2022    Former smoker 10/24/2018    Hypertension     Smoker 7/27/2016       Past Surgical History:   Procedure Laterality Date    BREAST BIOPSY      left  side years ago in high school   1962    CHOLECYSTECTOMY      COLONOSCOPY      COLONOSCOPY N/A 08/23/2021    Procedure: COLONOSCOPY;  Surgeon: Shree Amaya MD;  Location: [facility] ([address] [place] [address]);  Service: Endoscopy;  Laterality: N/A;  Do not cancel this order  fully vaccinated-see immunization record  EF 18%  8/20-covid [facility]-tb    HYSTERECTOMY         Review of patient's allergies indicates:   Allergen Reactions    Atorvastatin      Leg cramps       Medications:  Continuous Infusions:      Scheduled Meds:   fluticasone furoate-vilanteroL  1 puff Inhalation Daily    senna-docusate 8.6-50 mg  2 tablet Oral Daily     PRN Meds:  Current Facility-Administered Medications:     bisacodyL, 10 mg, Rectal, Daily PRN    dextrose 10%, 12.5 g, Intravenous, PRN    dextrose 10%, 25 g, Intravenous, PRN    HYDROmorphone, 0.5 mg, Intravenous, Q1H PRN    lorazepam, 0.5 mg, Intravenous, Q4H PRN    ondansetron, 4 mg, Intravenous, Q6H PRN    oxyCODONE, 5 mg, Oral, Q4H PRN    prochlorperazine, 5 mg, Intravenous, Q6H PRN    senna-docusate 8.6-50 mg, 1 tablet, Oral, Daily PRN    sodium chloride 0.9%, 10 mL, Intravenous, PRN     sodium chloride 0.9%, 10 mL, Intravenous, PRN    Family History       Problem Relation (Age of Onset)    Arthritis Mother    Cancer Father, Brother    Colon cancer Father, Brother (63)    Dementia Father    Diabetes Daughter    Heart attack Mother    Heart disease Mother, Brother    Hypertension Mother, Father, Brother          Tobacco Use    Smoking status: Former     Current packs/day: 0.00     Types: Cigarettes     Quit date: 2018     Years since quittin.9     Passive exposure: Past    Smokeless tobacco: Never   Substance and Sexual Activity    Alcohol use: Yes     Comment: occasional drinker, not a daily drinker    Drug use: No    Sexual activity: Not Currently     Partners: Male         Objective:     Vital Signs (Most Recent):  Temp: 98.4 °F (36.9 °C) (24 09)  Pulse: 99 (24 1453)  Resp: 18 (24 1252)  BP: (!) 108/59 (24 09)  SpO2: (!) 93 % (24 0957) Vital Signs (24h Range):  Temp:  [98.1 °F (36.7 °C)-99 °F (37.2 °C)] 98.4 °F (36.9 °C)  Pulse:  [] 99  Resp:  [16-18] 18  SpO2:  [93 %-99 %] 93 %  BP: (101-114)/(55-61) 108/59     Weight: 62.9 kg (138 lb 10.7 oz)  Body mass index is 26.2 kg/m².       Physical Exam  Vitals and nursing note reviewed.   Constitutional:       General: She is not in acute distress.     Appearance: She is ill-appearing. She is not toxic-appearing.      Comments: Older, ill-appearing female lying in bed, awake and conversant, occasional grimaces   HENT:      Nose:      Comments: NC in place     Mouth/Throat:      Mouth: Mucous membranes are moist.   Eyes:      Extraocular Movements: Extraocular movements intact.   Pulmonary:      Effort: Pulmonary effort is normal. No respiratory distress.   Abdominal:      General: Abdomen is flat.      Palpations: Abdomen is soft.   Skin:     General: Skin is warm and dry.   Neurological:      General: No focal deficit present.      Mental Status: She is alert and oriented to person, place, and time.  "Mental status is at baseline.   Psychiatric:         Mood and Affect: Mood normal.         Behavior: Behavior normal.         Thought Content: Thought content normal.         Judgment: Judgment normal.              Advance Care Planning  Advance Directives:   Do Not Resuscitate Status: Yes      Decision Making:  Patient answered questions and Family answered questions  Goals of Care: Re-engaged patient and her family at bedside in continued concordant care discussions. Patient's daughter at bedside with some concerns about a statement that patient made earlier in the day. She shares that her mother stated "I don't know why you are just sitting here watching me die". After further discussion with patient and her family, it became clear to me that this was an expression of grief, reflecting patient's struggle seeing her children and grandchildren grieve her dying process. I attempted to normalize patient's feelings reframing grief as a form of love and a way for her family to honor her life. Patient was very receptive to this language, stating that she "needed to hear that". Patient and family continue to express values consistent with comfort-focused care and avoidance of any life prolonging interventions. All other concerns and questions addressed at this time.            CBC:   Recent Labs   Lab 06/03/24  0315   WBC 8.61   HGB 10.4*   HCT 32.9*   MCV 77*        BMP:  No results for input(s): "GLU", "NA", "K", "CL", "CO2", "BUN", "CREATININE", "CALCIUM", "MG" in the last 24 hours.    LFT:  Lab Results   Component Value Date    AST 65 (H) 06/03/2024    ALKPHOS 86 06/03/2024    BILITOT 0.4 06/03/2024     Albumin:   Albumin   Date Value Ref Range Status   06/03/2024 3.3 (L) 3.5 - 5.2 g/dL Final     Protein:   Total Protein   Date Value Ref Range Status   06/03/2024 6.6 6.0 - 8.4 g/dL Final     Lactic acid:   Lab Results   Component Value Date    LACTATE 0.7 06/03/2024    LACTATE 4.7 (HH) 05/28/2024         In my " care of this patient with acute on chronic severe illness with threat to life and/or bodily function, I am providing goal-concordant care as noted above. My recommendations include the use of goal-concordant and proportionate parenteral opioids and benzodiazepines for comfort-focused care.     In addition to above, I spent 20 minutes specifically discussing advance care planning and goals of care with patient's family at bedside.     The above recommendations communicated directly to primary team on 6/4      Rogelio Lin MD  Palliative Medicine  [facility] - Cardiology Step[facility]

## 2024-06-04 NOTE — PLAN OF CARE
Problem: Pain Acute  Goal: Optimal Pain Control and Function  Outcome: Progressing     Problem: Adult Inpatient Plan of Care  Goal: Plan of Care Review  Outcome: Progressing  Goal: Patient-Specific Goal (Individualized)  Outcome: Progressing  Goal: Absence of Hospital-Acquired Illness or Injury  Outcome: Progressing  Goal: Optimal Comfort and Wellbeing  Outcome: Progressing  Goal: Readiness for Transition of Care  Outcome: Progressing     Problem: Skin Injury Risk Increased  Goal: Skin Health and Integrity  Outcome: Progressing   Plan of care discussed with patient and family.  Comfort care measures in place. Pt receiving PRN medications to ease pain and anxiety. Pt resting comfortably in bed, rise and fall of chest noted. No further needs at this time.

## 2024-06-04 NOTE — ASSESSMENT & PLAN NOTE
- urinary sediment with granular, half muddy brown and waxy casts suggestive of acute tubular injury in the setting of cardiogenic shock  -scr(Baseline 0.9-1.0) started trending up from 1.6--1.8--peaked 2.6-then down to -2.3-2.4-2.3-2.3  - urine output in last 12 hr 600 ml , with lasix push    Recommendations  - Lasix as per primary team  - continue to monitor urine output  - Daily RFP and magnesium levels.   - please avoid hypotension/major fluctuations in BP   - no acute indications for RRT at this time however will continue to monitor closely and consent obtained should acute indications for RRT arise

## 2024-06-04 NOTE — ASSESSMENT & PLAN NOTE
Patient is identified as having Combined Systolic and Diastolic heart failure that is Acute on chronic. CHF is currently . Latest ECHO performed and demonstrates- Results for orders placed during the hospital encounter of 05/14/24    Echo    Interpretation Summary    Left Ventricle: The left ventricle is severely dilated. Severely increased ventricular mass. Normal wall thickness. There is eccentric hypertrophy. Severe global hypokinesis present. There is severely reduced systolic function with a visually estimated ejection fraction of 5 - 10%. There is diastolic dysfunction but grade cannot be determined.    Right Ventricle: Normal right ventricular cavity size. Wall thickness is normal. Right ventricle wall motion  is normal. Systolic function is normal.    Left Atrium: Left atrium is moderately dilated.    Mitral Valve: There is mild regurgitation.    Pulmonary Artery: The estimated pulmonary artery systolic pressure is 32 mmHg.    IVC/SVC: Normal venous pressure at 3 mmHg.  . Monitor clinical status closely. Monitor on telemetry. Patient is off CHF pathway.  Monitor strict Is&Os and daily weights.  Place on fluid restriction of 1.5 L. Cardiology has been consulted. Continue to stress to patient importance of self efficacy and  on diet for CHF. Last BNP reviewed- and noted below   Recent Labs   Lab 05/28/24  1259   BNP 2,066*       - D/c lasix

## 2024-06-04 NOTE — SUBJECTIVE & OBJECTIVE
Interval History; patient seen this morning on 2 L nasal cannula. Look altered, was saying people are trying to intoxicate me.  Objective:     Vital Signs (Most Recent):  Temp: 98.4 °F (36.9 °C) (06/04/24 0924)  Pulse: (!) 115 (06/04/24 0957)  Resp: 16 (06/04/24 0957)  BP: (!) 108/59 (06/04/24 0924)  SpO2: (!) 93 % (06/04/24 0957) Vital Signs (24h Range):  Temp:  [97.4 °F (36.3 °C)-99 °F (37.2 °C)] 98.4 °F (36.9 °C)  Pulse:  [] 115  Resp:  [16-18] 16  SpO2:  [93 %-99 %] 93 %  BP: (101-114)/(55-62) 108/59     Weight: 62.9 kg (138 lb 10.7 oz) (06/04/24 0415)  Body mass index is 26.2 kg/m².  Body surface area is 1.65 meters squared.    I/O last 3 completed shifts:  In: 560 [P.O.:560]  Out: 1460 [Urine:1460]     Physical Exam  Vitals and nursing note reviewed.   Constitutional:       General: She is awake. She is not in acute distress.     Appearance: She is normal weight. She is not ill-appearing or diaphoretic.      Interventions: Nasal cannula in place.   HENT:      Head: Normocephalic and atraumatic.      Right Ear: External ear normal.      Left Ear: External ear normal.      Mouth/Throat:      Mouth: Mucous membranes are moist.      Pharynx: Oropharynx is clear. No oropharyngeal exudate or posterior oropharyngeal erythema.   Eyes:      General: No scleral icterus.        Right eye: No discharge.         Left eye: No discharge.      Extraocular Movements: Extraocular movements intact.      Conjunctiva/sclera: Conjunctivae normal.   Cardiovascular:      Rate and Rhythm: Normal rate and regular rhythm.      Heart sounds: No murmur heard.  Pulmonary:      Effort: Tachypnea present. No respiratory distress.      Breath sounds: No wheezing, rhonchi or rales.   Abdominal:      General: Bowel sounds are normal. There is no distension.      Palpations: Abdomen is soft.      Tenderness: There is no abdominal tenderness.   Musculoskeletal:      Cervical back: Neck supple.      Right lower leg: No edema.      Left lower  leg: No edema.   Skin:     General: Skin is warm and dry.      Coloration: Skin is not jaundiced.   Neurological:      General: No focal deficit present.      Mental Status: She is alert. Mental status is at baseline.      Cranial Nerves: No cranial nerve deficit.      Motor: No weakness.   Psychiatric:         Mood and Affect: Mood normal.         Behavior: Behavior normal. Behavior is cooperative.          Significant Labs:  ABGs:   Recent Labs   Lab 06/02/24  0829   PH 7.417   PCO2 65.8*   HCO3 42.4*   POCSATURATED 56   BE 18*     BMP:   Recent Labs   Lab 06/03/24 0315 06/03/24  1419   GLU 89 88   * 135*   K 4.0 4.0   CL 83* 85*   CO2 32* 34*   BUN 58* 57*   CREATININE 2.3* 2.3*   CALCIUM 9.9 10.1   MG 1.9  --      CBC:   Recent Labs   Lab 06/03/24 0315   WBC 8.61   RBC 4.26   HGB 10.4*   HCT 32.9*      MCV 77*   MCH 24.4*   MCHC 31.6*     CMP:   Recent Labs   Lab 06/03/24 0315 06/03/24  1419   GLU 89 88   CALCIUM 9.9 10.1   ALBUMIN 3.3*  --    PROT 6.6  --    * 135*   K 4.0 4.0   CO2 32* 34*   CL 83* 85*   BUN 58* 57*   CREATININE 2.3* 2.3*   ALKPHOS 86  --    ALT 51*  --    AST 65*  --    BILITOT 0.4  --      Coagulation:   Recent Labs   Lab 05/28/24 2005 05/28/24 2157 06/03/24 0315   INR 1.2  --   --    APTT 109.1*   < > 49.4*    < > = values in this interval not displayed.     LFTs:   Recent Labs   Lab 06/03/24 0315   ALT 51*   AST 65*   ALKPHOS 86   BILITOT 0.4   PROT 6.6   ALBUMIN 3.3*     Microbiology Results (last 7 days)       Procedure Component Value Units Date/Time    Urine culture [[id number]]  (Abnormal)  (Susceptibility) Collected: 05/28/24 1817    Order Status: Completed Specimen: Urine Updated: 05/31/24 0442     Urine Culture, Routine PROTEUS MIRABILIS  >100,000 cfu/ml      Narrative:      ADD ON URINE PROTEIN/CREATININE RATIO PER DR BRITT GUILLEN/ORDER#   [id number] @ 22:28    ADD ON URINE SODIUM PER DR ELAINA CASH/ORDER# [id number] @ 21:53    Specimen Source->Urine  "         Specimen (24h ago, onward)      None          TSH:   No results for input(s): "TSH" in the last 168 hours.    Recent Labs   Lab 05/28/24  1817   COLORU [place]*   SPECGRAV 1.010   PHUR 6.0   PROTEINUA 1+*   BACTERIA Many*   NITRITE Negative   LEUKOCYTESUR 2+*   HYALINECASTS 4*     Urinary sediment on 05/28/2024:                                              Significant Imaging:  I have reviewed all imagining in the last 24 hours.  "

## 2024-06-04 NOTE — SUBJECTIVE & OBJECTIVE
Interval History: Family discussion yesterday and decision made to transition to comfort-care. Pending hospice options.        Review of Systems   Cardiovascular:  Negative for chest pain.   Musculoskeletal:         Right leg pain    Gastrointestinal:  Negative for nausea and vomiting.   Psychiatric/Behavioral:  The patient is not nervous/anxious.      Objective:     Vital Signs (Most Recent):  Temp: 98.4 °F (36.9 °C) (06/04/24 0924)  Pulse: 90 (06/04/24 1101)  Resp: 16 (06/04/24 0957)  BP: (!) 108/59 (06/04/24 0924)  SpO2: (!) 93 % (06/04/24 0957) Vital Signs (24h Range):  Temp:  [97.8 °F (36.6 °C)-99 °F (37.2 °C)] 98.4 °F (36.9 °C)  Pulse:  [] 90  Resp:  [16-18] 16  SpO2:  [93 %-99 %] 93 %  BP: (101-114)/(55-62) 108/59     Weight: 62.9 kg (138 lb 10.7 oz)  Body mass index is 26.2 kg/m².     SpO2: (!) 93 %         Intake/Output Summary (Last 24 hours) at 6/4/2024 1151  Last data filed at 6/4/2024 0800  Gross per 24 hour   Intake 360 ml   Output 450 ml   Net -90 ml       Lines/Drains/Airways       Drain  Duration             Female External Urinary Catheter w/ Suction 06/03/24 0903 1 day              Peripheral Intravenous Line  Duration                  Peripheral IV - Single Lumen 06/02/24 1500 22 G Anterior;Distal;Left Forearm 1 day         Peripheral IV - Single Lumen 06/02/24 1500 22 G Anterior;Left Forearm 1 day                       Physical Exam  Constitutional:       Appearance: She is ill-appearing.      Interventions: Nasal cannula in place.   HENT:      Head: Normocephalic and atraumatic.      Nose: Nose normal.      Mouth/Throat:      Pharynx: Oropharynx is clear.   Eyes:      Conjunctiva/sclera: Conjunctivae normal.   Cardiovascular:      Rate and Rhythm: Normal rate and regular rhythm.      Pulses: Decreased pulses.   Pulmonary:      Effort: Pulmonary effort is normal.   Musculoskeletal:         General: Swelling present.   Skin:     General: Skin is dry.      Comments: RLE cold and mottled  appearing    Neurological:      Mental Status: She is disoriented.          Significant Labs: None    Significant Imaging:  None

## 2024-06-04 NOTE — PROGRESS NOTES
[facility] ([facility]) Team notified of pt referral via Direct notification via epic in basket message, secure chat message or other .    Patient screened today 6/4/2024 by provider and LPN for enrollment to program.      Pt was deemed not a candidate for enrollment at this time related to  pt is transitioned to hospice care.    Pt will require additional follow up planning per primary team.     If pt status, diagnosis, or treatment plan changes , please place AMB referral to [facility] ([id number]) for [facility] enrollment re-evalution.

## 2024-06-04 NOTE — ASSESSMENT & PLAN NOTE
78f with end stage HFrEF (EF 5-10%) with no advanced options, admitted with cardiogenic shock and limb ischemia, on dobutamine.     -transitioned to comfort care only on 6/3  -educated patient and family on typical course of heart failure and expected symptoms and symptom management strategy

## 2024-06-04 NOTE — PROGRESS NOTES
[facility] - [facility] Step[facility]  Cardiology  Progress Note    Patient Name: Selma Hooper  MRN: [id number]  Admission Date: 5/28/2024  Hospital Length of Stay: 7 days  Code Status: DNR   Attending Physician: Anthony Cardoso MD   Primary Care Physician: Phil Andrade MD  Expected Discharge Date: 6/5/2024  Principal Problem:Comfort measures only status    Subjective:     Hospital Course:   Weaned off BiPAP to 2L NC (home O2 1.5L). RLE with continued poor, intermittent pulses with associated intermittent pain/numbness, decreasing sensation to palpation. US arterial BLE with occlusion of the right mid and distal superficial femoral artery, popliteal artery, and anterior and posterior tibial arteries & severe stenosis of the distal left superficial femoral artery. Per discussions with Interventional Cardiology, no acute intervention given acuity of overall presentation. Vascular Surgery offered L AKA, but patient declined citing growing medical complexity in her life and no desire for intubation in any form. She believes that her life is limited primarily by her HFrEF and that the ischemic limb is an additional setback which, even if amputated, would not necessarily improve her quality of life. Palliative consulted to assist with next steps. Decision made to transition to comfort-care.          Interval History: Family discussion yesterday and decision made to transition to comfort-care. Pending hospice options.        Review of Systems   Cardiovascular:  Negative for chest pain.   Musculoskeletal:         Right leg pain    Gastrointestinal:  Negative for nausea and vomiting.   Psychiatric/Behavioral:  The patient is not nervous/anxious.      Objective:     Vital Signs (Most Recent):  Temp: 98.4 °F (36.9 °C) (06/04/24 0924)  Pulse: 90 (06/04/24 1101)  Resp: 16 (06/04/24 0957)  BP: (!) 108/59 (06/04/24 0924)  SpO2: (!) 93 % (06/04/24 0957) Vital Signs (24h Range):  Temp:  [97.8 °F (36.6 °C)-99 °F (37.2 °C)] 98.4 °F  (36.9 °C)  Pulse:  [] 90  Resp:  [16-18] 16  SpO2:  [93 %-99 %] 93 %  BP: (101-114)/(55-62) 108/59     Weight: 62.9 kg (138 lb 10.7 oz)  Body mass index is 26.2 kg/m².     SpO2: (!) 93 %         Intake/Output Summary (Last 24 hours) at 6/4/2024 1151  Last data filed at 6/4/2024 0800  Gross per 24 hour   Intake 360 ml   Output 450 ml   Net -90 ml       Lines/Drains/Airways       Drain  Duration             Female External Urinary Catheter w/ Suction 06/03/24 0903 1 day              Peripheral Intravenous Line  Duration                  Peripheral IV - Single Lumen 06/02/24 1500 22 G Anterior;Distal;Left Forearm 1 day         Peripheral IV - Single Lumen 06/02/24 1500 22 G Anterior;Left Forearm 1 day                       Physical Exam  Constitutional:       Appearance: She is ill-appearing.      Interventions: Nasal cannula in place.   HENT:      Head: Normocephalic and atraumatic.      Nose: Nose normal.      Mouth/Throat:      Pharynx: Oropharynx is clear.   Eyes:      Conjunctiva/sclera: Conjunctivae normal.   Cardiovascular:      Rate and Rhythm: Normal rate and regular rhythm.      Pulses: Decreased pulses.   Pulmonary:      Effort: Pulmonary effort is normal.   Musculoskeletal:         General: Swelling present.   Skin:     General: Skin is dry.      Comments: RLE cold and mottled appearing    Neurological:      Mental Status: She is disoriented.          Significant Labs: None    Significant Imaging:  None  Assessment and Plan:     * Comfort measures only status  Decision made to transition to comfort-care     - Palliative following   - Pending hospice options   - Discontinue medications, labs, and interventions not contributing to patient's comfort  - Symptom management with dilaudid and lorazepam   - PRN antiemetics    Palliative care encounter  - Palliative consulted with assistance for GOC and hospice discussions  - Family meeting planned for Mon 6/03   - Decision made to transition to  comfort-care    Acute lower limb ischemia  Pt presenting with RLE weakness and decreased sensation to the knee that started around 3AM the day of admit. Initial thought was TIA as pt had palpable pulses on admit, however pt developed worsening RLE pain during admission prompting LE Arterial US.    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 There is severe stenosis of the distal left superficial femoral artery.  -CPK uptrended from 2673 to 4628.     -RLE pulses unable to be obtained on doppler. Color change noted on RLE. Pt endorsing numbness.    -Vascular surgery consulted today. Spoke to pt and family and discussed that she would require above-the-knee amputation, pt unsure about above the knee amputation at this time as it may require here to be intubated. We will inform vascular surgery if pt decides to proceed with above the knee amputation.     - D/c Asa and Plavix and heparin  - Vascular Surgery offered AKA, but patient refused as it doesn't align with her goals.     Arrhythmia  Sinus tachycardia    Cardiogenic shock  Pt with an EF of 5-10% that had suspected flash pulmonary edema while getting a MRI. She required BiPAP at that time and received IV Lasix 40mg x3 in the ED with no response. Central line and A-line were placed; pt was started on Dobutamine 5mcg and Lasix 30mg/hr gtt.     Hemos during the initiation of Dobutamine and Lasix gtt:     CVP 10, SvO2 51, CO 3.3, CI 2, and SVR 1945.     - D/c lasix and dobutamine    Long QT interval  With wide QRS tachycardia on admission. Qtc 568 on admission.     - Keep K > 4, Mg > 2    COPD (chronic obstructive pulmonary disease)  Patient's COPD is with exacerbation noted by continued dyspnea and worsening of baseline hypoxia currently.  Patient is currently off COPD Pathway. Continue scheduled inhalers Supplemental oxygen and monitor respiratory status closely.      Prior smoking history of 0.5 ppd, 23 yo start, quit 2018.    - Continue maintenance inhaler  - Supplemental O2 PRN, home use 1.5L    HFrEF (heart failure with reduced ejection fraction)  See NICM    DNR (do not resuscitate)  Pt DNR per chart review and reconfirmed in the ED on this admission. Do not intubate.    Type 2 diabetes mellitus with hyperglycemia, without long-term current use of insulin  Last A1C 5 during admission two weeks prior to current ICU admission. Not on home DM regimen. Hyperglycemic during this admission.        Acute on chronic respiratory failure with hypoxia and hypercapnia  Patient with Hypercapnic and Hypoxic Respiratory failure which is Acute on chronic.  she is on home oxygen at 1.5 LPM. Supplemental oxygen was provided and noted.    Respiratory status has improved, pt on 2L NC this AM (baseline 1.5L at home)       Signs/symptoms of respiratory failure include- tachypnea, increased work of breathing, and respiratory distress. Contributing diagnoses includes - CHF and COPD Labs and images were reviewed. Patient Has recent ABG, which has been reviewed. Will treat underlying causes and adjust management of respiratory failure as follows-     - Do not intubate, does not align with patient goals    Acute on chronic combined systolic and diastolic congestive heart failure  Patient is identified as having Combined Systolic and Diastolic heart failure that is Acute on chronic. CHF is currently . Latest ECHO performed and demonstrates- Results for orders placed during the hospital encounter of 05/14/24    Echo    Interpretation Summary    Left Ventricle: The left ventricle is severely dilated. Severely increased ventricular mass. Normal wall thickness. There is eccentric hypertrophy. Severe global hypokinesis present. There is severely reduced systolic function with a visually estimated ejection fraction of 5 - 10%. There is diastolic dysfunction but grade cannot be determined.    Right  Ventricle: Normal right ventricular cavity size. Wall thickness is normal. Right ventricle wall motion  is normal. Systolic function is normal.    Left Atrium: Left atrium is moderately dilated.    Mitral Valve: There is mild regurgitation.    Pulmonary Artery: The estimated pulmonary artery systolic pressure is 32 mmHg.    IVC/SVC: Normal venous pressure at 3 mmHg.  . Monitor clinical status closely. Monitor on telemetry. Patient is off CHF pathway.  Monitor strict Is&Os and daily weights.  Place on fluid restriction of 1.5 L. Cardiology has been consulted. Continue to stress to patient importance of self efficacy and  on diet for CHF. Last BNP reviewed- and noted below   Recent Labs   Lab 05/28/24  1259   BNP 2,066*       - D/c lasix     LBBB (left bundle branch block)  Noted on EKG dating as far back as 03/2019. Reconfirmed on admission EKG.     NICM (nonischemic cardiomyopathy)  Echo from 05/24/24 with EF 5-10% with global hypokinesis. GDMT includes Toprol 25mg qd, spironolactone 25mg qd.  Unable to afford Jardiance even with coupon. Entresto held on prior admission and DC'd at clinic visit on day prior to admission given continued soft BP. Clinic plan was to introduce low dose ACE/ARB for additional GDMT. Diuresis with Lasix 40mg qd. Patient declined establishing with Palliative Care during recent clinic visit.          Acute renal failure with acute tubular necrosis superimposed on stage 3a chronic kidney disease  Nephrology consulted. Urinary sediment with granular, halfy muddy brown and waxy casts suggestive of ATN in the setting of cardiogenic shock.    Baseline Cr of 1.0.       Hypertension, essential  Chronic, controlled. Latest blood pressure and vitals reviewed-     Temp:  [97.8 °F (36.6 °C)-99 °F (37.2 °C)]   Pulse:  []   Resp:  [16-18]   BP: (101-114)/(55-62)   SpO2:  [93 %-99 %] .   Home meds for hypertension were reviewed and noted below.   Hypertension Medications                furosemide (LASIX) 40 MG tablet Take 1 tablet (40 mg total) by mouth once daily.    metoprolol succinate (TOPROL-XL) 25 MG 24 hr tablet Take 1 tablet (25 mg total) by mouth once daily.    spironolactone (ALDACTONE) 25 MG tablet Take 1 tablet (25 mg total) by mouth once daily.            While in the hospital, will manage blood pressure as follows; Adjust home antihypertensive regimen as follows- d/c amlodipine    Will utilize p.r.n. blood pressure medication only if patient's blood pressure greater than 180/110 and she develops symptoms such as worsening chest pain or shortness of breath.        VTE Risk Mitigation (From admission, onward)           Ordered     IP VTE HIGH RISK PATIENT  Once         05/28/24 2104     Place sequential compression device  Until discontinued         05/28/24 2104     Place sequential compression device  Until discontinued         05/28/24 1624                    Leandra Ocasio MD  Cardiology  [facility] - [facility] Step[facility]

## 2024-06-04 NOTE — ASSESSMENT & PLAN NOTE
Decision made to transition to comfort-care     - Palliative following   - Pending hospice options   - Discontinue medications, labs, and interventions not contributing to patient's comfort  - Symptom management with dilaudid and lorazepam   - PRN antiemetics

## 2024-06-04 NOTE — ASSESSMENT & PLAN NOTE
Last A1C 5 during admission two weeks prior to current ICU admission. Not on home DM regimen. Hyperglycemic during this admission.

## 2024-06-04 NOTE — ASSESSMENT & PLAN NOTE
See ACP 6/2-6/4    Interval update 6/4- patient continues to require parenteral opioids for comfort, not imminently dying but likely to progress towards a peaceful death in the coming days to short weeks. Passages to eval for inpatient unit    Insight/goals of care- good insight and fair prognostic awareness. Clear goals of comfort, limiting life-prolonging care, and dignity (fear of being a burden). Patient and family electing to transition to comfort-focused care in the hospital, allowing patient's condition to guide dispo planning (hospice in a facility vs in hospital)    Social support- fair, lives with daughter's family. Also has a son from [place], but reportedly he is struggling with mental health issues. Daughter worries she could not meet her mother's needs, and son would not cope well with her at home.     Psychological- good coping mechanisms by patient and daughter as well as grandchildren. Patient clearly has accepted her dying but struggling to cope with her family's grief and support. Learning to accept grief and love    Spiritual- Jewish, has had anointing of the sick, appreciates continued  support    Symptom management- hiccups, nausea, ischemic leg pain    Recommendations  -DNR, comfort-focused care  -if patient acutely declines AND requires comfort-focused medicines via IV, reasonable to consider inpatient hospice GIP admit to unit   -otherwise, may need to explore other inpatient hospice placement  -discontinue dobutamine, routine lab draws, and medicines not contributing to patient's comfort  -would also decrease monitoring, frequency of vitals, and other non goal-concordant interventions    Symptom management  -hydromorphone 0.5mg (10 OME/dose) every hour PRN for pain, dyspnea, or pain behaviors  -lorazepam 0.5mg IV q4hr PRN for agitation, anxiety  -if hiccups return and are uncomfortable, would trial chlorpromazine 25mg q6hr PRN  -continue PRN anti-emetics for nausea

## 2024-06-04 NOTE — PLAN OF CARE
06/04/24 1326   Post-Acute Status   Post-Acute Authorization Hospice   Hospice Status Referrals Sent     Per Palliative Care MD Dr Lin pt not quite meeting criteria for GIP bed but probably will within the next couple of days.  Per Dr Lin family is not able to care for pt in the home so pt might meet criteria for one of the other inpatient hospice [id number] programs.  SW met with pt and daughter Alexsandra at bedside who voiced agreement with the above plan, stating that [facility] is right by Alexsandra's home.    Preferred provider: (if more than 1, listed in order of descending preference)  [facility] / [facility]    Referral sent in [facility] and liaison Rizwan ([phone]) notified of referral.  If an additional preferred facility not listed above is identified, additional referral to be sent. If above facilities unable to accept, will send additional referrals to in-network providers.       Juliana Roque, SOILA  [facility]  [id number]

## 2024-06-04 NOTE — ASSESSMENT & PLAN NOTE
Pt with an EF of 5-10% that had suspected flash pulmonary edema while getting a MRI. She required BiPAP at that time and received IV Lasix 40mg x3 in the ED with no response. Central line and A-line were placed; pt was started on Dobutamine 5mcg and Lasix 30mg/hr gtt.     Hemos during the initiation of Dobutamine and Lasix gtt:     CVP 10, SvO2 51, CO 3.3, CI 2, and SVR 1945.     - D/c lasix and dobutamine

## 2024-06-04 NOTE — ASSESSMENT & PLAN NOTE
Chronic, controlled. Latest blood pressure and vitals reviewed-     Temp:  [97.8 °F (36.6 °C)-99 °F (37.2 °C)]   Pulse:  []   Resp:  [16-18]   BP: (101-114)/(55-62)   SpO2:  [93 %-99 %] .   Home meds for hypertension were reviewed and noted below.   Hypertension Medications               furosemide (LASIX) 40 MG tablet Take 1 tablet (40 mg total) by mouth once daily.    metoprolol succinate (TOPROL-XL) 25 MG 24 hr tablet Take 1 tablet (25 mg total) by mouth once daily.    spironolactone (ALDACTONE) 25 MG tablet Take 1 tablet (25 mg total) by mouth once daily.            While in the hospital, will manage blood pressure as follows; Adjust home antihypertensive regimen as follows- d/c amlodipine    Will utilize p.r.n. blood pressure medication only if patient's blood pressure greater than 180/110 and she develops symptoms such as worsening chest pain or shortness of breath.

## 2024-06-04 NOTE — ASSESSMENT & PLAN NOTE
Nephrology consulted. Urinary sediment with granular, halfy muddy brown and waxy casts suggestive of ATN in the setting of cardiogenic shock.    Baseline Cr of 1.0.

## 2024-06-04 NOTE — PLAN OF CARE
06/04/24 1516   Post-Acute Status   Post-Acute Authorization Hospice   Hospice Status Pending post acute provider review/more information requested     Per Rizwan with [facility] she is meeting pt's daughter tomorrow 6/5 at 8:30am at their inpatient unit for a tour.  SW will continue to follow.      Juliana Roque LMSW  [facility]  [id number]

## 2024-06-04 NOTE — ASSESSMENT & PLAN NOTE
- Palliative consulted with assistance for GOC and hospice discussions  - Family meeting planned for Mon 6/03   - Decision made to transition to comfort-care

## 2024-06-05 NOTE — PROGRESS NOTES
[facility] - Hospice and Palliative Medicine  Cardiology  Progress Note    Patient Name: Selma Hooper  MRN: [id number]  Admission Date: 5/28/2024  Hospital Length of Stay: 8 days  Code Status: DNR   Attending Physician: Raien Olivarez MD   Primary Care Physician: Phil Andrade MD  Expected Discharge Date: 6/6/2024  Principal Problem:Comfort measures only status    Subjective:     Hospital Course:   Weaned off BiPAP to 2L NC (home O2 1.5L). RLE with continued poor, intermittent pulses with associated intermittent pain/numbness, decreasing sensation to palpation. US arterial BLE with occlusion of the right mid and distal superficial femoral artery, popliteal artery, and anterior and posterior tibial arteries & severe stenosis of the distal left superficial femoral artery. Per discussions with Interventional Cardiology, no acute intervention given acuity of overall presentation. Vascular Surgery offered L AKA, but patient declined citing growing medical complexity in her life and no desire for intubation in any form. She believes that her life is limited primarily by her HFrEF and that the ischemic limb is an additional setback which, even if amputated, would not necessarily improve her quality of life. Palliative consulted to assist with next steps. Decision made to transition to comfort-care. Pt will be admitted to inpatient hospice unit.          Interval History: Pt to be transferred to inpatient hospice today.     Review of Systems   Cardiovascular:  Negative for chest pain.   Musculoskeletal:         Right leg pain    Gastrointestinal:  Negative for nausea and vomiting.   Psychiatric/Behavioral:  The patient is not nervous/anxious.    Objective:     Vital Signs (Most Recent):  Temp: 97.4 °F (36.3 °C) (06/05/24 1309)  Pulse: 107 (06/05/24 1309)  Resp: 20 (06/05/24 1309)  BP: 117/68 (06/05/24 1309)  SpO2: (!) 94 % (06/05/24 1309) Vital Signs (24h Range):  Temp:  [97.4 °F (36.3 °C)-98.3 °F (36.8 °C)] 97.4 °F  (36.3 °C)  Pulse:  [] 107  Resp:  [16-20] 20  SpO2:  [94 %-95 %] 94 %  BP: (110-117)/(66-71) 117/68     Weight: 61.5 kg (135 lb 9.3 oz)  Body mass index is 25.62 kg/m².     SpO2: (!) 94 %         Intake/Output Summary (Last 24 hours) at 6/5/2024 1332  Last data filed at 6/5/2024 1157  Gross per 24 hour   Intake --   Output 300 ml   Net -300 ml       Lines/Drains/Airways       Drain  Duration             Female External Urinary Catheter w/ Suction 06/03/24 0903 2 days              Peripheral Intravenous Line  Duration                  Peripheral IV - Single Lumen 06/02/24 1500 22 G Anterior;Distal;Left Forearm 2 days         Peripheral IV - Single Lumen 06/02/24 1500 22 G Anterior;Left Forearm 2 days                       Physical Exam  Constitutional:       Appearance: She is ill-appearing.      Interventions: Nasal cannula in place.   HENT:      Head: Normocephalic and atraumatic.      Nose: Nose normal.      Mouth/Throat:      Pharynx: Oropharynx is clear.   Eyes:      Conjunctiva/sclera: Conjunctivae normal.   Cardiovascular:      Rate and Rhythm: Normal rate and regular rhythm.      Pulses: Decreased pulses.   Pulmonary:      Effort: Pulmonary effort is normal.   Musculoskeletal:         General: Swelling present.   Skin:     General: Skin is dry.      Comments: RLE cold and mottled appearing    Neurological:      Mental Status: She is disoriented.       Significant Labs: None    Significant Imaging:  None  Assessment and Plan:     * Comfort measures only status  Decision made to transition to comfort-care     - Palliative following   - Pt to be transferred to inpatient hospice unit today   - Discontinue medications, labs, and interventions not contributing to patient's comfort  - Symptom management with dilaudid and lorazepam   - PRN antiemetics    Palliative care encounter  - Palliative consulted with assistance for GOC and hospice discussions  - Family meeting planned for Mon 6/03   - Decision made to  transition to comfort-care  - Pt to be transferred to inpatient hospice unit today       Acute lower limb ischemia  Pt presenting with RLE weakness and decreased sensation to the knee that started around 3AM the day of admit. Initial thought was TIA as pt had palpable pulses on admit, however pt developed worsening RLE pain during admission prompting LE Arterial US.    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 There is severe stenosis of the distal left superficial femoral artery.  -CPK uptrended from 2673 to 4628.     -RLE pulses unable to be obtained on doppler. Color change noted on RLE. Pt endorsing numbness.    -Vascular surgery consulted today. Spoke to pt and family and discussed that she would require above-the-knee amputation, pt unsure about above the knee amputation at this time as it may require here to be intubated. We will inform vascular surgery if pt decides to proceed with above the knee amputation.     - D/c Asa and Plavix and heparin  - Vascular Surgery offered AKA, but patient refused as it doesn't align with her goals.     Arrhythmia  Sinus tachycardia    Cardiogenic shock  Pt with an EF of 5-10% that had suspected flash pulmonary edema while getting a MRI. She required BiPAP at that time and received IV Lasix 40mg x3 in the ED with no response. Central line and A-line were placed; pt was started on Dobutamine 5mcg and Lasix 30mg/hr gtt.     Hemos during the initiation of Dobutamine and Lasix gtt:     CVP 10, SvO2 51, CO 3.3, CI 2, and SVR 1945.     - D/c lasix and dobutamine    Long QT interval  With wide QRS tachycardia on admission. Qtc 568 on admission.     - Keep K > 4, Mg > 2    COPD (chronic obstructive pulmonary disease)  Patient's COPD is with exacerbation noted by continued dyspnea and worsening of baseline hypoxia currently.  Patient is currently off COPD Pathway. Continue scheduled  inhalers Supplemental oxygen and monitor respiratory status closely.     Prior smoking history of 0.5 ppd, 25 yo start, quit 2018.    - Continue maintenance inhaler  - Supplemental O2 PRN, home use 1.5L    HFrEF (heart failure with reduced ejection fraction)  See NICM    DNR (do not resuscitate)  Pt DNR per chart review and reconfirmed in the ED on this admission. Do not intubate.    Type 2 diabetes mellitus with hyperglycemia, without long-term current use of insulin  Last A1C 5 during admission two weeks prior to current ICU admission. Not on home DM regimen. Hyperglycemic during this admission.        Acute on chronic respiratory failure with hypoxia and hypercapnia  Patient with Hypercapnic and Hypoxic Respiratory failure which is Acute on chronic.  she is on home oxygen at 1.5 LPM. Supplemental oxygen was provided and noted.    Respiratory status has improved, pt on 2L NC this AM (baseline 1.5L at home)       Signs/symptoms of respiratory failure include- tachypnea, increased work of breathing, and respiratory distress. Contributing diagnoses includes - CHF and COPD Labs and images were reviewed. Patient Has recent ABG, which has been reviewed. Will treat underlying causes and adjust management of respiratory failure as follows-     - Do not intubate, does not align with patient goals    Acute on chronic combined systolic and diastolic congestive heart failure  Patient is identified as having Combined Systolic and Diastolic heart failure that is Acute on chronic. CHF is currently . Latest ECHO performed and demonstrates- Results for orders placed during the hospital encounter of 05/14/24    Echo    Interpretation Summary    Left Ventricle: The left ventricle is severely dilated. Severely increased ventricular mass. Normal wall thickness. There is eccentric hypertrophy. Severe global hypokinesis present. There is severely reduced systolic function with a visually estimated ejection fraction of 5 - 10%. There is  "diastolic dysfunction but grade cannot be determined.    Right Ventricle: Normal right ventricular cavity size. Wall thickness is normal. Right ventricle wall motion  is normal. Systolic function is normal.    Left Atrium: Left atrium is moderately dilated.    Mitral Valve: There is mild regurgitation.    Pulmonary Artery: The estimated pulmonary artery systolic pressure is 32 mmHg.    IVC/SVC: Normal venous pressure at 3 mmHg.  . Monitor clinical status closely. Monitor on telemetry. Patient is off CHF pathway.  Monitor strict Is&Os and daily weights.  Place on fluid restriction of 1.5 L. Cardiology has been consulted. Continue to stress to patient importance of self efficacy and  on diet for CHF. Last BNP reviewed- and noted below   No results for input(s): "BNP", "BNPTRIAGEBLO" in the last 168 hours.    - D/c lasix     LBBB (left bundle branch block)  Noted on EKG dating as far back as 03/2019. Reconfirmed on admission EKG.     NICM (nonischemic cardiomyopathy)  Echo from 05/24/24 with EF 5-10% with global hypokinesis. GDMT includes Toprol 25mg qd, spironolactone 25mg qd.  Unable to afford Jardiance even with coupon. Entresto held on prior admission and DC'd at clinic visit on day prior to admission given continued soft BP. Clinic plan was to introduce low dose ACE/ARB for additional GDMT. Diuresis with Lasix 40mg qd. Patient declined establishing with Palliative Care during recent clinic visit.          Acute renal failure with acute tubular necrosis superimposed on stage 3a chronic kidney disease  Nephrology consulted. Urinary sediment with granular, halfy muddy brown and waxy casts suggestive of ATN in the setting of cardiogenic shock.    Baseline Cr of 1.0.       Hypertension, essential  Chronic, controlled. Latest blood pressure and vitals reviewed-     Temp:  [97.4 °F (36.3 °C)-98.3 °F (36.8 °C)]   Pulse:  []   Resp:  [16-20]   BP: (110-117)/(66-71)   SpO2:  [94 %-95 %] .   Home meds for " hypertension were reviewed and noted below.   Hypertension Medications               furosemide (LASIX) 40 MG tablet Take 1 tablet (40 mg total) by mouth once daily.    metoprolol succinate (TOPROL-XL) 25 MG 24 hr tablet Take 1 tablet (25 mg total) by mouth once daily.    spironolactone (ALDACTONE) 25 MG tablet Take 1 tablet (25 mg total) by mouth once daily.            While in the hospital, will manage blood pressure as follows; Adjust home antihypertensive regimen as follows- d/c amlodipine    Will utilize p.r.n. blood pressure medication only if patient's blood pressure greater than 180/110 and she develops symptoms such as worsening chest pain or shortness of breath.        VTE Risk Mitigation (From admission, onward)           Ordered     Place sequential compression device  Until discontinued         05/28/24 6906                    Leandra Ocasio MD  Cardiology  [facility] - [facility]

## 2024-06-05 NOTE — SUBJECTIVE & OBJECTIVE
Interval History: Chart reviewed including 24h medication use. Patient more confused this AM, requiring frequent redirection, increasing pain needs. Family at bedside later in the afternoon. Plan to move to inpatient palliative care unit    Palliative ROS:  PRNs: hydromorphone 0.5mg IV x5 (50 OME)  Pain + (numb, aching, stabbing pain in RLE, relieved with hydromorphone)  Dyspnea -  Nausea +  Hiccups +  Vomiting -  Constipation -  Anxiety -  Agitation -  Anorexia +          Past Medical History:   Diagnosis Date    Abnormal liver enzymes 12/10/2018    Acute on chronic combined systolic and diastolic congestive heart failure 4/2/2021    Acute on chronic respiratory failure with hypoxia 4/2/2021    Acute on chronic respiratory failure with hypoxia and hypercapnia 12/10/2021    CHF (congestive heart failure)     COPD exacerbation 7/12/2022    Former smoker 10/24/2018    Hypertension     Smoker 7/27/2016       Past Surgical History:   Procedure Laterality Date    BREAST BIOPSY      left  side years ago in high school   1962    CHOLECYSTECTOMY      COLONOSCOPY      COLONOSCOPY N/A 08/23/2021    Procedure: COLONOSCOPY;  Surgeon: Shree Amaya MD;  Location: [facility] ([address] [place] [address]);  Service: Endoscopy;  Laterality: N/A;  Do not cancel this order  fully vaccinated-see immunization record  EF 18%  [facility]    HYSTERECTOMY         Review of patient's allergies indicates:   Allergen Reactions    Atorvastatin      Leg cramps       Medications:  Continuous Infusions:      Scheduled Meds:   fluticasone furoate-vilanteroL  1 puff Inhalation Daily     PRN Meds:  Current Facility-Administered Medications:     bisacodyL, 10 mg, Rectal, Daily PRN    dextrose 10%, 12.5 g, Intravenous, PRN    dextrose 10%, 25 g, Intravenous, PRN    HYDROmorphone, 1 mg, Intravenous, Q1H PRN    lorazepam, 0.5 mg, Intravenous, Q4H PRN    ondansetron, 4 mg, Intravenous, Q6H PRN    oxyCODONE, 5 mg, Oral, Q4H PRN    prochlorperazine, 5  mg, Intravenous, Q6H PRN    senna-docusate 8.6-50 mg, 1 tablet, Oral, Daily PRN    sodium chloride 0.9%, 10 mL, Intravenous, PRN    sodium chloride 0.9%, 10 mL, Intravenous, PRN    Family History       Problem Relation (Age of Onset)    Arthritis Mother    Cancer Father, Brother    Colon cancer Father, Brother (63)    Dementia Father    Diabetes Daughter    Heart attack Mother    Heart disease Mother, Brother    Hypertension Mother, Father, Brother          Tobacco Use    Smoking status: Former     Current packs/day: 0.00     Types: Cigarettes     Quit date: 2018     Years since quittin.9     Passive exposure: Past    Smokeless tobacco: Never   Substance and Sexual Activity    Alcohol use: Yes     Comment: occasional drinker, not a daily drinker    Drug use: No    Sexual activity: Not Currently     Partners: Male         Objective:     Vital Signs (Most Recent):  Temp: 97.4 °F (36.3 °C) (24 1309)  Pulse: 107 (24 1309)  Resp: 16 (24 1529)  BP: 117/68 (24 1309)  SpO2: (!) 94 % (24 1309) Vital Signs (24h Range):  Temp:  [97.4 °F (36.3 °C)-98.3 °F (36.8 °C)] 97.4 °F (36.3 °C)  Pulse:  [102-121] 107  Resp:  [16-20] 16  SpO2:  [94 %-95 %] 94 %  BP: (110-117)/(66-71) 117/68     Weight: 61.5 kg (135 lb 9.3 oz)  Body mass index is 25.62 kg/m².       Physical Exam  Vitals and nursing note reviewed.   Constitutional:       General: She is not in acute distress.     Appearance: She is ill-appearing. She is not toxic-appearing.      Comments: Older, ill-appearing female lying in bed, awake but less interactive, occasional grimaces   HENT:      Nose:      Comments: NC in place     Mouth/Throat:      Mouth: Mucous membranes are moist.   Eyes:      Extraocular Movements: Extraocular movements intact.   Pulmonary:      Effort: Pulmonary effort is normal. No respiratory distress.   Abdominal:      General: Abdomen is flat.      Palpations: Abdomen is soft.   Skin:     Comments: RLE very cold and  "mottled from knee down, absent distal pulses, mostly insensate   Neurological:      Mental Status: She is alert. She is disoriented.   Psychiatric:         Mood and Affect: Mood normal.         Behavior: Behavior normal.         Thought Content: Thought content normal.         Judgment: Judgment normal.                       CBC:   Recent Labs   Lab 06/03/24  0315   WBC 8.61   HGB 10.4*   HCT 32.9*   MCV 77*        BMP:  No results for input(s): "GLU", "NA", "K", "CL", "CO2", "BUN", "CREATININE", "CALCIUM", "MG" in the last 24 hours.    LFT:  Lab Results   Component Value Date    AST 65 (H) 06/03/2024    ALKPHOS 86 06/03/2024    BILITOT 0.4 06/03/2024     Albumin:   Albumin   Date Value Ref Range Status   06/03/2024 3.3 (L) 3.5 - 5.2 g/dL Final     Protein:   Total Protein   Date Value Ref Range Status   06/03/2024 6.6 6.0 - 8.4 g/dL Final     Lactic acid:   Lab Results   Component Value Date    LACTATE 0.7 06/03/2024    LACTATE 4.7 (HH) 05/28/2024       "

## 2024-06-05 NOTE — PLAN OF CARE
SW returned call to pt's daughter Alexsandra who stated she was confused about pt's discharge date. SW explained that the date is depending on what the discharge plan ends up being, which partially depends on what she looks like today.  SW did inform Alexsandra that we will need to think about a plan B in the event that pt still doesn't meet inpatient criteria, but that that will depend on Palliative Care MD Dr Lin's assessment this morning.  SW to follow up after being evaluated by her physicians.       UPDATE 12:31 PM  Per Dr Lin pt to transfer to inpatient Palliative Care unit.      Juliana Roque LMSW  [facility]  [phone]

## 2024-06-05 NOTE — SUBJECTIVE & OBJECTIVE
Interval History: Pt to be transferred to inpatient hospice today.     Review of Systems   Cardiovascular:  Negative for chest pain.   Musculoskeletal:         Right leg pain    Gastrointestinal:  Negative for nausea and vomiting.   Psychiatric/Behavioral:  The patient is not nervous/anxious.    Objective:     Vital Signs (Most Recent):  Temp: 97.4 °F (36.3 °C) (06/05/24 1309)  Pulse: 107 (06/05/24 1309)  Resp: 20 (06/05/24 1309)  BP: 117/68 (06/05/24 1309)  SpO2: (!) 94 % (06/05/24 1309) Vital Signs (24h Range):  Temp:  [97.4 °F (36.3 °C)-98.3 °F (36.8 °C)] 97.4 °F (36.3 °C)  Pulse:  [] 107  Resp:  [16-20] 20  SpO2:  [94 %-95 %] 94 %  BP: (110-117)/(66-71) 117/68     Weight: 61.5 kg (135 lb 9.3 oz)  Body mass index is 25.62 kg/m².     SpO2: (!) 94 %         Intake/Output Summary (Last 24 hours) at 6/5/2024 1332  Last data filed at 6/5/2024 1157  Gross per 24 hour   Intake --   Output 300 ml   Net -300 ml       Lines/Drains/Airways       Drain  Duration             Female External Urinary Catheter w/ Suction 06/03/24 0903 2 days              Peripheral Intravenous Line  Duration                  Peripheral IV - Single Lumen 06/02/24 1500 22 G Anterior;Distal;Left Forearm 2 days         Peripheral IV - Single Lumen 06/02/24 1500 22 G Anterior;Left Forearm 2 days                       Physical Exam  Constitutional:       Appearance: She is ill-appearing.      Interventions: Nasal cannula in place.   HENT:      Head: Normocephalic and atraumatic.      Nose: Nose normal.      Mouth/Throat:      Pharynx: Oropharynx is clear.   Eyes:      Conjunctiva/sclera: Conjunctivae normal.   Cardiovascular:      Rate and Rhythm: Normal rate and regular rhythm.      Pulses: Decreased pulses.   Pulmonary:      Effort: Pulmonary effort is normal.   Musculoskeletal:         General: Swelling present.   Skin:     General: Skin is dry.      Comments: RLE cold and mottled appearing    Neurological:      Mental Status: She is  disoriented.       Significant Labs: None    Significant Imaging:  None

## 2024-06-05 NOTE — PLAN OF CARE
Problem: Infection  Goal: Absence of Infection Signs and Symptoms  6/5/2024 0528 by Lm Fonseca RN  Outcome: Progressing  6/5/2024 0343 by Lm Fonseca RN  Outcome: Progressing     Problem: Adult Inpatient Plan of Care  Goal: Plan of Care Review  6/5/2024 0528 by Lm Fonseca RN  Outcome: Progressing  6/5/2024 0343 by Lm Fonseca RN  Outcome: Progressing  Goal: Patient-Specific Goal (Individualized)  6/5/2024 0528 by Lm Fonseca RN  Outcome: Progressing  6/5/2024 0343 by Lm Fonseca RN  Outcome: Progressing  Goal: Absence of Hospital-Acquired Illness or Injury  6/5/2024 0528 by Lm Fonseca RN  Outcome: Progressing  6/5/2024 0343 by Lm Fonseca RN  Outcome: Progressing  Goal: Optimal Comfort and Wellbeing  6/5/2024 0528 by Lm Fonseca RN  Outcome: Progressing  6/5/2024 0343 by Lm Fonseca RN  Outcome: Progressing  Goal: Readiness for Transition of Care  6/5/2024 0528 by Lm Fonseca RN  Outcome: Progressing  6/5/2024 0343 by Lm Fonseca RN  Outcome: Progressing     Problem: Fall Injury Risk  Goal: Absence of Fall and Fall-Related Injury  6/5/2024 0528 by Lm Fonseca RN  Outcome: Progressing  6/5/2024 0343 by Lm Fonseca RN  Outcome: Progressing     Problem: Pain Acute  Goal: Optimal Pain Control and Function  6/5/2024 0528 by Lm Fonseca RN  Outcome: Progressing  6/5/2024 0343 by Lm Fonseca RN  Outcome: Progressing     Problem: Skin Injury Risk Increased  Goal: Skin Health and Integrity  6/5/2024 0528 by Lm Fonseca RN  Outcome: Progressing  6/5/2024 0343 by Lm Fonseca RN  Outcome: Progressing    Pt educated on fall risk, pt remained free from falls/trauma/injury. Denies chest pain, SOB, palpitations or dizziness. Plan of care reviewed with pt. Bed locked in lowest position, call bell within reach, no acute distress noted, will continue to monitor.  Prn Dilaudid 0.5mg given for pain

## 2024-06-05 NOTE — NURSING
Arrived to unit via bed, on O2 5 L, IV x 2 in place left forearm, flushed and intact, pt doesn't respond to questions, she responded to a couple, unable to determine orientation. Alert and cooperative. She denies pain, appears comfortable, but tired, No family at bedside at this time.

## 2024-06-05 NOTE — ASSESSMENT & PLAN NOTE
Chronic, controlled. Latest blood pressure and vitals reviewed-     Temp:  [97.4 °F (36.3 °C)-98.3 °F (36.8 °C)]   Pulse:  []   Resp:  [16-20]   BP: (110-117)/(66-71)   SpO2:  [94 %-95 %] .   Home meds for hypertension were reviewed and noted below.   Hypertension Medications               furosemide (LASIX) 40 MG tablet Take 1 tablet (40 mg total) by mouth once daily.    metoprolol succinate (TOPROL-XL) 25 MG 24 hr tablet Take 1 tablet (25 mg total) by mouth once daily.    spironolactone (ALDACTONE) 25 MG tablet Take 1 tablet (25 mg total) by mouth once daily.            While in the hospital, will manage blood pressure as follows; Adjust home antihypertensive regimen as follows- d/c amlodipine    Will utilize p.r.n. blood pressure medication only if patient's blood pressure greater than 180/110 and she develops symptoms such as worsening chest pain or shortness of breath.

## 2024-06-05 NOTE — ASSESSMENT & PLAN NOTE
Decision made to transition to comfort-care     - Palliative following   - Pt to be transferred to inpatient hospice unit today   - Discontinue medications, labs, and interventions not contributing to patient's comfort  - Symptom management with dilaudid and lorazepam   - PRN antiemetics

## 2024-06-05 NOTE — CHAPLAIN
Palliative Care     Patient: Selma Hooper  MRN: [id number]  : 1945  Age: 78 y.o.  Hospital Length of Stay: 8  Code Status: Code Status Discussion Note  Attending Provider: Raine Olivarez MD  Principal Problem: Comfort measures only status    Non-clinical observations of patient/room: Visited pall med/hospice pt who was just moved to the 3H annex unit; pt was alone, awake, resting, somewhat communicative; also visited with bedside nurse; 15 min    Provided compassionate presence and tender touch to pt as I introduced myself and role. I referenced Dr. Alexander Lin Palliative doc, as well as the [place] chaplains who visited when she was on the main floor as well as Fr. Rodríguez. Assured her that she is being well taken care of and well loved.     Pt said she has two children  when I asked; asked her if she was in any pain and pt said yes. Asked on the 1-10 scale and she said 10.  Per pall med doc pt is not good about sharing her pain and also pt expressed not wanting to drag things out. She was calm and only winced once. My focus was to get her comfortable, so I left my card and told pt I was going straight to her nurse and she thanked me. She attempted to wink back at me when I winked at her and I blew her a kiss as I left and she did that back to me. So cute.    Informed pt I'd visit again tomorrow. At that time I will offer prayer and perhaps Fr. Rodríguez once again; hope to meet family as well. Lord, in your mercy.      **Spiritual Care Dept. Chaplains are available evenings, overnight and weekends **    In Peace,    Rev. Sylvia Fitzgerald MDiv, [facility]  Board Certified   Palliative Medicine Department  [phone]

## 2024-06-05 NOTE — ASSESSMENT & PLAN NOTE
See ACP 6/2-6/4    Patient is a 78 year old female with end stage HFrEF, admitted for decompensated heart failure and ischemic RLE. Transitioned off of  and lasix following goals of care discussion    Interval update 06/05/2024 - admitted to hospice unit, family updated at bedside, progressing as expected, increasing IV opioid needs. Minimal oral intake    Hospice diagnosis- decompensated heart failure with reduced ejection fraction    Need for inpatient care- Active dying process as evidenced by decreasing consciousness, cool extremities. Requiring IV opioids and benzodiazepines for pain behaviors, respiratory distress, and agitation. Likely prognosis days    Dispo: anticipate EOL care on the unit, but will continue to engage family regarding care at home or alternative in-facility hospice care    Insight/goals of care- good insight and fair prognostic awareness. Clear goals of comfort, limiting life-prolonging care, and dignity (fear of being a burden). Patient and family electing to transition to comfort-focused care in the hospital.     Social support- fair, lives with daughter's family. Also has a son from [place], but reportedly he is struggling with mental health issues. Daughter worries she could not meet her mother's needs, and son would not cope well with her at home.     Psychological- good coping mechanisms by patient and daughter as well as grandchildren. Patient clearly has accepted her dying but struggling to cope with her family's grief and support. Learning to accept grief and love    Spiritual- Faith, has had anointing of the sick, appreciates continued  and  support    Symptom Assessment  - Pain/ pain behaviors: controlled with frequent IV opioids  - Dyspnea/tachypnea: controlled  - Agitation: controlled  - Mentation: decreasing level of consciousness     Plan of care:  Tachypnea/dyspnea:   -non pharm measures such as having bedside fan and cooler room temps     Pain behaviors:    -hydromorphone 1mg IV (20 OME/dose) 1 hour PRN non verbal signs of discomfort     Agitation:   -treat with opioids initially  -followed by lorazepam 0.5mg IV q2hr PRN    Uncomfortable secretions:  -glycopyrrolate 0.4mg IV q4hr PRN     GI:   -will not prioritize at end of life

## 2024-06-05 NOTE — ASSESSMENT & PLAN NOTE
- Palliative consulted with assistance for GOC and hospice discussions  - Family meeting planned for Mon 6/03   - Decision made to transition to comfort-care  - Pt to be transferred to inpatient hospice unit today

## 2024-06-05 NOTE — PROGRESS NOTES
[facility] and Palliative Medicine  Palliative Medicine  Progress Note    Patient Name: Selma Hooper  MRN: [id number]  Admission Date: 5/28/2024  Hospital Length of Stay: 8 days  Code Status: DNR   Attending Provider: Raine Olivarez MD  Consulting Provider: Rogelio Lin MD  Primary Care Physician: Phil Andrade MD  Principal Problem:Comfort measures only status    Patient information was obtained from patient, relative(s), past medical records, and primary team.      Assessment/Plan:     Palliative Care  Palliative care encounter  See ACP 6/2-6/4    Patient is a 78 year old female with end stage HFrEF, admitted for decompensated heart failure and ischemic RLE. Transitioned off of  and lasix following goals of care discussion    Interval update 06/05/2024 - admitted to hospice unit, family updated at bedside, progressing as expected, increasing IV opioid needs. Minimal oral intake    Hospice diagnosis- decompensated heart failure with reduced ejection fraction    Need for inpatient care- Active dying process as evidenced by decreasing consciousness, cool extremities. Requiring IV opioids and benzodiazepines for pain behaviors, respiratory distress, and agitation. Likely prognosis days    Dispo: anticipate EOL care on the unit, but will continue to engage family regarding care at home or alternative in-facility hospice care    Insight/goals of care- good insight and fair prognostic awareness. Clear goals of comfort, limiting life-prolonging care, and dignity (fear of being a burden). Patient and family electing to transition to comfort-focused care in the hospital.     Social support- fair, lives with daughter's family. Also has a son from [place], but reportedly he is struggling with mental health issues. Daughter worries she could not meet her mother's needs, and son would not cope well with her at home.     Psychological- good coping mechanisms by patient and daughter as well as  grandchildren. Patient clearly has accepted her dying but struggling to cope with her family's grief and support. Learning to accept grief and love    Spiritual- Taoist, has had anointing of the sick, appreciates continued  and  support    Symptom Assessment  - Pain/ pain behaviors: controlled with frequent IV opioids  - Dyspnea/tachypnea: controlled  - Agitation: controlled  - Mentation: decreasing level of consciousness     Plan of care:  Tachypnea/dyspnea:   -non pharm measures such as having bedside fan and cooler room temps     Pain behaviors:   -hydromorphone 1mg IV (20 OME/dose) 1 hour PRN non verbal signs of discomfort     Agitation:   -treat with opioids initially  -followed by lorazepam 0.5mg IV q2hr PRN    Uncomfortable secretions:  -glycopyrrolate 0.4mg IV q4hr PRN     GI:   -will not prioritize at end of life        Subjective:     Chief Complaint:   Chief Complaint   Patient presents with    Numbness     Arrives via EMS for right leg numbness that started this morning x2 hours, states that the numbness went away when EMS arrived, VAN -, denies any weakness currently. On 2 L NC at home,       HPI:   Ms Selma Hooper is a 78 year old woman with HFrEF (EF of 5-10%), COPD, CKD III, and hypertension who was admitted to CCU on 5/28 with cardiogenic shock. Patient started on dobutamine and lasix gtt. Course complicated by persistent JAMIR, likely cardiorenal, and RLE ischemia. Vascular surgery consulted, recommending amputation which patient has declined due to risks of anesthesia and intubation. Palliative care consulted for goals of care.     Hospital Course:  No notes on file    Interval History: Chart reviewed including 24h medication use. Patient more confused this AM, requiring frequent redirection, increasing pain needs. Family at bedside later in the afternoon. Plan to move to inpatient palliative care unit    Palliative ROS:  PRNs: hydromorphone 0.5mg IV x5 (50 OME)  Pain + (numb, aching,  stabbing pain in RLE, relieved with hydromorphone)  Dyspnea -  Nausea +  Hiccups +  Vomiting -  Constipation -  Anxiety -  Agitation -  Anorexia +          Past Medical History:   Diagnosis Date    Abnormal liver enzymes 12/10/2018    Acute on chronic combined systolic and diastolic congestive heart failure 4/2/2021    Acute on chronic respiratory failure with hypoxia 4/2/2021    Acute on chronic respiratory failure with hypoxia and hypercapnia 12/10/2021    CHF (congestive heart failure)     COPD exacerbation 7/12/2022    Former smoker 10/24/2018    Hypertension     Smoker 7/27/2016       Past Surgical History:   Procedure Laterality Date    BREAST BIOPSY      left  side years ago in high school   1962    CHOLECYSTECTOMY      COLONOSCOPY      COLONOSCOPY N/A 08/23/2021    Procedure: COLONOSCOPY;  Surgeon: Shree Amaya MD;  Location: [facility] ([address] [place] [address]);  Service: Endoscopy;  Laterality: N/A;  Do not cancel this order  fully vaccinated-see immunization record  EF 18%  8/20-covid [facility]    HYSTERECTOMY         Review of patient's allergies indicates:   Allergen Reactions    Atorvastatin      Leg cramps       Medications:  Continuous Infusions:      Scheduled Meds:   fluticasone furoate-vilanteroL  1 puff Inhalation Daily     PRN Meds:  Current Facility-Administered Medications:     bisacodyL, 10 mg, Rectal, Daily PRN    dextrose 10%, 12.5 g, Intravenous, PRN    dextrose 10%, 25 g, Intravenous, PRN    HYDROmorphone, 1 mg, Intravenous, Q1H PRN    lorazepam, 0.5 mg, Intravenous, Q4H PRN    ondansetron, 4 mg, Intravenous, Q6H PRN    oxyCODONE, 5 mg, Oral, Q4H PRN    prochlorperazine, 5 mg, Intravenous, Q6H PRN    senna-docusate 8.6-50 mg, 1 tablet, Oral, Daily PRN    sodium chloride 0.9%, 10 mL, Intravenous, PRN    sodium chloride 0.9%, 10 mL, Intravenous, PRN    Family History       Problem Relation (Age of Onset)    Arthritis Mother    Cancer Father, Brother    Colon cancer Father, Brother (63)     Dementia Father    Diabetes Daughter    Heart attack Mother    Heart disease Mother, Brother    Hypertension Mother, Father, Brother          Tobacco Use    Smoking status: Former     Current packs/day: 0.00     Types: Cigarettes     Quit date: 2018     Years since quittin.9     Passive exposure: Past    Smokeless tobacco: Never   Substance and Sexual Activity    Alcohol use: Yes     Comment: occasional drinker, not a daily drinker    Drug use: No    Sexual activity: Not Currently     Partners: Male         Objective:     Vital Signs (Most Recent):  Temp: 97.4 °F (36.3 °C) (24 1309)  Pulse: 107 (24 1309)  Resp: 16 (24 1529)  BP: 117/68 (24 1309)  SpO2: (!) 94 % (24 1309) Vital Signs (24h Range):  Temp:  [97.4 °F (36.3 °C)-98.3 °F (36.8 °C)] 97.4 °F (36.3 °C)  Pulse:  [102-121] 107  Resp:  [16-20] 16  SpO2:  [94 %-95 %] 94 %  BP: (110-117)/(66-71) 117/68     Weight: 61.5 kg (135 lb 9.3 oz)  Body mass index is 25.62 kg/m².       Physical Exam  Vitals and nursing note reviewed.   Constitutional:       General: She is not in acute distress.     Appearance: She is ill-appearing. She is not toxic-appearing.      Comments: Older, ill-appearing female lying in bed, awake but less interactive, occasional grimaces   HENT:      Nose:      Comments: NC in place     Mouth/Throat:      Mouth: Mucous membranes are moist.   Eyes:      Extraocular Movements: Extraocular movements intact.   Pulmonary:      Effort: Pulmonary effort is normal. No respiratory distress.   Abdominal:      General: Abdomen is flat.      Palpations: Abdomen is soft.   Skin:     Comments: RLE very cold and mottled from knee down, absent distal pulses, mostly insensate   Neurological:      Mental Status: She is alert. She is disoriented.   Psychiatric:         Mood and Affect: Mood normal.         Behavior: Behavior normal.         Thought Content: Thought content normal.         Judgment: Judgment normal.         "               CBC:   Recent Labs   Lab 06/03/24  0315   WBC 8.61   HGB 10.4*   HCT 32.9*   MCV 77*        BMP:  No results for input(s): "GLU", "NA", "K", "CL", "CO2", "BUN", "CREATININE", "CALCIUM", "MG" in the last 24 hours.    LFT:  Lab Results   Component Value Date    AST 65 (H) 06/03/2024    ALKPHOS 86 06/03/2024    BILITOT 0.4 06/03/2024     Albumin:   Albumin   Date Value Ref Range Status   06/03/2024 3.3 (L) 3.5 - 5.2 g/dL Final     Protein:   Total Protein   Date Value Ref Range Status   06/03/2024 6.6 6.0 - 8.4 g/dL Final     Lactic acid:   Lab Results   Component Value Date    LACTATE 0.7 06/03/2024    LACTATE 4.7 (HH) 05/28/2024         In my care of this patient with acute on chronic severe illness with threat to life and/or bodily function, I am providing goal-concordant care as noted above. My care includes the use of goal-concordant, proportionate, parenteral opioids and benzodiazepines for comfort-focused care.       Rogelio Lin MD  Palliative Medicine  [facility] - Hospice and Palliative Medicine                "

## 2024-06-05 NOTE — PLAN OF CARE
Problem: Infection  Goal: Absence of Infection Signs and Symptoms  Outcome: Progressing     Problem: Adult Inpatient Plan of Care  Goal: Plan of Care Review  Outcome: Progressing  Goal: Patient-Specific Goal (Individualized)  Outcome: Progressing  Goal: Absence of Hospital-Acquired Illness or Injury  Outcome: Progressing  Goal: Optimal Comfort and Wellbeing  Outcome: Progressing  Goal: Readiness for Transition of Care  Outcome: Progressing     Problem: Fall Injury Risk  Goal: Absence of Fall and Fall-Related Injury  Outcome: Progressing     Problem: Pain Acute  Goal: Optimal Pain Control and Function  Outcome: Progressing     Problem: Skin Injury Risk Increased  Goal: Skin Health and Integrity  Outcome: Progressing

## 2024-06-05 NOTE — ASSESSMENT & PLAN NOTE
"Patient is identified as having Combined Systolic and Diastolic heart failure that is Acute on chronic. CHF is currently . Latest ECHO performed and demonstrates- Results for orders placed during the hospital encounter of 05/14/24    Echo    Interpretation Summary    Left Ventricle: The left ventricle is severely dilated. Severely increased ventricular mass. Normal wall thickness. There is eccentric hypertrophy. Severe global hypokinesis present. There is severely reduced systolic function with a visually estimated ejection fraction of 5 - 10%. There is diastolic dysfunction but grade cannot be determined.    Right Ventricle: Normal right ventricular cavity size. Wall thickness is normal. Right ventricle wall motion  is normal. Systolic function is normal.    Left Atrium: Left atrium is moderately dilated.    Mitral Valve: There is mild regurgitation.    Pulmonary Artery: The estimated pulmonary artery systolic pressure is 32 mmHg.    IVC/SVC: Normal venous pressure at 3 mmHg.  . Monitor clinical status closely. Monitor on telemetry. Patient is off CHF pathway.  Monitor strict Is&Os and daily weights.  Place on fluid restriction of 1.5 L. Cardiology has been consulted. Continue to stress to patient importance of self efficacy and  on diet for CHF. Last BNP reviewed- and noted below   No results for input(s): "BNP", "BNPTRIAGEBLO" in the last 168 hours.    - D/c lasix   "

## 2024-06-06 NOTE — CHAPLAIN
"Palliative Care     Patient: Selma Hooper  MRN: [id number]  : 1945  Age: 78 y.o.  Hospital Length of Stay: 9  Code Status: Code Status Discussion Note  Attending Provider: Raine Olivarez MD  Principal Problem: Comfort measures only status    Non-clinical observations of patient/room: Visited pall med, Comfort Measures pt again today, this time with Ema wong, bedside; pt was sleeping entire time and looked very comfortable; 45 min visit    Provided compassionate presence and listening ear as judith shared the family dynamics and the trajectory of the pt's conditions. She said her auntie/pt was very talkative and engaged over this past weekend and declined some each day since.     Judith shared that pt made it clear about her wishes and to allow her to die peacefully; she said pt has already written her own obit and chose the color of the  program; this niece is handling all those logistics and said the pt's son and daughter (her cousins) are on board with it all. She said the son in [place] is struggling the most right now with anticipatory grief and his own regrets of not visiting his mother as often as he should have.    Pt and family are Jehovah's witness and Fr. Marcos has visited a couple times, including last rites anointing.     Both pt's daughter and granddaughter work here at [employer] [place] [employer] [facility] [employer]. Judith wasn't certain if pt was on hospice services as of this point, but she said the dgtr stated someone told her "the pt's body is not ready for hospice."   Perhaps one of the hospice agencies?    Judith said the family is ready for hospice and they believe pt would qualify now; together the niece and I spoke to the nurse about that-- for a hospice re-assessment. Judith states/knows the daughter is the one with the decision-making authority-- not her.    Provided insights to judith about the dying process, what to expect, etc. She asked me about the surge of energy ("rally") and I explained. Judith was very talkative, " grounded and knowledgeable in dealing with the logistics of the after-death processes to help out her cousins. She is a statewide approved Notary.    Informed niece of the [place] chaplains available to her, pt and family when I'm not available. I referenced Dr. Olivarez as well, [facility] [place], [place] provider.     Palliative  will continue to follow. Lord, in your mercy.      **Spiritual Care Dept. Chaplains are available evenings, overnight and weekends **    In Peace,    Rev. Sylvia Fitzgerald MDiv, [facility]  Board Certified   Palliative Medicine Department  [phone]

## 2024-06-06 NOTE — NURSING
"1848 Report received from day shift nurse    1925 Patient resting with eyes closed, resp even and unlabored, O2 per NC @ 5 LMP humidified, no s/s distress/ discomfort, aroused to tactile stimuli, will follow with eyes and mumbled when feet repositioned and elevated heels on a pillow, attempted to check orientation and patient would not respond to any questions verbally, slight nod when asked if was doing okay, but wouldn't respond verbally or non verbally if in any pain, appears to be comfortable will monitor for signs of discomfort and distress, mottling continues with leg cool to touch on RLE, no wounds noted, no family at bedside at this time,maintained as confidential patient with visitor restrictions per family request    2020 Daughter and Grand daughter at bedside, discussed patient status and discussed their wishes and answered questions, stated was going home for the night, encouraged to call at anytime to check on her and that staff would call if any changes    2110 Family left for the night, Patient resting with eyes closed, no s/s distress/ discomfort    2250 Resting with eyes closed, no s/s distress/discomfort    0043 Went to check on patient and she was awake, asked if she was in pain and she verbalized "yeah", was not able to rate pain, just moaned when moved ,    0024 no changes, no s/s distress/ discomfort    0445 patient awake asked if she was doing okay and she stated " yes" no s/s distress/ discomfort at this time    0612 Daughter called and checked on her mom, was updated on her night    0626 Patient alert and will look at staff when named called but would not respond to if in pain, verbally or nonverbally, noted respirations increased, was clammy at beginning of shift but diaphoretic now, no moaning or restlessness noted  "

## 2024-06-06 NOTE — ASSESSMENT & PLAN NOTE
78f with end stage HFrEF (EF 5-10%) with no advanced options, admitted with cardiogenic shock and limb ischemia, on dobutamine.     -transitioned to comfort care only on 6/3. Transferred to palliative unit on 6/5

## 2024-06-06 NOTE — PLAN OF CARE
Problem: Infection  Goal: Absence of Infection Signs and Symptoms  Outcome: Progressing  Intervention: Prevent or Manage Infection  Flowsheets (Taken 6/6/2024 0320)  Infection Management: aseptic technique maintained     Problem: Adult Inpatient Plan of Care  Goal: Plan of Care Review  Outcome: Progressing  Goal: Patient-Specific Goal (Individualized)  Outcome: Progressing  Goal: Absence of Hospital-Acquired Illness or Injury  Outcome: Progressing  Goal: Optimal Comfort and Wellbeing  Outcome: Progressing  Intervention: Monitor Pain and Promote Comfort  Flowsheets (Taken 6/6/2024 0320)  Pain Management Interventions:   medication offered   quiet environment facilitated   relaxation techniques promoted  Intervention: Provide Person-Centered Care  Flowsheets (Taken 6/6/2024 0320)  Trust Relationship/Rapport: (discussed with patient alert and daughter at bedside)   care explained   emotional support provided   empathic listening provided   questions answered   questions encouraged   reassurance provided   thoughts/feelings acknowledged     Problem: Fall Injury Risk  Goal: Absence of Fall and Fall-Related Injury  Outcome: Progressing  Intervention: Promote Injury-Free Environment  Flowsheets (Taken 6/6/2024 0320)  Safety Promotion/Fall Prevention: side rails raised x 2     Problem: Pain Acute  Goal: Optimal Pain Control and Function  Outcome: Progressing  Intervention: Develop Pain Management Plan  Flowsheets (Taken 6/6/2024 0320)  Pain Management Interventions:   medication offered   quiet environment facilitated   relaxation techniques promoted  Intervention: Optimize Psychosocial Wellbeing  Flowsheets (Taken 6/6/2024 0320)  Supportive Measures: active listening utilized     Problem: Skin Injury Risk Increased  Goal: Skin Health and Integrity  Outcome: Progressing  Intervention: Optimize Skin Protection  Flowsheets (Taken 6/6/2024 0320)  Pressure Reduction Techniques: weight shift assistance provided  Pressure  Reduction Devices:   foam padding utilized   pressure-redistributing mattress utilized  Skin Protection: incontinence pads utilized  Activity Management: Arm raise - L1  Head of Bed (HOB) Positioning: HOB elevated

## 2024-06-06 NOTE — PLAN OF CARE
Per discussion with Dr Olivarez pt is currently stable but not yet GIP appropriate.  Will continue to follow.      Juliana Roque, SOILA  [facility]  [id number]

## 2024-06-06 NOTE — SUBJECTIVE & OBJECTIVE
Interval History: Checked on patient several times throughout the day. Does not wake up to voice. Appears comfortable. Family at bedside says patient has been comfortable.     Palliative ROS:  PRNs: hydromorphone 0.5mg IV x 2, 1mg x 2   Pain +   Dyspnea -  Nausea -  Hiccups -  Vomiting -  Constipation -  Anxiety -  Agitation -  Anorexia +          Past Medical History:   Diagnosis Date    Abnormal liver enzymes 12/10/2018    Acute on chronic combined systolic and diastolic congestive heart failure 4/2/2021    Acute on chronic respiratory failure with hypoxia 4/2/2021    Acute on chronic respiratory failure with hypoxia and hypercapnia 12/10/2021    CHF (congestive heart failure)     COPD exacerbation 7/12/2022    Former smoker 10/24/2018    Hypertension     Smoker 7/27/2016       Past Surgical History:   Procedure Laterality Date    BREAST BIOPSY      left  side years ago in high school   1962    CHOLECYSTECTOMY      COLONOSCOPY      COLONOSCOPY N/A 08/23/2021    Procedure: COLONOSCOPY;  Surgeon: Shree Amaya MD;  Location: [address];  Service: Endoscopy;  Laterality: N/A;  Do not cancel this order  fully vaccinated-see immunization record  EF 18%  8/20-covid [facility]    HYSTERECTOMY         Review of patient's allergies indicates:   Allergen Reactions    Atorvastatin      Leg cramps       Medications:  Continuous Infusions:      Scheduled Meds:   fluticasone furoate-vilanteroL  1 puff Inhalation Daily     PRN Meds:  Current Facility-Administered Medications:     bisacodyL, 10 mg, Rectal, Daily PRN    dextrose 10%, 12.5 g, Intravenous, PRN    dextrose 10%, 25 g, Intravenous, PRN    HYDROmorphone, 1 mg, Intravenous, Q1H PRN    lorazepam, 0.5 mg, Intravenous, Q4H PRN    ondansetron, 4 mg, Intravenous, Q6H PRN    oxyCODONE, 5 mg, Oral, Q4H PRN    prochlorperazine, 5 mg, Intravenous, Q6H PRN    senna-docusate 8.6-50 mg, 1 tablet, Oral, Daily PRN    sodium chloride 0.9%, 10 mL, Intravenous,  PRN    sodium chloride 0.9%, 10 mL, Intravenous, PRN    Family History       Problem Relation (Age of Onset)    Arthritis Mother    Cancer Father, Brother    Colon cancer Father, Brother (63)    Dementia Father    Diabetes Daughter    Heart attack Mother    Heart disease Mother, Brother    Hypertension Mother, Father, Brother          Tobacco Use    Smoking status: Former     Current packs/day: 0.00     Types: Cigarettes     Quit date: 2018     Years since quittin.9     Passive exposure: Past    Smokeless tobacco: Never   Substance and Sexual Activity    Alcohol use: Yes     Comment: occasional drinker, not a daily drinker    Drug use: No    Sexual activity: Not Currently     Partners: Male         Objective:     Vital Signs (Most Recent):  Temp: 97.2 °F (36.2 °C) (24 0713)  Pulse: 108 (24 0916)  Resp: (!) 26 (24 1225)  BP: 126/66 (24 0713)  SpO2: (!) 90 % (24 0916) Vital Signs (24h Range):  Temp:  [97.2 °F (36.2 °C)-99.8 °F (37.7 °C)] 97.2 °F (36.2 °C)  Pulse:  [107-115] 108  Resp:  [9-26] 26  SpO2:  [90 %-95 %] 90 %  BP: (103-126)/(58-66) 126/66     Weight: 61.5 kg (135 lb 9.3 oz)  Body mass index is 25.62 kg/m².       Physical Exam  Vitals and nursing note reviewed.   Constitutional:       General: She is not in acute distress.     Appearance: She is ill-appearing. She is not toxic-appearing.      Comments: Sleeping    HENT:      Nose:      Comments: NC in place     Mouth/Throat:      Mouth: Mucous membranes are dry.   Pulmonary:      Effort: Pulmonary effort is normal. No respiratory distress.   Abdominal:      General: Abdomen is flat.      Palpations: Abdomen is soft.   Skin:     Comments: RLE very cold and mottled from knee down, absent distal pulses, mostly insensate   Neurological:      Comments: Does not wake to voice                        CBC:   Recent Labs   Lab 24  0315   WBC 8.61   HGB 10.4*   HCT 32.9*   MCV 77*        BMP:  No results for input(s):  ""GLU", "NA", "K", "CL", "CO2", "BUN", "CREATININE", "CALCIUM", "MG" in the last 24 hours.    LFT:  Lab Results   Component Value Date    AST 65 (H) 06/03/2024    ALKPHOS 86 06/03/2024    BILITOT 0.4 06/03/2024     Albumin:   Albumin   Date Value Ref Range Status   06/03/2024 3.3 (L) 3.5 - 5.2 g/dL Final     Protein:   Total Protein   Date Value Ref Range Status   06/03/2024 6.6 6.0 - 8.4 g/dL Final     Lactic acid:   Lab Results   Component Value Date    LACTATE 0.7 06/03/2024    LACTATE 4.7 (HH) 05/28/2024           "

## 2024-06-06 NOTE — PLAN OF CARE
Patient comfort care  Problem: Infection  Goal: Absence of Infection Signs and Symptoms  Outcome: Progressing  Intervention: Prevent or Manage Infection  Flowsheets (Taken 6/6/2024 0320)  Infection Management: aseptic technique maintained     Problem: Adult Inpatient Plan of Care  Goal: Plan of Care Review  Outcome: Progressing  Goal: Patient-Specific Goal (Individualized)  Outcome: Progressing  Goal: Absence of Hospital-Acquired Illness or Injury  Outcome: Progressing  Goal: Optimal Comfort and Wellbeing  Outcome: Progressing  Intervention: Monitor Pain and Promote Comfort  Flowsheets (Taken 6/6/2024 0320)  Pain Management Interventions:   medication offered   quiet environment facilitated   relaxation techniques promoted  Intervention: Provide Person-Centered Care  Flowsheets (Taken 6/6/2024 0320)  Trust Relationship/Rapport: (discussed with patient alert and daughter at bedside)   care explained   emotional support provided   empathic listening provided   questions answered   questions encouraged   reassurance provided   thoughts/feelings acknowledged     Problem: Fall Injury Risk  Goal: Absence of Fall and Fall-Related Injury  Outcome: Progressing  Intervention: Promote Injury-Free Environment  Flowsheets (Taken 6/6/2024 0320)  Safety Promotion/Fall Prevention: side rails raised x 2     Problem: Pain Acute  Goal: Optimal Pain Control and Function  Outcome: Progressing  Intervention: Develop Pain Management Plan  Flowsheets (Taken 6/6/2024 0320)  Pain Management Interventions:   medication offered   quiet environment facilitated   relaxation techniques promoted  Intervention: Optimize Psychosocial Wellbeing  Flowsheets (Taken 6/6/2024 0320)  Supportive Measures: active listening utilized     Problem: Skin Injury Risk Increased  Goal: Skin Health and Integrity  Outcome: Progressing  Intervention: Optimize Skin Protection  Flowsheets (Taken 6/6/2024 0320)  Pressure Reduction Techniques: weight shift assistance  provided  Pressure Reduction Devices:   foam padding utilized   pressure-redistributing mattress utilized  Skin Protection: incontinence pads utilized  Activity Management: Arm raise - L1  Head of Bed (HOB) Positioning: HOB elevated

## 2024-06-06 NOTE — NURSING
PRN medications administered for noted tachypnea and dyspnea. Minimal response to painful stimuli noted. Pt actively dying. Plan of care discussed with family. Care continued

## 2024-06-06 NOTE — PROGRESS NOTES
[facility] and [facility]  Palliative Medicine Unit   Progress Note    Patient Name: Selma Hooper  MRN: [id number]  Admission Date: 5/28/2024  Hospital Length of Stay: 9 days  Code Status: DNR   Attending Provider: Raine Olivarez MD  Primary Care Physician: Phil Andrade MD  Principal Problem:Comfort measures only status      Assessment/Plan:     Palliative Care  Palliative care encounter    Patient is a 78 year old female with end stage HFrEF, admitted for decompensated heart failure and ischemic RLE. Transitioned off of  and lasix following goals of care discussion    Interval update 06/06/2024 - admitted to hospice unit, family updated at bedside, progressing as expected, Minimal oral intake    Hospice diagnosis- decompensated heart failure with reduced ejection fraction      Dispo: anticipate EOL care on the unit, but will continue to engage family regarding care at home or alternative in-facility hospice care        Social support- fair, lives with daughter's family. Also has a son from [place], but reportedly he is struggling with mental health issues. Daughter worries she could not meet her mother's needs, and son would not cope well with her at home.     Psychological- good coping mechanisms by patient and daughter as well as grandchildren. Patient clearly has accepted her dying but struggling to cope with her family's grief and support. Learning to accept grief and love    Spiritual- Tenriism, has had anointing of the sick, appreciates continued  and  support    Symptom Assessment  - Pain/ pain behaviors: controlled  - Dyspnea/tachypnea: controlled  - Agitation: controlled  - Mentation: decreasing level of consciousness     Plan of care:  Tachypnea/dyspnea:   -non pharm measures such as having bedside fan and cooler room temps     Pain behaviors:   -hydromorphone 1mg IV (20 OME/dose) 1 hour PRN non verbal signs of discomfort     Agitation:   -Lorazepam 0.5mg IV q2hr  PRN    Uncomfortable secretions:  -glycopyrrolate 0.4mg IV q4hr PRN     GI:   -will not prioritize at end of life        Subjective:     Chief Complaint:   Chief Complaint   Patient presents with    Numbness     Arrives via EMS for right leg numbness that started this morning x2 hours, states that the numbness went away when EMS arrived, VAN -, denies any weakness currently. On 2 L NC at home,       HPI:   Ms Selma Hooper is a 78 year old woman with HFrEF (EF of 5-10%), COPD, CKD III, and hypertension who was admitted to CCU on 5/28 with cardiogenic shock. Patient started on dobutamine and lasix gtt. Course complicated by persistent JAMIR, likely cardiorenal, and RLE ischemia. Vascular surgery consulted, recommending amputation which patient has declined due to risks of anesthesia and intubation. Palliative care consulted for goals of care.     Hospital Course:  No notes on file    Interval History: Checked on patient several times throughout the day. Does not wake up to voice. Appears comfortable. Family at bedside says patient has been comfortable.     Palliative ROS:  PRNs: hydromorphone 0.5mg IV x 2, 1mg x 2   Pain +   Dyspnea -  Nausea -  Hiccups -  Vomiting -  Constipation -  Anxiety -  Agitation -  Anorexia +          Past Medical History:   Diagnosis Date    Abnormal liver enzymes 12/10/2018    Acute on chronic combined systolic and diastolic congestive heart failure 4/2/2021    Acute on chronic respiratory failure with hypoxia 4/2/2021    Acute on chronic respiratory failure with hypoxia and hypercapnia 12/10/2021    CHF (congestive heart failure)     COPD exacerbation 7/12/2022    Former smoker 10/24/2018    Hypertension     Smoker 7/27/2016       Past Surgical History:   Procedure Laterality Date    BREAST BIOPSY      left  side years ago in high school   1962    CHOLECYSTECTOMY      COLONOSCOPY      COLONOSCOPY N/A 08/23/2021    Procedure: COLONOSCOPY;  Surgeon: Shree Amaya MD;  Location: [facility] ([address] [place] [address]);   Service: Endoscopy;  Laterality: N/A;  Do not cancel this order  fully vaccinated-see immunization record  EF 18%  -covid [facility]    HYSTERECTOMY         Review of patient's allergies indicates:   Allergen Reactions    Atorvastatin      Leg cramps       Medications:  Continuous Infusions:      Scheduled Meds:   fluticasone furoate-vilanteroL  1 puff Inhalation Daily     PRN Meds:  Current Facility-Administered Medications:     bisacodyL, 10 mg, Rectal, Daily PRN    dextrose 10%, 12.5 g, Intravenous, PRN    dextrose 10%, 25 g, Intravenous, PRN    HYDROmorphone, 1 mg, Intravenous, Q1H PRN    lorazepam, 0.5 mg, Intravenous, Q4H PRN    ondansetron, 4 mg, Intravenous, Q6H PRN    oxyCODONE, 5 mg, Oral, Q4H PRN    prochlorperazine, 5 mg, Intravenous, Q6H PRN    senna-docusate 8.6-50 mg, 1 tablet, Oral, Daily PRN    sodium chloride 0.9%, 10 mL, Intravenous, PRN    sodium chloride 0.9%, 10 mL, Intravenous, PRN    Family History       Problem Relation (Age of Onset)    Arthritis Mother    Cancer Father, Brother    Colon cancer Father, Brother (63)    Dementia Father    Diabetes Daughter    Heart attack Mother    Heart disease Mother, Brother    Hypertension Mother, Father, Brother          Tobacco Use    Smoking status: Former     Current packs/day: 0.00     Types: Cigarettes     Quit date: 2018     Years since quittin.9     Passive exposure: Past    Smokeless tobacco: Never   Substance and Sexual Activity    Alcohol use: Yes     Comment: occasional drinker, not a daily drinker    Drug use: No    Sexual activity: Not Currently     Partners: Male         Objective:     Vital Signs (Most Recent):  Temp: 97.2 °F (36.2 °C) (24 0713)  Pulse: 108 (24 0916)  Resp: (!) 26 (24 1225)  BP: 126/66 (24 0713)  SpO2: (!) 90 % (24 0916) Vital Signs (24h Range):  Temp:  [97.2 °F (36.2 °C)-99.8 °F (37.7 °C)] 97.2 °F (36.2 °C)  Pulse:  [107-115] 108  Resp:  [9-26] 26  SpO2:  [90  "%-95 %] 90 %  BP: (103-126)/(58-66) 126/66     Weight: 61.5 kg (135 lb 9.3 oz)  Body mass index is 25.62 kg/m².       Physical Exam  Vitals and nursing note reviewed.   Constitutional:       General: She is not in acute distress.     Appearance: She is ill-appearing. She is not toxic-appearing.      Comments: Sleeping    HENT:      Nose:      Comments: NC in place     Mouth/Throat:      Mouth: Mucous membranes are dry.   Pulmonary:      Effort: Pulmonary effort is normal. No respiratory distress.   Abdominal:      General: Abdomen is flat.      Palpations: Abdomen is soft.   Skin:     Comments: RLE very cold and mottled from knee down, absent distal pulses, mostly insensate   Neurological:      Comments: Does not wake to voice                        CBC:   Recent Labs   Lab 06/03/24  0315   WBC 8.61   HGB 10.4*   HCT 32.9*   MCV 77*        BMP:  No results for input(s): "GLU", "NA", "K", "CL", "CO2", "BUN", "CREATININE", "CALCIUM", "MG" in the last 24 hours.    LFT:  Lab Results   Component Value Date    AST 65 (H) 06/03/2024    ALKPHOS 86 06/03/2024    BILITOT 0.4 06/03/2024     Albumin:   Albumin   Date Value Ref Range Status   06/03/2024 3.3 (L) 3.5 - 5.2 g/dL Final     Protein:   Total Protein   Date Value Ref Range Status   06/03/2024 6.6 6.0 - 8.4 g/dL Final     Lactic acid:   Lab Results   Component Value Date    LACTATE 0.7 06/03/2024    LACTATE 4.7 (HH) 05/28/2024             Raine Olivarez MD  Palliative Medicine  [facility] - Hospice and Palliative Medicine                "

## 2024-06-06 NOTE — ASSESSMENT & PLAN NOTE
Patient is a 78 year old female with end stage HFrEF, admitted for decompensated heart failure and ischemic RLE. Transitioned off of  and lasix following goals of care discussion    Interval update 06/06/2024 - admitted to hospice unit, family updated at bedside, progressing as expected, Minimal oral intake    Hospice diagnosis- decompensated heart failure with reduced ejection fraction      Dispo: anticipate EOL care on the unit, but will continue to engage family regarding care at home or alternative in-facility hospice care        Social support- fair, lives with daughter's family. Also has a son from [place], but reportedly he is struggling with mental health issues. Daughter worries she could not meet her mother's needs, and son would not cope well with her at home.     Psychological- good coping mechanisms by patient and daughter as well as grandchildren. Patient clearly has accepted her dying but struggling to cope with her family's grief and support. Learning to accept grief and love    Spiritual- Anglican, has had anointing of the sick, appreciates continued  and  support    Symptom Assessment  - Pain/ pain behaviors: controlled  - Dyspnea/tachypnea: controlled  - Agitation: controlled  - Mentation: decreasing level of consciousness     Plan of care:  Tachypnea/dyspnea:   -non pharm measures such as having bedside fan and cooler room temps     Pain behaviors:   -hydromorphone 1mg IV (20 OME/dose) 1 hour PRN non verbal signs of discomfort     Agitation:   -Lorazepam 0.5mg IV q2hr PRN    Uncomfortable secretions:  -glycopyrrolate 0.4mg IV q4hr PRN     GI:   -will not prioritize at end of life

## 2024-06-07 NOTE — PLAN OF CARE
24 0733   Final Note   Assessment Type Final Discharge Note   Anticipated Discharge Disposition      See above. Per physician : Patient is declared  at 1:40 am of 24     Mark Almaguer RN    [phone]

## 2024-06-07 NOTE — NURSING
"1920 Pt in bed resting with eyes closed, unresponsive, respirations were slightly elevated and wet no distress noted, continues with O2 per NC @ 2 Lpm, no s/s discomfort and no other changes noted from yesterday, report got new passcode for confidential patient and list of new people that could visit, called daughter and verified the code and new family members and updated chart sticky note , messaged doctor oncall about temperature    2010 patient's daughter and son visiting, daughter mentioned increased secretions and explained process and offered a book " Gone From MY Sight " to help with understanding dying process, requested medication for secretions and administered Glycopyrrolate, Lorazepam and Dilaudid for increased resp and secretions, Tylenol suppositories and Dulcolax suppositories also given for fever and constipation      2213 Pt respirations 24-26 mouth breathing with more effort required and accessory use, prn Dilaudid given, moans infrequently, a single moan, family remains at bedside    2230 patient's family left rechecked temperature , has elevated to 106.0 axillary, applied cool damp cloth and fan, AC set as low as possible , message oncall doctor    2241 Temp down to 105.4 axillary, continue with cool towel to torso and head, with fan in place    2305 Resp 16 agonal breathing, continues with temp 105.4 despite attempts to decrease, Daughter called to notify of change    2327 Daughter and grand daughter back at bedside    0040 no changes in respirations, no distress noted, temp down to 103.1  "

## 2024-06-07 NOTE — NURSING
0133 Pt's family came to desk to report that patient was dead they thought, nurse entered room, no HR and no respirations, messaged oncsuad Trent, and House supervisor called    0140  In room pronounced patient, post mortem care provided , family stepped off unit will come back to decide if anyone else will be visiting    0220 KAM called ruled out for tissue d/t age but suitable for eye donation    0232 Family back and no others coming to see her will prepare for morgue, [facility] called and will not follow for donation, Family states plans to use [facility]  home

## 2024-06-07 NOTE — CARE UPDATE
I was called upon to pronounce the death of the patient.      On examination,  Pupils fixed and dilated bilateral  CVS: No heart sounds auscultated  RS: no breath sounds    Patient is declared  at 1:40 am of 24    Cause of death: Acute cardiorespiratory arrest in case of cardiogenic shock, acute decompensated heart failure with reduced ejection fraction, ischemic right lower extremity, COPD, CKD III.

## 2024-06-07 NOTE — DISCHARGE SUMMARY
"[facility]  Palliative Medicine  Discharge Summary      Patient Name: Selma Hooper  MRN: [id number]  Admission Date: 2024  Hospital Length of Stay: 10 days  Discharge Date and Time: 2024 1:40  Attending Physician: Raine Olivarez   Discharging Provider: Raine Olivarez MD  Primary Care Provider: Phil Andrade MD    HPI:   Ms Selma Hooper is a 78 year old woman with HFrEF (EF of 5-10%), COPD, CKD III, and hypertension who was admitted to CCU on  with cardiogenic shock. Patient started on dobutamine and lasix gtt. Course complicated by persistent JAMIR, likely cardiorenal, and RLE ischemia. Vascular surgery consulted, recommending amputation which patient has declined due to risks of anesthesia and intubation. Palliative care consulted for goals of care.     * No surgery found *      Hospital Course:   GOC discussion took place where it was decided to transition to comfort focused care. Family did not feel they could care for patient at home with hospice. They declined nursing home with hospice. Patient was transferred to palliative medicine unit. Any signs of pain, dyspnea, anxiety, or agitation were treated. Patient  peacefully. Family was notified     Goals of Care Treatment Preferences:  Code Status: DNR      Consults:   Consults (From admission, onward)          Status Ordering Provider     Inpatient consult to Palliative Care  Once        Provider:  (Not yet assigned)    Completed ADDISON VELAZCO     Inpatient consult to Vascular Surgery  Once        Provider:  (Not yet assigned)    Completed JAZIEL TARIQ     Pharmacy to dose Vancomycin consult  Once        Provider:  (Not yet assigned)   Placed in "And" Linked Group    Completed TOMMIE JEFFRIES     Inpatient consult to Nephrology  Once        Provider:  (Not yet assigned)    Completed TOMMIE JEFFRIES     Inpatient consult to Electrophysiology  Once        Provider:  (Not yet " assigned)    Completed TOMMIE JEFFRIES     Inpatient consult to Vascular (Stroke) Neurology  Once        Provider:  (Not yet assigned)    Completed ADDISON VELAZCO            No new Assessment & Plan notes have been filed under this hospital service since the last note was generated.  Service: Palliative Medicine    Final Active Diagnoses:    Diagnosis Date Noted POA    PRINCIPAL PROBLEM:  Comfort measures only status [Z51.5] 2024 Not Applicable    Palliative care encounter [Z51.5] 2024 Not Applicable    Acute lower limb ischemia [I99.8] 2024 Yes    DNR (do not resuscitate) [Z66] 2024 Yes    HFrEF (heart failure with reduced ejection fraction) [I50.20] 2024 Yes    COPD (chronic obstructive pulmonary disease) [J44.9] 2024 Yes    Long QT interval [R94.31] 2024 Yes    Cardiogenic shock [R57.0] 2024 No    Arrhythmia [I49.9] 2024 Yes    Type 2 diabetes mellitus with hyperglycemia, without long-term current use of insulin [E11.65] 10/26/2023 Yes    Acute on chronic respiratory failure with hypoxia and hypercapnia [J96.21, J96.22] 12/10/2021 Yes    Acute on chronic combined systolic and diastolic congestive heart failure [I50.43] 2021 Yes    LBBB (left bundle branch block) [I44.7] 2019 Yes     Chronic    NICM (nonischemic cardiomyopathy) [I42.8] 10/24/2018 Yes     Chronic    Acute renal failure with acute tubular necrosis superimposed on stage 3a chronic kidney disease [N17.0, N18.31] 2018 Yes    Hypertension, essential [I10] 2015 Yes      Problems Resolved During this Admission:    Diagnosis Date Noted Date Resolved POA    Pulmonary emphysema [J43.9] 2018 Yes     Chronic       Discharged Condition:     Disposition:     Follow Up:   Follow-up Information       Schedule an appointment as soon as possible for a visit  with Phil Andrade MD.    Specialties: Family Medicine, Sports Medicine  Contact  information:  [address]  [facility] [address]  [phone]                           Patient Instructions:   No discharge procedures on file.    Significant Diagnostic Studies: N/A    Pending Diagnostic Studies:       Procedure Component Value Units Date/Time    Vancomycin, Random [[id number]] Collected: 05/30/24 0311    Order Status: Sent Lab Status: No result     Specimen: Blood            Medications:  None    Indwelling Lines/Drains at time of discharge:   Lines/Drains/Airways       Drain  Duration             Female External Urinary Catheter w/ Suction 06/03/24 0903 4 days                    Time spent on the discharge of patient: 5 minutes  Patient was seen and examined on the date of discharge and determined to be suitable for discharge.    Raine Olivarez MD  Department of Palliative Medicine  [facility] - Hospice and Palliative Medicine

## 2024-06-10 ENCOUNTER — EXTERNAL HOME HEALTH (OUTPATIENT)
Dept: HOME HEALTH SERVICES | Facility: HOSPITAL | Age: 79
End: 2024-06-10
Payer: COMMERCIAL

## 2024-12-26 NOTE — ASSESSMENT & PLAN NOTE
Home.  I have sent a prescription for Bentyl and Zofran to your pharmacy.  Please  take as directed or needed to treat your symptoms.  Increase your fluid intake.  Eat well-balanced meals and get plenty of rest.  Resume a clear liquid diet for at least the next few hours then advance as tolerated.  Avoid greasy, fatty, fried, spicy foods until symptoms resolve.  You may alternate Tylenol and ibuprofen for pain relief.    Call your primary care provider to schedule an appointment for follow-up as needed.    If symptoms worsen, change in presentation, or you develop new symptoms please seek medical attention or return to the ED for further evaluation.     Pt DNR per chart review and reconfirmed in the ED on this admission. Do not intubate.
